# Patient Record
Sex: FEMALE | Race: WHITE | Employment: OTHER | URBAN - METROPOLITAN AREA
[De-identification: names, ages, dates, MRNs, and addresses within clinical notes are randomized per-mention and may not be internally consistent; named-entity substitution may affect disease eponyms.]

---

## 2019-05-06 VITALS
OXYGEN SATURATION: 99 % | DIASTOLIC BLOOD PRESSURE: 86 MMHG | WEIGHT: 187 LBS | SYSTOLIC BLOOD PRESSURE: 122 MMHG | HEART RATE: 70 BPM | RESPIRATION RATE: 20 BRPM | BODY MASS INDEX: 30.05 KG/M2 | HEIGHT: 66 IN

## 2019-05-06 RX ORDER — MULTIVIT WITH MINERALS/LUTEIN
1000 TABLET ORAL DAILY
COMMUNITY

## 2019-05-06 RX ORDER — CYCLOBENZAPRINE HCL 10 MG
10 TABLET ORAL SEE ADMIN INSTRUCTIONS
COMMUNITY
End: 2019-08-05 | Stop reason: ALTCHOICE

## 2019-05-06 RX ORDER — FLUTICASONE PROPIONATE 50 MCG
1 SPRAY, SUSPENSION (ML) NASAL DAILY
COMMUNITY
End: 2019-10-02 | Stop reason: SDUPTHER

## 2019-05-06 RX ORDER — NYSTATIN 100000 U/G
CREAM TOPICAL SEE ADMIN INSTRUCTIONS
COMMUNITY
End: 2019-08-05 | Stop reason: ALTCHOICE

## 2019-05-06 RX ORDER — OMEPRAZOLE 40 MG/1
40 CAPSULE, DELAYED RELEASE ORAL DAILY
COMMUNITY
End: 2019-08-05 | Stop reason: ALTCHOICE

## 2019-05-06 RX ORDER — SULFAMETHOXAZOLE AND TRIMETHOPRIM 800; 160 MG/1; MG/1
1 TABLET ORAL SEE ADMIN INSTRUCTIONS
COMMUNITY
End: 2019-05-15 | Stop reason: ALTCHOICE

## 2019-05-06 RX ORDER — ALBUTEROL SULFATE 2.5 MG/3ML
2.5 SOLUTION RESPIRATORY (INHALATION) SEE ADMIN INSTRUCTIONS
COMMUNITY
End: 2019-08-05 | Stop reason: ALTCHOICE

## 2019-05-06 RX ORDER — LOSARTAN POTASSIUM 50 MG/1
50 TABLET ORAL DAILY
COMMUNITY
End: 2019-07-09 | Stop reason: SDUPTHER

## 2019-05-06 RX ORDER — LOPERAMIDE HYDROCHLORIDE 2 MG/1
2 TABLET ORAL SEE ADMIN INSTRUCTIONS
COMMUNITY
End: 2019-08-05

## 2019-05-06 RX ORDER — PROMETHAZINE HYDROCHLORIDE 25 MG/1
25 TABLET ORAL SEE ADMIN INSTRUCTIONS
COMMUNITY
End: 2019-08-05 | Stop reason: ALTCHOICE

## 2019-05-06 RX ORDER — ERYTHROMYCIN AND BENZOYL PEROXIDE 30; 50 MG/G; MG/G
GEL TOPICAL DAILY
COMMUNITY
End: 2019-08-05 | Stop reason: ALTCHOICE

## 2019-05-06 RX ORDER — ACYCLOVIR 50 MG/G
CREAM TOPICAL
COMMUNITY
End: 2019-08-05

## 2019-05-06 RX ORDER — METOLAZONE 2.5 MG/1
2.5 TABLET ORAL SEE ADMIN INSTRUCTIONS
COMMUNITY
End: 2019-08-05 | Stop reason: ALTCHOICE

## 2019-05-06 RX ORDER — MAGNESIUM CARB/ALUMINUM HYDROX 105-160MG
296 TABLET,CHEWABLE ORAL SEE ADMIN INSTRUCTIONS
COMMUNITY
End: 2019-08-05 | Stop reason: SDUPTHER

## 2019-05-06 RX ORDER — CLOTRIMAZOLE 10 MG/1
10 LOZENGE ORAL; TOPICAL SEE ADMIN INSTRUCTIONS
COMMUNITY
End: 2019-05-15 | Stop reason: ALTCHOICE

## 2019-05-06 RX ORDER — KETOCONAZOLE 20 MG/G
CREAM TOPICAL DAILY
COMMUNITY
End: 2019-08-05

## 2019-05-06 RX ORDER — ACETAMINOPHEN 325 MG/1
650 TABLET ORAL EVERY 4 HOURS PRN
COMMUNITY
End: 2019-08-05

## 2019-05-06 RX ORDER — FUROSEMIDE 40 MG/1
40 TABLET ORAL DAILY
COMMUNITY
End: 2020-01-31 | Stop reason: SDUPTHER

## 2019-05-06 RX ORDER — POLYETHYLENE GLYCOL 3350 17 G/17G
POWDER, FOR SOLUTION ORAL SEE ADMIN INSTRUCTIONS
Status: ON HOLD | COMMUNITY
End: 2020-07-30 | Stop reason: HOSPADM

## 2019-05-06 RX ORDER — LEVOCETIRIZINE DIHYDROCHLORIDE 5 MG/1
5 TABLET, FILM COATED ORAL DAILY
COMMUNITY
End: 2019-08-05

## 2019-05-14 ENCOUNTER — HOSPITAL ENCOUNTER (EMERGENCY)
Age: 64
Discharge: HOME | End: 2019-05-15
Attending: EMERGENCY MEDICINE
Payer: MEDICAID

## 2019-05-14 ENCOUNTER — APPOINTMENT (OUTPATIENT)
Dept: CT IMAGING | Age: 64
End: 2019-05-14
Payer: MEDICAID

## 2019-05-14 DIAGNOSIS — S20.212A HEMATOMA OF LEFT CHEST WALL, INITIAL ENCOUNTER: Primary | ICD-10-CM

## 2019-05-14 LAB
ABO/RH: NORMAL
ALBUMIN SERPL-MCNC: 4 G/DL (ref 3.5–5.2)
ALP BLD-CCNC: 110 U/L (ref 35–104)
ALT SERPL-CCNC: 26 U/L (ref 0–32)
ANION GAP SERPL CALCULATED.3IONS-SCNC: 14 MMOL/L (ref 7–16)
ANTIBODY SCREEN: NORMAL
AST SERPL-CCNC: 25 U/L (ref 0–31)
BASOPHILS ABSOLUTE: 0.13 E9/L (ref 0–0.2)
BASOPHILS RELATIVE PERCENT: 0.9 % (ref 0–2)
BILIRUB SERPL-MCNC: 0.5 MG/DL (ref 0–1.2)
BUN BLDV-MCNC: 15 MG/DL (ref 8–23)
CALCIUM SERPL-MCNC: 9 MG/DL (ref 8.6–10.2)
CHLORIDE BLD-SCNC: 105 MMOL/L (ref 98–107)
CO2: 22 MMOL/L (ref 22–29)
CREAT SERPL-MCNC: 0.6 MG/DL (ref 0.5–1)
EOSINOPHILS ABSOLUTE: 0.52 E9/L (ref 0.05–0.5)
EOSINOPHILS RELATIVE PERCENT: 3.5 % (ref 0–6)
GFR AFRICAN AMERICAN: >60
GFR NON-AFRICAN AMERICAN: >60 ML/MIN/1.73
GLUCOSE BLD-MCNC: 125 MG/DL (ref 74–99)
HCT VFR BLD CALC: 39.1 % (ref 34–48)
HEMOGLOBIN: 13.1 G/DL (ref 11.5–15.5)
LYMPHOCYTES ABSOLUTE: 2.98 E9/L (ref 1.5–4)
LYMPHOCYTES RELATIVE PERCENT: 20 % (ref 20–42)
MCH RBC QN AUTO: 33.5 PG (ref 26–35)
MCHC RBC AUTO-ENTMCNC: 33.5 % (ref 32–34.5)
MCV RBC AUTO: 100 FL (ref 80–99.9)
MONOCYTES ABSOLUTE: 1.49 E9/L (ref 0.1–0.95)
MONOCYTES RELATIVE PERCENT: 10.4 % (ref 2–12)
NEUTROPHILS ABSOLUTE: 9.69 E9/L (ref 1.8–7.3)
NEUTROPHILS RELATIVE PERCENT: 65.2 % (ref 43–80)
PDW BLD-RTO: 14 FL (ref 11.5–15)
PLATELET # BLD: 385 E9/L (ref 130–450)
PMV BLD AUTO: 10.6 FL (ref 7–12)
POLYCHROMASIA: ABNORMAL
POTASSIUM SERPL-SCNC: 4.7 MMOL/L (ref 3.5–5)
RBC # BLD: 3.91 E12/L (ref 3.5–5.5)
ROULEAUX: ABNORMAL
SODIUM BLD-SCNC: 141 MMOL/L (ref 132–146)
TOTAL PROTEIN: 6.9 G/DL (ref 6.4–8.3)
WBC # BLD: 14.9 E9/L (ref 4.5–11.5)

## 2019-05-14 PROCEDURE — 6370000000 HC RX 637 (ALT 250 FOR IP): Performed by: EMERGENCY MEDICINE

## 2019-05-14 PROCEDURE — 6360000004 HC RX CONTRAST MEDICATION: Performed by: INTERNAL MEDICINE

## 2019-05-14 PROCEDURE — 86900 BLOOD TYPING SEROLOGIC ABO: CPT

## 2019-05-14 PROCEDURE — 2580000003 HC RX 258: Performed by: EMERGENCY MEDICINE

## 2019-05-14 PROCEDURE — 85025 COMPLETE CBC W/AUTO DIFF WBC: CPT

## 2019-05-14 PROCEDURE — 96375 TX/PRO/DX INJ NEW DRUG ADDON: CPT

## 2019-05-14 PROCEDURE — 96374 THER/PROPH/DIAG INJ IV PUSH: CPT

## 2019-05-14 PROCEDURE — 74177 CT ABD & PELVIS W/CONTRAST: CPT

## 2019-05-14 PROCEDURE — 6360000002 HC RX W HCPCS: Performed by: EMERGENCY MEDICINE

## 2019-05-14 PROCEDURE — 80053 COMPREHEN METABOLIC PANEL: CPT

## 2019-05-14 PROCEDURE — 36415 COLL VENOUS BLD VENIPUNCTURE: CPT

## 2019-05-14 PROCEDURE — 86850 RBC ANTIBODY SCREEN: CPT

## 2019-05-14 PROCEDURE — 2580000003 HC RX 258: Performed by: INTERNAL MEDICINE

## 2019-05-14 PROCEDURE — 86901 BLOOD TYPING SEROLOGIC RH(D): CPT

## 2019-05-14 PROCEDURE — 71260 CT THORAX DX C+: CPT

## 2019-05-14 PROCEDURE — 70450 CT HEAD/BRAIN W/O DYE: CPT

## 2019-05-14 PROCEDURE — 99284 EMERGENCY DEPT VISIT MOD MDM: CPT

## 2019-05-14 RX ORDER — NAPROXEN 500 MG/1
500 TABLET ORAL 2 TIMES DAILY WITH MEALS
Qty: 60 TABLET | Refills: 0 | Status: SHIPPED | OUTPATIENT
Start: 2019-05-14 | End: 2019-05-15 | Stop reason: ALTCHOICE

## 2019-05-14 RX ORDER — 0.9 % SODIUM CHLORIDE 0.9 %
1000 INTRAVENOUS SOLUTION INTRAVENOUS ONCE
Status: COMPLETED | OUTPATIENT
Start: 2019-05-14 | End: 2019-05-14

## 2019-05-14 RX ORDER — MORPHINE SULFATE 4 MG/ML
4 INJECTION, SOLUTION INTRAMUSCULAR; INTRAVENOUS ONCE
Status: COMPLETED | OUTPATIENT
Start: 2019-05-14 | End: 2019-05-14

## 2019-05-14 RX ORDER — ACETAMINOPHEN 500 MG
1000 TABLET ORAL ONCE
Status: DISCONTINUED | OUTPATIENT
Start: 2019-05-14 | End: 2019-05-14

## 2019-05-14 RX ORDER — HYDROCODONE BITARTRATE AND ACETAMINOPHEN 5; 325 MG/1; MG/1
2 TABLET ORAL ONCE
Status: COMPLETED | OUTPATIENT
Start: 2019-05-14 | End: 2019-05-14

## 2019-05-14 RX ORDER — SODIUM CHLORIDE 0.9 % (FLUSH) 0.9 %
10 SYRINGE (ML) INJECTION PRN
Status: DISCONTINUED | OUTPATIENT
Start: 2019-05-14 | End: 2019-05-15 | Stop reason: HOSPADM

## 2019-05-14 RX ORDER — ONDANSETRON 2 MG/ML
4 INJECTION INTRAMUSCULAR; INTRAVENOUS ONCE
Status: COMPLETED | OUTPATIENT
Start: 2019-05-14 | End: 2019-05-14

## 2019-05-14 RX ORDER — MORPHINE SULFATE 4 MG/ML
4 INJECTION, SOLUTION INTRAMUSCULAR; INTRAVENOUS ONCE
Status: DISCONTINUED | OUTPATIENT
Start: 2019-05-14 | End: 2019-05-14

## 2019-05-14 RX ADMIN — Medication 10 ML: at 18:06

## 2019-05-14 RX ADMIN — IOPAMIDOL 110 ML: 755 INJECTION, SOLUTION INTRAVENOUS at 18:06

## 2019-05-14 RX ADMIN — HYDROCODONE BITARTRATE AND ACETAMINOPHEN 2 TABLET: 5; 325 TABLET ORAL at 19:02

## 2019-05-14 RX ADMIN — Medication 4 MG: at 16:26

## 2019-05-14 RX ADMIN — ONDANSETRON HYDROCHLORIDE 4 MG: 2 SOLUTION INTRAMUSCULAR; INTRAVENOUS at 16:27

## 2019-05-14 RX ADMIN — SODIUM CHLORIDE 1000 ML: 9 INJECTION, SOLUTION INTRAVENOUS at 20:04

## 2019-05-14 ASSESSMENT — PAIN DESCRIPTION - LOCATION: LOCATION: RIB CAGE

## 2019-05-14 ASSESSMENT — ENCOUNTER SYMPTOMS
BLOOD IN STOOL: 0
VOMITING: 0
COLOR CHANGE: 1
NAUSEA: 0
BACK PAIN: 1
ABDOMINAL PAIN: 0
SHORTNESS OF BREATH: 0
COUGH: 0

## 2019-05-14 ASSESSMENT — PAIN SCALES - GENERAL
PAINLEVEL_OUTOF10: 4
PAINLEVEL_OUTOF10: 10
PAINLEVEL_OUTOF10: 10

## 2019-05-14 ASSESSMENT — PAIN DESCRIPTION - ORIENTATION: ORIENTATION: LEFT

## 2019-05-14 ASSESSMENT — PAIN DESCRIPTION - PAIN TYPE: TYPE: ACUTE PAIN

## 2019-05-14 NOTE — ED PROVIDER NOTES
Condition is a 48-year-old female presenting to ED with complaint of fall. Patient states that she normally uses a wheelchair to get around but can transition to a walker. Patient states that her knee gave out 6 days ago when she fell onto her left side striking her left side of her ribs and arm on the ground. Patient states she had a similar episode that occurred today that the fall was much softer. Patient has noticed swelling over the left side of her chest and has significant pain in that site. Patient denies any difficulty breathing. Patient is concerned she may have broken ribs. Patient denies any significant head trauma. Patient denies loss of consciousness. She is not attempted to treat her symptoms with any medication. Patient's history of UTI, MS, metatarsal fracture, hypertension, abdominal wall hematoma and 2011, patient also history of multiple falls and asthma. Review of Systems   Constitutional: Negative for chills and fever. Respiratory: Negative for cough and shortness of breath. Cardiovascular: Positive for chest pain. Gastrointestinal: Negative for abdominal pain, blood in stool, nausea and vomiting. Genitourinary: Negative for dysuria and frequency. Musculoskeletal: Positive for back pain. Skin: Positive for color change. Negative for rash. Bruising over left chest and left flank   Neurological: Negative for weakness and headaches. All other systems reviewed and are negative. Physical Exam   Constitutional: She is oriented to person, place, and time. She appears well-developed and well-nourished. No distress. HENT:   Head: Normocephalic and atraumatic. Eyes: Pupils are equal, round, and reactive to light. Neck: Normal range of motion. Neck supple. Cardiovascular: Normal rate, regular rhythm and intact distal pulses. Pulmonary/Chest: Effort normal and breath sounds normal. No respiratory distress. She has no wheezes. She has no rales.  She exhibits tenderness. Abdominal: Soft. Bowel sounds are normal. There is no tenderness. There is no rebound and no guarding. Musculoskeletal: She exhibits no edema. Neurological: She is alert and oriented to person, place, and time. No cranial nerve deficit. Coordination normal.   Skin: Skin is warm and dry. She is not diaphoretic. Bruising over the left side of the chest wall extending down the left flank. Nursing note and vitals reviewed. Procedures    MDM  Number of Diagnoses or Management Options  Diagnosis management comments: She is a 54-year-old female presenting to ED after multiple falls. Considering possibility of rib fractures, hematoma over the chest wall, also considering possible atelectasis or coronary contusion. Patient did have multiple falls considering possibility of head traumas by patient's denial of it. Patient wanted a head CT performed. Patient also has left-sided flank bruising considered possibility of internal bleeding from possible abdominal organ injury. 7:30 patient has smi 2500, ct shows hematoma on left subcutaneous space, patient will be treated for hematoma, inflammation and pain. Advised to frequently use spirometer. Patient has pain medication at home and follows with pain management.     --------------------------------------------- PAST HISTORY ---------------------------------------------  Past Medical History:  has a past medical history of Asthma, Fall, Hematoma of abdominal wall, Hypertension, Metatarsal fracture, Multiple sclerosis ([facility] [address] and UTI (urinary tract infection). Past Surgical History:  has a past surgical history that includes Hysterectomy; Cholecystectomy; and Appendectomy. Social History:  reports that she has been smoking cigarettes. She has a 11.50 pack-year smoking history. She does not have any smokeless tobacco history on file. She reports that she does not drink alcohol or use drugs.     Family History: family history includes Asthma in her ischemic changes. CT CHEST W CONTRAST   Final Result   Biliary dilatation likely status post cholecystectomy   Enhancing lesion in the right lobe of the liver   On the most inferior slice there appears to be a right renal mass   possibly arising from the right kidney of low density likely a cyst   Findings consistent with emphysema and scar in the lung fields   , Superimposed on interstitial lung disease                           CT ABDOMEN PELVIS W IV CONTRAST Additional Contrast? None   Final Result   Lesion in the liver faintly seen on the CT abdomen better seen on the   CT chest chest with evidence for enhancement, concerning for   hepatocellular carcinoma    Renal masses, statistically likely cysts. Biliary dilatation                                     ------------------------- NURSING NOTES AND VITALS REVIEWED ---------------------------  Date / Time Roomed:  5/14/2019  3:19 PM  ED Bed Assignment:  12/12    The nursing notes within the ED encounter and vital signs as below have been reviewed. /80   Pulse 87   Temp 97.6 °F (36.4 °C) (Oral)   Resp 18   Ht 5' 6\" (1.676 m)   Wt 165 lb (74.8 kg)   SpO2 98%   BMI 26.63 kg/m²   Oxygen Saturation Interpretation: Normal      ------------------------------------------ PROGRESS NOTES ------------------------------------------  ED Course as of May 14 2355   Tue May 14, 2019   2000 Notified that the patient has a blood pressure of 19O systolically. Fluids ordered. Patient asymptomatic.    [BM]      ED Course User Index  [BM] Cara Wu MD         11:55 PM  I have spoken with the patient and discussed todays results, in addition to providing specific details for the plan of care and counseling regarding the diagnosis and prognosis. Their questions are answered at this time and they are agreeable with the plan. I discussed at length with them reasons for immediate return here for re evaluation.  They will followup with their primary care

## 2019-05-15 VITALS
WEIGHT: 165 LBS | RESPIRATION RATE: 17 BRPM | HEART RATE: 82 BPM | BODY MASS INDEX: 26.52 KG/M2 | DIASTOLIC BLOOD PRESSURE: 60 MMHG | HEIGHT: 66 IN | SYSTOLIC BLOOD PRESSURE: 118 MMHG | OXYGEN SATURATION: 98 % | TEMPERATURE: 97.6 F

## 2019-05-15 RX ORDER — MAGNESIUM CARB/ALUMINUM HYDROX 105-160MG
TABLET,CHEWABLE ORAL
Refills: 11 | COMMUNITY
Start: 2019-05-10 | End: 2019-08-14 | Stop reason: SDUPTHER

## 2019-05-15 RX ORDER — AMOXICILLIN 875 MG/1
TABLET, COATED ORAL
Refills: 0 | COMMUNITY
Start: 2019-02-08 | End: 2019-02-13

## 2019-05-15 RX ORDER — AMOXICILLIN 500 MG/1
CAPSULE ORAL
Refills: 0 | COMMUNITY
Start: 2019-04-30 | End: 2019-05-07

## 2019-05-15 RX ORDER — INCONTINENCE PAD,LINER,DISP
EACH MISCELLANEOUS
Refills: 11 | COMMUNITY
Start: 2019-05-10 | End: 2020-12-10

## 2019-05-15 RX ORDER — CLINDAMYCIN HYDROCHLORIDE 300 MG/1
CAPSULE ORAL
Refills: 0 | COMMUNITY
Start: 2019-04-25 | End: 2019-05-06

## 2019-05-15 ASSESSMENT — PAIN SCALES - GENERAL: PAINLEVEL_OUTOF10: 5

## 2019-05-15 NOTE — ED NOTES
Called [employer] [facility]  had a flat and is why not here at 5 or 530.   Changing things around and assured  will still be here     Tracy Cavanaugh RN  05/15/19 [id number]

## 2019-05-15 NOTE — ED NOTES
Pt placed on bedpan for moderate amount of urine. Attempts with ambulance with prior nurse to take home to [place] unsuccessful. Pt called person at [facility] unable to get hold of her at this time to see if can pick pt up.   Continuing to call for transport     Jamia Norwood RN  05/15/19 0005

## 2019-05-15 NOTE — ED NOTES
Pt doesn't have money to pay for [employer] transport called [employer] back to see if can run pt [facility] # to see if medicaid will pay.   Medicaid will pay for transport and will  pt approx 500 to [address], RN  05/15/19 003

## 2019-05-16 ENCOUNTER — NURSE ONLY (OUTPATIENT)
Dept: PRIMARY CARE CLINIC | Age: 64
End: 2019-05-16
Payer: MEDICAID

## 2019-05-16 VITALS
SYSTOLIC BLOOD PRESSURE: 107 MMHG | RESPIRATION RATE: 20 BRPM | DIASTOLIC BLOOD PRESSURE: 73 MMHG | OXYGEN SATURATION: 98 % | HEART RATE: 90 BPM

## 2019-05-16 DIAGNOSIS — D49.0 NEOPLASM, LIVER: ICD-10-CM

## 2019-05-16 DIAGNOSIS — G35 MULTIPLE SCLEROSIS (HCC): ICD-10-CM

## 2019-05-16 DIAGNOSIS — S30.1XXD HEMATOMA OF LEFT FLANK, SUBSEQUENT ENCOUNTER: ICD-10-CM
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
## 2019-05-16 DIAGNOSIS — R60.0 LOCALIZED EDEMA: ICD-10-CM

## 2019-05-16 DIAGNOSIS — K59.00 CONSTIPATION, UNSPECIFIED CONSTIPATION TYPE: ICD-10-CM
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
## 2019-05-16 PROCEDURE — 99350 HOME/RES VST EST HIGH MDM 60: CPT | Performed by: FAMILY MEDICINE

## 2019-05-16 NOTE — PROGRESS NOTES
Ongoing evaluation of chronic medical problems. This patient has Neck pain; Multiple sclerosis ([address] Bilateral foot-drop; and Peripheral polyneuropathy on their problem list.     Home visit medically necessary in lieu of an office visit due to: wheelchair bound, difficult to get out. HPI:  Seen in [facility] ER on 5/14/19 after she fell and landed on L flank. Has a lot of bruising and some swelling. C/o muscle spasms on L side from fall. CT scan of chest showed worrisome lesion in R lobe of liver. Also c/o constipated and hard stools. Has not had a BM for at least a week. She's finished the home PT. She has officially quit smoking (2/25/19). REVIEW OF SYSTEMS:   General:  Weight stable, generally healthy, no change in strength or exercise tolerance. Heart: No palpitations, no syncope, no orthopnea. Abdomen: Chronic constipation from vicodin (sparingly) and MS managed on stool softeners and laxatives. No change in appetite, no dysphagia, no abdominal pains, no bowel habit changes, no emesis, no melena. : No urinary urgency, no dysuria, no change in nature of urine. Neurologic:   Unable to walk without holding on to something. Significant ataxia. In w/c most of the time. No weakness, no tremor, no seizures, no changes in mentation. All other systems negative. PAST MEDICAL HISTORY: (Major events, hospitalizations, surgeries):  Pneumonia (2010), 1998 Vag hyst, 1978 naina, append, freq epidural inclusion cysts tx'd with docycycline. MS dx'd Mar 8, 1991. IV corticosteroids 1991-92. Has never been on MS meds. 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 Known allergies: NKDA. Ongoing medical problems: Multiple sclerosis, Asthma, HTN, hypoglycemia, scoliosis, DDD with sciatic, arthritis, osteoporosis, chronic LBP. Preventative: Pneumovax 11/2009, Flu vac 2018, 2017, 2012, smoking cessation counselling 7/2014 Social history:  with 3 children.    Smokes 1-1 1/2 ppd 40 yrs. No alcohol. . 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 Nutritional history: Takes a lot of vitamins and supplements every day. PHYSICAL EXAM:      Vitals:    05/16/19 1238   BP: 107/73   Site: Right Upper Arm   Position: Sitting   Cuff Size: Large Adult   Pulse: 90   Resp: 20   SpO2: 98%      GEN: middle age overweight female patient sitting in WC in NAD. HEAD:  atraumatic, normocephalic. EYES:  EOMI, PERRL, L  conjunct normal, R conjunct reddened, without drainage. ENT:  EACs and TMs normal. ORAL: mouth with 12 dental extractions healing,  Tongue with white coating. Thick nasal secretions. Pharynx: PND. LUNGS:   clear to ausc, no rales or rhonchi. HEART:  RRR, no murmurs or gallops. ABD:  Obese, soft, non-tender to palp, no palp masses or HSM, normal BS. BACK:  No CVAT. Scoliosis toward right / kyphosis,  tender to palp over L lumbar area. EXTREM: 1-2+ edema  Unable to make a strong fist.   Venous stasis changes. No ulcerations, varicosities or erythema,  deformities. MUSCULOSKEL:  good ROM of most joints, non-tender to palp and with movement, except lower extremeties which are weakened from [address] and non-wt bearing. WC bound from MS. NEURO:  Normal motor for her. No tremor, normal sensory,  able to stand and transfer with much effort. Unable to walk. SKIN: Clear, no lesions. R midback with papular eczema patch. No drainage  No ulcerations or breakdown, ecchymosis, or other lesions. Medications reviewed. Labs and Imaging reviewed. ASSESSMENT:  Elderly female with has Neck pain; Multiple sclerosis ([facility] [address] Bilateral foot-drop; and Peripheral polyneuropathy on their problem list.     Diagnoses attached to this encounter:  Rachel Smith was seen today for constipation and fall.     Diagnoses and all orders for this visit:    Fall as cause of accidental injury in home as place of occurrence, subsequent encounter    Constipation, unspecified Indications: Swelling      ketoconazole (NIZORAL) 2 % cream Apply topically daily Apply to affected area BID      levocetirizine (XYZAL) 5 MG tablet Take 5 mg by mouth daily Take 1 tab daily for allergy symptoms      loperamide (LOPERAMIDE A-D) 2 MG tablet Take 2 mg by mouth See Admin Instructions 2 tabs to start, 1 tab after each diarrhea stool, up to 6 tabs per day      loratadine-pseudoephedrine (SM LORATADINE D) 5-120 MG per extended release tablet Take 1 tablet by mouth 2 times daily      losartan (COZAAR) 50 MG tablet Take 50 mg by mouth daily      Magnesium Citrate 1.745 GM/30ML solution Take 296 mLs by mouth See Admin Instructions 1 bottle daily prn constipation      metolazone (ZAROXOLYN) 2.5 MG tablet Take 2.5 mg by mouth See Admin Instructions Take 1 tab by mouth daily 30 minutes before lazix as needed for swelling      B Complex-C-Folic Acid (HM VITAMIN B COMPLEX/VITAMIN C PO) Take 1 tablet by mouth daily      mupirocin (BACTROBAN) 2 % ointment Apply topically See Admin Instructions Apply to affected area BID until better      nystatin (MYCOSTATIN) 073212 UNIT/GM cream Apply topically See Admin Instructions Apply daily to affected area until resolved      omeprazole (PRILOSEC) 40 MG delayed release capsule Take 40 mg by mouth daily      polyethylene glycol (GLYCOLAX) powder Take by mouth See Admin Instructions 1 capful in large glass water prn constipation      promethazine (PHENERGAN) 25 MG tablet Take 25 mg by mouth See Admin Instructions 1/2 -1 tab q6h prn N/V      Propylhexedrine (BENZEDREX) INHA by Nasal route Inhale each nostril for nasal congestion      Disposable Gloves (VINYL GLOVES) MISC by Does not apply route      HYDROcodone-acetaminophen (NORCO)  MG per tablet Take 1 tablet by mouth See Admin Instructions.  Take 1 tab TID prn chronic pain due to multiple sclerosis G35      albuterol sulfate  (90 BASE) MCG/ACT inhaler Inhale 2 puffs into the lungs every 6 hours

## 2019-05-31 ENCOUNTER — TELEPHONE (OUTPATIENT)
Dept: CASE MANAGEMENT | Age: 64
End: 2019-05-31

## 2019-05-31 ENCOUNTER — OFFICE VISIT (OUTPATIENT)
Dept: ONCOLOGY | Age: 64
End: 2019-05-31
Payer: MEDICAID

## 2019-05-31 ENCOUNTER — HOSPITAL ENCOUNTER (OUTPATIENT)
Dept: INFUSION THERAPY | Age: 64
Discharge: HOME | End: 2019-05-31
Payer: MEDICAID

## 2019-05-31 VITALS
BODY MASS INDEX: 26.52 KG/M2 | HEIGHT: 66 IN | WEIGHT: 165 LBS | HEART RATE: 86 BPM | SYSTOLIC BLOOD PRESSURE: 141 MMHG | DIASTOLIC BLOOD PRESSURE: 87 MMHG | TEMPERATURE: 98.2 F

## 2019-05-31 DIAGNOSIS — R16.0 LIVER MASS: Primary | ICD-10-CM

## 2019-05-31 DIAGNOSIS — R16.0 LIVER MASS: ICD-10-CM

## 2019-05-31 LAB
ALBUMIN SERPL-MCNC: 4.1 G/DL (ref 3.5–5.2)
ALP BLD-CCNC: 117 U/L (ref 35–104)
ALT SERPL-CCNC: 21 U/L (ref 0–32)
ANION GAP SERPL CALCULATED.3IONS-SCNC: 12 MMOL/L (ref 7–16)
AST SERPL-CCNC: 24 U/L (ref 0–31)
BASOPHILS ABSOLUTE: 0 E9/L (ref 0–0.2)
BASOPHILS RELATIVE PERCENT: 0.8 % (ref 0–2)
BILIRUB SERPL-MCNC: 0.7 MG/DL (ref 0–1.2)
BUN BLDV-MCNC: 17 MG/DL (ref 8–23)
CALCIUM SERPL-MCNC: 9.7 MG/DL (ref 8.6–10.2)
CEA: 1.7 NG/ML (ref 0–5.2)
CHLORIDE BLD-SCNC: 106 MMOL/L (ref 98–107)
CO2: 25 MMOL/L (ref 22–29)
CREAT SERPL-MCNC: 0.6 MG/DL (ref 0.5–1)
EOSINOPHILS ABSOLUTE: 0.46 E9/L (ref 0.05–0.5)
EOSINOPHILS RELATIVE PERCENT: 4.3 % (ref 0–6)
GFR AFRICAN AMERICAN: >60
GFR NON-AFRICAN AMERICAN: >60 ML/MIN/1.73
GLUCOSE BLD-MCNC: 106 MG/DL (ref 74–99)
HCT VFR BLD CALC: 41.6 % (ref 34–48)
HEMOGLOBIN: 13.6 G/DL (ref 11.5–15.5)
LYMPHOCYTES ABSOLUTE: 1.3 E9/L (ref 1.5–4)
LYMPHOCYTES RELATIVE PERCENT: 12.2 % (ref 20–42)
MCH RBC QN AUTO: 33.7 PG (ref 26–35)
MCHC RBC AUTO-ENTMCNC: 32.7 % (ref 32–34.5)
MCV RBC AUTO: 103.2 FL (ref 80–99.9)
METAMYELOCYTES RELATIVE PERCENT: 0.9 % (ref 0–1)
MONOCYTES ABSOLUTE: 1.19 E9/L (ref 0.1–0.95)
MONOCYTES RELATIVE PERCENT: 11.3 % (ref 2–12)
MYELOCYTE PERCENT: 0.9 % (ref 0–0)
NEUTROPHILS ABSOLUTE: 7.78 E9/L (ref 1.8–7.3)
NEUTROPHILS RELATIVE PERCENT: 70.4 % (ref 43–80)
PDW BLD-RTO: 14.8 FL (ref 11.5–15)
PLATELET # BLD: 418 E9/L (ref 130–450)
PMV BLD AUTO: 9.8 FL (ref 7–12)
POTASSIUM SERPL-SCNC: 4.6 MMOL/L (ref 3.5–5)
RBC # BLD: 4.03 E12/L (ref 3.5–5.5)
SODIUM BLD-SCNC: 143 MMOL/L (ref 132–146)
TOTAL PROTEIN: 7.5 G/DL (ref 6.4–8.3)
WBC # BLD: 10.8 E9/L (ref 4.5–11.5)

## 2019-05-31 PROCEDURE — 80053 COMPREHEN METABOLIC PANEL: CPT

## 2019-05-31 PROCEDURE — 99214 OFFICE O/P EST MOD 30 MIN: CPT

## 2019-05-31 PROCEDURE — 85610 PROTHROMBIN TIME: CPT | Performed by: INTERNAL MEDICINE

## 2019-05-31 PROCEDURE — 80074 ACUTE HEPATITIS PANEL: CPT

## 2019-05-31 PROCEDURE — 82105 ALPHA-FETOPROTEIN SERUM: CPT

## 2019-05-31 PROCEDURE — 36415 COLL VENOUS BLD VENIPUNCTURE: CPT

## 2019-05-31 PROCEDURE — 99244 OFF/OP CNSLTJ NEW/EST MOD 40: CPT | Performed by: INTERNAL MEDICINE

## 2019-05-31 PROCEDURE — 86301 IMMUNOASSAY TUMOR CA 19-9: CPT

## 2019-05-31 PROCEDURE — 86703 HIV-1/HIV-2 1 RESULT ANTBDY: CPT

## 2019-05-31 PROCEDURE — 85025 COMPLETE CBC W/AUTO DIFF WBC: CPT

## 2019-05-31 PROCEDURE — 82378 CARCINOEMBRYONIC ANTIGEN: CPT

## 2019-05-31 PROCEDURE — 86316 IMMUNOASSAY TUMOR OTHER: CPT

## 2019-05-31 RX ORDER — SOY ISOFLAVONE 40 MG
1 TABLET ORAL DAILY
Status: ON HOLD | COMMUNITY
End: 2020-07-30 | Stop reason: HOSPADM

## 2019-05-31 NOTE — TELEPHONE ENCOUNTER
Met with patient during her initial consultation appointment with Dr. Joe Rodriguez at [facility] for evaluation of liver mass per her PCP. Introduced myself and explained my role with patients receiving treatment at our cancer center. Patient was friendly and receptive. She has her history of MS, HTN, and asthma. She is in a wheelchair. On [employer]SI. Transportation with [employer] (needs 24 hr notice prior), [employer] assisting with shower entrance opening, and an aide comes 4x/wk to assist with laundry on lower floor. Reports that she baths, cooks, and cleans for herself. The aides are more companions and they play cards, etc.  Patient takes numerous vitamins and supplements. Completed nursing assessment for the center. Informed her that our office will contact once her CT Triphasic liver scan, CT guided liver biopsy, and Dr. Jany Cast consult is scheduled. Verbalizes understanding. Provided contact information, and instructed patient to call me with questions or concerns. Verbalizes understanding. Patient appreciative of visit. Will continue to follow. Transported to lab for blood work and called for her transportation. Dali Maciel, BSW, RN, OCN  Oncology Nurse Navigator

## 2019-05-31 NOTE — PROGRESS NOTES
mouth See Admin Instructions 1 bottle daily prn constipation, Disp: , Rfl:     metolazone (ZAROXOLYN) 2.5 MG tablet, Take 2.5 mg by mouth See Admin Instructions Take 1 tab by mouth daily 30 minutes before lazix as needed for swelling, Disp: , Rfl:     B Complex-C-Folic Acid (HM VITAMIN B COMPLEX/VITAMIN C PO), Take 1 tablet by mouth daily, Disp: , Rfl:     HYDROcodone-acetaminophen (NORCO)  MG per tablet, Take 1 tablet by mouth See Admin Instructions. Take 1 tab TID prn chronic pain due to multiple sclerosis G35, Disp: , Rfl:     albuterol sulfate  (90 BASE) MCG/ACT inhaler, Inhale 2 puffs into the lungs every 6 hours as needed for Wheezing, Disp: , Rfl:     vitamin D (CHOLECALCIFEROL) 5000 UNITS CAPS capsule, Take 5,000 Units by mouth daily, Disp: , Rfl:     senna (SENOKOT) 8.6 MG tablet, Take 1 tablet by mouth daily, Disp: , Rfl:     ammonium lactate (LAC-HYDRIN) 12 % lotion, Apply topically as needed for Dry Skin Apply topically as needed. , Disp: , Rfl:     Incontinence Supply Disposable (PROTECTIVE UNDERWEAR LARGE) MISC, , Disp: , Rfl: 11    Incontinence Supply Disposable (POISE MAXIMUM ABSORBENCY) PADS, , Disp: , Rfl: 11    acetaminophen (TYLENOL) 325 MG tablet, Take 650 mg by mouth every 4 hours as needed for Pain, Disp: , Rfl:     acyclovir (ZOVIRAX) 5 % CREA, Apply topically Apply every 4 hours x 5 days at first sign of cold sore, Disp: , Rfl:     betamethasone valerate (VALISONE) 0.1 % cream, Apply topically 3 times daily Apply to rash TID until resolved, Disp: , Rfl:     cyclobenzaprine (FLEXERIL) 10 MG tablet, Take 10 mg by mouth See Admin Instructions 1 tab BID-TID prn spasms, Disp: , Rfl:     ketoconazole (NIZORAL) 2 % cream, Apply topically daily Apply to affected area BID, Disp: , Rfl:     levocetirizine (XYZAL) 5 MG tablet, Take 5 mg by mouth daily Take 1 tab daily for allergy symptoms, Disp: , Rfl:     loperamide (LOPERAMIDE A-D) 2 MG tablet, Take 2 mg by mouth See Admin Inability: Not on file    Transportation needs:     Medical: Not on file     Non-medical: Not on file   Tobacco Use    Smoking status: Former Smoker     Packs/day: 0.25     Years: 46.00     Pack years: 11.50     Types: Cigarettes     Last attempt to quit: 2019     Years since quittin.2    Smokeless tobacco: Never Used   Substance and Sexual Activity    Alcohol use: No    Drug use: No    Sexual activity: Not Currently   Lifestyle    Physical activity:     Days per week: Not on file     Minutes per session: Not on file    Stress: Not on file   Relationships    Social connections:     Talks on phone: Not on file     Gets together: Not on file     Attends Bahai service: Not on file     Active member of club or organization: Not on file     Attends meetings of clubs or organizations: Not on file     Relationship status: Not on file    Intimate partner violence:     Fear of current or ex partner: Not on file     Emotionally abused: Not on file     Physically abused: Not on file     Forced sexual activity: Not on file   Other Topics Concern    Not on file   Social History Narrative    Not on file       Occupation: [employer]  Retired:  NO    Pacemaker/Defibulator/ICD:  No    [employer]: No    FALLS RISK SCREENING ASSESSMENT    Instructions:  Assess the patient and [place] the appropriate indicators that are present for fall risk identification. Total the numbers circled and assign a fall risk score from Table 2.  Reassess patient at a minimum every 12 weeks or with status change. Assessment   Date  2019     1. Mental Ability: confusion/cognitively impaired No - 0       2. Elimination Issues: incontinence, frequency No - 0       3. Ambulatory: use of assistive devices (walker, cane, off-loading devices), attached to equipment (IV pole, oxygen) Yes - 2     4. Sensory Limitations: dizziness, vertigo, impaired vision No - 0       5. Age 72 years or greater - 1       10.   Medication: diuretics, strong analgesics, hypnotics, sedatives, antihypertensive agents   Yes - 3   7. Falls:  recent history of falls within the last 3 months (not to include slipping or tripping)   No - 0   TOTAL 4    If score of 4 or greater was education given? Yes       TABLE 2   Risk Score Risk Level Plan of Care   0-3 Little or  No Risk 1. Provide assistance as indicated for ambulation activities  2. Reorient confused/cognitively impaired patient  3. Call-light/bell within patient's reach  4. Chair/bed in low position, stretcher/bed with siderails up except when performing patient care activities  5. Educate patient/family/caregiver on falls prevention  6.  Reassess in 12 weeks or with any noted change in patient condition which places them at a risk for a fall   4-6 Moderate Risk 1. Provide assistance as indicated for ambulation activities  2. Reorient confused/cognitively impaired patient  3. Call-light/bell within patient's reach  4. Chair/bed in low position, stretcher/bed with siderails up except when performing patient care activities  5. Educate patient/family/caregiver on falls prevention  6. Falls risk precaution (Yellow sticker Level II) placed on patient chart   7 or   Higher High Risk 1. Place patient in easily observable treatment room  2. Patient attended at all times by family member or staff  3. Provide assistance as indicated for ambulation activities  4. Reorient confused/cognitively impaired patient  5. Call-light/bell within patient's reach  6. Chair/bed in low position, stretcher/bed with siderails up except when performing patient care activities  7. Educate patient/family/caregiver on falls prevention  8. Falls risk precaution (Yellow sticker Level III) placed on patient chart           MALNUTRITION RISK SCREENING ASSESSMENT    Instructions:  Assess the patient and enter the appropriate indicators that are present for nutrition risk identification.  Total the numbers entered and assign a risk score. Follow the appropriate action for total score listed below. Assessment   Date  5/31/2019     1. Have you lost weight without trying? 0- No     2. Have you been eating poorly because of a decreased appetite? 0- No   3. Do you have a diagnosis of head and neck cancer? 0- No                                                                                    TOTAL 0        Score of 0-1: No action  Score 2 or greater:  · For Non-Diabetic Patient: Recommend adding Ensure Complete 2 x daily and provide patient with Ensure wellness bag with coupons  · For Diabetic Patient: Recommend adding Glucerna Shake 2 x daily and provide patient with Glucerna Wellness bag with coupons  · Route to the dietitian via [employer]    · Are you having  difficulty performing daily routine tasks  due to fatigue or weakness (ie: bathing/showering, dressing, housework, meal prep, work, child Javed Passey): No     · Do you have any arm flexibility/ROM restrictions, swelling or pain that limit activity: No     · Any changes in memory, attention/focus that impact daily activities: No     · Do you avoid participation in leisure/social activity due weakness, fatigue or pain: No     ARE ANY OF THE ABOVE ARE ANSWERED YES: No          PT ASSESSMENT FOR REFERRAL    · Have you had any recent falls in past 2 months: No     · Do you have difficulty  going up/down stairs: Yes     · Are you having difficulty walking: Yes     · Do you often hold onto furniture/environmental supports or feel off balance when you are walking: Yes     · Do you need to take rest breaks when you are walking: No     · Any pain on scale of 1-10 that limits your mobility: No 0/10    ARE ANY OF THE ABOVE ARE ANSWERED YES: Yes - but NO PT referral request sent due to patient refusal.     Has MS but does all chores.   Has an aide for company for [employer]-4x/wk      [address]    - Is patient planned to receive Cisplatin? No. This patient is not planned to start Cisplatin. - Is patient complaining of new onset hearing loss? No. Patient is not complaining of new onset hearing loss.         Jose Raul Douglass

## 2019-05-31 NOTE — PROGRESS NOTES
Department of [facility] Med Oncology  Attending Consult Note    Reason for Visit:  Consultation on a patient with Liver mass    Referring Physician:  Sharad Bhat MD    PCP:  Sharad Bhat MD    History of Present Illness:  72 y/o female with hx of HTN, Asthma, MS who presented to the ED on 05/14/2019 after a fall. Hx of MS, multiple falls, has been using a replacement wheelchair while her normal one has been getting fixed. Work-up included CT chest/abdomen/pelvis 05/14/2019 which noted enhancing 2 x 1.5 cm mass in the right lobe of the liver. Referred to our clinic for further evaluation. Review of Systems;  CONSTITUTIONAL: No fever, chills. Fair appetite. Positive for fatigue. Wheelchair bound  ENMT: Eyes: No diplopia; Nose: No epistaxis. Mouth: No sore throat. RESPIRATORY: No hemoptysis, shortness of breath, cough. CARDIOVASCULAR: No chest pain, palpitations. GASTROINTESTINAL: No nausea/vomiting, abdominal pain, diarrhea/constipation. GENITOURINARY: No dysuria, urinary frequency, hematuria. NEURO: Hx of MS not on medications. Remainder:  ROS NEGATIVE    Past Medical History:      Diagnosis Date    Asthma     Fall 5/26/2011    Hematoma of abdominal wall 5/2011    extraperitoneal    Hypertension     Metatarsal fracture 1/2011    right 3rd and 4th    Multiple sclerosis (HCC)     UTI (urinary tract infection)      Past Surgical History:      Procedure Laterality Date    APPENDECTOMY      CHOLECYSTECTOMY      HYSTERECTOMY       Family History:  Family History   Problem Relation Age of Onset    Asthma Father     High Blood Pressure Father     Cancer Maternal Aunt      Medications:  Reviewed and reconciled.     Social History:  Social History     Socioeconomic History    Marital status:      Spouse name: Not on file    Number of children: Not on file    Years of education: Not on file    Highest education level: Not on file   Occupational History    Not on file   Social Needs    presented to the ED on 05/14/2019 after a fall. Work-up included CT chest/abdomen/pelvis 05/14/2019 which showed enhancing 2 x 1.5 cm mass in the right lobe of the liver. Labs will be drawn today including AFP. CT Triphasic Liver ordered. CT guided needle biopsy ordered. Hepatobiliary team consulted (Dr. Doreen Valdivia). RTC 2-3 weeks to review test results and treatment plan. Thank you for allowing us to participate in the care of Mrs. Natasha Braden.     Marcial Whitaker MD   3/25/8998  Board Certified Medical Oncologist

## 2019-06-01 LAB
AFP-TUMOR MARKER: 5 NG/ML (ref 0–9)
CA 19-9: 13 U/ML (ref 0–37)

## 2019-06-02 ENCOUNTER — HOSPITAL ENCOUNTER (OUTPATIENT)
Dept: CT IMAGING | Age: 64
Discharge: HOME | End: 2019-06-04
Payer: MEDICAID

## 2019-06-02 DIAGNOSIS — R16.0 LIVER MASS: ICD-10-CM

## 2019-06-02 LAB — CHROMOGRANIN A: 129 NG/ML (ref 0–95)

## 2019-06-02 PROCEDURE — 6360000004 HC RX CONTRAST MEDICATION: Performed by: RADIOLOGY

## 2019-06-02 PROCEDURE — 2580000003 HC RX 258: Performed by: RADIOLOGY

## 2019-06-02 PROCEDURE — 74174 CTA ABD&PLVS W/CONTRAST: CPT

## 2019-06-02 RX ORDER — SODIUM CHLORIDE 0.9 % (FLUSH) 0.9 %
10 SYRINGE (ML) INJECTION ONCE
Status: COMPLETED | OUTPATIENT
Start: 2019-06-02 | End: 2019-06-02

## 2019-06-02 RX ADMIN — Medication 10 ML: at 10:38

## 2019-06-02 RX ADMIN — IOPAMIDOL 90 ML: 755 INJECTION, SOLUTION INTRAVENOUS at 10:38

## 2019-06-03 LAB
HAV IGM SER IA-ACNC: NORMAL
HEPATITIS B CORE IGM ANTIBODY: NORMAL
HEPATITIS B SURFACE ANTIGEN INTERPRETATION: NORMAL
HEPATITIS C ANTIBODY INTERPRETATION: NORMAL
HIV-1 AND HIV-2 ANTIBODIES: NORMAL

## 2019-06-06 ENCOUNTER — TELEPHONE (OUTPATIENT)
Dept: PRIMARY CARE CLINIC | Age: 64
End: 2019-06-06

## 2019-06-06 ENCOUNTER — TELEPHONE (OUTPATIENT)
Dept: HEMATOLOGY | Age: 64
End: 2019-06-06

## 2019-06-06 NOTE — TELEPHONE ENCOUNTER
Lyla Blackwell called. She states she is still having pain and swelling to her L side (around where her bra would sit) since she fell in March. She said all of the xrays were negative at that time, and she wants to know why it would still be swollen.

## 2019-06-06 NOTE — TELEPHONE ENCOUNTER
I called the patient and she confirmed her 6/7/19 appt at 10:45 am in [facility] department. [address]. [facility],  [address]. Directions: Park in the ER parking lot enter through door B, walk straight until you see the elevators on the right. Take the elevators down to 1R, when you come off the elevators turn left and notify the  in the glass case that you are here to see Dr. Joshua Cerrato.      Electronically signed by Nicolás Neff on 6/6/19 at 10:22 AM

## 2019-06-06 NOTE — TELEPHONE ENCOUNTER
She had significant ecchymosis and a large hematoma when I saw her. Sometimes that can develop into a seroma, a collection of fluid, that needs to be drained. It is is hurting a lot still, she probably should go back to the ER to have it checked or I can stop and see her next time I'm in the area.

## 2019-06-07 ENCOUNTER — OFFICE VISIT (OUTPATIENT)
Dept: HEMATOLOGY | Age: 64
End: 2019-06-07
Payer: MEDICAID

## 2019-06-07 VITALS
DIASTOLIC BLOOD PRESSURE: 84 MMHG | WEIGHT: 165 LBS | BODY MASS INDEX: 26.52 KG/M2 | TEMPERATURE: 97.8 F | RESPIRATION RATE: 14 BRPM | OXYGEN SATURATION: 98 % | SYSTOLIC BLOOD PRESSURE: 164 MMHG | HEART RATE: 77 BPM | HEIGHT: 66 IN

## 2019-06-07 DIAGNOSIS — R16.0 LIVER MASS: Primary | ICD-10-CM

## 2019-06-07 DIAGNOSIS — S30.1XXA ABDOMINAL WALL HEMATOMA, INITIAL ENCOUNTER: ICD-10-CM

## 2019-06-07 PROCEDURE — 99204 OFFICE O/P NEW MOD 45 MIN: CPT | Performed by: TRANSPLANT SURGERY

## 2019-06-07 PROCEDURE — 99211 OFF/OP EST MAY X REQ PHY/QHP: CPT | Performed by: TRANSPLANT SURGERY

## 2019-06-07 ASSESSMENT — ENCOUNTER SYMPTOMS
VOMITING: 0
DIARRHEA: 0
PHOTOPHOBIA: 0
ABDOMINAL PAIN: 0
SHORTNESS OF BREATH: 0
CONSTIPATION: 0
EYE PAIN: 0
BACK PAIN: 0
NAUSEA: 0
BLOOD IN STOOL: 0
EYE DISCHARGE: 0

## 2019-06-07 NOTE — PROGRESS NOTES
Hepatobiliary and Pancreatic Surgery Attending History and Physical    Patient's Name/Date of Birth: Antonio Dale /1955 (14 y.o.)    Date: June 7, 2019     CC: possible liver mass    HPI:  Patient is a very pleasant 71year old female whom has MS and recently fell. She had a CT scan performed of her liver which questioned a 2.5cm hepatocellular carcinoma in the right side of her liver. She then underwent a triphasic CT which was not convincing. She denies any weight loss. She denies a family history of cancer. She was a former smoker and a non drinker.      Past Medical History:   Diagnosis Date    Asthma     Fall 5/26/2011    Hematoma of abdominal wall 5/2011    extraperitoneal    Hypertension     Metatarsal fracture 1/2011    right 3rd and 4th    Multiple sclerosis ([facility] [address]     UTI (urinary tract infection)        Past Surgical History:   Procedure Laterality Date    APPENDECTOMY      CHOLECYSTECTOMY      HYSTERECTOMY         Current Outpatient Medications   Medication Sig Dispense Refill    BLACK ELDERBERRY,BERRY-FLOWER, PO Take 1 tablet by mouth daily      Methylsulfonylmethane (MSM) 1000 MG CAPS Take 1 tablet by mouth daily      Multiple Vitamins-Minerals (MULTIVITAMIN ADULT PO) Take 1 tablet by mouth daily      Incontinence Supply Disposable (PROTECTIVE UNDERWEAR LARGE) MISC   11    Incontinence Supply Disposable (POISE MAXIMUM ABSORBENCY) PADS   11    acetaminophen (TYLENOL) 325 MG tablet Take 650 mg by mouth every 4 hours as needed for Pain      acyclovir (ZOVIRAX) 5 % CREA Apply topically Apply every 4 hours x 5 days at first sign of cold sore      albuterol (PROVENTIL) (2.5 MG/3ML) 0.083% nebulizer solution Take 2.5 mg by nebulization See Admin Instructions 1 vial via nebulizer q4h prn cough/wheeze      Ascorbic Acid (VITAMIN C) 1000 MG tablet Take 1,000 mg by mouth daily      benzoyl peroxide-erythromycin (BENZAMYCIN) 5-3 % gel Apply topically daily Apply once a day for breakouts      betamethasone valerate (VALISONE) 0.1 % cream Apply topically 3 times daily Apply to rash TID until resolved      cyclobenzaprine (FLEXERIL) 10 MG tablet Take 10 mg by mouth See Admin Instructions 1 tab BID-TID prn spasms      dextromethorphan-guaiFENesin (MUCINEX DM)  MG per extended release tablet Take 1 tablet by mouth every 12 hours as needed for Cough      fluticasone (FLONASE) 50 MCG/ACT nasal spray 1 spray by Each Nare route daily      furosemide (LASIX) 40 MG tablet Take 40 mg by mouth daily Indications: Swelling      ketoconazole (NIZORAL) 2 % cream Apply topically daily Apply to affected area BID      levocetirizine (XYZAL) 5 MG tablet Take 5 mg by mouth daily Take 1 tab daily for allergy symptoms      loperamide (LOPERAMIDE A-D) 2 MG tablet Take 2 mg by mouth See Admin Instructions 2 tabs to start, 1 tab after each diarrhea stool, up to 6 tabs per day      loratadine-pseudoephedrine (SM LORATADINE D) 5-120 MG per extended release tablet Take 1 tablet by mouth 2 times daily      losartan (COZAAR) 50 MG tablet Take 50 mg by mouth daily      Magnesium Citrate 1.745 GM/30ML solution Take 296 mLs by mouth See Admin Instructions 1 bottle daily prn constipation      metolazone (ZAROXOLYN) 2.5 MG tablet Take 2.5 mg by mouth See Admin Instructions Take 1 tab by mouth daily 30 minutes before lazix as needed for swelling      B Complex-C-Folic Acid (HM VITAMIN B COMPLEX/VITAMIN C PO) Take 1 tablet by mouth daily      mupirocin (BACTROBAN) 2 % ointment Apply topically See Admin Instructions Apply to affected area BID until better      nystatin (MYCOSTATIN) 431360 UNIT/GM cream Apply topically See Admin Instructions Apply daily to affected area until resolved      omeprazole (PRILOSEC) 40 MG delayed release capsule Take 40 mg by mouth daily      polyethylene glycol (GLYCOLAX) powder Take by mouth See Admin Instructions 1 capful in large glass water prn constipation      promethazine (PHENERGAN) 25 MG tablet Take 25 mg by mouth See Admin Instructions 1/2 -1 tab q6h prn N/V      Propylhexedrine (BENZEDREX) INHA by Nasal route Inhale each nostril for nasal congestion      Disposable Gloves (VINYL GLOVES) MISC by Does not apply route      HYDROcodone-acetaminophen (NORCO)  MG per tablet Take 1 tablet by mouth See Admin Instructions. Take 1 tab TID prn chronic pain due to multiple sclerosis G35      albuterol sulfate  (90 BASE) MCG/ACT inhaler Inhale 2 puffs into the lungs every 6 hours as needed for Wheezing      vitamin D (CHOLECALCIFEROL) 5000 UNITS CAPS capsule Take 5,000 Units by mouth daily      senna (SENOKOT) 8.6 MG tablet Take 1 tablet by mouth daily      ammonium lactate (LAC-HYDRIN) 12 % lotion Apply topically as needed for Dry Skin Apply topically as needed. No current facility-administered medications for this visit.         No Known Allergies    Family History   Problem Relation Age of Onset    Asthma Father     High Blood Pressure Father     Cancer Maternal Aunt        Social History     Socioeconomic History    Marital status:      Spouse name: Not on file    Number of children: Not on file    Years of education: Not on file    Highest education level: Not on file   Occupational History    Not on file   Social Needs    Financial resource strain: Not on file    Food insecurity:     Worry: Not on file     Inability: Not on file    Transportation needs:     Medical: Not on file     Non-medical: Not on file   Tobacco Use    Smoking status: Former Smoker     Packs/day: 0.25     Years: 46.00     Pack years: 11.50     Types: Cigarettes     Last attempt to quit: 2019     Years since quittin.2    Smokeless tobacco: Never Used   Substance and Sexual Activity    Alcohol use: No    Drug use: No    Sexual activity: Not Currently   Lifestyle    Physical activity:     Days per week: Not on file     Minutes per session: Not on file    Stress: Not on file   Relationships    Social connections:     Talks on phone: Not on file     Gets together: Not on file     Attends Spiritism service: Not on file     Active member of club or organization: Not on file     Attends meetings of clubs or organizations: Not on file     Relationship status: Not on file    Intimate partner violence:     Fear of current or ex partner: Not on file     Emotionally abused: Not on file     Physically abused: Not on file     Forced sexual activity: Not on file   Other Topics Concern    Not on file   Social History Narrative    Not on file       ROS:   Review of Systems   Constitutional: Negative for chills, diaphoresis and fever. HENT: Negative for congestion, ear discharge, ear pain, hearing loss, nosebleeds and tinnitus. Eyes: Negative for photophobia, pain and discharge. Respiratory: Negative for shortness of breath. Cardiovascular: Negative for palpitations and leg swelling. Gastrointestinal: Negative for abdominal pain, blood in stool, constipation, diarrhea, nausea and vomiting. Endocrine: Negative for polydipsia. Genitourinary: Negative for frequency, hematuria and urgency. Musculoskeletal: Positive for myalgias. Negative for back pain and neck pain. Skin: Negative for rash. Allergic/Immunologic: Negative for environmental allergies. Neurological: Negative for tremors and seizures. Psychiatric/Behavioral: Negative for hallucinations and suicidal ideas. The patient is not nervous/anxious. Physical Exam:  Vitals:    06/07/19 1054   BP: (!) 164/84   Pulse: 77   Resp: 14   Temp: 97.8 °F (36.6 °C)   SpO2: 98%       PSYCH: mood and affect normal, alert and oriented x 3  CONSTITUTIONAL: No apparent distress, comfortable  EYES: Sclera white, pupils equal round and reactive to light  ENMT:  Hearing normal, trachea midline, ears externally intact  LYMPH: no lympadenopathy in neck.  Nolympadenopathy in groins  RESP: Breath sounds were clear and equal with no rales, wheezes, or rhonchi. Respiratory effort was normal with no retractions or use of accessory muscles. CV: Heart soundswere normal with a regular rate and rhythm. No pedal edema  GI/ Abdomen: Soft, nondistended, nontender, no guarding, no peritoneal signs  MSK: no clubbing/ no cyanosis       Assessment/Plan:  Possible 2.5cm right lobe liver mass  - we reviewed her CT images together today in the office. I am not convinced that something is there. I agree with the triphasic CT read. - I would advise against a liver biopsy at this point  - will plan for an ultrasound in 3 months  - she is in agreement with this plan    45 Minutes of which greater than 50% was spent counseling or coordinating her care. Thank you for the consultation allowing me to take part in Ms. Lara's care.      Bladimir Patel M.D.  6/7/2019  11:05 AM

## 2019-06-14 ENCOUNTER — OFFICE VISIT (OUTPATIENT)
Dept: ONCOLOGY | Age: 64
End: 2019-06-14
Payer: MEDICAID

## 2019-06-14 ENCOUNTER — HOSPITAL ENCOUNTER (OUTPATIENT)
Dept: INFUSION THERAPY | Age: 64
Discharge: HOME | End: 2019-06-14
Payer: MEDICAID

## 2019-06-14 ENCOUNTER — TELEPHONE (OUTPATIENT)
Dept: CASE MANAGEMENT | Age: 64
End: 2019-06-14

## 2019-06-14 VITALS
BODY MASS INDEX: 26.52 KG/M2 | WEIGHT: 165 LBS | TEMPERATURE: 98.8 F | HEIGHT: 66 IN | DIASTOLIC BLOOD PRESSURE: 92 MMHG | SYSTOLIC BLOOD PRESSURE: 117 MMHG | HEART RATE: 88 BPM

## 2019-06-14 DIAGNOSIS — Z09 FOLLOW UP: Primary | ICD-10-CM

## 2019-06-14 PROCEDURE — 99214 OFFICE O/P EST MOD 30 MIN: CPT | Performed by: INTERNAL MEDICINE

## 2019-06-14 PROCEDURE — 99212 OFFICE O/P EST SF 10 MIN: CPT

## 2019-06-14 NOTE — PROGRESS NOTES
[place] [id number]  Attending Clinic Note    Reason for Visit: Follow-up on a patient with ? Liver lesion    PCP:  Jackson Johnson MD    History of Present Illness:  70 y/o female with hx of HTN, Asthma, MS who presented to the ED on 05/14/2019 after a fall. Hx of MS, multiple falls, has been using a replacement wheelchair while her normal one has been getting fixed. Work-up included CT chest/abdomen/pelvis 05/14/2019 which noted enhancing 2 x 1.5 cm mass in the right lobe of the liver. Referred to our clinic for further evaluation. Review of Systems;  CONSTITUTIONAL: No fever, chills. Fair appetite. Positive for fatigue. Wheelchair bound  ENMT: Eyes: No diplopia; Nose: No epistaxis. Mouth: No sore throat. RESPIRATORY: No hemoptysis, shortness of breath, cough. CARDIOVASCULAR: No chest pain, palpitations. GASTROINTESTINAL: No nausea/vomiting, abdominal pain, diarrhea/constipation. GENITOURINARY: No dysuria, urinary frequency, hematuria. NEURO: Hx of MS not on medications. Remainder:  ROS NEGATIVE    Past Medical History:      Diagnosis Date    Asthma     Fall 5/26/2011    Hematoma of abdominal wall 5/2011    extraperitoneal    Hypertension     Metatarsal fracture 1/2011    right 3rd and 4th    Multiple sclerosis (HCC)     UTI (urinary tract infection)      Past Surgical History:      Procedure Laterality Date    APPENDECTOMY      CHOLECYSTECTOMY      HYSTERECTOMY       Family History:  Family History   Problem Relation Age of Onset    Asthma Father     High Blood Pressure Father     Cancer Maternal Aunt      Medications:  Reviewed and reconciled. Allergies:  No Known Allergies    Physical Exam:  BP (!) 117/92 (Site: Left Upper Arm, Position: Sitting, Cuff Size: Large Adult)   Pulse 88   Temp 98.8 °F (37.1 °C) (Oral)   Ht 5' 6\" (1.676 m)   Wt 165 lb (74.8 kg)   BMI 26.63 kg/m²   GENERAL: Alert, oriented x 3, Tired.  Wheelchair bound secondary to her MS  HEENT: KAYLAH; EOMI. Oropharynx clear. NECK: Supple. Without lymphadenopathy. LUNGS: Good air entry bilaterally. No wheezing, crackles or ronchi. CARDIOVASCULAR: Regular rate. No murmurs, rubs or gallops. ABDOMEN: Soft. Non-tender, non-distended. Positive bowel sounds. EXTREMITIES: Without clubbing, cyanosis, or edema. ECOG PS 2    Impression/Plan:  70 y/o female with hx of HTN, Asthma, MS who presented to the ED on 05/14/2019 after a fall. Work-up included CT chest/abdomen/pelvis 05/14/2019 which showed enhancing 2 x 1.5 cm mass in the right lobe of the liver. On 05/31/2019:   AFP 5  CEA 1.7  CA 19-9 13  CT Triphasic Liver No pathologically enhancing hepatic abnormalities noted. Hepatobiliary team consult (Dr. Kandis Ghosh) appreciated. Liver U/S in 3 months.   RTC PRN    Jaz Charles MD   3/54/0754  Board Certified Medical Oncologist

## 2019-07-09 RX ORDER — LOSARTAN POTASSIUM 50 MG/1
50 TABLET ORAL DAILY
Qty: 90 TABLET | Refills: 3 | Status: SHIPPED | OUTPATIENT
Start: 2019-07-09 | End: 2019-09-12 | Stop reason: ALTCHOICE

## 2019-07-09 RX ORDER — LOSARTAN POTASSIUM 50 MG/1
50 TABLET ORAL DAILY
Qty: 30 TABLET | Refills: 11 | OUTPATIENT
Start: 2019-07-09

## 2019-08-01 ENCOUNTER — TELEPHONE (OUTPATIENT)
Dept: PRIMARY CARE CLINIC | Age: 64
End: 2019-08-01

## 2019-08-05 ENCOUNTER — OFFICE VISIT (OUTPATIENT)
Dept: PRIMARY CARE CLINIC | Age: 64
End: 2019-08-05
Payer: MEDICAID

## 2019-08-05 VITALS
BODY MASS INDEX: 26.63 KG/M2 | OXYGEN SATURATION: 97 % | HEART RATE: 73 BPM | DIASTOLIC BLOOD PRESSURE: 94 MMHG | HEIGHT: 66 IN | SYSTOLIC BLOOD PRESSURE: 138 MMHG | RESPIRATION RATE: 20 BRPM

## 2019-08-05 DIAGNOSIS — G62.9 PERIPHERAL POLYNEUROPATHY: ICD-10-CM

## 2019-08-05 DIAGNOSIS — G35 MULTIPLE SCLEROSIS (HCC): Primary | ICD-10-CM

## 2019-08-05 DIAGNOSIS — R60.0 EDEMA OF BOTH LEGS: ICD-10-CM

## 2019-08-05 DIAGNOSIS — K59.00 CONSTIPATION, UNSPECIFIED CONSTIPATION TYPE: ICD-10-CM

## 2019-08-05 DIAGNOSIS — I10 ESSENTIAL HYPERTENSION: ICD-10-CM

## 2019-08-05 DIAGNOSIS — J45.909 ASTHMA, UNSPECIFIED ASTHMA SEVERITY, UNSPECIFIED WHETHER COMPLICATED, UNSPECIFIED WHETHER PERSISTENT: ICD-10-CM

## 2019-08-05 PROCEDURE — 99349 HOME/RES VST EST MOD MDM 40: CPT | Performed by: FAMILY MEDICINE

## 2019-08-05 RX ORDER — MAGNESIUM CARB/ALUMINUM HYDROX 105-160MG
296 TABLET,CHEWABLE ORAL SEE ADMIN INSTRUCTIONS
Qty: 1 BOTTLE | Refills: 5 | Status: ON HOLD
Start: 2019-08-05 | End: 2020-07-30 | Stop reason: HOSPADM

## 2019-08-05 ASSESSMENT — PATIENT HEALTH QUESTIONNAIRE - PHQ9
SUM OF ALL RESPONSES TO PHQ QUESTIONS 1-9: 0
SUM OF ALL RESPONSES TO PHQ QUESTIONS 1-9: 0
SUM OF ALL RESPONSES TO PHQ9 QUESTIONS 1 & 2: 0
1. LITTLE INTEREST OR PLEASURE IN DOING THINGS: 0
2. FEELING DOWN, DEPRESSED OR HOPELESS: 0

## 2019-08-13 ENCOUNTER — TELEPHONE (OUTPATIENT)
Dept: PRIMARY CARE CLINIC | Age: 64
End: 2019-08-13

## 2019-08-13 RX ORDER — AZITHROMYCIN 250 MG/1
250 TABLET, FILM COATED ORAL SEE ADMIN INSTRUCTIONS
Qty: 6 TABLET | Refills: 0 | Status: SHIPPED | OUTPATIENT
Start: 2019-08-13 | End: 2019-08-18

## 2019-08-13 NOTE — TELEPHONE ENCOUNTER
Emelyn Farley called. She states she has had a sore throat for 3 days. Her glands are swollen, and today she is having diarrhea and a temp of 99.1. She is asking that you call in an ATB.

## 2019-08-14 RX ORDER — MAGNESIUM CARB/ALUMINUM HYDROX 105-160MG
1 TABLET,CHEWABLE ORAL 2 TIMES DAILY PRN
Qty: 56 BOTTLE | Refills: 11 | Status: SHIPPED | OUTPATIENT
Start: 2019-08-14 | End: 2020-01-30

## 2019-09-11 ENCOUNTER — TELEPHONE (OUTPATIENT)
Dept: HEMATOLOGY | Age: 64
End: 2019-09-11

## 2019-09-12 ENCOUNTER — TELEPHONE (OUTPATIENT)
Dept: PRIMARY CARE CLINIC | Age: 64
End: 2019-09-12

## 2019-09-12 RX ORDER — LOSARTAN POTASSIUM AND HYDROCHLOROTHIAZIDE 12.5; 5 MG/1; MG/1
1 TABLET ORAL DAILY
Qty: 30 TABLET | Refills: 5 | Status: SHIPPED | OUTPATIENT
Start: 2019-09-12 | End: 2020-02-04 | Stop reason: ALTCHOICE

## 2019-09-25 ENCOUNTER — TELEPHONE (OUTPATIENT)
Dept: HEMATOLOGY | Age: 64
End: 2019-09-25

## 2019-09-26 ENCOUNTER — OFFICE VISIT (OUTPATIENT)
Dept: HEMATOLOGY | Age: 64
End: 2019-09-26
Payer: MEDICAID

## 2019-09-26 ENCOUNTER — HOSPITAL ENCOUNTER (OUTPATIENT)
Dept: ULTRASOUND IMAGING | Age: 64
Discharge: HOME | End: 2019-09-28
Payer: MEDICAID

## 2019-09-26 VITALS
RESPIRATION RATE: 18 BRPM | BODY MASS INDEX: 25.9 KG/M2 | HEART RATE: 71 BPM | SYSTOLIC BLOOD PRESSURE: 113 MMHG | OXYGEN SATURATION: 95 % | DIASTOLIC BLOOD PRESSURE: 74 MMHG | WEIGHT: 165 LBS | HEIGHT: 67 IN

## 2019-09-26 DIAGNOSIS — R16.0 LIVER MASS: ICD-10-CM

## 2019-09-26 DIAGNOSIS — K76.0 NAFLD (NONALCOHOLIC FATTY LIVER DISEASE): Primary | ICD-10-CM

## 2019-09-26 PROCEDURE — 99213 OFFICE O/P EST LOW 20 MIN: CPT | Performed by: TRANSPLANT SURGERY

## 2019-09-26 PROCEDURE — 76705 ECHO EXAM OF ABDOMEN: CPT

## 2019-09-26 RX ORDER — MULTIVIT WITH MINERALS/LUTEIN
1000 TABLET ORAL DAILY
COMMUNITY
End: 2020-08-21

## 2019-10-02 ENCOUNTER — OFFICE VISIT (OUTPATIENT)
Dept: PRIMARY CARE CLINIC | Age: 64
End: 2019-10-02
Payer: MEDICAID

## 2019-10-02 VITALS
OXYGEN SATURATION: 96 % | RESPIRATION RATE: 20 BRPM | SYSTOLIC BLOOD PRESSURE: 127 MMHG | DIASTOLIC BLOOD PRESSURE: 86 MMHG | HEART RATE: 75 BPM

## 2019-10-02 DIAGNOSIS — I10 ESSENTIAL HYPERTENSION: Primary | ICD-10-CM

## 2019-10-02 DIAGNOSIS — K59.00 CONSTIPATION, UNSPECIFIED CONSTIPATION TYPE: ICD-10-CM

## 2019-10-02 DIAGNOSIS — Z23 NEED FOR VACCINATION: ICD-10-CM

## 2019-10-02 DIAGNOSIS — G62.9 PERIPHERAL POLYNEUROPATHY: ICD-10-CM

## 2019-10-02 DIAGNOSIS — G35 MULTIPLE SCLEROSIS (HCC): ICD-10-CM

## 2019-10-02 DIAGNOSIS — R60.0 EDEMA OF BOTH LEGS: ICD-10-CM

## 2019-10-02 PROCEDURE — 90471 IMMUNIZATION ADMIN: CPT | Performed by: FAMILY MEDICINE

## 2019-10-02 PROCEDURE — 90670 PCV13 VACCINE IM: CPT | Performed by: FAMILY MEDICINE

## 2019-10-02 PROCEDURE — 90472 IMMUNIZATION ADMIN EACH ADD: CPT | Performed by: FAMILY MEDICINE

## 2019-10-02 PROCEDURE — 90653 IIV ADJUVANT VACCINE IM: CPT | Performed by: FAMILY MEDICINE

## 2019-10-02 PROCEDURE — 99350 HOME/RES VST EST HIGH MDM 60: CPT | Performed by: FAMILY MEDICINE

## 2019-10-02 RX ORDER — LEVOCETIRIZINE DIHYDROCHLORIDE 5 MG/1
5 TABLET, FILM COATED ORAL NIGHTLY PRN
Qty: 30 TABLET | Refills: 11 | Status: ON HOLD
Start: 2019-10-02 | End: 2020-07-30 | Stop reason: HOSPADM

## 2019-10-02 RX ORDER — DOCUSATE SODIUM 100 MG/1
100 CAPSULE, LIQUID FILLED ORAL 2 TIMES DAILY PRN
Qty: 60 CAPSULE | Refills: 11 | Status: ON HOLD
Start: 2019-10-02 | End: 2020-07-30 | Stop reason: HOSPADM

## 2019-10-02 RX ORDER — SENNA PLUS 8.6 MG/1
1 TABLET ORAL DAILY
Qty: 30 TABLET | Refills: 11 | Status: ON HOLD
Start: 2019-10-02 | End: 2020-07-30 | Stop reason: HOSPADM

## 2019-10-02 RX ORDER — SULFAMETHOXAZOLE AND TRIMETHOPRIM 800; 160 MG/1; MG/1
1 TABLET ORAL 2 TIMES DAILY
Qty: 20 TABLET | Refills: 1 | Status: SHIPPED | OUTPATIENT
Start: 2019-10-02 | End: 2019-11-07 | Stop reason: SDUPTHER

## 2019-10-02 RX ORDER — FLUTICASONE PROPIONATE 50 MCG
1 SPRAY, SUSPENSION (ML) NASAL DAILY
Qty: 1 BOTTLE | Refills: 11 | Status: ON HOLD
Start: 2019-10-02 | End: 2020-07-30 | Stop reason: HOSPADM

## 2019-10-16 ENCOUNTER — TELEPHONE (OUTPATIENT)
Dept: PRIMARY CARE CLINIC | Age: 64
End: 2019-10-16

## 2019-10-16 DIAGNOSIS — M21.372 BILATERAL FOOT-DROP: ICD-10-CM

## 2019-10-16 DIAGNOSIS — G62.9 PERIPHERAL POLYNEUROPATHY: ICD-10-CM

## 2019-10-16 DIAGNOSIS — G35 MULTIPLE SCLEROSIS (HCC): Primary | ICD-10-CM

## 2019-10-16 DIAGNOSIS — M21.371 BILATERAL FOOT-DROP: ICD-10-CM

## 2019-10-18 ENCOUNTER — TELEPHONE (OUTPATIENT)
Dept: PRIMARY CARE CLINIC | Age: 64
End: 2019-10-18

## 2019-11-04 ENCOUNTER — TELEPHONE (OUTPATIENT)
Dept: PRIMARY CARE CLINIC | Age: 64
End: 2019-11-04

## 2019-11-04 DIAGNOSIS — G35 MULTIPLE SCLEROSIS (HCC): Primary | ICD-10-CM

## 2019-11-07 RX ORDER — ALBUTEROL SULFATE 2.5 MG/3ML
2.5 SOLUTION RESPIRATORY (INHALATION) SEE ADMIN INSTRUCTIONS
Qty: 120 EACH | Refills: 11 | Status: ON HOLD
Start: 2019-11-07 | End: 2020-07-30 | Stop reason: HOSPADM

## 2019-11-07 RX ORDER — SULFAMETHOXAZOLE AND TRIMETHOPRIM 800; 160 MG/1; MG/1
1 TABLET ORAL 2 TIMES DAILY
Qty: 20 TABLET | Refills: 1 | Status: SHIPPED | OUTPATIENT
Start: 2019-11-07 | End: 2019-12-06 | Stop reason: SDUPTHER

## 2019-11-13 ENCOUNTER — TELEPHONE (OUTPATIENT)
Dept: PRIMARY CARE CLINIC | Age: 64
End: 2019-11-13

## 2019-11-21 ENCOUNTER — OFFICE VISIT (OUTPATIENT)
Dept: PRIMARY CARE CLINIC | Age: 64
End: 2019-11-21
Payer: MEDICAID

## 2019-11-21 VITALS
BODY MASS INDEX: 29.03 KG/M2 | WEIGHT: 185 LBS | OXYGEN SATURATION: 99 % | HEART RATE: 82 BPM | DIASTOLIC BLOOD PRESSURE: 70 MMHG | HEIGHT: 67 IN | RESPIRATION RATE: 20 BRPM | SYSTOLIC BLOOD PRESSURE: 96 MMHG

## 2019-11-21 DIAGNOSIS — I10 ESSENTIAL HYPERTENSION: ICD-10-CM

## 2019-11-21 DIAGNOSIS — G35 MULTIPLE SCLEROSIS (HCC): Primary | ICD-10-CM

## 2019-11-21 DIAGNOSIS — G62.9 PERIPHERAL POLYNEUROPATHY: ICD-10-CM

## 2019-11-21 DIAGNOSIS — R60.0 EDEMA OF BOTH LEGS: ICD-10-CM

## 2019-11-21 DIAGNOSIS — M48.02 CERVICAL SPINAL STENOSIS: ICD-10-CM

## 2019-11-21 DIAGNOSIS — K59.00 CONSTIPATION, UNSPECIFIED CONSTIPATION TYPE: ICD-10-CM

## 2019-11-21 DIAGNOSIS — M51.06 INTERVERTEBRAL LUMBAR DISC DISORDER WITH MYELOPATHY, LUMBAR REGION: ICD-10-CM

## 2019-11-21 PROCEDURE — 99350 HOME/RES VST EST HIGH MDM 60: CPT | Performed by: FAMILY MEDICINE

## 2019-11-21 RX ORDER — LACTULOSE 10 G/15ML
20 SOLUTION ORAL 2 TIMES DAILY PRN
Qty: 946 ML | Refills: 5 | Status: SHIPPED | OUTPATIENT
Start: 2019-11-21 | End: 2019-12-16 | Stop reason: SDUPTHER

## 2019-11-22 ENCOUNTER — TELEPHONE (OUTPATIENT)
Dept: HEMATOLOGY | Age: 64
End: 2019-11-22

## 2019-12-07 RX ORDER — SULFAMETHOXAZOLE AND TRIMETHOPRIM 800; 160 MG/1; MG/1
1 TABLET ORAL 2 TIMES DAILY
Qty: 20 TABLET | Refills: 1 | Status: SHIPPED
Start: 2019-12-07 | End: 2020-02-28

## 2019-12-16 ENCOUNTER — TELEPHONE (OUTPATIENT)
Dept: PRIMARY CARE CLINIC | Age: 64
End: 2019-12-16

## 2019-12-16 DIAGNOSIS — K59.00 CONSTIPATION, UNSPECIFIED CONSTIPATION TYPE: Primary | ICD-10-CM

## 2019-12-16 RX ORDER — LACTULOSE 10 G/15ML
20 SOLUTION ORAL 3 TIMES DAILY PRN
Qty: 1892 ML | Refills: 5 | Status: ON HOLD
Start: 2019-12-16 | End: 2020-07-30 | Stop reason: HOSPADM

## 2019-12-17 DIAGNOSIS — K59.00 CONSTIPATION, UNSPECIFIED CONSTIPATION TYPE: ICD-10-CM

## 2019-12-18 ENCOUNTER — TELEPHONE (OUTPATIENT)
Dept: PRIMARY CARE CLINIC | Age: 64
End: 2019-12-18

## 2019-12-23 ENCOUNTER — TELEPHONE (OUTPATIENT)
Dept: PRIMARY CARE CLINIC | Age: 64
End: 2019-12-23

## 2019-12-23 RX ORDER — CIPROFLOXACIN 250 MG/1
250 TABLET, FILM COATED ORAL 2 TIMES DAILY
Qty: 14 TABLET | Refills: 0 | Status: SHIPPED | OUTPATIENT
Start: 2019-12-23 | End: 2019-12-23 | Stop reason: SDUPTHER

## 2019-12-23 RX ORDER — CIPROFLOXACIN 250 MG/1
250 TABLET, FILM COATED ORAL 2 TIMES DAILY
Qty: 14 TABLET | Refills: 0 | Status: SHIPPED | OUTPATIENT
Start: 2019-12-23 | End: 2019-12-30

## 2019-12-27 ENCOUNTER — HOSPITAL ENCOUNTER (OUTPATIENT)
Dept: ULTRASOUND IMAGING | Age: 64
Discharge: HOME | End: 2019-12-29
Payer: MEDICAID

## 2019-12-27 DIAGNOSIS — K76.0 NAFLD (NONALCOHOLIC FATTY LIVER DISEASE): ICD-10-CM

## 2019-12-27 PROCEDURE — 76705 ECHO EXAM OF ABDOMEN: CPT

## 2020-01-03 ENCOUNTER — OFFICE VISIT (OUTPATIENT)
Dept: HEMATOLOGY | Age: 65
End: 2020-01-03
Payer: MEDICAID

## 2020-01-03 VITALS
WEIGHT: 185 LBS | SYSTOLIC BLOOD PRESSURE: 149 MMHG | HEART RATE: 75 BPM | HEIGHT: 65 IN | DIASTOLIC BLOOD PRESSURE: 82 MMHG | BODY MASS INDEX: 30.82 KG/M2

## 2020-01-03 PROCEDURE — 99213 OFFICE O/P EST LOW 20 MIN: CPT | Performed by: TRANSPLANT SURGERY

## 2020-01-03 NOTE — PROGRESS NOTES
Hepatobiliary and Pancreatic Surgery Progress Note    CC:  Fatty liver disease    Subjective: Patient states that she is doing well only some bowel issues likely related to her MS.    OBJECTIVE      Physical    BP (!) 149/82   Pulse 75   Ht 5' 5\" (1.651 m)   Wt 185 lb (83.9 kg)   BMI 30.79 kg/m²     General appearance: appears in no acute distress  Lungs:CTABL  Heart: RRR  Abdomen: soft, nondistended, nontympanic, no guarding, no peritoneal signs, normoactive bowel sounds  Extremities:no peripheral edema    ASSESSMENT: NAFLD - no liver mass identified    PLAN:    - we reviewed her images together today in the office  - she continues to not have a lesion in her liver  - she now falls into ultrasounds every 6 months for surveillance    Thank you for the consultation and allowing me to take part in Ms. Lara's care. Please send a copy of my note to Dr.T. Jamia Levyor Yesica 1/3/2020 1:08 PM

## 2020-01-14 ENCOUNTER — TELEPHONE (OUTPATIENT)
Dept: PRIMARY CARE CLINIC | Age: 65
End: 2020-01-14

## 2020-01-14 RX ORDER — FEXOFENADINE HCL AND PSEUDOEPHEDRINE HCI 60; 120 MG/1; MG/1
1 TABLET, EXTENDED RELEASE ORAL 2 TIMES DAILY PRN
Qty: 20 TABLET | Refills: 3 | Status: SHIPPED
Start: 2020-01-14 | End: 2020-03-24 | Stop reason: ALTCHOICE

## 2020-01-17 ENCOUNTER — TELEPHONE (OUTPATIENT)
Dept: PRIMARY CARE CLINIC | Age: 65
End: 2020-01-17

## 2020-01-17 RX ORDER — DOXYCYCLINE HYCLATE 100 MG
100 TABLET ORAL 2 TIMES DAILY
Qty: 14 TABLET | Refills: 0 | Status: SHIPPED | OUTPATIENT
Start: 2020-01-17 | End: 2020-01-24 | Stop reason: SDUPTHER

## 2020-01-17 RX ORDER — CLOTRIMAZOLE 10 MG/1
10 LOZENGE ORAL; TOPICAL
Qty: 50 TABLET | Refills: 0 | Status: SHIPPED | OUTPATIENT
Start: 2020-01-17 | End: 2020-01-27

## 2020-01-24 ENCOUNTER — TELEPHONE (OUTPATIENT)
Dept: PRIMARY CARE CLINIC | Age: 65
End: 2020-01-24

## 2020-01-24 RX ORDER — FLUCONAZOLE 150 MG/1
150 TABLET ORAL
Qty: 2 TABLET | Refills: 0 | Status: SHIPPED | OUTPATIENT
Start: 2020-01-24 | End: 2020-01-30

## 2020-01-24 RX ORDER — DOXYCYCLINE HYCLATE 100 MG
100 TABLET ORAL 2 TIMES DAILY
Qty: 14 TABLET | Refills: 0 | Status: SHIPPED | OUTPATIENT
Start: 2020-01-24 | End: 2020-01-31

## 2020-01-24 NOTE — TELEPHONE ENCOUNTER
Carmen Jimenez called. She state she finished the Doxycycline this morning. She is feeling a little better, but still doesn't feel good. She is taking Mucinex DM, Vit C, and ibuprofen, but didn't get the Allegra D because her insurance wouldn't pay for it. She states her nose is stuffy and runny. She has a low grade fever off and on. She also thinks she is getting a yeast infection because she has vaginal itching. She is asking if you can order something for that and also wants to know what else to do for her sxms. She wants to know if she needs a different ATB.

## 2020-01-30 RX ORDER — MAGNESIUM CARB/ALUMINUM HYDROX 105-160MG
TABLET,CHEWABLE ORAL
Qty: 52 BOTTLE | Refills: 11 | Status: SHIPPED
Start: 2020-01-30 | End: 2021-02-25

## 2020-01-31 ENCOUNTER — TELEPHONE (OUTPATIENT)
Dept: PRIMARY CARE CLINIC | Age: 65
End: 2020-01-31

## 2020-01-31 RX ORDER — FUROSEMIDE 40 MG/1
40 TABLET ORAL DAILY
Qty: 60 TABLET | Refills: 5 | Status: SHIPPED
Start: 2020-01-31 | End: 2020-02-10 | Stop reason: ALTCHOICE

## 2020-01-31 RX ORDER — POTASSIUM CHLORIDE 750 MG/1
10 TABLET, FILM COATED, EXTENDED RELEASE ORAL DAILY
Qty: 60 TABLET | Refills: 3 | Status: SHIPPED
Start: 2020-01-31 | End: 2020-03-20 | Stop reason: SDUPTHER

## 2020-01-31 NOTE — TELEPHONE ENCOUNTER
Patient called to report her legs are very swollen and she is SOB with exertion. CXR ordered. She does not want to go to hospital.  Instructed to take Lasix 80mg q AM and start KCl 10 mEq BID. She will call on Monday to report her status.

## 2020-02-03 ENCOUNTER — TELEPHONE (OUTPATIENT)
Dept: PRIMARY CARE CLINIC | Age: 65
End: 2020-02-03

## 2020-02-04 ENCOUNTER — OFFICE VISIT (OUTPATIENT)
Dept: PRIMARY CARE CLINIC | Age: 65
End: 2020-02-04
Payer: MEDICAID

## 2020-02-04 ENCOUNTER — TELEPHONE (OUTPATIENT)
Dept: PRIMARY CARE CLINIC | Age: 65
End: 2020-02-04

## 2020-02-04 VITALS
OXYGEN SATURATION: 97 % | SYSTOLIC BLOOD PRESSURE: 146 MMHG | DIASTOLIC BLOOD PRESSURE: 89 MMHG | RESPIRATION RATE: 20 BRPM | HEART RATE: 80 BPM

## 2020-02-04 PROCEDURE — 99349 HOME/RES VST EST MOD MDM 40: CPT | Performed by: FAMILY MEDICINE

## 2020-02-04 RX ORDER — LOSARTAN POTASSIUM 50 MG/1
50 TABLET ORAL NIGHTLY
COMMUNITY
End: 2020-07-17

## 2020-02-04 ASSESSMENT — PATIENT HEALTH QUESTIONNAIRE - PHQ9
SUM OF ALL RESPONSES TO PHQ QUESTIONS 1-9: 0
1. LITTLE INTEREST OR PLEASURE IN DOING THINGS: 0
SUM OF ALL RESPONSES TO PHQ9 QUESTIONS 1 & 2: 0
2. FEELING DOWN, DEPRESSED OR HOPELESS: 0
SUM OF ALL RESPONSES TO PHQ QUESTIONS 1-9: 0

## 2020-02-04 NOTE — PROGRESS NOTES
history: Takes a lot of vitamins and supplements every day. PHYSICAL EXAM:    Vitals:    02/04/20 1103   BP: (!) 146/89   Site: Left Upper Arm   Position: Sitting   Pulse: 80   Resp: 20   SpO2: 97%   Weight: Comment: unable      GEN: middle age overweight female patient sitting in WC in NAD. HEAD:  atraumatic, normocephalic. EYES:  EOMI, PERRL, L  conjunct normal, R conjunct reddened, without drainage. ENT:  EACs and TMs normal. ORAL: mouth with 12 dental extractions healing,  Tongue with white coating. Thick nasal secretions. Pharynx: PND. LUNGS:   clear to ausc, no rales or rhonchi. HEART:  RRR, no murmurs or gallops. ABD:  Obese, soft, non-tender to palp, no palp masses or HSM, normal BS. BACK:  No CVAT. Scoliosis toward right / kyphosis,  tender to palp over L lumbar area. EXTREM: Trace edema  Unable to make a strong fist.   Venous stasis changes. No ulcerations, varicosities or erythema,  deformities. MUSCULOSKEL:  good ROM of most joints, non-tender to palp and with movement, except lower extremeties which are weakened from [address] and non-wt bearing. WC bound from MS. NEURO:  Normal motor for her. No tremor, normal sensory,  able to stand and transfer with much effort. Unable to walk. SKIN: Clear, no lesions. R midback with papular eczema patch. No drainage  No ulcerations or breakdown, ecchymosis, or other lesions. Medications reviewed. Labs  5/31/19 [place] 14/42, GFR>60, LFTs nl, lytes nl  12/28/18 GFR 60, Lytes-nl   8/28/18 R toes/scattered arthritic changes, no fx, R foot/ scattered arthritic changes, no fx  5/16/18 Xray/ L knee Unremarkable  4/10/18 HH 14/39, GFR 96, CMP-nl, TSH-nl     ASSESSMENT:  Elderly female with has Neck pain; Multiple sclerosis ([address] Bilateral foot-drop; Peripheral polyneuropathy; Essential hypertension; Asthma; Edema of both legs; Constipation;  Intervertebral lumbar disc disorder with myelopathy, lumbar region; and Cervical spinal stenosis on prn cough/wheeze 120 each 11    fluticasone (FLONASE) 50 MCG/ACT nasal spray 1 spray by Each Nostril route daily 1 Bottle 11    senna (SENOKOT) 8.6 MG tablet Take 1 tablet by mouth daily 30 tablet 11    docusate sodium (COLACE) 100 MG capsule Take 1 capsule by mouth 2 times daily as needed for Constipation 60 capsule 11    levocetirizine (XYZAL) 5 MG tablet Take 1 tablet by mouth nightly as needed (allergies) 30 tablet 11    vitamin E 1000 units capsule Take 1,000 Units by mouth daily      Magnesium Citrate 1.745 GM/30ML solution Take 296 mLs by mouth See Admin Instructions 1 bottle daily prn constipation 1 Bottle 5    PROAIR  (90 Base) MCG/ACT inhaler INHALE 2 PUFFS EVERY 4 TO 6 HOURS AS NEEDED FOR WHEEZING.  8.5 g 11    BLACK ELDERBERRY,BERRY-FLOWER, PO Take 1 tablet by mouth daily      Methylsulfonylmethane (MSM) 1000 MG CAPS Take 1 tablet by mouth daily      Multiple Vitamins-Minerals (MULTIVITAMIN ADULT PO) Take 1 tablet by mouth daily      Incontinence Supply Disposable (POISE MAXIMUM ABSORBENCY) PADS   11    Ascorbic Acid (VITAMIN C) 1000 MG tablet Take 1,000 mg by mouth daily      betamethasone valerate (VALISONE) 0.1 % cream Apply topically 3 times daily Apply to rash TID until resolved      dextromethorphan-guaiFENesin (MUCINEX DM)  MG per extended release tablet Take 1 tablet by mouth every 12 hours as needed for Cough      B Complex-C-Folic Acid (HM VITAMIN B COMPLEX/VITAMIN C PO) Take 1 tablet by mouth daily      mupirocin (BACTROBAN) 2 % ointment Apply topically See Admin Instructions Apply to affected area BID until better      polyethylene glycol (GLYCOLAX) powder Take by mouth See Admin Instructions 1 capful in large glass water prn constipation      Propylhexedrine (BENZEDREX) INHA by Nasal route Inhale each nostril for nasal congestion      Disposable Gloves (VINYL GLOVES) MISC by Does not apply route      vitamin D (CHOLECALCIFEROL) 5000 UNITS CAPS capsule Take

## 2020-02-08 ENCOUNTER — HOSPITAL ENCOUNTER (OUTPATIENT)
Age: 65
Discharge: HOME | End: 2020-02-08
Payer: MEDICAID

## 2020-02-08 LAB
ALBUMIN SERPL-MCNC: 3.9 G/DL (ref 3.5–5.2)
ALP BLD-CCNC: 90 U/L (ref 35–104)
ALT SERPL-CCNC: 43 U/L (ref 0–32)
ANION GAP SERPL CALCULATED.3IONS-SCNC: 11 MMOL/L (ref 7–16)
AST SERPL-CCNC: 33 U/L (ref 0–31)
ATYPICAL LYMPHOCYTE RELATIVE PERCENT: 3 % (ref 0–4)
BASOPHILS ABSOLUTE: 0 E9/L (ref 0–0.2)
BASOPHILS RELATIVE PERCENT: 0 % (ref 0–2)
BILIRUB SERPL-MCNC: 0.4 MG/DL (ref 0–1.2)
BUN BLDV-MCNC: 20 MG/DL (ref 8–23)
CALCIUM SERPL-MCNC: 8.9 MG/DL (ref 8.6–10.2)
CHLORIDE BLD-SCNC: 105 MMOL/L (ref 98–107)
CHOLESTEROL, TOTAL: 169 MG/DL (ref 0–199)
CO2: 24 MMOL/L (ref 22–29)
CREAT SERPL-MCNC: 0.7 MG/DL (ref 0.5–1)
EOSINOPHILS ABSOLUTE: 0.11 E9/L (ref 0.05–0.5)
EOSINOPHILS RELATIVE PERCENT: 1 % (ref 0–6)
GFR AFRICAN AMERICAN: >60
GFR NON-AFRICAN AMERICAN: >60 ML/MIN/1.73
GLUCOSE BLD-MCNC: 114 MG/DL (ref 74–99)
HCT VFR BLD CALC: 42.1 % (ref 34–48)
HDLC SERPL-MCNC: 36 MG/DL
HEMOGLOBIN: 14.6 G/DL (ref 11.5–15.5)
LDL CHOLESTEROL CALCULATED: 56 MG/DL (ref 0–99)
LYMPHOCYTES ABSOLUTE: 2.76 E9/L (ref 1.5–4)
LYMPHOCYTES RELATIVE PERCENT: 23 % (ref 20–42)
MCH RBC QN AUTO: 34.4 PG (ref 26–35)
MCHC RBC AUTO-ENTMCNC: 34.7 % (ref 32–34.5)
MCV RBC AUTO: 99.1 FL (ref 80–99.9)
MONOCYTES ABSOLUTE: 0.32 E9/L (ref 0.1–0.95)
MONOCYTES RELATIVE PERCENT: 3 % (ref 2–12)
MYELOCYTE PERCENT: 1 % (ref 0–0)
NEUTROPHILS ABSOLUTE: 7.42 E9/L (ref 1.8–7.3)
NEUTROPHILS RELATIVE PERCENT: 69 % (ref 43–80)
PDW BLD-RTO: 13.3 FL (ref 11.5–15)
PLATELET # BLD: 287 E9/L (ref 130–450)
PMV BLD AUTO: 10.5 FL (ref 7–12)
POTASSIUM SERPL-SCNC: 4.1 MMOL/L (ref 3.5–5)
PRO-BNP: 60 PG/ML (ref 0–125)
RBC # BLD: 4.25 E12/L (ref 3.5–5.5)
SODIUM BLD-SCNC: 140 MMOL/L (ref 132–146)
TOTAL PROTEIN: 6.9 G/DL (ref 6.4–8.3)
TRIGL SERPL-MCNC: 386 MG/DL (ref 0–149)
TSH SERPL DL<=0.05 MIU/L-ACNC: 1.8 UIU/ML (ref 0.27–4.2)
VLDLC SERPL CALC-MCNC: 77 MG/DL
WBC # BLD: 10.6 E9/L (ref 4.5–11.5)

## 2020-02-08 PROCEDURE — 85025 COMPLETE CBC W/AUTO DIFF WBC: CPT

## 2020-02-08 PROCEDURE — 83880 ASSAY OF NATRIURETIC PEPTIDE: CPT

## 2020-02-08 PROCEDURE — 80061 LIPID PANEL: CPT

## 2020-02-08 PROCEDURE — 80053 COMPREHEN METABOLIC PANEL: CPT

## 2020-02-08 PROCEDURE — 36415 COLL VENOUS BLD VENIPUNCTURE: CPT

## 2020-02-08 PROCEDURE — 84443 ASSAY THYROID STIM HORMONE: CPT

## 2020-02-10 ENCOUNTER — TELEPHONE (OUTPATIENT)
Dept: PRIMARY CARE CLINIC | Age: 65
End: 2020-02-10

## 2020-02-10 RX ORDER — TORSEMIDE 10 MG/1
10 TABLET ORAL DAILY
Qty: 30 TABLET | Refills: 3 | Status: SHIPPED
Start: 2020-02-10 | End: 2020-02-27

## 2020-02-10 NOTE — TELEPHONE ENCOUNTER
5 min. Sandro Jesus called the office and left a message reporting the Lasix is giving her a bad headache, a dry spot on the back of her throat, making her 'pee like crazy' and giving her sweats. She just doesn't want to take it anymore. If Dr. Amie Gutiérrez orders her something new please send it to [facility] on [address].

## 2020-02-18 ENCOUNTER — TELEPHONE (OUTPATIENT)
Dept: PRIMARY CARE CLINIC | Age: 65
End: 2020-02-18

## 2020-02-18 NOTE — TELEPHONE ENCOUNTER
With such severe pain, I think she needs to have it evaluated at the hospital.  She should go to ED.

## 2020-02-18 NOTE — TELEPHONE ENCOUNTER
Carmen Jimenez states that her lower back has been hurting so badly, more than normal for the last 7-10 days. She states the pain will go away temporarily if she is moving a lot, but about 20 minutes after she sits down it returns. She states it is waking her up at night at times and she is only finding relief when she is seated if she beds over. She states she feels like someone is sticking her with a probe full of heat and electricity. She has been taking Ibuprophen but it is not helping relieve the pain. She states that she is also having some pain in her right arm, which her therapist massaged; Carmen Jimenez thinks this will resolve on its own. She is asking if you would consider giving her a script for Hydrocodone as it is the only pain med which seems to work and does not \"knock her out\". Orders?

## 2020-02-21 ENCOUNTER — TELEPHONE (OUTPATIENT)
Dept: PRIMARY CARE CLINIC | Age: 65
End: 2020-02-21

## 2020-02-21 NOTE — TELEPHONE ENCOUNTER
I spoke to [place] by phone. She told me leg swelling has been going on for some time. I reviewed here chart. She says she has been taking Torsemide 10 mg daily and has been elevating her legs and she uses compression stockings. Despite that she says she has BLE pitting edema. Has some mild SOB with ambulation. No wheezing at this time. No pain. I told her to take 10mg Torsemide now then starting tomorrow to take 20mg Torsemide daily x 3 days. Told her to call office on February 24 around 2 pm to give update and to receive further instructions. If symptoms worsen before then I told her to go to the emergency department. She voiced understanding.

## 2020-02-21 NOTE — TELEPHONE ENCOUNTER
Lesvia Laureano called. She states she is having more swelling again and is SOB. She is asking if she should start taking the Torsemide BID instead of QD, or if you have other orders.

## 2020-02-24 ENCOUNTER — TELEPHONE (OUTPATIENT)
Dept: PRIMARY CARE CLINIC | Age: 65
End: 2020-02-24

## 2020-02-24 NOTE — TELEPHONE ENCOUNTER
She'll need to take an extra one every day. And she will need to elevate her legs more until she gets the swelling under control.

## 2020-02-24 NOTE — TELEPHONE ENCOUNTER
Mykel Tsai called. She states she took the extra 10mg of Torsemide over the weekend and the swelling went down. She did not take it today and her legs are swelling again. Orders?

## 2020-02-27 ENCOUNTER — TELEPHONE (OUTPATIENT)
Dept: PRIMARY CARE CLINIC | Age: 65
End: 2020-02-27

## 2020-02-27 RX ORDER — HYDROCHLOROTHIAZIDE 12.5 MG/1
CAPSULE, GELATIN COATED ORAL
COMMUNITY
Start: 2020-02-03 | End: 2020-02-28

## 2020-02-27 RX ORDER — TORSEMIDE 20 MG/1
20 TABLET ORAL DAILY
Qty: 30 TABLET | Refills: 3 | Status: SHIPPED
Start: 2020-02-27 | End: 2020-03-04

## 2020-02-27 NOTE — TELEPHONE ENCOUNTER
Parish Becerra is asking if you can send in a script to her pharmacy for the higher dose of Torsemide (20mg)  She states it is working, and she is running out of the 10mg

## 2020-02-28 RX ORDER — HYDROCHLOROTHIAZIDE 12.5 MG/1
CAPSULE, GELATIN COATED ORAL
Qty: 30 CAPSULE | Refills: 2 | Status: SHIPPED
Start: 2020-02-28 | End: 2020-05-11

## 2020-02-28 RX ORDER — SULFAMETHOXAZOLE AND TRIMETHOPRIM 800; 160 MG/1; MG/1
1 TABLET ORAL 2 TIMES DAILY
Qty: 20 TABLET | Refills: 1 | Status: SHIPPED
Start: 2020-02-28 | End: 2020-04-20

## 2020-03-04 ENCOUNTER — TELEPHONE (OUTPATIENT)
Dept: PRIMARY CARE CLINIC | Age: 65
End: 2020-03-04

## 2020-03-04 RX ORDER — TORSEMIDE 10 MG/1
20 TABLET ORAL DAILY
Qty: 60 TABLET | Refills: 5 | Status: SHIPPED
Start: 2020-03-04 | End: 2020-06-18

## 2020-03-04 NOTE — TELEPHONE ENCOUNTER
Edis Armas called. She states her insurance will not pay for Torsemide 20mg, but it will pay for 10mg.  She is asking that you escript the Torsemide 10mg, take 2 tabs daily to [facility]

## 2020-03-20 ENCOUNTER — TELEPHONE (OUTPATIENT)
Dept: PRIMARY CARE CLINIC | Age: 65
End: 2020-03-20

## 2020-03-20 RX ORDER — POTASSIUM CHLORIDE 750 MG/1
20 TABLET, FILM COATED, EXTENDED RELEASE ORAL DAILY
Qty: 120 TABLET | Refills: 3 | Status: ON HOLD
Start: 2020-03-20 | End: 2020-07-30 | Stop reason: HOSPADM

## 2020-03-20 NOTE — TELEPHONE ENCOUNTER
Dario Alcaraz called today. She states stopped taking the Torsemide because it wasn't working. She decided to take HCTZ 25mg daily, but it isn't working either and she is swelling. It is in her feet, legs and mid section. She states she isn't urinating much and she is constipated. She also states she is having back spasms on the left side. Orders?

## 2020-03-20 NOTE — TELEPHONE ENCOUNTER
I called her and gave instructions to take HCTZ 1/2 hr before 1 torsemide, to limit NaCl more in her diet, to take 2 tabs of KCl. And to elevate legs more. She will call Monday to let us know how she is doing.

## 2020-03-24 ENCOUNTER — TELEPHONE (OUTPATIENT)
Dept: PRIMARY CARE CLINIC | Age: 65
End: 2020-03-24

## 2020-03-24 RX ORDER — LORATADINE AND PSEUDOEPHEDRINE SULFATE 5; 120 MG/1; MG/1
1 TABLET, EXTENDED RELEASE ORAL 2 TIMES DAILY PRN
Qty: 30 TABLET | Refills: 3 | Status: ON HOLD
Start: 2020-03-24 | End: 2020-07-30 | Stop reason: HOSPADM

## 2020-03-24 NOTE — TELEPHONE ENCOUNTER
Andreia Torrez called. She is requesting that you call in Preventiceitin D to her pharmacy [facility]). She has had nasal congestion, cough and headache for the past 3 days. She denies having a fever.   She states the swelling is much better

## 2020-03-27 ENCOUNTER — TELEPHONE (OUTPATIENT)
Dept: PRIMARY CARE CLINIC | Age: 65
End: 2020-03-27

## 2020-03-27 RX ORDER — AZITHROMYCIN 250 MG/1
250 TABLET, FILM COATED ORAL SEE ADMIN INSTRUCTIONS
Qty: 6 TABLET | Refills: 0 | Status: SHIPPED | OUTPATIENT
Start: 2020-03-27 | End: 2020-04-01

## 2020-03-27 NOTE — TELEPHONE ENCOUNTER
Claire Chawla called. She states she has been running a little bit of a fever and she thinks she is getting bronchitis.   She states she is not going to a hospital. She is requesting that you send a script for a Z-lina to [facility]

## 2020-03-27 NOTE — TELEPHONE ENCOUNTER
Okay.  Let her know that I sent it but they may not fill it because it is being reserved for those with C19. She might have to go to a Flu Clinic.

## 2020-04-20 RX ORDER — SULFAMETHOXAZOLE AND TRIMETHOPRIM 800; 160 MG/1; MG/1
1 TABLET ORAL 2 TIMES DAILY
Qty: 20 TABLET | Refills: 1 | Status: SHIPPED
Start: 2020-04-20 | End: 2020-06-15

## 2020-04-22 ENCOUNTER — VIRTUAL VISIT (OUTPATIENT)
Dept: PRIMARY CARE CLINIC | Age: 65
End: 2020-04-22
Payer: MEDICAID

## 2020-04-22 VITALS — DIASTOLIC BLOOD PRESSURE: 66 MMHG | SYSTOLIC BLOOD PRESSURE: 96 MMHG | HEART RATE: 92 BPM

## 2020-04-22 PROCEDURE — 99347 HOME/RES VST EST SF MDM 20: CPT | Performed by: FAMILY MEDICINE

## 2020-04-22 NOTE — PROGRESS NOTES
mouth daily      Magnesium Citrate 1.745 GM/30ML solution Take 296 mLs by mouth See Admin Instructions 1 bottle daily prn constipation 1 Bottle 5    PROAIR  (90 Base) MCG/ACT inhaler INHALE 2 PUFFS EVERY 4 TO 6 HOURS AS NEEDED FOR WHEEZING. 8.5 g 11    BLACK ELDERBERRY,BERRY-FLOWER, PO Take 1 tablet by mouth daily      Methylsulfonylmethane (MSM) 1000 MG CAPS Take 1 tablet by mouth daily      Multiple Vitamins-Minerals (MULTIVITAMIN ADULT PO) Take 1 tablet by mouth daily      Incontinence Supply Disposable (POISE MAXIMUM ABSORBENCY) PADS   11    Ascorbic Acid (VITAMIN C) 1000 MG tablet Take 1,000 mg by mouth daily      betamethasone valerate (VALISONE) 0.1 % cream Apply topically 3 times daily Apply to rash TID until resolved      B Complex-C-Folic Acid (HM VITAMIN B COMPLEX/VITAMIN C PO) Take 1 tablet by mouth daily      mupirocin (BACTROBAN) 2 % ointment Apply topically See Admin Instructions Apply to affected area BID until better      polyethylene glycol (GLYCOLAX) powder Take by mouth See Admin Instructions 1 capful in large glass water prn constipation      Propylhexedrine (BENZEDREX) INHA by Nasal route Inhale each nostril for nasal congestion      Disposable Gloves (VINYL GLOVES) MISC by Does not apply route      vitamin D (CHOLECALCIFEROL) 5000 UNITS CAPS capsule Take 5,000 Units by mouth daily      ammonium lactate (LAC-HYDRIN) 12 % lotion Apply topically as needed for Dry Skin Apply topically as needed. No current facility-administered medications for this visit. Return in about 1 month (around 5/22/2020). Shanika Denis is a 76 y.o. female being evaluated by a Virtual Visit (video visit) encounter to address concerns as mentioned above. A caregiver was present when appropriate.  Due to this being a TeleHealth encounter ([facility] emergency), evaluation of the following organ systems was limited: Vitals/Constitutional/EENT/Resp/CV/GI//MS/Neuro/Skin/Heme-Lymph-Imm. Pursuant to the emergency declaration under the [address] [place] [address] and the [employer] Act, this Virtual Visit was conducted with patient's (and/or legal guardian's) consent, to reduce the patient's risk of exposure to COVID-19 and provide necessary medical care. The patient (and/or legal guardian) has also been advised to contact this office for worsening conditions or problems, and seek emergency medical treatment and/or call 911 if deemed necessary. Services were provided through a video synchronous discussion virtually to substitute for in-person clinic visit. Patient and provider were located at their individual homes. --Angela Galeazzi, MD on 4/22/2020 at 3:45 PM    An electronic signature was used to authenticate this note.

## 2020-04-29 ENCOUNTER — TELEPHONE (OUTPATIENT)
Dept: PRIMARY CARE CLINIC | Age: 65
End: 2020-04-29

## 2020-05-12 ENCOUNTER — TELEPHONE (OUTPATIENT)
Dept: PRIMARY CARE CLINIC | Age: 65
End: 2020-05-12

## 2020-05-12 RX ORDER — SULFAMETHOXAZOLE AND TRIMETHOPRIM 800; 160 MG/1; MG/1
1 TABLET ORAL 2 TIMES DAILY
Qty: 20 TABLET | Refills: 0 | Status: SHIPPED | OUTPATIENT
Start: 2020-05-12 | End: 2020-05-22

## 2020-05-22 ENCOUNTER — TELEPHONE (OUTPATIENT)
Dept: PRIMARY CARE CLINIC | Age: 65
End: 2020-05-22

## 2020-05-22 RX ORDER — PHENAZOPYRIDINE HYDROCHLORIDE 100 MG/1
100 TABLET, FILM COATED ORAL 3 TIMES DAILY PRN
Qty: 10 TABLET | Refills: 0 | Status: ON HOLD
Start: 2020-05-22 | End: 2020-07-30 | Stop reason: HOSPADM

## 2020-05-22 NOTE — TELEPHONE ENCOUNTER
No. Since she is on an ATB already, she will need to get a UA C&S. When she is off the ATB for a couple days, then she will need to get urine specimen to the lab. She can take some pyridium for sxms in the meantime. Sent eRx to [facility].

## 2020-05-29 ENCOUNTER — VIRTUAL VISIT (OUTPATIENT)
Dept: PRIMARY CARE CLINIC | Age: 65
End: 2020-05-29
Payer: MEDICAID

## 2020-05-29 PROCEDURE — 99213 OFFICE O/P EST LOW 20 MIN: CPT | Performed by: PHYSICIAN ASSISTANT

## 2020-05-29 RX ORDER — DOXYCYCLINE 100 MG/1
100 CAPSULE ORAL 2 TIMES DAILY
Qty: 20 CAPSULE | Refills: 0 | Status: SHIPPED | OUTPATIENT
Start: 2020-05-29 | End: 2020-06-08

## 2020-06-15 RX ORDER — SULFAMETHOXAZOLE AND TRIMETHOPRIM 800; 160 MG/1; MG/1
1 TABLET ORAL 2 TIMES DAILY
Qty: 20 TABLET | Refills: 1 | Status: SHIPPED
Start: 2020-06-15 | End: 2020-08-26

## 2020-06-17 ENCOUNTER — TELEPHONE (OUTPATIENT)
Dept: HEMATOLOGY | Age: 65
End: 2020-06-17

## 2020-06-18 ENCOUNTER — TELEPHONE (OUTPATIENT)
Dept: PRIMARY CARE CLINIC | Age: 65
End: 2020-06-18

## 2020-06-18 RX ORDER — PRENATAL VIT 91/IRON/FOLIC/DHA 28-975-200
COMBINATION PACKAGE (EA) ORAL
Qty: 42 G | Refills: 2 | Status: ON HOLD
Start: 2020-06-18 | End: 2020-07-30 | Stop reason: HOSPADM

## 2020-06-18 RX ORDER — TORSEMIDE 10 MG/1
TABLET ORAL
Qty: 30 TABLET | Refills: 11 | Status: ON HOLD
Start: 2020-06-18 | End: 2020-07-30 | Stop reason: HOSPADM

## 2020-06-18 RX ORDER — AZITHROMYCIN 250 MG/1
250 TABLET, FILM COATED ORAL SEE ADMIN INSTRUCTIONS
Qty: 6 TABLET | Refills: 0 | Status: SHIPPED | OUTPATIENT
Start: 2020-06-18 | End: 2020-06-23

## 2020-06-24 ENCOUNTER — TELEPHONE (OUTPATIENT)
Dept: HEMATOLOGY | Age: 65
End: 2020-06-24

## 2020-07-08 NOTE — PROGRESS NOTES
7/9/2020     Home visit medically necessary in lieu of an office visit due to: wheelchair bound, difficult to get out. HPI:  Patient has been having trouble with swelling of legs again this week. She says she doesn't feel good today. She has been taking torsemide on & off, last time was 1 week ago. She has also been having more LBP and sciatic pain L lower leg. She says she takes ibuprofen occasionally when she really needs it. She also feels like she has UTI because the urine smells bad. The constipation is under control with senna and stool softeners. She has officially quit smoking (2/25/19). REVIEW OF SYSTEMS:   General:  Weight stable, generally healthy, no change in strength or exercise tolerance. Heart: No palpitations, no syncope, no orthopnea. EDEMA OF LEGS. Abdomen: Chronic constipation from vicodin (sparingly) and MS managed on stool softeners and laxatives. No change in appetite, no dysphagia, no abdominal pains, no bowel habit changes, no emesis, no melena. : No urinary urgency, no dysuria, no change in nature of urine. Neurologic:   Unable to walk without holding on to something. In w/c most of the time. No tremor, no seizures, no changes in mentation. All other systems negative. PAST MEDICAL HISTORY: (Major events, hospitalizations, surgeries): Pneumonia (2010), 1998 Vag hyst, 1978 naina, append, freq epidural inclusion cysts tx'd with docycycline. MS dx'd Mar 8, 1991. IV corticosteroids 1991-92. Has never been on MS meds. 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 Known allergies: NKDA. Ongoing medical problems: Multiple sclerosis, Asthma, HTN, hypoglycemia, scoliosis, DDD with sciatic, arthritis, osteoporosis, chronic LBP. Preventative: Pneumovax 11/2009, Flu vac 2018, 2017, 2012, smoking cessation counselling 7/2014 Social history:  with 3 children. Smokes 1-1 1/2 ppd 40 yrs. No alcohol. . Made catheter.    Worked grocery and drug stores. Nutritional history: Takes a lot of vitamins and supplements every day. PHYSICAL EXAM:    Vitals:    07/09/20 1300   BP: (!) 141/92   Site: Left Upper Arm   Position: Sitting   Pulse: 74   Resp: 20   Temp: 96.4 °F (35.8 °C)   SpO2: 97%   Weight: Comment: no weight   Height: 5' 5\" (1.651 m)      GEN: middle age overweight female patient sitting in WC in NAD. HEAD:  atraumatic, normocephalic. EYES:  EOMI, PERRL, L  conjunct normal, R conjunct reddened, without drainage. ENT:  EACs and TMs normal. ORAL: mouth with 12 dental extractions healing,  Tongue with white coating. Thick nasal secretions. Pharynx: PND. LUNGS:   clear to ausc, no rales or rhonchi. HEART:  RRR, no murmurs or gallops. ABD:  Obese, soft, non-tender to palp, no palp masses or HSM, normal BS. BACK:  No CVAT. Scoliosis toward right / kyphosis,  tender to palp over L lumbar area. EXTREM: 1-2+ edema  Unable to make a strong fist.   Venous stasis changes. No ulcerations, varicosities or erythema,  deformities. MUSCULOSKEL:  good ROM of most joints, non-tender to palp and with movement, except lower extremeties which are weakened from [address] and non-wt bearing. WC bound from MS. NEURO:  Normal motor for her. No tremor, normal sensory,  able to stand and transfer with much effort. Unable to walk. SKIN: Clear, no lesions. R midback with papular eczema patch. No drainage  No ulcerations or breakdown, ecchymosis, or other lesions. Medications reviewed. Labs 2/8/20 HH 15/42, GFR>60, lytes nl, BNP 60, TSH 1.8, , HDL 36   5/31/19 HH 14/42, GFR>60, LFTs nl, lytes nl  12/28/18 GFR 60, Lytes-nl   8/28/18 R toes/scattered arthritic changes, no fx, R foot/ scattered arthritic changes, no fx  5/16/18 Xray/ L knee Unremarkable  4/10/18 HH 14/39, GFR 96, CMP-nl, TSH-nl     ASSESSMENT:  Elderly female with has Neck pain; Multiple sclerosis ([facility] [address]  Bilateral foot-drop; Peripheral polyneuropathy; Essential hypertension; Asthma; Edema of both legs; Constipation; Intervertebral lumbar disc disorder with myelopathy, lumbar region; and Cervical spinal stenosis on their problem list.     Diagnoses attached to this encounter:  Shantelle Oconnell was seen today for multiple sclerosis, urinary tract infection and edema. Diagnoses and all orders for this visit:    Multiple sclerosis ([address]    Edema of both legs    Essential hypertension  -     Basic Metabolic Panel; Future    Intervertebral lumbar disc disorder with myelopathy, lumbar region    Constipation, unspecified constipation type    PLAN:  Check labs. Take torsemide 10 mEq q AM prn for now. Continue losartan 50mg 1 tab daily. Encouraged to elevate legs more. Continue Boost 1 daily for improving strength. Continue Flonase and Benzedrex for her allergies. Recheck 1 month. 40 minutes spent on visit, 25 minutes involved education/counseling regarding MS, BLE edema, HTN disease processes, treatment options, meds and coordination of care. Current Outpatient Medications   Medication Sig Dispense Refill    torsemide (DEMADEX) 10 MG tablet TAKE ONE TABLET BY MOUTH EVERY DAY 30 tablet 11    terbinafine (ATHLETES FOOT) 1 % cream Apply topically to feet 2 times daily for 6 weeks, then as needed. 42 g 2    phenazopyridine (PYRIDIUM) 100 MG tablet Take 1 tablet by mouth 3 times daily as needed for Pain 10 tablet 0    albuterol sulfate  (90 Base) MCG/ACT inhaler INHALE 2 PUFFS EVERY 4 TO 6 HOURS AS NEEDED FOR WHEEZING. 8.5 g 11    hydroCHLOROthiazide (MICROZIDE) 12.5 MG capsule Take 1 tablet by mouth once daily.  30 capsule 11    dextromethorphan-guaiFENesin (MUCINEX DM)  MG per extended release tablet Take 1 tablet by mouth every 12 hours as needed for Cough 60 tablet 5    loratadine-pseudoephedrine (CLARITIN-D 12 HOUR) 5-120 MG per extended release tablet Take 1 tablet by mouth 2 times daily as needed (drainage and congestion) 30 tablet 3    potassium chloride (KLOR-CON) 10 MEQ extended release tablet Take 2 tablets by mouth daily 120 tablet 3    losartan (COZAAR) 50 MG tablet Take 50 mg by mouth nightly      Incontinence Supply Disposable (PROTECTIVE UNDERWEAR LARGE) MISC Use as directed 52 Bottle 11    Zinc Gluconate (COLD-EEZE) 13.3 MG LOZG Take 1 lozenge by mouth every 3 hours for 1 day at first sign of sore throat.  18 lozenge 3    lactulose (CHRONULAC) 10 GM/15ML solution Take 30 mLs by mouth 3 times daily as needed (constipation) 1892 mL 5    albuterol (PROVENTIL) (2.5 MG/3ML) 0.083% nebulizer solution Take 3 mLs by nebulization See Admin Instructions 1 vial via nebulizer q4h prn cough/wheeze 120 each 11    fluticasone (FLONASE) 50 MCG/ACT nasal spray 1 spray by Each Nostril route daily 1 Bottle 11    senna (SENOKOT) 8.6 MG tablet Take 1 tablet by mouth daily 30 tablet 11    docusate sodium (COLACE) 100 MG capsule Take 1 capsule by mouth 2 times daily as needed for Constipation 60 capsule 11    levocetirizine (XYZAL) 5 MG tablet Take 1 tablet by mouth nightly as needed (allergies) 30 tablet 11    vitamin E 1000 units capsule Take 1,000 Units by mouth daily      Magnesium Citrate 1.745 GM/30ML solution Take 296 mLs by mouth See Admin Instructions 1 bottle daily prn constipation 1 Bottle 5    BLACK ELDERBERRY,BERRY-FLOWER, PO Take 1 tablet by mouth daily      Methylsulfonylmethane (MSM) 1000 MG CAPS Take 1 tablet by mouth daily      Multiple Vitamins-Minerals (MULTIVITAMIN ADULT PO) Take 1 tablet by mouth daily      Incontinence Supply Disposable (POISE MAXIMUM ABSORBENCY) PADS   11    Ascorbic Acid (VITAMIN C) 1000 MG tablet Take 1,000 mg by mouth daily      betamethasone valerate (VALISONE) 0.1 % cream Apply topically 3 times daily Apply to rash TID until resolved      B Complex-C-Folic Acid (HM VITAMIN B COMPLEX/VITAMIN C PO) Take 1 tablet by mouth daily      mupirocin (BACTROBAN) 2 % ointment Apply topically See Admin Instructions Apply to affected area BID until better      polyethylene glycol (GLYCOLAX) powder Take by mouth See Admin Instructions 1 capful in large glass water prn constipation      Propylhexedrine (BENZEDREX) INHA by Nasal route Inhale each nostril for nasal congestion      Disposable Gloves (VINYL GLOVES) MISC by Does not apply route      vitamin D (CHOLECALCIFEROL) 5000 UNITS CAPS capsule Take 5,000 Units by mouth daily      ammonium lactate (LAC-HYDRIN) 12 % lotion Apply topically as needed for Dry Skin Apply topically as needed. No current facility-administered medications for this visit. Return in about 1 month (around 8/9/2020). An  electronic signature was used to authenticate this note.     --Rody Longo MD on 7/9/2020 at 1:31 PM

## 2020-07-09 ENCOUNTER — TELEPHONE (OUTPATIENT)
Dept: PRIMARY CARE CLINIC | Age: 65
End: 2020-07-09

## 2020-07-09 ENCOUNTER — OFFICE VISIT (OUTPATIENT)
Dept: PRIMARY CARE CLINIC | Age: 65
End: 2020-07-09
Payer: MEDICAID

## 2020-07-09 VITALS
RESPIRATION RATE: 20 BRPM | SYSTOLIC BLOOD PRESSURE: 141 MMHG | HEART RATE: 74 BPM | TEMPERATURE: 96.4 F | HEIGHT: 65 IN | DIASTOLIC BLOOD PRESSURE: 92 MMHG | BODY MASS INDEX: 30.79 KG/M2 | OXYGEN SATURATION: 97 %

## 2020-07-09 PROCEDURE — 99349 HOME/RES VST EST MOD MDM 40: CPT | Performed by: FAMILY MEDICINE

## 2020-07-09 NOTE — TELEPHONE ENCOUNTER
----- Message from Torres League, RN sent at 7/9/2020  1:18 PM EDT -----  Ordering labs. ..  She will get a ride to [facility]  thanks

## 2020-07-10 ENCOUNTER — TELEPHONE (OUTPATIENT)
Dept: PRIMARY CARE CLINIC | Age: 65
End: 2020-07-10

## 2020-07-10 NOTE — TELEPHONE ENCOUNTER
[place] Krishna called to [facility]. During the conversation she mentioned that she often feels cold and the frequency is increasing. She also feels very tired all of the time. She is wondering if her thyroid needs to be checked. She forgot to mention this to you yesterday. If you would like to have thyroid tested as well, please add to system and let me know. I told her that I would call her back on Monday to let her know.     Thank you

## 2020-07-13 NOTE — TELEPHONE ENCOUNTER
Thank you. Markos Crisostomo know. She also stated that she left a message on your cell phone to let you know that another resident in her building had been taken out with COVID 19 on July 7th. Cheryle Lathe stated that she is feeling fine,except for her sciatica which she states is a normal complaint. She is taking her temp now proactively.

## 2020-07-13 NOTE — TELEPHONE ENCOUNTER
[place]idence Ormond her thanks. We did not come into contact or see anybody else while we were there at her building.

## 2020-07-17 RX ORDER — LOSARTAN POTASSIUM 50 MG/1
TABLET ORAL
Qty: 90 TABLET | Refills: 3 | Status: ON HOLD
Start: 2020-07-17 | End: 2020-07-30 | Stop reason: HOSPADM

## 2020-07-21 ENCOUNTER — TELEPHONE (OUTPATIENT)
Dept: PRIMARY CARE CLINIC | Age: 65
End: 2020-07-21

## 2020-07-26 ENCOUNTER — HOSPITAL ENCOUNTER (INPATIENT)
Age: 65
LOS: 4 days | Discharge: NURSING, REHAB OR LONG-TERM CARE | DRG: 720 | End: 2020-07-30
Attending: EMERGENCY MEDICINE | Admitting: INTERNAL MEDICINE
Payer: MEDICAID

## 2020-07-26 ENCOUNTER — APPOINTMENT (OUTPATIENT)
Dept: GENERAL RADIOLOGY | Age: 65
DRG: 720 | End: 2020-07-26
Payer: MEDICAID

## 2020-07-26 ENCOUNTER — ANESTHESIA (OUTPATIENT)
Dept: OPERATING ROOM | Age: 65
DRG: 720 | End: 2020-07-26
Payer: MEDICAID

## 2020-07-26 ENCOUNTER — ANESTHESIA EVENT (OUTPATIENT)
Dept: OPERATING ROOM | Age: 65
DRG: 720 | End: 2020-07-26
Payer: MEDICAID

## 2020-07-26 ENCOUNTER — APPOINTMENT (OUTPATIENT)
Dept: CT IMAGING | Age: 65
DRG: 720 | End: 2020-07-26
Payer: MEDICAID

## 2020-07-26 VITALS
DIASTOLIC BLOOD PRESSURE: 77 MMHG | RESPIRATION RATE: 19 BRPM | TEMPERATURE: 100.8 F | SYSTOLIC BLOOD PRESSURE: 91 MMHG | OXYGEN SATURATION: 95 %

## 2020-07-26 PROBLEM — A41.9 SEPSIS (HCC): Status: ACTIVE | Noted: 2020-07-26

## 2020-07-26 LAB
ALBUMIN SERPL-MCNC: 3 G/DL (ref 3.5–5.2)
ALBUMIN SERPL-MCNC: 3.7 G/DL (ref 3.5–5.2)
ALP BLD-CCNC: 133 U/L (ref 35–104)
ALP BLD-CCNC: 94 U/L (ref 35–104)
ALT SERPL-CCNC: 31 U/L (ref 0–32)
ALT SERPL-CCNC: 34 U/L (ref 0–32)
ANION GAP SERPL CALCULATED.3IONS-SCNC: 18 MMOL/L (ref 7–16)
ANION GAP SERPL CALCULATED.3IONS-SCNC: 20 MMOL/L (ref 7–16)
ANISOCYTOSIS: ABNORMAL
AST SERPL-CCNC: 32 U/L (ref 0–31)
AST SERPL-CCNC: 42 U/L (ref 0–31)
BACTERIA: ABNORMAL /HPF
BACTERIA: ABNORMAL /HPF
BASOPHILS ABSOLUTE: 0 E9/L (ref 0–0.2)
BASOPHILS RELATIVE PERCENT: 0.6 % (ref 0–2)
BILIRUB SERPL-MCNC: 0.8 MG/DL (ref 0–1.2)
BILIRUB SERPL-MCNC: 1.1 MG/DL (ref 0–1.2)
BILIRUBIN URINE: NEGATIVE
BILIRUBIN URINE: NEGATIVE
BLOOD, URINE: ABNORMAL
BLOOD, URINE: ABNORMAL
BUN BLDV-MCNC: 11 MG/DL (ref 8–23)
BUN BLDV-MCNC: 12 MG/DL (ref 8–23)
CALCIUM SERPL-MCNC: 8.2 MG/DL (ref 8.6–10.2)
CALCIUM SERPL-MCNC: 8.9 MG/DL (ref 8.6–10.2)
CHLORIDE BLD-SCNC: 102 MMOL/L (ref 98–107)
CHLORIDE BLD-SCNC: 106 MMOL/L (ref 98–107)
CLARITY: ABNORMAL
CLARITY: CLEAR
CO2: 16 MMOL/L (ref 22–29)
CO2: 17 MMOL/L (ref 22–29)
COLOR: ABNORMAL
COLOR: YELLOW
CREAT SERPL-MCNC: 0.9 MG/DL (ref 0.5–1)
CREAT SERPL-MCNC: 1 MG/DL (ref 0.5–1)
EKG ATRIAL RATE: 117 BPM
EKG P AXIS: 28 DEGREES
EKG P-R INTERVAL: 120 MS
EKG Q-T INTERVAL: 384 MS
EKG QRS DURATION: 86 MS
EKG QTC CALCULATION (BAZETT): 535 MS
EKG R AXIS: -3 DEGREES
EKG T AXIS: 72 DEGREES
EKG VENTRICULAR RATE: 117 BPM
EOSINOPHILS ABSOLUTE: 0 E9/L (ref 0.05–0.5)
EOSINOPHILS RELATIVE PERCENT: 0.2 % (ref 0–6)
EPITHELIAL CELLS, UA: ABNORMAL /HPF
EPITHELIAL CELLS, UA: ABNORMAL /HPF
GFR AFRICAN AMERICAN: >60
GFR AFRICAN AMERICAN: >60
GFR NON-AFRICAN AMERICAN: 55 ML/MIN/1.73
GFR NON-AFRICAN AMERICAN: >60 ML/MIN/1.73
GLUCOSE BLD-MCNC: 123 MG/DL (ref 74–99)
GLUCOSE BLD-MCNC: 168 MG/DL (ref 74–99)
GLUCOSE URINE: NEGATIVE MG/DL
GLUCOSE URINE: NEGATIVE MG/DL
HCT VFR BLD CALC: 37.5 % (ref 34–48)
HEMOGLOBIN: 13.7 G/DL (ref 11.5–15.5)
KETONES, URINE: NEGATIVE MG/DL
KETONES, URINE: NEGATIVE MG/DL
LACTIC ACID, SEPSIS: 5 MMOL/L (ref 0.5–1.9)
LACTIC ACID, SEPSIS: 5.7 MMOL/L (ref 0.5–1.9)
LACTIC ACID: 3.5 MMOL/L (ref 0.5–2.2)
LACTIC ACID: 5.2 MMOL/L (ref 0.5–2.2)
LEUKOCYTE ESTERASE, URINE: ABNORMAL
LEUKOCYTE ESTERASE, URINE: ABNORMAL
LYMPHOCYTES ABSOLUTE: 0 E9/L (ref 1.5–4)
LYMPHOCYTES RELATIVE PERCENT: 2 % (ref 20–42)
MAGNESIUM: 1.6 MG/DL (ref 1.6–2.6)
MCH RBC QN AUTO: 34.1 PG (ref 26–35)
MCHC RBC AUTO-ENTMCNC: 33.9 % (ref 32–34.5)
MCV RBC AUTO: 104.5 FL (ref 80–99.9)
METAMYELOCYTES RELATIVE PERCENT: 1.7 % (ref 0–1)
MONOCYTES ABSOLUTE: 0 E9/L (ref 0.1–0.95)
MONOCYTES RELATIVE PERCENT: 0.5 % (ref 2–12)
NEUTROPHILS ABSOLUTE: 13.3 E9/L (ref 1.8–7.3)
NEUTROPHILS RELATIVE PERCENT: 98.3 % (ref 43–80)
NITRITE, URINE: NEGATIVE
NITRITE, URINE: NEGATIVE
PDW BLD-RTO: 13.8 FL (ref 11.5–15)
PH UA: 5.5 (ref 5–9)
PH UA: 7 (ref 5–9)
PHOSPHORUS: 2.2 MG/DL (ref 2.5–4.5)
PLATELET # BLD: 227 E9/L (ref 130–450)
PMV BLD AUTO: 10.1 FL (ref 7–12)
POLYCHROMASIA: ABNORMAL
POTASSIUM SERPL-SCNC: 3.2 MMOL/L (ref 3.5–5)
POTASSIUM SERPL-SCNC: 3.5 MMOL/L (ref 3.5–5)
PRO-BNP: 397 PG/ML (ref 0–125)
PROTEIN UA: 30 MG/DL
PROTEIN UA: NEGATIVE MG/DL
RBC # BLD: 3.59 E12/L (ref 3.5–5.5)
RBC UA: ABNORMAL /HPF (ref 0–2)
RBC UA: ABNORMAL /HPF (ref 0–2)
SARS-COV-2, NAAT: NOT DETECTED
SODIUM BLD-SCNC: 139 MMOL/L (ref 132–146)
SODIUM BLD-SCNC: 140 MMOL/L (ref 132–146)
SPECIFIC GRAVITY UA: 1.01 (ref 1–1.03)
SPECIFIC GRAVITY UA: <=1.005 (ref 1–1.03)
T4 TOTAL: 8.4 MCG/DL (ref 4.5–11.7)
TOTAL PROTEIN: 5.5 G/DL (ref 6.4–8.3)
TOTAL PROTEIN: 6.3 G/DL (ref 6.4–8.3)
TROPONIN: 0.05 NG/ML (ref 0–0.03)
TSH SERPL DL<=0.05 MIU/L-ACNC: 1.73 UIU/ML (ref 0.27–4.2)
UROBILINOGEN, URINE: 0.2 E.U./DL
UROBILINOGEN, URINE: 0.2 E.U./DL
WBC # BLD: 13.3 E9/L (ref 4.5–11.5)
WBC UA: ABNORMAL /HPF (ref 0–5)
WBC UA: ABNORMAL /HPF (ref 0–5)

## 2020-07-26 PROCEDURE — 83735 ASSAY OF MAGNESIUM: CPT

## 2020-07-26 PROCEDURE — 2500000003 HC RX 250 WO HCPCS: Performed by: NURSE ANESTHETIST, CERTIFIED REGISTERED

## 2020-07-26 PROCEDURE — 2580000003 HC RX 258: Performed by: INTERNAL MEDICINE

## 2020-07-26 PROCEDURE — 6360000002 HC RX W HCPCS: Performed by: EMERGENCY MEDICINE

## 2020-07-26 PROCEDURE — 87081 CULTURE SCREEN ONLY: CPT

## 2020-07-26 PROCEDURE — C1758 CATHETER, URETERAL: HCPCS | Performed by: UROLOGY

## 2020-07-26 PROCEDURE — 6360000002 HC RX W HCPCS: Performed by: INTERNAL MEDICINE

## 2020-07-26 PROCEDURE — 83605 ASSAY OF LACTIC ACID: CPT

## 2020-07-26 PROCEDURE — 6360000002 HC RX W HCPCS: Performed by: UROLOGY

## 2020-07-26 PROCEDURE — 96374 THER/PROPH/DIAG INJ IV PUSH: CPT

## 2020-07-26 PROCEDURE — 87186 SC STD MICRODIL/AGAR DIL: CPT

## 2020-07-26 PROCEDURE — 2000000000 HC ICU R&B

## 2020-07-26 PROCEDURE — 74176 CT ABD & PELVIS W/O CONTRAST: CPT

## 2020-07-26 PROCEDURE — 87077 CULTURE AEROBIC IDENTIFY: CPT

## 2020-07-26 PROCEDURE — 74400 UROGRAPHY IV +-KUB TOMOG: CPT

## 2020-07-26 PROCEDURE — 6370000000 HC RX 637 (ALT 250 FOR IP): Performed by: UROLOGY

## 2020-07-26 PROCEDURE — 85025 COMPLETE CBC W/AUTO DIFF WBC: CPT

## 2020-07-26 PROCEDURE — 84100 ASSAY OF PHOSPHORUS: CPT

## 2020-07-26 PROCEDURE — 81001 URINALYSIS AUTO W/SCOPE: CPT

## 2020-07-26 PROCEDURE — 83880 ASSAY OF NATRIURETIC PEPTIDE: CPT

## 2020-07-26 PROCEDURE — 84484 ASSAY OF TROPONIN QUANT: CPT

## 2020-07-26 PROCEDURE — 3600000003 HC SURGERY LEVEL 3 BASE: Performed by: UROLOGY

## 2020-07-26 PROCEDURE — 3700000000 HC ANESTHESIA ATTENDED CARE: Performed by: UROLOGY

## 2020-07-26 PROCEDURE — 3700000001 HC ADD 15 MINUTES (ANESTHESIA): Performed by: UROLOGY

## 2020-07-26 PROCEDURE — 6370000000 HC RX 637 (ALT 250 FOR IP): Performed by: EMERGENCY MEDICINE

## 2020-07-26 PROCEDURE — P9047 ALBUMIN (HUMAN), 25%, 50ML: HCPCS | Performed by: NURSE PRACTITIONER

## 2020-07-26 PROCEDURE — 71045 X-RAY EXAM CHEST 1 VIEW: CPT

## 2020-07-26 PROCEDURE — 87150 DNA/RNA AMPLIFIED PROBE: CPT

## 2020-07-26 PROCEDURE — 6360000002 HC RX W HCPCS: Performed by: NURSE ANESTHETIST, CERTIFIED REGISTERED

## 2020-07-26 PROCEDURE — 84436 ASSAY OF TOTAL THYROXINE: CPT

## 2020-07-26 PROCEDURE — 3600000013 HC SURGERY LEVEL 3 ADDTL 15MIN: Performed by: UROLOGY

## 2020-07-26 PROCEDURE — 6360000002 HC RX W HCPCS: Performed by: NURSE PRACTITIONER

## 2020-07-26 PROCEDURE — 99285 EMERGENCY DEPT VISIT HI MDM: CPT

## 2020-07-26 PROCEDURE — 2580000003 HC RX 258: Performed by: EMERGENCY MEDICINE

## 2020-07-26 PROCEDURE — 87088 URINE BACTERIA CULTURE: CPT

## 2020-07-26 PROCEDURE — 2709999900 HC NON-CHARGEABLE SUPPLY: Performed by: UROLOGY

## 2020-07-26 PROCEDURE — 84443 ASSAY THYROID STIM HORMONE: CPT

## 2020-07-26 PROCEDURE — 80053 COMPREHEN METABOLIC PANEL: CPT

## 2020-07-26 PROCEDURE — 36415 COLL VENOUS BLD VENIPUNCTURE: CPT

## 2020-07-26 PROCEDURE — 87040 BLOOD CULTURE FOR BACTERIA: CPT

## 2020-07-26 PROCEDURE — C9113 INJ PANTOPRAZOLE SODIUM, VIA: HCPCS | Performed by: NURSE PRACTITIONER

## 2020-07-26 PROCEDURE — 2500000003 HC RX 250 WO HCPCS: Performed by: INTERNAL MEDICINE

## 2020-07-26 PROCEDURE — 2580000003 HC RX 258: Performed by: UROLOGY

## 2020-07-26 PROCEDURE — 2580000003 HC RX 258: Performed by: NURSE ANESTHETIST, CERTIFIED REGISTERED

## 2020-07-26 PROCEDURE — 93005 ELECTROCARDIOGRAM TRACING: CPT | Performed by: EMERGENCY MEDICINE

## 2020-07-26 PROCEDURE — 0T778DZ DILATION OF LEFT URETER WITH INTRALUMINAL DEVICE, VIA NATURAL OR ARTIFICIAL OPENING ENDOSCOPIC: ICD-10-PCS | Performed by: UROLOGY

## 2020-07-26 PROCEDURE — 6370000000 HC RX 637 (ALT 250 FOR IP): Performed by: INTERNAL MEDICINE

## 2020-07-26 PROCEDURE — C2617 STENT, NON-COR, TEM W/O DEL: HCPCS | Performed by: UROLOGY

## 2020-07-26 PROCEDURE — U0002 COVID-19 LAB TEST NON-CDC: HCPCS

## 2020-07-26 DEVICE — STENT URET 6FR L24CM PERCFLX HYDR+ DBL PGTL THRD 2: Type: IMPLANTABLE DEVICE | Site: URETER | Status: FUNCTIONAL

## 2020-07-26 RX ORDER — HYDROCODONE BITARTRATE AND ACETAMINOPHEN 5; 325 MG/1; MG/1
1 TABLET ORAL EVERY 4 HOURS PRN
Status: DISCONTINUED | OUTPATIENT
Start: 2020-07-26 | End: 2020-07-30 | Stop reason: HOSPADM

## 2020-07-26 RX ORDER — 0.9 % SODIUM CHLORIDE 0.9 %
1000 INTRAVENOUS SOLUTION INTRAVENOUS ONCE
Status: COMPLETED | OUTPATIENT
Start: 2020-07-26 | End: 2020-07-26

## 2020-07-26 RX ORDER — MAGNESIUM SULFATE IN WATER 40 MG/ML
2 INJECTION, SOLUTION INTRAVENOUS ONCE
Status: COMPLETED | OUTPATIENT
Start: 2020-07-26 | End: 2020-07-26

## 2020-07-26 RX ORDER — IBUPROFEN 600 MG/1
600 TABLET ORAL ONCE
Status: COMPLETED | OUTPATIENT
Start: 2020-07-26 | End: 2020-07-26

## 2020-07-26 RX ORDER — PROPOFOL 10 MG/ML
INJECTION, EMULSION INTRAVENOUS CONTINUOUS PRN
Status: DISCONTINUED | OUTPATIENT
Start: 2020-07-26 | End: 2020-07-26 | Stop reason: SDUPTHER

## 2020-07-26 RX ORDER — SODIUM CHLORIDE 9 MG/ML
25 INJECTION, SOLUTION INTRAVENOUS EVERY 8 HOURS
Status: DISCONTINUED | OUTPATIENT
Start: 2020-07-26 | End: 2020-07-29

## 2020-07-26 RX ORDER — MAGNESIUM CARB/ALUMINUM HYDROX 105-160MG
296 TABLET,CHEWABLE ORAL SEE ADMIN INSTRUCTIONS
Status: DISCONTINUED | OUTPATIENT
Start: 2020-07-26 | End: 2020-07-26 | Stop reason: SDUPTHER

## 2020-07-26 RX ORDER — MULTIVIT WITH MINERALS/LUTEIN
1000 TABLET ORAL DAILY
Status: DISCONTINUED | OUTPATIENT
Start: 2020-07-26 | End: 2020-07-30 | Stop reason: HOSPADM

## 2020-07-26 RX ORDER — SENNA PLUS 8.6 MG/1
1 TABLET ORAL DAILY
Status: DISCONTINUED | OUTPATIENT
Start: 2020-07-26 | End: 2020-07-30 | Stop reason: HOSPADM

## 2020-07-26 RX ORDER — PROPOFOL 10 MG/ML
INJECTION, EMULSION INTRAVENOUS PRN
Status: DISCONTINUED | OUTPATIENT
Start: 2020-07-26 | End: 2020-07-26 | Stop reason: SDUPTHER

## 2020-07-26 RX ORDER — ALBUTEROL SULFATE 2.5 MG/3ML
2.5 SOLUTION RESPIRATORY (INHALATION) SEE ADMIN INSTRUCTIONS
Status: DISCONTINUED | OUTPATIENT
Start: 2020-07-26 | End: 2020-07-30 | Stop reason: HOSPADM

## 2020-07-26 RX ORDER — ALBUMIN (HUMAN) 12.5 G/50ML
50 SOLUTION INTRAVENOUS ONCE
Status: COMPLETED | OUTPATIENT
Start: 2020-07-26 | End: 2020-07-26

## 2020-07-26 RX ORDER — SODIUM CHLORIDE, SODIUM LACTATE, POTASSIUM CHLORIDE, CALCIUM CHLORIDE 600; 310; 30; 20 MG/100ML; MG/100ML; MG/100ML; MG/100ML
INJECTION, SOLUTION INTRAVENOUS CONTINUOUS
Status: DISCONTINUED | OUTPATIENT
Start: 2020-07-26 | End: 2020-07-28

## 2020-07-26 RX ORDER — FLUTICASONE PROPIONATE 50 MCG
1 SPRAY, SUSPENSION (ML) NASAL DAILY
Status: DISCONTINUED | OUTPATIENT
Start: 2020-07-26 | End: 2020-07-30 | Stop reason: HOSPADM

## 2020-07-26 RX ORDER — PSEUDOEPHEDRINE HCL 60 MG/1
60 TABLET ORAL EVERY 8 HOURS PRN
Status: DISCONTINUED | OUTPATIENT
Start: 2020-07-26 | End: 2020-07-30 | Stop reason: HOSPADM

## 2020-07-26 RX ORDER — PANTOPRAZOLE SODIUM 40 MG/10ML
40 INJECTION, POWDER, LYOPHILIZED, FOR SOLUTION INTRAVENOUS DAILY
Status: DISCONTINUED | OUTPATIENT
Start: 2020-07-26 | End: 2020-07-30 | Stop reason: HOSPADM

## 2020-07-26 RX ORDER — SOY ISOFLAVONE 40 MG
1 TABLET ORAL DAILY
Status: DISCONTINUED | OUTPATIENT
Start: 2020-07-26 | End: 2020-07-26 | Stop reason: RX

## 2020-07-26 RX ORDER — EPHEDRINE SULFATE/0.9% NACL/PF 50 MG/5 ML
SYRINGE (ML) INTRAVENOUS PRN
Status: DISCONTINUED | OUTPATIENT
Start: 2020-07-26 | End: 2020-07-26 | Stop reason: SDUPTHER

## 2020-07-26 RX ORDER — DOCUSATE SODIUM 100 MG/1
100 CAPSULE, LIQUID FILLED ORAL 2 TIMES DAILY PRN
Status: DISCONTINUED | OUTPATIENT
Start: 2020-07-26 | End: 2020-07-30 | Stop reason: HOSPADM

## 2020-07-26 RX ORDER — ASCORBIC ACID 500 MG
1000 TABLET ORAL DAILY
Status: DISCONTINUED | OUTPATIENT
Start: 2020-07-26 | End: 2020-07-30 | Stop reason: HOSPADM

## 2020-07-26 RX ORDER — MEPERIDINE HYDROCHLORIDE 25 MG/ML
12.5 INJECTION INTRAMUSCULAR; INTRAVENOUS; SUBCUTANEOUS EVERY 5 MIN PRN
Status: DISCONTINUED | OUTPATIENT
Start: 2020-07-26 | End: 2020-07-26 | Stop reason: HOSPADM

## 2020-07-26 RX ORDER — MIDAZOLAM HYDROCHLORIDE 1 MG/ML
INJECTION INTRAMUSCULAR; INTRAVENOUS PRN
Status: DISCONTINUED | OUTPATIENT
Start: 2020-07-26 | End: 2020-07-26 | Stop reason: SDUPTHER

## 2020-07-26 RX ORDER — CETIRIZINE HYDROCHLORIDE 10 MG/1
10 TABLET ORAL DAILY
Status: DISCONTINUED | OUTPATIENT
Start: 2020-07-26 | End: 2020-07-30 | Stop reason: HOSPADM

## 2020-07-26 RX ORDER — ACETAMINOPHEN 325 MG/1
650 TABLET ORAL EVERY 4 HOURS PRN
Status: DISCONTINUED | OUTPATIENT
Start: 2020-07-26 | End: 2020-07-30 | Stop reason: HOSPADM

## 2020-07-26 RX ORDER — SODIUM CHLORIDE 0.9 % (FLUSH) 0.9 %
10 SYRINGE (ML) INJECTION PRN
Status: DISCONTINUED | OUTPATIENT
Start: 2020-07-26 | End: 2020-07-30 | Stop reason: HOSPADM

## 2020-07-26 RX ORDER — ACETAMINOPHEN 325 MG/1
650 TABLET ORAL ONCE
Status: COMPLETED | OUTPATIENT
Start: 2020-07-26 | End: 2020-07-26

## 2020-07-26 RX ORDER — POTASSIUM CHLORIDE 20 MEQ/1
40 TABLET, EXTENDED RELEASE ORAL ONCE
Status: COMPLETED | OUTPATIENT
Start: 2020-07-26 | End: 2020-07-26

## 2020-07-26 RX ORDER — SODIUM CHLORIDE 0.9 % (FLUSH) 0.9 %
10 SYRINGE (ML) INJECTION EVERY 12 HOURS SCHEDULED
Status: DISCONTINUED | OUTPATIENT
Start: 2020-07-26 | End: 2020-07-30 | Stop reason: HOSPADM

## 2020-07-26 RX ORDER — SODIUM CHLORIDE 9 MG/ML
INJECTION, SOLUTION INTRAVENOUS EVERY 8 HOURS
Status: DISCONTINUED | OUTPATIENT
Start: 2020-07-26 | End: 2020-07-26 | Stop reason: CLARIF

## 2020-07-26 RX ORDER — SODIUM CHLORIDE, SODIUM LACTATE, POTASSIUM CHLORIDE, CALCIUM CHLORIDE 600; 310; 30; 20 MG/100ML; MG/100ML; MG/100ML; MG/100ML
INJECTION, SOLUTION INTRAVENOUS CONTINUOUS PRN
Status: DISCONTINUED | OUTPATIENT
Start: 2020-07-26 | End: 2020-07-26 | Stop reason: SDUPTHER

## 2020-07-26 RX ORDER — FENTANYL CITRATE 50 UG/ML
INJECTION, SOLUTION INTRAMUSCULAR; INTRAVENOUS PRN
Status: DISCONTINUED | OUTPATIENT
Start: 2020-07-26 | End: 2020-07-26 | Stop reason: SDUPTHER

## 2020-07-26 RX ADMIN — PHENYLEPHRINE HYDROCHLORIDE 300 MCG: 10 INJECTION INTRAVENOUS at 07:36

## 2020-07-26 RX ADMIN — OXYCODONE HYDROCHLORIDE AND ACETAMINOPHEN 1000 MG: 500 TABLET ORAL at 09:26

## 2020-07-26 RX ADMIN — ALBUMIN HUMAN 50 G: 250 SOLUTION INTRAVENOUS at 14:25

## 2020-07-26 RX ADMIN — VANCOMYCIN HYDROCHLORIDE 1000 MG: 1 INJECTION, POWDER, LYOPHILIZED, FOR SOLUTION INTRAVENOUS at 04:41

## 2020-07-26 RX ADMIN — PHENYLEPHRINE HYDROCHLORIDE 300 MCG: 10 INJECTION INTRAVENOUS at 07:48

## 2020-07-26 RX ADMIN — SODIUM CHLORIDE, POTASSIUM CHLORIDE, SODIUM LACTATE AND CALCIUM CHLORIDE: 600; 310; 30; 20 INJECTION, SOLUTION INTRAVENOUS at 07:21

## 2020-07-26 RX ADMIN — DOCUSATE SODIUM 100 MG: 100 CAPSULE, LIQUID FILLED ORAL at 09:27

## 2020-07-26 RX ADMIN — CETIRIZINE HYDROCHLORIDE 10 MG: 10 TABLET, FILM COATED ORAL at 09:37

## 2020-07-26 RX ADMIN — SENNOSIDES 8.6 MG: 8.6 TABLET, FILM COATED ORAL at 09:26

## 2020-07-26 RX ADMIN — ENOXAPARIN SODIUM 40 MG: 40 INJECTION SUBCUTANEOUS at 09:27

## 2020-07-26 RX ADMIN — FENTANYL CITRATE 25 MCG: 50 INJECTION, SOLUTION INTRAMUSCULAR; INTRAVENOUS at 07:45

## 2020-07-26 RX ADMIN — SODIUM CHLORIDE, POTASSIUM CHLORIDE, SODIUM LACTATE AND CALCIUM CHLORIDE: 600; 310; 30; 20 INJECTION, SOLUTION INTRAVENOUS at 23:31

## 2020-07-26 RX ADMIN — SODIUM CHLORIDE 1000 ML: 9 INJECTION, SOLUTION INTRAVENOUS at 02:42

## 2020-07-26 RX ADMIN — VANCOMYCIN HYDROCHLORIDE 1000 MG: 1 INJECTION, POWDER, LYOPHILIZED, FOR SOLUTION INTRAVENOUS at 18:36

## 2020-07-26 RX ADMIN — NOREPINEPHRINE BITARTRATE 2 MCG/MIN: 1 INJECTION INTRAVENOUS at 08:16

## 2020-07-26 RX ADMIN — HYDROCODONE BITARTRATE AND ACETAMINOPHEN 1 TABLET: 5; 325 TABLET ORAL at 09:26

## 2020-07-26 RX ADMIN — PHENYLEPHRINE HYDROCHLORIDE 500 MCG: 10 INJECTION INTRAVENOUS at 08:05

## 2020-07-26 RX ADMIN — DOCUSATE SODIUM 100 MG: 100 CAPSULE, LIQUID FILLED ORAL at 14:29

## 2020-07-26 RX ADMIN — Medication 25 MG: at 07:59

## 2020-07-26 RX ADMIN — PIPERACILLIN AND TAZOBACTAM 3.38 G: 3; .375 INJECTION, POWDER, LYOPHILIZED, FOR SOLUTION INTRAVENOUS at 23:19

## 2020-07-26 RX ADMIN — Medication 25 MG: at 07:57

## 2020-07-26 RX ADMIN — ACETAMINOPHEN 650 MG: 325 TABLET, FILM COATED ORAL at 02:46

## 2020-07-26 RX ADMIN — PROPOFOL INJECTABLE EMULSION 40 MG: 10 INJECTION, EMULSION INTRAVENOUS at 07:27

## 2020-07-26 RX ADMIN — IBUPROFEN 600 MG: 600 TABLET, FILM COATED ORAL at 04:22

## 2020-07-26 RX ADMIN — PHENYLEPHRINE HYDROCHLORIDE 200 MCG: 10 INJECTION INTRAVENOUS at 07:28

## 2020-07-26 RX ADMIN — PANTOPRAZOLE SODIUM 40 MG: 40 INJECTION, POWDER, FOR SOLUTION INTRAVENOUS at 14:26

## 2020-07-26 RX ADMIN — Medication 10 ML: at 19:54

## 2020-07-26 RX ADMIN — PHENYLEPHRINE HYDROCHLORIDE 300 MCG: 10 INJECTION INTRAVENOUS at 07:45

## 2020-07-26 RX ADMIN — HYDROCODONE BITARTRATE AND ACETAMINOPHEN 1 TABLET: 5; 325 TABLET ORAL at 13:41

## 2020-07-26 RX ADMIN — PHENYLEPHRINE HYDROCHLORIDE 300 MCG: 10 INJECTION INTRAVENOUS at 07:40

## 2020-07-26 RX ADMIN — MAGNESIUM SULFATE HEPTAHYDRATE 2 G: 40 INJECTION, SOLUTION INTRAVENOUS at 09:24

## 2020-07-26 RX ADMIN — MAGNESIUM CITRATE 296 ML: 1.75 LIQUID ORAL at 09:37

## 2020-07-26 RX ADMIN — PHENYLEPHRINE HYDROCHLORIDE 300 MCG: 10 INJECTION INTRAVENOUS at 07:43

## 2020-07-26 RX ADMIN — PIPERACILLIN AND TAZOBACTAM 3.38 G: 3; .375 INJECTION, POWDER, LYOPHILIZED, FOR SOLUTION INTRAVENOUS at 14:28

## 2020-07-26 RX ADMIN — PROPOFOL INJECTABLE EMULSION 150 MCG/KG/MIN: 10 INJECTION, EMULSION INTRAVENOUS at 07:27

## 2020-07-26 RX ADMIN — POTASSIUM CHLORIDE 40 MEQ: 20 TABLET, EXTENDED RELEASE ORAL at 03:25

## 2020-07-26 RX ADMIN — SODIUM CHLORIDE 1000 ML: 9 INJECTION, SOLUTION INTRAVENOUS at 01:49

## 2020-07-26 RX ADMIN — SODIUM CHLORIDE, POTASSIUM CHLORIDE, SODIUM LACTATE AND CALCIUM CHLORIDE: 600; 310; 30; 20 INJECTION, SOLUTION INTRAVENOUS at 07:10

## 2020-07-26 RX ADMIN — PHENYLEPHRINE HYDROCHLORIDE 500 MCG: 10 INJECTION INTRAVENOUS at 08:00

## 2020-07-26 RX ADMIN — Medication 10 ML: at 14:27

## 2020-07-26 RX ADMIN — PHENYLEPHRINE HYDROCHLORIDE 200 MCG: 10 INJECTION INTRAVENOUS at 07:31

## 2020-07-26 RX ADMIN — FENTANYL CITRATE 25 MCG: 50 INJECTION, SOLUTION INTRAMUSCULAR; INTRAVENOUS at 07:27

## 2020-07-26 RX ADMIN — Medication 1000 UNITS: at 14:29

## 2020-07-26 RX ADMIN — SODIUM CHLORIDE 25 ML: 9 INJECTION, SOLUTION INTRAVENOUS at 19:53

## 2020-07-26 RX ADMIN — CEFTRIAXONE SODIUM 2 G: 2 INJECTION, POWDER, FOR SOLUTION INTRAMUSCULAR; INTRAVENOUS at 02:45

## 2020-07-26 RX ADMIN — SODIUM CHLORIDE, POTASSIUM CHLORIDE, SODIUM LACTATE AND CALCIUM CHLORIDE: 600; 310; 30; 20 INJECTION, SOLUTION INTRAVENOUS at 16:23

## 2020-07-26 RX ADMIN — MIDAZOLAM 1 MG: 1 INJECTION INTRAMUSCULAR; INTRAVENOUS at 07:27

## 2020-07-26 RX ADMIN — PIPERACILLIN AND TAZOBACTAM 3.38 G: 3; .375 INJECTION, POWDER, LYOPHILIZED, FOR SOLUTION INTRAVENOUS at 09:04

## 2020-07-26 RX ADMIN — SODIUM CHLORIDE 1000 ML: 9 INJECTION, SOLUTION INTRAVENOUS at 02:49

## 2020-07-26 RX ADMIN — PHENYLEPHRINE HYDROCHLORIDE 300 MCG: 10 INJECTION INTRAVENOUS at 07:55

## 2020-07-26 RX ADMIN — SODIUM CHLORIDE, POTASSIUM CHLORIDE, SODIUM LACTATE AND CALCIUM CHLORIDE: 600; 310; 30; 20 INJECTION, SOLUTION INTRAVENOUS at 08:00

## 2020-07-26 RX ADMIN — SODIUM CHLORIDE 1000 ML: 9 INJECTION, SOLUTION INTRAVENOUS at 04:22

## 2020-07-26 ASSESSMENT — PULMONARY FUNCTION TESTS
PIF_VALUE: 1
PIF_VALUE: 0
PIF_VALUE: 0
PIF_VALUE: 1
PIF_VALUE: 0
PIF_VALUE: 1
PIF_VALUE: 1
PIF_VALUE: 0
PIF_VALUE: 0
PIF_VALUE: 1
PIF_VALUE: 2
PIF_VALUE: 1
PIF_VALUE: 2
PIF_VALUE: 1
PIF_VALUE: 0
PIF_VALUE: 1
PIF_VALUE: 3
PIF_VALUE: 1
PIF_VALUE: 1
PIF_VALUE: 0
PIF_VALUE: 2
PIF_VALUE: 0
PIF_VALUE: 1
PIF_VALUE: 2
PIF_VALUE: 2
PIF_VALUE: 1
PIF_VALUE: 0
PIF_VALUE: 1
PIF_VALUE: 1
PIF_VALUE: 0
PIF_VALUE: 1
PIF_VALUE: 1
PIF_VALUE: 2
PIF_VALUE: 0
PIF_VALUE: 1
PIF_VALUE: 1
PIF_VALUE: 2

## 2020-07-26 ASSESSMENT — PAIN SCALES - GENERAL
PAINLEVEL_OUTOF10: 0
PAINLEVEL_OUTOF10: 0
PAINLEVEL_OUTOF10: 5
PAINLEVEL_OUTOF10: 5
PAINLEVEL_OUTOF10: 0

## 2020-07-26 ASSESSMENT — ENCOUNTER SYMPTOMS
COUGH: 0
WHEEZING: 0
BACK PAIN: 0
SHORTNESS OF BREATH: 0
ABDOMINAL DISTENTION: 0
ABDOMINAL PAIN: 0
SORE THROAT: 0
CHEST TIGHTNESS: 0
VOMITING: 0
RHINORRHEA: 0
NAUSEA: 0
SINUS PRESSURE: 0
DIARRHEA: 0

## 2020-07-26 ASSESSMENT — PAIN DESCRIPTION - LOCATION: LOCATION: LEG

## 2020-07-26 ASSESSMENT — PAIN DESCRIPTION - DESCRIPTORS: DESCRIPTORS: ACHING;DISCOMFORT

## 2020-07-26 ASSESSMENT — PAIN DESCRIPTION - PROGRESSION: CLINICAL_PROGRESSION: GRADUALLY WORSENING

## 2020-07-26 ASSESSMENT — PAIN DESCRIPTION - FREQUENCY: FREQUENCY: INTERMITTENT

## 2020-07-26 ASSESSMENT — PAIN DESCRIPTION - ONSET: ONSET: ON-GOING

## 2020-07-26 ASSESSMENT — PAIN DESCRIPTION - ORIENTATION: ORIENTATION: RIGHT;LOWER

## 2020-07-26 ASSESSMENT — PAIN DESCRIPTION - PAIN TYPE: TYPE: CHRONIC PAIN

## 2020-07-26 NOTE — ANESTHESIA PRE PROCEDURE
Department of Anesthesiology  Preprocedure Note       Name:  Lauren Gregory   Age:  76 y.o.  :  1955                                          MRN:  [id number]         Date:  2020      Surgeon: Xochilt Hines):  Travis Howard,     Procedure: Procedure(s):  CYSTOSCOPY RETROGRADE PYELOGRAM STENT INSERTION    Medications prior to admission:   Prior to Admission medications    Medication Sig Start Date End Date Taking? Authorizing Provider   losartan (COZAAR) 50 MG tablet Take 1 tablet by mouth daily 20   Carmencita Mayes MD   torsemide (DEMADEX) 10 MG tablet TAKE ONE TABLET BY MOUTH EVERY DAY 20   Carmencita Mayes MD   terbinafine (ATHLETES FOOT) 1 % cream Apply topically to feet 2 times daily for 6 weeks, then as needed. 20   Carmencita Mayes MD   phenazopyridine (PYRIDIUM) 100 MG tablet Take 1 tablet by mouth 3 times daily as needed for Pain 20  Carmencita Mayes MD   albuterol sulfate  (90 Base) MCG/ACT inhaler INHALE 2 PUFFS EVERY 4 TO 6 HOURS AS NEEDED FOR WHEEZING. 20   Carmencita Mayes MD   hydroCHLOROthiazide (MICROZIDE) 12.5 MG capsule Take 1 tablet by mouth once daily. 20   Carmencita Mayes MD   dextromethorphan-guaiFENesin [facility] DM)  MG per extended release tablet Take 1 tablet by mouth every 12 hours as needed for Cough 3/27/20   Carmencita Mayes MD   loratadine-pseudoephedrine (CLARITIN-D 12 HOUR) 5-120 MG per extended release tablet Take 1 tablet by mouth 2 times daily as needed (drainage and congestion) 3/24/20   Carmencita Mayes MD   potassium chloride (KLOR-CON) 10 MEQ extended release tablet Take 2 tablets by mouth daily 3/20/20   Carmencita Mayes MD   Incontinence Supply Disposable (PROTECTIVE UNDERWEAR LARGE) MISC Use as directed 20   Carmencita Mayes MD   Zinc Gluconate (COLD-EEZE) 13.3 MG LOZG Take 1 lozenge by mouth every 3 hours for 1 day at first sign of sore throat.  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    Cervical spinal stenosis M48.02    Sepsis secondary to ureteral stone ([facility] [address] A41.9       Past Medical History:        Diagnosis Date    Asthma     Fall 2011    Hematoma of abdominal wall 2011    extraperitoneal    Hypertension     Metatarsal fracture 2011    right 3rd and 4th    Multiple sclerosis ([facility] [address]     UTI (urinary tract infection)        Past Surgical History:        Procedure Laterality Date    APPENDECTOMY      CHOLECYSTECTOMY      HYSTERECTOMY         Social History:    Social History     Tobacco Use    Smoking status: Former Smoker     Packs/day: 0.25     Years: 46.00     Pack years: 11.50     Types: Cigarettes     Last attempt to quit: 2019     Years since quittin.4    Smokeless tobacco: Never Used   Substance Use Topics    Alcohol use: No                                Counseling given: Not Answered      Vital Signs (Current): There were no vitals filed for this visit.                                            BP Readings from Last 3 Encounters:   20 105/69   20 (!) 141/92   20 96/66       NPO Status:                                                                                 BMI:   Wt Readings from Last 3 Encounters:   20 197 lb (89.4 kg)   20 185 lb (83.9 kg)   19 185 lb (83.9 kg)     There is no height or weight on file to calculate BMI.    CBC:   Lab Results   Component Value Date    WBC 13.3 2020    RBC 3.59 2020    HGB 13.7 2020    HCT 37.5 2020    .5 2020    RDW 13.8 2020     2020       CMP:   Lab Results   Component Value Date     2020    K 3.2 2020     2020    CO2 17 2020    BUN 12 2020    CREATININE 0.9 2020    GFRAA >60 2020    LABGLOM >60 2020    GLUCOSE 123 2020    GLUCOSE 96 2011    PROT 6.3 2020    CALCIUM 8.9 2020    BILITOT 1.1 2020    ALKPHOS 133 2020    AST 32 2020    ALT 31 2020       POC Tests: No results for input(s): POCGLU, POCNA, POCK, POCCL, POCBUN, POCHEMO, POCHCT in the last 72 hours. Coags:   Lab Results   Component Value Date    PROTIME 11.9 2011    INR 1.3 2011    APTT 34.6 2011       HCG (If Applicable): No results found for: PREGTESTUR, PREGSERUM, HCG, HCGQUANT     ABGs:   Lab Results   Component Value Date    C0XVHTWI 99.6 2011        Type & Screen (If Applicable):  No results found for: LABABO, LABRH    Drug/Infectious Status (If Applicable):  No results found for: HIV, HEPCAB    COVID-19 Screening (If Applicable):   Lab Results   Component Value Date    COVID19 Not Detected 2020         Anesthesia Evaluation  Patient summary reviewed  Airway: Mallampati: I  TM distance: >3 FB   Neck ROM: full  Mouth opening: > = 3 FB Dental:    (+) edentulous, upper dentures and lower dentures      Pulmonary: breath sounds clear to auscultation  (+) asthma:                           ROS comment: Former Smoker. Cardiovascular:    (+) hypertension:,       ECG reviewed  Rhythm: regular  Rate: normal           Beta Blocker:  Not on Beta Blocker      ROS comment: EKG: Sinus Tachycardia 117, with prolonged QT. [facility]:   (+) neuromuscular disease:,              ROS comment: Multiple sclerosis  H/O fractures and Fall. GI/Hepatic/Renal:            ROS comment: UTI. .   Endo/Other:                      ROS comment: Hematoma of abdominal wall. Abdominal:   (+) obese,         Vascular: negative vascular ROS.                                     Department of Anesthesiology  Preprocedure Note       Name:  Elio Cuello   Age:  76 y.o.  :  1955                                          MRN:  [id number]         Date:  2020      Surgeon: Surgeon(s):  Rosi Shine DO    Procedure: Procedure(s):  CYSTOSCOPY RETROGRADE PYELOGRAM STENT INSERTION    Medications prior to admission:   Prior to Admission medications    Medication Sig Start Date End Date Taking? Authorizing Provider   losartan (COZAAR) 50 MG tablet Take 1 tablet by mouth daily 7/17/20   Lucita Martinez MD   torsemide (DEMADEX) 10 MG tablet TAKE ONE TABLET BY MOUTH EVERY DAY 6/18/20   Lucita Martinez MD   terbinafine (ATHLETES FOOT) 1 % cream Apply topically to feet 2 times daily for 6 weeks, then as needed. 6/18/20   Lucita Martinez MD   phenazopyridine (PYRIDIUM) 100 MG tablet Take 1 tablet by mouth 3 times daily as needed for Pain 5/22/20 5/22/21  Lucita Martinez MD   albuterol sulfate  (90 Base) MCG/ACT inhaler INHALE 2 PUFFS EVERY 4 TO 6 HOURS AS NEEDED FOR WHEEZING. 5/11/20   Lucita Martinez MD   hydroCHLOROthiazide (MICROZIDE) 12.5 MG capsule Take 1 tablet by mouth once daily. 5/11/20   Lucita Martinez MD   dextromethorphan-guaiFENesin [facility] DM)  MG per extended release tablet Take 1 tablet by mouth every 12 hours as needed for Cough 3/27/20   Lucita Martinez MD   loratadine-pseudoephedrine (CLARITIN-D 12 HOUR) 5-120 MG per extended release tablet Take 1 tablet by mouth 2 times daily as needed (drainage and congestion) 3/24/20   Lucita Martinez MD   potassium chloride (KLOR-CON) 10 MEQ extended release tablet Take 2 tablets by mouth daily 3/20/20   Lucita Martinez MD   Incontinence Supply Disposable (PROTECTIVE UNDERWEAR LARGE) MISC Use as directed 1/30/20   Lucita Martinez MD   Zinc Gluconate (COLD-EEZE) 13.3 MG LOZG Take 1 lozenge by mouth every 3 hours for 1 day at first sign of sore throat.  1/14/20 1/15/20  Lucita Martinez MD   lactulose (CHRONULAC) 10 GM/15ML solution Take 30 mLs by mouth 3 times daily as needed (constipation) 12/16/19   Lucita Martinez MD   albuterol (PROVENTIL) (2.5 MG/3ML) 0.083% nebulizer solution Take 3 mLs by nebulization See Admin Instructions 1 vial via nebulizer q4h prn cough/wheeze 11/7/19   Sara Victor Gibson Garcia MD   fluticasone (FLONASE) 50 MCG/ACT nasal spray 1 spray by Each Nostril route daily 10/2/19   Nathan Meza MD   senna (SENOKOT) 8.6 MG tablet Take 1 tablet by mouth daily 10/2/19   Nathan Meza MD   docusate sodium (COLACE) 100 MG capsule Take 1 capsule by mouth 2 times daily as needed for Constipation 10/2/19   Nathan Meza MD   levocetirizine (XYZAL) 5 MG tablet Take 1 tablet by mouth nightly as needed (allergies) 10/2/19   Nathan Meza MD   vitamin E 1000 units capsule Take 1,000 Units by mouth daily    Historical Provider, MD   Magnesium Citrate 1.745 GM/30ML solution Take 296 mLs by mouth See Admin Instructions 1 bottle daily prn constipation 8/5/19   Nathan Meza MD   BLACK ELDERBERRY,BERRY-FLOWER, PO Take 1 tablet by mouth daily    Historical Provider, MD   Methylsulfonylmethane (MSM) 1000 MG CAPS Take 1 tablet by mouth daily    Historical Provider, MD   Multiple Vitamins-Minerals (MULTIVITAMIN ADULT PO) Take 1 tablet by mouth daily    Historical Provider, MD   Incontinence Supply Disposable (POISE MAXIMUM ABSORBENCY) PADS  5/10/19   Historical Provider, MD   Ascorbic Acid (VITAMIN C) 1000 MG tablet Take 1,000 mg by mouth daily    Historical Provider, MD   betamethasone valerate (VALISONE) 0.1 % cream Apply topically 3 times daily Apply to rash TID until resolved    Historical Provider, MD   B Complex-C-Folic Acid (HM VITAMIN B COMPLEX/VITAMIN C PO) Take 1 tablet by mouth daily    Historical Provider, MD   mupirocin (BACTROBAN) 2 % ointment Apply topically See Admin Instructions Apply to affected area BID until better    Historical Provider, MD   polyethylene glycol (GLYCOLAX) powder Take by mouth See Admin Instructions 1 capful in large glass water prn constipation    Historical Provider, MD   Propylhexedrine [facility]) INHA by Nasal route Inhale each nostril for nasal congestion    Historical Provider, MD   Disposable Gloves (VINYL GLOVES) MISC by Does not apply route    Historical Provider, MD   vitamin D (CHOLECALCIFEROL) 5000 UNITS CAPS capsule Take 5,000 Units by mouth daily    Historical Provider, MD   ammonium lactate (LAC-HYDRIN) 12 % lotion Apply topically as needed for Dry Skin Apply topically as needed. Historical Provider, MD       Current medications:    No current facility-administered medications for this visit. No current outpatient medications on file.      Facility-Administered Medications Ordered in Other Visits   Medication Dose Route Frequency Provider Last Rate Last Dose    sodium chloride flush 0.9 % injection 10 mL  10 mL Intravenous 2 times per day Teodoro Fink DO        sodium chloride flush 0.9 % injection 10 mL  10 mL Intravenous PRN Teodoro Fink DO        acetaminophen (TYLENOL) tablet 650 mg  650 mg Oral Q4H PRN Teodoro Fink DO        meperidine (DEMEROL) injection 12.5 mg  12.5 mg Intravenous Q5 Min PRN Yarely Lee MD        HYDROmorphone (DILAUDID) injection 0.5 mg  0.5 mg Intravenous Q5 Min PRN Yarely Lee MD        magnesium sulfate 2 g in 50 mL IVPB premix  2 g Intravenous Once Guera Hagan MD        enoxaparin (LOVENOX) injection 40 mg  40 mg Subcutaneous Daily Guera Hagan MD        lactated ringers infusion   Intravenous Continuous Guera Hagan  mL/hr at 07/26/20 0710      piperacillin-tazobactam (ZOSYN) 3.375 g in dextrose 5 % 50 mL IVPB extended infusion (mini-bag)  3.375 g Intravenous Q8H Guera Hagan MD        And    0.9 % sodium chloride infusion  25 mL Intravenous Q8H Marlon Hoang MD           Allergies:  No Known Allergies    Problem List:    Patient Active Problem List   Diagnosis Code    Neck pain M54.2    Multiple sclerosis ([facility] [address] G35    Bilateral foot-drop M21.371, M21.372    Peripheral polyneuropathy G62.9    Essential hypertension I10    Asthma J45.909    Edema of both legs R60.0    Constipation K59.00    Intervertebral lumbar disc disorder with myelopathy, 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    Cervical spinal stenosis M48.02    Sepsis secondary to ureteral stone ([address] A41.9       Past Medical History:        Diagnosis Date    Asthma     Fall 2011    Hematoma of abdominal wall 2011    extraperitoneal    Hypertension     Metatarsal fracture 2011    right 3rd and 4th    Multiple sclerosis ([address]     UTI (urinary tract infection)        Past Surgical History:        Procedure Laterality Date    APPENDECTOMY      CHOLECYSTECTOMY      HYSTERECTOMY         Social History:    Social History     Tobacco Use    Smoking status: Former Smoker     Packs/day: 0.25     Years: 46.00     Pack years: 11.50     Types: Cigarettes     Last attempt to quit: 2019     Years since quittin.4    Smokeless tobacco: Never Used   Substance Use Topics    Alcohol use: No                                Counseling given: Not Answered      Vital Signs (Current): There were no vitals filed for this visit.                                            BP Readings from Last 3 Encounters:   20 105/69   20 (!) 141/92   20 96/66       NPO Status:                                                                                 BMI:   Wt Readings from Last 3 Encounters:   20 197 lb (89.4 kg)   20 185 lb (83.9 kg)   19 185 lb (83.9 kg)     There is no height or weight on file to calculate BMI.    CBC:   Lab Results   Component Value Date    WBC 13.3 2020    RBC 3.59 2020    HGB 13.7 2020    HCT 37.5 2020    .5 2020    RDW 13.8 2020     2020       CMP:   Lab Results   Component Value Date     2020    K 3.2 2020     2020    CO2 17 2020    BUN 12 2020    CREATININE 0.9 2020    GFRAA >60 2020    LABGLOM >60 2020    GLUCOSE 123 2020    GLUCOSE 96 2011    PROT 6.3 2020    CALCIUM 8.9 2020    BILITOT 1.1 2020    ALKPHOS 133 2020    AST 32 2020    ALT 31 2020       POC Tests: No results for input(s): POCGLU, POCNA, POCK, POCCL, POCBUN, POCHEMO, POCHCT in the last 72 hours. Coags:   Lab Results   Component Value Date    PROTIME 11.9 05/27/2011    INR 1.3 05/27/2011    APTT 34.6 05/27/2011       HCG (If Applicable): No results found for: PREGTESTUR, PREGSERUM, HCG, HCGQUANT     ABGs:   Lab Results   Component Value Date    B4LBENKG 99.6 05/27/2011        Type & Screen (If Applicable):  No results found for: LABABO, LABRH    Drug/Infectious Status (If Applicable):  No results found for: HIV, HEPCAB    COVID-19 Screening (If Applicable):   Lab Results   Component Value Date    COVID19 Not Detected 07/26/2020         Anesthesia Evaluation  Patient summary reviewed  Airway:         Dental:          Pulmonary:   (+) asthma:                           ROS comment: Former Smoker. Cardiovascular:    (+) hypertension:,       ECG reviewed               Beta Blocker:  Not on Beta Blocker      ROS comment: EKG: Sinus Tachycardia 117, with prolonged QT. [facility] Neuro/Psych:   (+) neuromuscular disease:,              ROS comment: Multiple sclerosis  H/O fractures and Fall. GI/Hepatic/Renal:            ROS comment: UTI. .   Endo/Other:                      ROS comment: Hematoma of abdominal wall. Abdominal:           Vascular: negative vascular ROS. Anesthesia Plan      general and MAC     ASA 3 - emergent       Induction: intravenous. BIS  MIPS: Postoperative opioids intended. Anesthetic plan and risks discussed with patient. Plan discussed with CRNA. Attending anesthesiologist reviewed and agrees with Dasha Rapp MD   7/26/2020               Anesthesia Plan      general and MAC     ASA 3 - emergent       Induction: intravenous. BIS  MIPS: Postoperative opioids intended. Anesthetic plan and risks discussed with patient. Plan discussed with CRNA.     Attending anesthesiologist reviewed and agrees with Pre Eval

## 2020-07-26 NOTE — ANESTHESIA PRE PROCEDURE
Department of Anesthesiology  Preprocedure Note       Name:  Carito Godoy   Age:  76 y.o.  :  1955                                          MRN:  [id number]         Date:  2020      Surgeon: Gallito Elder):  Kimberly Howard,     Procedure: Procedure(s):  CYSTOSCOPY RETROGRADE PYELOGRAM STENT INSERTION    Medications prior to admission:   Prior to Admission medications    Medication Sig Start Date End Date Taking? Authorizing Provider   losartan (COZAAR) 50 MG tablet Take 1 tablet by mouth daily 20   Nel Shepherd MD   torsemide (DEMADEX) 10 MG tablet TAKE ONE TABLET BY MOUTH EVERY DAY 20   Nel Shepherd MD   terbinafine (ATHLETES FOOT) 1 % cream Apply topically to feet 2 times daily for 6 weeks, then as needed. 20   Nel Shepherd MD   phenazopyridine (PYRIDIUM) 100 MG tablet Take 1 tablet by mouth 3 times daily as needed for Pain 20  Nel Shepherd MD   albuterol sulfate  (90 Base) MCG/ACT inhaler INHALE 2 PUFFS EVERY 4 TO 6 HOURS AS NEEDED FOR WHEEZING. 20   Nel Shepherd MD   hydroCHLOROthiazide (MICROZIDE) 12.5 MG capsule Take 1 tablet by mouth once daily. 20   Nel Shepherd MD   dextromethorphan-guaiFENesin [facility] DM)  MG per extended release tablet Take 1 tablet by mouth every 12 hours as needed for Cough 3/27/20   Nel Shepherd MD   loratadine-pseudoephedrine (CLARITIN-D 12 HOUR) 5-120 MG per extended release tablet Take 1 tablet by mouth 2 times daily as needed (drainage and congestion) 3/24/20   Nel Shepherd MD   potassium chloride (KLOR-CON) 10 MEQ extended release tablet Take 2 tablets by mouth daily 3/20/20   Nel Shepherd MD   Incontinence Supply Disposable (PROTECTIVE UNDERWEAR LARGE) MISC Use as directed 20   Nel Shepherd MD   Zinc Gluconate (COLD-EEZE) 13.3 MG LOZG Take 1 lozenge by mouth every 3 hours for 1 day at first sign of sore throat.  1/14/20 1/15/20  Nel Shepherd MD   lactulose (CHRONULAC) 10 GM/15ML solution Take 30 mLs by mouth 3 times daily as needed (constipation) 12/16/19   Alley Orlando MD   albuterol (PROVENTIL) (2.5 MG/3ML) 0.083% nebulizer solution Take 3 mLs by nebulization See Admin Instructions 1 vial via nebulizer q4h prn cough/wheeze 11/7/19   Alley Orlando MD   fluticasone (FLONASE) 50 MCG/ACT nasal spray 1 spray by Each Nostril route daily 10/2/19   Alley Orlando MD   senna (SENOKOT) 8.6 MG tablet Take 1 tablet by mouth daily 10/2/19   Alley Orlando MD   docusate sodium (COLACE) 100 MG capsule Take 1 capsule by mouth 2 times daily as needed for Constipation 10/2/19   Alley Orlando MD   levocetirizine (XYZAL) 5 MG tablet Take 1 tablet by mouth nightly as needed (allergies) 10/2/19   Alley Orlando MD   vitamin E 1000 units capsule Take 1,000 Units by mouth daily    Historical Provider, MD   Magnesium Citrate 1.745 GM/30ML solution Take 296 mLs by mouth See Admin Instructions 1 bottle daily prn constipation 8/5/19   Alley Orlando MD   BLACK ELDERBERRY,BERRY-FLOWER, PO Take 1 tablet by mouth daily    Historical Provider, MD   Methylsulfonylmethane (MSM) 1000 MG CAPS Take 1 tablet by mouth daily    Historical Provider, MD   Multiple Vitamins-Minerals (MULTIVITAMIN ADULT PO) Take 1 tablet by mouth daily    Historical Provider, MD   Incontinence Supply Disposable (POISE MAXIMUM ABSORBENCY) PADS  5/10/19   Historical Provider, MD   Ascorbic Acid (VITAMIN C) 1000 MG tablet Take 1,000 mg by mouth daily    Historical Provider, MD   betamethasone valerate (VALISONE) 0.1 % cream Apply topically 3 times daily Apply to rash TID until resolved    Historical Provider, MD   B Complex-C-Folic Acid (HM VITAMIN B COMPLEX/VITAMIN C PO) Take 1 tablet by mouth daily    Historical Provider, MD   mupirocin (BACTROBAN) 2 % ointment Apply topically See Admin Instructions Apply to affected area BID until better    Historical Provider, MD   polyethylene glycol (GLYCOLAX) powder Take by mouth See Admin Instructions 1 capful in large glass water prn constipation    Historical Provider, MD Years since quittin.4    Smokeless tobacco: Never Used   Substance Use Topics    Alcohol use: No                                Counseling given: Not Answered      Vital Signs (Current):   Vitals:    20 0442 20 0500 20 0515 20 0600   BP: (!) 139/126 109/83 105/69 105/69   Pulse: 136 121 131 135   Resp:    Temp: 102.7 °F (39.3 °C) 102.7 °F (39.3 °C) 102.8 °F (39.3 °C) 101.8 °F (38.8 °C)   TempSrc: Core Core Core Core   SpO2: 96% 97% 96% 94%   Weight:   197 lb (89.4 kg)    Height:   5' 6\" (1.676 m)                                               BP Readings from Last 3 Encounters:   20 105/69   20 (!) 141/92   20 96/66       NPO Status:                                                                                 BMI:   Wt Readings from Last 3 Encounters:   20 197 lb (89.4 kg)   20 185 lb (83.9 kg)   19 185 lb (83.9 kg)     Body mass index is 31.8 kg/m². CBC:   Lab Results   Component Value Date    WBC 13.3 2020    RBC 3.59 2020    HGB 13.7 2020    HCT 37.5 2020    .5 2020    RDW 13.8 2020     2020       CMP:   Lab Results   Component Value Date     2020    K 3.2 2020     2020    CO2 17 2020    BUN 12 2020    CREATININE 0.9 2020    GFRAA >60 2020    LABGLOM >60 2020    GLUCOSE 123 2020    GLUCOSE 96 2011    PROT 6.3 2020    CALCIUM 8.9 2020    BILITOT 1.1 2020    ALKPHOS 133 2020    AST 32 2020    ALT 31 2020       POC Tests: No results for input(s): POCGLU, POCNA, POCK, POCCL, POCBUN, POCHEMO, POCHCT in the last 72 hours.     Coags:   Lab Results   Component Value Date    PROTIME 11.9 2011    INR 1.3 2011    APTT 34.6 2011       HCG (If Applicable): No results found for: PREGTESTUR, PREGSERUM, HCG, HCGQUANT     ABGs:   Lab Results   Component Value Date [id number] 99.6 05/27/2011        Type & Screen (If Applicable):  No results found for: LABABO, LABRH    Drug/Infectious Status (If Applicable):  No results found for: HIV, HEPCAB    COVID-19 Screening (If Applicable):   Lab Results   Component Value Date    COVID19 Not Detected 07/26/2020         Anesthesia Evaluation  Patient summary reviewed  Airway:         Dental:          Pulmonary:   (+) asthma:                           ROS comment: Former Smoker. Cardiovascular:    (+) hypertension:,       ECG reviewed               Beta Blocker:  Not on Beta Blocker      ROS comment: EKG: Sinus Tachycardia 117, with prolonged QT. [facility]:   (+) neuromuscular disease:,              ROS comment: Multiple sclerosis  H/O fractures and Fall. GI/Hepatic/Renal:            ROS comment: UTI. .   Endo/Other:                      ROS comment: Hematoma of abdominal wall. Abdominal:           Vascular: negative vascular ROS. Anesthesia Plan      general and MAC     ASA 3 - emergent       Induction: intravenous. BIS  MIPS: Postoperative opioids intended. Anesthetic plan and risks discussed with patient. Plan discussed with CRNA.     Attending anesthesiologist reviewed and agrees with Cora Joseph MD   7/26/2020

## 2020-07-26 NOTE — PROGRESS NOTES
Pharmacy Note    Vasyl Moreno was ordered MSM Capsules. Per the [address], this medication is non-formulary and not stocked by pharmacy for the reason indicated below. The medication can be reordered at discharge. CONSUELO Echevarria Ph. 7/26/20209:12 AM

## 2020-07-26 NOTE — ED NOTES
1st set of [facility] obtained from [address], [facility]  07/26/20 8758

## 2020-07-26 NOTE — CONSULTS
Consult ordered by Dr. Jade Harmon, Reason for consult is Disla Proc. Shahriar Lynne :    76 F HX Multiple sclerosis, Asthma, HTN, hypoglycemia, scoliosis, DDD with sciatic, arthritis, osteoporosis, chronic LBP, presented to ED with generalized weakness over the last few days, worse with activity, better with resting, associated dysurea, increased frequency of urine, chills, constipation, left flank pain, GUTIERRES, occ cough, no CP, sputum. In the ER was appearing septic, tachycardic, data showed evidence of UTI, left hydronephrosis and hydroureter, 4mm distal ureteral stone. She was given IVF, ABX and under went CYSTOSCOPY RETROGRADE PYELOGRAM STENT INSERTION left POD#0. She was in shock as she entered the ICU requiring pressor. Past Medical History:    Reviewed; no changes. Past Medical History:   Diagnosis Date    Asthma     Fall 2011    Hematoma of abdominal wall 2011    extraperitoneal    Hypertension     Metatarsal fracture 2011    right 3rd and 4th    Multiple sclerosis ([facility] [address]     UTI (urinary tract infection)        Social History:    Reviewed; no changes.    Social History     Socioeconomic History    Marital status:      Spouse name: Not on file    Number of children: Not on file    Years of education: Not on file    Highest education level: Not on file   Occupational History    Not on file   Social Needs    Financial resource strain: Not on file    Food insecurity     Worry: Not on file     Inability: Not on file    Transportation needs     Medical: Not on file     Non-medical: Not on file   Tobacco Use    Smoking status: Former Smoker     Packs/day: 0.25     Years: 46.00     Pack years: 11.50     Types: Cigarettes     Last attempt to quit: 2019     Years since quittin.4    Smokeless tobacco: Never Used   Substance and Sexual Activity    Alcohol use: No    Drug use: No    Sexual activity: Not Currently   Lifestyle    Physical activity     Days per week: Not on file     Minutes per session: Not on file    Stress: Not on file   Relationships    Social connections     Talks on phone: Not on file     Gets together: Not on file     Attends Alevism service: Not on file     Active member of club or organization: Not on file     Attends meetings of clubs or organizations: Not on file     Relationship status: Not on file    Intimate partner violence     Fear of current or ex partner: Not on file     Emotionally abused: Not on file     Physically abused: Not on file     Forced sexual activity: Not on file   Other Topics Concern    Not on file   Social History Narrative    Not on file       Allergies:No Known Allergies    Meds: reviewed   Prior to Admission medications    Medication Sig Start Date End Date Taking? Authorizing Provider   losartan (COZAAR) 50 MG tablet Take 1 tablet by mouth daily 7/17/20   Liane Mcintosh MD   torsemide (DEMADEX) 10 MG tablet TAKE ONE TABLET BY MOUTH EVERY DAY 6/18/20   Liane Mcintosh MD   terbinafine (ATHLETES FOOT) 1 % cream Apply topically to feet 2 times daily for 6 weeks, then as needed. 6/18/20   Liane Mcintosh MD   phenazopyridine (PYRIDIUM) 100 MG tablet Take 1 tablet by mouth 3 times daily as needed for Pain 5/22/20 5/22/21  Liane Mcintosh MD   albuterol sulfate  (90 Base) MCG/ACT inhaler INHALE 2 PUFFS EVERY 4 TO 6 HOURS AS NEEDED FOR WHEEZING. 5/11/20   Liane Mcintosh MD   hydroCHLOROthiazide (MICROZIDE) 12.5 MG capsule Take 1 tablet by mouth once daily.  5/11/20   Liane Mcintosh MD   dextromethorphan-guaiFENesin [facility] DM)  MG per extended release tablet Take 1 tablet by mouth every 12 hours as needed for Cough 3/27/20   Liane Mcintosh MD   loratadine-pseudoephedrine (CLARITIN-D 12 HOUR) 5-120 MG per extended release tablet Take 1 tablet by mouth 2 times daily as needed (drainage and congestion) 3/24/20   Liane Mcintosh MD   potassium chloride (KLOR-CON) 10 MEQ extended release tablet Take 2 tablets by mouth daily 3/20/20   Liane Mcintosh MD Incontinence Supply Disposable (PROTECTIVE UNDERWEAR LARGE) MISC Use as directed 1/30/20   Emily Padron MD   Zinc Gluconate (COLD-EEZE) 13.3 MG LOZG Take 1 lozenge by mouth every 3 hours for 1 day at first sign of sore throat.  1/14/20 1/15/20  Emily Padron MD   lactulose (CHRONULAC) 10 GM/15ML solution Take 30 mLs by mouth 3 times daily as needed (constipation) 12/16/19   Emily Padron MD   albuterol (PROVENTIL) (2.5 MG/3ML) 0.083% nebulizer solution Take 3 mLs by nebulization See Admin Instructions 1 vial via nebulizer q4h prn cough/wheeze 11/7/19   Emily Padron MD   fluticasone (FLONASE) 50 MCG/ACT nasal spray 1 spray by Each Nostril route daily 10/2/19   Emily Padron MD   senna (SENOKOT) 8.6 MG tablet Take 1 tablet by mouth daily 10/2/19   Emily Padron MD   docusate sodium (COLACE) 100 MG capsule Take 1 capsule by mouth 2 times daily as needed for Constipation 10/2/19   Emily Padron MD   levocetirizine (XYZAL) 5 MG tablet Take 1 tablet by mouth nightly as needed (allergies) 10/2/19   Emily Padron MD   vitamin E 1000 units capsule Take 1,000 Units by mouth daily    Historical Provider, MD   Magnesium Citrate 1.745 GM/30ML solution Take 296 mLs by mouth See Admin Instructions 1 bottle daily prn constipation 8/5/19   Emily Padron MD   BLACK ELDERBERRY,BERRY-FLOWER, PO Take 1 tablet by mouth daily    Historical Provider, MD   Methylsulfonylmethane (MSM) 1000 MG CAPS Take 1 tablet by mouth daily    Historical Provider, MD   Multiple Vitamins-Minerals (MULTIVITAMIN ADULT PO) Take 1 tablet by mouth daily    Historical Provider, MD   Incontinence Supply Disposable (POISE MAXIMUM ABSORBENCY) PADS  5/10/19   Historical Provider, MD   Ascorbic Acid (VITAMIN C) 1000 MG tablet Take 1,000 mg by mouth daily    Historical Provider, MD   betamethasone valerate (VALISONE) 0.1 % cream Apply topically 3 times daily Apply to rash TID until resolved    Historical Provider, MD   B Complex-C-Folic Acid (HM VITAMIN B COMPLEX/VITAMIN C PO) Take 1 tablet by mouth daily    Historical Provider, MD   mupirocin (BACTROBAN) 2 % ointment Apply topically See Admin Instructions Apply to affected area BID until better    Historical Provider, MD   polyethylene glycol (GLYCOLAX) powder Take by mouth See Admin Instructions 1 capful in large glass water prn constipation    Historical Provider, MD   Propylhexedrine Amberly Scott INHA by Nasal route Inhale each nostril for nasal congestion    Historical Provider, MD   Disposable Gloves (VINYL GLOVES) MISC by Does not apply route    Historical Provider, MD   vitamin D (CHOLECALCIFEROL) 5000 UNITS CAPS capsule Take 5,000 Units by mouth daily    Historical Provider, MD   ammonium lactate (LAC-HYDRIN) 12 % lotion Apply topically as needed for Dry Skin Apply topically as needed. Historical Provider, MD       Family hx:   Family History   Problem Relation Age of Onset    Asthma Father     High Blood Pressure Father     Cancer Maternal Aunt        REVIEW OF SYSTEMS:      All other systems reviewed and are negative otherwise. PHYSICAL EXAM:        VITALS:  BP 90/64   Pulse 109   Temp 100.4 °F (38 °C) (Core)   Resp 20   Ht 5' 6\" (1.676 m)   Wt 197 lb (89.4 kg)   SpO2 94%   BMI 31.80 kg/m²  on 2 L NC    24HR INTAKE/OUTPUT:      Intake/Output Summary (Last 24 hours) at 7/26/2020 0844  Last data filed at 7/26/2020 2917  Gross per 24 hour   Intake 1200 ml   Output 2300 ml   Net -1100 ml     Level of Alertness:   Awake alert  Orientation:  Oriented to person, place, time. CONSTITUTIONAL:  awake, alert,  apparent distress, and appears stated age  Throat: clear, no ulcerations or exudate  Ears:clear, no discharge  Neck:supple no LAP, or masses  LUNGS:  + basilar  crackles no wheezing  CARDIOVASCULAR: tachy no M/R/G  ABDOMEN:  normal bowel sounds, distended and non-tender to palpation  EXT: 1+ edema, no calf tenderness. Pulses are present bilaterally.   NEUROLOGIC:  Non focal GROSSLY NORMAL SPEECH  SKIN:  normal skin color, texture  Psych: normal affect no depression    Ventilator Settings:Vent Information  SpO2: 94 %      DATA:    CBC:  Recent Labs     07/26/20  0138   WBC 13.3*   RBC 3.59   HGB 13.7   HCT 37.5      .5*   MCH 34.1   MCHC 33.9   RDW 13.8      BMP:  Recent Labs     07/26/20  0138      K 3.2*      CO2 17*   BUN 12   CREATININE 0.9   CALCIUM 8.9   GLUCOSE 123*      ABG:  Lab Results   Component Value Date    PH 7.414 05/27/2011    PCO2 37.0 05/27/2011    PO2 304.6 05/27/2011    HCO3 23.1 05/27/2011       Cultures:  No results for input(s): BC in the last 72 hours. No results for input(s): Lonia Julio in the last 72 hours. No results for input(s): CULTRESP in the last 72 hours. Radiology Review:  Pertinent images / reports were reviewed as a part of this visit. reveals the following:  XR UROGRAM W OR WO KUB W OR WO TOMOGRAM   Final Result   Intraoperative fluoroscopy with placement of a double-J left ureteral stent. CT ABDOMEN PELVIS WO CONTRAST   Preliminary Result   1. Moderate left hydronephrosis and hydroureter related to a 4 mm distal left   ureteral stone. 2. New large nonobstructing left renal calculi. 3. Otherwise unchanged incidental findings as described in the body of the   report. XR CHEST PORTABLE   Final Result   1. Low lung volumes with no definite acute abnormality identified. 2. Ground-glass opacity overlying the bilateral lower lung zones is favored   to be related to overlapping soft tissue.              Assessment:     Severe sepsis with septic shock  Urinary tract infection, likely upper, complicated by left ureteral stone/hydronephrosis  Lactic acidosis 2/2 above  Hypokalemia  Bibasilar atelectasis  Hx of MS  Constipation last bm 4 days ago    Plan:     Monitor hemodynamics, continue pressors goal map>65  ABX, FU CX, deescalate as appropriate  IVF Monitor UO, RFP, replace electrolytes as needed  Trend lactate  IS q awake hour  Bowel regimen    Discussed with RN re management    ICU Staff Physician note of personal involvement in Care  As the attending physician, I certify that I personally reviewed the patient's history and personally examined the patient to confirm the physical findings described above,  And that I reviewed the relevant imaging studies and available reports.  I also discussed the differential diagnosis and all of the proposed management plans with the patient and individuals accompanying the patient to this visit.  They had the opportunity to ask questions about the proposed management plans and to have those questions answered.     This patient has a high probability of sudden, clinically significant deterioration, which requires the highest level of physician preparedness to intervene urgently.  I managed/supervised life or organ supporting interventions that required frequent physician assessment.   I devoted my full attention to the direct care of this patient for the amount of time indicated below.  Time I spent with the family or surrogate(s) is included only if the patient was incapable of providing the necessary information or participating in medical decisions - Time devoted to teaching and to any procedures I billed separately is not included.     CRITICAL CARE TIME:  >45 minutes           Electronically signed by Zoie Valadez MD on 7/26/2020 at 8:44 AM

## 2020-07-26 NOTE — PROGRESS NOTES
Per patient request, all information and updates to be given to Bertha Bella which is a close friend ([phone]) pre- and post-surgery. Called and updated Dena on patient status and that patient was taken for surgery. All questions were answered.  Electronically signed by Cb Smallwood RN on 7/26/2020 at 7:37 AM

## 2020-07-26 NOTE — PROGRESS NOTES
Pharmacy Note    Eliolisa Cuello was ordered [address] . Per the [address], this medication is non-formulary and not stocked by pharmacy for the reason indicated below. The medication can be reordered at discharge. MAY Voss. Ph. 7/26/20209:14 AM

## 2020-07-26 NOTE — ED NOTES
Nurse April Faustin is with another patient at this time. Will call for report.      Laurence Max RN  07/26/20 2239

## 2020-07-26 NOTE — ED NOTES
Critical Lab Results:  Lactic Acid 5.0 Dr Suleman Ocampo and Dee Browning RN notified.         Diaz Stacy RN  07/26/20 6350

## 2020-07-26 NOTE — ED NOTES
Bed: 01  Expected date:   Expected time:   Means of arrival:   Comments:  LANES Angelo Huron, RN  07/26/20 5145

## 2020-07-26 NOTE — CONSULTS
each nostril for nasal congestion  Disposable Gloves (VINYL GLOVES) MISC, by Does not apply route  vitamin D (CHOLECALCIFEROL) 5000 UNITS CAPS capsule, Take 5,000 Units by mouth daily  ammonium lactate (LAC-HYDRIN) 12 % lotion, Apply topically as needed for Dry Skin Apply topically as needed. Allergies:    Patient has no known allergies. Social History:    reports that she quit smoking about 17 months ago. Her smoking use included cigarettes. She has a 11.50 pack-year smoking history. She has never used smokeless tobacco. She reports that she does not drink alcohol or use drugs. Family History:   Non-contributory to this urological problem  family history includes Asthma in her father; Cancer in her maternal aunt; High Blood Pressure in her father. Review of Systems:  Respiratory: negative for cough and hemoptysis  Cardiovascular: negative for chest pain and dyspnea  Gastrointestinal: negative for abdominal pain, diarrhea, nausea and vomiting  Derm: negative for rash and skin lesion(s)  Neurological: negative for seizures and tremors  Endocrine: negative for diabetic symptoms including polydipsia and polyuria  : As above in the HPI, otherwise negative  All other reviews are negative    Physical Exam:   Vitals: /69   Pulse 135   Temp 101.8 °F (38.8 °C) (Core)   Resp 21   Ht 5' 6\" (1.676 m)   Wt 197 lb (89.4 kg)   SpO2 94%   BMI 31.80 kg/m²   General:  Awake, alert, oriented X 3. Well developed, well nourished, well groomed. No apparent distress. HEENT:  Normocephalic, atraumatic. Pupils equal, round. No scleral icterus. No conjunctival injection. Normal lips, teeth, and gums. No nasal discharge. Neck:  Supple, no masses. Heart:  RRR  Lungs:  No audible wheezing. Respirations symmetric and non-labored.   Abdomen:  soft, nontender, no masses, no organomegaly, no peritoneal signs  Extremities:  No clubbing, cyanosis, or edema  Skin:  Warm and dry, no open lesions or rashes  Neuro: Cranial nerves 2-12 intact, no focal deficits  Rectal: deferred  Genitalia:  Obrien no    Labs:   Recent Labs     07/26/20  0138   WBC 13.3*   RBC 3.59   HGB 13.7   HCT 37.5   .5*   MCH 34.1   MCHC 33.9   RDW 13.8      MPV 10.1       Recent Labs     07/26/20  0138   CREATININE 0.9     1. Moderate left hydronephrosis and hydroureter related to a 4 mm distal left    ureteral stone. 2. New large nonobstructing left renal calculi. 3. Otherwise unchanged incidental findings as described in the body of the    report.           Images:              Assessment: Heavenly Valdes 76 y.o. female     Left distal ureteral stone  Left hydronephrosis  Left flank pain  UTI with sepsis    Plan:    CT was reviewed  All options were discussed  Progress to the OR stat for cysto, left stent insertion  RBA of the surgery was discussed  She will need another procedure in the future  Cont the IVF  Cont the antibiotics  Cultures were sent  Cont the ICU    DO PAULA Hearn  Urology

## 2020-07-26 NOTE — H&P
Department of Internal Medicine  History and Physical    PCP: Dr. Rafael Valdovinos   Admitting Physician: Dr. Nicholas Moser  Consultants: Dr. Marli Kwok, Dr. Antonio Chávez:  Generalized malaise with fever    HISTORY OF PRESENT ILLNESS:    Albania Keller is a very polite and pleasant 50-year-old female who presented to [address] [place] [address] emergency department for the evaluation of progressive generalized fatigue. She also admitted to low-grade febrile illness and left-sided flank pain. Upon presentation to the emergency department, she was found to be septic secondary to a nonobstructing left ureteral stone resulting in urinary tract infection. The patient has been IV fluid resuscitated and underwent cystoscopy with stent placement. She remains hypotensive with vasopressor therapy on board. She is feeling dramatically better than on initial presentation. She is maintained on nasal cannula oxygen and we have discussed her past medical history. She is tolerating antibiotic support without complication. No family members were present during my examination. PAST MEDICAL Hx:  Past Medical History:   Diagnosis Date    Asthma     Fall 5/26/2011    Hematoma of abdominal wall 5/2011    extraperitoneal    Hypertension     Metatarsal fracture 1/2011    right 3rd and 4th    Multiple sclerosis (HCC)     UTI (urinary tract infection)        PAST SURGICAL Hx:   Past Surgical History:   Procedure Laterality Date    APPENDECTOMY      CHOLECYSTECTOMY      HYSTERECTOMY         FAMILY Hx:  Family History   Problem Relation Age of Onset    Asthma Father     High Blood Pressure Father     Cancer Maternal Aunt        HOME MEDICATIONS:  Prior to Admission medications    Medication Sig Start Date End Date Taking?  Authorizing Provider   losartan (COZAAR) 50 MG tablet Take 1 tablet by mouth daily 7/17/20   Brandon Solis MD   torsemide (DEMADEX) 10 MG tablet TAKE ONE TABLET BY MOUTH EVERY DAY 6/18/20   Brandon Solis MD   terbinafine (ATHLETES FOOT) 1 % cream Apply topically to feet 2 times daily for 6 weeks, then as needed. 6/18/20   Washington Paiz MD   phenazopyridine (PYRIDIUM) 100 MG tablet Take 1 tablet by mouth 3 times daily as needed for Pain 5/22/20 5/22/21  Washington Pazi MD   albuterol sulfate  (90 Base) MCG/ACT inhaler INHALE 2 PUFFS EVERY 4 TO 6 HOURS AS NEEDED FOR WHEEZING. 5/11/20   Washington Paiz MD   hydroCHLOROthiazide (MICROZIDE) 12.5 MG capsule Take 1 tablet by mouth once daily. 5/11/20   Washington Paiz MD   dextromethorphan-guaiFENesin [facility] DM)  MG per extended release tablet Take 1 tablet by mouth every 12 hours as needed for Cough 3/27/20   Washington Paiz MD   loratadine-pseudoephedrine (CLARITIN-D 12 HOUR) 5-120 MG per extended release tablet Take 1 tablet by mouth 2 times daily as needed (drainage and congestion) 3/24/20   Washington Paiz MD   potassium chloride (KLOR-CON) 10 MEQ extended release tablet Take 2 tablets by mouth daily 3/20/20   Washington Paiz MD   Incontinence Supply Disposable (PROTECTIVE UNDERWEAR LARGE) MISC Use as directed 1/30/20   Washington Paiz MD   Zinc Gluconate (COLD-EEZE) 13.3 MG LOZG Take 1 lozenge by mouth every 3 hours for 1 day at first sign of sore throat.  1/14/20 1/15/20  Washington Paiz MD   lactulose (CHRONULAC) 10 GM/15ML solution Take 30 mLs by mouth 3 times daily as needed (constipation) 12/16/19   Washington Paiz MD   albuterol (PROVENTIL) (2.5 MG/3ML) 0.083% nebulizer solution Take 3 mLs by nebulization See Admin Instructions 1 vial via nebulizer q4h prn cough/wheeze 11/7/19   Washington Paiz MD   fluticasone (FLONASE) 50 MCG/ACT nasal spray 1 spray by Each Nostril route daily 10/2/19   Washington Paiz MD   senna (SENOKOT) 8.6 MG tablet Take 1 tablet by mouth daily 10/2/19   Washington Paiz MD   docusate sodium (COLACE) 100 MG capsule Take 1 capsule by mouth 2 times daily as needed for Constipation 10/2/19   Washington Paiz MD   levocetirizine (XYZAL) 5 MG tablet Take 1 tablet by mouth nightly as needed (allergies) 10/2/19   Washington Paiz MD vitamin E 1000 units capsule Take 1,000 Units by mouth daily    Historical Provider, MD   Magnesium Citrate 1.745 GM/30ML solution Take 296 mLs by mouth See Admin Instructions 1 bottle daily prn constipation 8/5/19   Iris Pak MD   BLACK ELDERBERRY,BERRY-FLOWER, PO Take 1 tablet by mouth daily    Historical Provider, MD   Methylsulfonylmethane (MSM) 1000 MG CAPS Take 1 tablet by mouth daily    Historical Provider, MD   Multiple Vitamins-Minerals (MULTIVITAMIN ADULT PO) Take 1 tablet by mouth daily    Historical Provider, MD   Incontinence Supply Disposable (POISE MAXIMUM ABSORBENCY) PADS  5/10/19   Historical Provider, MD   Ascorbic Acid (VITAMIN C) 1000 MG tablet Take 1,000 mg by mouth daily    Historical Provider, MD   betamethasone valerate (VALISONE) 0.1 % cream Apply topically 3 times daily Apply to rash TID until resolved    Historical Provider, MD   B Complex-C-Folic Acid (HM VITAMIN B COMPLEX/VITAMIN C PO) Take 1 tablet by mouth daily    Historical Provider, MD   mupirocin (BACTROBAN) 2 % ointment Apply topically See Admin Instructions Apply to affected area BID until better    Historical Provider, MD   polyethylene glycol (GLYCOLAX) powder Take by mouth See Admin Instructions 1 capful in large glass water prn constipation    Historical Provider, MD   Propylhexedrine Bishnu Sheridan) INHA by Nasal route Inhale each nostril for nasal congestion    Historical Provider, MD   Disposable Gloves (VINYL GLOVES) MISC by Does not apply route    Historical Provider, MD   vitamin D (CHOLECALCIFEROL) 5000 UNITS CAPS capsule Take 5,000 Units by mouth daily    Historical Provider, MD   ammonium lactate (LAC-HYDRIN) 12 % lotion Apply topically as needed for Dry Skin Apply topically as needed. Historical Provider, MD       ALLERGIES:  Patient has no known allergies.     SOCIAL Hx:  Social History     Socioeconomic History    Marital status:      Spouse name: Not on file    Number of children: Not on file    Years dysfunction. LABORATORY DATA:  CBC with Differential:    Lab Results   Component Value Date    WBC 13.3 07/26/2020    RBC 3.59 07/26/2020    HGB 13.7 07/26/2020    HCT 37.5 07/26/2020     07/26/2020    .5 07/26/2020    MCH 34.1 07/26/2020    MCHC 33.9 07/26/2020    RDW 13.8 07/26/2020    SEGSPCT 50 05/28/2011    METASPCT 1.7 07/26/2020    LYMPHOPCT 2.0 07/26/2020    MONOPCT 0.5 07/26/2020    MYELOPCT 1.0 02/08/2020    BASOPCT 0.6 07/26/2020    MONOSABS 0.00 07/26/2020    LYMPHSABS 0.00 07/26/2020    EOSABS 0.00 07/26/2020    BASOSABS 0.00 07/26/2020     BMP:    Lab Results   Component Value Date     07/26/2020    K 3.2 07/26/2020     07/26/2020    CO2 17 07/26/2020    BUN 12 07/26/2020    LABALBU 3.7 07/26/2020    LABALBU 4.0 05/27/2011    CREATININE 0.9 07/26/2020    CALCIUM 8.9 07/26/2020    GFRAA >60 07/26/2020    LABGLOM >60 07/26/2020    GLUCOSE 123 07/26/2020    GLUCOSE 96 05/28/2011       ASSESSMENT:  1. Sepsis secondary to a urinary tract infection in the setting of a nonobstructing 4 mm ureteral stone status post urologic stenting  2. Multiple sclerosis  3. Essential hypertension    PLAN:  I fully anticipate bacteremia in the setting of the patient's septic picture with ongoing hypotension and need for vasopressor therapy. IV fluid resuscitation is being undertaken. We will maintain empiric IV antibiotic therapy as we await urine and blood cultures. She tolerated urologic intervention without complication. Our goal is to wean off vasopressor therapy. We will attempt to wean off the nasal cannula oxygen as well. 2D echocardiogram will be assessed as she appears to be retaining a significant degree of fluid and is becoming increasingly dyspneic. Greater than 40 minutes of critical care time was spent with the patient. This time included chart review, , and discussion with those consultants involved in the patient's care.     Ivy Wasserman D.O., FACOI  11:53

## 2020-07-26 NOTE — ED NOTES
Report called and given to CRISSY LIRIANO [facility] for room ICU-8.      Oleg Redd RN  07/26/20 9915

## 2020-07-26 NOTE — BRIEF OP NOTE
Brief Postoperative Note      Patient: Carolyn Yeh  YOB: 1955  MRN: [id number]    Date of Procedure: 7/26/2020    Pre-Op Diagnosis: SEPSIS    Post-Op Diagnosis: Same       Procedure(s):  CYSTOSCOPY   STENT INSERTION LEFT    Surgeon(s):  Portia Howard DO    Assistant:  * No surgical staff found *    Anesthesia: Monitor Anesthesia Care    Estimated Blood Loss (mL): Minimal    Complications: None    Specimens:   ID Type Source Tests Collected by Time Destination   1 : URINE FOR CULTURE Urine Urine, Cystoscopic CULTURE, URINE, URINALYSIS Portia Howard DO 7/26/2020 2399        Implants:  Implant Name Type Inv.  Item Serial No.  Lot No. LRB No. Used Action   STENT URET 2 POLARIS W/O GW [id number] Stent:Urological STENT URET 2 POLARIS W/O GW [id number]  [place] [employer]: Cecilia Grass [id number] Left 1 Implanted         Drains:   Urethral Catheter Temperature probe;Straight-tip 16 fr (Active)   Catheter Indications Need for fluid management in critically ill patients in a critical care setting not able to be managed by other means such as BSC with hat, bedpan, urinal, condom catheter, or short term intermittent urethral catherization 07/26/20 0600   Site Assessment Pink 07/26/20 0600   Urine Color Yellow 07/26/20 0600   Urine Appearance Clear 07/26/20 0600   Output (mL) 200 mL 07/26/20 0141       Findings: see operative    Electronically signed by Portia Howard DO on 7/26/2020 at 7:50 AM

## 2020-07-26 NOTE — OP NOTE
[address]                                OPERATIVE REPORT    PATIENT NAME: Kavita Engel                 :        1955  MED REC NO:   [id number]                            ROOM:       08  ACCOUNT NO:   [de-identified]                           ADMIT DATE: 2020  PROVIDER:     Martha Howard DO    DATE OF PROCEDURE:  2020    PREOPERATIVE DIAGNOSES:  1. A 4-mm distal left ureteral stone. 2.  Left hydronephrosis. 3.  Left flank pain. 4.  UTI with associated sepsis. POSTOPERATIVE DIAGNOSES:  1. A 4-mm distal left ureteral stone. 2.  Left hydronephrosis. 3.  Left flank pain. 4.  UTI with associated sepsis. 5.  The patient had pyelonephrosis. PROCEDURES PERFORMED:  Cysto with left stent insertion. ANESTHESIA:  Monitored anesthesia. SURGEON:  Tony Howard DO    ESTIMATED BLOOD LOSS:  None. CONDITION:  In the ICU remains guarded. ANTIBIOTICS:  Preoperative antibiotics were administered with Rocephin. STORY:  This is a pleasant 77-year-old  female who presents to  [address] [place] [address] on the aforementioned date with the above  diagnoses. The patient was consented for this procedure this   morning. Basically, the patient was hypotensive, tachycardic with fever  of greater than 101.5. The patient did not have any family present. In  discussion with her, she states that she has never had a previous kidney  stone. The patient has been in the ICU for about two hours and still  feels quite ill. Ultimately, I talked to her about doing a staged  procedure today, first putting a stent in and then in the near future  she is going to need a left-sided laser lithotripsy. I explained the  risks, the benefits, and the alternatives of the proposed surgical  procedure to the patient.   The patient understood the risks, the  benefits, the alternatives and elected to proceed. OPERATIVE PROCEDURE:  Procedure goes as follows: This pleasant  29-year-old  female, who was brought to the operating room #1  at [facility] was placed in the supine position,  induced with monitored anesthesia. Anesthesia monitored head and neck  area, IV access and vital signs throughout the course of the case status  post induction. The patient was placed in a dorsal lithotomy position. All underlying points were pressure padded. SCDs were applied. She was  prepped and draped in a normal sterile fashion. I proceeded by taking a  21-[place] Olympus scope with a 30-degree lens inserted through the  meatus in an atraumatic fashion. Panendoscopic visualization of the  bladder was really devoid of any significant masses, tumors, lesions or  defects, but limited. She did have edema at the left ureteral orifice. It was at this point ultimately I did take an open-ended catheter and  cannulated the left ureteral orifice, placed upon a 0.035 Glidewire into  this kidney and then as soon as I did this, I took a large amount of  suppurative material to drain consistent with pyelonephrosis, probably  the etiology of her sepsis. I did place a 6 x 24 double-J stent. Her  Obrien catheter was placed. Urine culture was obtained. The patient  awoke from anesthesia without complication. PLAN:  1. She could be transferred back to the ICU. 2.  Continue IV fluids and antibiotics. 3.  She will need a laser lithotripsy when stable.         Susan Cook DO    D: 07/26/2020 7:53:37       T: 07/26/2020 10:09:45     [email or web address]  Job#: [id number]     Doc#: [id number]    CC:

## 2020-07-26 NOTE — ED PROVIDER NOTES
Chief complaint:  Weakness    HPI history provided by the patient and report  Patient was in for generalized fatigue and weakness. Apparently could not get off of the toilet today. States that she has been having increasing fatigue and weakness for the last 2 to 3 days. Denies falls or injury. No specific or focal weakness, did scribes a general body fatigue. States she believes she may have a bladder infection, does describe some urinary frequency and urgency although no flank pain or abdominal pain. She states her abdomen feels bloated but has no pain. She denies arm or leg injuries or pain. No headache. No stiff neck. No sore throat. No runny nose or nasal congestion. No coughing or shortness of breath. No syncope. Exertion makes her worse. Nursing reports that EMS reported blood sugar in the 130s. Review of Systems   Constitutional: Positive for fatigue. Negative for chills, diaphoresis and fever. HENT: Negative for congestion, rhinorrhea, sinus pressure and sore throat. Respiratory: Negative for cough, chest tightness, shortness of breath and wheezing. Cardiovascular: Negative for chest pain, palpitations and leg swelling. Gastrointestinal: Negative for abdominal distention, abdominal pain, diarrhea, nausea and vomiting. Genitourinary: Positive for dysuria and frequency. Negative for flank pain, hematuria and urgency. Musculoskeletal: Negative for arthralgias, back pain, gait problem, joint swelling, myalgias, neck pain and neck stiffness. Skin: Negative for rash and wound. Neurological: Positive for weakness. Negative for dizziness, tremors, seizures, syncope, speech difficulty, light-headedness, numbness and headaches. Hematological: Negative for adenopathy. All other systems reviewed and are negative. Physical Exam  Vitals signs and nursing note reviewed. Constitutional:       General: She is not in acute distress. Appearance: She is well-developed.  She is ill-appearing. She is not toxic-appearing or diaphoretic. HENT:      Head: Normocephalic and atraumatic. Mouth/Throat:      Mouth: Mucous membranes are dry. Comments: Dry lips, dry oral mucosa  Eyes:      General: No scleral icterus. Pupils: Pupils are equal, round, and reactive to light. Neck:      Musculoskeletal: Normal range of motion and neck supple. Normal range of motion. No neck rigidity, injury, pain with movement, spinous process tenderness or muscular tenderness. Trachea: Trachea and phonation normal. No tracheal tenderness or tracheal deviation. Cardiovascular:      Rate and Rhythm: Regular rhythm. Tachycardia present. Heart sounds: Normal heart sounds. No murmur. Pulmonary:      Effort: Pulmonary effort is normal. Tachypnea present. No respiratory distress. Breath sounds: Normal breath sounds. No stridor, decreased air movement or transmitted upper airway sounds. No decreased breath sounds, wheezing, rhonchi or rales. Chest:      Chest wall: No tenderness. Abdominal:      General: Bowel sounds are normal. There is no distension. Palpations: Abdomen is soft. Tenderness: There is no abdominal tenderness. There is no right CVA tenderness, left CVA tenderness, guarding or rebound. Musculoskeletal:         General: No swelling, tenderness, deformity or signs of injury. Right lower leg: No edema. Left lower leg: No edema. Comments: +2 general lower extremity edema with no calf pain. Lymphadenopathy:      Cervical: No cervical adenopathy. Skin:     General: Skin is warm and dry. Coloration: Skin is not jaundiced or pale. Findings: No erythema or rash. Neurological:      General: No focal deficit present. Mental Status: She is alert and oriented to person, place, and time. GCS: GCS eye subscore is 4. GCS verbal subscore is 5. GCS motor subscore is 6. Cranial Nerves: No cranial nerve deficit.       Coordination: Coordination normal.          Procedures     [facility]     ED Course as of Jul 26 0438   Sun Jul 26, 2020 0410 Patient with initial improvement however currently she is shivering, states she just does not feel well. She is somewhat tachycardic at about 140 although is shivering which is being picked up. Lungs are clear otherwise. Abdomen is currently soft and nontender. Work-up results are noted, pages have been placed for urology and medicine. [NC]   [id number] Patient with 2 peripheral IVs established and running. [NC]   [id number] Case discussed with Dr. RAZO [facility], detailed over given, he will consult on the patient, plans for stenting at 8 AM, would like the patient in the ICU and notified if significant condition change. [NC]   [id number] Case discussed with Dr. Bryant Osullivan for critical care, detailed overview given, he would also like the patient to get a dose of vancomycin. [NC]   [id number] Case discussed with Dr. Brenda Chawla, detailed overview given, he will admit the patient to the ICU. [NC]      ED Course User Index  [NC] [facility], DO        EKG Interpretation    Interpreted by emergency department physician    Rhythm: sinus tachycardia  Rate: 117  Axis: normal  Ectopy: none  Conduction: normal and long QT  ST Segments: no acute change  T Waves: no acute change  Q Waves: none    Clinical Impression: no acute changes    [facility]       ED Course as of Jul 26 0438   Sun Jul 26, 2020 0410 Patient with initial improvement however currently she is shivering, states she just does not feel well. She is somewhat tachycardic at about 140 although is shivering which is being picked up. Lungs are clear otherwise. Abdomen is currently soft and nontender. Work-up results are noted, pages have been placed for urology and medicine. [NC]   [id number] Patient with 2 peripheral IVs established and running.     [NC]   [id number] Case discussed with Dr. RAZO [facility], detailed over given, he will consult on the patient, plans for stenting at 8 AM, would like the patient in the ICU and notified if significant condition change. [NC]   [id number] Case discussed with Dr. Vicente Lowe for critical care, detailed overview given, he would also like the patient to get a dose of vancomycin. [NC]   [id number] Case discussed with Dr. Jamey Barrios, detailed overview given, he will admit the patient to the ICU. [NC]      ED Course User Index  [NC] Tyshawn Perez, DO       --------------------------------------------- PAST HISTORY ---------------------------------------------  Past Medical History:  has a past medical history of Asthma, Fall, Hematoma of abdominal wall, Hypertension, Metatarsal fracture, Multiple sclerosis ([facility] [address] and UTI (urinary tract infection). Past Surgical History:  has a past surgical history that includes Hysterectomy; Cholecystectomy; and Appendectomy. Social History:  reports that she quit smoking about 17 months ago. Her smoking use included cigarettes. She has a 11.50 pack-year smoking history. She has never used smokeless tobacco. She reports that she does not drink alcohol or use drugs. Family History: family history includes Asthma in her father; Cancer in her maternal aunt; High Blood Pressure in her father. The patients home medications have been reviewed. Allergies: Patient has no known allergies.     -------------------------------------------------- RESULTS -------------------------------------------------    Lab  Results for orders placed or performed during the hospital encounter of 07/26/20   CBC Auto Differential   Result Value Ref Range    WBC 13.3 (H) 4.5 - 11.5 E9/L    RBC 3.59 3.50 - 5.50 E12/L    Hemoglobin 13.7 11.5 - 15.5 g/dL    Hematocrit 37.5 34.0 - 48.0 %    .5 (H) 80.0 - 99.9 fL    MCH 34.1 26.0 - 35.0 pg    MCHC 33.9 32.0 - 34.5 %    RDW 13.8 11.5 - 15.0 fL    Platelets 564 834 - 432 E9/L    MPV 10.1 7.0 - 12.0 fL    Neutrophils % 98.3 (H) 43.0 - 80.0 %    Lymphocytes % 2.0 (L) 20.0 - 42.0 % Monocytes % 0.5 (L) 2.0 - 12.0 %    Eosinophils % 0.2 0.0 - 6.0 %    Basophils % 0.6 0.0 - 2.0 %    Neutrophils Absolute 13.30 (H) 1.80 - 7.30 E9/L    Lymphocytes Absolute 0.00 (L) 1.50 - 4.00 E9/L    Monocytes Absolute 0.00 (L) 0.10 - 0.95 E9/L    Eosinophils Absolute 0.00 (L) 0.05 - 0.50 E9/L    Basophils Absolute 0.00 0.00 - 0.20 E9/L    Metamyelocytes Relative 1.7 (H) 0.0 - 1.0 %    Anisocytosis 1+     Polychromasia 1+    Comprehensive Metabolic Panel   Result Value Ref Range    Sodium 139 132 - 146 mmol/L    Potassium 3.2 (L) 3.5 - 5.0 mmol/L    Chloride 102 98 - 107 mmol/L    CO2 17 (L) 22 - 29 mmol/L    Anion Gap 20 (H) 7 - 16 mmol/L    Glucose 123 (H) 74 - 99 mg/dL    BUN 12 8 - 23 mg/dL    CREATININE 0.9 0.5 - 1.0 mg/dL    GFR Non-African American >60 >=60 mL/min/1.73    GFR African American >60     Calcium 8.9 8.6 - 10.2 mg/dL    Total Protein 6.3 (L) 6.4 - 8.3 g/dL    Alb 3.7 3.5 - 5.2 g/dL    Total Bilirubin 1.1 0.0 - 1.2 mg/dL    Alkaline Phosphatase 133 (H) 35 - 104 U/L    ALT 31 0 - 32 U/L    AST 32 (H) 0 - 31 U/L   Brain Natriuretic Peptide   Result Value Ref Range    Pro- (H) 0 - 125 pg/mL   Lactate, Sepsis   Result Value Ref Range    Lactic Acid, Sepsis 5.7 (HH) 0.5 - 1.9 mmol/L   Lactate, Sepsis   Result Value Ref Range    Lactic Acid, Sepsis 5.0 (HH) 0.5 - 1.9 mmol/L   TSH without Reflex   Result Value Ref Range    TSH 1.730 0.270 - 4.200 uIU/mL   T4   Result Value Ref Range    T4, Total 8.4 4.5 - 11.7 mcg/dL   Troponin   Result Value Ref Range    Troponin 0.05 (H) 0.00 - 0.03 ng/mL   Magnesium   Result Value Ref Range    Magnesium 1.6 1.6 - 2.6 mg/dL   Urinalysis   Result Value Ref Range    Color, UA Yellow Straw/Yellow    Clarity, UA CLOUDY (A) Clear    Glucose, Ur Negative Negative mg/dL    Bilirubin Urine Negative Negative    Ketones, Urine Negative Negative mg/dL    Specific Gravity, UA 1.015 1.005 - 1.030    Blood, Urine MODERATE (A) Negative    pH, UA 7.0 5.0 - 9.0    Protein, UA 30 (A) Negative mg/dL    Urobilinogen, Urine 0.2 <2.0 E.U./dL    Nitrite, Urine Negative Negative    Leukocyte Esterase, Urine SMALL (A) Negative   COVID-19   Result Value Ref Range    SARS-CoV-2, NAAT Not Detected Not Detected   Microscopic Urinalysis   Result Value Ref Range    WBC, UA 5-10 (A) 0 - 5 /HPF    RBC, UA 5-10 (A) 0 - 2 /HPF    Epithelial Cells, UA FEW /HPF    Bacteria, UA MANY (A) None Seen /HPF   EKG 12 Lead   Result Value Ref Range    Ventricular Rate 117 BPM    Atrial Rate 117 BPM    P-R Interval 120 ms    QRS Duration 86 ms    Q-T Interval 384 ms    QTc Calculation (Bazett) 535 ms    P Axis 28 degrees    R Axis -3 degrees    T Axis 72 degrees       Radiology  CT ABDOMEN PELVIS WO CONTRAST   Preliminary Result   1. Moderate left hydronephrosis and hydroureter related to a 4 mm distal left   ureteral stone. 2. New large nonobstructing left renal calculi. 3. Otherwise unchanged incidental findings as described in the body of the   report. XR CHEST PORTABLE   Final Result   1. Low lung volumes with no definite acute abnormality identified. 2. Ground-glass opacity overlying the bilateral lower lung zones is favored   to be related to overlapping soft tissue.                   ------------------------- NURSING NOTES AND VITALS REVIEWED ---------------------------  Date / Time Roomed:  7/26/2020  1:24 AM  ED Bed Assignment:  01/01    The nursing notes within the ED encounter and vital signs as below have been reviewed.    Patient Vitals for the past 24 hrs:   BP Temp Temp src Pulse Resp SpO2 Height Weight   07/26/20 0324 (!) 116/93 100.5 °F (38.1 °C) CORE 114 24 96 % -- --   07/26/20 0251 126/69 101.5 °F (38.6 °C) Bladder 113 18 97 % -- --   07/26/20 0150 (!) 130/59 101.5 °F (38.6 °C) CORE 117 24 99 % -- --   07/26/20 0127 138/73 98.4 °F (36.9 °C) Oral 135 26 93 % 5' 6\" (1.676 m) 185 lb (83.9 kg)       Oxygen Saturation Interpretation: Normal      ------------------------------------------ PROGRESS NOTES ------------------------------------------  Re-evaluation(s):  Time: 0430. Patients symptoms show no change  Repeat physical examination is not changed        I have spoken with the patient and discussed todays results, in addition to providing specific details for the plan of care and counseling regarding the diagnosis and prognosis. Their questions are answered at this time and they are agreeable with the plan. This patient's ED course included: a personal history and physicial examination, re-evaluation prior to disposition, multiple bedside re-evaluations, IV medications, cardiac monitoring, continuous pulse oximetry and complex medical decision making and emergency management    This patient has remained hemodynamically stable and been closely monitored during their ED course. Please note that the withdrawal or failure to initiate urgent interventions for this patient would likely result in a life threatening deterioration or permanent disability. Accordingly this patient received 60 minutes of critical care time, excluding separately billable procedures. Systems at risk for deterioration include: cardiac. Clinical Impression  1. Septicemia ([address]    2. Acute cystitis without hematuria    3. Hypokalemia    4. Elevated troponin    5. Fatigue, unspecified type    6. Dehydration    7. Kidney stone          Disposition  Patient's disposition: Admit to CCU/ICU  Patient's condition is serious.        Oscar Sung DO  07/26/20 9088

## 2020-07-27 LAB
ACINETOBACTER BAUMANNII BY PCR: NOT DETECTED
ALBUMIN SERPL-MCNC: 3 G/DL (ref 3.5–5.2)
ALP BLD-CCNC: 93 U/L (ref 35–104)
ALT SERPL-CCNC: 27 U/L (ref 0–32)
ANION GAP SERPL CALCULATED.3IONS-SCNC: 11 MMOL/L (ref 7–16)
AST SERPL-CCNC: 28 U/L (ref 0–31)
BASOPHILS ABSOLUTE: 0.28 E9/L (ref 0–0.2)
BASOPHILS RELATIVE PERCENT: 0.9 % (ref 0–2)
BILIRUB SERPL-MCNC: 1.5 MG/DL (ref 0–1.2)
BOTTLE TYPE: ABNORMAL
BUN BLDV-MCNC: 12 MG/DL (ref 8–23)
CALCIUM SERPL-MCNC: 8.2 MG/DL (ref 8.6–10.2)
CANDIDA ALBICANS BY PCR: NOT DETECTED
CANDIDA GLABRATA BY PCR: NOT DETECTED
CANDIDA KRUSEI BY PCR: NOT DETECTED
CANDIDA PARAPSILOSIS BY PCR: NOT DETECTED
CANDIDA TROPICALIS BY PCR: NOT DETECTED
CARBAPENEM RESISTANCE KPC BY PCR: NOT DETECTED
CHLORIDE BLD-SCNC: 113 MMOL/L (ref 98–107)
CO2: 21 MMOL/L (ref 22–29)
CREAT SERPL-MCNC: 1 MG/DL (ref 0.5–1)
ENTEROBACTER CLOACAE COMPLEX BY PCR: NOT DETECTED
ENTEROBACTERALES BY PCR: DETECTED
ENTEROCOCCUS BY PCR: NOT DETECTED
EOSINOPHILS ABSOLUTE: 0.28 E9/L (ref 0.05–0.5)
EOSINOPHILS RELATIVE PERCENT: 0.9 % (ref 0–6)
ESCHERICHIA COLI BY PCR: DETECTED
GFR AFRICAN AMERICAN: >60
GFR NON-AFRICAN AMERICAN: 55 ML/MIN/1.73
GLUCOSE BLD-MCNC: 118 MG/DL (ref 74–99)
HAEMOPHILUS INFLUENZAE BY PCR: NOT DETECTED
HCT VFR BLD CALC: 33.4 % (ref 34–48)
HEMOGLOBIN: 11.2 G/DL (ref 11.5–15.5)
KLEBSIELLA OXYTOCA BY PCR: NOT DETECTED
KLEBSIELLA PNEUMONIAE GROUP BY PCR: NOT DETECTED
LACTIC ACID: 1.6 MMOL/L (ref 0.5–2.2)
LISTERIA MONOCYTOGENES BY PCR: NOT DETECTED
LV EF: 61 %
LVEF MODALITY: NORMAL
LYMPHOCYTES ABSOLUTE: 0.95 E9/L (ref 1.5–4)
LYMPHOCYTES RELATIVE PERCENT: 2.6 % (ref 20–42)
MCH RBC QN AUTO: 34.1 PG (ref 26–35)
MCHC RBC AUTO-ENTMCNC: 33.5 % (ref 32–34.5)
MCV RBC AUTO: 101.8 FL (ref 80–99.9)
MONOCYTES ABSOLUTE: 2.84 E9/L (ref 0.1–0.95)
MONOCYTES RELATIVE PERCENT: 8.7 % (ref 2–12)
MRSA CULTURE ONLY: NORMAL
NEISSERIA MENINGITIDIS BY PCR: NOT DETECTED
NEUTROPHILS ABSOLUTE: 27.49 E9/L (ref 1.8–7.3)
NEUTROPHILS RELATIVE PERCENT: 87 % (ref 43–80)
ORDER NUMBER: ABNORMAL
PDW BLD-RTO: 14.5 FL (ref 11.5–15)
PLATELET # BLD: 158 E9/L (ref 130–450)
PMV BLD AUTO: 10.8 FL (ref 7–12)
POLYCHROMASIA: ABNORMAL
POTASSIUM SERPL-SCNC: 3.5 MMOL/L (ref 3.5–5)
PROTEUS BY PCR: NOT DETECTED
PSEUDOMONAS AERUGINOSA BY PCR: NOT DETECTED
RBC # BLD: 3.28 E12/L (ref 3.5–5.5)
ROULEAUX: ABNORMAL
SERRATIA MARCESCENS BY PCR: NOT DETECTED
SODIUM BLD-SCNC: 145 MMOL/L (ref 132–146)
SOURCE OF BLOOD CULTURE: ABNORMAL
STAPHYLOCOCCUS AUREUS BY PCR: NOT DETECTED
STAPHYLOCOCCUS SPECIES BY PCR: NOT DETECTED
STREPTOCOCCUS AGALACTIAE BY PCR: NOT DETECTED
STREPTOCOCCUS PNEUMONIAE BY PCR: NOT DETECTED
STREPTOCOCCUS PYOGENES  BY PCR: NOT DETECTED
STREPTOCOCCUS SPECIES BY PCR: NOT DETECTED
TOTAL PROTEIN: 5.5 G/DL (ref 6.4–8.3)
TROPONIN: 0.07 NG/ML (ref 0–0.03)
WBC # BLD: 31.6 E9/L (ref 4.5–11.5)

## 2020-07-27 PROCEDURE — 83605 ASSAY OF LACTIC ACID: CPT

## 2020-07-27 PROCEDURE — 6360000002 HC RX W HCPCS: Performed by: INTERNAL MEDICINE

## 2020-07-27 PROCEDURE — 6360000002 HC RX W HCPCS: Performed by: UROLOGY

## 2020-07-27 PROCEDURE — 2580000003 HC RX 258: Performed by: INTERNAL MEDICINE

## 2020-07-27 PROCEDURE — 6360000002 HC RX W HCPCS: Performed by: NURSE PRACTITIONER

## 2020-07-27 PROCEDURE — 80053 COMPREHEN METABOLIC PANEL: CPT

## 2020-07-27 PROCEDURE — 6370000000 HC RX 637 (ALT 250 FOR IP): Performed by: UROLOGY

## 2020-07-27 PROCEDURE — 2580000003 HC RX 258: Performed by: UROLOGY

## 2020-07-27 PROCEDURE — 2000000000 HC ICU R&B

## 2020-07-27 PROCEDURE — 6370000000 HC RX 637 (ALT 250 FOR IP): Performed by: INTERNAL MEDICINE

## 2020-07-27 PROCEDURE — 93306 TTE W/DOPPLER COMPLETE: CPT

## 2020-07-27 PROCEDURE — C9113 INJ PANTOPRAZOLE SODIUM, VIA: HCPCS | Performed by: NURSE PRACTITIONER

## 2020-07-27 PROCEDURE — 2500000003 HC RX 250 WO HCPCS: Performed by: INTERNAL MEDICINE

## 2020-07-27 PROCEDURE — 85025 COMPLETE CBC W/AUTO DIFF WBC: CPT

## 2020-07-27 RX ORDER — TORSEMIDE 10 MG/1
10 TABLET ORAL DAILY
Status: DISCONTINUED | OUTPATIENT
Start: 2020-07-27 | End: 2020-07-28 | Stop reason: ALTCHOICE

## 2020-07-27 RX ORDER — POTASSIUM CHLORIDE 20 MEQ/1
20 TABLET, EXTENDED RELEASE ORAL DAILY
Status: DISCONTINUED | OUTPATIENT
Start: 2020-07-27 | End: 2020-07-28

## 2020-07-27 RX ORDER — HYDROCHLOROTHIAZIDE 12.5 MG/1
12.5 TABLET ORAL DAILY
Status: DISCONTINUED | OUTPATIENT
Start: 2020-07-27 | End: 2020-07-30 | Stop reason: HOSPADM

## 2020-07-27 RX ORDER — METOPROLOL TARTRATE 5 MG/5ML
5 INJECTION INTRAVENOUS EVERY 4 HOURS
Status: DISCONTINUED | OUTPATIENT
Start: 2020-07-27 | End: 2020-07-29 | Stop reason: DRUGHIGH

## 2020-07-27 RX ORDER — LOSARTAN POTASSIUM 50 MG/1
50 TABLET ORAL DAILY
Status: DISCONTINUED | OUTPATIENT
Start: 2020-07-27 | End: 2020-07-30 | Stop reason: HOSPADM

## 2020-07-27 RX ORDER — FUROSEMIDE 10 MG/ML
20 INJECTION INTRAMUSCULAR; INTRAVENOUS ONCE
Status: COMPLETED | OUTPATIENT
Start: 2020-07-28 | End: 2020-07-27

## 2020-07-27 RX ADMIN — POTASSIUM CHLORIDE 20 MEQ: 20 TABLET, EXTENDED RELEASE ORAL at 08:17

## 2020-07-27 RX ADMIN — OXYCODONE HYDROCHLORIDE AND ACETAMINOPHEN 1000 MG: 500 TABLET ORAL at 08:18

## 2020-07-27 RX ADMIN — Medication 1000 UNITS: at 08:19

## 2020-07-27 RX ADMIN — HYDROCHLOROTHIAZIDE 12.5 MG: 12.5 TABLET ORAL at 08:17

## 2020-07-27 RX ADMIN — PIPERACILLIN AND TAZOBACTAM 3.38 G: 3; .375 INJECTION, POWDER, LYOPHILIZED, FOR SOLUTION INTRAVENOUS at 07:00

## 2020-07-27 RX ADMIN — SENNOSIDES 8.6 MG: 8.6 TABLET, FILM COATED ORAL at 08:18

## 2020-07-27 RX ADMIN — TORSEMIDE 10 MG: 10 TABLET ORAL at 09:48

## 2020-07-27 RX ADMIN — SODIUM CHLORIDE, PRESERVATIVE FREE 10 ML: 5 INJECTION INTRAVENOUS at 23:52

## 2020-07-27 RX ADMIN — ACETAMINOPHEN 650 MG: 325 TABLET, FILM COATED ORAL at 23:45

## 2020-07-27 RX ADMIN — FUROSEMIDE 20 MG: 10 INJECTION, SOLUTION INTRAMUSCULAR; INTRAVENOUS at 23:52

## 2020-07-27 RX ADMIN — DOCUSATE SODIUM 100 MG: 100 CAPSULE, LIQUID FILLED ORAL at 08:18

## 2020-07-27 RX ADMIN — SODIUM CHLORIDE 25 ML: 9 INJECTION, SOLUTION INTRAVENOUS at 11:30

## 2020-07-27 RX ADMIN — SODIUM CHLORIDE, POTASSIUM CHLORIDE, SODIUM LACTATE AND CALCIUM CHLORIDE: 600; 310; 30; 20 INJECTION, SOLUTION INTRAVENOUS at 09:48

## 2020-07-27 RX ADMIN — SODIUM CHLORIDE, POTASSIUM CHLORIDE, SODIUM LACTATE AND CALCIUM CHLORIDE: 600; 310; 30; 20 INJECTION, SOLUTION INTRAVENOUS at 20:40

## 2020-07-27 RX ADMIN — VANCOMYCIN HYDROCHLORIDE 1000 MG: 1 INJECTION, POWDER, LYOPHILIZED, FOR SOLUTION INTRAVENOUS at 05:13

## 2020-07-27 RX ADMIN — GUAIFENESIN AND DEXTROMETHORPHAN HYDROBROMIDE 1 TABLET: 600; 30 TABLET, EXTENDED RELEASE ORAL at 08:17

## 2020-07-27 RX ADMIN — CETIRIZINE HYDROCHLORIDE 10 MG: 10 TABLET, FILM COATED ORAL at 08:18

## 2020-07-27 RX ADMIN — SODIUM CHLORIDE 25 ML: 9 INJECTION, SOLUTION INTRAVENOUS at 04:36

## 2020-07-27 RX ADMIN — LOSARTAN POTASSIUM 50 MG: 50 TABLET, FILM COATED ORAL at 18:30

## 2020-07-27 RX ADMIN — SODIUM CHLORIDE 25 ML: 9 INJECTION, SOLUTION INTRAVENOUS at 21:01

## 2020-07-27 RX ADMIN — PIPERACILLIN AND TAZOBACTAM 3.38 G: 3; .375 INJECTION, POWDER, LYOPHILIZED, FOR SOLUTION INTRAVENOUS at 16:17

## 2020-07-27 RX ADMIN — Medication 10 ML: at 08:18

## 2020-07-27 RX ADMIN — PANTOPRAZOLE SODIUM 40 MG: 40 INJECTION, POWDER, FOR SOLUTION INTRAVENOUS at 08:18

## 2020-07-27 RX ADMIN — PIPERACILLIN AND TAZOBACTAM 3.38 G: 3; .375 INJECTION, POWDER, LYOPHILIZED, FOR SOLUTION INTRAVENOUS at 23:27

## 2020-07-27 RX ADMIN — METOPROLOL TARTRATE 5 MG: 5 INJECTION, SOLUTION INTRAVENOUS at 21:00

## 2020-07-27 RX ADMIN — Medication 10 ML: at 21:00

## 2020-07-27 RX ADMIN — ACETAMINOPHEN 650 MG: 325 TABLET, FILM COATED ORAL at 05:19

## 2020-07-27 RX ADMIN — ENOXAPARIN SODIUM 40 MG: 40 INJECTION SUBCUTANEOUS at 08:16

## 2020-07-27 ASSESSMENT — PAIN SCALES - GENERAL
PAINLEVEL_OUTOF10: 0
PAINLEVEL_OUTOF10: 0
PAINLEVEL_OUTOF10: 7
PAINLEVEL_OUTOF10: 0
PAINLEVEL_OUTOF10: 0
PAINLEVEL_OUTOF10: 6
PAINLEVEL_OUTOF10: 0

## 2020-07-27 ASSESSMENT — PAIN DESCRIPTION - PROGRESSION
CLINICAL_PROGRESSION: GRADUALLY WORSENING
CLINICAL_PROGRESSION: GRADUALLY WORSENING

## 2020-07-27 ASSESSMENT — PAIN DESCRIPTION - DESCRIPTORS: DESCRIPTORS: HEADACHE

## 2020-07-27 ASSESSMENT — PAIN DESCRIPTION - PAIN TYPE: TYPE: ACUTE PAIN

## 2020-07-27 ASSESSMENT — PAIN DESCRIPTION - ONSET: ONSET: PROGRESSIVE

## 2020-07-27 ASSESSMENT — PAIN DESCRIPTION - FREQUENCY: FREQUENCY: INTERMITTENT

## 2020-07-27 ASSESSMENT — PAIN - FUNCTIONAL ASSESSMENT: PAIN_FUNCTIONAL_ASSESSMENT: ACTIVITIES ARE NOT PREVENTED

## 2020-07-27 ASSESSMENT — PAIN DESCRIPTION - LOCATION: LOCATION: HEAD

## 2020-07-27 ASSESSMENT — PAIN DESCRIPTION - ORIENTATION: ORIENTATION: RIGHT;LEFT

## 2020-07-27 NOTE — PROGRESS NOTES
7/27/2020 8:14 AM  Service: Urology  Group: [facility] ([facility])    Melinda Cosby  [id number]    Subjective:    She remains in the ICU  She is alert and oriented  Tired  Off of pressors  She denies any flank pain, tolerating the stent   Denies fever or chills   Tolerating diet     Review of Systems  Constitutional: No fever or chills   Respiratory: negative for cough and hemoptysis  Cardiovascular: negative for chest pain and dyspnea  Gastrointestinal: negative for abdominal pain, diarrhea, nausea and vomiting   : See above  Derm: negative for rash and skin lesion(s)  Neurological: negative for seizures and tremors  Musculoskeletal: Negative    Psychiatric: Negative   All other reviews are negative      Scheduled Meds:   potassium chloride  20 mEq Oral Daily    hydrochlorothiazide  12.5 mg Oral Daily    torsemide  10 mg Oral Daily    sodium chloride flush  10 mL Intravenous 2 times per day    enoxaparin  40 mg Subcutaneous Daily    piperacillin-tazobactam  3.375 g Intravenous Q8H    vitamin C  1,000 mg Oral Daily    vitamin E  1,000 Units Oral Daily    fluticasone  1 spray Each Nostril Daily    senna  1 tablet Oral Daily    albuterol  2.5 mg Nebulization See Admin Instructions    vancomycin  1,000 mg Intravenous Q12H    cetirizine  10 mg Oral Daily    pantoprazole  40 mg Intravenous Daily       Objective:  Vitals:    07/27/20 0700   BP: 121/60   Pulse: 104   Resp: 16   Temp:    SpO2: 92%         Allergies: Patient has no known allergies.     General Appearance: alert and oriented to person, place and time and in no acute distress, weak  Skin: no rash or erythema  Head: normocephalic and atraumatic  Pulmonary/Chest: normal air movement, no respiratory distress  Abdomen: soft, non-tender, non-distended  Genitourinary: dillon draining yellow urine   Extremities: no cyanosis, clubbing or edema         Labs:     Recent Labs     07/27/20  0506      K 3.5   *   CO2 21*   BUN 12 CREATININE 1.0   GLUCOSE 118*   CALCIUM 8.2*       Lab Results   Component Value Date    HGB 11.2 07/27/2020    HCT 33.4 07/27/2020         Assessment/Plan:  POD#1 cystoscopy, left ureteral stent insertion   4mm left distal ureteral stone   UTI with Sepsis   Pyelonephrosis     Cont to watch the WBCs  Creatinine stable   Cont the ICU Care  Urine culture pending   Continue the antibiotics per ICU Team, currently on Vanc and Zosyn   Continue the left ureteral stent  She will require a second procedure for her stone management in the future via left sided laser lithotripsy.  This will be done during a different hospital stay once she is free of infection   No further acute  interventions are planned at this time  Please call with further questions or concerns     Pee Lyon, APRN - CNP   [facility]rology

## 2020-07-27 NOTE — PROGRESS NOTES
Internal Medicine Progress Note     [facility]     Donnalesley Anaya. Zahraa Chao, KARL.A.CTerrenceOTerrenceI. Joyce Doshi D.O., TAYLEROBEN Calderon D.O. Davi Senior, MSN, APRN, NP-C  Gwendolyn Salazaror. rSeedhar Headley, MSN, APRN-CNP     Primary Care Physician: Todd Salmon MD   Admitting Physician:  Kelsey Bautista DO  Admission date and time: 7/26/2020  1:24 AM    Room:  [place] [address]    Patient Name: Kalpesh Beltran  MRN: [id number]    Date of Service: 7/27/2020     Subjective:  Cushing is feeling dramatically better today with no further need for vasopressor therapy. Her abdominal discomfort is improving and she has an increasing appetite. We discussed her positive blood cultures as to be expected. She is tolerating antibiotic support and IV fluid resuscitation. She would like to become more active today. No family members were present during my examination. Review of System: HEENT:  Manuel ear pain, sore throat, sinus or eye problems. Admits to irritation associated with the nasal cannula oxygen. Cardiovascular:   Denies any chest pain, irregular heartbeats, or palpitations. Respiratory:   Less shortness of breath today. No coughing or sputum production. Gastrointestinal:   Improving abdominal discomfort. No diarrhea or constipation. Admits to an improving appetite. Extremities:   Denies any lower extremity swelling or edema. Neurology:    Admits to chronic neurologic deficits and weakness related to underlying multiple sclerosis  Derm:    Denies any rashes, ulcers, or excoriations. Denies bruising. Genitourinary:    Denies any urgency, frequency, hematuria. Voiding with the use of a catheter. .  Musculoskeletal:  Denies myalgias, joint complaints or back pain      Physical Exam:  I/O this shift: In: 48 [IV Piggyback:50]  Out: -   Blood pressure 136/71, pulse 87, temperature 99.2 °F (37.3 °C), temperature source Oral, resp.  rate 18, height 5' 6\" (1.676 m), weight 195 lb (88.5 kg), SpO2 93 %. HEENT:    PERRLA. EOMI. Sclera clear. Buccal mucosa moist.  Nasal cannula oxygen is in place. Neck:    Supple. Trachea midline. No thyromegaly. No JVD. No bruits. Heart:    Rhythm regular, rate controlled. No murmurs. Lungs:    Symmetrical. Clear to auscultation bilaterally. No wheezes. No rhonchi. No rales. Abdomen:   Soft. Non-tender. Non-distended. Bowel sounds positive. No organomegaly or masses. No pain on palpation  Extremities:    Peripheral pulses present. No peripheral edema. No ulcers. Neurologic:    Alert x 3. No focal deficit. Cranial nerves grossly intact. Chronic deficits related to multiple sclerosis  Skin:    No petechia. No hemorrhage. No wounds. Psych:   Behavior is normal. Mood appears normal. Speech is not rapid or pressured. Musculokeletal:  Spine ROM normal. Muscular strength intact. Gait not assessed. Genitalia/Breast:  Voiding with the use of a Obrien catheter.     Scheduled Meds:   potassium chloride  20 mEq Oral Daily    hydrochlorothiazide  12.5 mg Oral Daily    torsemide  10 mg Oral Daily    sodium chloride flush  10 mL Intravenous 2 times per day    enoxaparin  40 mg Subcutaneous Daily    piperacillin-tazobactam  3.375 g Intravenous Q8H    vitamin C  1,000 mg Oral Daily    vitamin E  1,000 Units Oral Daily    fluticasone  1 spray Each Nostril Daily    senna  1 tablet Oral Daily    albuterol  2.5 mg Nebulization See Admin Instructions    cetirizine  10 mg Oral Daily    pantoprazole  40 mg Intravenous Daily       Continuous Infusions:   lactated ringers 100 mL/hr at 07/27/20 0948    sodium chloride 25 mL (07/27/20 0436)    norepinephrine Stopped (07/26/20 2045)       Objective Data:  CBC with Differential:    Lab Results   Component Value Date    WBC 31.6 07/27/2020    RBC 3.28 07/27/2020    HGB 11.2 07/27/2020    HCT 33.4 07/27/2020     07/27/2020    .8 07/27/2020    MCH 34.1 07/27/2020    MCHC 33.5 07/27/2020 RDW 14.5 07/27/2020    SEGSPCT 50 05/28/2011    METASPCT 1.7 07/26/2020    LYMPHOPCT 2.6 07/27/2020    MONOPCT 8.7 07/27/2020    MYELOPCT 1.0 02/08/2020    BASOPCT 0.9 07/27/2020    MONOSABS 2.84 07/27/2020    LYMPHSABS 0.95 07/27/2020    EOSABS 0.28 07/27/2020    BASOSABS 0.28 07/27/2020     BMP:    Lab Results   Component Value Date     07/27/2020    K 3.5 07/27/2020     07/27/2020    CO2 21 07/27/2020    BUN 12 07/27/2020    LABALBU 3.0 07/27/2020    LABALBU 4.0 05/27/2011    CREATININE 1.0 07/27/2020    CALCIUM 8.2 07/27/2020    GFRAA >60 07/27/2020    LABGLOM 55 07/27/2020    GLUCOSE 118 07/27/2020    GLUCOSE 96 05/28/2011       Assessment:   1. Sepsis secondary to a urinary tract infection in the setting of a nonobstructing 4 mm ureteral stone status post urologic stenting with concomitant gram-negative bacteremia  2. Multiple sclerosis  3. Essential hypertension    Plan:   Augie Hitchcock has improved dramatically when compared to my examination yesterday. She is maintained on IV fluid resuscitation and is no longer requiring vasopressor support. Antibiotic therapy is being employed for the underlying infectious processes. 2D echocardiogram will also be obtained today. Diet is being advanced. I anticipate a downward trend in white blood cell count over the next 24 hours. She will work with the therapy teams. I anticipate transferring the patient from the intensive care unit in the near future. Continue current therapy. See orders for further plan of care. Greater than 40 minutes of critical care time spent with the patient. This time included chart review, , and discussion with those consultants involved in the patient's care. More than 50% of my  time was spent at the bedside counseling/coordinating care with the patient and/or family with face to face contact. This time was spent reviewing notes and laboratory data as well as instructing and counseling the patient.  Time I spent with the family or surrogate(s) is included only if the patient was incapable of providing the necessary information or participating in medical decisions. I also discussed the differential diagnosis and all of the proposed management plans with the patient and individuals accompanying the patient. Garima Rajput requires this high level of physician care and nursing in the ICU due to the complexity of decision management and chance of rapid decline or death. Continued cardiac monitoring and higher level of nursing are required. I am readily available for any further decision-making and intervention.        Dinora Ewing DO, F.A.C.O.I.  7/27/2020  1:00 PM

## 2020-07-27 NOTE — PROGRESS NOTES
Pharmacy Consultation Note  (Antibiotic Dosing and Monitoring)    Vancomycin has been discontinued; pharmacy will sign-off. Please reconsult if needed.     Thank you,  Niko Garcia, PharmD, BCPS 7/27/2020 11:25 AM

## 2020-07-27 NOTE — PLAN OF CARE
Problem: Falls - Risk of:  Goal: Will remain free from falls  Description: Will remain free from falls  Outcome: Met This Shift     Problem: Pain:  Goal: Control of acute pain  Description: Control of acute pain  Outcome: Met This Shift  Goal: Control of chronic pain  Description: Control of chronic pain  Outcome: Met This Shift

## 2020-07-27 NOTE — PROGRESS NOTES
Pulmonary/Critical Care Progress Note    We are following patient for septic shock secondary to obstructing stone left ureter requiring cystoscopy and stent insertion to remove the stone, multiple sclerosis, asthma, hypertension, suspect underlying obstructive sleep apnea, chronic low back pain, osteoarthritis    SUBJECTIVE:  Patient has done well since the stent insertion. She is off Levophed now which she required earlier. She is currently on lactated Ringer's and doing well on antibiotics which include Zosyn. Her blood cultures are positive for E. coli and Enterobacter species. She has become much more awake and alert and says she feels much better than she did yesterday. She is taking a diet without difficulty. MEDICATIONS:   potassium chloride  20 mEq Oral Daily    hydrochlorothiazide  12.5 mg Oral Daily    torsemide  10 mg Oral Daily    sodium chloride flush  10 mL Intravenous 2 times per day    enoxaparin  40 mg Subcutaneous Daily    piperacillin-tazobactam  3.375 g Intravenous Q8H    vitamin C  1,000 mg Oral Daily    vitamin E  1,000 Units Oral Daily    fluticasone  1 spray Each Nostril Daily    senna  1 tablet Oral Daily    albuterol  2.5 mg Nebulization See Admin Instructions    cetirizine  10 mg Oral Daily    pantoprazole  40 mg Intravenous Daily      lactated ringers 100 mL/hr at 07/27/20 0948    sodium chloride 25 mL (07/27/20 1130)    norepinephrine Stopped (07/26/20 2045)     sodium chloride flush, acetaminophen, docusate sodium, dextromethorphan-guaiFENesin, HYDROcodone 5 mg - acetaminophen, pseudoephedrine, magnesium citrate, mupirocin, perflutren lipid microspheres      REVIEW OF SYSTEMS:  Constitutional: Denies fever, weight loss, night sweats, and fatigue  Skin: Denies pigmentation, dark lesions, and rashes   HEENT: Denies hearing loss, tinnitus, ear drainage, epistaxis, sore throat, and hoarseness.   Cardiovascular: Denies palpitations, chest pain, and chest pressure. Respiratory: Denies cough, dyspnea at rest, hemoptysis, apnea, and choking. Gastrointestinal: Denies nausea, vomiting, poor appetite, diarrhea, heartburn or reflux  Genitourinary: Denies dysuria, frequency, urgency or hematuria  Musculoskeletal: Denies myalgias, muscle weakness, and bone pain  Neurological: Denies dizziness, vertigo, headache, and focal weakness  Psychological: Denies anxiety and depression  Endocrine: Denies heat intolerance and cold intolerance  Hematopoietic/Lymphatic: Denies bleeding problems and blood transfusions    OBJECTIVE:  Vitals:    07/27/20 1400   BP: (!) 143/91   Pulse: 88   Resp: 18   Temp:    SpO2: 94%        O2 Flow Rate (L/min): 2 L/min  O2 Device: Nasal cannula    PHYSICAL EXAM:  Constitutional: No fever, chills, diaphoresis  Skin: No skin rash, no skin breakdown  HEENT: Unremarkable  Neck: Thick neck, no jugular venous distention or lymphadenopathy  Cardiovascular: S1, S2 normal.  No S3 murmurs rubs present  Respiratory: Clear to auscultation bilaterally  Gastrointestinal: Soft, obese, nontender  Genitourinary: No CVA tenderness  Extremities: No clubbing, cyanosis, or edema  Neurological: Awake, alert, however, has some spastic type movements. Moves all extremities  Psychological: Appropriate affect    LABS:  WBC   Date Value Ref Range Status   07/27/2020 31.6 (H) 4.5 - 11.5 E9/L Final   07/26/2020 13.3 (H) 4.5 - 11.5 E9/L Final   02/08/2020 10.6 4.5 - 11.5 E9/L Final     Hemoglobin   Date Value Ref Range Status   07/27/2020 11.2 (L) 11.5 - 15.5 g/dL Final   07/26/2020 13.7 11.5 - 15.5 g/dL Final     Comment:     Results corrected for icteric sample.    02/08/2020 14.6 11.5 - 15.5 g/dL Final     Hematocrit   Date Value Ref Range Status   07/27/2020 33.4 (L) 34.0 - 48.0 % Final   07/26/2020 37.5 34.0 - 48.0 % Final   02/08/2020 42.1 34.0 - 48.0 % Final     MCV   Date Value Ref Range Status   07/27/2020 101.8 (H) 80.0 - 99.9 fL Final   07/26/2020 104.5 (H) 80.0 - 99.9 07/26/2020 3.0 (L) 3.5 - 5.2 g/dL Final   07/26/2020 3.7 3.5 - 5.2 g/dL Final     Total Bilirubin   Date Value Ref Range Status   07/27/2020 1.5 (H) 0.0 - 1.2 mg/dL Final   07/26/2020 0.8 0.0 - 1.2 mg/dL Final   07/26/2020 1.1 0.0 - 1.2 mg/dL Final     Alkaline Phosphatase   Date Value Ref Range Status   07/27/2020 93 35 - 104 U/L Final   07/26/2020 94 35 - 104 U/L Final   07/26/2020 133 (H) 35 - 104 U/L Final     AST   Date Value Ref Range Status   07/27/2020 28 0 - 31 U/L Final   07/26/2020 42 (H) 0 - 31 U/L Final   07/26/2020 32 (H) 0 - 31 U/L Final     ALT   Date Value Ref Range Status   07/27/2020 27 0 - 32 U/L Final   07/26/2020 34 (H) 0 - 32 U/L Final   07/26/2020 31 0 - 32 U/L Final     GFR Non-   Date Value Ref Range Status   07/27/2020 55 >=60 mL/min/1.73 Final     Comment:     Chronic Kidney Disease: less than 60 ml/min/1.73 sq.m. Kidney Failure: less than 15 ml/min/1.73 sq.m. Results valid for patients 18 years and older. 07/26/2020 55 >=60 mL/min/1.73 Final     Comment:     Chronic Kidney Disease: less than 60 ml/min/1.73 sq.m. Kidney Failure: less than 15 ml/min/1.73 sq.m. Results valid for patients 18 years and older. 07/26/2020 >60 >=60 mL/min/1.73 Final     Comment:     Chronic Kidney Disease: less than 60 ml/min/1.73 sq.m. Kidney Failure: less than 15 ml/min/1.73 sq.m. Results valid for patients 18 years and older. GFR    Date Value Ref Range Status   07/27/2020 >60  Final   07/26/2020 >60  Final   07/26/2020 >60  Final     Magnesium   Date Value Ref Range Status   07/26/2020 1.6 1.6 - 2.6 mg/dL Final     Phosphorus   Date Value Ref Range Status   07/26/2020 2.2 (L) 2.5 - 4.5 mg/dL Final     No results for input(s): PH, PO2, PCO2, HCO3, BE, O2SAT in the last 72 hours. RADIOLOGY:  XR UROGRAM W OR WO KUB W OR WO TOMOGRAM   Final Result   Intraoperative fluoroscopy with placement of a double-J left ureteral stent. CT ABDOMEN PELVIS WO CONTRAST   Preliminary Result   1. Moderate left hydronephrosis and hydroureter related to a 4 mm distal left   ureteral stone. 2. New large nonobstructing left renal calculi. 3. Otherwise unchanged incidental findings as described in the body of the   report. XR CHEST PORTABLE   Final Result   1. Low lung volumes with no definite acute abnormality identified. 2. Ground-glass opacity overlying the bilateral lower lung zones is favored   to be related to overlapping soft tissue. XR CHEST PORTABLE    (Results Pending)           PROBLEM LIST:  Principal Problem:    Sepsis secondary to ureteral stone ([address]  Resolved Problems:    * No resolved hospital problems. *      IMPRESSION:  1. Septic shock, improving  2. Urinary tract infection  3. Bacteremia with Enterobacter and E. coli in cultures  4. Arthritis  5. Multiple sclerosis  6. JAKY  7. Obstructing left ureteral stone removed by stenting/cystoscopy    PLAN:  1. Reduce IV fluids  2. Continue IV antibiotics including Zosyn  3. Await sensitivities  4. Continue DVT prophylaxis with enoxaparin  5. Continue GI bleed prophylaxis with Protonix  6. Resume Demadex (torsemide 10 mg/day  7. Labs, chest x-ray in a.m. ATTESTATION:  ICU Staff Physician note of personal involvement in Care  As the attending physician, I certify that I personally reviewed the patients history and personally examined the patient to confirm the physical findings described above,  And that I reviewed the relevant imaging studies and available reports. I also discussed the differential diagnosis and all of the proposed management plans with the patient and individuals accompanying the patient to this visit. They had the opportunity to ask questions about the proposed management plans and to have those questions answered.      This patient has a high probability of sudden, clinically significant deterioration, which requires the highest level of physician preparedness to intervene urgently. I managed/supervised life or organ supporting interventions that required frequent physician assessment. I devoted my full attention to the direct care of this patient for the amount of time indicated below. Time I spent with the family or surrogate(s) is included only if the patient was incapable of providing the necessary information or participating in medical decisions - Time devoted to teaching and to any procedures I billed separately is not included.     CRITICAL CARE TIME:  37 minutes    Electronically signed by Anne Queen MD on 7/27/2020 at 3:14 PM

## 2020-07-27 NOTE — CARE COORDINATION
7/27/2020 return call from Anastasia Santana with [employer]. Pt is getting meals 7 days a week 2 meals a day- total 14 meals a week. She has aide services 3 hrs 3 x/s a week- with [employer] [facility]. She has home pt with [facility] now. CM/Ss to follow.  Electronically signed by BRIGETTE Paige on 7/27/2020 at 3:33 PM

## 2020-07-28 ENCOUNTER — APPOINTMENT (OUTPATIENT)
Dept: GENERAL RADIOLOGY | Age: 65
DRG: 720 | End: 2020-07-28
Payer: MEDICAID

## 2020-07-28 LAB
ALBUMIN SERPL-MCNC: 2.8 G/DL (ref 3.5–5.2)
ALP BLD-CCNC: 114 U/L (ref 35–104)
ALT SERPL-CCNC: 28 U/L (ref 0–32)
ANION GAP SERPL CALCULATED.3IONS-SCNC: 10 MMOL/L (ref 7–16)
AST SERPL-CCNC: 28 U/L (ref 0–31)
BASOPHILS ABSOLUTE: 0.19 E9/L (ref 0–0.2)
BASOPHILS RELATIVE PERCENT: 0.9 % (ref 0–2)
BILIRUB SERPL-MCNC: 0.8 MG/DL (ref 0–1.2)
BUN BLDV-MCNC: 12 MG/DL (ref 8–23)
CALCIUM SERPL-MCNC: 8.4 MG/DL (ref 8.6–10.2)
CHLORIDE BLD-SCNC: 108 MMOL/L (ref 98–107)
CO2: 25 MMOL/L (ref 22–29)
CREAT SERPL-MCNC: 1 MG/DL (ref 0.5–1)
EOSINOPHILS ABSOLUTE: 0.73 E9/L (ref 0.05–0.5)
EOSINOPHILS RELATIVE PERCENT: 3.5 % (ref 0–6)
GFR AFRICAN AMERICAN: >60
GFR NON-AFRICAN AMERICAN: 55 ML/MIN/1.73
GLUCOSE BLD-MCNC: 108 MG/DL (ref 74–99)
HCT VFR BLD CALC: 31.5 % (ref 34–48)
HEMOGLOBIN: 11.3 G/DL (ref 11.5–15.5)
LYMPHOCYTES ABSOLUTE: 1.67 E9/L (ref 1.5–4)
LYMPHOCYTES RELATIVE PERCENT: 7.8 % (ref 20–42)
MCH RBC QN AUTO: 37.5 PG (ref 26–35)
MCHC RBC AUTO-ENTMCNC: 35.9 % (ref 32–34.5)
MCV RBC AUTO: 104.7 FL (ref 80–99.9)
MONOCYTES ABSOLUTE: 0.84 E9/L (ref 0.1–0.95)
MONOCYTES RELATIVE PERCENT: 4.3 % (ref 2–12)
NEUTROPHILS ABSOLUTE: 17.56 E9/L (ref 1.8–7.3)
NEUTROPHILS RELATIVE PERCENT: 83.5 % (ref 43–80)
ORGANISM: ABNORMAL
PDW BLD-RTO: 14.2 FL (ref 11.5–15)
PHOSPHORUS: 2.7 MG/DL (ref 2.5–4.5)
PLATELET # BLD: 159 E9/L (ref 130–450)
PMV BLD AUTO: 11 FL (ref 7–12)
POTASSIUM SERPL-SCNC: 3.4 MMOL/L (ref 3.5–5)
RBC # BLD: 3.01 E12/L (ref 3.5–5.5)
SODIUM BLD-SCNC: 143 MMOL/L (ref 132–146)
TOTAL PROTEIN: 5.6 G/DL (ref 6.4–8.3)
URINE CULTURE, ROUTINE: ABNORMAL
WBC # BLD: 20.9 E9/L (ref 4.5–11.5)

## 2020-07-28 PROCEDURE — 71045 X-RAY EXAM CHEST 1 VIEW: CPT

## 2020-07-28 PROCEDURE — 6360000002 HC RX W HCPCS: Performed by: NURSE PRACTITIONER

## 2020-07-28 PROCEDURE — 6360000002 HC RX W HCPCS: Performed by: UROLOGY

## 2020-07-28 PROCEDURE — 1200000000 HC SEMI PRIVATE

## 2020-07-28 PROCEDURE — 97161 PT EVAL LOW COMPLEX 20 MIN: CPT | Performed by: PHYSICAL THERAPIST

## 2020-07-28 PROCEDURE — 36415 COLL VENOUS BLD VENIPUNCTURE: CPT

## 2020-07-28 PROCEDURE — 6370000000 HC RX 637 (ALT 250 FOR IP): Performed by: INTERNAL MEDICINE

## 2020-07-28 PROCEDURE — 84100 ASSAY OF PHOSPHORUS: CPT

## 2020-07-28 PROCEDURE — 97530 THERAPEUTIC ACTIVITIES: CPT

## 2020-07-28 PROCEDURE — 2580000003 HC RX 258: Performed by: UROLOGY

## 2020-07-28 PROCEDURE — 80053 COMPREHEN METABOLIC PANEL: CPT

## 2020-07-28 PROCEDURE — 2500000003 HC RX 250 WO HCPCS: Performed by: INTERNAL MEDICINE

## 2020-07-28 PROCEDURE — 2580000003 HC RX 258: Performed by: INTERNAL MEDICINE

## 2020-07-28 PROCEDURE — C9113 INJ PANTOPRAZOLE SODIUM, VIA: HCPCS | Performed by: NURSE PRACTITIONER

## 2020-07-28 PROCEDURE — 97530 THERAPEUTIC ACTIVITIES: CPT | Performed by: PHYSICAL THERAPIST

## 2020-07-28 PROCEDURE — 6370000000 HC RX 637 (ALT 250 FOR IP): Performed by: UROLOGY

## 2020-07-28 PROCEDURE — 85025 COMPLETE CBC W/AUTO DIFF WBC: CPT

## 2020-07-28 PROCEDURE — 97165 OT EVAL LOW COMPLEX 30 MIN: CPT

## 2020-07-28 RX ORDER — FUROSEMIDE 40 MG/1
40 TABLET ORAL 2 TIMES DAILY
Status: DISCONTINUED | OUTPATIENT
Start: 2020-07-28 | End: 2020-07-29

## 2020-07-28 RX ORDER — POTASSIUM CHLORIDE 20 MEQ/1
40 TABLET, EXTENDED RELEASE ORAL ONCE
Status: COMPLETED | OUTPATIENT
Start: 2020-07-28 | End: 2020-07-28

## 2020-07-28 RX ORDER — POTASSIUM CHLORIDE 20 MEQ/1
20 TABLET, EXTENDED RELEASE ORAL 2 TIMES DAILY
Status: DISCONTINUED | OUTPATIENT
Start: 2020-07-29 | End: 2020-07-30 | Stop reason: HOSPADM

## 2020-07-28 RX ORDER — FUROSEMIDE 10 MG/ML
40 INJECTION INTRAMUSCULAR; INTRAVENOUS 2 TIMES DAILY
Status: DISCONTINUED | OUTPATIENT
Start: 2020-07-28 | End: 2020-07-28 | Stop reason: DRUGHIGH

## 2020-07-28 RX ADMIN — SODIUM CHLORIDE 25 ML: 9 INJECTION, SOLUTION INTRAVENOUS at 03:30

## 2020-07-28 RX ADMIN — CETIRIZINE HYDROCHLORIDE 10 MG: 10 TABLET, FILM COATED ORAL at 08:24

## 2020-07-28 RX ADMIN — METOPROLOL TARTRATE 5 MG: 5 INJECTION, SOLUTION INTRAVENOUS at 17:49

## 2020-07-28 RX ADMIN — ACETAMINOPHEN 650 MG: 325 TABLET, FILM COATED ORAL at 19:18

## 2020-07-28 RX ADMIN — DOCUSATE SODIUM 100 MG: 100 CAPSULE, LIQUID FILLED ORAL at 08:33

## 2020-07-28 RX ADMIN — SODIUM CHLORIDE, PRESERVATIVE FREE 10 ML: 5 INJECTION INTRAVENOUS at 21:25

## 2020-07-28 RX ADMIN — Medication 10 ML: at 08:27

## 2020-07-28 RX ADMIN — OXYCODONE HYDROCHLORIDE AND ACETAMINOPHEN 1000 MG: 500 TABLET ORAL at 08:24

## 2020-07-28 RX ADMIN — Medication 10 ML: at 20:22

## 2020-07-28 RX ADMIN — METOPROLOL TARTRATE 5 MG: 5 INJECTION, SOLUTION INTRAVENOUS at 09:51

## 2020-07-28 RX ADMIN — METOPROLOL TARTRATE 5 MG: 5 INJECTION, SOLUTION INTRAVENOUS at 00:52

## 2020-07-28 RX ADMIN — PIPERACILLIN AND TAZOBACTAM 3.38 G: 3; .375 INJECTION, POWDER, LYOPHILIZED, FOR SOLUTION INTRAVENOUS at 06:12

## 2020-07-28 RX ADMIN — PIPERACILLIN AND TAZOBACTAM 3.38 G: 3; .375 INJECTION, POWDER, LYOPHILIZED, FOR SOLUTION INTRAVENOUS at 23:00

## 2020-07-28 RX ADMIN — METOPROLOL TARTRATE 5 MG: 5 INJECTION, SOLUTION INTRAVENOUS at 21:25

## 2020-07-28 RX ADMIN — ENOXAPARIN SODIUM 40 MG: 40 INJECTION SUBCUTANEOUS at 08:25

## 2020-07-28 RX ADMIN — Medication 1000 UNITS: at 08:24

## 2020-07-28 RX ADMIN — PANTOPRAZOLE SODIUM 40 MG: 40 INJECTION, POWDER, FOR SOLUTION INTRAVENOUS at 08:26

## 2020-07-28 RX ADMIN — SODIUM CHLORIDE 25 ML: 9 INJECTION, SOLUTION INTRAVENOUS at 16:38

## 2020-07-28 RX ADMIN — LOSARTAN POTASSIUM 50 MG: 50 TABLET, FILM COATED ORAL at 09:23

## 2020-07-28 RX ADMIN — HYDROCHLOROTHIAZIDE 12.5 MG: 12.5 TABLET ORAL at 08:24

## 2020-07-28 RX ADMIN — FUROSEMIDE 40 MG: 40 TABLET ORAL at 16:42

## 2020-07-28 RX ADMIN — POTASSIUM CHLORIDE 40 MEQ: 20 TABLET, EXTENDED RELEASE ORAL at 09:23

## 2020-07-28 RX ADMIN — SODIUM CHLORIDE, PRESERVATIVE FREE 10 ML: 5 INJECTION INTRAVENOUS at 18:36

## 2020-07-28 RX ADMIN — SODIUM CHLORIDE, POTASSIUM CHLORIDE, SODIUM LACTATE AND CALCIUM CHLORIDE: 600; 310; 30; 20 INJECTION, SOLUTION INTRAVENOUS at 08:12

## 2020-07-28 RX ADMIN — FUROSEMIDE 40 MG: 40 TABLET ORAL at 08:24

## 2020-07-28 RX ADMIN — PIPERACILLIN AND TAZOBACTAM 3.38 G: 3; .375 INJECTION, POWDER, LYOPHILIZED, FOR SOLUTION INTRAVENOUS at 14:57

## 2020-07-28 RX ADMIN — METOPROLOL TARTRATE 5 MG: 5 INJECTION, SOLUTION INTRAVENOUS at 14:10

## 2020-07-28 RX ADMIN — SENNOSIDES 8.6 MG: 8.6 TABLET, FILM COATED ORAL at 08:24

## 2020-07-28 RX ADMIN — POTASSIUM CHLORIDE 20 MEQ: 20 TABLET, EXTENDED RELEASE ORAL at 08:24

## 2020-07-28 RX ADMIN — SODIUM CHLORIDE 25 ML: 9 INJECTION, SOLUTION INTRAVENOUS at 10:30

## 2020-07-28 ASSESSMENT — PAIN SCALES - GENERAL
PAINLEVEL_OUTOF10: 0
PAINLEVEL_OUTOF10: 6
PAINLEVEL_OUTOF10: 0

## 2020-07-28 ASSESSMENT — PAIN DESCRIPTION - LOCATION: LOCATION: HEAD

## 2020-07-28 ASSESSMENT — PAIN DESCRIPTION - PAIN TYPE: TYPE: ACUTE PAIN

## 2020-07-28 ASSESSMENT — PAIN DESCRIPTION - PROGRESSION
CLINICAL_PROGRESSION: GRADUALLY IMPROVING
CLINICAL_PROGRESSION: GRADUALLY IMPROVING

## 2020-07-28 ASSESSMENT — PAIN DESCRIPTION - ONSET: ONSET: SUDDEN

## 2020-07-28 ASSESSMENT — PAIN - FUNCTIONAL ASSESSMENT: PAIN_FUNCTIONAL_ASSESSMENT: ACTIVITIES ARE NOT PREVENTED

## 2020-07-28 NOTE — PLAN OF CARE
Problem: Falls - Risk of:  Goal: Will remain free from falls  Description: Will remain free from falls  7/28/2020 1940 by Giovanny Lunsford RN  Outcome: Met This Shift  7/28/2020 1252 by Mónica Jones RN  Outcome: Met This Shift     Problem: Falls - Risk of:  Goal: Absence of physical injury  Description: Absence of physical injury  Outcome: Met This Shift     Problem: Pain:  Goal: Pain level will decrease  Description: Pain level will decrease  Outcome: Met This Shift     Problem: Pain:  Goal: Control of acute pain  Description: Control of acute pain  7/28/2020 1940 by Giovanny Lunsford RN  Outcome: Met This Shift  7/28/2020 1252 by Mónica Jones RN  Outcome: Met This Shift     Problem: Pain:  Goal: Control of chronic pain  Description: Control of chronic pain  Outcome: Met This Shift     Problem: Skin Integrity:  Goal: Will show no infection signs and symptoms  Description: Will show no infection signs and symptoms  7/28/2020 1940 by Giovanny Lunsford RN  Outcome: Met This Shift  7/28/2020 0627 by Vianey Weaver RN  Outcome: Met This Shift     Problem: Skin Integrity:  Goal: Absence of new skin breakdown  Description: Absence of new skin breakdown  7/28/2020 1940 by Giovanny Lunsford RN  Outcome: Met This Shift  7/28/2020 1252 by Mónica Jones RN  Outcome: Met This Shift

## 2020-07-28 NOTE — PLAN OF CARE
Problem: Skin Integrity:  Goal: Will show no infection signs and symptoms  Description: Will show no infection signs and symptoms  Outcome: Met This Shift     Problem: Fluid Volume:  Goal: Maintenance of adequate hydration will improve  Description: Maintenance of adequate hydration will improve  Outcome: Met This Shift     Problem: Urinary Elimination:  Goal: Skin integrity will improve  Description: Skin integrity will improve  Outcome: Met This Shift     Problem: Urinary Elimination:  Goal: Will remain free from infection  Description: Will remain free from infection  Outcome: Met This Shift

## 2020-07-28 NOTE — PROGRESS NOTES
7/28/2020 9:07 AM  Service: Urology  Group: [facility] ([facility])    Arjun Art  [id number]    Subjective:    Alert and oriented  Denies pain or discomfort  Feels tired, but feeling better  She denies any flank pain, tolerating the stent   Denies fever or chills   Tolerating diet     Review of Systems  Constitutional: No fever or chills   Respiratory: negative for cough and hemoptysis  Cardiovascular: negative for chest pain and dyspnea  Gastrointestinal: negative for abdominal pain, diarrhea, nausea and vomiting   : See above  Derm: negative for rash and skin lesion(s)  Neurological: negative for seizures and tremors  Musculoskeletal: Negative    Psychiatric: Negative   All other reviews are negative      Scheduled Meds:   furosemide  40 mg Oral BID    potassium chloride  20 mEq Oral Daily    hydrochlorothiazide  12.5 mg Oral Daily    losartan  50 mg Oral Daily    metoprolol  5 mg Intravenous Q4H    sodium chloride flush  10 mL Intravenous 2 times per day    enoxaparin  40 mg Subcutaneous Daily    piperacillin-tazobactam  3.375 g Intravenous Q8H    vitamin C  1,000 mg Oral Daily    vitamin E  1,000 Units Oral Daily    fluticasone  1 spray Each Nostril Daily    senna  1 tablet Oral Daily    albuterol  2.5 mg Nebulization See Admin Instructions    cetirizine  10 mg Oral Daily    pantoprazole  40 mg Intravenous Daily       Objective:  Vitals:    07/28/20 0600   BP: (!) 151/82   Pulse: 82   Resp: 18   Temp:    SpO2: 92%         Allergies: Patient has no known allergies.     General Appearance: alert and oriented to person, place and time and in no acute distress, weak  Skin: no rash or erythema  Head: normocephalic and atraumatic  Pulmonary/Chest: normal air movement, no respiratory distress  Abdomen: soft, non-tender, non-distended  Genitourinary: dillon draining yellow urine   Extremities: no cyanosis, clubbing or edema         Labs:     Recent Labs     07/28/20  0430      K 3.4* *   CO2 25   BUN 12   CREATININE 1.0   GLUCOSE 108*   CALCIUM 8.4*       Lab Results   Component Value Date    HGB 11.3 07/28/2020    HCT 31.5 07/28/2020         Assessment/Plan:  POD#2 cystoscopy, left ureteral stent insertion   4mm left distal ureteral stone   UTI with Sepsis   Pyelonephrosis     Cont to watch the WBCs  Creatinine stable   Cont the ICU Care  Urine culture +E. Coli   Continue the antibiotics per ICU Team, currently on Zosyn   Continue the left ureteral stent  Continue the dillon catheter  Voiding trial when medically stable and ambulatory   She will require a second procedure for her stone management in the future via left sided laser lithotripsy.  This will be done as an outpatient, during a different hospital stay once she is free of infection   No further acute  interventions are planned at this time  Please call with further questions or concerns     GRETEL Villeda - CNP   [facility]  Urology

## 2020-07-28 NOTE — PROGRESS NOTES
Dr Sridhar Cerrato notified about patient's increasing edema and BP. Patient states she feels overly bloated and is hard to move her extremities. Orders given.

## 2020-07-28 NOTE — PROGRESS NOTES
Physical Therapy Initial Evaluation    Room #:  IC08/IC08-01  Patient Name: Anisa Casey  YOB: 1955  MRN: [id number]    Referring Provider: Romi Posada MD    Date of Service: 7/28/2020    Evaluating Physical Therapist:  Fonda Galeazzi, PT #[id number]      Diagnosis:   Sepsis [facility]) [A41.9]        Patient Active Problem List   Diagnosis    Neck pain    Multiple sclerosis ([address]    Bilateral foot-drop    Peripheral polyneuropathy    Essential hypertension    Asthma    Edema of both legs    Constipation    Intervertebral lumbar disc disorder with myelopathy, lumbar region    Cervical spinal stenosis    Sepsis secondary to ureteral stone ([address]        Tentative placement recommendation: Subacute rehab    Equipment recommendation: To be determined      Prior Level of Function: Patient transferred to wheelchair, ambulated short distances as able with wheeled walker   Rehab Potential: good  for baseline    Past medical history:   Past Medical History:   Diagnosis Date    Asthma     Fall 5/26/2011    Hematoma of abdominal wall 5/2011    extraperitoneal    Hypertension     Metatarsal fracture 1/2011    right 3rd and 4th    Multiple sclerosis ([address]     UTI (urinary tract infection)      Past Surgical History:   Procedure Laterality Date    APPENDECTOMY      CHOLECYSTECTOMY      CYSTOSCOPY INSERTION / REMOVAL STENT / STONE Right 7/26/2020    CYSTOSCOPY RETROGRADE PYELOGRAM STENT INSERTION LEFT performed by Ean Tejeda Lee, DO at [facility]         Precautions: falls, alarm and O2 ,      SUBJECTIVE:    Social history: Patient lives alone  in a apartment  with No steps  to enterTub shower grab bars    Equipment owned: Wheelchair, Natalie Manger, Bedside commode and Shower chair,       [address] cleared patient for PT evaluation.  The admitting diagnosis and active problem list as listed above have been reviewed prior to the initiation of this evaluation. OBJECTIVE:   Initial Evaluation  Date: 7/28/2020 Treatment Date:     Short Term/ Long Term   Goals   Was pt agreeable to Eval/treatment? Yes    To be met in 5 days   Pain level   7/10  Left sciatic      Bed Mobility    Rolling: Maximal assist of  2    Supine to sit: Maximal assist of  2    Sit to supine: Maximal assist of  2    Scooting: Maximal assist of  2  Dependent x 2 to scoot to Head of bed   Rolling: Moderate assist of 1    Supine to sit: Moderate assist of 1    Sit to supine: Moderate assist of 1    Scooting: Moderate assist of 1     Transfers Sit to stand: Maximal assist of  2 with chux  Sit to stand:  Moderate assist of 1 wheeled walker    Ambulation    not assessed unable to advance lower extremities    5 feet using  wheeled walker with Moderate assist of 1    Stair negotiation: ascended and descended   Not assessed            ROM Within functional limits except lower extremities     Increase range of motion 10% of affected joints    Strength BUE: 3+/5  RLE:  2+/5 external rotation 0/5  LLE:  2+/5 external rotation 0/5   Increase strength in affected mm groups by 1/3 grade   Balance Sitting EOB:  fair  Dynamic Standing:  poor    Sitting EOB:  good    Dynamic Standing: fair wheeled walker      Patient is Alert & Oriented x person, place, time and situation and follows directions    Sensation:  Patient  reports numbness and tingling to extremities    Edema:  yes bilateral lower extremities    Endurance: fair       Vitals:   2 liters  Blood Pressure at rest 149/84 Blood Pressure during session 178/94   Heart Rate at rest 92 Heart Rate during session 95   SPO2 at rest 91%  SPO2 during session 88% 92% in standing     Patient education  Patient educated on role of Physical Therapy, risks of immobility, safety and plan of care      Patient response to education:   Pt verbalized understanding Pt demonstrated skill Pt requires further education in this area   Yes Partial Yes      ASSESSMENT: Patient exhibits decreased strength, balance, coordination impairing functional mobility. Therapist educated and facilitated patient on techniques to increase safety and independence with bed mobility, balance, functional transfers, and functional mobility. Treatment:  Patient practiced and was instructed in the following treatment: Sat edge of bed 15 minutes with Minimal assist of 1 to increase dynamic sitting balance and activity tolerance. stood x 2, unable to advance lower extremities      Therapeutic Exercises:  ankle pumps  x 10 reps. At end of session, patient in bed with alarm call light and phone within reach,   all lines and tubes intact, nursing notified. Patient would benefit from continued skilled Physical Therapy to improve functional independence and quality of life. Patient's/ family goals   get stronger        Patient and or family understand(s) diagnosis, prognosis, and plan of care. PLAN:    Physical Therapy care will be provided in accordance with the objectives noted above. Exercises and functional mobility practice will be used as well as appropriate assistive devices or modalities to obtain goals. Patient and family education will also be administered as needed. Frequency of treatments: Patient will be seen    daily  for therapeutic exercise, functional retraining, endurance activities, balance exercises, family and patient education. Time in  0837  Time out  0900    Total Treatment Time  10 minutes    Evaluation time includes thorough review of current medical information, gathering information on past medical history/social history and prior level of function, completion of standardized testing/informal observation of tasks, assessment of data, and development of Plan of care and goals.      CPT codes:  Low Complexity PT evaluation (36306)  Therapeutic activities (19753)   10 minutes  1 unit(s)    Thomas Lara, PT

## 2020-07-28 NOTE — PROGRESS NOTES
Internal Medicine Progress Note     [facility]     Sweta Oviedo. Maciej Sanches., KARL.MARCI.ANALIOTerrenceI. Brooks Valencia D.O., TAYLEROBEN Alvarado D.O. Viona Heimlich, MSN, APRN, NP-C  Karie Mackenzie. Marcello Melo, MSN, APRN-CNP     Primary Care Physician: Naheed Allen MD   Admitting Physician:  Humberto Min DO  Admission date and time: 7/26/2020  1:24 AM    Room:  [place] [address]    Patient Name: Melinda Cosby  MRN: [id number]    Date of Service: 7/28/2020     Subjective:  Sabra Wallace continues to improve on a daily basis. She is feeling dramatically better today. We continue to await final cultures from both the urine as well as the blood. We are attempting to wean off the nasal cannula oxygen and have begun gentle diuresis with the addition of home medications. She is volume overloaded at this point. She will work with the therapy teams and we discussed discharge planning. She is acceptable for transfer from the intensive care unit. Review of System: HEENT:  Manuel ear pain, sore throat, sinus or eye problems. Admits to irritation associated with the nasal cannula oxygen. Cardiovascular:   Denies any chest pain, irregular heartbeats, or palpitations. Respiratory:   She is short of breath today that appears to be compatible with volume overload. Gastrointestinal:   Improving abdominal discomfort. No diarrhea or constipation. Admits to an improving appetite. Extremities:   Denies any lower extremity swelling or edema. Neurology:    Admits to chronic neurologic deficits and weakness related to underlying multiple sclerosis  Derm:    Denies any rashes, ulcers, or excoriations. Denies bruising. Genitourinary:    Denies any urgency, frequency, hematuria. Voiding with the use of a catheter. .  Musculoskeletal:  Denies myalgias, joint complaints or back pain      Physical Exam:  I/O this shift:  In: 370 [P.O.:360; I.V.:10]  Out: 400 [Urine:400]  Blood pressure (!) 150/98, pulse 76, temperature 98.8 °F (37.1 °C), temperature source Temporal, resp. rate 18, height 5' 6\" (1.676 m), weight 194 lb 3.2 oz (88.1 kg), SpO2 94 %. HEENT:    PERRLA. EOMI. Sclera clear. Buccal mucosa moist.  Nasal cannula oxygen is in place. Neck:    Supple. Trachea midline. No thyromegaly. No JVD. No bruits. Heart:    Rhythm regular, rate controlled. No murmurs. Lungs:    Diminished bilaterally with rales at the bases. Abdomen:   Soft. Non-tender. Non-distended. Bowel sounds positive. No organomegaly or masses. No pain on palpation  Extremities:    Peripheral pulses present. Bilateral peripheral edema. Neurologic:    Alert x 3. No focal deficit. Cranial nerves grossly intact. Chronic deficits related to multiple sclerosis  Skin:    No petechia. No hemorrhage. No wounds. Psych:   Behavior is normal. Mood appears normal. Speech is not rapid or pressured. Musculokeletal:  Spine ROM normal. Muscular strength intact. Gait not assessed. Genitalia/Breast:  Voiding with the use of a Obrien catheter.     Scheduled Meds:   furosemide  40 mg Oral BID    [START ON 7/29/2020] potassium chloride  20 mEq Oral BID    hydrochlorothiazide  12.5 mg Oral Daily    losartan  50 mg Oral Daily    metoprolol  5 mg Intravenous Q4H    sodium chloride flush  10 mL Intravenous 2 times per day    enoxaparin  40 mg Subcutaneous Daily    piperacillin-tazobactam  3.375 g Intravenous Q8H    vitamin C  1,000 mg Oral Daily    vitamin E  1,000 Units Oral Daily    fluticasone  1 spray Each Nostril Daily    senna  1 tablet Oral Daily    albuterol  2.5 mg Nebulization See Admin Instructions    cetirizine  10 mg Oral Daily    pantoprazole  40 mg Intravenous Daily       Continuous Infusions:   sodium chloride 25 mL (07/28/20 1030)    norepinephrine Stopped (07/26/20 2045)       Objective Data:  CBC with Differential:    Lab Results   Component Value Date    WBC 20.9 07/28/2020    RBC 3.01 07/28/2020    HGB 11.3 07/28/2020    HCT 31.5 07/28/2020     07/28/2020    .7 07/28/2020    MCH 37.5 07/28/2020    MCHC 35.9 07/28/2020    RDW 14.2 07/28/2020    SEGSPCT 50 05/28/2011    METASPCT 1.7 07/26/2020    LYMPHOPCT 7.8 07/28/2020    MONOPCT 4.3 07/28/2020    MYELOPCT 1.0 02/08/2020    BASOPCT 0.9 07/28/2020    MONOSABS 0.84 07/28/2020    LYMPHSABS 1.67 07/28/2020    EOSABS 0.73 07/28/2020    BASOSABS 0.19 07/28/2020     BMP:    Lab Results   Component Value Date     07/28/2020    K 3.4 07/28/2020     07/28/2020    CO2 25 07/28/2020    BUN 12 07/28/2020    LABALBU 2.8 07/28/2020    LABALBU 4.0 05/27/2011    CREATININE 1.0 07/28/2020    CALCIUM 8.4 07/28/2020    GFRAA >60 07/28/2020    LABGLOM 55 07/28/2020    GLUCOSE 108 07/28/2020    GLUCOSE 96 05/28/2011       Assessment:   1. Sepsis secondary to a urinary tract infection in the setting of a nonobstructing 4 mm ureteral stone status post urologic stenting with concomitant gram-negative bacteremia  2. Acute respiratory failure with hypoxia in the setting of volume resuscitation and volume overload  3. Multiple sclerosis  4. Essential hypertension    Plan:   Josy Maharaj continues to improve on a daily basis. Vasopressor therapy has been weaned off and she is now requiring the addition of her home blood pressure medications. Following aggressive IV fluid resuscitation in the setting of sepsis and hypotension, she has developed some volume retention. Echocardiogram is indicating preserved ejection fraction on preliminary report. We will begin diuresis with the addition of home oral diuretics. We will maintain antibiotics for coverage of the urinary tract infection and bacteremia as we await final cultures and sensitivities. She is acceptable for transfer from the intensive care unit. Her goal is to return home. She remains appreciative of the care she has received. Urologic recommendations have been reviewed as well.     Greater than 40 minutes of critical care time spent with the patient. This time included chart review, , and discussion with those consultants involved in the patient's care. More than 50% of my  time was spent at the bedside counseling/coordinating care with the patient and/or family with face to face contact. This time was spent reviewing notes and laboratory data as well as instructing and counseling the patient. Time I spent with the family or surrogate(s) is included only if the patient was incapable of providing the necessary information or participating in medical decisions. I also discussed the differential diagnosis and all of the proposed management plans with the patient and individuals accompanying the patient. Ciera Fernandez requires this high level of physician care and nursing in the ICU due to the complexity of decision management and chance of rapid decline or death. Continued cardiac monitoring and higher level of nursing are required. I am readily available for any further decision-making and intervention.        Jean Mcfadden DO, F.A.C.O.I.  7/28/2020  11:52 AM

## 2020-07-28 NOTE — PROGRESS NOTES
Pulmonary/Critical Care Progress Note    We are following patient for E. coli, Enterobacter urinary tract infection and bacteremia resulting in septic shock, diastolic dysfunction, probable obstructive sleep apnea, multiple sclerosis, obstructing left ureteral stone requiring cystoscopy and stent, chronic low back pain, hypertension    SUBJECTIVE:  Patient continues to do quite well. She will need a restart of her oral loop diuretics  As well as hydrochlorothiazide and losartan for her high blood pressure. She is responding well to Zosyn for her urinary tract infection and is also tolerating her doses of metoprolol every 4 hours as well as potassium chloride. Because she is doing well, she is a candidate for transfer to monitored floor having been weaned off norepinephrine yesterday.         MEDICATIONS:   furosemide  40 mg Oral BID    [START ON 7/29/2020] potassium chloride  20 mEq Oral BID    hydrochlorothiazide  12.5 mg Oral Daily    losartan  50 mg Oral Daily    metoprolol  5 mg Intravenous Q4H    sodium chloride flush  10 mL Intravenous 2 times per day    enoxaparin  40 mg Subcutaneous Daily    piperacillin-tazobactam  3.375 g Intravenous Q8H    vitamin C  1,000 mg Oral Daily    vitamin E  1,000 Units Oral Daily    fluticasone  1 spray Each Nostril Daily    senna  1 tablet Oral Daily    albuterol  2.5 mg Nebulization See Admin Instructions    cetirizine  10 mg Oral Daily    pantoprazole  40 mg Intravenous Daily      sodium chloride 25 mL (07/28/20 1030)    norepinephrine Stopped (07/26/20 2045)     sodium chloride flush, acetaminophen, docusate sodium, dextromethorphan-guaiFENesin, HYDROcodone 5 mg - acetaminophen, pseudoephedrine, magnesium citrate, mupirocin, perflutren lipid microspheres      REVIEW OF SYSTEMS:  Constitutional: Denies fever, weight loss, night sweats, and fatigue  Skin: Denies pigmentation, dark lesions, and rashes   HEENT: Denies hearing loss, tinnitus, ear drainage, epistaxis, sore throat, and hoarseness. Cardiovascular: Denies palpitations, chest pain, and chest pressure. Respiratory: Denies cough, dyspnea at rest, hemoptysis, apnea, and choking. Gastrointestinal: Denies nausea, vomiting, poor appetite, diarrhea, heartburn or reflux  Genitourinary: Denies dysuria, frequency, urgency or hematuria  Musculoskeletal: Denies myalgias, muscle weakness, and bone pain  Neurological: Denies dizziness, vertigo, headache, and focal weakness  Psychological: Denies anxiety and depression  Endocrine: Denies heat intolerance and cold intolerance  Hematopoietic/Lymphatic: Denies bleeding problems and blood transfusions    OBJECTIVE:  Vitals:    07/28/20 1400   BP: (!) 150/89   Pulse: 100   Resp: 19   Temp:    SpO2: 95%        O2 Flow Rate (L/min): 2 L/min  O2 Device: Nasal cannula    PHYSICAL EXAM:  Constitutional: No fever, chills, diaphoresis  Skin: Skin rash, no skin breakdown  HEENT: Unremarkable  Neck: Thick neck, no JVD or lymphadenopathy  Cardiovascular: S1, S2 regular. No S3 murmurs or rubs present  Respiratory: Clear to auscultation bilaterally  Gastrointestinal: Soft, obese, nontender  Genitourinary: CVA tenderness  Extremities: 2+ edema of feet legs and ankles  Neurological: Some spastic movements consistent with multiple sclerosis, however, she does move all of her extremities. Psychological: Appropriate affect    LABS:  WBC   Date Value Ref Range Status   07/28/2020 20.9 (H) 4.5 - 11.5 E9/L Final   07/27/2020 31.6 (H) 4.5 - 11.5 E9/L Final   07/26/2020 13.3 (H) 4.5 - 11.5 E9/L Final     Hemoglobin   Date Value Ref Range Status   07/28/2020 11.3 (L) 11.5 - 15.5 g/dL Final   07/27/2020 11.2 (L) 11.5 - 15.5 g/dL Final   07/26/2020 13.7 11.5 - 15.5 g/dL Final     Comment:     Results corrected for icteric sample.      Hematocrit   Date Value Ref Range Status   07/28/2020 31.5 (L) 34.0 - 48.0 % Final   07/27/2020 33.4 (L) 34.0 - 48.0 % Final   07/26/2020 37.5 34.0 - 48.0 % Final MCV   Date Value Ref Range Status   07/28/2020 104.7 (H) 80.0 - 99.9 fL Final   07/27/2020 101.8 (H) 80.0 - 99.9 fL Final   07/26/2020 104.5 (H) 80.0 - 99.9 fL Final     Platelets   Date Value Ref Range Status   07/28/2020 159 130 - 450 E9/L Final   07/27/2020 158 130 - 450 E9/L Final   07/26/2020 227 130 - 450 E9/L Final     Sodium   Date Value Ref Range Status   07/28/2020 143 132 - 146 mmol/L Final   07/27/2020 145 132 - 146 mmol/L Final   07/26/2020 140 132 - 146 mmol/L Final     Potassium   Date Value Ref Range Status   07/28/2020 3.4 (L) 3.5 - 5.0 mmol/L Final   07/27/2020 3.5 3.5 - 5.0 mmol/L Final   07/26/2020 3.5 3.5 - 5.0 mmol/L Final     Chloride   Date Value Ref Range Status   07/28/2020 108 (H) 98 - 107 mmol/L Final   07/27/2020 113 (H) 98 - 107 mmol/L Final   07/26/2020 106 98 - 107 mmol/L Final     CO2   Date Value Ref Range Status   07/28/2020 25 22 - 29 mmol/L Final   07/27/2020 21 (L) 22 - 29 mmol/L Final   07/26/2020 16 (L) 22 - 29 mmol/L Final     BUN   Date Value Ref Range Status   07/28/2020 12 8 - 23 mg/dL Final   07/27/2020 12 8 - 23 mg/dL Final   07/26/2020 11 8 - 23 mg/dL Final     CREATININE   Date Value Ref Range Status   07/28/2020 1.0 0.5 - 1.0 mg/dL Final   07/27/2020 1.0 0.5 - 1.0 mg/dL Final   07/26/2020 1.0 0.5 - 1.0 mg/dL Final     Glucose   Date Value Ref Range Status   07/28/2020 108 (H) 74 - 99 mg/dL Final   07/27/2020 118 (H) 74 - 99 mg/dL Final   07/26/2020 168 (H) 74 - 99 mg/dL Final   05/28/2011 96 70 - 110 mg/dL Final   05/27/2011 98 70 - 110 mg/dL Final   05/26/2011 135 (H) 70 - 110 mg/dL Final     Calcium   Date Value Ref Range Status   07/28/2020 8.4 (L) 8.6 - 10.2 mg/dL Final   07/27/2020 8.2 (L) 8.6 - 10.2 mg/dL Final   07/26/2020 8.2 (L) 8.6 - 10.2 mg/dL Final     Total Protein   Date Value Ref Range Status   07/28/2020 5.6 (L) 6.4 - 8.3 g/dL Final   07/27/2020 5.5 (L) 6.4 - 8.3 g/dL Final   07/26/2020 5.5 (L) 6.4 - 8.3 g/dL Final     Albumin   Date Value Ref Range Status   05/27/2011 4.0 3.2 - 4.8 g/dL Final     Alb   Date Value Ref Range Status   07/28/2020 2.8 (L) 3.5 - 5.2 g/dL Final   07/27/2020 3.0 (L) 3.5 - 5.2 g/dL Final   07/26/2020 3.0 (L) 3.5 - 5.2 g/dL Final     Total Bilirubin   Date Value Ref Range Status   07/28/2020 0.8 0.0 - 1.2 mg/dL Final   07/27/2020 1.5 (H) 0.0 - 1.2 mg/dL Final   07/26/2020 0.8 0.0 - 1.2 mg/dL Final     Alkaline Phosphatase   Date Value Ref Range Status   07/28/2020 114 (H) 35 - 104 U/L Final   07/27/2020 93 35 - 104 U/L Final   07/26/2020 94 35 - 104 U/L Final     AST   Date Value Ref Range Status   07/28/2020 28 0 - 31 U/L Final   07/27/2020 28 0 - 31 U/L Final   07/26/2020 42 (H) 0 - 31 U/L Final     ALT   Date Value Ref Range Status   07/28/2020 28 0 - 32 U/L Final   07/27/2020 27 0 - 32 U/L Final   07/26/2020 34 (H) 0 - 32 U/L Final     GFR Non-   Date Value Ref Range Status   07/28/2020 55 >=60 mL/min/1.73 Final     Comment:     Chronic Kidney Disease: less than 60 ml/min/1.73 sq.m. Kidney Failure: less than 15 ml/min/1.73 sq.m. Results valid for patients 18 years and older. 07/27/2020 55 >=60 mL/min/1.73 Final     Comment:     Chronic Kidney Disease: less than 60 ml/min/1.73 sq.m. Kidney Failure: less than 15 ml/min/1.73 sq.m. Results valid for patients 18 years and older. 07/26/2020 55 >=60 mL/min/1.73 Final     Comment:     Chronic Kidney Disease: less than 60 ml/min/1.73 sq.m. Kidney Failure: less than 15 ml/min/1.73 sq.m. Results valid for patients 18 years and older.        GFR    Date Value Ref Range Status   07/28/2020 >60  Final   07/27/2020 >60  Final   07/26/2020 >60  Final     Magnesium   Date Value Ref Range Status   07/26/2020 1.6 1.6 - 2.6 mg/dL Final     Phosphorus   Date Value Ref Range Status   07/28/2020 2.7 2.5 - 4.5 mg/dL Final   07/26/2020 2.2 (L) 2.5 - 4.5 mg/dL Final     No results for input(s): PH, PO2, PCO2, HCO3, BE, O2SAT in the last 72 hours. RADIOLOGY:  XR CHEST PORTABLE   Final Result   Mild central vascular congestion, with bibasilar airspace disease and small   pleural effusions, which could represent pulmonary edema or bibasilar   pneumonia. XR UROGRAM W OR WO KUB W OR WO TOMOGRAM   Final Result   Intraoperative fluoroscopy with placement of a double-J left ureteral stent. CT ABDOMEN PELVIS WO CONTRAST   Final Result   1. Moderate left hydronephrosis and hydroureter related to a 4 mm distal   ureteral stone. 2. New large nonobstructing left renal calculi. 3. Otherwise unchanged incidental findings as described in the body of the   report. XR CHEST PORTABLE   Final Result   1. Low lung volumes with no definite acute abnormality identified. 2. Ground-glass opacity overlying the bilateral lower lung zones is favored   to be related to overlapping soft tissue. PROBLEM LIST:  Principal Problem:    Sepsis secondary to ureteral stone ([address]  Resolved Problems:    * No resolved hospital problems. *      IMPRESSION:  1. Septic shock, improving  2. E. coli and Enterobacter urinary tract infections and bacteremia  3. Arthritis  4. Hypertension  5. Likely obstructive sleep apnea  6. Acute kidney injury, improving  7. Status post removal of obstructing left ureteral stone with cystoscopy and stenting    PLAN:  1. DC IV fluids  2. Restart oral furosemide 40 mg p.o. twice daily  3. Restart hydrochlorothiazide  4. Potassium chloride  5. Cozaar as she was taking before  6. Continue IV antibiotics/Zosyn  7. Transferred to monitored floor  8.  Continue aggressive physical and Occupational Therapy    ATTESTATION:  ICU Staff Physician note of personal involvement in Care  As the attending physician, I certify that I personally reviewed the patients history and personally examined the patient to confirm the physical findings described above,  And that I reviewed the relevant imaging studies and available reports. I also discussed the differential diagnosis and all of the proposed management plans with the patient and individuals accompanying the patient to this visit. They had the opportunity to ask questions about the proposed management plans and to have those questions answered. This patient has a high probability of sudden, clinically significant deterioration, which requires the highest level of physician preparedness to intervene urgently. I managed/supervised life or organ supporting interventions that required frequent physician assessment. I devoted my full attention to the direct care of this patient for the amount of time indicated below. Time I spent with the family or surrogate(s) is included only if the patient was incapable of providing the necessary information or participating in medical decisions - Time devoted to teaching and to any procedures I billed separately is not included.     CRITICAL CARE TIME:  38 minutes    Electronically signed by Soy Whiteny MD on 7/28/2020 at 3:32 PM

## 2020-07-28 NOTE — PROGRESS NOTES
Notified Dr Rogelio Madsen concerning patient's BP of 164/114 post administration of her BP medications.      Orders given

## 2020-07-28 NOTE — PROGRESS NOTES
Occupational Therapy  OCCUPATIONAL THERAPY INITIAL EVALUATION      Date:2020  Patient Name: Niecy Saravia  MRN: [id number]  : 1955  Room: [place] [address]    Referring Provider: Lee Gonzalez MD     Evaluating OT: Jenifer Pagan OTR/DHIRAJ #[id number]    AM-PAC Daily Activity Raw Score:   AM-PAC Daily Activity Inpatient   How much help for putting on and taking off regular lower body clothing?: A Lot  How much help for Bathing?: A Lot  How much help for Toileting?: A Lot  How much help for putting on and taking off regular upper body clothing?: A Little  How much help for taking care of personal grooming?: A Little  How much help for eating meals?: None  AM-PAC Inpatient Daily Activity Raw Score: 16  AM-PAC Inpatient ADL T-Scale Score : 35.96  ADL Inpatient CMS 0-100% Score: 53.32  ADL Inpatient CMS G-Code Modifier : CK      Recommended Placement: Subacute Rehab  Recommended Adaptive Equipment: TBD     Diagnosis:  1. Septicemia ([address]    2. Acute cystitis without hematuria    3. Hypokalemia    4. Elevated troponin    5. Fatigue, unspecified type    6. Dehydration    7. Kidney stone         Pertinent Medical History:    Past Medical History:   Diagnosis Date    Asthma     Fall 2011    Hematoma of abdominal wall 2011    extraperitoneal    Hypertension     Metatarsal fracture 2011    right 3rd and 4th    Multiple sclerosis (HCC)     UTI (urinary tract infection)         Precautions:  Falls, alarm, O2     Home Living: Pt lives alone in an apartment; 0 ARABELLA/0 rail(s).    Bathroom setup: tub/shower   Equipment owned: w/c, TTB, [employer], BSC    Prior Level of Function: Mod I with ADLs , Mod I with IADLs; ambulated with w/c and [employer] for short distances  Driving: No  Occupation: Retired    Pain Level: 7/10 L sciatic  Cognition: A&O: 4/4; Follows 2 step directions   Memory:  Good   Sequencing:  Fair   Problem solving:  Fair+   Judgement/safety:  Fair+     Functional Assessment:   Initial Eval Status  Date: 20 Treatment Status  Date: STGs = LTGs  Time frame: 5-7 days   Feeding Independent        Grooming Minimal Assist   Brushing hair sitting EOB  Independent    UB Dressing Minimal Assist   Adjust and tie back of gown  Independent    LB Dressing Maximal Assist     Moderate Assist    Bathing Maximal Assist    Moderate Assist    Toileting Maximal Assist     Moderate Assist    Bed Mobility  Supine to sit: Maximal Assist of 2   Sit to supine: Maximal Assist of 2     Supine to sit: Moderate Assist   Sit to supine: Moderate Assist    Functional Transfers Maximal Assist of 2  Sit<>stand  Moderate Assist    Functional Mobility Attempted  Pt unable to advance feet. Moderate Assist    Balance Sitting:     Static:  Fair    Dynamic:Fair  Standing: Fair  Sitting:     Static:  Good    Dynamic:Good  Standing: Good   Activity Tolerance Fair    Good   Visual/  Perceptual   WFL          Hand Dominance not reported     Strength ROM Additional Info:    RUE  4-/5  WFL good  and wfl FMC/dexterity noted during ADL tasks       LUE 4-/5  WFL good  and wfl FMC/dexterity noted during ADL tasks       Hearing: WFL   Sensation:  No c/o numbness or tingling   Tone: WFL   Edema: B LE                            Comments:   Nursing approved therapy session. Upon arrival, patient supine in bed and agreeable to OT session with PT collaboration. Therapist educated pt on role of OT. At end of session, patient supine in bed with call light and phone within reach, all lines and tubes intact; nursing aware. Pt demonstrated fair understanding of education/techniques and decreased independence and safety during completion of ADL/functional transfer/mobility tasks. Pt would benefit from continued skilled OT to increase safety and independence with completion of ADL/IADL tasks for functional independence and quality of life.     Treatment:  Skilled occupational therapy services provided include instruction/training on safety and adapted techniques for completion of therapeutic activities, ADLs/IADLs. Therapist facilitated bed mobility, functional transfers, graded functional activities (static/dynamic sitting, static/dynamic standing, functional reaching), and functional ambulation - providing mod cuing (verbal, visual, tactile) on hand placement, body mechanics, posture, breathing techniques, energy conservation, compensatory strategies, and safety. Therapist facilitated self-care retraining: UB dressing, LB dressing, grooming tasks - providing min cuing (verbal, visual, tactile) on body mechanics, posture, breathing techniques, energy conservation, compensatory strategies, and safety. Therapist educated pt on compensatory strategies/energy conservation techniques to safely complete ADLs/IADLs. Skilled monitoring of O2 sats, HR, and pt response throughout treatment.       Eval Complexity: Low    Evaluation time includes thorough review of current medical information, gathering information on past medical history/social history and prior level of function, completion of standardized testing/informal observation of tasks, assessment of data, and development of POC/Goals    Assessment of current deficits   Functional mobility [x]  ADLs [x] Strength [x]  Cognition []  Functional transfers  [x] IADLs [x] Safety Awareness [x]  Endurance [x]  Fine Motor Coordination [] Balance [x] Vision/perception [] Sensation []   Gross Motor Coordination [] ROM [] Delirium []                  Motor Control []    Plan of Care: OT 1-3x/week PRN during hospitalization  ADL retraining [x]   Equipment needs [x]   Neuromuscular re-education [x] Energy Conservation Techniques [x]  Functional Transfer training [x] Patient and/or Family Education [x]  Functional Mobility training [x]  Environmental Modifications [x]  Cognitive re-training []   Compensatory techniques for ADLs [x]  Splinting Needs []   Positioning to improve overall function [x]   Therapeutic Activity [x]  Therapeutic Exercise  [x]  Visual/Perceptual: []    Delirium prevention/treatment  []   Other:  []    Rehab Potential: Good for established goals     Patient / Family Goal:  Not stated. Patient and/or family were instructed on functional diagnosis, prognosis/goals and OT plan of care. Demonstrated fair understanding. low Evaluation + 17 Timed treatment minutes    Time IW:2487            Time Out: 0901    Treatment Charges: Mins Units   Ther Ex  22871     Manual Therapy 95082     Thera Activities 42139 10 1   ADL/Home Mgt 20642 7    Neuro Re-ed 43971     Orthotic manage/training  04210     Non-Billable Time     Total Timed Treatment 17 1     Evaluation time includes thorough review of current medical information, gathering information on past medical history/social history and prior level of function, completion of standardized testing/informal observation of tasks, assessment of data, and development of POC/Goals.     Suzanna Beyer, OTR/L #[id number]

## 2020-07-28 NOTE — CARE COORDINATION
7/28/2020 Negative Covid. Cm transition of care:  Pt/ot suggesting [facility]- Spoke to patient and she is in agreement for [facility] at discharge in addition to her other passport services she previously has. Referral to [facility] to review. Will need orders if pcp agrees. SS to follow.  Electronically signed by Clive Babb RN-BC on 7/28/2020 at 2:39 PM

## 2020-07-29 LAB
ALBUMIN SERPL-MCNC: 2.9 G/DL (ref 3.5–5.2)
ALP BLD-CCNC: 158 U/L (ref 35–104)
ALT SERPL-CCNC: 36 U/L (ref 0–32)
ANION GAP SERPL CALCULATED.3IONS-SCNC: 12 MMOL/L (ref 7–16)
AST SERPL-CCNC: 30 U/L (ref 0–31)
BASOPHILS ABSOLUTE: 0.07 E9/L (ref 0–0.2)
BASOPHILS RELATIVE PERCENT: 0.5 % (ref 0–2)
BILIRUB SERPL-MCNC: 0.8 MG/DL (ref 0–1.2)
BUN BLDV-MCNC: 11 MG/DL (ref 8–23)
CALCIUM SERPL-MCNC: 9 MG/DL (ref 8.6–10.2)
CHLORIDE BLD-SCNC: 100 MMOL/L (ref 98–107)
CO2: 29 MMOL/L (ref 22–29)
CREAT SERPL-MCNC: 0.9 MG/DL (ref 0.5–1)
CULTURE, BLOOD 2: ABNORMAL
CULTURE, BLOOD 2: ABNORMAL
EOSINOPHILS ABSOLUTE: 0.59 E9/L (ref 0.05–0.5)
EOSINOPHILS RELATIVE PERCENT: 3.8 % (ref 0–6)
GFR AFRICAN AMERICAN: >60
GFR NON-AFRICAN AMERICAN: >60 ML/MIN/1.73
GLUCOSE BLD-MCNC: 111 MG/DL (ref 74–99)
HCT VFR BLD CALC: 37.2 % (ref 34–48)
HEMOGLOBIN: 12.2 G/DL (ref 11.5–15.5)
IMMATURE GRANULOCYTES #: 0.2 E9/L
IMMATURE GRANULOCYTES %: 1.3 % (ref 0–5)
LYMPHOCYTES ABSOLUTE: 1.9 E9/L (ref 1.5–4)
LYMPHOCYTES RELATIVE PERCENT: 12.2 % (ref 20–42)
MAGNESIUM: 1.8 MG/DL (ref 1.6–2.6)
MCH RBC QN AUTO: 33.2 PG (ref 26–35)
MCHC RBC AUTO-ENTMCNC: 32.8 % (ref 32–34.5)
MCV RBC AUTO: 101.4 FL (ref 80–99.9)
MONOCYTES ABSOLUTE: 1.17 E9/L (ref 0.1–0.95)
MONOCYTES RELATIVE PERCENT: 7.5 % (ref 2–12)
NEUTROPHILS ABSOLUTE: 11.59 E9/L (ref 1.8–7.3)
NEUTROPHILS RELATIVE PERCENT: 74.7 % (ref 43–80)
ORGANISM: ABNORMAL
PDW BLD-RTO: 13.4 FL (ref 11.5–15)
PHOSPHORUS: 3.8 MG/DL (ref 2.5–4.5)
PLATELET # BLD: 214 E9/L (ref 130–450)
PMV BLD AUTO: 11.1 FL (ref 7–12)
POTASSIUM SERPL-SCNC: 3.3 MMOL/L (ref 3.5–5)
RBC # BLD: 3.67 E12/L (ref 3.5–5.5)
SODIUM BLD-SCNC: 141 MMOL/L (ref 132–146)
TOTAL PROTEIN: 6.3 G/DL (ref 6.4–8.3)
URINE CULTURE, ROUTINE: ABNORMAL
URINE CULTURE, ROUTINE: ABNORMAL
WBC # BLD: 15.5 E9/L (ref 4.5–11.5)

## 2020-07-29 PROCEDURE — C9113 INJ PANTOPRAZOLE SODIUM, VIA: HCPCS | Performed by: NURSE PRACTITIONER

## 2020-07-29 PROCEDURE — 97530 THERAPEUTIC ACTIVITIES: CPT

## 2020-07-29 PROCEDURE — 6360000002 HC RX W HCPCS: Performed by: NURSE PRACTITIONER

## 2020-07-29 PROCEDURE — 1200000000 HC SEMI PRIVATE

## 2020-07-29 PROCEDURE — 2580000003 HC RX 258: Performed by: UROLOGY

## 2020-07-29 PROCEDURE — 83735 ASSAY OF MAGNESIUM: CPT

## 2020-07-29 PROCEDURE — 2500000003 HC RX 250 WO HCPCS: Performed by: INTERNAL MEDICINE

## 2020-07-29 PROCEDURE — 6360000002 HC RX W HCPCS: Performed by: UROLOGY

## 2020-07-29 PROCEDURE — 84100 ASSAY OF PHOSPHORUS: CPT

## 2020-07-29 PROCEDURE — 2580000003 HC RX 258: Performed by: CLINICAL NURSE SPECIALIST

## 2020-07-29 PROCEDURE — 6370000000 HC RX 637 (ALT 250 FOR IP): Performed by: INTERNAL MEDICINE

## 2020-07-29 PROCEDURE — 36415 COLL VENOUS BLD VENIPUNCTURE: CPT

## 2020-07-29 PROCEDURE — 85025 COMPLETE CBC W/AUTO DIFF WBC: CPT

## 2020-07-29 PROCEDURE — 6360000002 HC RX W HCPCS: Performed by: CLINICAL NURSE SPECIALIST

## 2020-07-29 PROCEDURE — 2700000000 HC OXYGEN THERAPY PER DAY

## 2020-07-29 PROCEDURE — 80053 COMPREHEN METABOLIC PANEL: CPT

## 2020-07-29 PROCEDURE — 6370000000 HC RX 637 (ALT 250 FOR IP): Performed by: UROLOGY

## 2020-07-29 RX ORDER — HEPARIN SODIUM (PORCINE) LOCK FLUSH IV SOLN 100 UNIT/ML 100 UNIT/ML
1 SOLUTION INTRAVENOUS EVERY 12 HOURS SCHEDULED
Status: DISCONTINUED | OUTPATIENT
Start: 2020-07-29 | End: 2020-07-30 | Stop reason: HOSPADM

## 2020-07-29 RX ORDER — POLYETHYLENE GLYCOL 3350 17 G/17G
17 POWDER, FOR SOLUTION ORAL DAILY PRN
Status: DISCONTINUED | OUTPATIENT
Start: 2020-07-29 | End: 2020-07-30 | Stop reason: HOSPADM

## 2020-07-29 RX ORDER — POTASSIUM CHLORIDE 20 MEQ/1
20 TABLET, EXTENDED RELEASE ORAL ONCE
Status: COMPLETED | OUTPATIENT
Start: 2020-07-29 | End: 2020-07-29

## 2020-07-29 RX ORDER — SODIUM CHLORIDE 0.9 % (FLUSH) 0.9 %
10 SYRINGE (ML) INJECTION PRN
Status: DISCONTINUED | OUTPATIENT
Start: 2020-07-29 | End: 2020-07-30 | Stop reason: HOSPADM

## 2020-07-29 RX ORDER — HEPARIN SODIUM (PORCINE) LOCK FLUSH IV SOLN 100 UNIT/ML 100 UNIT/ML
1 SOLUTION INTRAVENOUS PRN
Status: DISCONTINUED | OUTPATIENT
Start: 2020-07-29 | End: 2020-07-30 | Stop reason: HOSPADM

## 2020-07-29 RX ORDER — METOPROLOL TARTRATE 50 MG/1
50 TABLET, FILM COATED ORAL 2 TIMES DAILY
Status: DISCONTINUED | OUTPATIENT
Start: 2020-07-29 | End: 2020-07-30 | Stop reason: HOSPADM

## 2020-07-29 RX ORDER — FUROSEMIDE 40 MG/1
40 TABLET ORAL DAILY
Status: DISCONTINUED | OUTPATIENT
Start: 2020-07-30 | End: 2020-07-30 | Stop reason: HOSPADM

## 2020-07-29 RX ADMIN — SODIUM CHLORIDE 25 ML: 9 INJECTION, SOLUTION INTRAVENOUS at 02:57

## 2020-07-29 RX ADMIN — Medication 10 ML: at 20:20

## 2020-07-29 RX ADMIN — CETIRIZINE HYDROCHLORIDE 10 MG: 10 TABLET, FILM COATED ORAL at 08:32

## 2020-07-29 RX ADMIN — OXYCODONE HYDROCHLORIDE AND ACETAMINOPHEN 1000 MG: 500 TABLET ORAL at 08:32

## 2020-07-29 RX ADMIN — METOPROLOL TARTRATE 50 MG: 50 TABLET, FILM COATED ORAL at 08:32

## 2020-07-29 RX ADMIN — WATER 2 G: 1 INJECTION INTRAMUSCULAR; INTRAVENOUS; SUBCUTANEOUS at 17:50

## 2020-07-29 RX ADMIN — SODIUM CHLORIDE, PRESERVATIVE FREE 10 ML: 5 INJECTION INTRAVENOUS at 20:22

## 2020-07-29 RX ADMIN — FLUTICASONE PROPIONATE 1 SPRAY: 50 SPRAY, METERED NASAL at 08:33

## 2020-07-29 RX ADMIN — POTASSIUM CHLORIDE 20 MEQ: 20 TABLET, EXTENDED RELEASE ORAL at 20:20

## 2020-07-29 RX ADMIN — HYDROCODONE BITARTRATE AND ACETAMINOPHEN 1 TABLET: 5; 325 TABLET ORAL at 08:32

## 2020-07-29 RX ADMIN — SODIUM CHLORIDE 25 ML: 9 INJECTION, SOLUTION INTRAVENOUS at 10:40

## 2020-07-29 RX ADMIN — Medication 10 ML: at 08:34

## 2020-07-29 RX ADMIN — ENOXAPARIN SODIUM 40 MG: 40 INJECTION SUBCUTANEOUS at 08:33

## 2020-07-29 RX ADMIN — PSEUDOEPHEDRINE HYDROCHLORIDE 60 MG: 60 TABLET ORAL at 00:54

## 2020-07-29 RX ADMIN — PANTOPRAZOLE SODIUM 40 MG: 40 INJECTION, POWDER, FOR SOLUTION INTRAVENOUS at 08:33

## 2020-07-29 RX ADMIN — HYDROCHLOROTHIAZIDE 12.5 MG: 12.5 TABLET ORAL at 08:32

## 2020-07-29 RX ADMIN — DOCUSATE SODIUM 100 MG: 100 CAPSULE, LIQUID FILLED ORAL at 08:32

## 2020-07-29 RX ADMIN — METOPROLOL TARTRATE 5 MG: 5 INJECTION, SOLUTION INTRAVENOUS at 05:00

## 2020-07-29 RX ADMIN — POTASSIUM CHLORIDE 20 MEQ: 20 TABLET, EXTENDED RELEASE ORAL at 14:24

## 2020-07-29 RX ADMIN — SENNOSIDES 8.6 MG: 8.6 TABLET, FILM COATED ORAL at 08:32

## 2020-07-29 RX ADMIN — POTASSIUM CHLORIDE 20 MEQ: 20 TABLET, EXTENDED RELEASE ORAL at 08:32

## 2020-07-29 RX ADMIN — PIPERACILLIN AND TAZOBACTAM 3.38 G: 3; .375 INJECTION, POWDER, LYOPHILIZED, FOR SOLUTION INTRAVENOUS at 06:43

## 2020-07-29 RX ADMIN — LOSARTAN POTASSIUM 50 MG: 50 TABLET, FILM COATED ORAL at 08:32

## 2020-07-29 RX ADMIN — ACETAMINOPHEN 650 MG: 325 TABLET, FILM COATED ORAL at 23:41

## 2020-07-29 RX ADMIN — Medication 1000 UNITS: at 08:32

## 2020-07-29 RX ADMIN — METOPROLOL TARTRATE 5 MG: 5 INJECTION, SOLUTION INTRAVENOUS at 00:21

## 2020-07-29 RX ADMIN — METOPROLOL TARTRATE 50 MG: 50 TABLET, FILM COATED ORAL at 20:20

## 2020-07-29 RX ADMIN — FUROSEMIDE 40 MG: 40 TABLET ORAL at 08:32

## 2020-07-29 ASSESSMENT — PAIN SCALES - GENERAL
PAINLEVEL_OUTOF10: 0
PAINLEVEL_OUTOF10: 6
PAINLEVEL_OUTOF10: 4
PAINLEVEL_OUTOF10: 0
PAINLEVEL_OUTOF10: 4

## 2020-07-29 ASSESSMENT — PAIN DESCRIPTION - PROGRESSION
CLINICAL_PROGRESSION: GRADUALLY WORSENING

## 2020-07-29 ASSESSMENT — PAIN DESCRIPTION - FREQUENCY
FREQUENCY: INTERMITTENT
FREQUENCY: CONTINUOUS
FREQUENCY: CONTINUOUS

## 2020-07-29 ASSESSMENT — PAIN DESCRIPTION - PAIN TYPE
TYPE: ACUTE PAIN

## 2020-07-29 ASSESSMENT — PAIN DESCRIPTION - DESCRIPTORS
DESCRIPTORS: DULL;ACHING;THROBBING
DESCRIPTORS: HEADACHE
DESCRIPTORS: HEADACHE

## 2020-07-29 ASSESSMENT — PAIN DESCRIPTION - LOCATION
LOCATION: HEAD

## 2020-07-29 ASSESSMENT — PAIN DESCRIPTION - ONSET
ONSET: ON-GOING
ONSET: GRADUAL
ONSET: GRADUAL

## 2020-07-29 ASSESSMENT — PAIN - FUNCTIONAL ASSESSMENT: PAIN_FUNCTIONAL_ASSESSMENT: ACTIVITIES ARE NOT PREVENTED

## 2020-07-29 NOTE — CARE COORDINATION
7/29/2020 Negative Covid. Cm transition of care:  SS consult for [facility]- acute rehab planning discharge tomorrow 7/30/2020. Pt is a telemetry transfer. Pt interested in [facility]- she has been there before. She does have MS and is in the wheelchair much of the time. Pt/ot notes for BILLY Bates with [facility] will review. NO PRECERT pt has straight Medicaid coverage. CM/SS to follow.  Electronically signed by BRIGETTE Pino on 7/29/2020 at 12:12 PM

## 2020-07-29 NOTE — PROGRESS NOTES
Occupational Therapy  OT BEDSIDE TREATMENT NOTE      Date:2020  Patient Name: Carolyn Yeh  MRN: [id number]  : 1955  Room: [place] [address]     *Referring Provider: Nicki Watkins MD      Evaluating OT: Asher Galvez OTR/L #[id number]     AM-PAC Daily Activity Raw Score:   AM-PAC Daily Activity Inpatient   How much help for putting on and taking off regular lower body clothing?: A Lot  How much help for Bathing?: A Lot  How much help for Toileting?: A Lot  How much help for putting on and taking off regular upper body clothing?: A Little  How much help for taking care of personal grooming?: A Little  How much help for eating meals?: None  AM-PAC Inpatient Daily Activity Raw Score: 16  AM-PAC Inpatient ADL T-Scale Score : 35.96  ADL Inpatient CMS 0-100% Score: 53.32  ADL Inpatient CMS G-Code Modifier : CK        Recommended Placement: Subacute Rehab  Recommended Adaptive Equipment: TBD      Diagnosis:  1. Septicemia ([facility] [address]    2. Acute cystitis without hematuria    3. Hypokalemia    4. Elevated troponin    5. Fatigue, unspecified type    6. Dehydration    7. Kidney stone          Pertinent Medical History:    Past Medical History        Past Medical History:   Diagnosis Date    Asthma      Fall 2011    Hematoma of abdominal wall 2011     extraperitoneal    Hypertension      Metatarsal fracture 2011     right 3rd and 4th    Multiple sclerosis (HCC)      UTI (urinary tract infection)              Precautions:  Falls, alarm, O2, hx of MS     Home Living: Pt lives alone in an apartment; 0 ARABELLA/0 rail(s).    Bathroom setup: tub/shower   Equipment owned: w/c, TTB, [employer], BSC     Prior Level of Function: Mod I with ADLs , Mod I with IADLs; ambulated with w/c and [employer] for short distances  Driving: No  Occupation: Retired     Pain Level: No c/o pain  Cognition: A&O: 4/4; Follows 2 step directions              Memory:  Good              Sequencing:  Fair              Problem solving:  Fair+ understanding of education/techniques and decreased independence and safety during completion of ADL/functional transfer/mobility tasks. Pt would benefit from continued skilled OT to increase safety and independence with completion of ADL/IADL tasks for functional independence and quality of life. Pt has made fair progress towards set goals. Continue with current plan of care. Treatment:  Skilled occupational therapy services provided include instruction/training on safety and adapted techniques for completion of therapeutic activities, ADLs/IADLs. Therapist facilitated bed mobility, functional transfers, graded functional activities (static/dynamic sitting, static/dynamic standing, functional reaching) - providing min cuing (verbal, visual, tactile) on hand placement, body mechanics, posture, breathing techniques, energy conservation, compensatory strategies, and safety. Therapist facilitated self-care retraining: toileting tasks - providing min cuing (verbal, visual, tactile) on body mechanics, posture, breathing techniques, energy conservation, compensatory strategies, and safety. Therapist educated pt on compensatory strategies/energy conservation techniques to safely complete ADLs/IADLs. Skilled monitoring of O2 sats, HR, and pt response throughout treatment.       Treatment Time In:1334         Treatment Time Out: 1740               Treatment Charges: Mins Units   Ther Ex  63253     Manual Therapy 33383     Thera Activities 24322 10 1   ADL/Home Mgt 42978 8    Neuro Re-ed 29442     Group Therapy      Orthotic manage/training  23338     Non-Billable Time     Total Timed Treatment 18 [address] [place], [place] #[id number]

## 2020-07-29 NOTE — PROGRESS NOTES
Physical Therapy Treatment Note    Room #:  IC08/IC08-01  Patient Name: Dangelo Osorio  YOB: 1955  MRN: [id number]    Referring Provider: Soy Whitney MD    Date of Service: 7/29/2020    Evaluating Physical Therapist:  Lester Billings PT #[id number]      Diagnosis:   Sepsis [facility]) [A41.9]        Patient Active Problem List   Diagnosis    Neck pain    Multiple sclerosis ([address]    Bilateral foot-drop    Peripheral polyneuropathy    Essential hypertension    Asthma    Edema of both legs    Constipation    Intervertebral lumbar disc disorder with myelopathy, lumbar region    Cervical spinal stenosis    Sepsis secondary to ureteral stone ([address]        Tentative placement recommendation: Subacute rehab    Equipment recommendation:  To be determined      Prior Level of Function: Patient transferred to wheelchair, ambulated short distances as able with wheeled walker   Rehab Potential: good  for baseline    Past medical history:   Past Medical History:   Diagnosis Date    Asthma     Fall 5/26/2011    Hematoma of abdominal wall 5/2011    extraperitoneal    Hypertension     Metatarsal fracture 1/2011    right 3rd and 4th    Multiple sclerosis ([address]     UTI (urinary tract infection)      Past Surgical History:   Procedure Laterality Date    APPENDECTOMY      CHOLECYSTECTOMY      CYSTOSCOPY INSERTION / REMOVAL STENT / STONE Right 7/26/2020    CYSTOSCOPY RETROGRADE PYELOGRAM STENT INSERTION LEFT performed by Nava Zhang Lee, DO at [address]         Precautions: falls, alarm and O2 ,      SUBJECTIVE:    Social history: Patient lives alone  in a apartment  with No steps  to enterTub shower grab bars    Equipment owned: Wheelchair, [address], Bedside commode and Shower chair,       [address] [facility] Mobility Inpatient   How much difficulty turning over in bed?: A Lot  How much difficulty sitting down on / standing up from a chair with arms?: A Lot  How much Dynamic Standing:  poor   Sitting EOB:  good    Dynamic Standing: fair wheeled walker      Patient is Alert & Oriented x person, place, time and situation  and follows directions    Sensation:  Pt denies numbness and tingling to extremities  Edema:  yes bilateral lower extremities    Vitals:  Blood Pressure at rest  Blood Pressure post session    Heart Rate at rest  Heart Rate post activity    SPO2 at rest %  SPO2 post activity % recovery      Patient education  Patient educated on role of Physical Therapy, risks of immobility, safety and plan of care and safety      Patient response to education:   Pt verbalized understanding Pt demonstrated skill Pt requires further education in this area   Yes Partial Yes       ASSESSMENT:    Comments:  Pt able to attain 1/2 upright position with standing. I performed a stand-pivot transfer chair to the bed; pt not able to assist.    Treatment:  Patient practiced and was instructed in the following treatment:    Pt in chair at start of tx session. not assessed Assisted OT with standing pt to get off of the bedpan and personal hygiene. Pt fatigued from sitting up in the chair. I performed a stand-pivot transfer from the chair and back to bed. Therapeutic Exercises: not performed     At end of session, patient in bed with  call light and phone within reach,  all lines and tubes intact, nursing notified. Patient would benefit from continued skilled Physical Therapy to improve functional independence and quality of life. PLAN:    Patient is making limited progress towards established goals. Will continue with current Plan of care. Time in  1:34  Time out  1:52    Total Treatment Time  18 minutes    CPT codes:  Therapeutic activities (05527)   18 minutes  1 unit(s)    Warren Camacho  [facility]  LIC # [id number]

## 2020-07-29 NOTE — PROGRESS NOTES
Internal Medicine Progress Note     [facility]     Jackolyn Severe. Hailey Mays., KARL.MARCI.ANALIOTerrenceI. Skye Oliveira D.O., TAYLEROTerrenceITerrence Harp D.O. Evan Cannon, MSN, APRN, NP-C  Doreen Waldron. Joanne Servin, MSN, APRN-CNP     Primary Care Physician: Victorino Ward MD   Admitting Physician:  Erin Rosenthal DO  Admission date and time: 7/26/2020  1:24 AM    Room:  [place] [address]    Patient Name: Shirley Rincon  MRN: [id number]    Date of Service: 7/29/2020     Subjective:  Kwadwo Craven is seated at the bedside chair resting very comfortably today. She states she feels dramatically better today. She continues to require nasal cannula oxygen but diuresed accordingly yesterday. Unfortunately, she has become increasingly weak and deconditioned following her critical illness. She is requesting evaluation for acute rehabilitation and I believe this to be very reasonable. We also discussed the multi drug resistant nature of her E. coli urinary tract infection and bacteremia. She is for transfer from the intensive care unit. Review of System: HEENT:  Manuel ear pain, sore throat, sinus or eye problems. Admits to irritation associated with the nasal cannula oxygen. Cardiovascular:   Denies any chest pain, irregular heartbeats, or palpitations. Respiratory:   She is short of breath today that appears to be compatible with volume overload. Gastrointestinal:   Improving abdominal discomfort. No diarrhea or constipation. Admits to an improving appetite. Extremities:   Denies any lower extremity swelling or edema. Neurology:    Admits to chronic neurologic deficits and weakness related to underlying multiple sclerosis  Derm:    Denies any rashes, ulcers, or excoriations. Denies bruising. Genitourinary:    Denies any urgency, frequency, hematuria. Voiding with the use of a catheter. .  Musculoskeletal:  Denies myalgias, joint complaints or back pain      Physical Exam:  I/O this shift:  In: 380 [P.O.:360; I.V.:20]  Out: -   Blood pressure (!) 153/77, pulse 83, temperature 98.1 °F (36.7 °C), temperature source Oral, resp. rate 20, height 5' 6\" (1.676 m), weight 194 lb 3.2 oz (88.1 kg), SpO2 91 %. HEENT:    PERRLA. EOMI. Sclera clear. Buccal mucosa moist.  Nasal cannula oxygen is in place. Neck:    Supple. Trachea midline. No thyromegaly. No JVD. No bruits. Heart:    Rhythm regular, rate controlled. No murmurs. Lungs:    Diminished bilaterally with rales at the bases. Abdomen:   Soft. Non-tender. Non-distended. Bowel sounds positive. No organomegaly or masses. No pain on palpation  Extremities:    Peripheral pulses present. Bilateral peripheral edema. Neurologic:    Alert x 3. No focal deficit. Cranial nerves grossly intact. Chronic deficits related to multiple sclerosis  Skin:    No petechia. No hemorrhage. No wounds. Psych:   Behavior is normal. Mood appears normal. Speech is not rapid or pressured. Musculokeletal:  Spine ROM normal. Muscular strength intact. Gait not assessed. Genitalia/Breast:  Voiding with the use of a Obrien catheter.     Scheduled Meds:   metoprolol tartrate  50 mg Oral BID    [START ON 7/30/2020] furosemide  40 mg Oral Daily    potassium chloride  20 mEq Oral Once    potassium chloride  20 mEq Oral BID    hydrochlorothiazide  12.5 mg Oral Daily    losartan  50 mg Oral Daily    sodium chloride flush  10 mL Intravenous 2 times per day    enoxaparin  40 mg Subcutaneous Daily    piperacillin-tazobactam  3.375 g Intravenous Q8H    vitamin C  1,000 mg Oral Daily    vitamin E  1,000 Units Oral Daily    fluticasone  1 spray Each Nostril Daily    senna  1 tablet Oral Daily    albuterol  2.5 mg Nebulization See Admin Instructions    cetirizine  10 mg Oral Daily    pantoprazole  40 mg Intravenous Daily       Continuous Infusions:   sodium chloride 25 mL (07/29/20 1040)       Objective Data:  CBC with Differential:    Lab Results   Component Value Date    WBC 15.5 07/29/2020    RBC 3.67 07/29/2020    HGB 12.2 07/29/2020    HCT 37.2 07/29/2020     07/29/2020    .4 07/29/2020    MCH 33.2 07/29/2020    MCHC 32.8 07/29/2020    RDW 13.4 07/29/2020    SEGSPCT 50 05/28/2011    METASPCT 1.7 07/26/2020    LYMPHOPCT 12.2 07/29/2020    MONOPCT 7.5 07/29/2020    MYELOPCT 1.0 02/08/2020    BASOPCT 0.5 07/29/2020    MONOSABS 1.17 07/29/2020    LYMPHSABS 1.90 07/29/2020    EOSABS 0.59 07/29/2020    BASOSABS 0.07 07/29/2020     BMP:    Lab Results   Component Value Date     07/29/2020    K 3.3 07/29/2020     07/29/2020    CO2 29 07/29/2020    BUN 11 07/29/2020    LABALBU 2.9 07/29/2020    LABALBU 4.0 05/27/2011    CREATININE 0.9 07/29/2020    CALCIUM 9.0 07/29/2020    GFRAA >60 07/29/2020    LABGLOM >60 07/29/2020    GLUCOSE 111 07/29/2020    GLUCOSE 96 05/28/2011       Assessment:   1. Sepsis secondary to a urinary tract infection in the setting of a nonobstructing 4 mm ureteral stone status post urologic stenting with concomitant gram-negative bacteremia  2. Acute respiratory failure with hypoxia in the setting of volume resuscitation and volume overload  3. Multiple sclerosis  4. Essential hypertension    Plan:   EMCOR continues to improve on a daily basis. She diuresed accordingly yesterday and her volume status has improved dramatically. This has resulted in improvement in her respiratory status as well. Unfortunately, E. coli urinary tract infection and bacteremia is somewhat drug-resistant and we will ask the infectious disease team to provide recommendations for antibiotic therapy upon discharge. She may ultimately require IV antibiotic therapy temporarily. This would require PICC line placement. The patient will continue to work with the therapy teams. She is acceptable for transfer from the intensive care unit. We discussed removal of the Obrien catheter tomorrow morning.   I am recommending acute rehabilitation and the patient is in agreement. Probable transfer to [facility] tomorrow pending acceptance. Greater than 40 minutes of critical care time spent with the patient. This time included chart review, , and discussion with those consultants involved in the patient's care. More than 50% of my  time was spent at the bedside counseling/coordinating care with the patient and/or family with face to face contact. This time was spent reviewing notes and laboratory data as well as instructing and counseling the patient. Time I spent with the family or surrogate(s) is included only if the patient was incapable of providing the necessary information or participating in medical decisions. I also discussed the differential diagnosis and all of the proposed management plans with the patient and individuals accompanying the patient. Geo Sanabria requires this high level of physician care and nursing in the ICU due to the complexity of decision management and chance of rapid decline or death. Continued cardiac monitoring and higher level of nursing are required. I am readily available for any further decision-making and intervention.        Flavio Henley DO, F.A.C.O.I.  7/29/2020  12:02 PM

## 2020-07-29 NOTE — PLAN OF CARE
Problem: Falls - Risk of:  Goal: Will remain free from falls  Description: Will remain free from falls  Outcome: Met This Shift     Problem: Pain:  Goal: Control of acute pain  Description: Control of acute pain  Outcome: Met This Shift     Problem: Skin Integrity:  Goal: Absence of new skin breakdown  Description: Absence of new skin breakdown  Outcome: Met This Shift

## 2020-07-29 NOTE — DISCHARGE INSTR - COC
Impairments/Disabilities:010395360}    Nutrition Therapy:  Current Nutrition Therapy:   50Ej Barnes BREANA Diet List:753060186}    Routes of Feeding: {CHP DME Other Feedings:473058373}  Liquids: {Slp liquid thickness:71430}  Daily Fluid Restriction: {CHP DME Yes amt example:274055779}  Last Modified Barium Swallow with Video (Video Swallowing Test): {Done Not Done UZLL:827309352}    Treatments at the Time of Hospital Discharge:   Respiratory Treatments: ***  Oxygen Therapy:  {Therapy; copd oxygen:21686}  Ventilator:    {MH CC Vent RMHJ:949071178}    Rehab Therapies: {THERAPEUTIC INTERVENTION:7869588402}  Weight Bearing Status/Restrictions: 50Ej HAYES Weight Bearin}  Other Medical Equipment (for information only, NOT a DME order):  {EQUIPMENT:619001591}  Other Treatments: ***    Patient's personal belongings (please select all that are sent with patient):  {[facility] DME Belongings:187227501}    RN SIGNATURE:  {Esignature:057149942}    CASE MANAGEMENT/SOCIAL WORK SECTION    Inpatient Status Date: 2020     Readmission Risk Assessment Score:  Readmission Risk              Risk of Unplanned Readmission:        19           Discharging to Facility/ Agency   Name: Cosme Mishra , [facility]     Phone: [phone]    / signature: Electronically signed by Sherri Gramajo RN-BC on 2020 at 1:03 PM      PHYSICIAN SECTION    Prognosis: {Prognosis:3117934228}    Condition at Discharge: 50Ej Barnes Patient Condition:794321265}    Rehab Potential (if transferring to Rehab): {Prognosis:3666900956}    Recommended Labs or Other Treatments After Discharge: ***    Physician Certification: I certify the above information and transfer of Ching Tubbs  is necessary for the continuing treatment of the diagnosis listed and that she requires {Admit to Appropriate Level of Care:09079} for {GREATER/LESS:706310973} 30 days.      Update Admission H&P: {CHP DME Changes in ZCQGE:623790539}    PHYSICIAN SIGNATURE: {[facility]:393674578}

## 2020-07-29 NOTE — CONSULTS
[facility]  Consult Note    [address] [address]  [place], [address]  Phone [phone]   [phone]      Admit Date: 2020  1:24 AM  Pt Name: Alondra Meléndez  MRN: [id number]  : 1955  Reason for Consult:    Chief Complaint   Patient presents with    Extremity Weakness     x 2 days; couldnt get off toilet tonight     Requesting Physician:  Chino Weiner DO  PCP: Bri Snow MD  History Obtained From:  patient  ID consulted for Sepsis [facility]) [A41.9]  on hospital day [address] [place]       Chief Complaint   Patient presents with    Extremity Weakness     x 2 days; couldnt get off toilet tonight     HISTORYOF PRESENT ILLNESS      Alondra Meléndez is a 76 y.o. female who presents with significant past medical history of  has a past medical history of Asthma, Fall, Hematoma of abdominal wall, Hypertension, Metatarsal fracture, Multiple sclerosis ([address] and UTI (urinary tract infection). who presents with   Chief Complaint   Patient presents with    Extremity Weakness     x 2 days; couldnt get off toilet tonight     ED TRIAGEVITALS  BP: (!) 153/77, Temp: 98.1 °F (36.7 °C), Pulse: 83, Resp: 20, SpO2: 91 %  HPI  Pt from home with h/o MS  Came in with fatigue and weakness. She had abd pain   Wbc13.3 cr0.9   1. Moderate left hydronephrosis and hydroureter related to a 4 mm distal   ureteral stone. 2. New large nonobstructing left renal calculi. 3. Otherwise unchanged incidental findings as described in the body of the   report. S/p CYSTOSCOPY   STENT INSERTION LEFT  Blood cx E.  Coli  ID was asked to see for atbx  Currently on piptazo  No issues with meds    REVIEW OF SYSTEMS    (2-9 systems for level 4, 10 or more for level 5)     REVIEW OFSYSTEMS:    CONSTITUTIONAL:   No fever, chills, weight loss  ALLERGIES:    No urticaria, hay fever,    EYES:     No blurry vision, loss of vision,eye pain  ENT:      No hearing loss, sore throat  CARDIOVASCULAR:  No chest pain or palpitations  RESPIRATORY:   No cough, sob  ENDOCRINE:    No increase thirst, urination   HEME-LYMPH:   No easy bruising or bleeding  GI:     No nausea, vomiting or diarrhea  :       urinary complaints  NEURO:    No seizures, stroke, HA  MUSCULOSKELETAL:  No muscle aches or pain, no jointpain  SKIN:     No rash or itch  PSYCH:    No depression or anxiety    Medications Prior to Admission: losartan (COZAAR) 50 MG tablet, Take 1 tablet by mouth daily  torsemide (DEMADEX) 10 MG tablet, TAKE ONE TABLET BY MOUTH EVERY DAY  terbinafine (ATHLETES FOOT) 1 % cream, Apply topically to feet 2 times daily for 6 weeks, then as needed. phenazopyridine (PYRIDIUM) 100 MG tablet, Take 1 tablet by mouth 3 times daily as needed for Pain  albuterol sulfate  (90 Base) MCG/ACT inhaler, INHALE 2 PUFFS EVERY 4 TO 6 HOURS AS NEEDED FOR WHEEZING. hydroCHLOROthiazide (MICROZIDE) 12.5 MG capsule, Take 1 tablet by mouth once daily. dextromethorphan-guaiFENesin (MUCINEX DM)  MG per extended release tablet, Take 1 tablet by mouth every 12 hours as needed for Cough  loratadine-pseudoephedrine (CLARITIN-D 12 HOUR) 5-120 MG per extended release tablet, Take 1 tablet by mouth 2 times daily as needed (drainage and congestion)  potassium chloride (KLOR-CON) 10 MEQ extended release tablet, Take 2 tablets by mouth daily  Incontinence Supply Disposable (PROTECTIVE UNDERWEAR LARGE) MISC, Use as directed  Zinc Gluconate (COLD-EEZE) 13.3 MG LOZG, Take 1 lozenge by mouth every 3 hours for 1 day at first sign of sore throat.   lactulose (CHRONULAC) 10 GM/15ML solution, Take 30 mLs by mouth 3 times daily as needed (constipation)  albuterol (PROVENTIL) (2.5 MG/3ML) 0.083% nebulizer solution, Take 3 mLs by nebulization See Admin Instructions 1 vial via nebulizer q4h prn cough/wheeze  fluticasone (FLONASE) 50 MCG/ACT nasal spray, 1 spray by Each Nostril route daily  senna (SENOKOT) 8.6 MG tablet, Take 1 tablet by mouth daily  docusate sodium (COLACE) 100 MG capsule, Take 1 capsule by mouth 2 times daily as needed for Constipation  levocetirizine (XYZAL) 5 MG tablet, Take 1 tablet by mouth nightly as needed (allergies)  vitamin E 1000 units capsule, Take 1,000 Units by mouth daily  Magnesium Citrate 1.745 GM/30ML solution, Take 296 mLs by mouth See Admin Instructions 1 bottle daily prn constipation  BLACK ELDERBERRY,BERRY-FLOWER, PO, Take 1 tablet by mouth daily  Methylsulfonylmethane (MSM) 1000 MG CAPS, Take 1 tablet by mouth daily  Multiple Vitamins-Minerals (MULTIVITAMIN ADULT PO), Take 1 tablet by mouth daily  Incontinence Supply Disposable (POISE MAXIMUM ABSORBENCY) PADS,   Ascorbic Acid (VITAMIN C) 1000 MG tablet, Take 1,000 mg by mouth daily  betamethasone valerate (VALISONE) 0.1 % cream, Apply topically 3 times daily Apply to rash TID until resolved  B Complex-C-Folic Acid (HM VITAMIN B COMPLEX/VITAMIN C PO), Take 1 tablet by mouth daily  mupirocin (BACTROBAN) 2 % ointment, Apply topically See Admin Instructions Apply to affected area BID until better  polyethylene glycol (GLYCOLAX) powder, Take by mouth See Admin Instructions 1 capful in large glass water prn constipation  Propylhexedrine (BENZEDREX) INHA, by Nasal route Inhale each nostril for nasal congestion  Disposable Gloves (VINYL GLOVES) MISC, by Does not apply route  vitamin D (CHOLECALCIFEROL) 5000 UNITS CAPS capsule, Take 5,000 Units by mouth daily  ammonium lactate (LAC-HYDRIN) 12 % lotion, Apply topically as needed for Dry Skin Apply topically as needed.'  CURRENT MEDICATIONS     Current Facility-Administered Medications:     metoprolol tartrate (LOPRESSOR) tablet 50 mg, 50 mg, Oral, BID, Desean Warren MD, 50 mg at 07/29/20 0094    polyethylene glycol (GLYCOLAX) packet 17 g, 17 g, Oral, Daily PRN, MD Arun Smith ON 7/30/2020] furosemide (LASIX) tablet 40 mg, 40 mg, Oral, Daily, Desean Warren MD    potassium chloride (KLOR-CON M) extended release tablet 20 mEq, 20 mEq, Oral, Once, Luiza Glodsmith MD    potassium chloride Scott MANCIA) extended release tablet 20 mEq, 20 mEq, Oral, BID, Luiza Goldsmith MD, 20 mEq at 07/29/20 5651    hydrochlorothiazide (HYDRODIURIL) tablet 12.5 mg, 12.5 mg, Oral, Daily, Luiza oGldsmith MD, 12.5 mg at 07/29/20 2003    losartan (COZAAR) tablet 50 mg, 50 mg, Oral, Daily, Luiza Goldsmith MD, 50 mg at 07/29/20 5447    sodium chloride flush 0.9 % injection 10 mL, 10 mL, Intravenous, 2 times per day, Tony A Lee, DO, 10 mL at 07/29/20 0834    sodium chloride flush 0.9 % injection 10 mL, 10 mL, Intravenous, PRN, Tony A Lee, DO, 10 mL at 07/28/20 2125    acetaminophen (TYLENOL) tablet 650 mg, 650 mg, Oral, Q4H PRN, Tony A Lee, DO, 650 mg at 07/28/20 1918    enoxaparin (LOVENOX) injection 40 mg, 40 mg, Subcutaneous, Daily, Tony A Lee, DO, 40 mg at 07/29/20 0833    piperacillin-tazobactam (ZOSYN) 3.375 g in dextrose 5 % 50 mL IVPB extended infusion (mini-bag), 3.375 g, Intravenous, Q8H, Stopped at 07/29/20 1040 **AND** 0.9 % sodium chloride infusion, 25 mL, Intravenous, Q8H, Tony A Lee, DO, Last Rate: 12.5 mL/hr at 07/29/20 1040, 25 mL at 07/29/20 1040    vitamin C (ASCORBIC ACID) tablet 1,000 mg, 1,000 mg, Oral, Daily, Tony A Lee, DO, 1,000 mg at 07/29/20 7441    vitamin E capsule 1,000 Units, 1,000 Units, Oral, Daily, Tony A Lee, DO, 1,000 Units at 07/29/20 0832    fluticasone (FLONASE) 50 MCG/ACT nasal spray 1 spray, 1 spray, Each Nostril, Daily, Tony A Lee, DO, 1 spray at 07/29/20 5939    senna (SENOKOT) tablet 8.6 mg, 1 tablet, Oral, Daily, Tony A Lee, DO, 8.6 mg at 07/29/20 9885    docusate sodium (COLACE) capsule 100 mg, 100 mg, Oral, BID PRN, Tony A Ele, DO, 100 mg at 07/29/20 8587    albuterol (PROVENTIL) nebulizer solution 2.5 mg, 2.5 mg, Nebulization, See Admin Instructions, Tony A Lee, DO    dextromethorphan-guaiFENesin (MUCINEX DM)  MG per extended release tablet 1 tablet, 1 tablet, Oral, Q12H PRN, Tony Howard DO, 1 tablet at 07/27/20 0817    HYDROcodone-acetaminophen (NORCO) 5-325 MG per tablet 1 tablet, 1 tablet, Oral, Q4H PRN, Sandra Kaye MD, 1 tablet at 07/29/20 0832    cetirizine (ZYRTEC) tablet 10 mg, 10 mg, Oral, Daily, Guera Hagan MD, 10 mg at 07/29/20 0832    pseudoephedrine (SUDAFED) tablet 60 mg, 60 mg, Oral, Q8H PRN, Guera Hagan MD, 60 mg at 07/29/20 0054    Magnesium Citrate solution 296 mL, 296 mL, Oral, Daily PRN, Guera Hagan MD, 296 mL at 07/26/20 7131    mupirocin (BACTROBAN) 2 % ointment, , Topical, Daily PRN, Tony Howard DO    perflutren lipid microspheres (DEFINITY) injection 1.65 mg, 1.5 mL, Intravenous, ONCE PRN, Israel Escalante APRN - CNP    pantoprazole (PROTONIX) injection 40 mg, 40 mg, Intravenous, Daily, Woodstacy Escalante, APRN - CNP, 40 mg at 07/29/20 6570  ALLERGIES     Patient has no known allergies.   Immunization History   Administered Date(s) Administered    Influenza Vaccine, unspecified formulation 10/01/2012, 10/01/2017, 10/01/2018    Influenza Virus Vaccine 10/01/2012, 10/01/2017, 10/01/2018    Influenza, Triv, inactivated, subunit, adjuvanted, IM (Fluad 65 yrs and older) 10/02/2019    Pneumococcal Conjugate 13-valent ([id number]) 10/02/2019    Pneumococcal Polysaccharide ([id number]) 11/01/2009     PAST MEDICAL HISTORY     Past Medical History:   Diagnosis Date    Asthma     Fall 5/26/2011    Hematoma of abdominal wall 5/2011    extraperitoneal    Hypertension     Metatarsal fracture 1/2011    right 3rd and 4th    Multiple sclerosis ([facility] [address]     UTI (urinary tract infection)      SURGICAL HISTORY       Past Surgical History:   Procedure Laterality Date    APPENDECTOMY      CHOLECYSTECTOMY      CYSTOSCOPY INSERTION / REMOVAL STENT / STONE Right 7/26/2020    CYSTOSCOPY RETROGRADE PYELOGRAM STENT INSERTION LEFT performed by Sarah [facility] DO at [facility]       Family History   Problem Relation Age of Onset    Asthma Father     High Blood Pressure Father     Cancer Maternal Aunt      SOCIAL HISTORY       Social History     Socioeconomic History    Marital status:      Spouse name: None    Number of children: None    Years of education: None    Highest education level: None   Occupational History    None   Social Needs    Financial resource strain: None    Food insecurity     Worry: None     Inability: None    Transportation needs     Medical: None     Non-medical: None   Tobacco Use    Smoking status: Former Smoker     Packs/day: 0.25     Years: 46.00     Pack years: 11.50     Types: Cigarettes     Last attempt to quit: 2019     Years since quittin.4    Smokeless tobacco: Never Used   Substance and Sexual Activity    Alcohol use: No    Drug use: No    Sexual activity: Not Currently   Lifestyle    Physical activity     Days per week: None     Minutes per session: None    Stress: None   Relationships    Social connections     Talks on phone: None     Gets together: None     Attends Voodoo service: None     Active member of club or organization: None     Attends meetings of clubs or organizations: None     Relationship status: None    Intimate partner violence     Fear of current or ex partner: None     Emotionally abused: None     Physically abused: None     Forced sexual activity: None   Other Topics Concern    None   Social History Narrative    None     ·      PHYSICAL EXAM    (up to 7 for level 4, 8 or more forlevel 5)     ED Triage Vitals [20 0127]   BP Temp Temp Source Pulse Resp SpO2 Height Weight   138/73 98.4 °F (36.9 °C) Oral 135 26 93 % 5' 6\" (1.676 m) 185 lb (83.9 kg)     Vitals:    Vitals:    20 0600 20 0800 20 0900 20 1000   BP: (!) 165/89 134/77 (!) 145/78 (!) 153/77   Pulse: 83 73 80 83   Resp:  20   Temp:  98.1 °F (36.7 °C)     TempSrc:  Oral     SpO2: 92%  92% 91%   Weight:       Height:         Physical Exam Constitutional/General: Alert and oriented, NAD in chair inc bmi  Head: NC/AT  Eyes: PERRL, EOMI  Mouth: Normal mucosa, no thrush   Neck: Supple, full ROM,    Pulmonary: Lungs clear to auscultation bilaterally post . Not in respiratory distress  Cardiovascular:  Regular rate and rhythm, no murmurs, gallops, or rubs. Abdomen: Soft, + BS.    distension. Nontender. Extremities: Moves all extremities x 4. Warm and well perfused edema  Pulses:  Distal pulses intact  Skin: Warm and dry without rash  Neurologic:    No focal deficits  Psych: Normal Affect     DIAGNOSTICRESULTS   RADIOLOGY:   Ct Abdomen Pelvis Wo Contrast    Result Date: 7/28/2020  EXAMINATION: CT OF THE ABDOMEN AND PELVIS WITHOUT CONTRAST 7/26/2020 3:07 am TECHNIQUE: CT of the abdomen and pelvis was performed without the administration of intravenous contrast. Multiplanar reformatted images are provided for review. Dose modulation, iterative reconstruction, and/or weight based adjustment of the mA/kV was utilized to reduce the radiation dose to as low as reasonably achievable. COMPARISON: 06/02/2019 HISTORY: ORDERING SYSTEM PROVIDED HISTORY: Pain TECHNOLOGIST PROVIDED HISTORY: Reason for exam:->Pain FINDINGS: Lower Chest:  Visualized portion of the lower chest demonstrates no acute abnormality. Stable appearance of thin-walled cystic changes. Organs: Suboptimal evaluation of the solid organs without intravenous contrast.  There are bilateral there are large nonobstructing left renal calculi which appear new since the comparison study. Moderate left hydronephrosis and hydroureter to the level of the distal ureter. There is an obstructing 4 mm left distal ureteral stone. A right upper pole hyperdense cyst is noted. 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 The liver, spleen, pancreas, and adrenal glands are without acute findings. Unchanged ill-defined low-attenuation region in hepatic segment 5.   The gallbladder is present without radiopaque cholelithiasis. GI/Bowel: No mechanical bowel obstruction. Small hiatal hernia. Sigmoid predominant diverticulosis without evidence of acute diverticulitis. No evidence appendicitis. Pelvis: The urinary bladder contains gas and a Obrien catheter. Status post hysterectomy. Peritoneum/Retroperitoneum: Moderate to severe calcified atherosclerotic plaque. No lymphadenopathy. Bones/Soft Tissues: No acute or aggressive osseous lesions. 1. Moderate left hydronephrosis and hydroureter related to a 4 mm distal ureteral stone. 2. New large nonobstructing left renal calculi. 3. Otherwise unchanged incidental findings as described in the body of the report. Xr Chest Portable    Result Date: 7/28/2020  EXAMINATION: ONE XRAY VIEW OF THE CHEST 7/28/2020 7:40 am COMPARISON: 07/26/2020 HISTORY: ORDERING SYSTEM PROVIDED HISTORY: DOMI TECHNOLOGIST PROVIDED HISTORY: Reason for exam:->SOB FINDINGS: Cardiac size and mediastinal structures appear stable. Mild vascular congestion. Bibasilar airspace disease and small pleural effusions. No pneumothorax is identified. Mild central vascular congestion, with bibasilar airspace disease and small pleural effusions, which could represent pulmonary edema or bibasilar pneumonia. Xr Chest Portable    Result Date: 7/26/2020  EXAMINATION: ONE XRAY VIEW OF THE CHEST 7/26/2020 2:47 am COMPARISON: 05/27/2001 HISTORY: ORDERING SYSTEM PROVIDED HISTORY: bala TECHNOLOGIST PROVIDED HISTORY: Reason for exam:->bala FINDINGS: The lungs are underinflated, resulting in vascular crowding and subsegmental atelectasis. Bilateral lower lung zone ground-glass attenuation is favored to be related to overlapping soft tissue. No convincing evidence for focal consolidation. No effusion or pneumothorax. The cardiac silhouette and mediastinal contours are stable given differences in technique and degree of inspiration. No acute bony abnormality. 1. Low lung volumes with no definite acute abnormality identified. 2. Ground-glass opacity overlying the bilateral lower lung zones is favored to be related to overlapping soft tissue. Xr Urogram W Or Wo Kub W Or Wo Tomogram    Result Date: 7/26/2020  EXAMINATION: ANTE PYELOGRAM NEPHROST 7/26/2020 8:13 am COMPARISON: None. Correlated with CT abdomen pelvis from earlier the same day. HISTORY: ORDERING SYSTEM PROVIDED HISTORY: Kidney stone TECHNOLOGIST PROVIDED HISTORY: Reason for exam:->kidney stone FINDINGS: Fluoroscopy time 4.1 seconds, Air Kerma 1.6 mGy. 4 fluoroscopic images were obtained. Fluoroscopic images show retrograde cannulation of the left ureter with placement of a double-J left ureteral stent. For additional details, please refer to the procedure report. Intraoperative fluoroscopy with placement of a double-J left ureteral stent.      LABS  Recent Labs     07/27/20  0506 07/28/20  0430 07/29/20  0511   WBC 31.6* 20.9* 15.5*   HGB 11.2* 11.3* 12.2   HCT 33.4* 31.5* 37.2   .8* 104.7* 101.4*    159 214     Recent Labs     07/27/20  0506 07/28/20  0430 07/29/20  0511    143 141   K 3.5 3.4* 3.3*   * 108* 100   CO2 21* 25 29   BUN 12 12 11   CREATININE 1.0 1.0 0.9   GFRAA >60 >60 >60   LABGLOM 55 55 >60   GLUCOSE 118* 108* 111*   PROT 5.5* 5.6* 6.3*   LABALBU 3.0* 2.8* 2.9*   CALCIUM 8.2* 8.4* 9.0   BILITOT 1.5* 0.8 0.8   ALKPHOS 93 114* 158*   AST 28 28 30   ALT 27 28 36*        Lab Results   Component Value Date    COVID19 Not Detected 07/26/2020     COVID-19/TERRELL-COV2 LABS  Recent Labs     07/26/20  1730 07/26/20  2320 07/27/20  0506 07/28/20  0430 07/29/20  0511   TROPONINI 0.05* 0.07*  --   --   --    AST  --   --  28 28 30   ALT  --   --  27 28 36*     Lab Results   Component Value Date    CHOL 169 02/08/2020    TRIG 386 02/08/2020    HDL 36 02/08/2020    LDLCALC 56 02/08/2020    LABVLDL 77 02/08/2020            Lab Results   Component Value Date    HEPAIGM Non-Reactive 05/31/2019    HEPBIGM Non-Reactive 05/31/2019    HCVABI Non-Reactive 05/31/2019     Hep C Ab Interp   Date Value Ref Range Status   05/31/2019 Non-Reactive NON REACT Final          MICROBIOLOGY:     Cultures :   Lab Results   Component Value Date    ORG Escherichia coli 07/26/2020    ORG Escherichia coli 07/26/2020    ORG Escherichia coli 07/26/2020    ORG Escherichia coli 07/26/2020     Lab Results   Component Value Date    BLOODCULT2 Previously positive blood culture called 07/26/2020    BLOODCULT2  07/26/2020     Refer to previous culture of CXBLD from 7-26-20 @ 0138 for  susceptibility results      ORG Escherichia coli 07/26/2020    ORG Escherichia coli 07/26/2020    ORG Escherichia coli 07/26/2020    ORG Escherichia coli 07/26/2020          Urine Culture, Routine   Date Value Ref Range Status   07/26/2020 >100,000 CFU/ml  Final   07/26/2020 <10,000 CFU/mL  Gram positive cocci   (A)  Final   07/26/2020 >100,000 CFU/ml  Final     MRSA Culture Only   Date Value Ref Range Status   07/26/2020 Methicillin resistant Staph aureus not isolated  Final       Patient is a 76 y.o. female who presented with   Chief Complaint   Patient presents with    Extremity Weakness     x 2 days; couldnt get off toilet tonight        FINAL IMPRESSION    SEPSIS  FEVERS/TACHYCARDIA  LEUKOCYTOSIS  E. COLI SEPSIS COMPLICATED UTI NEPHROLITHIASIS/HYDRONEPHROSIS  MRSA NEG         piperacillin-tazobactam (ZOSYN) 3.375 g in dextrose 5 % 50 mL IVPB extended infusion (mini-bag), Q8H     Change to ceftriaxone plan 2 weeks  Order picc      Imaging and labs were reviewed per medical records and any ID pertinent labs were addressed with the patient. The patient/FAMILY  was educated about the diagnosis, prognosis, indications, risks and benefits of treatment. An opportunity to ask questions was given to the patient/FAMILY and questions were answered. Thank you for involving me in the care of Ching Tubbs.  Please do not hesitate to call for any questions or concerns.          Electronically signed by Mandy Grimaldo MD on 7/29/2020 at 12:32 PM

## 2020-07-29 NOTE — PROGRESS NOTES
Pulmonary/Critical Care Progress Note    We are following patient for E. coli, Enterobacter urinary tract infection, bacteremia resulting in septic shock, now improved, diastolic dysfunction, obstructive sleep apnea, obstructive urinary stone requiring cystoscopy and stent, hypertension, low back pain, possible cor pulmonale, multiple sclerosis    SUBJECTIVE:  Patient is recovering very nicely. She continues to receive oral furosemide and is diuresing very well. She is being treated with ceftriaxone 2 g every 24 hours and will need 1 week of IV ceftriaxone at the time of discharge which should hopefully be soon.   Her blood pressure is under excellent control and her diet has returned to normal.  She will be transferred to a monitored floor once there is a bed available    MEDICATIONS:   metoprolol tartrate  50 mg Oral BID    [START ON 7/30/2020] furosemide  40 mg Oral Daily    lidocaine  5 mL Intradermal Once    heparin flush  1 mL Intravenous 2 times per day    cefTRIAXone (ROCEPHIN) IV  2 g Intravenous Q24H    potassium chloride  20 mEq Oral BID    hydrochlorothiazide  12.5 mg Oral Daily    losartan  50 mg Oral Daily    sodium chloride flush  10 mL Intravenous 2 times per day    enoxaparin  40 mg Subcutaneous Daily    vitamin C  1,000 mg Oral Daily    vitamin E  1,000 Units Oral Daily    fluticasone  1 spray Each Nostril Daily    senna  1 tablet Oral Daily    albuterol  2.5 mg Nebulization See Admin Instructions    cetirizine  10 mg Oral Daily    pantoprazole  40 mg Intravenous Daily       polyethylene glycol, sodium chloride flush, heparin flush, sodium chloride flush, acetaminophen, docusate sodium, dextromethorphan-guaiFENesin, HYDROcodone 5 mg - acetaminophen, pseudoephedrine, magnesium citrate, mupirocin, perflutren lipid microspheres      REVIEW OF SYSTEMS:  Constitutional: Denies fever, weight loss, night sweats, and fatigue  Skin: Denies pigmentation, dark lesions, and rashes   HEENT: Denies hearing loss, tinnitus, ear drainage, epistaxis, sore throat, and hoarseness. Cardiovascular: Denies palpitations, chest pain, and chest pressure. Respiratory: Denies cough, dyspnea at rest, hemoptysis, apnea, and choking. Gastrointestinal: Denies nausea, vomiting, poor appetite, diarrhea, heartburn or reflux  Genitourinary: Denies dysuria, frequency, urgency or hematuria  Musculoskeletal: Denies myalgias, muscle weakness, and bone pain  Neurological: Denies dizziness, vertigo, headache, and focal weakness  Psychological: Denies anxiety and depression  Endocrine: Denies heat intolerance and cold intolerance  Hematopoietic/Lymphatic: Denies bleeding problems and blood transfusions    OBJECTIVE:  Vitals:    07/29/20 1400   BP: 125/72   Pulse: 72   Resp: 20   Temp:    SpO2: 94%        O2 Flow Rate (L/min): 2 L/min  O2 Device: Nasal cannula    PHYSICAL EXAM:  Constitutional: No fever, chills, diaphoresis  Skin: Skin rash, no skin breakdown  HEENT: Unremarkable  Neck: JVD, lymphadenopathy, thyromegaly  Cardiovascular: S1, S2 normal.  No S3 murmurs rubs present  Respiratory: Auscultation clear bilaterally  Gastrointestinal: Obese soft, nontender  Genitourinary: No CVA tenderness  Extremities: No clubbing, cyanosis, or edema  Neurological: At baseline for multiple sclerosis. Moves all extremities  Psychological: In good spirits. Appropriate affect    LABS:  WBC   Date Value Ref Range Status   07/29/2020 15.5 (H) 4.5 - 11.5 E9/L Final   07/28/2020 20.9 (H) 4.5 - 11.5 E9/L Final   07/27/2020 31.6 (H) 4.5 - 11.5 E9/L Final     Hemoglobin   Date Value Ref Range Status   07/29/2020 12.2 11.5 - 15.5 g/dL Final   07/28/2020 11.3 (L) 11.5 - 15.5 g/dL Final   07/27/2020 11.2 (L) 11.5 - 15.5 g/dL Final     Hematocrit   Date Value Ref Range Status   07/29/2020 37.2 34.0 - 48.0 % Final     Comment:     Cold agglutinin suspected. The specimen was pre-treated prior to assay.    07/28/2020 31.5 (L) 34.0 - 48.0 % Final 07/27/2020 33.4 (L) 34.0 - 48.0 % Final     MCV   Date Value Ref Range Status   07/29/2020 101.4 (H) 80.0 - 99.9 fL Final   07/28/2020 104.7 (H) 80.0 - 99.9 fL Final   07/27/2020 101.8 (H) 80.0 - 99.9 fL Final     Platelets   Date Value Ref Range Status   07/29/2020 214 130 - 450 E9/L Final   07/28/2020 159 130 - 450 E9/L Final   07/27/2020 158 130 - 450 E9/L Final     Sodium   Date Value Ref Range Status   07/29/2020 141 132 - 146 mmol/L Final   07/28/2020 143 132 - 146 mmol/L Final   07/27/2020 145 132 - 146 mmol/L Final     Potassium   Date Value Ref Range Status   07/29/2020 3.3 (L) 3.5 - 5.0 mmol/L Final   07/28/2020 3.4 (L) 3.5 - 5.0 mmol/L Final   07/27/2020 3.5 3.5 - 5.0 mmol/L Final     Chloride   Date Value Ref Range Status   07/29/2020 100 98 - 107 mmol/L Final   07/28/2020 108 (H) 98 - 107 mmol/L Final   07/27/2020 113 (H) 98 - 107 mmol/L Final     CO2   Date Value Ref Range Status   07/29/2020 29 22 - 29 mmol/L Final   07/28/2020 25 22 - 29 mmol/L Final   07/27/2020 21 (L) 22 - 29 mmol/L Final     BUN   Date Value Ref Range Status   07/29/2020 11 8 - 23 mg/dL Final   07/28/2020 12 8 - 23 mg/dL Final   07/27/2020 12 8 - 23 mg/dL Final     CREATININE   Date Value Ref Range Status   07/29/2020 0.9 0.5 - 1.0 mg/dL Final   07/28/2020 1.0 0.5 - 1.0 mg/dL Final   07/27/2020 1.0 0.5 - 1.0 mg/dL Final     Glucose   Date Value Ref Range Status   07/29/2020 111 (H) 74 - 99 mg/dL Final   07/28/2020 108 (H) 74 - 99 mg/dL Final   07/27/2020 118 (H) 74 - 99 mg/dL Final   05/28/2011 96 70 - 110 mg/dL Final   05/27/2011 98 70 - 110 mg/dL Final   05/26/2011 135 (H) 70 - 110 mg/dL Final     Calcium   Date Value Ref Range Status   07/29/2020 9.0 8.6 - 10.2 mg/dL Final   07/28/2020 8.4 (L) 8.6 - 10.2 mg/dL Final   07/27/2020 8.2 (L) 8.6 - 10.2 mg/dL Final     Total Protein   Date Value Ref Range Status   07/29/2020 6.3 (L) 6.4 - 8.3 g/dL Final   07/28/2020 5.6 (L) 6.4 - 8.3 g/dL Final   07/27/2020 5.5 (L) 6.4 - 8.3 g/dL Final     Albumin   Date Value Ref Range Status   05/27/2011 4.0 3.2 - 4.8 g/dL Final     Alb   Date Value Ref Range Status   07/29/2020 2.9 (L) 3.5 - 5.2 g/dL Final   07/28/2020 2.8 (L) 3.5 - 5.2 g/dL Final   07/27/2020 3.0 (L) 3.5 - 5.2 g/dL Final     Total Bilirubin   Date Value Ref Range Status   07/29/2020 0.8 0.0 - 1.2 mg/dL Final   07/28/2020 0.8 0.0 - 1.2 mg/dL Final   07/27/2020 1.5 (H) 0.0 - 1.2 mg/dL Final     Alkaline Phosphatase   Date Value Ref Range Status   07/29/2020 158 (H) 35 - 104 U/L Final   07/28/2020 114 (H) 35 - 104 U/L Final   07/27/2020 93 35 - 104 U/L Final     AST   Date Value Ref Range Status   07/29/2020 30 0 - 31 U/L Final   07/28/2020 28 0 - 31 U/L Final   07/27/2020 28 0 - 31 U/L Final     ALT   Date Value Ref Range Status   07/29/2020 36 (H) 0 - 32 U/L Final   07/28/2020 28 0 - 32 U/L Final   07/27/2020 27 0 - 32 U/L Final     GFR Non-   Date Value Ref Range Status   07/29/2020 >60 >=60 mL/min/1.73 Final     Comment:     Chronic Kidney Disease: less than 60 ml/min/1.73 sq.m. Kidney Failure: less than 15 ml/min/1.73 sq.m. Results valid for patients 18 years and older. 07/28/2020 55 >=60 mL/min/1.73 Final     Comment:     Chronic Kidney Disease: less than 60 ml/min/1.73 sq.m. Kidney Failure: less than 15 ml/min/1.73 sq.m. Results valid for patients 18 years and older. 07/27/2020 55 >=60 mL/min/1.73 Final     Comment:     Chronic Kidney Disease: less than 60 ml/min/1.73 sq.m. Kidney Failure: less than 15 ml/min/1.73 sq.m. Results valid for patients 18 years and older.        GFR    Date Value Ref Range Status   07/29/2020 >60  Final   07/28/2020 >60  Final   07/27/2020 >60  Final     Magnesium   Date Value Ref Range Status   07/29/2020 1.8 1.6 - 2.6 mg/dL Final   07/26/2020 1.6 1.6 - 2.6 mg/dL Final     Phosphorus   Date Value Ref Range Status   07/29/2020 3.8 2.5 - 4.5 mg/dL Final   07/28/2020 2.7 2.5 - 4.5 mg/dL Final   07/26/2020 2.2 (L) 2.5 - 4.5 mg/dL Final     No results for input(s): PH, PO2, PCO2, HCO3, BE, O2SAT in the last 72 hours. RADIOLOGY:  XR CHEST PORTABLE   Final Result   Mild central vascular congestion, with bibasilar airspace disease and small   pleural effusions, which could represent pulmonary edema or bibasilar   pneumonia. XR UROGRAM W OR WO KUB W OR WO TOMOGRAM   Final Result   Intraoperative fluoroscopy with placement of a double-J left ureteral stent. CT ABDOMEN PELVIS WO CONTRAST   Final Result   1. Moderate left hydronephrosis and hydroureter related to a 4 mm distal   ureteral stone. 2. New large nonobstructing left renal calculi. 3. Otherwise unchanged incidental findings as described in the body of the   report. XR CHEST PORTABLE   Final Result   1. Low lung volumes with no definite acute abnormality identified. 2. Ground-glass opacity overlying the bilateral lower lung zones is favored   to be related to overlapping soft tissue. PROBLEM LIST:  Principal Problem:    Sepsis secondary to ureteral stone ([address]  Resolved Problems:    * No resolved hospital problems. *      IMPRESSION:  1. Septic shock, improved  2. E. coli, Enterobacter urinary tract infections with bacteremia  3. Multiple sclerosis  4. Acute kidney injury  5. Likely obstructive sleep apnea  6. Hypertension  7. Osteoarthritis  8. Status post removal of obstructing left ureteral stone via cystoscopy and stenting  9. Hypokalemia    PLAN:  1. Oral Lasix  2. Continue IV antibiotics  3. Transfer to monitored floor  4. Resume antihypertensive medications  5.  Potassium replacement    ATTESTATION:  ICU Staff Physician note of personal involvement in Care  As the attending physician, I certify that I personally reviewed the patients history and personally examined the patient to confirm the physical findings described above,  And that I reviewed the relevant imaging studies and available reports. I also discussed the differential diagnosis and all of the proposed management plans with the patient and individuals accompanying the patient to this visit. They had the opportunity to ask questions about the proposed management plans and to have those questions answered. This patient has a high probability of sudden, clinically significant deterioration, which requires the highest level of physician preparedness to intervene urgently. I managed/supervised life or organ supporting interventions that required frequent physician assessment. I devoted my full attention to the direct care of this patient for the amount of time indicated below. Time I spent with the family or surrogate(s) is included only if the patient was incapable of providing the necessary information or participating in medical decisions - Time devoted to teaching and to any procedures I billed separately is not included.     CRITICAL CARE TIME:  32 minutes    Electronically signed by Lee Gonzalez MD on 7/29/2020 at 2:32 PM

## 2020-07-30 VITALS
HEIGHT: 66 IN | DIASTOLIC BLOOD PRESSURE: 69 MMHG | TEMPERATURE: 98.7 F | BODY MASS INDEX: 30.15 KG/M2 | RESPIRATION RATE: 20 BRPM | OXYGEN SATURATION: 90 % | SYSTOLIC BLOOD PRESSURE: 139 MMHG | HEART RATE: 77 BPM | WEIGHT: 187.6 LBS

## 2020-07-30 LAB
ANION GAP SERPL CALCULATED.3IONS-SCNC: 13 MMOL/L (ref 7–16)
BUN BLDV-MCNC: 12 MG/DL (ref 8–23)
CALCIUM SERPL-MCNC: 9.2 MG/DL (ref 8.6–10.2)
CHLORIDE BLD-SCNC: 94 MMOL/L (ref 98–107)
CO2: 32 MMOL/L (ref 22–29)
CREAT SERPL-MCNC: 0.9 MG/DL (ref 0.5–1)
GFR AFRICAN AMERICAN: >60
GFR NON-AFRICAN AMERICAN: >60 ML/MIN/1.73
GLUCOSE BLD-MCNC: 153 MG/DL (ref 74–99)
MAGNESIUM: 1.7 MG/DL (ref 1.6–2.6)
POTASSIUM SERPL-SCNC: 3.3 MMOL/L (ref 3.5–5)
SARS-COV-2, NAAT: NOT DETECTED
SODIUM BLD-SCNC: 139 MMOL/L (ref 132–146)

## 2020-07-30 PROCEDURE — 6370000000 HC RX 637 (ALT 250 FOR IP): Performed by: INTERNAL MEDICINE

## 2020-07-30 PROCEDURE — 6370000000 HC RX 637 (ALT 250 FOR IP): Performed by: UROLOGY

## 2020-07-30 PROCEDURE — 36410 VNPNXR 3YR/> PHY/QHP DX/THER: CPT

## 2020-07-30 PROCEDURE — 2700000000 HC OXYGEN THERAPY PER DAY

## 2020-07-30 PROCEDURE — 05HC33Z INSERTION OF INFUSION DEVICE INTO LEFT BASILIC VEIN, PERCUTANEOUS APPROACH: ICD-10-PCS | Performed by: INTERNAL MEDICINE

## 2020-07-30 PROCEDURE — 2580000003 HC RX 258: Performed by: UROLOGY

## 2020-07-30 PROCEDURE — 6360000002 HC RX W HCPCS: Performed by: CLINICAL NURSE SPECIALIST

## 2020-07-30 PROCEDURE — 80048 BASIC METABOLIC PNL TOTAL CA: CPT

## 2020-07-30 PROCEDURE — 83735 ASSAY OF MAGNESIUM: CPT

## 2020-07-30 PROCEDURE — C9113 INJ PANTOPRAZOLE SODIUM, VIA: HCPCS | Performed by: NURSE PRACTITIONER

## 2020-07-30 PROCEDURE — U0002 COVID-19 LAB TEST NON-CDC: HCPCS

## 2020-07-30 PROCEDURE — 6360000002 HC RX W HCPCS: Performed by: UROLOGY

## 2020-07-30 PROCEDURE — C1751 CATH, INF, PER/CENT/MIDLINE: HCPCS

## 2020-07-30 PROCEDURE — 87040 BLOOD CULTURE FOR BACTERIA: CPT

## 2020-07-30 PROCEDURE — 6360000002 HC RX W HCPCS: Performed by: NURSE PRACTITIONER

## 2020-07-30 PROCEDURE — 76937 US GUIDE VASCULAR ACCESS: CPT

## 2020-07-30 RX ORDER — PSEUDOEPHEDRINE HCL 30 MG
100 TABLET ORAL 2 TIMES DAILY PRN
DISCHARGE
Start: 2020-07-30 | End: 2020-09-30

## 2020-07-30 RX ORDER — ACETAMINOPHEN 325 MG/1
650 TABLET ORAL EVERY 4 HOURS PRN
Qty: 120 TABLET | Refills: 3 | DISCHARGE
Start: 2020-07-30

## 2020-07-30 RX ORDER — FUROSEMIDE 40 MG/1
40 TABLET ORAL DAILY
Qty: 60 TABLET | Refills: 3 | DISCHARGE
Start: 2020-07-31 | End: 2020-08-21 | Stop reason: SDUPTHER

## 2020-07-30 RX ORDER — POTASSIUM CHLORIDE 20 MEQ/1
40 TABLET, EXTENDED RELEASE ORAL ONCE
Status: COMPLETED | OUTPATIENT
Start: 2020-07-30 | End: 2020-07-30

## 2020-07-30 RX ORDER — METOPROLOL TARTRATE 50 MG/1
50 TABLET, FILM COATED ORAL 2 TIMES DAILY
Qty: 60 TABLET | Refills: 3 | DISCHARGE
Start: 2020-07-30 | End: 2020-08-21 | Stop reason: SDUPTHER

## 2020-07-30 RX ORDER — HYDROCHLOROTHIAZIDE 12.5 MG/1
12.5 TABLET ORAL DAILY
Qty: 30 TABLET | Refills: 3 | DISCHARGE
Start: 2020-07-31 | End: 2020-08-21 | Stop reason: ALTCHOICE

## 2020-07-30 RX ORDER — HYDROCODONE BITARTRATE AND ACETAMINOPHEN 5; 325 MG/1; MG/1
1 TABLET ORAL EVERY 4 HOURS PRN
Refills: 0 | COMMUNITY
Start: 2020-07-30 | End: 2020-08-21

## 2020-07-30 RX ORDER — PSEUDOEPHEDRINE HCL 60 MG/1
60 TABLET ORAL EVERY 8 HOURS PRN
DISCHARGE
Start: 2020-07-30 | End: 2020-08-21

## 2020-07-30 RX ORDER — ALBUTEROL SULFATE 2.5 MG/3ML
2.5 SOLUTION RESPIRATORY (INHALATION) SEE ADMIN INSTRUCTIONS
Qty: 120 EACH | Refills: 3 | DISCHARGE
Start: 2020-07-30 | End: 2022-02-07 | Stop reason: SDUPTHER

## 2020-07-30 RX ORDER — CETIRIZINE HYDROCHLORIDE 10 MG/1
10 TABLET ORAL DAILY
Status: ON HOLD | DISCHARGE
Start: 2020-07-31 | End: 2020-08-07

## 2020-07-30 RX ORDER — POTASSIUM CHLORIDE 20 MEQ/1
20 TABLET, EXTENDED RELEASE ORAL 2 TIMES DAILY
Qty: 60 TABLET | Refills: 3 | DISCHARGE
Start: 2020-07-30 | End: 2020-08-21 | Stop reason: DRUGHIGH

## 2020-07-30 RX ORDER — PANTOPRAZOLE SODIUM 40 MG/10ML
40 INJECTION, POWDER, LYOPHILIZED, FOR SOLUTION INTRAVENOUS DAILY
DISCHARGE
Start: 2020-07-31 | End: 2020-08-21

## 2020-07-30 RX ORDER — LOSARTAN POTASSIUM 50 MG/1
50 TABLET ORAL DAILY
Qty: 30 TABLET | Refills: 3 | DISCHARGE
Start: 2020-07-31 | End: 2021-10-13 | Stop reason: SDUPTHER

## 2020-07-30 RX ORDER — POLYETHYLENE GLYCOL 3350 17 G/17G
17 POWDER, FOR SOLUTION ORAL DAILY PRN
Qty: 527 G | Refills: 1 | DISCHARGE
Start: 2020-07-30 | End: 2020-08-21 | Stop reason: SDUPTHER

## 2020-07-30 RX ADMIN — POTASSIUM CHLORIDE 40 MEQ: 20 TABLET, EXTENDED RELEASE ORAL at 12:23

## 2020-07-30 RX ADMIN — Medication 1000 UNITS: at 09:44

## 2020-07-30 RX ADMIN — HYDROCHLOROTHIAZIDE 12.5 MG: 12.5 TABLET ORAL at 09:45

## 2020-07-30 RX ADMIN — POTASSIUM CHLORIDE 20 MEQ: 20 TABLET, EXTENDED RELEASE ORAL at 09:44

## 2020-07-30 RX ADMIN — ENOXAPARIN SODIUM 40 MG: 40 INJECTION SUBCUTANEOUS at 09:44

## 2020-07-30 RX ADMIN — FUROSEMIDE 40 MG: 40 TABLET ORAL at 09:45

## 2020-07-30 RX ADMIN — SENNOSIDES 8.6 MG: 8.6 TABLET, FILM COATED ORAL at 09:44

## 2020-07-30 RX ADMIN — METOPROLOL TARTRATE 50 MG: 50 TABLET, FILM COATED ORAL at 09:44

## 2020-07-30 RX ADMIN — LOSARTAN POTASSIUM 50 MG: 50 TABLET, FILM COATED ORAL at 09:45

## 2020-07-30 RX ADMIN — Medication 10 ML: at 09:45

## 2020-07-30 RX ADMIN — OXYCODONE HYDROCHLORIDE AND ACETAMINOPHEN 1000 MG: 500 TABLET ORAL at 09:44

## 2020-07-30 RX ADMIN — FLUTICASONE PROPIONATE 1 SPRAY: 50 SPRAY, METERED NASAL at 09:44

## 2020-07-30 RX ADMIN — CETIRIZINE HYDROCHLORIDE 10 MG: 10 TABLET, FILM COATED ORAL at 09:45

## 2020-07-30 RX ADMIN — HYDROCODONE BITARTRATE AND ACETAMINOPHEN 1 TABLET: 5; 325 TABLET ORAL at 07:37

## 2020-07-30 RX ADMIN — PSEUDOEPHEDRINE HYDROCHLORIDE 60 MG: 60 TABLET ORAL at 09:45

## 2020-07-30 RX ADMIN — DOCUSATE SODIUM 100 MG: 100 CAPSULE, LIQUID FILLED ORAL at 09:44

## 2020-07-30 RX ADMIN — SODIUM CHLORIDE, PRESERVATIVE FREE 100 UNITS: 5 INJECTION INTRAVENOUS at 09:46

## 2020-07-30 RX ADMIN — PANTOPRAZOLE SODIUM 40 MG: 40 INJECTION, POWDER, FOR SOLUTION INTRAVENOUS at 09:44

## 2020-07-30 ASSESSMENT — PAIN SCALES - GENERAL
PAINLEVEL_OUTOF10: 5
PAINLEVEL_OUTOF10: 0
PAINLEVEL_OUTOF10: 0
PAINLEVEL_OUTOF10: 2
PAINLEVEL_OUTOF10: 0
PAINLEVEL_OUTOF10: 0

## 2020-07-30 ASSESSMENT — PAIN DESCRIPTION - ONSET: ONSET: GRADUAL

## 2020-07-30 ASSESSMENT — PAIN DESCRIPTION - LOCATION
LOCATION: HEAD
LOCATION: HEAD

## 2020-07-30 ASSESSMENT — PAIN DESCRIPTION - DESCRIPTORS: DESCRIPTORS: DISCOMFORT;HEADACHE

## 2020-07-30 ASSESSMENT — PAIN DESCRIPTION - PAIN TYPE
TYPE: ACUTE PAIN
TYPE: ACUTE PAIN

## 2020-07-30 NOTE — PROGRESS NOTES
[facility] called for nurse to nurse report to Darcy Briones. [phone]. Will call back and give Covid results of todays test.  [facility] called and wii be picking up at 1430 to transport to [place].    Pt made aware of arrangements

## 2020-07-30 NOTE — PROCEDURES
Power midline Placement 7/30/2020    Product number: [id number]   Lot Number: [id number]      Ultrasound: YES   Anatomy; iv placement site: left basilic vein                Upper Arm Circumference: 33cm    Size: 4.5fr    Exposed Length: 3cm    Internal Length: 12cm   Cut: 0cm   Vein Measurement: 0.58cm    Inserted power midline without difficulty,brisk blood return noted.      Kemp Ahumada  7/30/2020  9:06 AM

## 2020-07-30 NOTE — PROGRESS NOTES
Subjective:     CO generalized weakness for the last few days, worse with activity, better with resting, associated GUTIERRES, leg edema, no fever, chills or CP    PMH,Social Hx and Family Hx : Reviewed; no changes from initial note      Review of Systems    All other systems reviewed and are otherwise negative. Objective:   Data Review:  Vital Signs: BP (!) 151/82   Pulse 77   Temp 99 °F (37.2 °C) (Oral)   Resp 18   Ht 5' 6\" (1.676 m)   Wt 187 lb 9.6 oz (85.1 kg)   SpO2 94%   BMI 30.28 kg/m²     24 Hour I&O Review:     Intake/Output Summary (Last 24 hours) at 7/30/2020 0841  Last data filed at 7/30/2020 0544  Gross per 24 hour   Intake 740 ml   Output 4300 ml   Net -3560 ml       Physical Exam   VS: reviewed, trend noted  Constitutional: Alert and oriented. HENT: No oropharyngeal exudate. No ulceration . NC AT   Eyes: Pupils are equal, round, and reactive to light. Neck: Supple, No LAP. No tracheal deviation present. Cardiovascular: Normal rate, regular rhythm, S1&S2    Pulmonary/Chest:  No wheezes, + bibasilar crackles   Abdominal: Soft. Bowel sounds are normal, obese, no tenderness to palpation. Musculoskeletal: Normal range of motion. no tenderness.    Neurological: Non focal. normal speech  Extremities:1+ edema BL LE no clubbing or cyanosis  Skin: warm and dry  PSY: no depression, normal affect      Continuous Infusions:      Current Medications: Current Facility-Administered Medications: metoprolol tartrate (LOPRESSOR) tablet 50 mg, 50 mg, Oral, BID  polyethylene glycol (GLYCOLAX) packet 17 g, 17 g, Oral, Daily PRN  furosemide (LASIX) tablet 40 mg, 40 mg, Oral, Daily  lidocaine 1 % injection 5 mL, 5 mL, Intradermal, Once  sodium chloride flush 0.9 % injection 10 mL, 10 mL, Intravenous, PRN  heparin flush 100 UNIT/ML injection 100 Units, 1 mL, Intravenous, 2 times per day  heparin flush 100 UNIT/ML injection 100 Units, 1 mL, Intracatheter, PRN  cefTRIAXone (ROCEPHIN) 2 g in sterile water 20 mL IV syringe, 2 g, Intravenous, Q24H  potassium chloride (KLOR-CON M) extended release tablet 20 mEq, 20 mEq, Oral, BID  hydrochlorothiazide (HYDRODIURIL) tablet 12.5 mg, 12.5 mg, Oral, Daily  losartan (COZAAR) tablet 50 mg, 50 mg, Oral, Daily  sodium chloride flush 0.9 % injection 10 mL, 10 mL, Intravenous, 2 times per day  sodium chloride flush 0.9 % injection 10 mL, 10 mL, Intravenous, PRN  acetaminophen (TYLENOL) tablet 650 mg, 650 mg, Oral, Q4H PRN  enoxaparin (LOVENOX) injection 40 mg, 40 mg, Subcutaneous, Daily  vitamin C (ASCORBIC ACID) tablet 1,000 mg, 1,000 mg, Oral, Daily  vitamin E capsule 1,000 Units, 1,000 Units, Oral, Daily  fluticasone (FLONASE) 50 MCG/ACT nasal spray 1 spray, 1 spray, Each Nostril, Daily  senna (SENOKOT) tablet 8.6 mg, 1 tablet, Oral, Daily  docusate sodium (COLACE) capsule 100 mg, 100 mg, Oral, BID PRN  albuterol (PROVENTIL) nebulizer solution 2.5 mg, 2.5 mg, Nebulization, See Admin Instructions  dextromethorphan-guaiFENesin (MUCINEX DM)  MG per extended release tablet 1 tablet, 1 tablet, Oral, Q12H PRN  HYDROcodone-acetaminophen (NORCO) 5-325 MG per tablet 1 tablet, 1 tablet, Oral, Q4H PRN  cetirizine (ZYRTEC) tablet 10 mg, 10 mg, Oral, Daily  pseudoephedrine (SUDAFED) tablet 60 mg, 60 mg, Oral, Q8H PRN  Magnesium Citrate solution 296 mL, 296 mL, Oral, Daily PRN  mupirocin (BACTROBAN) 2 % ointment, , Topical, Daily PRN  perflutren lipid microspheres (DEFINITY) injection 1.65 mg, 1.5 mL, Intravenous, ONCE PRN  pantoprazole (PROTONIX) injection 40 mg, 40 mg, Intravenous, Daily    Ventilator Settings:Vent Information  SpO2: 94 %   CBC:  Recent Labs     07/28/20  0430 07/29/20  0511   WBC 20.9* 15.5*   RBC 3.01* 3.67   HGB 11.3* 12.2   HCT 31.5* 37.2    214   .7* 101.4*   MCH 37.5* 33.2   MCHC 35.9* 32.8   RDW 14.2 13.4      BMP:  Recent Labs     07/28/20  0430 07/29/20  0511    141   K 3.4* 3.3*   * 100   CO2 25 29   BUN 12 11   CREATININE 1.0 0.9   CALCIUM 8. 4* 9.0   GLUCOSE 108* 111*      ABG:  Lab Results   Component Value Date    PH 7.414 05/27/2011    PCO2 37.0 05/27/2011    PO2 304.6 05/27/2011    HCO3 23.1 05/27/2011       Cultures:  No results for input(s): BC in the last 72 hours. No results for input(s): Norlene Loges in the last 72 hours. No results for input(s): CULTRESP in the last 72 hours. Radiology Review:  Pertinent images / reports were reviewed as a part of this visit. XR CHEST PORTABLE   Final Result   Mild central vascular congestion, with bibasilar airspace disease and small   pleural effusions, which could represent pulmonary edema or bibasilar   pneumonia. XR UROGRAM W OR WO KUB W OR WO TOMOGRAM   Final Result   Intraoperative fluoroscopy with placement of a double-J left ureteral stent. CT ABDOMEN PELVIS WO CONTRAST   Final Result   1. Moderate left hydronephrosis and hydroureter related to a 4 mm distal   ureteral stone. 2. New large nonobstructing left renal calculi. 3. Otherwise unchanged incidental findings as described in the body of the   report. XR CHEST PORTABLE   Final Result   1. Low lung volumes with no definite acute abnormality identified. 2. Ground-glass opacity overlying the bilateral lower lung zones is favored   to be related to overlapping soft tissue.              Other Diagnostic Testing:      Assessment:   Severe sepsis without shock, improved off pressors now  Ecoli bacteremia  UTI complicated by ureteral stone sp stenting, without hematuria  Hypoxemia on NC o2  Basilar atelectasis  Hypokalemia  Hypomagnesemia    Plan:     Continue diuresis , monitor UO, RFP, replace electrolytes as needed  O2 to keep sat> 90%  IS q awake hour  abx per ID plan for 2w of rocephin  PT/OT    Discussed with RN re management        Electronically signed by Lyn Brooks MD on 7/30/2020 at 8:58 AM

## 2020-07-30 NOTE — CARE COORDINATION
7/30/2020 Negative Covid (7/26)  [facility] requested new covid testing. Dry Covid sent 7/30- noted. [facility] nurse to nurse:  [facility] [phone]. Ambulance form completed and sent to icu via tube system. Prince Santoro called nurse to nurse. Waiting on return call from [facility] with [facility] for discharge acceptance time. [facility].   Electronically signed by BRIGETTE Paige on 7/30/2020 at 10:31 AM

## 2020-07-30 NOTE — PLAN OF CARE
Problem: Falls - Risk of:  Goal: Will remain free from falls  Description: Will remain free from falls  7/30/2020 0543 by Silvino Nicole RN  Outcome: Met This Shift     Problem: Falls - Risk of:  Goal: Absence of physical injury  Description: Absence of physical injury  7/30/2020 0543 by Silvino Nicole RN  Outcome: Met This Shift     Problem: Pain:  Goal: Control of acute pain  Description: Control of acute pain  7/30/2020 0543 by Silvino Nicole RN  Outcome: Met This Shift

## 2020-07-30 NOTE — PLAN OF CARE
Problem: Falls - Risk of:  Goal: Will remain free from falls  Description: Will remain free from falls  7/29/2020 2130 by Chanda Brar RN  Outcome: Met This Shift  7/29/2020 1905 by Jeanne Greco RN  Outcome: Met This Shift     Problem: Falls - Risk of:  Goal: Absence of physical injury  Description: Absence of physical injury  Outcome: Met This Shift     Problem: Pain:  Goal: Pain level will decrease  Description: Pain level will decrease  Outcome: Met This Shift     Problem: Pain:  Goal: Control of acute pain  Description: Control of acute pain  7/29/2020 2130 by Chanda Brar RN  Outcome: Met This Shift  7/29/2020 1905 by Jeanne Greco RN  Outcome: Met This Shift     Problem: Pain:  Goal: Control of chronic pain  Description: Control of chronic pain  Outcome: Met This Shift     Problem: Skin Integrity:  Goal: Will show no infection signs and symptoms  Description: Will show no infection signs and symptoms  Outcome: Met This Shift     Problem: Skin Integrity:  Goal: Absence of new skin breakdown  Description: Absence of new skin breakdown  7/29/2020 2130 by Chanda Brar RN  Outcome: Met This Shift  7/29/2020 1905 by Jeanne Greco RN  Outcome: Met This Shift

## 2020-07-30 NOTE — PROGRESS NOTES
[facility]  [facility]  Progress Note    NAME:Nettie Forman  1955  DATE OF SERVICE:20    Pt was seen FACE TO FACE FOR E COLI SEPSIS    SUBJECTIVE:  Chief Complaint   Patient presents with    Extremity Weakness     x 2 days; couldnt get off toilet tonight     nad   No f/c  Patient is tolerating medications. No reported adverse drug reactions. Review of systems:  As stated above in the chief complaint, otherwise negative. Medications:  Scheduled Meds:   potassium chloride  40 mEq Oral Once    metoprolol tartrate  50 mg Oral BID    furosemide  40 mg Oral Daily    lidocaine  5 mL Intradermal Once    heparin flush  1 mL Intravenous 2 times per day    cefTRIAXone (ROCEPHIN) IV  2 g Intravenous Q24H    potassium chloride  20 mEq Oral BID    hydrochlorothiazide  12.5 mg Oral Daily    losartan  50 mg Oral Daily    sodium chloride flush  10 mL Intravenous 2 times per day    enoxaparin  40 mg Subcutaneous Daily    vitamin C  1,000 mg Oral Daily    vitamin E  1,000 Units Oral Daily    fluticasone  1 spray Each Nostril Daily    senna  1 tablet Oral Daily    albuterol  2.5 mg Nebulization See Admin Instructions    cetirizine  10 mg Oral Daily    pantoprazole  40 mg Intravenous Daily     Continuous Infusions:  PRN Meds:polyethylene glycol, sodium chloride flush, heparin flush, sodium chloride flush, acetaminophen, docusate sodium, dextromethorphan-guaiFENesin, HYDROcodone 5 mg - acetaminophen, pseudoephedrine, magnesium citrate, mupirocin, perflutren lipid microspheres    OBJECTIVE:  /69   Pulse 77   Temp 98.7 °F (37.1 °C) (Oral)   Resp 20   Ht 5' 6\" (1.676 m)   Wt 187 lb 9.6 oz (85.1 kg)   SpO2 90%   BMI 30.28 kg/m²   Temp  Av.9 °F (37.2 °C)  Min: 98.7 °F (37.1 °C)  Max: 99 °F (37.2 °C)  Constitutional:  The patient is awake, alert, and oriented. INC BMI  Skin:    Warm and dry. No rashes were noted. HEENT:   Round and reactive pupils. AT/NC  Neck:    Supple to movements. Chest:   No use of accessory muscles to breathe. Symmetrical expansion. Cardiovascular:  S1 and S2 are rhythmic and regular. No murmurs appreciated. Abdomen:   Positive bowel sounds to auscultation. Benign to palpation. Extremities:   No clubbing, no cyanosis, no edema. CNS    AAxO   Lines: MIDLINE 7/30    Radiology:  Laboratory and Tests Review:  Lab Results   Component Value Date    WBC 15.5 (H) 07/29/2020    WBC 20.9 (H) 07/28/2020    WBC 31.6 (H) 07/27/2020    HGB 12.2 07/29/2020    HCT 37.2 07/29/2020    .4 (H) 07/29/2020     07/29/2020     No results found for: CRPHS  Lab Results   Component Value Date    ALT 36 (H) 07/29/2020    AST 30 07/29/2020    ALKPHOS 158 (H) 07/29/2020    BILITOT 0.8 07/29/2020     Lab Results   Component Value Date     07/30/2020    K 3.3 07/30/2020    CL 94 07/30/2020    CO2 32 07/30/2020    BUN 12 07/30/2020    CREATININE 0.9 07/30/2020    CREATININE 0.9 07/29/2020    CREATININE 1.0 07/28/2020    GFRAA >60 07/30/2020    LABGLOM >60 07/30/2020    GLUCOSE 153 07/30/2020    GLUCOSE 96 05/28/2011    PROT 6.3 07/29/2020    LABALBU 2.9 07/29/2020    LABALBU 4.0 05/27/2011    CALCIUM 9.2 07/30/2020    BILITOT 0.8 07/29/2020    ALKPHOS 158 07/29/2020    AST 30 07/29/2020    ALT 36 07/29/2020     No results found for: CRP  No results found for: [address]    Microbiology:   No results for input(s): COVID19 in the last 72 hours.   Lab Results   Component Value Date    BLOODCULT2 Previously positive blood culture called 07/26/2020    BLOODCULT2  07/26/2020     Refer to previous culture of CXBLD from 7-26-20 @ 0138 for  susceptibility results      ORG Escherichia coli 07/26/2020    ORG Escherichia coli 07/26/2020    ORG Escherichia coli 07/26/2020    ORG Escherichia coli 07/26/2020          MRSA Culture Only   Date Value Ref Range Status   07/26/2020 Methicillin resistant Staph aureus not isolated  Final ASSESSMENT/PLAN:  SEPSIS  FEVERS/TACHYCARDIA  LEUKOCYTOSIS  E. COLI SEPSIS COMPLICATED UTI NEPHROLITHIASIS/HYDRONEPHROSIS  MRSA NEG             Change to ceftriaxone plan 2 weeks  Order picc      · Monitor labs    Imaging and labs were reviewed per medical records. The patient was educated about the diagnosis, prognosis, indications, risks and benefits of treatment. An opportunity to ask questions was given to the patient/FAMILY. Thank you for involving me in the care of Cordell Alexis will continue to follow. Please do not hesitate to call for any questions or concerns.     Electronically signed by Alec Chairez MD on 7/30/2020 at 10:25 AM

## 2020-07-31 NOTE — DISCHARGE SUMMARY
the  hospital to the intensive coronary care unit. PAST MEDICAL HISTORY:  Positive for history of asthma, history of fall,  essential hypertension, multiple sclerosis, urinary tract infection. PHYSICAL EXAMINATION:  VITAL SIGNS:  Showed blood pressure to be 123/89, pulse 118,  respirations 15. Afebrile. GENERAL APPEARANCE:  This is a 58-year-old white female. HEENT:  Normal.  NECK:  With adequate carotid upstrokes without bruits. Trachea midline. Thyroid not palpable. LUNGS:  Coarse. HEART:  Fairly regular. ABDOMEN:  Soft. Obrien catheter was in place. EXTREMITIES:  Revealed trace to mild edema. While the patient was in the hospital, she had the following laboratory  studies performed:  COVID test was negative. Electrolytes were  satisfactory. Potassium was 3.9. Remaining lab work included  microbiology testing, including positive blood culture for E. coli. BUN  and creatinine were 13 and 1.0 respectively. Magnesium was normal.   Glucose was satisfactory. CT of the abdomen and pelvis showed moderate  left hydronephrosis with hydroureter, related to 4-mm ureteral stone. CXR showed low volume with ground-glass opacity in the lower lung  zone, without any soft tissue. For further lab, please refer to chart. HOSPITAL COURSE OF STAY:  The patient was admitted directly to the  hospital to the intensive coronary care unit. After the surgical  procedure, because of hypotension, the patient was treated appropriately,  Vasopressor was weaned off. The patient clinically improved. The patient's lab work data at this time improved and did require  additional rehab. Arrangements were made, and the patient was  transferred to [facility] for rehab. At the time of discharge on  07/30, her blood pressure was 139/69, pulse 77, respirations 20. She  was afebrile. The patient was transferred by ambulance. Chest x-ray  was improved at this time.     More than 40 minutes were spent on the day

## 2020-08-03 ENCOUNTER — PREP FOR PROCEDURE (OUTPATIENT)
Dept: UROLOGY | Age: 65
End: 2020-08-03

## 2020-08-03 RX ORDER — CIPROFLOXACIN 2 MG/ML
400 INJECTION, SOLUTION INTRAVENOUS
Status: CANCELLED | OUTPATIENT
Start: 2020-08-03 | End: 2020-08-03

## 2020-08-03 RX ORDER — SODIUM CHLORIDE 0.9 % (FLUSH) 0.9 %
10 SYRINGE (ML) INJECTION EVERY 12 HOURS SCHEDULED
Status: CANCELLED | OUTPATIENT
Start: 2020-08-03

## 2020-08-03 RX ORDER — SODIUM CHLORIDE 9 MG/ML
INJECTION, SOLUTION INTRAVENOUS CONTINUOUS
Status: CANCELLED | OUTPATIENT
Start: 2020-08-03

## 2020-08-03 RX ORDER — SODIUM CHLORIDE 0.9 % (FLUSH) 0.9 %
10 SYRINGE (ML) INJECTION PRN
Status: CANCELLED | OUTPATIENT
Start: 2020-08-03

## 2020-08-04 LAB
BLOOD CULTURE, ROUTINE: NORMAL
CULTURE, BLOOD 2: NORMAL

## 2020-08-05 RX ORDER — VITAMIN B COMPLEX
1 CAPSULE ORAL DAILY
COMMUNITY

## 2020-08-05 RX ORDER — LANOLIN ALCOHOL/MO/W.PET/CERES
5 CREAM (GRAM) TOPICAL NIGHTLY PRN
COMMUNITY
End: 2020-08-21 | Stop reason: SDUPTHER

## 2020-08-05 RX ORDER — SENNA AND DOCUSATE SODIUM 50; 8.6 MG/1; MG/1
1 TABLET, FILM COATED ORAL DAILY
COMMUNITY
End: 2021-09-28 | Stop reason: SDUPTHER

## 2020-08-05 RX ORDER — LORATADINE 10 MG/1
10 CAPSULE, LIQUID FILLED ORAL 2 TIMES DAILY
COMMUNITY
End: 2020-10-16 | Stop reason: SDUPTHER

## 2020-08-05 NOTE — PROGRESS NOTES
Pre Procedure Arrival Instructions    Pre Admission Testing Department   Phone [phone] Fax [phone] Date: 8/7/20   Arrival Time:  0800 to [facility] [employer] Instructions:    [x] Nothing by mouth after midnight, including gum, candy, mints, or water  [x] You may brush your teeth, but do not swallow any water  [x] Take medications as instructed with 1-2 oz of water   Metoprolol,pantoprazole. Use albuterol am of sorgery    [x] Stop herbal supplements and vitamins 5 days prior to procedure last dose 8/5/20  [x] Please verify preop dosing of blood thinners with your physician. Check last dose of lovenox with Dr. Howard[] Do not take Insulin or oral diabetic medications  [] If diabetic and have low blood sugar or feel symptomatic take 1-2 oz apple juice or glucose tablets  [x] Antibacterial soap shower or bath Am of surgery, no lotion, poweders, or creams to surgical site  [] Shower with antibacterial soap and use CHG wipes provided the evening before surgery as instructed  [] Follow bowel prep as instructed per surgeon  [x] No smoking, alcohol use, or illegal drug use within 24 hours of surgery  [] Jewelry, body piercings, eyeglasses, contact lenses, and dentures are not permitted into surgery (bring cases)  [x] Please do not wear any nail polish or makeup on the day of surgery  [x] If surgeon requests a time change you will be notified the day prior to surgery  [] An authorized [address] [place] [address] must remain with the patient during their stay if they are mentally handicapped, have dementia, are disoriented, unable to use a call light, or would be a safety concern if left unattended.   [x] Other instructions:mask required

## 2020-08-06 NOTE — PROGRESS NOTES
[facility] faxed the covid result which was done 7/30. It is negative. Placed on chart. Facility unable to have her here for 0800 appt. They can come at 0830.

## 2020-08-07 ENCOUNTER — APPOINTMENT (OUTPATIENT)
Dept: GENERAL RADIOLOGY | Age: 65
End: 2020-08-07
Attending: UROLOGY
Payer: COMMERCIAL

## 2020-08-07 ENCOUNTER — ANESTHESIA EVENT (OUTPATIENT)
Dept: OPERATING ROOM | Age: 65
End: 2020-08-07
Payer: COMMERCIAL

## 2020-08-07 ENCOUNTER — HOSPITAL ENCOUNTER (OUTPATIENT)
Age: 65
Setting detail: OUTPATIENT SURGERY
Discharge: OTHER INSTITUTION | End: 2020-08-07
Attending: UROLOGY | Admitting: UROLOGY
Payer: COMMERCIAL

## 2020-08-07 ENCOUNTER — ANESTHESIA (OUTPATIENT)
Dept: OPERATING ROOM | Age: 65
End: 2020-08-07
Payer: COMMERCIAL

## 2020-08-07 VITALS
OXYGEN SATURATION: 93 % | SYSTOLIC BLOOD PRESSURE: 118 MMHG | HEART RATE: 70 BPM | TEMPERATURE: 98 F | HEIGHT: 66 IN | DIASTOLIC BLOOD PRESSURE: 64 MMHG | BODY MASS INDEX: 30.05 KG/M2 | WEIGHT: 187 LBS | RESPIRATION RATE: 18 BRPM

## 2020-08-07 VITALS — OXYGEN SATURATION: 100 % | TEMPERATURE: 98.6 F | DIASTOLIC BLOOD PRESSURE: 62 MMHG | SYSTOLIC BLOOD PRESSURE: 108 MMHG

## 2020-08-07 PROCEDURE — C2617 STENT, NON-COR, TEM W/O DEL: HCPCS | Performed by: UROLOGY

## 2020-08-07 PROCEDURE — 7100000011 HC PHASE II RECOVERY - ADDTL 15 MIN: Performed by: UROLOGY

## 2020-08-07 PROCEDURE — 2580000003 HC RX 258: Performed by: NURSE PRACTITIONER

## 2020-08-07 PROCEDURE — 3600000004 HC SURGERY LEVEL 4 BASE: Performed by: UROLOGY

## 2020-08-07 PROCEDURE — 74420 UROGRAPHY RTRGR +-KUB: CPT

## 2020-08-07 PROCEDURE — 6360000002 HC RX W HCPCS: Performed by: NURSE PRACTITIONER

## 2020-08-07 PROCEDURE — 2709999900 HC NON-CHARGEABLE SUPPLY: Performed by: UROLOGY

## 2020-08-07 PROCEDURE — 3700000000 HC ANESTHESIA ATTENDED CARE: Performed by: UROLOGY

## 2020-08-07 PROCEDURE — 2580000003 HC RX 258: Performed by: NURSE ANESTHETIST, CERTIFIED REGISTERED

## 2020-08-07 PROCEDURE — 6360000002 HC RX W HCPCS: Performed by: NURSE ANESTHETIST, CERTIFIED REGISTERED

## 2020-08-07 PROCEDURE — 3600000014 HC SURGERY LEVEL 4 ADDTL 15MIN: Performed by: UROLOGY

## 2020-08-07 PROCEDURE — 3700000001 HC ADD 15 MINUTES (ANESTHESIA): Performed by: UROLOGY

## 2020-08-07 PROCEDURE — 7100000010 HC PHASE II RECOVERY - FIRST 15 MIN: Performed by: UROLOGY

## 2020-08-07 DEVICE — URETERAL STENT
Type: IMPLANTABLE DEVICE | Site: URETER | Status: FUNCTIONAL
Brand: PERCUFLEX™

## 2020-08-07 RX ORDER — SODIUM CHLORIDE 9 MG/ML
INJECTION, SOLUTION INTRAVENOUS CONTINUOUS PRN
Status: DISCONTINUED | OUTPATIENT
Start: 2020-08-07 | End: 2020-08-07 | Stop reason: SDUPTHER

## 2020-08-07 RX ORDER — CIPROFLOXACIN 500 MG/1
500 TABLET, FILM COATED ORAL 2 TIMES DAILY
Qty: 10 TABLET | Refills: 0 | Status: SHIPPED | OUTPATIENT
Start: 2020-08-07 | End: 2020-08-12

## 2020-08-07 RX ORDER — PROPOFOL 10 MG/ML
INJECTION, EMULSION INTRAVENOUS CONTINUOUS PRN
Status: DISCONTINUED | OUTPATIENT
Start: 2020-08-07 | End: 2020-08-07 | Stop reason: SDUPTHER

## 2020-08-07 RX ORDER — MIDAZOLAM HYDROCHLORIDE 1 MG/ML
INJECTION INTRAMUSCULAR; INTRAVENOUS PRN
Status: DISCONTINUED | OUTPATIENT
Start: 2020-08-07 | End: 2020-08-07 | Stop reason: SDUPTHER

## 2020-08-07 RX ORDER — FENTANYL CITRATE 50 UG/ML
INJECTION, SOLUTION INTRAMUSCULAR; INTRAVENOUS PRN
Status: DISCONTINUED | OUTPATIENT
Start: 2020-08-07 | End: 2020-08-07 | Stop reason: SDUPTHER

## 2020-08-07 RX ORDER — SODIUM CHLORIDE 0.9 % (FLUSH) 0.9 %
10 SYRINGE (ML) INJECTION PRN
Status: DISCONTINUED | OUTPATIENT
Start: 2020-08-07 | End: 2020-08-07 | Stop reason: HOSPADM

## 2020-08-07 RX ORDER — ONDANSETRON 2 MG/ML
INJECTION INTRAMUSCULAR; INTRAVENOUS PRN
Status: DISCONTINUED | OUTPATIENT
Start: 2020-08-07 | End: 2020-08-07 | Stop reason: SDUPTHER

## 2020-08-07 RX ORDER — OXYCODONE HYDROCHLORIDE AND ACETAMINOPHEN 5; 325 MG/1; MG/1
1 TABLET ORAL EVERY 4 HOURS PRN
Qty: 10 TABLET | Refills: 0 | Status: SHIPPED | OUTPATIENT
Start: 2020-08-07 | End: 2020-08-14

## 2020-08-07 RX ORDER — CIPROFLOXACIN 2 MG/ML
400 INJECTION, SOLUTION INTRAVENOUS
Status: COMPLETED | OUTPATIENT
Start: 2020-08-07 | End: 2020-08-07

## 2020-08-07 RX ORDER — SODIUM CHLORIDE 0.9 % (FLUSH) 0.9 %
10 SYRINGE (ML) INJECTION EVERY 12 HOURS SCHEDULED
Status: DISCONTINUED | OUTPATIENT
Start: 2020-08-07 | End: 2020-08-07 | Stop reason: HOSPADM

## 2020-08-07 RX ORDER — DEXAMETHASONE SODIUM PHOSPHATE 10 MG/ML
INJECTION, EMULSION INTRAMUSCULAR; INTRAVENOUS PRN
Status: DISCONTINUED | OUTPATIENT
Start: 2020-08-07 | End: 2020-08-07 | Stop reason: SDUPTHER

## 2020-08-07 RX ORDER — PROPOFOL 10 MG/ML
INJECTION, EMULSION INTRAVENOUS PRN
Status: DISCONTINUED | OUTPATIENT
Start: 2020-08-07 | End: 2020-08-07 | Stop reason: SDUPTHER

## 2020-08-07 RX ORDER — SODIUM CHLORIDE 9 MG/ML
INJECTION, SOLUTION INTRAVENOUS CONTINUOUS
Status: DISCONTINUED | OUTPATIENT
Start: 2020-08-07 | End: 2020-08-07 | Stop reason: HOSPADM

## 2020-08-07 RX ADMIN — PHENYLEPHRINE HYDROCHLORIDE 100 MCG: 10 INJECTION INTRAVENOUS at 10:12

## 2020-08-07 RX ADMIN — CIPROFLOXACIN 400 MG: 2 INJECTION, SOLUTION INTRAVENOUS at 10:10

## 2020-08-07 RX ADMIN — MIDAZOLAM 2 MG: 1 INJECTION INTRAMUSCULAR; INTRAVENOUS at 10:05

## 2020-08-07 RX ADMIN — SODIUM CHLORIDE: 9 INJECTION, SOLUTION INTRAVENOUS at 09:10

## 2020-08-07 RX ADMIN — ONDANSETRON HYDROCHLORIDE 4 MG: 2 INJECTION, SOLUTION INTRAMUSCULAR; INTRAVENOUS at 10:10

## 2020-08-07 RX ADMIN — SODIUM CHLORIDE: 9 INJECTION, SOLUTION INTRAVENOUS at 09:00

## 2020-08-07 RX ADMIN — FENTANYL CITRATE 100 MCG: 50 INJECTION, SOLUTION INTRAMUSCULAR; INTRAVENOUS at 10:05

## 2020-08-07 RX ADMIN — DEXAMETHASONE SODIUM PHOSPHATE 10 MG: 10 INJECTION, EMULSION INTRAMUSCULAR; INTRAVENOUS at 10:10

## 2020-08-07 RX ADMIN — PHENYLEPHRINE HYDROCHLORIDE 200 MCG: 10 INJECTION INTRAVENOUS at 10:18

## 2020-08-07 RX ADMIN — PROPOFOL 50 MG: 10 INJECTION, EMULSION INTRAVENOUS at 10:07

## 2020-08-07 RX ADMIN — PROPOFOL 100 MCG/KG/MIN: 10 INJECTION, EMULSION INTRAVENOUS at 10:12

## 2020-08-07 ASSESSMENT — PULMONARY FUNCTION TESTS
PIF_VALUE: 0

## 2020-08-07 ASSESSMENT — PAIN SCALES - GENERAL
PAINLEVEL_OUTOF10: 0

## 2020-08-07 ASSESSMENT — PAIN - FUNCTIONAL ASSESSMENT: PAIN_FUNCTIONAL_ASSESSMENT: 0-10

## 2020-08-07 NOTE — OP NOTE
Operative Note      Patient: Vasyl Moreno  YOB: 1955  MRN: [id number]    Date of Procedure: 8/7/2020    Pre-Op Diagnosis: URETERAL STONE    Post-Op Diagnosis: Same       Procedure(s):  CYSTOSCOPY RETROGRADE URETEROSCOPY PYELOGRAM LASER LITHOTRIPSY LEFT J-STENT-----FACILITY------    Surgeon(s):  Micheal Howard DO    Assistant:   * No surgical staff found *    Anesthesia: Monitor Anesthesia Care    Estimated Blood Loss (mL): Minimal    Complications: None    Specimens:   * No specimens in log *    Implants:  * No implants in log *      Drains:   Urethral Catheter Non-latex 16 fr (Active)   Catheter Indications Urology/Urologist seeing this patient or inserted indwelling catheter;Need for fluid management in critically ill patients in a critical care setting not able to be managed by other means such as BSC with hat, bedpan, urinal, condom catheter, or short term intermittent urethral catherization 07/30/20 0805   Site Assessment No urethral drainage 07/28/20 1200   Urine Color Yellow 07/30/20 0805   Urine Appearance Clear 07/30/20 0805   Urine Odor Malodorous 07/30/20 0805   Output (mL) 650 mL 07/30/20 0544       [REMOVED] Urethral Catheter Temperature probe;Straight-tip 16 fr (Removed)   Catheter Indications Need for fluid management in critically ill patients in a critical care setting not able to be managed by other means such as BSC with hat, bedpan, urinal, condom catheter, or short term intermittent urethral catherization 07/26/20 1600   Site Assessment [place] 07/26/20 1600   Urine Color Yellow 07/26/20 1600   Urine Appearance Clear 07/26/20 1600   Output (mL) 200 mL 07/26/20 0141       Findings: see operative report    Detailed Description of Procedure:   See operative report    Electronically signed by Micheal Howard DO on 8/7/2020 at 9:41 AM

## 2020-08-07 NOTE — OP NOTE
[facility]                  [address] [place],  [facility]                                OPERATIVE REPORT    PATIENT NAME: Brady Salas                 :        1955  MED REC NO:   [id number]                            ROOM:  ACCOUNT NO:   [de-identified]                           ADMIT DATE: 2020  PROVIDER:     [facility] DO [facility]    DATE OF PROCEDURE:  2020    PREOPERATIVE DIAGNOSES:  1.  4-mm distal left ureteral stone. 2.  Left hydronephrosis. 3.  Left flank pain. 4.  Associated UTI with sepsis. POSTOPERATIVE DIAGNOSIS:  Passed stone. OPERATION PERFORMED:  1. Cystoscopy. 2.  Ureteroscopy. 3.  Stent exchange. 4.  Stent was left on a string. ANESTHESIA:  Monitored anesthesia. SURGEON:  Tony Howard DO    ESTIMATED BLOOD LOSS:  None. CONDITION:  PACU, stable. PREOPERATIVE ANTIBIOTICS:  Administered. PATHOLOGIC SPECIMEN:  None. STORY:  This is a 27-year-old female who in end of July had presented  with sepsis, had a 4-mm stone in the distal left ureter. At that time,  I did place the stent. She subsequently has been sent to rehab. She  represents today for definitive urologic intervention. CAT scan was  reviewed with her preoperatively. I did explain the risks, the  benefits, and the alternatives of the proposed surgical procedure to the  patient. The patient understood the risks, the benefits, and the  alternatives, and elected to proceed. Please note, the patient had been  transferred to [facility], where she has been undergoing rehab. The  patient remained with the stent-in. She does not have any family  present. I did explain the risks, benefits, the alternatives of the  proposed surgical procedure and she elected to proceed.     DESCRIPTION OF PROCEDURE:  This pleasant 27-year-old  female  was brought to operating room #11 at [address] [place] [address], placed in the supine

## 2020-08-07 NOTE — ANESTHESIA POSTPROCEDURE EVALUATION
Department of Anesthesiology  Postprocedure Note    Patient: Winston Joseph  MRN: [id number]  YOB: 1955  Date of evaluation: 8/7/2020  Time:  11:59 AM     Procedure Summary     Date:  08/07/20 Room / Location:  [facility] OR  / [facility]    Anesthesia Start:  8759 Anesthesia Stop:  4683    Procedure:  CYSTOSCOPY RETROGRADE PYELOGRAM URETEROSCOPY  LEFT J-STENT EXCHANGE, INSERTION JUNIOR CATHETER---FACILITY------ (Left Bladder) Diagnosis:  (URETERAL STONE)    Surgeon:  Red Howard DO Responsible Provider:  Brooke Thapa MD    Anesthesia Type:  MAC ASA Status:  3          Anesthesia Type: MAC    Tenzin Phase I: Tenzni Score: 9    Tenzin Phase II: Tenzin Score: 9    Last vitals: Reviewed and per EMR flowsheets.        Anesthesia Post Evaluation    Patient location during evaluation: PACU  Patient participation: complete - patient participated  Level of consciousness: awake and alert  Airway patency: patent  Nausea & Vomiting: no nausea and no vomiting  Complications: no  Cardiovascular status: hemodynamically stable  Respiratory status: acceptable  Hydration status: euvolemic

## 2020-08-07 NOTE — H&P
8/7/2020 9:38 AM  Service: Urology  Group: [facility] (Lee/Sowmya/Reg)    Scooter Diaz  [id number]     Chief Complaint: left distal ureteral stone    History of Present Illness: The patient is a 76 y.o. female patient who presents with the above  She is doing better post the stent  She remains in rehab  She does have a dillon  She does not have any family present  She states this infection has drained her  The surgery was discussed with here today  RBA of the surgery was discussed  All options were discussed     Past Medical History:   Diagnosis Date    Arthritis     Asthma     [facility] [address]     Eczema     Fall 5/26/2011    Gout     Hematoma of abdominal wall 5/2011    extraperitoneal    Hypertension     Metatarsal fracture 1/2011    right 3rd and 4th    Multiple sclerosis ([facility] [address] [facility]     Scoliosis     Spinal stenosis     UTI (urinary tract infection)        Past Surgical History:   Procedure Laterality Date    APPENDECTOMY      CHOLECYSTECTOMY      CYSTOSCOPY INSERTION / REMOVAL STENT / STONE Right 7/26/2020    CYSTOSCOPY RETROGRADE PYELOGRAM STENT INSERTION LEFT performed by Judy Howard DO at [address]         Medications Prior to Admission:    Medications Prior to Admission: melatonin 3 MG TABS tablet, Take 5 mg by mouth nightly as needed  sennosides-docusate sodium (SENOKOT-S) 8.6-50 MG tablet, Take 1 tablet by mouth daily  b complex vitamins capsule, Take 1 capsule by mouth daily  loratadine (CLARITIN) 10 MG capsule, Take 10 mg by mouth daily  HYDROcodone-acetaminophen (NORCO) 5-325 MG per tablet, Take 1 tablet by mouth every 4 hours as needed.   albuterol (PROVENTIL) (2.5 MG/3ML) 0.083% nebulizer solution, Take 3 mLs by nebulization See Admin Instructions  acetaminophen (TYLENOL) 325 MG tablet, Take 2 tablets by mouth every 4 hours as needed for Pain or Fever  enoxaparin (LOVENOX) 40 MG/0.4ML injection, Inject 0.4 mLs into the skin pack-year smoking history. She has never used smokeless tobacco. She reports that she does not drink alcohol or use drugs. Family History:   Non-contributory to this urological problem  family history includes Asthma in her father; Cancer in her maternal aunt; High Blood Pressure in her father. Review of Systems:  Respiratory: negative for cough and hemoptysis  Cardiovascular: negative for chest pain and dyspnea  Gastrointestinal: negative for abdominal pain, diarrhea, nausea and vomiting  Derm: negative for rash and skin lesion(s)  Neurological: negative for seizures and tremors  Endocrine: negative for diabetic symptoms including polydipsia and polyuria  : As above in the HPI, otherwise negative  All other reviews are negative    Physical Exam:   Vitals: /61   Pulse 77   Temp 98.6 °F (37 °C) (Temporal)   Resp 20   Ht 5' 6\" (1.676 m)   Wt 187 lb (84.8 kg)   SpO2 92%   BMI 30.18 kg/m²   General:  Awake, alert, oriented X 3. Well developed, well nourished, well groomed. No apparent distress. HEENT:  Normocephalic, atraumatic. Pupils equal, round. No scleral icterus. No conjunctival injection. Normal lips, teeth, and gums. No nasal discharge. Neck:  Supple, no masses. Heart:  RRR  Lungs:  No audible wheezing. Respirations symmetric and non-labored. Abdomen:  soft, nontender, no masses, no organomegaly, no peritoneal signs  Extremities:  No clubbing, cyanosis, or edema  Skin:  Warm and dry, no open lesions or rashes  Neuro:  Cranial nerves 2-12 intact, no focal deficits  Rectal: deferred  Genitalia:  Obrien no    Labs:   No results for input(s): WBC, RBC, HGB, HCT, MCV, MCH, MCHC, RDW, PLT, MPV in the last 72 hours. No results for input(s): CREATININE in the last 72 hours.     Images:      Assessment: Roberto Carlos Brownlee 76 y.o. female     Left distal ureteral stone  Left hydronephrosis  Left flank pain    Plan:    CT was reviewed  All options were discussed  Progress to the OR for C&P,

## 2020-08-07 NOTE — ANESTHESIA PRE PROCEDURE
Department of Anesthesiology  Preprocedure Note       Name:  Alondra Meléndez   Age:  76 y.o.  :  1955                                          MRN:  [id number]         Date:  2020      Surgeon: Jenifer Nguyen):  Gian Howard DO    Procedure: Procedure(s):  CYSTOSCOPY RETROGRADE PYELOGRAM STENT INSERTION    Medications prior to admission:   Prior to Admission medications    Medication Sig Start Date End Date Taking? Authorizing Provider   melatonin 3 MG TABS tablet Take 5 mg by mouth nightly as needed    Historical Provider, MD   sennosides-docusate sodium (SENOKOT-S) 8.6-50 MG tablet Take 1 tablet by mouth daily    Historical Provider, MD   b complex vitamins capsule Take 1 capsule by mouth daily    Historical Provider, MD   loratadine (CLARITIN) 10 MG capsule Take 10 mg by mouth daily    Historical Provider, MD   HYDROcodone-acetaminophen (NORCO) 5-325 MG per tablet Take 1 tablet by mouth every 4 hours as needed.  20   Cori Awad DO   albuterol (PROVENTIL) (2.5 MG/3ML) 0.083% nebulizer solution Take 3 mLs by nebulization See Admin Instructions 20   Cori Awad DO   acetaminophen (TYLENOL) 325 MG tablet Take 2 tablets by mouth every 4 hours as needed for Pain or Fever 20   Cori Awad DO   enoxaparin (LOVENOX) 40 MG/0.4ML injection Inject 0.4 mLs into the skin daily 20   Cori Awad DO   losartan (COZAAR) 50 MG tablet Take 1 tablet by mouth daily 20   Cori Awad DO   metoprolol tartrate (LOPRESSOR) 50 MG tablet Take 1 tablet by mouth 2 times daily 20   Cori Awad DO   dextromethorphan-guaiFENesin ([address] DM)  MG per extended release tablet Take 1 tablet by mouth every 12 hours as needed for Cough 20  Ruy Awad DO   pseudoephedrine (SUDAFED) 60 MG tablet Take 1 tablet by mouth every 8 hours as needed for Congestion 20   Ruy Awad DO   furosemide (LASIX) 40 MG tablet Take 1 tablet by mouth daily 20   Cori Burnette Intravenous Continuous Janneth Maya, APRN -  mL/hr at 20 0910      ciprofloxacin (CIPRO) IVPB 400 mg  400 mg Intravenous On Call to [address] [place] [place]ba APRN - CNP        sodium chloride flush 0.9 % injection 10 mL  10 mL Intravenous 2 times per day Janneth Maya, APRN - CNP        sodium chloride flush 0.9 % injection 10 mL  10 mL Intravenous PRN Janneth Maya, APRN - CNP           Allergies:     Allergies   Allergen Reactions    Adhesive Tape Itching       Problem List:    Patient Active Problem List   Diagnosis Code    Neck pain M54.2    Multiple sclerosis ([address] G35    Bilateral foot-drop M21.371, M21.372    Peripheral polyneuropathy G62.9    Essential hypertension I10    Asthma J45.909    Edema of both legs R60.0    Constipation K59.00    Intervertebral lumbar disc disorder with myelopathy, 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    Cervical spinal stenosis M48.02    Sepsis secondary to ureteral stone ([address] A41.9       Past Medical History:        Diagnosis Date    Arthritis     Asthma     COPD (chronic obstructive pulmonary disease) ([address]     Eczema     Fall 2011    Gout     Hematoma of abdominal wall 2011    extraperitoneal    Hypertension     Metatarsal fracture 2011    right 3rd and 4th    Multiple sclerosis ([address]     Scoliosis     Spinal stenosis     UTI (urinary tract infection)        Past Surgical History:        Procedure Laterality Date    APPENDECTOMY      CHOLECYSTECTOMY      CYSTOSCOPY INSERTION / REMOVAL STENT / STONE Right 2020    CYSTOSCOPY RETROGRADE PYELOGRAM STENT INSERTION LEFT performed by Brennen Howard DO at [facility]         Social History:    Social History     Tobacco Use    Smoking status: Former Smoker     Packs/day: 0.25     Years: 46.00     Pack years: 11.50     Types: Cigarettes     Last attempt to quit: 2019     Years since quittin.4    Smokeless tobacco: Never Used   Substance Use Topics    Alcohol use: No                                Counseling given: Not Answered      Vital Signs (Current): There were no vitals filed for this visit. BP Readings from Last 3 Encounters:   08/07/20 124/61   07/30/20 139/69   07/26/20 91/77       NPO Status:                                                                                 BMI:   Wt Readings from Last 3 Encounters:   08/07/20 187 lb (84.8 kg)   07/30/20 187 lb 9.6 oz (85.1 kg)   01/03/20 185 lb (83.9 kg)     There is no height or weight on file to calculate BMI.    CBC:   Lab Results   Component Value Date    WBC 15.5 07/29/2020    RBC 3.67 07/29/2020    HGB 12.2 07/29/2020    HCT 37.2 07/29/2020    .4 07/29/2020    RDW 13.4 07/29/2020     07/29/2020       CMP:   Lab Results   Component Value Date     07/30/2020    K 3.3 07/30/2020    CL 94 07/30/2020    CO2 32 07/30/2020    BUN 12 07/30/2020    CREATININE 0.9 07/30/2020    GFRAA >60 07/30/2020    LABGLOM >60 07/30/2020    GLUCOSE 153 07/30/2020    GLUCOSE 96 05/28/2011    PROT 6.3 07/29/2020    CALCIUM 9.2 07/30/2020    BILITOT 0.8 07/29/2020    ALKPHOS 158 07/29/2020    AST 30 07/29/2020    ALT 36 07/29/2020       POC Tests: No results for input(s): POCGLU, POCNA, POCK, POCCL, POCBUN, POCHEMO, POCHCT in the last 72 hours.     Coags:   Lab Results   Component Value Date    PROTIME 11.9 05/27/2011    INR 1.3 05/27/2011    APTT 34.6 05/27/2011       HCG (If Applicable): No results found for: PREGTESTUR, PREGSERUM, HCG, HCGQUANT     ABGs:   Lab Results   Component Value Date    J5EDMQNB 99.6 05/27/2011        Type & Screen (If Applicable):  No results found for: LABABO, LABRH    Drug/Infectious Status (If Applicable):  No results found for: HIV, HEPCAB    COVID-19 Screening (If Applicable):   Lab Results   Component Value Date    COVID19 Not Detected 07/30/2020         Anesthesia Evaluation  Patient summary reviewed no history of anesthetic complications: Airway: Mallampati: I  TM distance: >3 FB   Neck ROM: full  Mouth opening: > = 3 FB Dental:    (+) edentulous, upper dentures and lower dentures      Pulmonary: breath sounds clear to auscultation  (+) COPD:  asthma:                           ROS comment: Former Smoker. Cardiovascular:    (+) hypertension:,       ECG reviewed  Rhythm: regular  Rate: normal           Beta Blocker:  Not on Beta Blocker      ROS comment: EKG: Sinus Tachycardia 117, with prolonged QT. Cesilia Perry Neuro/Psych:   (+) neuromuscular disease:,              ROS comment: Multiple sclerosis  H/O fractures and Fall. GI/Hepatic/Renal:   (+) renal disease: kidney stones,          ROS comment: UTI. .   Endo/Other:                      ROS comment: obese Abdominal:           Vascular: negative vascular ROS. Department of Anesthesiology  Preprocedure Note       Name:  Katerina Reyes   Age:  76 y.o.  :  1955                                          MRN:  [id number]         Date:  2020      Surgeon: Torri Zhu):  Owen COVARRUBIAS Memo, DO    Procedure: Procedure(s):  CYSTOSCOPY RETROGRADE PYELOGRAM STENT INSERTION    Medications prior to admission:   Prior to Admission medications    Medication Sig Start Date End Date Taking? Authorizing Provider   melatonin 3 MG TABS tablet Take 5 mg by mouth nightly as needed    Historical Provider, MD   sennosides-docusate sodium (SENOKOT-S) 8.6-50 MG tablet Take 1 tablet by mouth daily    Historical Provider, MD   b complex vitamins capsule Take 1 capsule by mouth daily    Historical Provider, MD   loratadine (CLARITIN) 10 MG capsule Take 10 mg by mouth daily    Historical Provider, MD   HYDROcodone-acetaminophen (NORCO) 5-325 MG per tablet Take 1 tablet by mouth every 4 hours as needed.  20   Pama Lipleny Awad DO   albuterol (PROVENTIL) (2.5 MG/3ML) 0.083% nebulizer solution Take 3 mLs by nebulization See Admin Instructions 20   Pama Emily Awad DO   acetaminophen (TYLENOL) 325 MG tablet Take 2 tablets by mouth every 4 hours as needed for Pain or Fever 7/30/20   Sheryl Awad DO   enoxaparin (LOVENOX) 40 MG/0.4ML injection Inject 0.4 mLs into the skin daily 7/31/20   Sheryl Awad DO   losartan (COZAAR) 50 MG tablet Take 1 tablet by mouth daily 7/31/20   Sheryl Awad DO   metoprolol tartrate (LOPRESSOR) 50 MG tablet Take 1 tablet by mouth 2 times daily 7/30/20   Sheryl Awad DO   dextromethorphan-guaiFENesin ([address] DM)  MG per extended release tablet Take 1 tablet by mouth every 12 hours as needed for Cough 7/30/20 8/9/20  Ruy Awad DO   pseudoephedrine (SUDAFED) 60 MG tablet Take 1 tablet by mouth every 8 hours as needed for Congestion 7/30/20   Ruy Awad DO   furosemide (LASIX) 40 MG tablet Take 1 tablet by mouth daily 7/31/20   Sheryl Awad DO   hydrochlorothiazide (HYDRODIURIL) 12.5 MG tablet Take 1 tablet by mouth daily 7/31/20   Sheryl Awad DO   docusate sodium (COLACE, DULCOLAX) 100 MG CAPS Take 100 mg by mouth 2 times daily as needed for Constipation 7/30/20   Sheryl Awad DO   magnesium citrate solution Take 296 mLs by mouth daily as needed for Constipation 7/30/20   Sheryl Awad DO   polyethylene glycol (GLYCOLAX) 17 g packet Take 17 g by mouth daily as needed for Constipation 7/30/20 8/29/20  Sheryl Awad DO   potassium chloride (KLOR-CON M) 20 MEQ extended release tablet Take 1 tablet by mouth 2 times daily 7/30/20   Sheryl Awad DO   pantoprazole (PROTONIX) 40 MG injection Infuse 40 mg intravenously daily 7/31/20   Sheryl Awad DO   cefTRIAXone (ROCEPHIN) infusion Infuse 2,000 mg intravenously every 24 hours for 14 days Compound per protocol 7/29/20 8/12/20  GRETEL Peng - CNS   Incontinence Supply Disposable (PROTECTIVE UNDERWEAR LARGE) MISC Use as directed 1/30/20   María Center, MD   vitamin E 1000 units capsule Take 1,000 Units by mouth daily    Historical Provider, MD   Incontinence Supply Disposable (POISE MAXIMUM ABSORBENCY) PADS  5/10/19   Historical Provider, MD   Ascorbic Acid (VITAMIN C) 1000 MG tablet Take 1,000 mg by mouth daily    Historical Provider, MD   Disposable Gloves (VINYL GLOVES) MISC by Does not apply route    Historical Provider, MD   vitamin D (CHOLECALCIFEROL) 5000 UNITS CAPS capsule Take 5,000 Units by mouth daily    Historical Provider, MD   ammonium lactate (LAC-HYDRIN) 12 % lotion Apply topically as needed for Dry Skin Apply topically as needed. Historical Provider, MD       Current medications:    No current facility-administered medications for this visit. No current outpatient medications on file. Facility-Administered Medications Ordered in Other Visits   Medication Dose Route Frequency Provider Last Rate Last Dose    0.9 % sodium chloride infusion   Intravenous Continuous GRETEL Freeman  mL/hr at 08/07/20 0910      ciprofloxacin (CIPRO) IVPB 400 mg  400 mg Intravenous On Call to [facility], APRN - CNP        sodium chloride flush 0.9 % injection 10 mL  10 mL Intravenous 2 times per day GRETEL Freeman CNP        sodium chloride flush 0.9 % injection 10 mL  10 mL Intravenous PRN GRETEL Freeman CNP           Allergies:     Allergies   Allergen Reactions    Adhesive Tape Itching       Problem List:    Patient Active Problem List   Diagnosis Code    Neck pain M54.2    Multiple sclerosis ([facility] G35    Bilateral foot-drop M21.371, M21.372    Peripheral polyneuropathy G62.9    Essential hypertension I10    Asthma J45.909    Edema of both legs R60.0    Constipation K59.00    Intervertebral lumbar disc disorder with myelopathy, 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    Cervical spinal stenosis M48.02    Sepsis secondary to ureteral stone ([address] A41.9       Past Medical History:        Diagnosis Date    Arthritis     Asthma     COPD (chronic obstructive pulmonary disease) ([facility]     Eczema     Fall 5/26/2011    Gout     Hematoma of abdominal wall 2011    extraperitoneal    Hypertension     Metatarsal fracture 2011    right 3rd and 4th    Multiple sclerosis ([facility]     Scoliosis     Spinal stenosis     UTI (urinary tract infection)        Past Surgical History:        Procedure Laterality Date    APPENDECTOMY      CHOLECYSTECTOMY      CYSTOSCOPY INSERTION / REMOVAL STENT / STONE Right 2020    CYSTOSCOPY RETROGRADE PYELOGRAM STENT INSERTION LEFT performed by Hernando Howard DO at [facility] 5         Social History:    Social History     Tobacco Use    Smoking status: Former Smoker     Packs/day: 0.25     Years: 46.00     Pack years: 11.50     Types: Cigarettes     Last attempt to quit: 2019     Years since quittin.4    Smokeless tobacco: Never Used   Substance Use Topics    Alcohol use: No                                Counseling given: Not Answered      Vital Signs (Current): There were no vitals filed for this visit.                                            BP Readings from Last 3 Encounters:   20 124/61   20 139/69   20 91/77       NPO Status:                                                                                 BMI:   Wt Readings from Last 3 Encounters:   20 187 lb (84.8 kg)   20 187 lb 9.6 oz (85.1 kg)   20 185 lb (83.9 kg)     There is no height or weight on file to calculate BMI.    CBC:   Lab Results   Component Value Date    WBC 15.5 2020    RBC 3.67 2020    HGB 12.2 2020    HCT 37.2 2020    .4 2020    RDW 13.4 2020     2020       CMP:   Lab Results   Component Value Date     2020    K 3.3 2020    CL 94 2020    CO2 32 2020    BUN 12 2020    CREATININE 0.9 2020    GFRAA >60 2020    LABGLOM >60 2020    GLUCOSE 153 2020    GLUCOSE 96 2011    PROT 6.3 2020    CALCIUM 9.2 2020    BILITOT 0.8 2020 ALKPHOS 158 07/29/2020    AST 30 07/29/2020    ALT 36 07/29/2020       POC Tests: No results for input(s): POCGLU, POCNA, POCK, POCCL, POCBUN, POCHEMO, POCHCT in the last 72 hours. Coags:   Lab Results   Component Value Date    PROTIME 11.9 05/27/2011    INR 1.3 05/27/2011    APTT 34.6 05/27/2011       HCG (If Applicable): No results found for: PREGTESTUR, PREGSERUM, HCG, HCGQUANT     ABGs:   Lab Results   Component Value Date    P1HJVVFT 99.6 05/27/2011        Type & Screen (If Applicable):  No results found for: LABABO, LABRH    Drug/Infectious Status (If Applicable):  No results found for: HIV, HEPCAB    COVID-19 Screening (If Applicable):   Lab Results   Component Value Date    COVID19 Not Detected 07/30/2020         Anesthesia Evaluation  Patient summary reviewed  Airway:         Dental:          Pulmonary:   (+) asthma:                           ROS comment: Former Smoker. Cardiovascular:    (+) hypertension:,       ECG reviewed               Beta Blocker:  Not on Beta Blocker      ROS comment: EKG: Sinus Tachycardia 117, with prolonged QT. Tommas Baptise Neuro/Psych:   (+) neuromuscular disease:,              ROS comment: Multiple sclerosis  H/O fractures and Fall. GI/Hepatic/Renal:            ROS comment: UTI. .   Endo/Other:                      ROS comment: Hematoma of abdominal wall. Abdominal:           Vascular: negative vascular ROS. Anesthesia Plan      general and MAC     ASA 3 - emergent       Induction: intravenous. BIS  MIPS: Postoperative opioids intended. Anesthetic plan and risks discussed with patient. Plan discussed with CRNA. Attending anesthesiologist reviewed and agrees with Pre Eval content              Fortino Augustin MD   8/7/2020               Anesthesia Plan      MAC     ASA 3       Induction: intravenous. Anesthetic plan and risks discussed with patient. Plan discussed with CRNA.                   Fortino Augustin MD 8/7/2020

## 2020-08-10 ENCOUNTER — TELEPHONE (OUTPATIENT)
Dept: PRIMARY CARE CLINIC | Age: 65
End: 2020-08-10

## 2020-08-11 ENCOUNTER — TELEPHONE (OUTPATIENT)
Dept: HEMATOLOGY | Age: 65
End: 2020-08-11

## 2020-08-11 NOTE — TELEPHONE ENCOUNTER
Called patient no answer reguarding her cancelled us for dr Jamel Graf  Electronically signed by Elvis Gamez MA on 8/11/2020 at 2:37 PM

## 2020-08-19 ENCOUNTER — TELEPHONE (OUTPATIENT)
Dept: PRIMARY CARE CLINIC | Age: 65
End: 2020-08-19

## 2020-08-19 NOTE — TELEPHONE ENCOUNTER
Per Theone  at [facility], pt is scheduled to be discharged today (8/19/20) from their facility. *Pt has been confirmed for her scheduled  FRI, 8/21/20 TCM with us.

## 2020-08-21 ENCOUNTER — TELEPHONE (OUTPATIENT)
Dept: PRIMARY CARE CLINIC | Age: 65
End: 2020-08-21

## 2020-08-21 ENCOUNTER — OFFICE VISIT (OUTPATIENT)
Dept: PRIMARY CARE CLINIC | Age: 65
End: 2020-08-21
Payer: MEDICAID

## 2020-08-21 VITALS
SYSTOLIC BLOOD PRESSURE: 136 MMHG | HEART RATE: 104 BPM | TEMPERATURE: 96.5 F | WEIGHT: 187 LBS | BODY MASS INDEX: 30.05 KG/M2 | RESPIRATION RATE: 20 BRPM | OXYGEN SATURATION: 93 % | DIASTOLIC BLOOD PRESSURE: 78 MMHG | HEIGHT: 66 IN

## 2020-08-21 PROBLEM — A41.9 SEPSIS (HCC): Status: RESOLVED | Noted: 2020-07-26 | Resolved: 2020-08-21

## 2020-08-21 PROBLEM — N20.1 URETEROLITHIASIS: Status: ACTIVE | Noted: 2020-07-26

## 2020-08-21 PROCEDURE — 99350 HOME/RES VST EST HIGH MDM 60: CPT | Performed by: FAMILY MEDICINE

## 2020-08-21 PROCEDURE — 1111F DSCHRG MED/CURRENT MED MERGE: CPT | Performed by: FAMILY MEDICINE

## 2020-08-21 RX ORDER — LANOLIN ALCOHOL/MO/W.PET/CERES
3 CREAM (GRAM) TOPICAL NIGHTLY PRN
Qty: 100 TABLET | Refills: 3 | Status: SHIPPED | OUTPATIENT
Start: 2020-08-21

## 2020-08-21 RX ORDER — POLYETHYLENE GLYCOL 3350 17 G/17G
25 POWDER, FOR SOLUTION ORAL DAILY PRN
Qty: 527 G | Refills: 11 | Status: SHIPPED | OUTPATIENT
Start: 2020-08-21 | End: 2021-11-08 | Stop reason: SDUPTHER

## 2020-08-21 RX ORDER — POTASSIUM CHLORIDE 20 MEQ/1
20 TABLET, EXTENDED RELEASE ORAL DAILY
Qty: 60 TABLET | Refills: 3 | Status: SHIPPED
Start: 2020-08-21 | End: 2020-12-04 | Stop reason: ALTCHOICE

## 2020-08-21 RX ORDER — METOPROLOL TARTRATE 50 MG/1
50 TABLET, FILM COATED ORAL 2 TIMES DAILY
Qty: 60 TABLET | Refills: 3 | Status: SHIPPED
Start: 2020-08-21 | End: 2020-11-19

## 2020-08-21 RX ORDER — FUROSEMIDE 40 MG/1
40 TABLET ORAL DAILY
Qty: 60 TABLET | Refills: 3 | Status: SHIPPED
Start: 2020-08-21 | End: 2021-03-08

## 2020-08-21 RX ORDER — MEDICAL SUPPLY, MISCELLANEOUS
EACH MISCELLANEOUS
Qty: 1000 ML | Refills: 5 | Status: SHIPPED | OUTPATIENT
Start: 2020-08-21

## 2020-08-21 NOTE — PROGRESS NOTES
136/78   Site: Right Upper Arm   Position: Sitting   Pulse: 104   Resp: 20   Temp: 96.5 °F (35.8 °C)   TempSrc: Infrared   SpO2: 93%   Weight: 187 lb (84.8 kg)   Height: 5' 6\" (1.676 m)      GEN: middle age overweight female patient sitting in WC in NAD. HEAD:  atraumatic, normocephalic. EYES:  EOMI, PERRL, L  conjunct normal. ENT:  EACs and TMs normal. ORAL: mouth with 12 dental extractions healing,  Tongue with white coating. Thick nasal secretions. Pharynx: PND. LUNGS:   clear to ausc, no rales or rhonchi. HEART:  RRR, no murmurs or gallops. ABD:  Obese, soft, non-tender to palp, no palp masses or HSM, normal BS. : Obrien cath in place draining light yellow urine. BACK:  No CVAT. Scoliosis toward right / kyphosis,  tender to palp over L lumbar area. EXTREM: No edema  Unable to make a strong fist.   Venous stasis changes. No ulcerations, varicosities or erythema,  deformities. MUSCULOSKEL:  good ROM of most joints, non-tender to palp and with movement, except lower extremeties which are weakened from [address] and non-wt bearing. WC bound from MS. NEURO:  Normal motor for her. No tremor, normal sensory,  able to stand and transfer with much effort. Unable to walk. SKIN: No drainage  No ulcerations or breakdown, ecchymosis, or other lesions. Medications reviewed. Labs 7/29/20 HH 12/37, GFR>60, K+ 3.3  2/8/20 HH 15/42, GFR>60, lytes nl, BNP 60, TSH 1.8, , HDL 36   5/31/19 HH 14/42, GFR>60, LFTs nl, lytes nl      ASSESSMENT:  Elderly female with has Neck pain; Multiple sclerosis ([address] Bilateral foot-drop; Peripheral polyneuropathy; Essential hypertension; Asthma; Edema of both legs; Constipation; Intervertebral lumbar disc disorder with myelopathy, lumbar region; Cervical spinal stenosis; and Ureterolithiasis on their problem list.     Diagnoses attached to this encounter:  Dina Lara was seen today for urinary tract infection, hypertension, leg swelling and constipation.     Diagnoses and all orders for this visit:    Sepsis, due to unspecified organism, unspecified whether acute organ dysfunction present ([facility]    Ureterolithiasis    Multiple sclerosis ([facility]    Essential hypertension  -     CBC Auto Differential; Future  -     Basic Metabolic Panel; Future    Edema of both legs    Constipation, unspecified constipation type    Other orders  -     potassium chloride (KLOR-CON M) 20 MEQ extended release tablet; Take 1 tablet by mouth daily  -     melatonin 3 MG TABS tablet; Take 1 tablet by mouth nightly as needed (insomnia)  -     polyethylene glycol (GLYCOLAX) 17 g packet; Take 25 g by mouth daily as needed for Constipation  -     furosemide (LASIX) 40 MG tablet; Take 1 tablet by mouth daily  -     metoprolol tartrate (LOPRESSOR) 50 MG tablet; Take 1 tablet by mouth 2 times daily  -     Oral Electrolytes (PEDIALYTE) SOLN; Drink daily for good fluid intake. PLAN:  Check labs. Instructed to f/u with specialists as recommended. Take Lasix 40 mEq q AM with KCl. Continue losartan 50mg 1 tab daily and metoprolol 50mg BID. Encouraged to elevate legs. Continue Boost 1 daily for improving strength. Continue Flonase and Benzedrex for her allergies. Recheck 1 month. 60 minutes spent on visit, 35 minutes involved education/counseling regarding UTI, stones, MS, BLE edema, HTN disease processes, treatment options, meds and coordination of care.     Current Outpatient Medications   Medication Sig Dispense Refill    potassium chloride (KLOR-CON M) 20 MEQ extended release tablet Take 1 tablet by mouth daily 60 tablet 3    melatonin 3 MG TABS tablet Take 1 tablet by mouth nightly as needed (insomnia) 100 tablet 3    polyethylene glycol (GLYCOLAX) 17 g packet Take 25 g by mouth daily as needed for Constipation 527 g 11    furosemide (LASIX) 40 MG tablet Take 1 tablet by mouth daily 60 tablet 3    metoprolol tartrate (LOPRESSOR) 50 MG tablet Take 1 tablet by mouth 2 times daily 60 tablet 3    Oral Electrolytes (PEDIALYTE) SOLN Drink daily for good fluid intake. 1000 mL 5    b complex vitamins capsule Take 1 capsule by mouth daily      loratadine (CLARITIN) 10 MG capsule Take 10 mg by mouth daily      albuterol (PROVENTIL) (2.5 MG/3ML) 0.083% nebulizer solution Take 3 mLs by nebulization See Admin Instructions 120 each 3    acetaminophen (TYLENOL) 325 MG tablet Take 2 tablets by mouth every 4 hours as needed for Pain or Fever 120 tablet 3    losartan (COZAAR) 50 MG tablet Take 1 tablet by mouth daily 30 tablet 3    docusate sodium (COLACE, DULCOLAX) 100 MG CAPS Take 100 mg by mouth 2 times daily as needed for Constipation      magnesium citrate solution Take 296 mLs by mouth daily as needed for Constipation      Ascorbic Acid (VITAMIN C) 1000 MG tablet Take 1,000 mg by mouth daily      sennosides-docusate sodium (SENOKOT-S) 8.6-50 MG tablet Take 1 tablet by mouth daily      Incontinence Supply Disposable (PROTECTIVE UNDERWEAR LARGE) MISC Use as directed 52 Bottle 11    Incontinence Supply Disposable (POISE MAXIMUM ABSORBENCY) PADS   11    Disposable Gloves (VINYL GLOVES) MISC by Does not apply route      ammonium lactate (LAC-HYDRIN) 12 % lotion Apply topically as needed for Dry Skin Apply topically as needed. No current facility-administered medications for this visit. Return in about 1 month (around 9/21/2020). An  electronic signature was used to authenticate this note.     --Woo Martinez MD on 8/21/2020 at 2:07 PM

## 2020-08-21 NOTE — TELEPHONE ENCOUNTER
----- Message from Frederick Quezada RN sent at 8/21/2020  1:53 PM EDT -----  Ordering labs through her [employer] [facility], they are supposed to come later today, but for sure next week.    thanks

## 2020-08-25 ENCOUNTER — TELEPHONE (OUTPATIENT)
Dept: PRIMARY CARE CLINIC | Age: 65
End: 2020-08-25

## 2020-08-25 NOTE — TELEPHONE ENCOUNTER
Ramana Billings called. She is concerned because she is now on Metoprolol 50mg BID and Losartan QD. She states that when she was in the hospital, her BP was going too low. I looked at her VSs from her visit on Friday and assured her that her BP was well within limits and definitely not too low. I asked her if her BP has been running low since then. She states she doesn't know because she hasn't checked it, she is just worried that it will go too low. I encouraged her to continue taking her meds and ordered and check her BP daily and notify the office if her BP is low.  Verbalized understanding

## 2020-08-26 ENCOUNTER — HOSPITAL ENCOUNTER (OUTPATIENT)
Dept: GENERAL RADIOLOGY | Age: 65
Discharge: HOME | End: 2020-08-28
Payer: MEDICAID

## 2020-08-26 ENCOUNTER — HOSPITAL ENCOUNTER (OUTPATIENT)
Age: 65
Discharge: HOME | End: 2020-08-28
Payer: MEDICAID

## 2020-08-26 PROCEDURE — 74018 RADEX ABDOMEN 1 VIEW: CPT

## 2020-08-26 RX ORDER — SULFAMETHOXAZOLE AND TRIMETHOPRIM 800; 160 MG/1; MG/1
1 TABLET ORAL 2 TIMES DAILY
Qty: 20 TABLET | Refills: 1 | Status: SHIPPED
Start: 2020-08-26 | End: 2020-10-22 | Stop reason: SDUPTHER

## 2020-08-27 ENCOUNTER — TELEPHONE (OUTPATIENT)
Dept: PRIMARY CARE CLINIC | Age: 65
End: 2020-08-27

## 2020-08-27 NOTE — TELEPHONE ENCOUNTER
Patient called in stating that she went to Dr. Von Nixon / S[facility]'s office to have stent removed . When removing stent the string broke off and stent is still in and they sent her for a KUB yesterday. She states that she had left a message with their office and waiting for f/up return call. She was calling in regards of what to do next. I did advise her to call Dr. Deng's/ [address] [place] [address] office back again to obtain those KUB results and further recommendations from them.

## 2020-09-01 RX ORDER — CIPROFLOXACIN 2 MG/ML
400 INJECTION, SOLUTION INTRAVENOUS
Status: CANCELLED | OUTPATIENT
Start: 2020-09-01 | End: 2020-09-01

## 2020-09-01 RX ORDER — SODIUM CHLORIDE 0.9 % (FLUSH) 0.9 %
10 SYRINGE (ML) INJECTION PRN
Status: CANCELLED | OUTPATIENT
Start: 2020-09-01

## 2020-09-01 RX ORDER — SODIUM CHLORIDE 0.9 % (FLUSH) 0.9 %
10 SYRINGE (ML) INJECTION EVERY 12 HOURS SCHEDULED
Status: CANCELLED | OUTPATIENT
Start: 2020-09-01

## 2020-09-04 ENCOUNTER — TELEPHONE (OUTPATIENT)
Dept: PRIMARY CARE CLINIC | Age: 65
End: 2020-09-04

## 2020-09-04 ENCOUNTER — HOSPITAL ENCOUNTER (EMERGENCY)
Age: 65
Discharge: HOME | End: 2020-09-04
Attending: EMERGENCY MEDICINE
Payer: MEDICAID

## 2020-09-04 ENCOUNTER — APPOINTMENT (OUTPATIENT)
Dept: CT IMAGING | Age: 65
End: 2020-09-04
Payer: MEDICAID

## 2020-09-04 VITALS
SYSTOLIC BLOOD PRESSURE: 165 MMHG | TEMPERATURE: 98.4 F | WEIGHT: 185 LBS | HEIGHT: 60 IN | OXYGEN SATURATION: 98 % | RESPIRATION RATE: 85 BRPM | DIASTOLIC BLOOD PRESSURE: 88 MMHG | HEART RATE: 20 BPM | BODY MASS INDEX: 36.32 KG/M2

## 2020-09-04 LAB
ALBUMIN SERPL-MCNC: 3.4 G/DL (ref 3.5–5.2)
ALP BLD-CCNC: 96 U/L (ref 35–104)
ALT SERPL-CCNC: 27 U/L (ref 0–32)
ANION GAP SERPL CALCULATED.3IONS-SCNC: 14 MMOL/L (ref 7–16)
AST SERPL-CCNC: 44 U/L (ref 0–31)
BACTERIA: ABNORMAL /HPF
BASOPHILS ABSOLUTE: 0 E9/L (ref 0–0.2)
BASOPHILS RELATIVE PERCENT: 0.4 % (ref 0–2)
BILIRUB SERPL-MCNC: 0.7 MG/DL (ref 0–1.2)
BILIRUBIN URINE: NEGATIVE
BLOOD, URINE: ABNORMAL
BUN BLDV-MCNC: 11 MG/DL (ref 8–23)
CALCIUM SERPL-MCNC: 8.9 MG/DL (ref 8.6–10.2)
CHLORIDE BLD-SCNC: 105 MMOL/L (ref 98–107)
CLARITY: ABNORMAL
CO2: 21 MMOL/L (ref 22–29)
COLOR: YELLOW
CREAT SERPL-MCNC: 0.8 MG/DL (ref 0.5–1)
EOSINOPHILS ABSOLUTE: 0.28 E9/L (ref 0.05–0.5)
EOSINOPHILS RELATIVE PERCENT: 1.7 % (ref 0–6)
EPITHELIAL CELLS, UA: ABNORMAL /HPF
GFR AFRICAN AMERICAN: >60
GFR NON-AFRICAN AMERICAN: >60 ML/MIN/1.73
GLUCOSE BLD-MCNC: 94 MG/DL (ref 74–99)
GLUCOSE URINE: NEGATIVE MG/DL
HCT VFR BLD CALC: 38.6 % (ref 34–48)
HEMOGLOBIN: 13 G/DL (ref 11.5–15.5)
KETONES, URINE: NEGATIVE MG/DL
LACTIC ACID, SEPSIS: 2.7 MMOL/L (ref 0.5–1.9)
LEUKOCYTE ESTERASE, URINE: ABNORMAL
LYMPHOCYTES ABSOLUTE: 1.62 E9/L (ref 1.5–4)
LYMPHOCYTES RELATIVE PERCENT: 10.3 % (ref 20–42)
MCH RBC QN AUTO: 34.9 PG (ref 26–35)
MCHC RBC AUTO-ENTMCNC: 33.7 % (ref 32–34.5)
MCV RBC AUTO: 103.5 FL (ref 80–99.9)
MONOCYTES ABSOLUTE: 1.13 E9/L (ref 0.1–0.95)
MONOCYTES RELATIVE PERCENT: 6.9 % (ref 2–12)
NEUTROPHILS ABSOLUTE: 13.12 E9/L (ref 1.8–7.3)
NEUTROPHILS RELATIVE PERCENT: 81 % (ref 43–80)
NITRITE, URINE: POSITIVE
PDW BLD-RTO: 15 FL (ref 11.5–15)
PH UA: 6 (ref 5–9)
PLATELET # BLD: 285 E9/L (ref 130–450)
PMV BLD AUTO: 9.8 FL (ref 7–12)
POLYCHROMASIA: ABNORMAL
POTASSIUM REFLEX MAGNESIUM: 5.1 MMOL/L (ref 3.5–5)
PROTEIN UA: 100 MG/DL
RBC # BLD: 3.73 E12/L (ref 3.5–5.5)
RBC UA: ABNORMAL /HPF (ref 0–2)
SODIUM BLD-SCNC: 140 MMOL/L (ref 132–146)
SPECIFIC GRAVITY UA: 1.02 (ref 1–1.03)
TOTAL PROTEIN: 6.7 G/DL (ref 6.4–8.3)
UROBILINOGEN, URINE: 0.2 E.U./DL
WBC # BLD: 16.2 E9/L (ref 4.5–11.5)
WBC UA: >20 /HPF (ref 0–5)

## 2020-09-04 PROCEDURE — 87150 DNA/RNA AMPLIFIED PROBE: CPT

## 2020-09-04 PROCEDURE — 81001 URINALYSIS AUTO W/SCOPE: CPT

## 2020-09-04 PROCEDURE — 85025 COMPLETE CBC W/AUTO DIFF WBC: CPT

## 2020-09-04 PROCEDURE — 99284 EMERGENCY DEPT VISIT MOD MDM: CPT

## 2020-09-04 PROCEDURE — 74176 CT ABD & PELVIS W/O CONTRAST: CPT

## 2020-09-04 PROCEDURE — 83605 ASSAY OF LACTIC ACID: CPT

## 2020-09-04 PROCEDURE — 87186 SC STD MICRODIL/AGAR DIL: CPT

## 2020-09-04 PROCEDURE — 2580000003 HC RX 258: Performed by: EMERGENCY MEDICINE

## 2020-09-04 PROCEDURE — 80053 COMPREHEN METABOLIC PANEL: CPT

## 2020-09-04 PROCEDURE — 96365 THER/PROPH/DIAG IV INF INIT: CPT

## 2020-09-04 PROCEDURE — 6370000000 HC RX 637 (ALT 250 FOR IP): Performed by: EMERGENCY MEDICINE

## 2020-09-04 PROCEDURE — 87088 URINE BACTERIA CULTURE: CPT

## 2020-09-04 PROCEDURE — 6360000002 HC RX W HCPCS: Performed by: EMERGENCY MEDICINE

## 2020-09-04 PROCEDURE — 87040 BLOOD CULTURE FOR BACTERIA: CPT

## 2020-09-04 PROCEDURE — 87077 CULTURE AEROBIC IDENTIFY: CPT

## 2020-09-04 RX ORDER — SODIUM CHLORIDE 0.9 % (FLUSH) 0.9 %
10 SYRINGE (ML) INJECTION PRN
Status: DISCONTINUED | OUTPATIENT
Start: 2020-09-04 | End: 2020-09-04 | Stop reason: HOSPADM

## 2020-09-04 RX ORDER — ACETAMINOPHEN 325 MG/1
650 TABLET ORAL ONCE
Status: COMPLETED | OUTPATIENT
Start: 2020-09-04 | End: 2020-09-04

## 2020-09-04 RX ORDER — SODIUM CHLORIDE 0.9 % (FLUSH) 0.9 %
10 SYRINGE (ML) INJECTION EVERY 12 HOURS SCHEDULED
Status: DISCONTINUED | OUTPATIENT
Start: 2020-09-04 | End: 2020-09-04 | Stop reason: HOSPADM

## 2020-09-04 RX ORDER — CEFDINIR 300 MG/1
300 CAPSULE ORAL 2 TIMES DAILY
Qty: 20 CAPSULE | Refills: 0 | Status: SHIPPED | OUTPATIENT
Start: 2020-09-04 | End: 2020-09-14

## 2020-09-04 RX ORDER — 0.9 % SODIUM CHLORIDE 0.9 %
1000 INTRAVENOUS SOLUTION INTRAVENOUS ONCE
Status: COMPLETED | OUTPATIENT
Start: 2020-09-04 | End: 2020-09-04

## 2020-09-04 RX ADMIN — ACETAMINOPHEN 650 MG: 325 TABLET ORAL at 16:51

## 2020-09-04 RX ADMIN — SODIUM CHLORIDE 1000 ML: 9 INJECTION, SOLUTION INTRAVENOUS at 16:50

## 2020-09-04 RX ADMIN — CEFEPIME HYDROCHLORIDE 2 G: 2 INJECTION, POWDER, FOR SOLUTION INTRAVENOUS at 17:14

## 2020-09-04 ASSESSMENT — PAIN SCALES - GENERAL: PAINLEVEL_OUTOF10: 0

## 2020-09-04 NOTE — TELEPHONE ENCOUNTER
Cheryle Lathe called. She states she is going to the hospital. She has a fever and chills and thinks she has a UTI.   She states HH got a urine culture yesterday, but she wants to go to the hospital.  She is going to [facility]

## 2020-09-05 LAB
ACINETOBACTER BAUMANNII BY PCR: NOT DETECTED
BOTTLE TYPE: ABNORMAL
CANDIDA ALBICANS BY PCR: NOT DETECTED
CANDIDA GLABRATA BY PCR: NOT DETECTED
CANDIDA KRUSEI BY PCR: NOT DETECTED
CANDIDA PARAPSILOSIS BY PCR: NOT DETECTED
CANDIDA TROPICALIS BY PCR: NOT DETECTED
CARBAPENEM RESISTANCE KPC BY PCR: NOT DETECTED
ENTEROBACTER CLOACAE COMPLEX BY PCR: NOT DETECTED
ENTEROBACTERALES BY PCR: DETECTED
ENTEROCOCCUS BY PCR: NOT DETECTED
ESCHERICHIA COLI BY PCR: DETECTED
HAEMOPHILUS INFLUENZAE BY PCR: NOT DETECTED
KLEBSIELLA OXYTOCA BY PCR: NOT DETECTED
KLEBSIELLA PNEUMONIAE GROUP BY PCR: NOT DETECTED
LISTERIA MONOCYTOGENES BY PCR: NOT DETECTED
NEISSERIA MENINGITIDIS BY PCR: NOT DETECTED
ORDER NUMBER: ABNORMAL
PROTEUS BY PCR: NOT DETECTED
PSEUDOMONAS AERUGINOSA BY PCR: NOT DETECTED
SERRATIA MARCESCENS BY PCR: NOT DETECTED
SOURCE OF BLOOD CULTURE: ABNORMAL
STAPHYLOCOCCUS AUREUS BY PCR: NOT DETECTED
STAPHYLOCOCCUS SPECIES BY PCR: NOT DETECTED
STREPTOCOCCUS AGALACTIAE BY PCR: NOT DETECTED
STREPTOCOCCUS PNEUMONIAE BY PCR: NOT DETECTED
STREPTOCOCCUS PYOGENES  BY PCR: NOT DETECTED
STREPTOCOCCUS SPECIES BY PCR: NOT DETECTED

## 2020-09-05 ASSESSMENT — ENCOUNTER SYMPTOMS
ABDOMINAL DISTENTION: 0
NAUSEA: 0
WHEEZING: 0
CONSTIPATION: 0
EYE REDNESS: 0
ABDOMINAL PAIN: 0
EYE PAIN: 0
RHINORRHEA: 0
VOMITING: 0
SINUS PRESSURE: 0
SHORTNESS OF BREATH: 0
COUGH: 0
SORE THROAT: 0
BLOOD IN STOOL: 0
EYE DISCHARGE: 0
BACK PAIN: 0
DIARRHEA: 0

## 2020-09-05 NOTE — ED PROVIDER NOTES
XIN Hanna is a 76 y.o. female with a PMHx significant for HTN, COPD, MS, Gout and scoliosis who presents with 1 day of increased urinary frequency, fever and chills. The patient states that she was recently admitted to the hospital for a septic kidney stone. She states that she had a stent placed at that time. She says that she was actually due to get the stent removed today. However, she states that when she was not feeling well this morning she called her urologist office and they canceled the procedure. At that point she decided to come to the ER for further evaluation. The patient states that her temperature was as high as 99 °F.  She states that she knows this is not a normal \"fever,\" but she states that with her MS, her normal temperature is approximately 97.2 °F and that this does present a significant elevation for her. She states that she took ibuprofen earlier in the day and that her temperature normalized briefly. She says, \"I tried to come in early this time. .. last time I waited way too long and I got that sepsis and had to be admitted. \"  The patient denies recent trauma, fatigue, HA, dizziness, lightheadedness, vision changes, congestion, rhinorrhea, neck pain, chest pain, palpitations, hx of MI, SOB, cough, wheezing, abdominal pain, N/V/D/C, hematochezia, melena, dysuria, hematuria, generalized weakness and paresthesias. The patient is currently taking no blood thinners. Tobacco Hx:   reports that she quit smoking about 18 months ago. Her smoking use included cigarettes. She has a 11.50 pack-year smoking history. She has never used smokeless tobacco.    Alcohol Hx:   reports no history of alcohol use. Illicit Drug Hx:  Reports no history of illicit drug use. The history is provided by the patient. Last Tetanus (if applicable): N/A    Review of Systems   Constitutional: Positive for chills and fever. Negative for diaphoresis and fatigue.    HENT: Negative for congestion, ear pain, postnasal drip, rhinorrhea, sinus pressure and sore throat. Eyes: Negative for pain, discharge and redness. Respiratory: Negative for cough, shortness of breath and wheezing. Cardiovascular: Negative for chest pain, palpitations and leg swelling. Gastrointestinal: Negative for abdominal distention, abdominal pain, blood in stool, constipation, diarrhea, nausea and vomiting. \"I feel some increased pressure in my lower abdomen. \"   Genitourinary: Positive for frequency. Negative for difficulty urinating, dysuria, flank pain and hematuria. Musculoskeletal: Negative for arthralgias, back pain, myalgias and neck pain. Skin: Negative for rash and wound. Neurological: Negative for dizziness, facial asymmetry, weakness, light-headedness, numbness and headaches. Hematological: Negative for adenopathy. All other systems reviewed and are negative. Physical Exam  Vitals signs and nursing note reviewed. Constitutional:       General: She is awake. She is not in acute distress. Appearance: She is not diaphoretic. HENT:      Head: Normocephalic and atraumatic. Right Ear: External ear normal.      Left Ear: External ear normal.      Nose: Nose normal. No congestion or rhinorrhea. Mouth/Throat:      Mouth: Mucous membranes are moist.      Pharynx: Oropharynx is clear. Eyes:      General: No scleral icterus. Right eye: No discharge. Left eye: No discharge. Extraocular Movements: Extraocular movements intact. Conjunctiva/sclera: Conjunctivae normal.   Neck:      Musculoskeletal: Normal range of motion and neck supple. No neck rigidity or muscular tenderness. Cardiovascular:      Rate and Rhythm: Normal rate and regular rhythm. Heart sounds: Normal heart sounds. No murmur. No friction rub. No gallop.        Comments: Upper extremity and lower extremity distal pulses intact bilaterally +2/4  Pulmonary:      Effort: Pulmonary effort is normal. No respiratory distress. Breath sounds: Normal breath sounds. No wheezing, rhonchi or rales. Comments: Good air movement noted throughout. Symmetric lung expansion. Chest:      Chest wall: No tenderness. Abdominal:      General: Bowel sounds are normal. There is no distension. Palpations: Abdomen is soft. Tenderness: There is no abdominal tenderness. There is no right CVA tenderness, left CVA tenderness, guarding or rebound. Musculoskeletal: Normal range of motion. General: No tenderness or deformity. Right lower leg: No edema. Left lower leg: No edema. Lymphadenopathy:      Cervical: No cervical adenopathy. Skin:     General: Skin is warm and dry. Capillary Refill: Capillary refill takes less than 2 seconds. Findings: No erythema or rash. Neurological:      General: No focal deficit present. Mental Status: She is alert and oriented to person, place, and time. Sensory: No sensory deficit. Motor: No weakness. Coordination: Coordination normal.        ---------------------------------- PAST HISTORY ---------------------------------------------  Past Medical History:  has a past medical history of Arthritis, Asthma, COPD (chronic obstructive pulmonary disease) ([address] Eczema, Fall, Gout, Hematoma of abdominal wall, Hypertension, Metatarsal fracture, Multiple sclerosis ([address] Prolonged emergence from general anesthesia, Scoliosis, Spinal stenosis, and UTI (urinary tract infection). Past Surgical History:  has a past surgical history that includes Hysterectomy; Cholecystectomy; Appendectomy; CYSTOSCOPY INSERTION / REMOVAL STENT / STONE (Right, 7/26/2020); and Lithotripsy (Left, 8/7/2020). Social History:  reports that she quit smoking about 18 months ago. Her smoking use included cigarettes. She has a 11.50 pack-year smoking history.  She has never used smokeless tobacco. She reports that she does not drink alcohol or use drugs. Family History: family history includes Asthma in her father; Cancer in her maternal aunt; High Blood Pressure in her father. Home Meds: Not in a hospital admission. The patients home medications have been reviewed. Allergies: Adhesive tape    ------------------------- NURSING NOTES AND VITALS REVIEWED ---------------------------  Date / Time Roomed:  9/4/2020  3:20 PM  ED Bed Assignment:  14/14    The nursing notes within the ED encounter and vital signs as below have been reviewed. BP (!) 165/88   Pulse (!) 20   Temp 98.4 °F (36.9 °C) (Oral)   Resp (!) 85   Ht 5' (1.524 m)   Wt 185 lb (83.9 kg)   SpO2 98%   BMI 36.13 kg/m²   -------------------------------------------------- RESULTS / INTERVENTIONS -------------------------------------------------  All laboratory and radiology tests have been reviewed by this physician.     LABS:  Results for orders placed or performed during the hospital encounter of 09/04/20   CBC Auto Differential   Result Value Ref Range    WBC 16.2 (H) 4.5 - 11.5 E9/L    RBC 3.73 3.50 - 5.50 E12/L    Hemoglobin 13.0 11.5 - 15.5 g/dL    Hematocrit 38.6 34.0 - 48.0 %    .5 (H) 80.0 - 99.9 fL    MCH 34.9 26.0 - 35.0 pg    MCHC 33.7 32.0 - 34.5 %    RDW 15.0 11.5 - 15.0 fL    Platelets 348 667 - 057 E9/L    MPV 9.8 7.0 - 12.0 fL    Neutrophils % 81.0 (H) 43.0 - 80.0 %    Lymphocytes % 10.3 (L) 20.0 - 42.0 %    Monocytes % 6.9 2.0 - 12.0 %    Eosinophils % 1.7 0.0 - 6.0 %    Basophils % 0.4 0.0 - 2.0 %    Neutrophils Absolute 13.12 (H) 1.80 - 7.30 E9/L    Lymphocytes Absolute 1.62 1.50 - 4.00 E9/L    Monocytes Absolute 1.13 (H) 0.10 - 0.95 E9/L    Eosinophils Absolute 0.28 0.05 - 0.50 E9/L    Basophils Absolute 0.00 0.00 - 0.20 E9/L    Polychromasia 1+    Comprehensive Metabolic Panel w/ Reflex to MG   Result Value Ref Range    Sodium 140 132 - 146 mmol/L    Potassium reflex Magnesium 5.1 (H) 3.5 - 5.0 mmol/L    Chloride 105 98 - 107 mmol/L    CO2 21 (L) 22 - 29 mmol/L    Anion Gap 14 7 - 16 mmol/L    Glucose 94 74 - 99 mg/dL    BUN 11 8 - 23 mg/dL    CREATININE 0.8 0.5 - 1.0 mg/dL    GFR Non-African American >60 >=60 mL/min/1.73    GFR African American >60     Calcium 8.9 8.6 - 10.2 mg/dL    Total Protein 6.7 6.4 - 8.3 g/dL    Alb 3.4 (L) 3.5 - 5.2 g/dL    Total Bilirubin 0.7 0.0 - 1.2 mg/dL    Alkaline Phosphatase 96 35 - 104 U/L    ALT 27 0 - 32 U/L    AST 44 (H) 0 - 31 U/L   Urinalysis, reflex to microscopic   Result Value Ref Range    Color, UA Yellow Straw/Yellow    Clarity, UA CLOUDY (A) Clear    Glucose, Ur Negative Negative mg/dL    Bilirubin Urine Negative Negative    Ketones, Urine Negative Negative mg/dL    Specific Gravity, UA 1.020 1.005 - 1.030    Blood, Urine MODERATE (A) Negative    pH, UA 6.0 5.0 - 9.0    Protein,  (A) Negative mg/dL    Urobilinogen, Urine 0.2 <2.0 E.U./dL    Nitrite, Urine POSITIVE (A) Negative    Leukocyte Esterase, Urine LARGE (A) Negative   Lactate, Sepsis   Result Value Ref Range    Lactic Acid, Sepsis 2.7 (H) 0.5 - 1.9 mmol/L   Microscopic Urinalysis   Result Value Ref Range    WBC, UA >20 (A) 0 - 5 /HPF    RBC, UA NONE 0 - 2 /HPF    Epithelial Cells, UA RARE /HPF    Bacteria, UA MANY (A) None Seen /HPF       RADIOLOGY: Interpreted by Radiologist unless otherwise noted. CT ABDOMEN PELVIS WO CONTRAST Additional Contrast? None   Final Result   Left nephroureteral stent is in good position. Perinephric fat stranding around the left kidney may be postprocedural versus   possible pyelonephritis. Limited evaluation without IV contrast.      No abnormal perinephric or periureteral collections.              Oxygen Saturation Interpretation: Normal    Meds Given:  Medications   0.9 % sodium chloride bolus (0 mLs Intravenous Stopped 9/4/20 1843)   acetaminophen (TYLENOL) tablet 650 mg (650 mg Oral Given 9/4/20 1651)   cefepime (MAXIPIME) 2 g IVPB extended (mini-bag) (0 g Intravenous Stopped 9/4/20 3489)       Procedures:  No procedures performed. --------------------------------- PROGRESS NOTES / ADDITIONAL PROVIDER NOTES ---------------------------------  Consultations:  As outlined below. ED Course:     4109: All results were discussed with the patient and I have provided specific details regarding the plan of care, diagnosis and associated prognosis. The patient tolerated the visit well and, at the time of discharge, she was without objective evidence of hemodynamic instability or an acute process (biological or psychological) requiring hospitalization and inpatient management. She was seen by myself and the assigned attending physician, Dr. Trinidad Cranker, who agreed with my assessment and plan as laid out herein. The importance of follow-up was discussed at the end of the visit and I recommended that the patient be seen by her primary care physician in 2-3 days. The patient verbalized her understanding and agreement with the plan as presented and stated her intention to follow up with her PCP and urologist by calling to schedule an appointment as soon as possible. Reasons to return to the ER or seek immediate evaluation by a medical provider were discussed at length and all questions were answered. The patient was discharged home in stable condition. MDM:  Patient presented from home complaining of increased urinary frequency, fevers and chills. She states that she was scheduled to have a stent removed by her urologist today, but when she was not feeling well they canceled the appointment and she came to the ER for further evaluation. On arrival, the patient was hypertensive with remaining vital signs within normal limits. Physical exam was as documented above. A work-up was initiated to further evaluate the patient's presenting complaints. Patient had a leukocytosis with a white count of approximately 16,000. Lactate mildly elevated at 2.7.  UA results consistent with a urinary tract infection.   Blood and urine cultures

## 2020-09-06 LAB
ORGANISM: ABNORMAL
URINE CULTURE, ROUTINE: ABNORMAL

## 2020-09-09 LAB — CULTURE, BLOOD 2: NORMAL

## 2020-09-10 LAB — ORGANISM: ABNORMAL

## 2020-09-17 ENCOUNTER — TELEPHONE (OUTPATIENT)
Dept: PRIMARY CARE CLINIC | Age: 65
End: 2020-09-17

## 2020-09-24 NOTE — PROGRESS NOTES
9/25/2020     Home visit medically necessary in lieu of an office visit due to: wheelchair bound, difficult to get out. HPI:  Patient had stent removed by Dr Ysabel Allen and it went well. She was seen [facility] ED on 9/4/20 for UTI and was treated with cefdinir. She has felt very tired all month and today she feels a bit SOB and has some swelling of legs. She takes Lasix when her feet are real swollen. She has not been having LBP and sciatic pain L lower leg. She has been moving her bowels better this month. REVIEW OF SYSTEMS:   General:  Weight stable, generally healthy, no change in strength or exercise tolerance. Heart: No palpitations, no syncope, no orthopnea. EDEMA OF LEGS AT TIMES. Abdomen: Chronic constipation. No change in appetite, no dysphagia, no abdominal pains, no bowel habit changes, no emesis, no melena. : No urinary urgency, no dysuria, no change in nature of urine. JUNIOR CATH. Neurologic:   Unable to walk without holding on to something. In w/c most of the time. No tremor, no seizures, no changes in mentation. All other systems negative. PAST MEDICAL HISTORY: (Major events, hospitalizations, surgeries): Pneumonia (2010), 1998 Vag hyst, 1978 naina, append, freq epidural inclusion cysts tx'd with docycycline. MS dx'd Mar 8, 1991. IV corticosteroids 1991-92. Has never been on MS meds. 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 Known allergies: NKDA. Ongoing medical problems: Multiple sclerosis, Asthma, HTN, hypoglycemia, scoliosis, DDD with sciatic, arthritis, osteoporosis, chronic LBP. Preventative: Pneumovax 11/2009, Flu vac 2018, 2017, 2012, smoking cessation counselling 7/2014 Social history:  with 3 children. Smokes 1-1 1/2 ppd 40 yrs. No alcohol. . 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 Nutritional history: Takes a lot of vitamins and supplements every day.      PHYSICAL EXAM:    Vitals:    09/25/20 1019   BP: 135/84 Site: Left Upper Arm   Position: Sitting   Pulse: 63   Resp: 20   Temp: 96.8 °F (36 °C)   TempSrc: Infrared   SpO2: 98%   Weight: 184 lb 15.5 oz (83.9 kg)   Height: 5' (1.524 m)      GEN: middle age overweight female patient sitting in WC in NAD. HEAD:  atraumatic, normocephalic. EYES:  EOMI, PERRL, L  conjunct normal. ENT:  EACs and TMs normal. ORAL: mouth with 12 dental extractions healing,  Tongue with white coating. Thick nasal secretions. Pharynx: PND. LUNGS:   clear to ausc, no rales or rhonchi. HEART:  RRR, no murmurs or gallops. ABD:  Obese, soft, non-tender to palp, no palp masses or HSM, normal BS. : Obrien cath in place draining light yellow urine. BACK:  No CVAT. Scoliosis toward right / kyphosis,  tender to palp over L lumbar area. EXTREM: 1-2+ pedal edema  Unable to make a strong fist. Venous stasis changes. No ulcerations, varicosities or erythema, deformities. MUSCULOSKEL: good ROM of most joints, non-tender to palp and with movement, except lower extremeties which are weakened from [address] and non-wt bearing. WC bound from MS. NEURO:  Normal motor for her. No tremor, normal sensory,  able to stand and transfer with much effort. Unable to walk. SKIN: No drainage  No ulcerations or breakdown, ecchymosis, or other lesions. Medications reviewed. Labs 9/4/20 HH 13/39, GFR>60, lytes nl, AST 44  7/29/20 HH 12/37, GFR>60, K+ 3.3  2/8/20 HH 15/42, GFR>60, lytes nl, BNP 60, TSH 1.8, , HDL 36   5/31/19 HH 14/42, GFR>60, LFTs nl, lytes nl      ASSESSMENT:  Elderly female with has Neck pain; Multiple sclerosis ([address] Bilateral foot-drop; Peripheral polyneuropathy; Essential hypertension; Asthma; Edema of both legs; Constipation;  Intervertebral lumbar disc disorder with myelopathy, lumbar region; Cervical spinal stenosis; Ureterolithiasis; and Pulmonary venous congestion on their problem list.     Diagnoses attached to this encounter:  [facility] was seen today for multiple sclerosis, daily 60 tablet 3    metoprolol tartrate (LOPRESSOR) 50 MG tablet Take 1 tablet by mouth 2 times daily 60 tablet 3    Oral Electrolytes (PEDIALYTE) SOLN Drink daily for good fluid intake. 1000 mL 5    sennosides-docusate sodium (SENOKOT-S) 8.6-50 MG tablet Take 1 tablet by mouth daily      b complex vitamins capsule Take 1 capsule by mouth daily      loratadine (CLARITIN) 10 MG capsule Take 10 mg by mouth 2 times daily       albuterol (PROVENTIL) (2.5 MG/3ML) 0.083% nebulizer solution Take 3 mLs by nebulization See Admin Instructions (Patient taking differently: Take 2.5 mg by nebulization every 4 hours as needed ) 120 each 3    acetaminophen (TYLENOL) 325 MG tablet Take 2 tablets by mouth every 4 hours as needed for Pain or Fever 120 tablet 3    losartan (COZAAR) 50 MG tablet Take 1 tablet by mouth daily 30 tablet 3    magnesium citrate solution Take 296 mLs by mouth daily as needed for Constipation (Patient not taking: Reported on 9/2/2020)      Incontinence Supply Disposable (PROTECTIVE UNDERWEAR LARGE) MISC Use as directed 52 Bottle 11    Incontinence Supply Disposable (POISE MAXIMUM ABSORBENCY) PADS   11    Ascorbic Acid (VITAMIN C) 1000 MG tablet Take 1,000 mg by mouth daily      Disposable Gloves (VINYL GLOVES) MISC by Does not apply route      ammonium lactate (LAC-HYDRIN) 12 % lotion Apply topically as needed for Dry Skin Apply topically as needed. No current facility-administered medications for this visit. Return in about 1 month (around 10/25/2020). An  electronic signature was used to authenticate this note.     --Ez Randhawa MD on 10/1/2020 at 11:43 AM

## 2020-09-25 ENCOUNTER — OFFICE VISIT (OUTPATIENT)
Dept: PRIMARY CARE CLINIC | Age: 65
End: 2020-09-25
Payer: MEDICAID

## 2020-09-25 ENCOUNTER — TELEPHONE (OUTPATIENT)
Dept: PRIMARY CARE CLINIC | Age: 65
End: 2020-09-25

## 2020-09-25 VITALS
DIASTOLIC BLOOD PRESSURE: 84 MMHG | WEIGHT: 184.97 LBS | OXYGEN SATURATION: 98 % | BODY MASS INDEX: 36.31 KG/M2 | SYSTOLIC BLOOD PRESSURE: 135 MMHG | HEART RATE: 63 BPM | TEMPERATURE: 96.8 F | HEIGHT: 60 IN | RESPIRATION RATE: 20 BRPM

## 2020-09-25 PROCEDURE — 99349 HOME/RES VST EST MOD MDM 40: CPT | Performed by: FAMILY MEDICINE

## 2020-09-25 NOTE — TELEPHONE ENCOUNTER
----- Message from Lobito Stewart RN sent at 9/25/2020 10:29 AM EDT -----  Ordering Cologuard for her. Told her they would call with instructions on use.    Thanks

## 2020-09-30 RX ORDER — DOCUSATE SODIUM 100 MG
CAPSULE ORAL
Qty: 60 CAPSULE | Refills: 11 | Status: SHIPPED | OUTPATIENT
Start: 2020-09-30 | End: 2022-05-16

## 2020-09-30 RX ORDER — SENNOSIDES 8.6 MG
TABLET ORAL
Qty: 30 TABLET | Refills: 11 | Status: ON HOLD
Start: 2020-09-30 | End: 2020-12-28 | Stop reason: HOSPADM

## 2020-09-30 RX ORDER — FLUTICASONE PROPIONATE 50 MCG
1 SPRAY, SUSPENSION (ML) NASAL DAILY
Qty: 16 G | Refills: 11 | Status: SHIPPED | OUTPATIENT
Start: 2020-09-30 | End: 2021-09-28 | Stop reason: SDUPTHER

## 2020-10-01 ENCOUNTER — TELEPHONE (OUTPATIENT)
Dept: PRIMARY CARE CLINIC | Age: 65
End: 2020-10-01

## 2020-10-01 PROBLEM — R09.89 PULMONARY VENOUS CONGESTION: Status: ACTIVE | Noted: 2020-09-25

## 2020-10-01 NOTE — TELEPHONE ENCOUNTER
Order sent to [facility] this afternoon. VM left for pt updating her on status and asking her to call office if she does not hear from [facility] in next couple days.

## 2020-10-01 NOTE — TELEPHONE ENCOUNTER
YESSI Sunshine with [address] states pt is sleeping in recliner because she cannot lay flat in bed d/t SOB. She states pt has bilateral edema which has no decreased since she has been treating her. She believes she would benefit from a hospital bed with rails for help repositioning and elevation of head and feet. I have attached order to this encounter for your signature if in agreement. Please also addend recent note to reflect the medical necessity of this item and disease contributing to need if in agreement. We would also need a Dx for SOB if appropriate.  Thanks

## 2020-10-16 ENCOUNTER — TELEPHONE (OUTPATIENT)
Dept: PRIMARY CARE CLINIC | Age: 65
End: 2020-10-16

## 2020-10-16 RX ORDER — LORATADINE 10 MG/1
10 CAPSULE, LIQUID FILLED ORAL 2 TIMES DAILY
Qty: 60 CAPSULE | Refills: 11 | Status: SHIPPED
Start: 2020-10-16 | End: 2021-03-09 | Stop reason: ALTCHOICE

## 2020-10-16 NOTE — TELEPHONE ENCOUNTER
[facility] called. She states she feels like she is getting a UTI. She is going to get the fax number of a lab near her. Could you please put in the order, and I will fax it to her lab once she gives us the name and number?  Thanks

## 2020-10-16 NOTE — TELEPHONE ENCOUNTER
Order inputted for JURGEN SRINIVASAN.    -- O[facility] Stage called. She states she feels like she is getting a UTI. She is going to get the fax number of a lab near her. Could you please put in the order, and I will fax it to her lab once she gives us the name and number?  Thanks

## 2020-10-22 RX ORDER — SULFAMETHOXAZOLE AND TRIMETHOPRIM 800; 160 MG/1; MG/1
1 TABLET ORAL 2 TIMES DAILY
Qty: 20 TABLET | Refills: 1 | Status: SHIPPED
Start: 2020-10-22 | End: 2020-12-17 | Stop reason: SDUPTHER

## 2020-10-26 ENCOUNTER — TELEPHONE (OUTPATIENT)
Dept: PRIMARY CARE CLINIC | Age: 65
End: 2020-10-26

## 2020-10-26 NOTE — TELEPHONE ENCOUNTER
Alley Braden states she took her urine to [facility] this morning and requested we fax script to them to process, which Monica Borrego took care of. Alley Braden is requesting if a script is sent in, please send it to [facility] in [place] this time so that she can have someone pick it up.  Thanks

## 2020-10-27 RX ORDER — CIPROFLOXACIN 500 MG/1
500 TABLET, FILM COATED ORAL 2 TIMES DAILY
Qty: 14 TABLET | Refills: 0 | Status: SHIPPED | OUTPATIENT
Start: 2020-10-27 | End: 2020-11-03

## 2020-10-27 NOTE — TELEPHONE ENCOUNTER
Bonnie Forde called back today asking if we had any results. I called Alaina Shepard in [place] to follow up and was directed to call office in [place]. When I called I spoke to [facility] who could not find the order in the computer. He stated that he was going to look into it and call us back. I have not heard anything this afternoon. When I called Bonnie Forde, she stated that she is running a temp of 98.6 which is a bit high for her. She states normal temp is aaprox 97.7. She states her stomach hurts, she has diarrhea and a headache. She states that she feels like she did before she ended up in the hospital before and she does not want that to happen again. If you are going to send something in for her, she would like it to go to [facility] in [place].

## 2020-11-03 NOTE — PROGRESS NOTES
11/4/2020     Home visit medically necessary in lieu of an office visit due to: wheelchair bound, difficult to get out. HPI:  Patient had recent symptoms of UTI and took course of cipro. She says she is much better. She has been having increased LBP and sciatic pain L lower leg. She did not get relief taking gabapentin in past. She continues to have swelling of feet and tries to elevate legs more. She takes Lasix about 3 times/week. She has felt very tired all month and today she feels a bit SOB and has some swelling of legs. She has been moving her bowels better this month. REVIEW OF SYSTEMS:   General:  Weight stable, generally healthy, no change in strength or exercise tolerance. Heart: No palpitations, no syncope, no orthopnea. EDEMA OF LEGS AT TIMES. Abdomen: Chronic constipation. No change in appetite, no dysphagia, no abdominal pains, no bowel habit changes, no emesis, no melena. : No urinary urgency, no dysuria, no change in nature of urine. JUNIOR CATH. Neurologic:   Unable to walk without holding on to something. In w/c most of the time. No tremor, no seizures, no changes in mentation. All other systems negative. PAST MEDICAL HISTORY: (Major events, hospitalizations, surgeries): Pneumonia (2010), 1998 Vag hyst, 1978 naina, append, freq epidural inclusion cysts tx'd with docycycline. MS dx'd Mar 8, 1991. IV corticosteroids 1991-92. Has never been on MS meds. 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 Known allergies: NKDA. Ongoing medical problems: Multiple sclerosis, Asthma, HTN, hypoglycemia, scoliosis, DDD with sciatic, arthritis, osteoporosis, chronic LBP. Preventative: Pneumovax 11/2009, Flu vac 2018, 2017, 2012, smoking cessation counselling 7/2014 Social history:  with 3 children. Smokes 1-1 1/2 ppd 40 yrs. No alcohol. . 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    Nutritional history: Takes a lot of vitamins and supplements every day. PHYSICAL EXAM:    Vitals:    11/04/20 1059   BP: (!) 142/91   Site: Left Wrist   Position: Sitting   Pulse: 72   Resp: 20   Temp: 97.1 °F (36.2 °C)   TempSrc: Infrared   SpO2: 98%   Weight: Comment: no weight   Height: 5' (1.524 m)      GEN: middle age overweight female patient sitting in WC in NAD. HEAD:  atraumatic, normocephalic. EYES:  EOMI, PERRL, L  conjunct normal. ENT:  EACs and TMs normal. ORAL: mouth with 12 dental extractions healing,  Tongue with white coating. Thick nasal secretions. Pharynx: PND. LUNGS:   clear to ausc, no rales or rhonchi. HEART:  RRR, no murmurs or gallops. ABD:  Obese, soft, non-tender to palp, no palp masses or HSM, normal BS. : Obrien cath in place draining light yellow urine. BACK:  No CVAT. Scoliosis toward right / kyphosis,  tender to palp over L lumbar area. EXTREM: 1-2+ pedal edema  Unable to make a strong fist. Venous stasis changes. No ulcerations, varicosities or erythema, deformities. MUSCULOSKEL: good ROM of most joints, non-tender to palp and with movement, except lower extremeties which are weakened from [address] and non-wt bearing. WC bound from MS. NEURO:  Normal motor for her. No tremor, normal sensory,  able to stand and transfer with much effort. Unable to walk. SKIN: No drainage  No ulcerations or breakdown, ecchymosis, or other lesions. Medications reviewed. Labs 9/4/20 HH 13/39, GFR>60, lytes nl, AST 44  7/29/20 HH 12/37, GFR>60, K+ 3.3  2/8/20 HH 15/42, GFR>60, lytes nl, BNP 60, TSH 1.8, , HDL 36   5/31/19 HH 14/42, GFR>60, LFTs nl, lytes nl      ASSESSMENT:  Elderly female with has Neck pain; Multiple sclerosis ([address] Bilateral foot-drop; Peripheral polyneuropathy; Essential hypertension; Asthma; Edema of both legs; Constipation;  Intervertebral lumbar disc disorder with myelopathy, lumbar region; Cervical spinal stenosis; Ureterolithiasis; and Pulmonary venous congestion on their problem list.     Diagnoses attached to this encounter:  Pranay Amos was seen today for multiple sclerosis and immunizations. Diagnoses and all orders for this visit:    Multiple sclerosis ([address]    Essential hypertension    Peripheral polyneuropathy  -     pregabalin (LYRICA) 25 MG capsule; Take 1 capsule by mouth 3 times daily for 180 days. Edema of both legs    Intervertebral lumbar disc disorder with myelopathy, lumbar region  -     pregabalin (LYRICA) 25 MG capsule; Take 1 capsule by mouth 3 times daily for 180 days. Need for immunization against influenza    Other orders  -     INFLUENZA, QUADV, ADJUVANTED, 65 YRS =, IM, PF, PREFILL SYR, 0.5ML (FLUAD)    PLAN:    Flu vac given R deltoid. Trial of Lyrica 25mg TID. Encouraged to elevate legs. Instructed to increase Lasix when she has swelling. F/U with Dr John Stuart as scheduled. Continue Boost 1 daily for improving strength. Continue Flonase and Benzedrex for her allergies. Recheck 1 month. 40 minutes spent on visit, 25 minutes involved education/counseling regarding UTI, stones, MS, BLE edema, HTN disease processes, treatment options, meds and coordination of care. Current Outpatient Medications   Medication Sig Dispense Refill    influenza A&B vaccine (FLUAD QUADRIVALENT) 0.5 ML PRSY injection Inject 0.5 mLs into the muscle once Given R deltoid. [address] [id number];  [id number]; exp 06/30/2021      pregabalin (LYRICA) 25 MG capsule Take 1 capsule by mouth 3 times daily for 180 days.  90 capsule 5    loratadine (CLARITIN) 10 MG capsule Take 1 capsule by mouth 2 times daily 60 capsule 11    STOOL SOFTENER 100 MG capsule Take 1 capsule by mouth 2 times daily as needed for Constipation 60 capsule 11    senna (SENOKOT) 8.6 MG TABS tablet Take 1 tablet by mouth daily 30 tablet 11    fluticasone (FLONASE) 50 MCG/ACT nasal spray 1 spray by Each Nostril route daily 16 g 11    albuterol sulfate (PROAIR RESPICLICK) 968 (90 Base) MCG/ACT aerosol powder inhalation Inhale into the lungs every 4 11/4/2020 at 11:29 AM

## 2020-11-04 ENCOUNTER — OFFICE VISIT (OUTPATIENT)
Dept: PRIMARY CARE CLINIC | Age: 65
End: 2020-11-04
Payer: MEDICAID

## 2020-11-04 ENCOUNTER — TELEPHONE (OUTPATIENT)
Dept: PRIMARY CARE CLINIC | Age: 65
End: 2020-11-04

## 2020-11-04 VITALS
HEIGHT: 60 IN | TEMPERATURE: 97.1 F | SYSTOLIC BLOOD PRESSURE: 142 MMHG | HEART RATE: 72 BPM | OXYGEN SATURATION: 98 % | RESPIRATION RATE: 20 BRPM | DIASTOLIC BLOOD PRESSURE: 91 MMHG | BODY MASS INDEX: 36.12 KG/M2

## 2020-11-04 PROCEDURE — 90694 VACC AIIV4 NO PRSRV 0.5ML IM: CPT | Performed by: FAMILY MEDICINE

## 2020-11-04 PROCEDURE — 99349 HOME/RES VST EST MOD MDM 40: CPT | Performed by: FAMILY MEDICINE

## 2020-11-04 PROCEDURE — 90471 IMMUNIZATION ADMIN: CPT | Performed by: FAMILY MEDICINE

## 2020-11-04 RX ORDER — PREGABALIN 25 MG/1
25 CAPSULE ORAL 3 TIMES DAILY
Qty: 90 CAPSULE | Refills: 5 | Status: SHIPPED
Start: 2020-11-04 | End: 2020-12-04 | Stop reason: SINTOL

## 2020-11-04 RX ORDER — A/SINGAPORE/GP1908/2015 IVR-180 (AN A/MICHIGAN/45/2015 (H1N1)PDM09-LIKE VIRUS, A/HONG KONG/4801/2014, NYMC X-263B (H3N2) (AN A/HONG KONG/4801/2014-LIKE VIRUS), AND B/BRISBANE/60/2008, WILD TYPE (A B/BRISBANE/60/2008-LIKE VIRUS) 15; 15; 15 UG/.5ML; UG/.5ML; UG/.5ML
0.5 INJECTION, SUSPENSION INTRAMUSCULAR ONCE
COMMUNITY
Start: 2020-11-04 | End: 2020-11-04

## 2020-11-04 NOTE — TELEPHONE ENCOUNTER
----- Message from Keary Claude, RN sent at 11/4/2020 10:56 AM EST -----  We never got her UA results. She took it to [facility] on [address] in [place]. Can you call and have them faxed over?    thanks

## 2020-11-04 NOTE — TELEPHONE ENCOUNTER
I called and spoke to rep at lab. It seems that there was a mix up somewhere because they do not have record of a urine being dropped off in the time frame of October 26-29 when Radha Vegas contacted our office to fax the script.

## 2020-11-12 ENCOUNTER — TELEPHONE (OUTPATIENT)
Dept: PRIMARY CARE CLINIC | Age: 65
End: 2020-11-12

## 2020-11-12 NOTE — TELEPHONE ENCOUNTER
RAPHAEL called. She states she has a stye on her L upper eyelid. Started last night, and now she thinks there is one starting on the R side also. I recommended warm compresses 10/15 min QID. Other orders?

## 2020-12-04 ENCOUNTER — OFFICE VISIT (OUTPATIENT)
Dept: PRIMARY CARE CLINIC | Age: 65
End: 2020-12-04
Payer: MEDICAID

## 2020-12-04 VITALS
HEART RATE: 74 BPM | OXYGEN SATURATION: 98 % | WEIGHT: 185 LBS | SYSTOLIC BLOOD PRESSURE: 163 MMHG | DIASTOLIC BLOOD PRESSURE: 83 MMHG | RESPIRATION RATE: 18 BRPM | BODY MASS INDEX: 36.32 KG/M2 | TEMPERATURE: 98 F | HEIGHT: 60 IN

## 2020-12-04 PROBLEM — R53.83 FATIGUE: Status: ACTIVE | Noted: 2020-12-04

## 2020-12-04 PROCEDURE — 99349 HOME/RES VST EST MOD MDM 40: CPT | Performed by: PHYSICIAN ASSISTANT

## 2020-12-04 RX ORDER — DM/P-EPHED/ACETAMINOPH/DOXYLAM
3000 LIQUID (ML) ORAL DAILY
Status: ON HOLD | COMMUNITY
Start: 2020-12-04 | End: 2020-12-24

## 2020-12-04 NOTE — PROGRESS NOTES
12/4/2020     Home visit medically necessary in lieu of an office visit due to: wheelchair bound, difficult to get out. HPI:  Feeling upset today due to family stress and loss of a relative due to cardiac arrest. Says BP may be up. Denies CP, SOB, URI symptoms/cough. or abdominal pain. No current symptoms of UTI. Still having some ^ LBP and sciatic pain L lower leg. Only took one Lyrica before stopping it - it caused fatigue and confusion. She did not get relief taking gabapentin in past. She continues to have swelling of feet and tries to elevate legs more. She takes Lasix about 3 times/week. She says she moves her bowels every several days when he takes Senna and Colace. Wants to have B12 shots to increase energy. Stopped Boost since it did not help with energy. Says she had a sty to left eyelid which went away after using warm, moist compresses. States she will tries Oil of Oregano and black garlic 4x times/per week to cleanse the body (antifungal) and hemp (pain control). Has HH aide 3x/week for 3 hours. REVIEW OF SYSTEMS:   General:  Weight stable, generally healthy, no change in strength or exercise tolerance. Heart: No palpitations, no syncope, no orthopnea. EDEMA OF LEGS AT TIMES. Abdomen: Chronic constipation. No change in appetite, no dysphagia, no abdominal pains, no bowel habit changes, no emesis, no melena. : No urinary urgency, no dysuria, no change in nature of urine. JUNIOR CATH. Neurologic:   Unable to walk without holding on to something. In w/c most of the time. No tremor, no seizures, no changes in mentation. All other systems negative. PAST MEDICAL HISTORY: (Major events, hospitalizations, surgeries): Pneumonia (2010), 1998 Vag hyst, 1978 naina, append, freq epidural inclusion cysts tx'd with docycycline. MS dx'd Mar 8, 1991. IV corticosteroids 1991-92. Has never been on MS meds. 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 Known allergies: NKDA.    Ongoing medical problems: Multiple sclerosis, Asthma, HTN, hypoglycemia, scoliosis, DDD with sciatic, arthritis, osteoporosis, chronic LBP. Preventative: Pneumovax 23 - 11/2009. Prevnar - 10/2019. Flu vac - 10/2020. Smoking cessation counselling 7/2014 Social history:  with 3 children. Smokes 1-1 1/2 ppd 40 yrs. No alcohol. . 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 Nutritional history: Takes a lot of vitamins and supplements every day. PHYSICAL EXAM:    Vitals:    12/04/20 1308   BP: (!) 163/83   Site: Left Upper Arm   Position: Sitting   Pulse: 74   Resp: 18   Temp: 98 °F (36.7 °C)   TempSrc: Temporal   SpO2: 98%   Weight: 185 lb (83.9 kg)  Comment: estimate   Height: 5' (1.524 m)        GEN: middle age overweight female patient sitting in WC in NAD. HEAD:  atraumatic, normocephalic. EYES:  EOMI, PERRL, L  conjunct normal. ENT:  EACs and TMs normal. ORAL: mouth with 12 dental extractions healing,  Tongue with white coating. Thick nasal secretions. Pharynx: PND. LUNGS:   clear to ausc, no rales or rhonchi. HEART:  RRR, no murmurs or gallops. ABD:  Obese, soft, non-tender to palp, no palp masses or HSM, normal BS. : Obrien cath in place draining light yellow urine. BACK:  No CVAT. Scoliosis toward right / kyphosis,  tender to palp over L lumbar area. EXTREM: 1-2+ pedal edema  Unable to make a strong fist. Venous stasis changes. No ulcerations, varicosities or erythema, deformities. MUSCULOSKEL: good ROM of most joints, non-tender to palp and with movement, except lower extremeties which are weakened from [address] and non-wt bearing. WC bound from MS. NEURO:  Normal motor for her. No tremor, normal sensory,  able to stand and transfer with much effort. Unable to walk. SKIN: No drainage  No ulcerations or breakdown, ecchymosis, or other lesions. Medications reviewed.    Labs 9/4/20 HH 13/39, GFR>60, lytes nl, AST 44  7/29/20 HH 12/37, GFR>60, K+ 3.3  2/8/20 [facility] 15/42, GFR>60, lytes nl, BNP 60, TSH 1.8, , HDL 36   5/31/19 HH 14/42, GFR>60, LFTs nl, lytes nl      ASSESSMENT:  Elderly female with has Neck pain; Multiple sclerosis ([facility] [address] Bilateral foot-drop; Peripheral polyneuropathy; Essential hypertension; Asthma; Edema of both legs; Constipation; Intervertebral lumbar disc disorder with myelopathy, lumbar region; Cervical spinal stenosis; Ureterolithiasis; Pulmonary venous congestion; and Fatigue on their problem list.     [facility] for hypertension, leg swelling and constipation. Diagnoses and all orders for this visit:    Multiple sclerosis ([facility] [address]    Essential hypertension  -     BASIC METABOLIC PANEL; Future    Edema of both legs  -     BASIC METABOLIC PANEL; Future    Intervertebral lumbar disc disorder with myelopathy, lumbar region    Fatigue, unspecified type  -     VITAMIN B12 & FOLATE; Future      PLAN:    Keep monitoring BP. Order B12 and Folate tests due to complaint of fatigue. Check BMP due to Lasix use and leg swelling. She will consider lidocaine patch and topical medicatons for LBP. Encouraged to elevate legs. Instructed to increase Lasix when she has swelling. F/U with Dr Ruddy Ojeda as scheduled. Continue Flonase and Benzedrex for her allergies. Recheck 1 month. 40 minutes spent on visit, 25 minutes involved education/counseling regarding UTI, stones, MS, BLE edema, HTN disease processes, treatment options, meds and coordination of care.     Current Outpatient Medications   Medication Sig Dispense Refill    Oil of Oregano 1500 MG CAPS Take 3,000 mg by mouth daily      metoprolol tartrate (LOPRESSOR) 50 MG tablet Take 1 tablet by mouth 2 times daily 60 tablet 11    potassium chloride (KLOR-CON) 10 MEQ extended release tablet Take 2 tablets by mouth daily (Patient taking differently: Take 20 mEq by mouth 2 times daily With her Lasix) 60 tablet 11    loratadine (CLARITIN) 10 MG capsule Take 1 capsule by mouth 2 times daily 60 capsule 11    STOOL SOFTENER 100 MG capsule Take 1 capsule by mouth 2 times daily as needed for Constipation 60 capsule 11    senna (SENOKOT) 8.6 MG TABS tablet Take 1 tablet by mouth daily 30 tablet 11    fluticasone (FLONASE) 50 MCG/ACT nasal spray 1 spray by Each Nostril route daily 16 g 11    albuterol sulfate (PROAIR RESPICLICK) 459 (90 Base) MCG/ACT aerosol powder inhalation Inhale into the lungs every 4 hours as needed for Wheezing or Shortness of Breath      melatonin 3 MG TABS tablet Take 1 tablet by mouth nightly as needed (insomnia) 100 tablet 3    polyethylene glycol (GLYCOLAX) 17 g packet Take 25 g by mouth daily as needed for Constipation 527 g 11    furosemide (LASIX) 40 MG tablet Take 1 tablet by mouth daily 60 tablet 3    Oral Electrolytes (PEDIALYTE) SOLN Drink daily for good fluid intake. 1000 mL 5    sennosides-docusate sodium (SENOKOT-S) 8.6-50 MG tablet Take 1 tablet by mouth daily      b complex vitamins capsule Take 1 capsule by mouth daily      albuterol (PROVENTIL) (2.5 MG/3ML) 0.083% nebulizer solution Take 3 mLs by nebulization See Admin Instructions (Patient taking differently: Take 2.5 mg by nebulization every 4 hours as needed ) 120 each 3    acetaminophen (TYLENOL) 325 MG tablet Take 2 tablets by mouth every 4 hours as needed for Pain or Fever 120 tablet 3    losartan (COZAAR) 50 MG tablet Take 1 tablet by mouth daily 30 tablet 3    magnesium citrate solution Take 296 mLs by mouth daily as needed for Constipation      Incontinence Supply Disposable (PROTECTIVE UNDERWEAR LARGE) MISC Use as directed 52 Bottle 11    Incontinence Supply Disposable (POISE MAXIMUM ABSORBENCY) PADS   11    Ascorbic Acid (VITAMIN C) 1000 MG tablet Take 1,000 mg by mouth daily      Disposable Gloves (VINYL GLOVES) MISC by Does not apply route      ammonium lactate (LAC-HYDRIN) 12 % lotion Apply topically as needed for Dry Skin Apply topically as needed.        No current facility-administered medications for this visit. Return in about 1 month (around 1/4/2021). An electronic signature was used to authenticate this note.     --Janet Wade PA-C on 12/4/2020 at 1:55 PM

## 2020-12-10 RX ORDER — INCONTINENCE PAD,LINER,DISP
EACH MISCELLANEOUS
Qty: 54 EACH | Refills: 11 | Status: SHIPPED | OUTPATIENT
Start: 2020-12-10

## 2020-12-17 RX ORDER — SULFAMETHOXAZOLE AND TRIMETHOPRIM 800; 160 MG/1; MG/1
1 TABLET ORAL 2 TIMES DAILY
Qty: 20 TABLET | Refills: 1 | Status: ON HOLD
Start: 2020-12-17 | End: 2020-12-28 | Stop reason: HOSPADM

## 2020-12-23 ENCOUNTER — APPOINTMENT (OUTPATIENT)
Dept: GENERAL RADIOLOGY | Age: 65
DRG: 720 | End: 2020-12-23
Payer: MEDICAID

## 2020-12-23 ENCOUNTER — HOSPITAL ENCOUNTER (INPATIENT)
Age: 65
LOS: 5 days | Discharge: NURSING, REHAB OR LONG-TERM CARE | DRG: 720 | End: 2020-12-28
Attending: EMERGENCY MEDICINE | Admitting: INTERNAL MEDICINE
Payer: MEDICAID

## 2020-12-23 PROBLEM — N39.0 SEPSIS DUE TO URINARY TRACT INFECTION (HCC): Status: ACTIVE | Noted: 2020-12-23

## 2020-12-23 PROBLEM — A41.9 SEPSIS DUE TO URINARY TRACT INFECTION (HCC): Status: ACTIVE | Noted: 2020-12-23

## 2020-12-23 LAB
ALBUMIN SERPL-MCNC: 3.6 G/DL (ref 3.5–5.2)
ALP BLD-CCNC: 143 U/L (ref 35–104)
ALT SERPL-CCNC: 32 U/L (ref 0–32)
ANION GAP SERPL CALCULATED.3IONS-SCNC: 15 MMOL/L (ref 7–16)
APTT: 25.7 SEC (ref 24.5–35.1)
AST SERPL-CCNC: 40 U/L (ref 0–31)
BACTERIA: ABNORMAL /HPF
BASOPHILS ABSOLUTE: 0 E9/L (ref 0–0.2)
BASOPHILS RELATIVE PERCENT: 0.4 % (ref 0–2)
BILIRUB SERPL-MCNC: 1.1 MG/DL (ref 0–1.2)
BILIRUBIN URINE: NEGATIVE
BLOOD, URINE: ABNORMAL
BUN BLDV-MCNC: 17 MG/DL (ref 8–23)
CALCIUM SERPL-MCNC: 9 MG/DL (ref 8.6–10.2)
CHLORIDE BLD-SCNC: 103 MMOL/L (ref 98–107)
CLARITY: ABNORMAL
CO2: 17 MMOL/L (ref 22–29)
COLOR: YELLOW
CREAT SERPL-MCNC: 1.3 MG/DL (ref 0.5–1)
EOSINOPHILS ABSOLUTE: 0 E9/L (ref 0.05–0.5)
EOSINOPHILS RELATIVE PERCENT: 0.2 % (ref 0–6)
EPITHELIAL CELLS, UA: ABNORMAL /HPF
GFR AFRICAN AMERICAN: 49
GFR NON-AFRICAN AMERICAN: 41 ML/MIN/1.73
GLUCOSE BLD-MCNC: 121 MG/DL (ref 74–99)
GLUCOSE URINE: NEGATIVE MG/DL
HCT VFR BLD CALC: 45 % (ref 34–48)
HEMOGLOBIN: 15.3 G/DL (ref 11.5–15.5)
INR BLD: 1.1
KETONES, URINE: NEGATIVE MG/DL
LACTIC ACID, SEPSIS: 5.7 MMOL/L (ref 0.5–1.9)
LACTIC ACID: 4.6 MMOL/L (ref 0.5–2.2)
LEUKOCYTE ESTERASE, URINE: ABNORMAL
LIPASE: 16 U/L (ref 13–60)
LYMPHOCYTES ABSOLUTE: 0.63 E9/L (ref 1.5–4)
LYMPHOCYTES RELATIVE PERCENT: 2.7 % (ref 20–42)
MCH RBC QN AUTO: 34.5 PG (ref 26–35)
MCHC RBC AUTO-ENTMCNC: 34 % (ref 32–34.5)
MCV RBC AUTO: 101.6 FL (ref 80–99.9)
MONOCYTES ABSOLUTE: 0.21 E9/L (ref 0.1–0.95)
MONOCYTES RELATIVE PERCENT: 0.9 % (ref 2–12)
NEUTROPHILS ABSOLUTE: 20.37 E9/L (ref 1.8–7.3)
NEUTROPHILS RELATIVE PERCENT: 96.5 % (ref 43–80)
NITRITE, URINE: NEGATIVE
PDW BLD-RTO: 14.5 FL (ref 11.5–15)
PH UA: 6 (ref 5–9)
PLATELET # BLD: 226 E9/L (ref 130–450)
PMV BLD AUTO: 10.4 FL (ref 7–12)
POTASSIUM REFLEX MAGNESIUM: 4.3 MMOL/L (ref 3.5–5)
PROTEIN UA: NEGATIVE MG/DL
PROTHROMBIN TIME: 12.1 SEC (ref 9.3–12.4)
RBC # BLD: 4.43 E12/L (ref 3.5–5.5)
RBC UA: ABNORMAL /HPF (ref 0–2)
SARS-COV-2, NAAT: NOT DETECTED
SODIUM BLD-SCNC: 135 MMOL/L (ref 132–146)
SPECIFIC GRAVITY UA: 1.02 (ref 1–1.03)
TOTAL PROTEIN: 6.7 G/DL (ref 6.4–8.3)
UROBILINOGEN, URINE: 0.2 E.U./DL
WBC # BLD: 21 E9/L (ref 4.5–11.5)
WBC UA: ABNORMAL /HPF (ref 0–5)

## 2020-12-23 PROCEDURE — 81001 URINALYSIS AUTO W/SCOPE: CPT

## 2020-12-23 PROCEDURE — 71045 X-RAY EXAM CHEST 1 VIEW: CPT

## 2020-12-23 PROCEDURE — U0002 COVID-19 LAB TEST NON-CDC: HCPCS

## 2020-12-23 PROCEDURE — 87040 BLOOD CULTURE FOR BACTERIA: CPT

## 2020-12-23 PROCEDURE — 85025 COMPLETE CBC W/AUTO DIFF WBC: CPT

## 2020-12-23 PROCEDURE — 87088 URINE BACTERIA CULTURE: CPT

## 2020-12-23 PROCEDURE — 1200000000 HC SEMI PRIVATE

## 2020-12-23 PROCEDURE — 6370000000 HC RX 637 (ALT 250 FOR IP): Performed by: EMERGENCY MEDICINE

## 2020-12-23 PROCEDURE — 0202U NFCT DS 22 TRGT SARS-COV-2: CPT

## 2020-12-23 PROCEDURE — 85610 PROTHROMBIN TIME: CPT

## 2020-12-23 PROCEDURE — 87150 DNA/RNA AMPLIFIED PROBE: CPT

## 2020-12-23 PROCEDURE — 2580000003 HC RX 258: Performed by: EMERGENCY MEDICINE

## 2020-12-23 PROCEDURE — 93005 ELECTROCARDIOGRAM TRACING: CPT | Performed by: EMERGENCY MEDICINE

## 2020-12-23 PROCEDURE — 96374 THER/PROPH/DIAG INJ IV PUSH: CPT

## 2020-12-23 PROCEDURE — 87077 CULTURE AEROBIC IDENTIFY: CPT

## 2020-12-23 PROCEDURE — 83690 ASSAY OF LIPASE: CPT

## 2020-12-23 PROCEDURE — 83605 ASSAY OF LACTIC ACID: CPT

## 2020-12-23 PROCEDURE — 80053 COMPREHEN METABOLIC PANEL: CPT

## 2020-12-23 PROCEDURE — 87186 SC STD MICRODIL/AGAR DIL: CPT

## 2020-12-23 PROCEDURE — 85730 THROMBOPLASTIN TIME PARTIAL: CPT

## 2020-12-23 PROCEDURE — 36415 COLL VENOUS BLD VENIPUNCTURE: CPT

## 2020-12-23 PROCEDURE — 2580000003 HC RX 258: Performed by: INTERNAL MEDICINE

## 2020-12-23 PROCEDURE — 6360000002 HC RX W HCPCS: Performed by: EMERGENCY MEDICINE

## 2020-12-23 PROCEDURE — 96361 HYDRATE IV INFUSION ADD-ON: CPT

## 2020-12-23 PROCEDURE — 99285 EMERGENCY DEPT VISIT HI MDM: CPT

## 2020-12-23 RX ORDER — 0.9 % SODIUM CHLORIDE 0.9 %
1000 INTRAVENOUS SOLUTION INTRAVENOUS ONCE
Status: COMPLETED | OUTPATIENT
Start: 2020-12-23 | End: 2020-12-24

## 2020-12-23 RX ORDER — ACETAMINOPHEN 325 MG/1
650 TABLET ORAL ONCE
Status: COMPLETED | OUTPATIENT
Start: 2020-12-23 | End: 2020-12-23

## 2020-12-23 RX ORDER — 0.9 % SODIUM CHLORIDE 0.9 %
1000 INTRAVENOUS SOLUTION INTRAVENOUS ONCE
Status: COMPLETED | OUTPATIENT
Start: 2020-12-23 | End: 2020-12-23

## 2020-12-23 RX ORDER — AZITHROMYCIN 250 MG/1
500 TABLET, FILM COATED ORAL ONCE
Status: COMPLETED | OUTPATIENT
Start: 2020-12-23 | End: 2020-12-23

## 2020-12-23 RX ADMIN — SODIUM CHLORIDE 1000 ML: 9 INJECTION, SOLUTION INTRAVENOUS at 19:44

## 2020-12-23 RX ADMIN — SODIUM CHLORIDE 1000 ML: 9 INJECTION, SOLUTION INTRAVENOUS at 22:26

## 2020-12-23 RX ADMIN — SODIUM CHLORIDE 1000 ML: 9 INJECTION, SOLUTION INTRAVENOUS at 21:37

## 2020-12-23 RX ADMIN — CEFTRIAXONE SODIUM 1 G: 1 INJECTION, POWDER, FOR SOLUTION INTRAMUSCULAR; INTRAVENOUS at 21:37

## 2020-12-23 RX ADMIN — AZITHROMYCIN MONOHYDRATE 500 MG: 250 TABLET ORAL at 21:37

## 2020-12-23 RX ADMIN — ACETAMINOPHEN 650 MG: 325 TABLET, FILM COATED ORAL at 19:45

## 2020-12-23 ASSESSMENT — ENCOUNTER SYMPTOMS
NAUSEA: 1
DIARRHEA: 1
ABDOMINAL PAIN: 0
VOMITING: 0
CHEST TIGHTNESS: 1
COUGH: 1
SHORTNESS OF BREATH: 1
BLOOD IN STOOL: 0

## 2020-12-23 ASSESSMENT — PAIN SCALES - GENERAL: PAINLEVEL_OUTOF10: 0

## 2020-12-24 ENCOUNTER — APPOINTMENT (OUTPATIENT)
Dept: GENERAL RADIOLOGY | Age: 65
DRG: 720 | End: 2020-12-24
Payer: MEDICAID

## 2020-12-24 ENCOUNTER — APPOINTMENT (OUTPATIENT)
Dept: ULTRASOUND IMAGING | Age: 65
DRG: 720 | End: 2020-12-24
Payer: MEDICAID

## 2020-12-24 LAB
ADENOVIRUS BY PCR: NOT DETECTED
ALBUMIN SERPL-MCNC: 3.7 G/DL (ref 3.5–5.2)
ALP BLD-CCNC: 83 U/L (ref 35–104)
ALT SERPL-CCNC: 24 U/L (ref 0–32)
AMMONIA: 20 UMOL/L (ref 11–51)
ANION GAP SERPL CALCULATED.3IONS-SCNC: 14 MMOL/L (ref 7–16)
AST SERPL-CCNC: 28 U/L (ref 0–31)
BASOPHILS ABSOLUTE: 0 E9/L (ref 0–0.2)
BASOPHILS RELATIVE PERCENT: 0.3 % (ref 0–2)
BILIRUB SERPL-MCNC: 1.1 MG/DL (ref 0–1.2)
BORDETELLA PARAPERTUSSIS BY PCR: NOT DETECTED
BORDETELLA PERTUSSIS BY PCR: NOT DETECTED
BUN BLDV-MCNC: 18 MG/DL (ref 8–23)
CALCIUM SERPL-MCNC: 8 MG/DL (ref 8.6–10.2)
CHLAMYDOPHILIA PNEUMONIAE BY PCR: NOT DETECTED
CHLORIDE BLD-SCNC: 107 MMOL/L (ref 98–107)
CO2: 17 MMOL/L (ref 22–29)
CORONAVIRUS 229E BY PCR: NOT DETECTED
CORONAVIRUS HKU1 BY PCR: NOT DETECTED
CORONAVIRUS NL63 BY PCR: NOT DETECTED
CORONAVIRUS OC43 BY PCR: NOT DETECTED
CREAT SERPL-MCNC: 1.4 MG/DL (ref 0.5–1)
EKG ATRIAL RATE: 105 BPM
EKG P AXIS: 22 DEGREES
EKG P-R INTERVAL: 116 MS
EKG Q-T INTERVAL: 384 MS
EKG QRS DURATION: 86 MS
EKG QTC CALCULATION (BAZETT): 507 MS
EKG R AXIS: -4 DEGREES
EKG T AXIS: 47 DEGREES
EKG VENTRICULAR RATE: 105 BPM
EOSINOPHILS ABSOLUTE: 0 E9/L (ref 0.05–0.5)
EOSINOPHILS RELATIVE PERCENT: 0.1 % (ref 0–6)
GFR AFRICAN AMERICAN: 45
GFR NON-AFRICAN AMERICAN: 38 ML/MIN/1.73
GLUCOSE BLD-MCNC: 120 MG/DL (ref 74–99)
HCT VFR BLD CALC: 37.7 % (ref 34–48)
HEMOGLOBIN: 12 G/DL (ref 11.5–15.5)
HUMAN METAPNEUMOVIRUS BY PCR: NOT DETECTED
HUMAN RHINOVIRUS/ENTEROVIRUS BY PCR: NOT DETECTED
INFLUENZA A BY PCR: NOT DETECTED
INFLUENZA B BY PCR: NOT DETECTED
LACTIC ACID: 1.4 MMOL/L (ref 0.5–2.2)
LACTIC ACID: 2.7 MMOL/L (ref 0.5–2.2)
LACTIC ACID: 2.8 MMOL/L (ref 0.5–2.2)
LACTIC ACID: 4.7 MMOL/L (ref 0.5–2.2)
LACTIC ACID: 6.4 MMOL/L (ref 0.5–2.2)
LYMPHOCYTES ABSOLUTE: 1.36 E9/L (ref 1.5–4)
LYMPHOCYTES RELATIVE PERCENT: 3.5 % (ref 20–42)
MAGNESIUM: 1.6 MG/DL (ref 1.6–2.6)
MCH RBC QN AUTO: 33 PG (ref 26–35)
MCHC RBC AUTO-ENTMCNC: 31.8 % (ref 32–34.5)
MCV RBC AUTO: 103.6 FL (ref 80–99.9)
METAMYELOCYTES RELATIVE PERCENT: 0.9 % (ref 0–1)
METER GLUCOSE: 141 MG/DL (ref 74–99)
MONOCYTES ABSOLUTE: 0.68 E9/L (ref 0.1–0.95)
MONOCYTES RELATIVE PERCENT: 1.8 % (ref 2–12)
MYCOPLASMA PNEUMONIAE BY PCR: NOT DETECTED
MYELOCYTE PERCENT: 0.9 % (ref 0–0)
NEUTROPHILS ABSOLUTE: 32.3 E9/L (ref 1.8–7.3)
NEUTROPHILS RELATIVE PERCENT: 93 % (ref 43–80)
OVALOCYTES: ABNORMAL
PARAINFLUENZA VIRUS 1 BY PCR: NOT DETECTED
PARAINFLUENZA VIRUS 2 BY PCR: NOT DETECTED
PARAINFLUENZA VIRUS 3 BY PCR: NOT DETECTED
PARAINFLUENZA VIRUS 4 BY PCR: NOT DETECTED
PDW BLD-RTO: 14.8 FL (ref 11.5–15)
PHOSPHORUS: 2.4 MG/DL (ref 2.5–4.5)
PLATELET # BLD: 200 E9/L (ref 130–450)
PMV BLD AUTO: 10.8 FL (ref 7–12)
POIKILOCYTES: ABNORMAL
POLYCHROMASIA: ABNORMAL
POTASSIUM SERPL-SCNC: 4.3 MMOL/L (ref 3.5–5)
RBC # BLD: 3.64 E12/L (ref 3.5–5.5)
RESPIRATORY SYNCYTIAL VIRUS BY PCR: NOT DETECTED
SARS-COV-2, PCR: NOT DETECTED
SODIUM BLD-SCNC: 138 MMOL/L (ref 132–146)
TEAR DROP CELLS: ABNORMAL
TOTAL PROTEIN: 5.8 G/DL (ref 6.4–8.3)
WBC # BLD: 34 E9/L (ref 4.5–11.5)

## 2020-12-24 PROCEDURE — 85025 COMPLETE CBC W/AUTO DIFF WBC: CPT

## 2020-12-24 PROCEDURE — 6360000002 HC RX W HCPCS: Performed by: INTERNAL MEDICINE

## 2020-12-24 PROCEDURE — 80053 COMPREHEN METABOLIC PANEL: CPT

## 2020-12-24 PROCEDURE — 6370000000 HC RX 637 (ALT 250 FOR IP): Performed by: INTERNAL MEDICINE

## 2020-12-24 PROCEDURE — 83605 ASSAY OF LACTIC ACID: CPT

## 2020-12-24 PROCEDURE — 36415 COLL VENOUS BLD VENIPUNCTURE: CPT

## 2020-12-24 PROCEDURE — 74018 RADEX ABDOMEN 1 VIEW: CPT

## 2020-12-24 PROCEDURE — 2580000003 HC RX 258: Performed by: INTERNAL MEDICINE

## 2020-12-24 PROCEDURE — 76775 US EXAM ABDO BACK WALL LIM: CPT

## 2020-12-24 PROCEDURE — 97162 PT EVAL MOD COMPLEX 30 MIN: CPT | Performed by: PHYSICAL THERAPIST

## 2020-12-24 PROCEDURE — 1200000000 HC SEMI PRIVATE

## 2020-12-24 PROCEDURE — 82140 ASSAY OF AMMONIA: CPT

## 2020-12-24 PROCEDURE — 87449 NOS EACH ORGANISM AG IA: CPT

## 2020-12-24 PROCEDURE — P9047 ALBUMIN (HUMAN), 25%, 50ML: HCPCS | Performed by: INTERNAL MEDICINE

## 2020-12-24 PROCEDURE — 76705 ECHO EXAM OF ABDOMEN: CPT

## 2020-12-24 PROCEDURE — 83735 ASSAY OF MAGNESIUM: CPT

## 2020-12-24 PROCEDURE — 82962 GLUCOSE BLOOD TEST: CPT

## 2020-12-24 PROCEDURE — 84100 ASSAY OF PHOSPHORUS: CPT

## 2020-12-24 RX ORDER — ALBUMIN (HUMAN) 12.5 G/50ML
25 SOLUTION INTRAVENOUS ONCE
Status: COMPLETED | OUTPATIENT
Start: 2020-12-24 | End: 2020-12-24

## 2020-12-24 RX ORDER — ACETAMINOPHEN 650 MG/1
650 SUPPOSITORY RECTAL EVERY 6 HOURS PRN
Status: DISCONTINUED | OUTPATIENT
Start: 2020-12-24 | End: 2020-12-29 | Stop reason: HOSPADM

## 2020-12-24 RX ORDER — FLUTICASONE PROPIONATE 50 MCG
1 SPRAY, SUSPENSION (ML) NASAL DAILY
Status: DISCONTINUED | OUTPATIENT
Start: 2020-12-24 | End: 2020-12-29 | Stop reason: HOSPADM

## 2020-12-24 RX ORDER — SENNA PLUS 8.6 MG/1
1 TABLET ORAL 2 TIMES DAILY
Status: DISCONTINUED | OUTPATIENT
Start: 2020-12-24 | End: 2020-12-25 | Stop reason: SDUPTHER

## 2020-12-24 RX ORDER — LANOLIN ALCOHOL/MO/W.PET/CERES
3 CREAM (GRAM) TOPICAL NIGHTLY PRN
Status: DISCONTINUED | OUTPATIENT
Start: 2020-12-24 | End: 2020-12-29 | Stop reason: HOSPADM

## 2020-12-24 RX ORDER — SODIUM CHLORIDE 0.9 % (FLUSH) 0.9 %
10 SYRINGE (ML) INJECTION EVERY 12 HOURS SCHEDULED
Status: DISCONTINUED | OUTPATIENT
Start: 2020-12-24 | End: 2020-12-29 | Stop reason: HOSPADM

## 2020-12-24 RX ORDER — NICOTINE POLACRILEX 4 MG
15 LOZENGE BUCCAL PRN
Status: DISCONTINUED | OUTPATIENT
Start: 2020-12-24 | End: 2020-12-29 | Stop reason: HOSPADM

## 2020-12-24 RX ORDER — POLYETHYLENE GLYCOL 3350 17 G/17G
17 POWDER, FOR SOLUTION ORAL DAILY
Status: DISCONTINUED | OUTPATIENT
Start: 2020-12-24 | End: 2020-12-29 | Stop reason: HOSPADM

## 2020-12-24 RX ORDER — BISACODYL 10 MG
10 SUPPOSITORY, RECTAL RECTAL ONCE
Status: COMPLETED | OUTPATIENT
Start: 2020-12-24 | End: 2020-12-24

## 2020-12-24 RX ORDER — SODIUM CHLORIDE 0.9 % (FLUSH) 0.9 %
10 SYRINGE (ML) INJECTION PRN
Status: DISCONTINUED | OUTPATIENT
Start: 2020-12-24 | End: 2020-12-26 | Stop reason: SDUPTHER

## 2020-12-24 RX ORDER — PREGABALIN 25 MG/1
25 CAPSULE ORAL 3 TIMES DAILY
Status: ON HOLD | COMMUNITY
End: 2020-12-24

## 2020-12-24 RX ORDER — SODIUM CHLORIDE 9 MG/ML
INJECTION, SOLUTION INTRAVENOUS CONTINUOUS
Status: DISCONTINUED | OUTPATIENT
Start: 2020-12-24 | End: 2020-12-25

## 2020-12-24 RX ORDER — 0.9 % SODIUM CHLORIDE 0.9 %
500 INTRAVENOUS SOLUTION INTRAVENOUS ONCE
Status: COMPLETED | OUTPATIENT
Start: 2020-12-24 | End: 2020-12-24

## 2020-12-24 RX ORDER — AZITHROMYCIN 250 MG/1
500 TABLET, FILM COATED ORAL DAILY
Status: DISCONTINUED | OUTPATIENT
Start: 2020-12-24 | End: 2020-12-25

## 2020-12-24 RX ORDER — DEXTROSE MONOHYDRATE 25 G/50ML
12.5 INJECTION, SOLUTION INTRAVENOUS PRN
Status: DISCONTINUED | OUTPATIENT
Start: 2020-12-24 | End: 2020-12-29 | Stop reason: HOSPADM

## 2020-12-24 RX ORDER — ACETAMINOPHEN 325 MG/1
650 TABLET ORAL EVERY 6 HOURS PRN
Status: DISCONTINUED | OUTPATIENT
Start: 2020-12-24 | End: 2020-12-29 | Stop reason: HOSPADM

## 2020-12-24 RX ORDER — POLYETHYLENE GLYCOL 3350 17 G/17G
17 POWDER, FOR SOLUTION ORAL DAILY PRN
Status: DISCONTINUED | OUTPATIENT
Start: 2020-12-24 | End: 2020-12-29 | Stop reason: HOSPADM

## 2020-12-24 RX ORDER — DEXTROSE MONOHYDRATE 50 MG/ML
100 INJECTION, SOLUTION INTRAVENOUS PRN
Status: DISCONTINUED | OUTPATIENT
Start: 2020-12-24 | End: 2020-12-29 | Stop reason: HOSPADM

## 2020-12-24 RX ADMIN — SENNOSIDES 8.6 MG: 8.6 TABLET, FILM COATED ORAL at 09:42

## 2020-12-24 RX ADMIN — SODIUM CHLORIDE 500 ML: 9 INJECTION, SOLUTION INTRAVENOUS at 01:53

## 2020-12-24 RX ADMIN — SODIUM CHLORIDE, PRESERVATIVE FREE 10 ML: 5 INJECTION INTRAVENOUS at 20:55

## 2020-12-24 RX ADMIN — SODIUM CHLORIDE: 9 INJECTION, SOLUTION INTRAVENOUS at 03:15

## 2020-12-24 RX ADMIN — ENOXAPARIN SODIUM 40 MG: 40 INJECTION SUBCUTANEOUS at 09:42

## 2020-12-24 RX ADMIN — ACETAMINOPHEN 650 MG: 325 TABLET, FILM COATED ORAL at 12:19

## 2020-12-24 RX ADMIN — ACETAMINOPHEN 650 MG: 325 TABLET, FILM COATED ORAL at 20:55

## 2020-12-24 RX ADMIN — SENNOSIDES 8.6 MG: 8.6 TABLET, FILM COATED ORAL at 20:54

## 2020-12-24 RX ADMIN — AZITHROMYCIN MONOHYDRATE 500 MG: 250 TABLET ORAL at 20:55

## 2020-12-24 RX ADMIN — CEFTRIAXONE SODIUM 1 G: 1 INJECTION, POWDER, FOR SOLUTION INTRAMUSCULAR; INTRAVENOUS at 21:06

## 2020-12-24 RX ADMIN — FLUTICASONE PROPIONATE 1 SPRAY: 50 SPRAY, METERED NASAL at 12:12

## 2020-12-24 RX ADMIN — POLYETHYLENE GLYCOL 3350 17 G: 17 POWDER, FOR SOLUTION ORAL at 09:42

## 2020-12-24 RX ADMIN — ALBUMIN (HUMAN) 25 G: 0.25 INJECTION, SOLUTION INTRAVENOUS at 01:56

## 2020-12-24 RX ADMIN — MAGNESIUM CITRATE 296 ML: 1.75 LIQUID ORAL at 17:40

## 2020-12-24 RX ADMIN — BISACODYL 10 MG: 10 SUPPOSITORY RECTAL at 12:10

## 2020-12-24 RX ADMIN — ALBUMIN (HUMAN) 25 G: 0.25 INJECTION, SOLUTION INTRAVENOUS at 03:15

## 2020-12-24 SDOH — ECONOMIC STABILITY: INCOME INSECURITY: HOW HARD IS IT FOR YOU TO PAY FOR THE VERY BASICS LIKE FOOD, HOUSING, MEDICAL CARE, AND HEATING?: NOT ASKED

## 2020-12-24 SDOH — ECONOMIC STABILITY: FOOD INSECURITY: WITHIN THE PAST 12 MONTHS, YOU WORRIED THAT YOUR FOOD WOULD RUN OUT BEFORE YOU GOT MONEY TO BUY MORE.: NOT ASKED

## 2020-12-24 SDOH — ECONOMIC STABILITY: TRANSPORTATION INSECURITY
IN THE PAST 12 MONTHS, HAS THE LACK OF TRANSPORTATION KEPT YOU FROM MEDICAL APPOINTMENTS OR FROM GETTING MEDICATIONS?: NOT ASKED

## 2020-12-24 SDOH — ECONOMIC STABILITY: FOOD INSECURITY: WITHIN THE PAST 12 MONTHS, THE FOOD YOU BOUGHT JUST DIDN'T LAST AND YOU DIDN'T HAVE MONEY TO GET MORE.: NOT ASKED

## 2020-12-24 SDOH — ECONOMIC STABILITY: TRANSPORTATION INSECURITY
IN THE PAST 12 MONTHS, HAS LACK OF TRANSPORTATION KEPT YOU FROM MEETINGS, WORK, OR FROM GETTING THINGS NEEDED FOR DAILY LIVING?: NOT ASKED

## 2020-12-24 ASSESSMENT — PAIN SCALES - GENERAL
PAINLEVEL_OUTOF10: 8
PAINLEVEL_OUTOF10: 0
PAINLEVEL_OUTOF10: 10
PAINLEVEL_OUTOF10: 0
PAINLEVEL_OUTOF10: 0

## 2020-12-24 NOTE — PROGRESS NOTES
Patient interested in meds to beds program.    Electronically signed by James Aceves RN on 12/24/2020 at 2:45 AM

## 2020-12-24 NOTE — CONSULTS
INPATIENT CONSULTATION RECORD FOR  12/24/2020      [facility]  [address] [facility], [address] [facility]  [phone]        REASON FOR CONSULTATION: Retained left ureteral stent/Bilateral renal cysts/Left renal calculi/UTI/Microhematuria      HISTORY OF PRESENT ILLNESS:      The patient is a 76 y.o. female patient who was admitted with weakness, fatigue, and shortness of breath. She is being treated for pneumonia at this time. She feels very worn out. She is not having any pain. She has had frequent UTIs. She is known to Dr. Eliv Howard in our practice. She was found in July of this year to have a left renal stone and left distal ureteral calculus with hydronephrosis. She had placement of a stent at that time. She was taken back to the operating room on 8/7/2020 and underwent left ureteroscopy with ureteral stone removal and stent placement on a string. She has not had follow-up since then and was to remove the stent on her own several days after her surgery. She reports that the string to the stent was pulled but no stent came out. A CT scan on 9/4/2020 was reviewed and shows persistence of a left ureteral stent with stone fragments in the left mid and lower pole calyx, along with bilateral simple renal cysts. She is not having any pain at this time. She has had limited mobility and transportation issues recently. A renal ultrasound was performed today showing bilateral renal cysts and a persistent left ureteral stent. A KUB is pending. She denies gross hematuria. She has a Obrien at this time. Urinalysis showed microscopic hematuria. She has been started on broad-spectrum antibiotics.   Cultures are pending      Past Medical History:   Diagnosis Date    Arthritis     Asthma     COPD (chronic obstructive pulmonary disease) ([facility] [address]     Eczema     Fall 5/26/2011    Gout     Hematoma of abdominal wall 5/2011    extraperitoneal    Hypertension     Metatarsal fracture 1/2011 right 3rd and 4th    Multiple sclerosis (HCC)     Prolonged emergence from general anesthesia     Scoliosis     Spinal stenosis     UTI (urinary tract infection)    Nephrolithiasis      Past Surgical History:   Procedure Laterality Date    APPENDECTOMY      CHOLECYSTECTOMY      CYSTOSCOPY INSERTION / REMOVAL STENT / STONE Right 7/26/2020    CYSTOSCOPY RETROGRADE PYELOGRAM STENT INSERTION LEFT performed by Carlyle Howard DO at [place] [id number] LITHOTRIPSY Left 8/7/2020    CYSTOSCOPY RETROGRADE PYELOGRAM URETEROSCOPY  LEFT J-STENT EXCHANGE, INSERTION JUNIOR CATHETER---FACILITY------ performed by Carlyle Howard DO at [facility] OR       Medications Prior to Admission:    Medications Prior to Admission: Incontinence Supply Disposable (POISE MAXIMUM ABSORBENCY) PADS, USE AS DIRECTED  metoprolol tartrate (LOPRESSOR) 50 MG tablet, Take 1 tablet by mouth 2 times daily  potassium chloride (KLOR-CON) 10 MEQ extended release tablet, Take 2 tablets by mouth daily (Patient taking differently: Take 20 mEq by mouth 2 times daily With her Lasix)  loratadine (CLARITIN) 10 MG capsule, Take 1 capsule by mouth 2 times daily  senna (SENOKOT) 8.6 MG TABS tablet, Take 1 tablet by mouth daily  fluticasone (FLONASE) 50 MCG/ACT nasal spray, 1 spray by Each Nostril route daily  albuterol sulfate (PROAIR RESPICLICK) 401 (90 Base) MCG/ACT aerosol powder inhalation, Inhale into the lungs every 4 hours as needed for Wheezing or Shortness of Breath  melatonin 3 MG TABS tablet, Take 1 tablet by mouth nightly as needed (insomnia)  polyethylene glycol (GLYCOLAX) 17 g packet, Take 25 g by mouth daily as needed for Constipation  furosemide (LASIX) 40 MG tablet, Take 1 tablet by mouth daily  Oral Electrolytes (PEDIALYTE) SOLN, Drink daily for good fluid intake.   sennosides-docusate sodium (SENOKOT-S) 8.6-50 MG tablet, Take 1 tablet by mouth daily  b complex vitamins capsule, Take 1 capsule by mouth daily albuterol (PROVENTIL) (2.5 MG/3ML) 0.083% nebulizer solution, Take 3 mLs by nebulization See Admin Instructions (Patient taking differently: Take 2.5 mg by nebulization every 4 hours as needed )  acetaminophen (TYLENOL) 325 MG tablet, Take 2 tablets by mouth every 4 hours as needed for Pain or Fever  losartan (COZAAR) 50 MG tablet, Take 1 tablet by mouth daily  magnesium citrate solution, Take 296 mLs by mouth daily as needed for Constipation  Incontinence Supply Disposable (PROTECTIVE UNDERWEAR LARGE) MISC, Use as directed  Ascorbic Acid (VITAMIN C) 1000 MG tablet, Take 1,000 mg by mouth daily  Disposable Gloves (VINYL GLOVES) MISC, by Does not apply route  ammonium lactate (LAC-HYDRIN) 12 % lotion, Apply topically as needed for Dry Skin Apply topically as needed. [DISCONTINUED] pregabalin (LYRICA) 25 MG capsule, Take 25 mg by mouth 3 times daily. Indications: for nerve pain  sulfamethoxazole-trimethoprim (BACTRIM DS;SEPTRA DS) 800-160 MG per tablet, Take 1 tablet by mouth 2 times daily for 10 days  [DISCONTINUED] Oil of Oregano 1500 MG CAPS, Take 3,000 mg by mouth daily  STOOL SOFTENER 100 MG capsule, Take 1 capsule by mouth 2 times daily as needed for Constipation    Allergies:    Lyrica [pregabalin] and Adhesive tape    Social History:   She  reports that she quit smoking about 21 months ago. Her smoking use included cigarettes. She has a 11.50 pack-year smoking history. She has never used smokeless tobacco. She reports that she does not drink alcohol or use drugs. She is  and has 2 children. She lives alone in an apartment. She is a retired drug     Family History:   Non-contributory to this Urological problem  family history includes Asthma in her father; Breast Cancer in her sister; Cancer in her maternal aunt; Heart Failure in her mother; High Blood Pressure in her father.     REVIEW OF SYSTEMS:  Respiratory: denies any symptoms of a productive cough, shortness of breath, or hemoptysis Cardiovascular: denies chest pain, dyspnea, or cardiac murmur  Gastrointestinal: negative for abdominal pain, diarrhea, or constipation  Dermatologic: denies any rashes, skin lesion(s), or pruritis  Neurological: negative for headaches, seizures, and tremors  Endocrine: negative for diabetic symptoms including polydipsia and polyuria or thyroid dysfunction  Ocular: denies retinopathy or glaucoma  ENT: denies sinusitis or epistaxis  Constitutional: denies nausea, vomiting, fever, or chills  Psychiatric: denies anxiety or depression  : denies gross hematuria. She gets frequent urinary tract infections    PHYSICAL EXAM:    Vitals:  /60   Pulse 98   Temp 99.6 °F (37.6 °C) (Oral)   Resp 20   Ht 5' 6\" (1.676 m)   Wt 195 lb (88.5 kg)   SpO2 99%   BMI 31.47 kg/m²     General:  Awake, alert, oriented X 3. Well developed, well nourished, well groomed. No apparent distress. HEENT:  Normocephalic, atraumatic. Pupils equal, round. No scleral icterus. No conjunctival injection. Normal lips, teeth, and gums. No nasal discharge. Neck:  Supple, no masses. Heart:  RRR. Lungs:  No audible wheezing. Respirations symmetric and non-labored. Clear bilaterally. Abdomen:  Soft, nontender, no masses, no organomegaly, no peritoneal signs. Active bowel sounds. No rebound or guarding. No flank or CVA tenderness. Extremities:  No clubbing, cyanosis, or edema. Brisk peripheral pulses. Skin:  Warm and dry, no open lesions or rashes. Neuro:  Cranial nerves 2-12 intact, no focal motor or sensory deficits. Alert and oriented. Rectal: Deferred  Genitalia: Normal female external genitalia. No obvious prolapse. She has a 16 [employer] Obrien catheter draining clear, yellow urine into a gravity bag. There is NO evidence of a ureteral stent string protruding from her urethra.     LABS:    Lab Results   Component Value Date    WBC 34.0 (H) 12/24/2020    HGB 12.0 12/24/2020    HCT 37.7 12/24/2020 .6 (H) 12/24/2020     12/24/2020       Lab Results   Component Value Date    BUN 18 12/24/2020    CREATININE 1.4 (H) 12/24/2020                    ASSESSMENT / PLAN:    Retained left ureteral stent/Bilateral renal cysts/Left renal calculi/UTI/Microhematuria  Her genitalia were examined. There is NO evidence of a stent string. Somehow this must have been removed without removing the stent in the process. A CT scan 3 months ago and a renal ultrasound today suggests the stent is still present. A KUB will be performed to check the stent location and to assess any residual stone burden. The left ureteral stone has likely been removed 4 months ago, however, she likely has persistent stones in the left kidney which were seen on CT imaging in September. She is limited in her mobility at this time. Her catheter will be kept indwelling for now but can be removed when she is ready for discharge. She will continue with broad-spectrum antibiotics until her cultures are finalized. She may require further stone treatment if they are still present on her KUB imaging. If that is the case, the stent will be kept indwelling and she will be scheduled as an outpatient for ESWL and stent removal.  If the stones are no longer present or minimal, plans will be made to have her stent removed during this admission after resolution of her acute illness since she has transportation issues and trouble getting to our office. We will follow her during her hospital stay. Thank you for allow me to assist in her care once again.   Sincerely,      Abi Klein  12:22 PM  12/24/2020

## 2020-12-24 NOTE — CARE COORDINATION
12/24/2020 1329 CM note: COVID (-) 12/23/2020. Met with patient at the bedside to discuss transition of care at discharge. Patient lives alone in a 3rd floor apartment with elevator. Patient is independent with ADLs, and patient uses [employer] for transportation or her friends provide a ride. Patient home DME includes wc (manual and electric), walker, and nebulizer through [email or web address]. Patient has hx [address] and [address] through [employer], and [employer]. Patient has aides come in from [address] [place] [address] on Th-F-Sat for 3 hours/day  to assist with laundry, light housekeeping, and grocery shopping. Discharge plan is home with [address] if ordered. [facility]- referral given to [facility]- she can accept patient with start of services on Wednesday or Thursday (next week). Pts friend Holly Paiz or [employer] will provide transportation home.  Electronically signed by Aster Strong RN on 12/24/2020 at 1:42 PM

## 2020-12-24 NOTE — PROGRESS NOTES
Physical Therapy Initial Evaluation    Room #:  6553/5970-32  Patient Name: Chrissy Zacarias  YOB: 1955  MRN: [id number]    Referring Provider: Fartun Headley DO    Date of Service: 12/24/2020    Evaluating Physical Therapist:  Carrol Abad PT     Diagnosis:   Sepsis due to urinary tract infection ([facility] [address] [A41.9, N39.0]        Patient Active Problem List   Diagnosis    Neck pain    Multiple sclerosis ([address]    Bilateral foot-drop    Peripheral polyneuropathy    Essential hypertension    Asthma    Edema of both legs    Constipation    Intervertebral lumbar disc disorder with myelopathy, lumbar region    Cervical spinal stenosis    Ureterolithiasis    Pulmonary venous congestion    Fatigue    Sepsis due to urinary tract infection ([address]        Tentative placement recommendation: Subacute rehab    Equipment recommendation: None      Prior Level of Function: Patient ambulated last at [facility] 8/20 in parallel bars. States she could stand for a few minutes to do her dishes.  Reports she was able to transfer bed to chair independently  Rehab Potential: good  for baseline    Past medical history:   Past Medical History:   Diagnosis Date    Arthritis     Asthma     COPD (chronic obstructive pulmonary disease) ([address]     Eczema     Fall 5/26/2011    Gout     Hematoma of abdominal wall 5/2011    extraperitoneal    Hypertension     Metatarsal fracture 1/2011    right 3rd and 4th    Multiple sclerosis ([address]     Prolonged emergence from general anesthesia     Scoliosis     Spinal stenosis     UTI (urinary tract infection)      Past Surgical History:   Procedure Laterality Date    APPENDECTOMY      CHOLECYSTECTOMY      CYSTOSCOPY INSERTION / REMOVAL STENT / STONE Right 7/26/2020    CYSTOSCOPY RETROGRADE PYELOGRAM STENT INSERTION LEFT [facility] LITHOTRIPSY Left 8/7/2020 Stair negotiation: ascended and descended   Not assessed       Not assessed      ROM Within functional limits        Strength BUE: 4/5  RLE:  2 - 3+/5  LLE:  2 - 3+/5  Increase strength in affected mm groups by 1/3 grade   Balance Sitting EOB:  fair +   Dynamic Standing:  not assessed    Sitting EOB:  good    Dynamic Standing: not assessed       Patient is Alert & Oriented x person, place, time and situation and follows directions    Sensation:  Patient  reports numbness and tingling to extremities    Edema:  yes bilateral lower extremities    Endurance: poor        Patient education  Patient educated on role of Physical Therapy, risks of immobility, safety and plan of care, energy conservation, importance of positional changes for oxygen exchange and  importance of mobility while in hospital       Patient response to education:   Pt verbalized understanding Pt demonstrated skill Pt requires further education in this area   Yes Partial Yes      ASSESSMENT: Patient exhibits decreased strength, balance, coordination impairing functional mobility. Therapist educated and facilitated patient on techniques to increase safety and independence with bed mobility, balance, functional transfers, and functional mobility. At end of session, patient in bed with alarm call light and phone within reach,   all lines and tubes intact, nursing notified. Patient would benefit from continued skilled Physical Therapy to improve functional independence and quality of life. Patient's/ family goals   home        Patient and or family understand(s) diagnosis, prognosis, and plan of care. PLAN:    Physical Therapy care will be provided in accordance with the objectives noted above. Exercises and functional mobility practice will be used as well as appropriate assistive devices or modalities to obtain goals. Patient and family education will also be administered as needed. Frequency of treatments: Patient will be seen    daily  for therapeutic exercise, functional retraining, endurance activities, balance exercises, family and patient education. Time in  1040  Time out  1105    Total Treatment Time  0 minutes    Evaluation time includes thorough review of current medical information, gathering information on past medical history/social history and prior level of function, completion of standardized testing/informal observation of tasks, assessment of data, and development of Plan of care and goals. CPT codes:   Moderate Complexity PT evaluation (17761)  No treatment    Mary Santoro, PT

## 2020-12-24 NOTE — PROGRESS NOTES
Internal Medicine Progress Note    [facility]    Alfreda Naranjo. Colette Caballero., F.MARCI.ANALIOTerrenceI. Phuc Duran D.O., SUREKHA Segura D.O. Kelley Kirby, MSN, APRN, NP-C  Alvarado Blanco. Dariela Gonsalez, MSN, APRN-CNP     Primary Care Physician: Ailyn Mojica MD   Admitting Physician:  Nimo Shepherd DO  Admission date and time: 12/23/2020  6:57 PM    Room:  [address] [place] [address]  Admitting diagnosis: Sepsis due [facility] tract infection ([facility] [A41.9, N39.0]    Patient Name: Pamella Cano  MRN: [id number]    Date of Service: 12/24/2020     Subjective:  Deepika Boone is a 76 y.o. female who was seen and examined today,12/24/2020, at the bedside. Patient seen uncomfortable. Complaining of constipation and abdominal distention. Also fever and chills    No family present during my examination. Review of System:   Constitutional:   Denies  weight loss or gain, fatigue or malaise. Complains of fever and chills  HEENT:   Denies ear pain, sore throat, sinus or eye problems. Cardiovascular:   Denies any chest pain, irregular heartbeats, or palpitations. Respiratory:   Denies shortness of breath, coughing, sputum production, hemoptysis, or wheezing. Gastrointestinal:   Denies nausea, vomiting, diarrhea. Denies any abdominal pain. Complains of chronic constipation abdominal distention  Genitourinary:    Urinary frequency  Extremities:   Denies lower extremity swelling, edema or cyanosis. Neurology:    Denies any headache or focal neurological deficits, Denies generalized weakness or memory difficulty. Psch:   Denies being anxious or depressed. Musculoskeletal:    Denies  myalgias, joint complaints or back pain. Integumentary:   Denies any rashes, ulcers, or excoriations. Denies bruising. Hematologic/Lymphatic:  Denies bruising or bleeding. Physical Exam:  No intake/output data recorded.     Intake/Output Summary (Last 24 hours) at 12/24/2020 2137 Last data filed at 12/24/2020 7040  Gross per 24 hour   Intake 1072.6 ml   Output 600 ml   Net 472.6 ml   I/O last 3 completed shifts: In: 1072.6 [P.O.:120; I.V.:252.6; IV Piggyback:700]  Out: 600 [Urine:600]  Patient Vitals for the past 96 hrs (Last 3 readings):   Weight   12/24/20 0114 195 lb (88.5 kg)     Vital Signs:   Blood pressure 102/60, pulse 98, temperature 99.6 °F (37.6 °C), temperature source Oral, resp. rate 20, height 5' 6\" (1.676 m), weight 195 lb (88.5 kg), SpO2 99 %, not currently breastfeeding. General appearance:  Alert, responsive, oriented to person, place, and time. Well preserved, alert, no distress. Head:  Normocephalic. No masses, lesions or tenderness. Eyes:  PERRLA. EOMI. Sclera clear. Buccal mucosa moist.  ENT:  Ears normal. Mucosa normal.  Neck:    Supple. Trachea midline. No thyromegaly. No JVD. No bruits. Heart:    Rhythm regular. Rate controlled. No murmurs. Lungs:    Symmetrical. Clear to auscultation bilaterally. No wheezes. No rhonchi. No rales. Abdomen:   Soft. Non-tender. Non-distended. Bowel sounds positive. No organomegaly or masses. No pain on palpation. Extremities:    Peripheral pulses present. No peripheral edema. No ulcers. No cyanosis. No clubbing. Neurologic:    Alert x 3. No focal deficit. Cranial nerves grossly intact. No focal weakness. Psych:   Behavior is normal. Mood appears normal. Speech is not rapid and/or pressured. Musculoskeletal:   Spine ROM normal. Muscular strength intact. Gait not assessed. Integumentary:  No rashes  Skin normal color and texture.   Genitalia/Breast:  Deferred    Medication:  Scheduled Meds:   sodium chloride flush  10 mL Intravenous 2 times per day    enoxaparin  40 mg Subcutaneous Daily    azithromycin  500 mg Oral Daily    cefTRIAXone (ROCEPHIN) IV  1 g Intravenous Q24H    fluticasone  1 spray Each Nostril Daily    polyethylene glycol  17 g Oral Daily    senna  1 tablet Oral BID     Continuous Infusions:  dextrose      sodium chloride 100 mL/hr at 12/24/20 1145       Objective Data:  CBC with Differential:    Lab Results   Component Value Date    WBC 34.0 12/24/2020    RBC 3.64 12/24/2020    HGB 12.0 12/24/2020    HCT 37.7 12/24/2020     12/24/2020    .6 12/24/2020    MCH 33.0 12/24/2020    MCHC 31.8 12/24/2020    RDW 14.8 12/24/2020    SEGSPCT 50 05/28/2011    METASPCT 0.9 12/24/2020    LYMPHOPCT 3.5 12/24/2020    MONOPCT 1.8 12/24/2020    MYELOPCT 0.9 12/24/2020    BASOPCT 0.3 12/24/2020    MONOSABS 0.68 12/24/2020    LYMPHSABS 1.36 12/24/2020    EOSABS 0.00 12/24/2020    BASOSABS 0.00 12/24/2020     CMP:    Lab Results   Component Value Date     12/24/2020    K 4.3 12/24/2020    K 4.3 12/23/2020     12/24/2020    CO2 17 12/24/2020    BUN 18 12/24/2020    CREATININE 1.4 12/24/2020    GFRAA 45 12/24/2020    LABGLOM 38 12/24/2020    GLUCOSE 120 12/24/2020    GLUCOSE 96 05/28/2011    PROT 5.8 12/24/2020    LABALBU 3.7 12/24/2020    LABALBU 4.0 05/27/2011    CALCIUM 8.0 12/24/2020    BILITOT 1.1 12/24/2020    ALKPHOS 83 12/24/2020    AST 28 12/24/2020    ALT 24 12/24/2020              Assessment:    · Sepsis secondary to UTI and pneumonia  · Constipation . · Chronic bilateral lower extremity edema  · Lactic acidosis   · Hepatic steatosis. ·  Multiple sclerosis  · Hypertension  · Asthma/COPD  · Scoliosis  · History of tobacco abuse      Plan:      · We will consult urology because of possible stent  · Continue antibiotic therapy. Awaiting further culture  · DVT prophylaxis  · Continue treatment chronic comorbidity  · Treat underlying constipation.   · Monitor lactic acid level More than 50% of my time was spent at the bedside counseling/coordinating care with the patient and/or family with face to face contact. This time was spent reviewing notes and laboratory data as well as instructing and counseling the patient. Time I spent with the family or surrogate(s) is included only if the patient was incapable of providing the necessary information or participating in medical decisions. I also discussed the differential diagnosis and all of the proposed management plans with the patient and individuals accompanying the patient. I am readily available for any further decision-making and intervention.        Sadia Awad DO, F.A.C.O.I.  12/24/2020  1:53 PM

## 2020-12-24 NOTE — H&P
Department of Internal Medicine  History and Physical    PCP: Dr. Binh Pinedo  Admitting Physician: Dr. Adele Sandhoff  Consultants:       CHIEF COMPLAINT:  Weakness/fatigue    HISTORY OF PRESENT ILLNESS:    Patient is 41-year-old female who presented to the ED due to feeling very weak and fatigued. States that she has been feeling like this for about a week. She does complain of some shortness of breath and nonproductive cough. However she denies any abdominal pain or fever. She admits to constipation. She does have some abdominal distention. She admits to increased lower extremity edema. PAST MEDICAL Hx:  Past Medical History:   Diagnosis Date    Arthritis     Asthma     COPD (chronic obstructive pulmonary disease) ([facility] [address]     Eczema     Fall 5/26/2011    Gout     Hematoma of abdominal wall 5/2011    extraperitoneal    Hypertension     Metatarsal fracture 1/2011    right 3rd and 4th    Multiple sclerosis (HCC)     Prolonged emergence from general anesthesia     Scoliosis     Spinal stenosis     UTI (urinary tract infection)        PAST SURGICAL Hx:   Past Surgical History:   Procedure Laterality Date    APPENDECTOMY      CHOLECYSTECTOMY      CYSTOSCOPY INSERTION / REMOVAL STENT / STONE Right 7/26/2020    CYSTOSCOPY RETROGRADE PYELOGRAM STENT INSERTION LEFT performed by Joseline Howard DO at [facility] [address] LITHOTRIPSY Left 8/7/2020    CYSTOSCOPY RETROGRADE PYELOGRAM URETEROSCOPY  LEFT J-STENT EXCHANGE, INSERTION JUNIOR CATHETER---FACILITY------ [place] [facility]       FAMILY Hx:  Family History   Problem Relation Age of Onset    Asthma Father     High Blood Pressure Father     Cancer Maternal Aunt        HOME MEDICATIONS:  Prior to Admission medications    Medication Sig Start Date End Date Taking?  Authorizing Provider sulfamethoxazole-trimethoprim (BACTRIM DS;SEPTRA DS) 800-160 MG per tablet Take 1 tablet by mouth 2 times daily for 10 days 12/17/20 12/27/20  Janine Goetz MD   Incontinence Supply Disposable (POISE MAXIMUM ABSORBENCY) PADS USE AS DIRECTED 12/10/20   Janine Goetz MD   Oil of Oregano 1500 MG CAPS Take 3,000 mg by mouth daily 12/4/20   Historical Provider, MD   metoprolol tartrate (LOPRESSOR) 50 MG tablet Take 1 tablet by mouth 2 times daily 11/19/20   Janine Goetz MD   potassium chloride (KLOR-CON) 10 MEQ extended release tablet Take 2 tablets by mouth daily  Patient taking differently: Take 20 mEq by mouth 2 times daily With her Lasix 11/19/20   Janine Goetz MD   loratadine (CLARITIN) 10 MG capsule Take 1 capsule by mouth 2 times daily 10/16/20   Janine Goetz MD   STOOL SOFTENER 100 MG capsule Take 1 capsule by mouth 2 times daily as needed for Constipation 9/30/20   Janine Goetz MD   senna (SENOKOT) 8.6 MG TABS tablet Take 1 tablet by mouth daily 9/30/20   Janine Goetz MD   fluticasone (FLONASE) 50 MCG/ACT nasal spray 1 spray by Each Nostril route daily 9/30/20   Janine Goetz MD   albuterol sulfate (PROAIR RESPICLICK) 580 (90 Base) MCG/ACT aerosol powder inhalation Inhale into the lungs every 4 hours as needed for Wheezing or Shortness of Breath    Historical Provider, MD   melatonin 3 MG TABS tablet Take 1 tablet by mouth nightly as needed (insomnia) 8/21/20   Janine Goetz MD   polyethylene glycol (GLYCOLAX) 17 g packet Take 25 g by mouth daily as needed for Constipation 8/21/20   Janine Goetz MD   furosemide (LASIX) 40 MG tablet Take 1 tablet by mouth daily 8/21/20   Janine Goetz MD   Oral Electrolytes (PEDIALYTE) SOLN Drink daily for good fluid intake.  8/21/20   Janine Goetz MD   sennosides-docusate sodium (SENOKOT-S) 8.6-50 MG tablet Take 1 tablet by mouth daily    Historical MD Dirk   b complex vitamins capsule Take 1 capsule by mouth daily    Historical Provider, MD albuterol (PROVENTIL) (2.5 MG/3ML) 0.083% nebulizer solution Take 3 mLs by nebulization See Admin Instructions  Patient taking differently: Take 2.5 mg by nebulization every 4 hours as needed  20   Carolina Beans Bisel, DO   acetaminophen (TYLENOL) 325 MG tablet Take 2 tablets by mouth every 4 hours as needed for Pain or Fever 20   Carolina Beans Bisel, DO   losartan (COZAAR) 50 MG tablet Take 1 tablet by mouth daily 20   Carolina Beans Bisel, DO   magnesium citrate solution Take 296 mLs by mouth daily as needed for Constipation 20   Carolina Beans Bisel, DO   Incontinence Supply Disposable (PROTECTIVE UNDERWEAR LARGE) MISC Use as directed 20   Teena Pena MD   Ascorbic Acid (VITAMIN C) 1000 MG tablet Take 1,000 mg by mouth daily    Historical Provider, MD   Disposable Gloves (VINYL GLOVES) MISC by Does not apply route    Historical Provider, MD   ammonium lactate (LAC-HYDRIN) 12 % lotion Apply topically as needed for Dry Skin Apply topically as needed.     Historical Provider, MD       ALLERGIES:  Adhesive tape    SOCIAL Hx:  Social History     Socioeconomic History    Marital status:      Spouse name: Not on file    Number of children: Not on file    Years of education: Not on file    Highest education level: Not on file   Occupational History    Not on file   Social Needs    Financial resource strain: Not on file    Food insecurity     Worry: Not on file     Inability: Not on file    Transportation needs     Medical: Not on file     Non-medical: Not on file   Tobacco Use    Smoking status: Former Smoker     Packs/day: 0.25     Years: 46.00     Pack years: 11.50     Types: Cigarettes     Quit date: 2019     Years since quittin.8    Smokeless tobacco: Never Used   Substance and Sexual Activity    Alcohol use: No    Drug use: No    Sexual activity: Not Currently   Lifestyle    Physical activity     Days per week: Not on file     Minutes per session: Not on file    Stress: Not on file Relationships    Social connections     Talks on phone: Not on file     Gets together: Not on file     Attends [employer] service: Not on file     Active member of club or organization: Not on file     Attends meetings of clubs or organizations: Not on file     Relationship status: Not on file    Intimate partner violence     Fear of current or ex partner: Not on file     Emotionally abused: Not on file     Physically abused: Not on file     Forced sexual activity: Not on file   Other Topics Concern    Not on file   Social History Narrative    Not on file       ROS: Positive in bold  General:   Denies chills, fatigue, fever, malaise, night sweats or weight loss    Psychological:   Denies anxiety, disorientation or hallucinations    ENT:    Denies epistaxis, headaches, vertigo or visual changes    Cardiovascular:   Denies any chest pain, irregular heartbeats, or palpitations. No paroxysmal nocturnal dyspnea. Respiratory:   Denies shortness of breath, coughing, sputum production, hemoptysis, or wheezing. No orthopnea. Gastrointestinal:   Denies nausea, vomiting, diarrhea, or constipation. Denies any abdominal pain. Denies change in bowel habits or stools. Genito-Urinary:    Denies any urgency, frequency, hematuria. Voiding without difficulty. Musculoskeletal:   Denies joint pain, joint stiffness, joint swelling or muscle pain    Neurology:    Denies any headache or focal neurological deficits. No weakness or paresthesia. Derm:    Denies any rashes, ulcers, or excoriations. Denies bruising. Extremities:   Denies any lower extremity swelling or edema.       PHYSICAL EXAM:  VITALS:  Vitals:    12/23/20 2128   BP: (!) 84/59   Pulse: 102   Resp: 20   Temp: 99.2 °F (37.3 °C)   SpO2: 95%         CONSTITUTIONAL:    Awake, alert, cooperative, no apparent distress, and appears stated age    EYES:    PERRL, EOMI, sclera clear, conjunctiva normal    ENT: MYELOPCT 1.0 02/08/2020    BASOPCT 0.4 12/23/2020    MONOSABS 0.21 12/23/2020    LYMPHSABS 0.63 12/23/2020    EOSABS 0.00 12/23/2020    BASOSABS 0.00 12/23/2020     CMP:    Lab Results   Component Value Date     12/23/2020    K 4.3 12/23/2020     12/23/2020    CO2 17 12/23/2020    BUN 17 12/23/2020    CREATININE 1.3 12/23/2020    GFRAA 49 12/23/2020    LABGLOM 41 12/23/2020    GLUCOSE 121 12/23/2020    GLUCOSE 96 05/28/2011    PROT 6.7 12/23/2020    LABALBU 3.6 12/23/2020    LABALBU 4.0 05/27/2011    CALCIUM 9.0 12/23/2020    BILITOT 1.1 12/23/2020    ALKPHOS 143 12/23/2020    AST 40 12/23/2020    ALT 32 12/23/2020       ASSESSMENT/PLAN:  1. Sepsis secondary to UTI and pneumonia  1. Continue Rocephin and azithromycin. Urine cultures ordered. Respiratory culture will be ordered as well. Continue IV fluids. Give a dose of albumin. Obtain renal ultrasound. 2. Constipation   1. Placed on bowel regimen. 3. Chronic bilateral lower extremity edema  1. Hold off on diuretic therapy in the setting of hypotension. 4. Lactic acidosis   1. Trend. Continue IV fluids  5. Hepatic steatosis  1. Patient does have distended abdomen. Obtain abdominal ultrasound. Assess for ascites. 6.  multiple sclerosis  7. Hypertension  8. Asthma/COPD  9. Scoliosis  10. History of tobacco abuse          Jimbo Jaramillo D.O.  9:49 PM  12/23/2020    Electronically signed by Jimbo Jaramillo DO on 12/23/20 at 9:49 PM EST  . Telehealth visit completed via physician liaison, Dr. Irene Meza,  who personally saw and examined the patient. I personally participated in the case including review of pertinent history as augmented in the above medical record and medical decision making on the date of service and I agree with all pertinent clinical formation unless otherwise noted. Tawnya Awad D.O., F.A.C.O.I.  10:00 PM  12/23/2020

## 2020-12-24 NOTE — ED PROVIDER NOTES
Fatigue  Severity:  Moderate  Onset quality:  Gradual  Duration:  1 day  Timing:  Constant  Progression:  Worsening  Chronicity:  New  Context: recent infection (URI for past week)    Relieved by:  Nothing  Worsened by: Activity  Ineffective treatments:  Rest  Associated symptoms: cough, diarrhea, difficulty walking (unable to get out of bed today), fever, frequency, nausea and shortness of breath    Associated symptoms: no abdominal pain, no anorexia, no chest pain, no dysuria, no numbness in extremities, no falls, no headaches, no loss of consciousness, no myalgias, no sensory-motor deficit, no syncope and no vomiting    Risk factors: no diabetes    Risk factors comment:  Htn      Review of Systems   Constitutional: Positive for activity change, appetite change, chills, fatigue and fever. HENT: Negative. Respiratory: Positive for cough, chest tightness and shortness of breath. Cardiovascular: Negative for chest pain, palpitations, leg swelling and syncope. Gastrointestinal: Positive for diarrhea and nausea. Negative for abdominal pain, anorexia, blood in stool and vomiting. Genitourinary: Positive for frequency. Negative for dysuria and flank pain. Musculoskeletal: Negative. Negative for falls, myalgias and neck pain. Skin: Negative. Neurological: Positive for weakness. Negative for loss of consciousness and headaches. Physical Exam  Vitals signs and nursing note reviewed. Constitutional:       Appearance: She is well-developed. She is obese. She is ill-appearing. HENT:      Head: Normocephalic and atraumatic. Nose: Nose normal.      Mouth/Throat:      Mouth: Mucous membranes are dry. Eyes:      Extraocular Movements: Extraocular movements intact. Conjunctiva/sclera: Conjunctivae normal.      Pupils: Pupils are equal, round, and reactive to light. Neck:      Musculoskeletal: Normal range of motion and neck supple.    Cardiovascular: Rate and Rhythm: Normal rate and regular rhythm. Heart sounds: Normal heart sounds. No murmur. Pulmonary:      Effort: Pulmonary effort is normal. No respiratory distress. Breath sounds: Normal breath sounds. No wheezing or rales. Abdominal:      General: Bowel sounds are normal.      Palpations: Abdomen is soft. Tenderness: There is no abdominal tenderness. There is no guarding or rebound. Skin:     General: Skin is warm and dry. Neurological:      Mental Status: She is alert and oriented to person, place, and time. Cranial Nerves: No cranial nerve deficit. Coordination: Coordination normal.         Procedures    MDM  EKG Interpretation    Interpreted by emergency department physician    Rhythm: sinus tachycardia  Rate: 100-110  Axis: left  Ectopy: none  Conduction: normal  ST Segments: depression in V5 and V6  T Waves: aVL  Q Waves: III    Clinical Impression: non-specific EKG    María Luciano       EKG Interpretation    Interpreted by emergency department physician    Rhythm: sinus tachycardia  Rate: 100-110  Axis: normal  Ectopy: none  Conduction: normal  ST Segments: depression in  v5 and v6  T Waves: inversion in  III  Q Waves: III    Clinical Impression: sinus tachycardia no change when compared to previous 7/26/20    María Luciano             --------------------------------------------- PAST HISTORY ---------------------------------------------  Past Medical History:  has a past medical history of Arthritis, Asthma, COPD (chronic obstructive pulmonary disease) ([address] Eczema, Fall, Gout, Hematoma of abdominal wall, Hypertension, Metatarsal fracture, Multiple sclerosis ([address] Prolonged emergence from general anesthesia, Scoliosis, Spinal stenosis, and UTI (urinary tract infection). Past Surgical History:  has a past surgical history that includes Hysterectomy; Cholecystectomy; Appendectomy; CYSTOSCOPY INSERTION / REMOVAL STENT / STONE (Right, 7/26/2020); and Lithotripsy (Left, 8/7/2020). Social History:  reports that she quit smoking about 21 months ago. Her smoking use included cigarettes. She has a 11.50 pack-year smoking history. She has never used smokeless tobacco. She reports that she does not drink alcohol or use drugs. Family History: family history includes Asthma in her father; Cancer in her maternal aunt; High Blood Pressure in her father. The patients home medications have been reviewed.     Allergies: Adhesive tape    -------------------------------------------------- RESULTS -------------------------------------------------    Lab  Results for orders placed or performed during the hospital encounter of 12/23/20   Lactate, Sepsis   Result Value Ref Range    Lactic Acid, Sepsis 5.7 (HH) 0.5 - 1.9 mmol/L   CBC auto differential   Result Value Ref Range    WBC 21.0 (H) 4.5 - 11.5 E9/L    RBC 4.43 3.50 - 5.50 E12/L    Hemoglobin 15.3 11.5 - 15.5 g/dL    Hematocrit 45.0 34.0 - 48.0 %    .6 (H) 80.0 - 99.9 fL    MCH 34.5 26.0 - 35.0 pg    MCHC 34.0 32.0 - 34.5 %    RDW 14.5 11.5 - 15.0 fL    Platelets 156 415 - 430 E9/L    MPV 10.4 7.0 - 12.0 fL    Neutrophils % 96.5 (H) 43.0 - 80.0 %    Lymphocytes % 2.7 (L) 20.0 - 42.0 %    Monocytes % 0.9 (L) 2.0 - 12.0 %    Eosinophils % 0.2 0.0 - 6.0 %    Basophils % 0.4 0.0 - 2.0 %    Neutrophils Absolute 20.37 (H) 1.80 - 7.30 E9/L    Lymphocytes Absolute 0.63 (L) 1.50 - 4.00 E9/L    Monocytes Absolute 0.21 0.10 - 0.95 E9/L    Eosinophils Absolute 0.00 (L) 0.05 - 0.50 E9/L    Basophils Absolute 0.00 0.00 - 0.20 E9/L   Comprehensive Metabolic Panel w/ Reflex to MG   Result Value Ref Range    Sodium 135 132 - 146 mmol/L    Potassium reflex Magnesium 4.3 3.5 - 5.0 mmol/L    Chloride 103 98 - 107 mmol/L    CO2 17 (L) 22 - 29 mmol/L Anion Gap 15 7 - 16 mmol/L    Glucose 121 (H) 74 - 99 mg/dL    BUN 17 8 - 23 mg/dL    CREATININE 1.3 (H) 0.5 - 1.0 mg/dL    GFR Non-African American 41 >=60 mL/min/1.73    GFR African American 49     Calcium 9.0 8.6 - 10.2 mg/dL    Total Protein 6.7 6.4 - 8.3 g/dL    Alb 3.6 3.5 - 5.2 g/dL    Total Bilirubin 1.1 0.0 - 1.2 mg/dL    Alkaline Phosphatase 143 (H) 35 - 104 U/L    ALT 32 0 - 32 U/L    AST 40 (H) 0 - 31 U/L   APTT   Result Value Ref Range    aPTT 25.7 24.5 - 35.1 sec   Protime-INR   Result Value Ref Range    Protime 12.1 9.3 - 12.4 sec    INR 1.1    Lipase   Result Value Ref Range    Lipase 16 13 - 60 U/L   Urinalysis with Microscopic   Result Value Ref Range    Color, UA Yellow Straw/Yellow    Clarity, UA SLCLOUDY Clear    Glucose, Ur Negative Negative mg/dL    Bilirubin Urine Negative Negative    Ketones, Urine Negative Negative mg/dL    Specific Gravity, UA 1.025 1.005 - 1.030    Blood, Urine MODERATE (A) Negative    pH, UA 6.0 5.0 - 9.0    Protein, UA Negative Negative mg/dL    Urobilinogen, Urine 0.2 <2.0 E.U./dL    Nitrite, Urine Negative Negative    Leukocyte Esterase, Urine MODERATE (A) Negative    WBC, UA 10-20 (A) 0 - 5 /HPF    RBC, UA 1-3 0 - 2 /HPF    Epithelial Cells, UA FEW /HPF    Bacteria, UA MANY (A) None Seen /HPF   COVID-19   Result Value Ref Range    SARS-CoV-2, NAAT Not Detected Not Detected   EKG 12 lead   Result Value Ref Range    Ventricular Rate 105 BPM    Atrial Rate 105 BPM    P-R Interval 116 ms    QRS Duration 86 ms    Q-T Interval 384 ms    QTc Calculation (Bazett) 507 ms    P Axis 22 degrees    R Axis -4 degrees    T Axis 47 degrees       Radiology  XR CHEST PORTABLE   Final Result   Questionable airspace disease in the right lung base medially, which may   reflect pneumonia.   Follow-up PA and lateral radiographs would be helpful in   further evaluation.        ------------------------- NURSING NOTES AND VITALS REVIEWED --------------------------- María Wolf, DO  12/23/20 0774

## 2020-12-24 NOTE — PROGRESS NOTES
Pt c/o \"I feel short of breath\"  Noted pt laying flat in bed, repositioned, Pox on RA 92%, applied 02 2LNC for comfort, 97% on 02

## 2020-12-25 ENCOUNTER — APPOINTMENT (OUTPATIENT)
Dept: GENERAL RADIOLOGY | Age: 65
DRG: 720 | End: 2020-12-25
Payer: MEDICAID

## 2020-12-25 LAB
ACINETOBACTER BAUMANNII BY PCR: NOT DETECTED
ALBUMIN SERPL-MCNC: 2.9 G/DL (ref 3.5–5.2)
ALP BLD-CCNC: 111 U/L (ref 35–104)
ALT SERPL-CCNC: 19 U/L (ref 0–32)
ANION GAP SERPL CALCULATED.3IONS-SCNC: 10 MMOL/L (ref 7–16)
AST SERPL-CCNC: 21 U/L (ref 0–31)
BASOPHILS ABSOLUTE: 0.04 E9/L (ref 0–0.2)
BASOPHILS RELATIVE PERCENT: 0.2 % (ref 0–2)
BILIRUB SERPL-MCNC: 1 MG/DL (ref 0–1.2)
BOTTLE TYPE: ABNORMAL
BUN BLDV-MCNC: 17 MG/DL (ref 8–23)
CALCIUM SERPL-MCNC: 8.3 MG/DL (ref 8.6–10.2)
CANDIDA ALBICANS BY PCR: NOT DETECTED
CANDIDA GLABRATA BY PCR: NOT DETECTED
CANDIDA KRUSEI BY PCR: NOT DETECTED
CANDIDA PARAPSILOSIS BY PCR: NOT DETECTED
CANDIDA TROPICALIS BY PCR: NOT DETECTED
CARBAPENEM RESISTANCE KPC BY PCR: NOT DETECTED
CHLORIDE BLD-SCNC: 108 MMOL/L (ref 98–107)
CO2: 20 MMOL/L (ref 22–29)
CREAT SERPL-MCNC: 1.3 MG/DL (ref 0.5–1)
ENTEROBACTER CLOACAE COMPLEX BY PCR: NOT DETECTED
ENTEROBACTERALES BY PCR: DETECTED
ENTEROCOCCUS BY PCR: NOT DETECTED
EOSINOPHILS ABSOLUTE: 0.13 E9/L (ref 0.05–0.5)
EOSINOPHILS RELATIVE PERCENT: 0.6 % (ref 0–6)
ESCHERICHIA COLI BY PCR: DETECTED
GFR AFRICAN AMERICAN: 49
GFR NON-AFRICAN AMERICAN: 41 ML/MIN/1.73
GLUCOSE BLD-MCNC: 119 MG/DL (ref 74–99)
HAEMOPHILUS INFLUENZAE BY PCR: NOT DETECTED
HCT VFR BLD CALC: 35.5 % (ref 34–48)
HEMOGLOBIN: 11.6 G/DL (ref 11.5–15.5)
IMMATURE GRANULOCYTES #: 0.23 E9/L
IMMATURE GRANULOCYTES %: 1.1 % (ref 0–5)
KLEBSIELLA OXYTOCA BY PCR: NOT DETECTED
KLEBSIELLA PNEUMONIAE GROUP BY PCR: NOT DETECTED
L. PNEUMOPHILA SEROGP 1 UR AG: NORMAL
LACTIC ACID: 1.2 MMOL/L (ref 0.5–2.2)
LISTERIA MONOCYTOGENES BY PCR: NOT DETECTED
LYMPHOCYTES ABSOLUTE: 0.93 E9/L (ref 1.5–4)
LYMPHOCYTES RELATIVE PERCENT: 4.5 % (ref 20–42)
MCH RBC QN AUTO: 32.8 PG (ref 26–35)
MCHC RBC AUTO-ENTMCNC: 32.7 % (ref 32–34.5)
MCV RBC AUTO: 100.3 FL (ref 80–99.9)
METER GLUCOSE: 115 MG/DL (ref 74–99)
METER GLUCOSE: 155 MG/DL (ref 74–99)
MONOCYTES ABSOLUTE: 1.45 E9/L (ref 0.1–0.95)
MONOCYTES RELATIVE PERCENT: 7 % (ref 2–12)
NEISSERIA MENINGITIDIS BY PCR: NOT DETECTED
NEUTROPHILS ABSOLUTE: 17.97 E9/L (ref 1.8–7.3)
NEUTROPHILS RELATIVE PERCENT: 86.6 % (ref 43–80)
ORDER NUMBER: ABNORMAL
PDW BLD-RTO: 15 FL (ref 11.5–15)
PLATELET # BLD: 174 E9/L (ref 130–450)
PMV BLD AUTO: 10.6 FL (ref 7–12)
POTASSIUM SERPL-SCNC: 3.9 MMOL/L (ref 3.5–5)
PROTEUS SPECIES BY PCR: NOT DETECTED
PSEUDOMONAS AERUGINOSA BY PCR: NOT DETECTED
RBC # BLD: 3.54 E12/L (ref 3.5–5.5)
SERRATIA MARCESCENS BY PCR: NOT DETECTED
SODIUM BLD-SCNC: 138 MMOL/L (ref 132–146)
SOURCE OF BLOOD CULTURE: ABNORMAL
STAPHYLOCOCCUS AUREUS BY PCR: NOT DETECTED
STAPHYLOCOCCUS SPECIES BY PCR: NOT DETECTED
STREP PNEUMONIAE ANTIGEN, URINE: NORMAL
STREPTOCOCCUS AGALACTIAE BY PCR: NOT DETECTED
STREPTOCOCCUS PNEUMONIAE BY PCR: NOT DETECTED
STREPTOCOCCUS PYOGENES  BY PCR: NOT DETECTED
STREPTOCOCCUS SPECIES BY PCR: NOT DETECTED
TOTAL PROTEIN: 5.7 G/DL (ref 6.4–8.3)
WBC # BLD: 20.8 E9/L (ref 4.5–11.5)

## 2020-12-25 PROCEDURE — 83605 ASSAY OF LACTIC ACID: CPT

## 2020-12-25 PROCEDURE — 87040 BLOOD CULTURE FOR BACTERIA: CPT

## 2020-12-25 PROCEDURE — 6370000000 HC RX 637 (ALT 250 FOR IP): Performed by: INTERNAL MEDICINE

## 2020-12-25 PROCEDURE — 1200000000 HC SEMI PRIVATE

## 2020-12-25 PROCEDURE — 71046 X-RAY EXAM CHEST 2 VIEWS: CPT

## 2020-12-25 PROCEDURE — 6370000000 HC RX 637 (ALT 250 FOR IP): Performed by: NURSE PRACTITIONER

## 2020-12-25 PROCEDURE — 80053 COMPREHEN METABOLIC PANEL: CPT

## 2020-12-25 PROCEDURE — 36415 COLL VENOUS BLD VENIPUNCTURE: CPT

## 2020-12-25 PROCEDURE — 85025 COMPLETE CBC W/AUTO DIFF WBC: CPT

## 2020-12-25 PROCEDURE — 6360000002 HC RX W HCPCS: Performed by: INTERNAL MEDICINE

## 2020-12-25 PROCEDURE — 82962 GLUCOSE BLOOD TEST: CPT

## 2020-12-25 RX ORDER — ALBUTEROL SULFATE 90 UG/1
1 AEROSOL, METERED RESPIRATORY (INHALATION) EVERY 4 HOURS PRN
Status: DISCONTINUED | OUTPATIENT
Start: 2020-12-25 | End: 2020-12-29 | Stop reason: HOSPADM

## 2020-12-25 RX ORDER — FUROSEMIDE 20 MG/1
20 TABLET ORAL ONCE
Status: COMPLETED | OUTPATIENT
Start: 2020-12-25 | End: 2020-12-25

## 2020-12-25 RX ORDER — METOPROLOL TARTRATE 50 MG/1
50 TABLET, FILM COATED ORAL 2 TIMES DAILY
Status: DISCONTINUED | OUTPATIENT
Start: 2020-12-25 | End: 2020-12-29 | Stop reason: HOSPADM

## 2020-12-25 RX ORDER — FUROSEMIDE 40 MG/1
40 TABLET ORAL DAILY
Status: DISCONTINUED | OUTPATIENT
Start: 2020-12-25 | End: 2020-12-29 | Stop reason: HOSPADM

## 2020-12-25 RX ORDER — FUROSEMIDE 10 MG/ML
20 INJECTION INTRAMUSCULAR; INTRAVENOUS ONCE
Status: DISCONTINUED | OUTPATIENT
Start: 2020-12-25 | End: 2020-12-25

## 2020-12-25 RX ORDER — SENNA PLUS 8.6 MG/1
1 TABLET ORAL DAILY
Status: DISCONTINUED | OUTPATIENT
Start: 2020-12-26 | End: 2020-12-29 | Stop reason: HOSPADM

## 2020-12-25 RX ORDER — LUBIPROSTONE 8 UG/1
8 CAPSULE, GELATIN COATED ORAL 2 TIMES DAILY WITH MEALS
Status: DISCONTINUED | OUTPATIENT
Start: 2020-12-25 | End: 2020-12-29 | Stop reason: HOSPADM

## 2020-12-25 RX ORDER — POTASSIUM CHLORIDE 20 MEQ/1
20 TABLET, EXTENDED RELEASE ORAL 2 TIMES DAILY
Status: DISCONTINUED | OUTPATIENT
Start: 2020-12-25 | End: 2020-12-29 | Stop reason: HOSPADM

## 2020-12-25 RX ORDER — LOSARTAN POTASSIUM 50 MG/1
50 TABLET ORAL DAILY
Status: DISCONTINUED | OUTPATIENT
Start: 2020-12-25 | End: 2020-12-29 | Stop reason: HOSPADM

## 2020-12-25 RX ADMIN — ACETAMINOPHEN 650 MG: 325 TABLET, FILM COATED ORAL at 18:01

## 2020-12-25 RX ADMIN — METOPROLOL TARTRATE 50 MG: 50 TABLET, FILM COATED ORAL at 21:31

## 2020-12-25 RX ADMIN — LOSARTAN POTASSIUM 50 MG: 50 TABLET, FILM COATED ORAL at 18:02

## 2020-12-25 RX ADMIN — ENOXAPARIN SODIUM 40 MG: 40 INJECTION SUBCUTANEOUS at 09:11

## 2020-12-25 RX ADMIN — FUROSEMIDE 40 MG: 40 TABLET ORAL at 18:02

## 2020-12-25 RX ADMIN — SENNOSIDES 8.6 MG: 8.6 TABLET, FILM COATED ORAL at 09:10

## 2020-12-25 RX ADMIN — ACETAMINOPHEN 650 MG: 325 TABLET, FILM COATED ORAL at 09:10

## 2020-12-25 RX ADMIN — POTASSIUM CHLORIDE 20 MEQ: 20 TABLET, EXTENDED RELEASE ORAL at 21:31

## 2020-12-25 RX ADMIN — AZITHROMYCIN MONOHYDRATE 500 MG: 250 TABLET ORAL at 09:10

## 2020-12-25 RX ADMIN — FUROSEMIDE 20 MG: 20 TABLET ORAL at 13:00

## 2020-12-25 ASSESSMENT — PAIN SCALES - GENERAL
PAINLEVEL_OUTOF10: 0

## 2020-12-25 NOTE — PROGRESS NOTES
Progress Note  Date:2020       Room:0415/0415-01  Patient Name:Nettie Lara     YOB: 1955     Age:65 y.o. Subjective    Subjective pt in xray at present  Chart reviewed  Pt had two procedures in aug for LEFT ureteral stones  Stent was left in place with string which apparently was cut and removed  The stent was still present on a ct in 2020  Pt now in with uti which is being rx by ID   A kub done yesterday did not show a LEFT stent however I believe I can see a RIGHT stentwhich is faintly seen? ? Could pt have had a procedure at another hosp???  Will order a renal scan    Pt will most likely need to have cysto retro to resolve issue                Review of Systems  Objective         Vitals Last 24 Hours:  TEMPERATURE:  Temp  Av.1 °F (37.8 °C)  Min: 99.4 °F (37.4 °C)  Max: 100.5 °F (38.1 °C)  RESPIRATIONS RANGE: Resp  Av  Min: 20  Max: 22  PULSE OXIMETRY RANGE: SpO2  Av %  Min: 91 %  Max: 97 %  PULSE RANGE: Pulse  Av  Min: 89  Max: 105  BLOOD PRESSURE RANGE: Systolic (67KLE), OHK:352 , Min:122 , OWS:908   ; Diastolic (09FPP), WCE:47, Min:63, Max:82    I/O (24Hr):     Intake/Output Summary (Last 24 hours) at 2020 1254  Last data filed at 2020 0934  Gross per 24 hour   Intake 2400 ml   Output 2550 ml   Net -150 ml     Objective  Labs/Imaging/Diagnostics    Labs:  CBC:  Recent Labs     209   WBC 21.0* 34.0* 20.8*   RBC 4.43 3.64 3.54   HGB 15.3 12.0 11.6   HCT 45.0 37.7 35.5   .6* 103.6* 100.3*   RDW 14.5 14.8 15.0    200 174     CHEMISTRIES:  Recent Labs     208 20    138 138   K 4.3 4.3 3.9    107 108*   CO2 17* 17* 20*   BUN 17 18 17   CREATININE 1.3* 1.4* 1.3*   GLUCOSE 121* 120* 119*   PHOS  --  2.4*  --    MG  --  1.6  --      PT/INR:  Recent Labs     20   PROTIME 12.1   INR 1.1     APTT:  Recent Labs     20 APTT 25.7     LIVER PROFILE:  Recent Labs     12/23/20  1935 12/24/20  0428 12/25/20  0459   AST 40* 28 21   ALT 32 24 19   BILITOT 1.1 1.1 1.0   ALKPHOS 143* 83 111*       Imaging Last 24 Hours:  Xr Abdomen (kub) (single Ap View)    Result Date: 12/24/2020  EXAMINATION: ONE SUPINE XRAY VIEW(S) OF THE ABDOMEN 12/24/2020 12:34 pm COMPARISON: 08/26/2020 HISTORY: ORDERING SYSTEM PROVIDED HISTORY: kidney stones TECHNOLOGIST PROVIDED HISTORY: KIDNEY STONES Reason for exam:->kidney stones FINDINGS: Lung bases are normal there are degenerative changes in the lower lumbar spine and mild rotoscoliosis. There are questionable vague calcifications in the left mid abdomen largest measuring 5 mm. Gas in bowel loops does somewhat limit evaluation. Mild adynamic ileus. Questionable calcifications/calculi in left mid abdomen largest measuring 5 mm    Xr Chest Portable    Result Date: 12/23/2020  EXAMINATION: ONE XRAY VIEW OF THE CHEST 12/23/2020 7:27 pm COMPARISON: July 28 HISTORY: ORDERING SYSTEM PROVIDED HISTORY: SOB/fever TECHNOLOGIST PROVIDED HISTORY: Reason for exam:->SOB/fever FINDINGS: Cardiomediastinal silhouette within normal limits. Patient rotated to the left. Question mild airspace disease in the right lung base. No pleural effusions. Pulmonary vasculature within normal limits. Questionable airspace disease in the right lung base medially, which may reflect pneumonia. Follow-up PA and lateral radiographs would be helpful in further evaluation. Us Abdomen Limited    Result Date: 12/24/2020  EXAMINATION: RIGHT UPPER QUADRANT ULTRASOUND 12/24/2020 8:27 am COMPARISON: None. HISTORY: ORDERING SYSTEM PROVIDED HISTORY: assess for ascites and evaluate kidneys TECHNOLOGIST PROVIDED HISTORY: Reason for exam:->assess for ascites and evaluate kidneys What reading provider will be dictating this exam?->CRC FINDINGS: Scanning of the right and left upper and lower quadrants demonstrates no ascites.     No ascites identified Us Retroperitoneal Limited    Result Date: 12/24/2020  EXAMINATION: ULTRASOUND OF THE KIDNEYS AND BLADDER 12/24/2020 7:27 am COMPARISON: Abdominopelvic CT dated 4 September 2020 HISTORY: ORDERING SYSTEM PROVIDED HISTORY: Flank pain TECHNOLOGIST PROVIDED HISTORY: Reason for exam:->Flank pain What reading provider will be dictating this exam?->CRC FINDINGS: The right kidney measures 12.1 in length and the left kidney measures 13.2 in length. There are benign cysts at the right kidney. There is no hydronephrosis. There is a benign cyst at the left left ureteral stent. The bladder is collapsed around a Obrien catheter. Bilateral renal stone cysts. Indwelling left ureteral stent.     Assessment//Plan           Hospital Problems           Last Modified POA    Sepsis due to urinary tract infection ([facility] [address] 12/23/2020 Yes        Assessment & Plan    Electronically signed by Moe Diaz MD on 12/25/20 at 12:54 PM EST

## 2020-12-25 NOTE — CONSULTS
GI:     No nausea, vomiting or diarrhea  :      urinary complaints  NEURO:    No seizures, stroke, HA  MUSCULOSKELETAL:  No muscle aches or pain, no jointpain  SKIN:     No rash or itch  PSYCH:    No depression or anxiety    Medications Prior to Admission: Incontinence Supply Disposable (POISE MAXIMUM ABSORBENCY) PADS, USE AS DIRECTED  metoprolol tartrate (LOPRESSOR) 50 MG tablet, Take 1 tablet by mouth 2 times daily  potassium chloride (KLOR-CON) 10 MEQ extended release tablet, Take 2 tablets by mouth daily (Patient taking differently: Take 20 mEq by mouth 2 times daily With her Lasix)  loratadine (CLARITIN) 10 MG capsule, Take 1 capsule by mouth 2 times daily  senna (SENOKOT) 8.6 MG TABS tablet, Take 1 tablet by mouth daily  fluticasone (FLONASE) 50 MCG/ACT nasal spray, 1 spray by Each Nostril route daily  albuterol sulfate (PROAIR RESPICLICK) 503 (90 Base) MCG/ACT aerosol powder inhalation, Inhale into the lungs every 4 hours as needed for Wheezing or Shortness of Breath  melatonin 3 MG TABS tablet, Take 1 tablet by mouth nightly as needed (insomnia)  polyethylene glycol (GLYCOLAX) 17 g packet, Take 25 g by mouth daily as needed for Constipation  furosemide (LASIX) 40 MG tablet, Take 1 tablet by mouth daily  Oral Electrolytes (PEDIALYTE) SOLN, Drink daily for good fluid intake.   sennosides-docusate sodium (SENOKOT-S) 8.6-50 MG tablet, Take 1 tablet by mouth daily  b complex vitamins capsule, Take 1 capsule by mouth daily  albuterol (PROVENTIL) (2.5 MG/3ML) 0.083% nebulizer solution, Take 3 mLs by nebulization See Admin Instructions (Patient taking differently: Take 2.5 mg by nebulization every 4 hours as needed )  acetaminophen (TYLENOL) 325 MG tablet, Take 2 tablets by mouth every 4 hours as needed for Pain or Fever  losartan (COZAAR) 50 MG tablet, Take 1 tablet by mouth daily  magnesium citrate solution, Take 296 mLs by mouth daily as needed for Constipation Incontinence Supply Disposable (PROTECTIVE UNDERWEAR LARGE) MISC, Use as directed  Ascorbic Acid (VITAMIN C) 1000 MG tablet, Take 1,000 mg by mouth daily  Disposable Gloves (VINYL GLOVES) MISC, by Does not apply route  ammonium lactate (LAC-HYDRIN) 12 % lotion, Apply topically as needed for Dry Skin Apply topically as needed. [DISCONTINUED] pregabalin (LYRICA) 25 MG capsule, Take 25 mg by mouth 3 times daily.  Indications: for nerve pain  sulfamethoxazole-trimethoprim (BACTRIM DS;SEPTRA DS) 800-160 MG per tablet, Take 1 tablet by mouth 2 times daily for 10 days  [DISCONTINUED] Oil of Oregano 1500 MG CAPS, Take 3,000 mg by mouth daily  STOOL SOFTENER 100 MG capsule, Take 1 capsule by mouth 2 times daily as needed for Constipation'  CURRENT MEDICATIONS     Current Facility-Administered Medications:     furosemide (LASIX) injection 20 mg, 20 mg, Intravenous, Once, Ruy Awad,     glucose (GLUTOSE) 40 % oral gel 15 g, 15 g, Oral, PRN, Ismail U Philipp, DO    dextrose 50 % IV solution, 12.5 g, Intravenous, PRN, Sabas Esters, DO    glucagon (rDNA) injection 1 mg, 1 mg, Intramuscular, PRN, Ismail U Philipp, DO    dextrose 5 % solution, 100 mL/hr, Intravenous, PRN, Sabas Esters, DO    sodium chloride flush 0.9 % injection 10 mL, 10 mL, Intravenous, 2 times per day, Sabas Esters, DO, 10 mL at 12/24/20 2055    sodium chloride flush 0.9 % injection 10 mL, 10 mL, Intravenous, PRN, Sabas Esters, DO    enoxaparin (LOVENOX) injection 40 mg, 40 mg, Subcutaneous, Daily, Ismail U Philipp, DO, 40 mg at 12/25/20 0911    polyethylene glycol (GLYCOLAX) packet 17 g, 17 g, Oral, Daily PRN, Sabas Esters, DO    acetaminophen (TYLENOL) tablet 650 mg, 650 mg, Oral, Q6H PRN, 650 mg at 12/25/20 0910 **OR** acetaminophen (TYLENOL) suppository 650 mg, 650 mg, Rectal, Q6H PRN, Sabas Esters, DO CYSTOSCOPY RETROGRADE PYELOGRAM STENT INSERTION LEFT [facility]    CYSTOSCOPY RETROGRADE PYELOGRAM URETEROSCOPY  LEFT J-STENT EXCHANGE, INSERTION JUNIOR CATHETER---FACILITY------ performed by Lucero Howard DO at [facility] OR     FAMILY HISTORY       Family History   Problem Relation Age of Onset    Asthma Father     High Blood Pressure Father     Cancer Maternal Aunt     Heart Failure Mother     Breast Cancer Sister      SOCIAL HISTORY       Social History     Socioeconomic History    Marital status:      Spouse name: None    Number of children: 2    Years of education: None    Highest education level: None   Occupational History    None   Social Needs    Financial resource strain: None    Food insecurity     Worry: None     Inability: None    Transportation needs     Medical: None     Non-medical: None   Tobacco Use    Smoking status: Former Smoker     Packs/day: 0.25     Years: 46.00     Pack years: 11.50     Types: Cigarettes     Quit date: 2019     Years since quittin.8    Smokeless tobacco: Never Used   Substance and Sexual Activity    Alcohol use: No    Drug use: No    Sexual activity: Not Currently   Lifestyle    Physical activity     Days per week: None     Minutes per session: None    Stress: None   Relationships    Social connections     Talks on phone: None     Gets together: None     Attends Buddhism service: None     Active member of club or organization: None     Attends meetings of clubs or organizations: None     Relationship status: None    Intimate partner violence     Fear of current or ex partner: None     Emotionally abused: None     Physically abused: None     Forced sexual activity: None   Other Topics Concern    None   Social History Narrative    Son in [place]-    Daughter in [place]     Pt at home  Has home health  PHYSICAL EXAM (up to 7 for level 4, 8 or more forlevel 5)     ED Triage Vitals   BP Temp Temp Source Pulse Resp SpO2 Height Weight   12/23/20 1858 12/23/20 1858 12/23/20 1858 12/23/20 1858 12/23/20 1858 12/23/20 1858 12/24/20 0114 12/24/20 0114   (!) 122/49 102.6 °F (39.2 °C) Oral 112 18 94 % 5' 6\" (1.676 m) 195 lb (88.5 kg)     Vitals:    Vitals:    12/24/20 0720 12/24/20 0745 12/24/20 2054 12/24/20 2150   BP:  102/60 122/63    Pulse:  98 105    Resp:  20 20    Temp:  99.6 °F (37.6 °C) 100.4 °F (38 °C) 99.4 °F (37.4 °C)   TempSrc:  Oral Oral Oral   SpO2: 92% 99% 97%    Weight:       Height:         Physical Exam   Constitutional/General: Alert and oriented, NAD  Head: NC/AT  Eyes: PERRL, EOMI  Mouth: Normal mucosa, no thrush   Neck: Supple, full ROM,    Pulmonary: Lungs clear to auscultation bilaterally. Not in respiratory distress  Cardiovascular:  Regular rate and rhythm, no murmurs, gallops, or rubs. Abdomen: Soft, + BS.   distension. Nontender. Extremities: ble edema   Warm  Has edema  Pulses:  Distal pulses intact  Skin: Warm and dry without rash  Neurologic:    No focal deficits  Psych: Normal Affect  Obrien slight pink     DIAGNOSTICRESULTS   RADIOLOGY:   Xr Abdomen (kub) (single Ap View)    Result Date: 12/24/2020  EXAMINATION: ONE SUPINE XRAY VIEW(S) OF THE ABDOMEN 12/24/2020 12:34 pm COMPARISON: 08/26/2020 HISTORY: ORDERING SYSTEM PROVIDED HISTORY: kidney stones TECHNOLOGIST PROVIDED HISTORY: KIDNEY STONES Reason for exam:->kidney stones FINDINGS: Lung bases are normal there are degenerative changes in the lower lumbar spine and mild rotoscoliosis. There are questionable vague calcifications in the left mid abdomen largest measuring 5 mm. Gas in bowel loops does somewhat limit evaluation. Mild adynamic ileus.  Questionable calcifications/calculi in left mid abdomen largest measuring 5 mm    Xr Chest Portable    Result Date: 12/23/2020 EXAMINATION: ONE XRAY VIEW OF THE CHEST 12/23/2020 7:27 pm COMPARISON: July 28 HISTORY: ORDERING SYSTEM PROVIDED HISTORY: SOB/fever TECHNOLOGIST PROVIDED HISTORY: Reason for exam:->SOB/fever FINDINGS: Cardiomediastinal silhouette within normal limits. Patient rotated to the left. Question mild airspace disease in the right lung base. No pleural effusions. Pulmonary vasculature within normal limits. Questionable airspace disease in the right lung base medially, which may reflect pneumonia. Follow-up PA and lateral radiographs would be helpful in further evaluation. Us Abdomen Limited    Result Date: 12/24/2020  EXAMINATION: RIGHT UPPER QUADRANT ULTRASOUND 12/24/2020 8:27 am COMPARISON: None. HISTORY: ORDERING SYSTEM PROVIDED HISTORY: assess for ascites and evaluate kidneys TECHNOLOGIST PROVIDED HISTORY: Reason for exam:->assess for ascites and evaluate kidneys What reading provider will be dictating this exam?->CRC FINDINGS: Scanning of the right and left upper and lower quadrants demonstrates no ascites. No ascites identified    Us Retroperitoneal Limited    Result Date: 12/24/2020  EXAMINATION: ULTRASOUND OF THE KIDNEYS AND BLADDER 12/24/2020 7:27 am COMPARISON: Abdominopelvic CT dated 4 September 2020 HISTORY: ORDERING SYSTEM PROVIDED HISTORY: Flank pain TECHNOLOGIST PROVIDED HISTORY: Reason for exam:->Flank pain What reading provider will be dictating this exam?->CRC FINDINGS: The right kidney measures 12.1 in length and the left kidney measures 13.2 in length. There are benign cysts at the right kidney. There is no hydronephrosis. There is a benign cyst at the left left ureteral stent. The bladder is collapsed around a Obrien catheter. Bilateral renal stone cysts. Indwelling left ureteral stent.     LABS  Recent Labs     12/23/20  1935 12/24/20  0428 12/25/20  0459   WBC 21.0* 34.0* 20.8*   HGB 15.3 12.0 11.6   HCT 45.0 37.7 35.5   .6* 103.6* 100.3*    200 174     Recent Labs Gram positive cocci   (A)  Final   07/26/2020 >100,000 CFU/ml  Final     MRSA Culture Only   Date Value Ref Range Status   07/26/2020 Methicillin resistant Staph aureus not isolated  Final     No results for input(s): CDIFFTOXANT in the last 72 hours. Recent Labs     12/24/20  0654   [id number] Presumptive Negative -suggesting no recent or current infections  with Legionella pneumophila serogroup 1. Infection to Legionella cannot be ruled out since other serogroups  and species may cause infection, antigen may not be present in  early infection, or level of antigen may be below the  detection limit. Normal Range: Presumptive Negative       Recent Labs     12/24/20  0654   STREPNEUMAGU Presumptive negative- suggests no current or recent  pneumococcal infection. Infection due to Strep pneumoniae cannot be  ruled out since the antigen present in the sample  may be below the detection limit of the test.  Normal Range:Presumptive Negative         Patient is a 76 y.o. female who presented with   Chief Complaint   Patient presents with    Fatigue     x1week        FINAL IMPRESSION    Fevers/leukocytosis  Gn sepsis complicated uti indwelling left ureteral stent. H/O LEFT HYDRONEPHROSIS/STONES  H/o recurrent E. Coli in the recent past 9/4/2020 she had a course of cipro   Right lung pneumonia covid screen neg   H/o MS   -currently on ceftriaxone will change to meropenem for poss ESBL      cefTRIAXone (ROCEPHIN) 1 g in sodium chloride (PF) 10 mL IV syringe, Q24H    REPEAT BLOOD CX  ESR/CRP  ECHO        Imaging and labs were reviewed per medical records and any ID pertinent labs were addressed with the patient. The patient/FAMILY  was educated about the diagnosis, prognosis, indications, risks and benefits of treatment. An opportunity to ask questions was given to the patient/FAMILY and questions were answered. Thank you for involving me in the care of Shannon Hampton. Please do not hesitate to call for any questions or concerns.          Electronically signed by Kendell Del Castillo MD on 12/25/2020 at 10:56 AM

## 2020-12-25 NOTE — PROGRESS NOTES
In: 160 [P.O.:160]  Out: -     Intake/Output Summary (Last 24 hours) at 12/25/2020 1312  Last data filed at 12/25/2020 0934  Gross per 24 hour   Intake 2400 ml   Output 2550 ml   Net -150 ml   I/O last 3 completed shifts: In: 0563 [P.O.:1520; I.V.:1500]  Out: 2550 [Urine:2550]  Patient Vitals for the past 96 hrs (Last 3 readings):   Weight   12/24/20 0114 195 lb (88.5 kg)     Vital Signs:   Blood pressure (!) 172/82, pulse 89, temperature 100.5 °F (38.1 °C), temperature source Oral, resp. rate 22, height 5' 6\" (1.676 m), weight 195 lb (88.5 kg), SpO2 91 %, not currently breastfeeding. General appearance:  Alert, responsive, oriented to person, place, and time. Well preserved, alert, no distress. Head:  Normocephalic. No masses, lesions or tenderness. Eyes:  PERRLA. EOMI. Sclera clear. Buccal mucosa moist.  ENT:  Ears normal. Mucosa normal.  Neck:    Supple. Trachea midline. No thyromegaly. No JVD. No bruits. Heart:    Rhythm regular. Rate controlled. No murmurs. Lungs:    Symmetrical. Clear to auscultation bilaterally. No wheezes. No rhonchi. No rales. Abdomen:   Soft. Non-tender. Non-distended. Bowel sounds positive. No organomegaly or masses. No pain on palpation. Extremities:    Peripheral pulses present. No peripheral edema. No ulcers. No cyanosis. No clubbing. Neurologic:    Alert x 3. No focal deficit. Cranial nerves grossly intact. No focal weakness. Psych:   Behavior is normal. Mood appears normal. Speech is not rapid and/or pressured. Musculoskeletal:   Spine ROM normal. Muscular strength intact. Gait not assessed. Integumentary:  No rashes  Skin normal color and texture.   Genitalia/Breast:  Deferred    Medication:  Scheduled Meds:   meropenem  1 g Intravenous Q12H    sodium chloride flush  10 mL Intravenous 2 times per day    enoxaparin  40 mg Subcutaneous Daily    fluticasone  1 spray Each Nostril Daily    polyethylene glycol  17 g Oral Daily    senna  1 tablet Oral BID Continuous Infusions:   dextrose      sodium chloride Stopped (12/24/20 3516)       Objective Data:  CBC with Differential:    Lab Results   Component Value Date    WBC 20.8 12/25/2020    RBC 3.54 12/25/2020    HGB 11.6 12/25/2020    HCT 35.5 12/25/2020     12/25/2020    .3 12/25/2020    MCH 32.8 12/25/2020    MCHC 32.7 12/25/2020    RDW 15.0 12/25/2020    SEGSPCT 50 05/28/2011    METASPCT 0.9 12/24/2020    LYMPHOPCT 4.5 12/25/2020    MONOPCT 7.0 12/25/2020    MYELOPCT 0.9 12/24/2020    BASOPCT 0.2 12/25/2020    MONOSABS 1.45 12/25/2020    LYMPHSABS 0.93 12/25/2020    EOSABS 0.13 12/25/2020    BASOSABS 0.04 12/25/2020     CMP:    Lab Results   Component Value Date     12/25/2020    K 3.9 12/25/2020    K 4.3 12/23/2020     12/25/2020    CO2 20 12/25/2020    BUN 17 12/25/2020    CREATININE 1.3 12/25/2020    GFRAA 49 12/25/2020    LABGLOM 41 12/25/2020    GLUCOSE 119 12/25/2020    GLUCOSE 96 05/28/2011    PROT 5.7 12/25/2020    LABALBU 2.9 12/25/2020    LABALBU 4.0 05/27/2011    CALCIUM 8.3 12/25/2020    BILITOT 1.0 12/25/2020    ALKPHOS 111 12/25/2020    AST 21 12/25/2020    ALT 19 12/25/2020              Assessment:    · Sepsis secondary to UTI and pneumonia  · Constipation . · Chronic bilateral lower extremity edema  · Lactic acidosis   · Hepatic steatosis. ·  Multiple sclerosis  · Hypertension  · Asthma/COPD  · Scoliosis  · History of tobacco abuse      Plan:      · Urology is following. · Blood cultures show gram-negative rods of E. coli might require longer duration of antibiotic therapy. ID has been consulted  · Repeat chest x-ray today show possible early developing pneumonia. Check procalcitonin level. Aggressive pulmonary toiletry  · Encourage ambulation  · Adjust blood pressure medication  · Discussed discharge planning.   Possibly might require short-term skilled nursing facility More than 50% of my time was spent at the bedside counseling/coordinating care with the patient and/or family with face to face contact. This time was spent reviewing notes and laboratory data as well as instructing and counseling the patient. Time I spent with the family or surrogate(s) is included only if the patient was incapable of providing the necessary information or participating in medical decisions. I also discussed the differential diagnosis and all of the proposed management plans with the patient and individuals accompanying the patient. I am readily available for any further decision-making and intervention.        Alta Awad DO, F.A.C.O.I.  12/25/2020  1:12 PM

## 2020-12-26 LAB
ALBUMIN SERPL-MCNC: 2.9 G/DL (ref 3.5–5.2)
ALP BLD-CCNC: 155 U/L (ref 35–104)
ALT SERPL-CCNC: 18 U/L (ref 0–32)
ANION GAP SERPL CALCULATED.3IONS-SCNC: 9 MMOL/L (ref 7–16)
AST SERPL-CCNC: 19 U/L (ref 0–31)
BASOPHILS ABSOLUTE: 0 E9/L (ref 0–0.2)
BASOPHILS RELATIVE PERCENT: 0 % (ref 0–2)
BILIRUB SERPL-MCNC: 0.9 MG/DL (ref 0–1.2)
BUN BLDV-MCNC: 15 MG/DL (ref 8–23)
C-REACTIVE PROTEIN: 29.3 MG/DL (ref 0–0.4)
CALCIUM SERPL-MCNC: 8.8 MG/DL (ref 8.6–10.2)
CHLORIDE BLD-SCNC: 100 MMOL/L (ref 98–107)
CO2: 21 MMOL/L (ref 22–29)
CREAT SERPL-MCNC: 1.2 MG/DL (ref 0.5–1)
EOSINOPHILS ABSOLUTE: 0.18 E9/L (ref 0.05–0.5)
EOSINOPHILS RELATIVE PERCENT: 1 % (ref 0–6)
GFR AFRICAN AMERICAN: 54
GFR NON-AFRICAN AMERICAN: 45 ML/MIN/1.73
GLUCOSE BLD-MCNC: 100 MG/DL (ref 74–99)
HCT VFR BLD CALC: 36.8 % (ref 34–48)
HEMOGLOBIN: 12.3 G/DL (ref 11.5–15.5)
LV EF: 55 %
LVEF MODALITY: NORMAL
LYMPHOCYTES ABSOLUTE: 1.81 E9/L (ref 1.5–4)
LYMPHOCYTES RELATIVE PERCENT: 10 % (ref 20–42)
MCH RBC QN AUTO: 33.2 PG (ref 26–35)
MCHC RBC AUTO-ENTMCNC: 33.4 % (ref 32–34.5)
MCV RBC AUTO: 99.2 FL (ref 80–99.9)
METER GLUCOSE: 111 MG/DL (ref 74–99)
METER GLUCOSE: 112 MG/DL (ref 74–99)
METER GLUCOSE: 128 MG/DL (ref 74–99)
MONOCYTES ABSOLUTE: 2.53 E9/L (ref 0.1–0.95)
MONOCYTES RELATIVE PERCENT: 14 % (ref 2–12)
NEUTROPHILS ABSOLUTE: 13.58 E9/L (ref 1.8–7.3)
NEUTROPHILS RELATIVE PERCENT: 75 % (ref 43–80)
ORGANISM: ABNORMAL
PDW BLD-RTO: 14.9 FL (ref 11.5–15)
PLATELET # BLD: 216 E9/L (ref 130–450)
PMV BLD AUTO: 10.9 FL (ref 7–12)
POTASSIUM SERPL-SCNC: 4.5 MMOL/L (ref 3.5–5)
RBC # BLD: 3.71 E12/L (ref 3.5–5.5)
SODIUM BLD-SCNC: 130 MMOL/L (ref 132–146)
TOTAL PROTEIN: 6.3 G/DL (ref 6.4–8.3)
URINE CULTURE, ROUTINE: ABNORMAL
WBC # BLD: 18.1 E9/L (ref 4.5–11.5)

## 2020-12-26 PROCEDURE — 80053 COMPREHEN METABOLIC PANEL: CPT

## 2020-12-26 PROCEDURE — 6360000002 HC RX W HCPCS: Performed by: INTERNAL MEDICINE

## 2020-12-26 PROCEDURE — 6370000000 HC RX 637 (ALT 250 FOR IP): Performed by: INTERNAL MEDICINE

## 2020-12-26 PROCEDURE — 86140 C-REACTIVE PROTEIN: CPT

## 2020-12-26 PROCEDURE — 85025 COMPLETE CBC W/AUTO DIFF WBC: CPT

## 2020-12-26 PROCEDURE — 82962 GLUCOSE BLOOD TEST: CPT

## 2020-12-26 PROCEDURE — 36415 COLL VENOUS BLD VENIPUNCTURE: CPT

## 2020-12-26 PROCEDURE — 93306 TTE W/DOPPLER COMPLETE: CPT

## 2020-12-26 PROCEDURE — 1200000000 HC SEMI PRIVATE

## 2020-12-26 PROCEDURE — 2700000000 HC OXYGEN THERAPY PER DAY

## 2020-12-26 RX ORDER — HEPARIN SODIUM (PORCINE) LOCK FLUSH IV SOLN 100 UNIT/ML 100 UNIT/ML
3 SOLUTION INTRAVENOUS EVERY 12 HOURS SCHEDULED
Status: DISCONTINUED | OUTPATIENT
Start: 2020-12-26 | End: 2020-12-29 | Stop reason: HOSPADM

## 2020-12-26 RX ORDER — HEPARIN SODIUM (PORCINE) LOCK FLUSH IV SOLN 100 UNIT/ML 100 UNIT/ML
3 SOLUTION INTRAVENOUS PRN
Status: DISCONTINUED | OUTPATIENT
Start: 2020-12-26 | End: 2020-12-29 | Stop reason: HOSPADM

## 2020-12-26 RX ORDER — LIDOCAINE HYDROCHLORIDE 10 MG/ML
5 INJECTION, SOLUTION EPIDURAL; INFILTRATION; INTRACAUDAL; PERINEURAL ONCE
Status: DISCONTINUED | OUTPATIENT
Start: 2020-12-26 | End: 2020-12-29 | Stop reason: HOSPADM

## 2020-12-26 RX ORDER — DIPHENHYDRAMINE HYDROCHLORIDE, ZINC ACETATE 2; .1 G/100G; G/100G
CREAM TOPICAL 3 TIMES DAILY PRN
Status: DISCONTINUED | OUTPATIENT
Start: 2020-12-26 | End: 2020-12-29 | Stop reason: HOSPADM

## 2020-12-26 RX ORDER — SODIUM CHLORIDE 0.9 % (FLUSH) 0.9 %
10 SYRINGE (ML) INJECTION PRN
Status: DISCONTINUED | OUTPATIENT
Start: 2020-12-26 | End: 2020-12-29 | Stop reason: HOSPADM

## 2020-12-26 RX ADMIN — POTASSIUM CHLORIDE 20 MEQ: 20 TABLET, EXTENDED RELEASE ORAL at 20:21

## 2020-12-26 RX ADMIN — LOSARTAN POTASSIUM 50 MG: 50 TABLET, FILM COATED ORAL at 08:05

## 2020-12-26 RX ADMIN — METOPROLOL TARTRATE 50 MG: 50 TABLET, FILM COATED ORAL at 20:21

## 2020-12-26 RX ADMIN — LUBIPROSTONE 8 MCG: 8 CAPSULE, GELATIN COATED ORAL at 18:07

## 2020-12-26 RX ADMIN — ENOXAPARIN SODIUM 40 MG: 40 INJECTION SUBCUTANEOUS at 08:05

## 2020-12-26 RX ADMIN — METOPROLOL TARTRATE 50 MG: 50 TABLET, FILM COATED ORAL at 08:05

## 2020-12-26 RX ADMIN — POLYETHYLENE GLYCOL 3350 17 G: 17 POWDER, FOR SOLUTION ORAL at 08:05

## 2020-12-26 RX ADMIN — LUBIPROSTONE 8 MCG: 8 CAPSULE, GELATIN COATED ORAL at 08:05

## 2020-12-26 RX ADMIN — FUROSEMIDE 40 MG: 40 TABLET ORAL at 08:05

## 2020-12-26 RX ADMIN — POTASSIUM CHLORIDE 20 MEQ: 20 TABLET, EXTENDED RELEASE ORAL at 08:05

## 2020-12-26 RX ADMIN — SENNOSIDES 8.6 MG: 8.6 TABLET, FILM COATED ORAL at 08:05

## 2020-12-26 ASSESSMENT — PAIN SCALES - GENERAL
PAINLEVEL_OUTOF10: 0
PAINLEVEL_OUTOF10: 0

## 2020-12-26 NOTE — PROGRESS NOTES
Comprehensive Nutrition Assessment    Type and Reason for Visit:  Initial, Positive Nutrition Screen    Nutrition Recommendations/Plan: Will start Ensure HP BID to supplement intake. Nutrition Assessment:  Pt admit w/ sepsis d/t UTI, PMH of COPD and MS. Pt reported poor intake x 1 at admit d/t weakness and fatigue. Variable but improved intake since admission. Will start ONS and monitor. Malnutrition Assessment:  Malnutrition Status: At risk for malnutrition (Comment)    Context:  Acute Illness     Findings of the 6 clinical characteristics of malnutrition:  Energy Intake:  1 - 75% or less of estimated energy requirements for 7 or more days  Weight Loss:  No significant weight loss     Body Fat Loss:  Unable to assess     Muscle Mass Loss:  Unable to assess    Fluid Accumulation:  No significant fluid accumulation     Strength:  Not Performed    Estimated Daily Nutrient Needs:  Energy (kcal):  6895-1775(MS REE= 1456 x 1.2); Weight Used for Energy Requirements:  Current     Protein (g):  80-90(1.3-1.5 gm/kg IBW); Weight Used for Protein Requirements:  Ideal        Fluid (ml/day):  4260-8293; Method Used for Fluid Requirements:  1 ml/kcal      Nutrition Related Findings:  Pt alert, abd distended, constipation, laxative ordered, +BS, I/O WNL, +3 BLE edema, renal labs improved since admit      Wounds:  None       Current Nutrition Therapies:    DIET GENERAL; Anthropometric Measures:  · Height: 5' 6\" (167.6 cm)  · Current Body Weight: 198 lb 4.8 oz (89.9 kg)(12/26 actual)   · Admission Body Weight: 198 lb 4.8 oz (89.9 kg)(12/26 actual)    · Usual Body Weight: 184 lb 15.5 oz (83.9 kg)(9/25/20 actual per EMR)     · Ideal Body Weight: 130 lbs; % Ideal Body Weight 152.5 %   · BMI: 32  · Adjusted Body Weight:  ; No Adjustment   · BMI Categories: Obese Class 1 (BMI 30.0-34. 9)       Nutrition Diagnosis: · Inadequate oral intake related to early satiety as evidenced by poor intake prior to admission, intake 51-75%    Nutrition Interventions:   Food and/or Nutrient Delivery:  Continue Current Diet, Start Oral Nutrition Supplement(Will start Ensure HP BID to supplement intake)  Nutrition Education/Counseling:  Education not indicated   Coordination of Nutrition Care:  Continue to monitor while inpatient    Goals:  Pt is to consume >50% of most meals/ONS       Nutrition Monitoring and Evaluation:   Behavioral-Environmental Outcomes:  None Identified   Food/Nutrient Intake Outcomes:  Food and Nutrient Intake, Supplement Intake  Physical Signs/Symptoms Outcomes:  Biochemical Data, GI Status, Fluid Status or Edema, Nutrition Focused Physical Findings, Skin, Weight     Discharge Planning:    No discharge needs at this time     Electronically signed by Tim Cameron RD, LD on 12/26/20 at 10:02 AM EST    Contact: [phone]

## 2020-12-26 NOTE — PROGRESS NOTES
Denies any rashes, ulcers, or excoriations. Denies bruising. Hematologic/Lymphatic:  Denies bruising or bleeding. Physical Exam:  I/O this shift:  In: 240 [P.O.:240]  Out: -     Intake/Output Summary (Last 24 hours) at 12/26/2020 1338  Last data filed at 12/26/2020 1155  Gross per 24 hour   Intake 240 ml   Output 1400 ml   Net -1160 ml   I/O last 3 completed shifts: In: 160 [P.O.:160]  Out: 1400 [Urine:1400]  Patient Vitals for the past 96 hrs (Last 3 readings):   Weight   12/26/20 0601 198 lb 4.8 oz (89.9 kg)   12/24/20 0114 195 lb (88.5 kg)     Vital Signs:   Blood pressure (!) 156/73, pulse 94, temperature 97.8 °F (36.6 °C), temperature source Oral, resp. rate 20, height 5' 6\" (1.676 m), weight 198 lb 4.8 oz (89.9 kg), SpO2 91 %, not currently breastfeeding. General appearance:  Alert, responsive, oriented to person, place, and time. Well preserved, alert, no distress. Head:  Normocephalic. No masses, lesions or tenderness. Eyes:  PERRLA. EOMI. Sclera clear. Buccal mucosa moist.  ENT:  Ears normal. Mucosa normal.  Neck:    Supple. Trachea midline. No thyromegaly. No JVD. No bruits. Heart:    Rhythm regular. Rate controlled. No murmurs. Lungs:    Symmetrical. Clear to auscultation bilaterally. No wheezes. No rhonchi. No rales. Abdomen:   Soft. Non-tender. Non-distended. Bowel sounds positive. No organomegaly or masses. No pain on palpation. Extremities:    Peripheral pulses present. No peripheral edema. No ulcers. No cyanosis. No clubbing. Neurologic:    Alert x 3. No focal deficit. Cranial nerves grossly intact. No focal weakness. Psych:   Behavior is normal. Mood appears normal. Speech is not rapid and/or pressured. Musculoskeletal:   Spine ROM normal. Muscular strength intact. Gait not assessed. Integumentary:  No rashes  Skin normal color and texture.   Genitalia/Breast:  Deferred    Medication:  Scheduled Meds:   magnesium citrate  296 mL Oral Once  lidocaine PF  5 mL Intradermal Once    heparin flush  3 mL Intravenous 2 times per day    meropenem  1 g Intravenous Q12H    metoprolol tartrate  50 mg Oral BID    potassium chloride  20 mEq Oral BID    senna  1 tablet Oral Daily    furosemide  40 mg Oral Daily    losartan  50 mg Oral Daily    lubiprostone  8 mcg Oral BID WC    sodium chloride flush  10 mL Intravenous 2 times per day    enoxaparin  40 mg Subcutaneous Daily    fluticasone  1 spray Each Nostril Daily    polyethylene glycol  17 g Oral Daily     Continuous Infusions:   dextrose         Objective Data:  CBC with Differential:    Lab Results   Component Value Date    WBC 18.1 12/26/2020    RBC 3.71 12/26/2020    HGB 12.3 12/26/2020    HCT 36.8 12/26/2020     12/26/2020    MCV 99.2 12/26/2020    MCH 33.2 12/26/2020    MCHC 33.4 12/26/2020    RDW 14.9 12/26/2020    SEGSPCT 50 05/28/2011    METASPCT 0.9 12/24/2020    LYMPHOPCT 10.0 12/26/2020    MONOPCT 14.0 12/26/2020    MYELOPCT 0.9 12/24/2020    BASOPCT 0.0 12/26/2020    MONOSABS 2.53 12/26/2020    LYMPHSABS 1.81 12/26/2020    EOSABS 0.18 12/26/2020    BASOSABS 0.00 12/26/2020     CMP:    Lab Results   Component Value Date     12/26/2020    K 4.5 12/26/2020    K 4.3 12/23/2020     12/26/2020    CO2 21 12/26/2020    BUN 15 12/26/2020    CREATININE 1.2 12/26/2020    GFRAA 54 12/26/2020    LABGLOM 45 12/26/2020    GLUCOSE 100 12/26/2020    GLUCOSE 96 05/28/2011    PROT 6.3 12/26/2020    LABALBU 2.9 12/26/2020    LABALBU 4.0 05/27/2011    CALCIUM 8.8 12/26/2020    BILITOT 0.9 12/26/2020    ALKPHOS 155 12/26/2020    AST 19 12/26/2020    ALT 18 12/26/2020              Assessment:    · Sepsis secondary to UTI and pneumonia  · Constipation . · Chronic bilateral lower extremity edema  · Lactic acidosis   · Hepatic steatosis.   ·  Multiple sclerosis  · Hypertension  · Asthma/COPD  · Scoliosis  · History of tobacco abuse      Plan: · Temperature are improving. Will continue antibiotic therapy  · PICC line placement and probably require outpatient antibiotics  · Social service for discharge planning  · Continue pulmonary treatment and wean off oxygen  · Encourage deep breathing exercise and DVT prophylaxis    More than 50% of my time was spent at the bedside counseling/coordinating care with the patient and/or family with face to face contact. This time was spent reviewing notes and laboratory data as well as instructing and counseling the patient. Time I spent with the family or surrogate(s) is included only if the patient was incapable of providing the necessary information or participating in medical decisions. I also discussed the differential diagnosis and all of the proposed management plans with the patient and individuals accompanying the patient. I am readily available for any further decision-making and intervention.        Judi Awad DO, F.A.C.O.I.  12/26/2020  1:38 PM

## 2020-12-26 NOTE — PROGRESS NOTES
[facility]  (Lee/ Sowmya[facility])      PROGRESS NOTE         PATIENT NAME: Rl Swanson OF BIRTH: 1955  ADMISSION DATE: 12/23/2020  6:57 PM   TODAY'S DATE: 12/26/2020        Subjective       Pt in echo this morning   Still with indwelling stent on CHEMA.    KUB positive left renal calculi    Objective     VS:   BP (!) 156/73   Pulse 94   Temp 97.8 °F (36.6 °C) (Oral)   Resp 20   Ht 5' 6\" (1.676 m)   Wt 198 lb 4.8 oz (89.9 kg)   SpO2 91%   BMI 32.01 kg/m²     I & O - 24hr:    Intake/Output Summary (Last 24 hours) at 12/26/2020 1008  Last data filed at 12/26/2020 0816  Gross per 24 hour   Intake 120 ml   Output 1400 ml   Net -1280 ml         Physical Exam:  General:  Neck:  Resp:  Abdomen:   No acute distress  Supple  Normal effort  Soft, non-tender, nondistended   :  deferred   Skin: Skin color, texture, turgor normal, no rashes or lesions       Labs and Imaging Studies   Labs:    CBC:   Recent Labs     12/24/20  0428 12/25/20  0459 12/26/20  0517   WBC 34.0* 20.8* 18.1*   HGB 12.0 11.6 12.3   HCT 37.7 35.5 36.8   .6* 100.3* 99.2    174 216     BMP:  Recent Labs     12/24/20  0428 12/25/20  0459 12/26/20  0517    138 130*   K 4.3 3.9 4.5    108* 100   CO2 17* 20* 21*   PHOS 2.4*  --   --    BUN 18 17 15   CREATININE 1.4* 1.3* 1.2*       Magnesium:   Lab Results   Component Value Date    MG 1.6 12/24/2020      Phosphate:   Lab Results   Component Value Date    PHOS 2.4 12/24/2020     PT/INR:   Recent Labs     12/23/20  1935   PROTIME 12.1   INR 1.1       U/A:   Lab Results   Component Value Date    LEUKOCYTESUR MODERATE 12/23/2020    PHUR 6.0 12/23/2020    WBCUA 10-20 12/23/2020    WBCUA 2-5 05/27/2011    RBCUA 1-3 12/23/2020    RBCUA 0-1 05/27/2011    BACTERIA MANY 12/23/2020    SPECGRAV 1.025 12/23/2020    BLOODU MODERATE 12/23/2020    GLUCOSEU Negative 12/23/2020    GLUCOSEU NEGATIVE 05/27/2011       Urine Culture:       Component Value Date/Time LABURIN >100,000 CFU/ml 12/23/2020 2036        Blood Culture: reviewed. Imaging Studies:          Assessment and Plan       ASSESMENT:  77 y/o F with retained left ureteral stent. Positive blood culture. PLAN:  Will need outpatient ESWL Stones KUB positive. Clear infection at this time. Will need cystoscopy and stent change vs removal at the time of Left eswl. Will set up outpatient follow up for surgery. Continue antibiotics.        Surendra Rosa MD  [facility]  12/26/2020  10:08 AM

## 2020-12-26 NOTE — PROGRESS NOTES
[address] [facility]  [facility]  Progress Note    NAME:Nettie Rodney  1955  DATE OF SERVICE:20    FACE TO FACE ENCOUNTER 20  ID FOLLOWING FOR     SUBJECTIVE:  Chief Complaint   Patient presents with    Fatigue     x1week    presented on admission  Today:    tired  Patient is tolerating medications. No reported adverse drug reactions. Review of systems:  As stated above in the chief complaint, otherwise negative. Medications:  Scheduled Meds:   magnesium citrate  296 mL Oral Once    lidocaine PF  5 mL Intradermal Once    heparin flush  3 mL Intravenous 2 times per day    meropenem  1 g Intravenous Q12H    metoprolol tartrate  50 mg Oral BID    potassium chloride  20 mEq Oral BID    senna  1 tablet Oral Daily    furosemide  40 mg Oral Daily    losartan  50 mg Oral Daily    lubiprostone  8 mcg Oral BID WC    sodium chloride flush  10 mL Intravenous 2 times per day    enoxaparin  40 mg Subcutaneous Daily    fluticasone  1 spray Each Nostril Daily    polyethylene glycol  17 g Oral Daily     Continuous Infusions:   dextrose       PRN Meds:sodium chloride flush, heparin flush, perflutren lipid microspheres, albuterol sulfate HFA, glucose, dextrose, glucagon (rDNA), dextrose, polyethylene glycol, acetaminophen **OR** acetaminophen, melatonin    OBJECTIVE:  BP (!) 156/73   Pulse 94   Temp 97.8 °F (36.6 °C) (Oral)   Resp 20   Ht 5' 6\" (1.676 m)   Wt 198 lb 4.8 oz (89.9 kg)   SpO2 91%   BMI 32.01 kg/m²   Temp  Av.9 °F (37.2 °C)  Min: 97.8 °F (36.6 °C)  Max: 99.7 °F (37.6 °C)  Constitutional:  The patient is awake, alert, and oriented. Skin:    Warm and dry. No rashes were noted. HEENT:   Round and reactive pupils. AT/NC  Neck:    Supple to movements. Chest:   No use of accessory muscles to breathe. Symmetrical expansion. On o2  Cardiovascular:  S1 and S2 are rhythmic and regular. No murmurs appreciated. Abdomen:   Positive bowel sounds to auscultation. Benign to palpation. distended  Extremities:   No clubbing, no cyanosis,  edema.   CNS    AAxO   Lines: no iv site    Radiology:  Laboratory and Tests Review:  Lab Results   Component Value Date    WBC 18.1 (H) 12/26/2020    WBC 20.8 (H) 12/25/2020    WBC 34.0 (H) 12/24/2020    HGB 12.3 12/26/2020    HCT 36.8 12/26/2020    MCV 99.2 12/26/2020     12/26/2020     No results found for: CRPHS  Lab Results   Component Value Date    ALT 18 12/26/2020    AST 19 12/26/2020    ALKPHOS 155 (H) 12/26/2020    BILITOT 0.9 12/26/2020     Lab Results   Component Value Date     12/26/2020    K 4.5 12/26/2020    K 4.3 12/23/2020     12/26/2020    CO2 21 12/26/2020    BUN 15 12/26/2020    CREATININE 1.2 12/26/2020    CREATININE 1.3 12/25/2020    CREATININE 1.4 12/24/2020    GFRAA 54 12/26/2020    LABGLOM 45 12/26/2020    GLUCOSE 100 12/26/2020    GLUCOSE 96 05/28/2011    PROT 6.3 12/26/2020    LABALBU 2.9 12/26/2020    LABALBU 4.0 05/27/2011    CALCIUM 8.8 12/26/2020    BILITOT 0.9 12/26/2020    ALKPHOS 155 12/26/2020    AST 19 12/26/2020    ALT 18 12/26/2020     Lab Results   Component Value Date    CRP 29.3 (H) 12/26/2020       Microbiology:   Recent Labs     12/23/20 2132   COVID19 Not Detected     Lab Results   Component Value Date    BLOODCULT2  12/23/2020     Gram stain performed from blood culture bottle media  Gram negative rods      BLOODCULT2 Identification and sensitivity to follow 12/23/2020    BLOODCULT2 5 Days no growth 09/04/2020    ORG Escherichia coli 12/23/2020    ORG Gram negative prince 12/23/2020    ORG Gram negative prince 12/23/2020         MRSA Culture Only   Date Value Ref Range Status   07/26/2020 Methicillin resistant Staph aureus not isolated  Final     No results found for: CULTRESP  Recent Labs     12/23/20 2036   ORG Escherichia coli*     Recent Labs     12/23/20 1935 12/23/20 2036 ORG Gram negative prince*  Gram negative prince* Escherichia coli*     Recent Labs     12/23/20 1935 12/23/20 2036   LABURIN  --  >100,000 CFU/ml   ORG Gram negative prince*  Gram negative prince* Escherichia coli*        ASSESSMENT/PLAN:  Gn sepsis  E. Coli uti has left stent  leukocytosis  Poss pneumonia   -urology following Will need cystoscopy and stent change vs removal at the time of Left eswl. Urology will set up outpatient follow up for surgery           meropenem (MERREM) 1 g in sodium chloride 0.9 % 100 mL IVPB (mini-bag), Q12H  Await final cx  picc       · Monitor labs    Imaging and labs were reviewed per medical records. The patient was educated about the diagnosis, prognosis, indications, risks and benefits of treatment. An opportunity to ask questions was given to the patient/FAMILY. Thank you for involving me in the care of Ramón Gamboa will continue to follow. Please do not hesitate to call for any questions or concerns.     Electronically signed by Marcus Younger MD on 12/26/2020 at 12:12 PM

## 2020-12-27 LAB
ALBUMIN SERPL-MCNC: 2.8 G/DL (ref 3.5–5.2)
ALP BLD-CCNC: 152 U/L (ref 35–104)
ALT SERPL-CCNC: 31 U/L (ref 0–32)
ANION GAP SERPL CALCULATED.3IONS-SCNC: 7 MMOL/L (ref 7–16)
AST SERPL-CCNC: 36 U/L (ref 0–31)
BASOPHILS ABSOLUTE: 0 E9/L (ref 0–0.2)
BASOPHILS RELATIVE PERCENT: 0 % (ref 0–2)
BILIRUB SERPL-MCNC: 0.8 MG/DL (ref 0–1.2)
BUN BLDV-MCNC: 19 MG/DL (ref 8–23)
CALCIUM SERPL-MCNC: 8.7 MG/DL (ref 8.6–10.2)
CHLORIDE BLD-SCNC: 97 MMOL/L (ref 98–107)
CO2: 24 MMOL/L (ref 22–29)
CREAT SERPL-MCNC: 1.1 MG/DL (ref 0.5–1)
EOSINOPHILS ABSOLUTE: 0.15 E9/L (ref 0.05–0.5)
EOSINOPHILS RELATIVE PERCENT: 1 % (ref 0–6)
GFR AFRICAN AMERICAN: 60
GFR NON-AFRICAN AMERICAN: 50 ML/MIN/1.73
GLUCOSE BLD-MCNC: 95 MG/DL (ref 74–99)
HCT VFR BLD CALC: 36.2 % (ref 34–48)
HEMOGLOBIN: 11.7 G/DL (ref 11.5–15.5)
LYMPHOCYTES ABSOLUTE: 1.51 E9/L (ref 1.5–4)
LYMPHOCYTES RELATIVE PERCENT: 10 % (ref 20–42)
MCH RBC QN AUTO: 32.3 PG (ref 26–35)
MCHC RBC AUTO-ENTMCNC: 32.3 % (ref 32–34.5)
MCV RBC AUTO: 100 FL (ref 80–99.9)
METER GLUCOSE: 109 MG/DL (ref 74–99)
METER GLUCOSE: 112 MG/DL (ref 74–99)
METER GLUCOSE: 118 MG/DL (ref 74–99)
MONOCYTES ABSOLUTE: 2.26 E9/L (ref 0.1–0.95)
MONOCYTES RELATIVE PERCENT: 15 % (ref 2–12)
MYELOCYTE PERCENT: 1 % (ref 0–0)
NEUTROPHILS ABSOLUTE: 11.17 E9/L (ref 1.8–7.3)
NEUTROPHILS RELATIVE PERCENT: 73 % (ref 43–80)
PDW BLD-RTO: 14.5 FL (ref 11.5–15)
PLATELET # BLD: 219 E9/L (ref 130–450)
PMV BLD AUTO: 10.5 FL (ref 7–12)
POTASSIUM SERPL-SCNC: 4.4 MMOL/L (ref 3.5–5)
RBC # BLD: 3.62 E12/L (ref 3.5–5.5)
SODIUM BLD-SCNC: 128 MMOL/L (ref 132–146)
TOTAL PROTEIN: 6.1 G/DL (ref 6.4–8.3)
WBC # BLD: 15.1 E9/L (ref 4.5–11.5)

## 2020-12-27 PROCEDURE — 82962 GLUCOSE BLOOD TEST: CPT

## 2020-12-27 PROCEDURE — 6360000002 HC RX W HCPCS: Performed by: INTERNAL MEDICINE

## 2020-12-27 PROCEDURE — 6360000002 HC RX W HCPCS: Performed by: SPECIALIST

## 2020-12-27 PROCEDURE — 6370000000 HC RX 637 (ALT 250 FOR IP): Performed by: INTERNAL MEDICINE

## 2020-12-27 PROCEDURE — 85025 COMPLETE CBC W/AUTO DIFF WBC: CPT

## 2020-12-27 PROCEDURE — 1200000000 HC SEMI PRIVATE

## 2020-12-27 PROCEDURE — 2700000000 HC OXYGEN THERAPY PER DAY

## 2020-12-27 PROCEDURE — 2580000003 HC RX 258: Performed by: SPECIALIST

## 2020-12-27 PROCEDURE — 97110 THERAPEUTIC EXERCISES: CPT

## 2020-12-27 PROCEDURE — 80053 COMPREHEN METABOLIC PANEL: CPT

## 2020-12-27 PROCEDURE — 36415 COLL VENOUS BLD VENIPUNCTURE: CPT

## 2020-12-27 RX ORDER — SODIUM CHLORIDE 0.9 % (FLUSH) 0.9 %
10 SYRINGE (ML) INJECTION EVERY 12 HOURS SCHEDULED
Status: DISCONTINUED | OUTPATIENT
Start: 2020-12-27 | End: 2020-12-29 | Stop reason: HOSPADM

## 2020-12-27 RX ORDER — LIDOCAINE HYDROCHLORIDE 10 MG/ML
5 INJECTION, SOLUTION INFILTRATION; PERINEURAL ONCE
Status: DISCONTINUED | OUTPATIENT
Start: 2020-12-27 | End: 2020-12-29 | Stop reason: HOSPADM

## 2020-12-27 RX ORDER — SODIUM CHLORIDE 0.9 % (FLUSH) 0.9 %
10 SYRINGE (ML) INJECTION PRN
Status: DISCONTINUED | OUTPATIENT
Start: 2020-12-27 | End: 2020-12-29 | Stop reason: HOSPADM

## 2020-12-27 RX ADMIN — CEFTRIAXONE 2 G: 2 INJECTION, POWDER, FOR SOLUTION INTRAMUSCULAR; INTRAVENOUS at 17:58

## 2020-12-27 RX ADMIN — ACETAMINOPHEN 650 MG: 325 TABLET, FILM COATED ORAL at 08:31

## 2020-12-27 RX ADMIN — LOSARTAN POTASSIUM 50 MG: 50 TABLET, FILM COATED ORAL at 08:32

## 2020-12-27 RX ADMIN — LUBIPROSTONE 8 MCG: 8 CAPSULE, GELATIN COATED ORAL at 08:31

## 2020-12-27 RX ADMIN — LUBIPROSTONE 8 MCG: 8 CAPSULE, GELATIN COATED ORAL at 17:58

## 2020-12-27 RX ADMIN — ENOXAPARIN SODIUM 40 MG: 40 INJECTION SUBCUTANEOUS at 08:31

## 2020-12-27 RX ADMIN — FUROSEMIDE 40 MG: 40 TABLET ORAL at 08:32

## 2020-12-27 RX ADMIN — POTASSIUM CHLORIDE 20 MEQ: 20 TABLET, EXTENDED RELEASE ORAL at 08:32

## 2020-12-27 RX ADMIN — METOPROLOL TARTRATE 50 MG: 50 TABLET, FILM COATED ORAL at 20:53

## 2020-12-27 RX ADMIN — METOPROLOL TARTRATE 50 MG: 50 TABLET, FILM COATED ORAL at 08:32

## 2020-12-27 RX ADMIN — FLUTICASONE PROPIONATE 1 SPRAY: 50 SPRAY, METERED NASAL at 08:31

## 2020-12-27 RX ADMIN — SENNOSIDES 8.6 MG: 8.6 TABLET, FILM COATED ORAL at 08:32

## 2020-12-27 RX ADMIN — POTASSIUM CHLORIDE 20 MEQ: 20 TABLET, EXTENDED RELEASE ORAL at 20:53

## 2020-12-27 ASSESSMENT — PAIN SCALES - GENERAL
PAINLEVEL_OUTOF10: 0
PAINLEVEL_OUTOF10: 0
PAINLEVEL_OUTOF10: 9
PAINLEVEL_OUTOF10: 0

## 2020-12-27 ASSESSMENT — PAIN DESCRIPTION - ORIENTATION: ORIENTATION: RIGHT;LEFT

## 2020-12-27 ASSESSMENT — PAIN DESCRIPTION - LOCATION: LOCATION: ABDOMEN

## 2020-12-27 ASSESSMENT — PAIN DESCRIPTION - FREQUENCY: FREQUENCY: CONTINUOUS

## 2020-12-27 ASSESSMENT — PAIN DESCRIPTION - DESCRIPTORS: DESCRIPTORS: CRAMPING;DISCOMFORT;CONSTANT

## 2020-12-27 ASSESSMENT — PAIN DESCRIPTION - PAIN TYPE: TYPE: ACUTE PAIN

## 2020-12-27 NOTE — PROGRESS NOTES
Physical Therapy Treatment Note    Room #:  0415/0415-01  Patient Name: Shannan Foreman  YOB: 1955  MRN: [id number]    Referring Provider: Jane Garcia DO  Date of Service: 12/27/2020  Evaluating Physical Therapist: Jeri Jeffrey PT  Diagnosis: Sepsis due to urinary tract infection ([facility] [A41.9, N39.0]        Patient Active Problem List   Diagnosis    Neck pain    Multiple sclerosis ([address]    Bilateral foot-drop    Peripheral polyneuropathy    Essential hypertension    Asthma    Edema of both legs    Constipation    Intervertebral lumbar disc disorder with myelopathy, lumbar region    Cervical spinal stenosis    Ureterolithiasis    Pulmonary venous congestion    Fatigue    Sepsis due to urinary tract infection ([address]      Tentative placement recommendation: Subacute rehab    Equipment recommendation: None      Prior Level of Function: Patient ambulated last at [facility] 8/20 in parallel bars. States she could stand for a few minutes to do her dishes.  Reports she was able to transfer bed to chair independently  Rehab Potential: good for baseline    Past medical history:   Past Medical History:   Diagnosis Date    Arthritis     Asthma     COPD (chronic obstructive pulmonary disease) ([address]     Eczema     Fall 5/26/2011    Gout     Hematoma of abdominal wall 5/2011    extraperitoneal    Hypertension     Metatarsal fracture 1/2011    right 3rd and 4th    Multiple sclerosis ([address]     Prolonged emergence from general anesthesia     Scoliosis     Spinal stenosis     UTI (urinary tract infection)      Past Surgical History:   Procedure Laterality Date    APPENDECTOMY      CHOLECYSTECTOMY      CYSTOSCOPY INSERTION / REMOVAL STENT / STONE Right 7/26/2020    CYSTOSCOPY RETROGRADE PYELOGRAM STENT INSERTION LEFT performed by Gisselle Howard DO at [facility] [address] LITHOTRIPSY Left 8/7/2020 CYSTOSCOPY RETROGRADE PYELOGRAM URETEROSCOPY  LEFT J-STENT EXCHANGE, INSERTION JUNIOR CATHETER---FACILITY------ performed by Rolf Howard DO at [facility] OR     Precautions: falls , FWB (full weight bearing) Bilateral, MS    SUBJECTIVE:  Social history: Patient lives alone in a apartment with No steps, Walk in shower, grab bars  Equipment owned: Wheelchair,  Bonine and Hand held shower head,       [address]   How much difficulty turning over in bed?: A Lot  How much difficulty sitting down on / standing up from a chair with arms?: A Lot  How much difficulty moving from lying on back to sitting on side of bed?: A Lot  How much help from another person moving to and from a bed to a chair?: A Lot  How much help from another person needed to walk in hospital room?: Total  How much help from another person for climbing 3-5 steps with a railing?: Total  AM-PAC Inpatient Mobility Raw Score : 10  AM-PAC Inpatient T-Scale Score : 32.29  Mobility Inpatient CMS 0-100% Score: 76.75  Mobility Inpatient CMS G-Code Modifier : CL    Nursing cleared patient for PT treatment. Patient complained of fatigue and \"exhaustion\"; agreed to bed exercises only. OBJECTIVE:   Initial Evaluation  Date: 12/24/20 Treatment Date:     Short Term/ Long Term   Goals   Was pt agreeable to Eval/treatment? Yes   12/27/2020 To be met in 3 days   Pain level   0/10    None stated    Bed Mobility  Rolling: Maximal assist of 1    Supine to sit: Maximal assist of 1    Sit to supine: Maximal assist of 1    Scooting: Maximal assist of 1   Rolling: Not assessed    Supine to sit: Not assessed    Sit to supine: Not assessed    Scooting: Not assessed   Rolling: Moderate assist of 1    Supine to sit: Moderate assist of 1    Sit to supine: Moderate assist of 1    Scooting:  Moderate assist of 1     Transfers Sit to stand:unable to stand attempted 2x Sit to stand: Not assessed   Sit to stand: Maximal assist of  2 Ambulation    not assessed   Not assessed  not assessed    Stair negotiation: ascended and descended   Not assessed    Not assessed   Not assessed      ROM Within functional limits        Strength BUE: 4/5  RLE:  2 - 3+/5  LLE:  2 - 3+/5  Increase strength in affected mm groups by 1/3 grade   Balance Sitting EOB:  fair +   Dynamic Standing:  not assessed   Not assessed Sitting EOB:  good    Dynamic Standing: not assessed       Patient education  Patient educated on role of Physical Therapy, risks of immobility, safety and plan of care, energy conservation, importance of positional changes for oxygen exchange and  importance of mobility while in hospital       Patient response to education:   Pt verbalized understanding Pt demonstrated skill Pt requires further education in this area   Yes Partial Yes      Treatment: Patient performed the following supine bilateral lower extremity exercises with AAROM: ankle pumps, heel slides, hip abduction, and SLR x 15 reps. Verbal cues were needed for correct technique and to increase participation. At end of session, patient in bed with alarm call light and phone within reach, all lines and tubes intact, nursing notified. ASSESSMENT: Patient continues to exhibit decreased strength, balance, and coordination that is impairing functional mobility. Patient agreed to bed exercises only and had difficulty participating due to bilateral lower extremity weakness and fatigue. Patient would benefit from continued skilled Physical Therapy to improve functional independence and quality of life. Patient's/ family goals   home        Patient and or family understand(s) diagnosis, prognosis, and plan of care.     PLAN: Physical Therapy care will be provided in accordance with the objectives noted above. Exercises and functional mobility practice will be used as well as appropriate assistive devices or modalities to obtain goals. Patient and family education will also be administered as needed. Frequency of treatments: Patient will be seen daily  for therapeutic exercise, functional retraining, endurance activities, balance exercises, family and patient education.        Time in: 11:10  Time out: 11:21    Total Treatment Time  0 minutes    CPT codes:  Therapeutic exercises (60867)   11 minutes  1 unit(s)     Perla Javier PTA   LIC# [id number]

## 2020-12-27 NOTE — PROGRESS NOTES
Internal Medicine Progress Note    [facility]    Gary Coffey. James Celaya., F.A.C.O.I. Mike Perry D.O., TAYLEROBEN Ortega D.O. Cornelio Montgomery, MSN, APRN, NP-C  Marina Beverly, MSN, APRN-CNP     Primary Care Physician: Yovanny Watson MD   Admitting Physician:  Nam Miller DO  Admission date and time: 12/23/2020  6:57 PM    Room:  [address] [place] [address]  Admitting diagnosis: Sepsis due to urinary tract infection ([facility] [A41.9, N39.0]    Patient Name: Rohan Harmon  MRN: [id number]    Date of Service: 12/27/2020     Subjective:  Christelle Sanon is a 76 y.o. female who was seen and examined today,12/27/2020, at the bedside. Patient still wearing oxygen adjustment has been made accordingly. Still complains some constipation. Patient probably will require IV antibiotic therapy. Discussed discharge planning. No family present during my examination. Review of System:   Constitutional:   Denies  weight loss or gain, fatigue or malaise. Complains of fever and chills  HEENT:   Denies ear pain, sore throat, sinus or eye problems. Cardiovascular:   Denies any chest pain, irregular heartbeats, or palpitations. Respiratory:   Mild shortness of breath. Denies coughing, sputum production, hemoptysis, or wheezing. Gastrointestinal:   Denies nausea, vomiting, diarrhea. Denies any abdominal pain. Complains of chronic constipation abdominal distention  Genitourinary:    Urinary frequency  Extremities:   Denies lower extremity swelling, edema or cyanosis. Neurology:    Denies any headache or focal neurological deficits, Denies generalized weakness or memory difficulty. Psch:   Denies being anxious or depressed. Musculoskeletal:    Denies  myalgias, joint complaints or back pain. Integumentary:   Denies any rashes, ulcers, or excoriations. Denies bruising. Hematologic/Lymphatic:  Denies bruising or bleeding.     Physical Exam:  I/O this shift:  In: 61 [P.O.:60] Out: 525 [Urine:525]    Intake/Output Summary (Last 24 hours) at 12/27/2020 1036  Last data filed at 12/27/2020 0752  Gross per 24 hour   Intake 300 ml   Output 2225 ml   Net -1925 ml   I/O last 3 completed shifts: In: 360 [P.O.:360]  Out: 1700 [Urine:1700]  Patient Vitals for the past 96 hrs (Last 3 readings):   Weight   12/26/20 0601 198 lb 4.8 oz (89.9 kg)   12/24/20 0114 195 lb (88.5 kg)     Vital Signs:   Blood pressure (!) 146/80, pulse 98, temperature 98.8 °F (37.1 °C), temperature source Oral, resp. rate 20, height 5' 6\" (1.676 m), weight 198 lb 4.8 oz (89.9 kg), SpO2 91 %, not currently breastfeeding. General appearance:  Alert, responsive, oriented to person, place, and time. Well preserved, alert, no distress. Head:  Normocephalic. No masses, lesions or tenderness. Eyes:  PERRLA. EOMI. Sclera clear. Buccal mucosa moist.  ENT:  Ears normal. Mucosa normal.  Neck:    Supple. Trachea midline. No thyromegaly. No JVD. No bruits. Heart:    Rhythm regular. Rate controlled. No murmurs. Lungs:    Symmetrical. Clear to auscultation bilaterally. No wheezes. No rhonchi. No rales. Abdomen:   Soft. Non-tender. Non-distended. Bowel sounds positive. No organomegaly or masses. No pain on palpation. Extremities:    Peripheral pulses present. No peripheral edema. No ulcers. No cyanosis. No clubbing. Neurologic:    Alert x 3. No focal deficit. Cranial nerves grossly intact. No focal weakness. Psych:   Behavior is normal. Mood appears normal. Speech is not rapid and/or pressured. Musculoskeletal:   Spine ROM normal. Muscular strength intact. Gait not assessed. Integumentary:  No rashes  Skin normal color and texture.   Genitalia/Breast:  Deferred    Medication:  Scheduled Meds:   magnesium citrate  296 mL Oral Once    lidocaine PF  5 mL Intradermal Once    heparin flush  3 mL Intravenous 2 times per day    meropenem  1 g Intravenous Q12H    metoprolol tartrate  50 mg Oral BID  potassium chloride  20 mEq Oral BID    senna  1 tablet Oral Daily    furosemide  40 mg Oral Daily    losartan  50 mg Oral Daily    lubiprostone  8 mcg Oral BID WC    sodium chloride flush  10 mL Intravenous 2 times per day    enoxaparin  40 mg Subcutaneous Daily    fluticasone  1 spray Each Nostril Daily    polyethylene glycol  17 g Oral Daily     Continuous Infusions:   dextrose         Objective Data:  CBC with Differential:    Lab Results   Component Value Date    WBC 15.1 12/27/2020    RBC 3.62 12/27/2020    HGB 11.7 12/27/2020    HCT 36.2 12/27/2020     12/27/2020    .0 12/27/2020    MCH 32.3 12/27/2020    MCHC 32.3 12/27/2020    RDW 14.5 12/27/2020    SEGSPCT 50 05/28/2011    METASPCT 0.9 12/24/2020    LYMPHOPCT 10.0 12/27/2020    MONOPCT 15.0 12/27/2020    MYELOPCT 1.0 12/27/2020    BASOPCT 0.0 12/27/2020    MONOSABS 2.26 12/27/2020    LYMPHSABS 1.51 12/27/2020    EOSABS 0.15 12/27/2020    BASOSABS 0.00 12/27/2020     CMP:    Lab Results   Component Value Date     12/27/2020    K 4.4 12/27/2020    K 4.3 12/23/2020    CL 97 12/27/2020    CO2 24 12/27/2020    BUN 19 12/27/2020    CREATININE 1.1 12/27/2020    GFRAA 60 12/27/2020    LABGLOM 50 12/27/2020    GLUCOSE 95 12/27/2020    GLUCOSE 96 05/28/2011    PROT 6.1 12/27/2020    LABALBU 2.8 12/27/2020    LABALBU 4.0 05/27/2011    CALCIUM 8.7 12/27/2020    BILITOT 0.8 12/27/2020    ALKPHOS 152 12/27/2020    AST 36 12/27/2020    ALT 31 12/27/2020              Assessment:    · Sepsis secondary to UTI and pneumonia  · Constipation . · Chronic bilateral lower extremity edema  · Lactic acidosis   · Hepatic steatosis.   ·  Multiple sclerosis  · Hypertension  · Asthma/COPD  · Scoliosis  · History of tobacco abuse  · Chronic constipation  · Obesity secondary to excess caloric intake with BMI of 32        Plan:      · Antibiotics being prescribed by infectious disease for sepsis  · PICC line entertained for outpatient antibiotic therapy

## 2020-12-27 NOTE — PROGRESS NOTES
Abdomen:   Positive bowel sounds to auscultation. Benign to palpation. Distended nt  Extremities:    edema. CNS    AAxO   Lines: no iv site    Radiology:  Laboratory and Tests Review:  Lab Results   Component Value Date    WBC 15.1 (H) 12/27/2020    WBC 18.1 (H) 12/26/2020    WBC 20.8 (H) 12/25/2020    HGB 11.7 12/27/2020    HCT 36.2 12/27/2020    .0 (H) 12/27/2020     12/27/2020     No results found for: CRPHS  Lab Results   Component Value Date    ALT 31 12/27/2020    AST 36 (H) 12/27/2020    ALKPHOS 152 (H) 12/27/2020    BILITOT 0.8 12/27/2020     Lab Results   Component Value Date     12/27/2020    K 4.4 12/27/2020    K 4.3 12/23/2020    CL 97 12/27/2020    CO2 24 12/27/2020    BUN 19 12/27/2020    CREATININE 1.1 12/27/2020    CREATININE 1.2 12/26/2020    CREATININE 1.3 12/25/2020    GFRAA 60 12/27/2020    LABGLOM 50 12/27/2020    GLUCOSE 95 12/27/2020    GLUCOSE 96 05/28/2011    PROT 6.1 12/27/2020    LABALBU 2.8 12/27/2020    LABALBU 4.0 05/27/2011    CALCIUM 8.7 12/27/2020    BILITOT 0.8 12/27/2020    ALKPHOS 152 12/27/2020    AST 36 12/27/2020    ALT 31 12/27/2020     Lab Results   Component Value Date    CRP 29.3 (H) 12/26/2020       Microbiology:   No results for input(s): COVID19 in the last 72 hours. Lab Results   Component Value Date    BLOODCULT2 24 Hours no growth 12/25/2020    BLOODCULT2  12/23/2020     Gram stain performed from blood culture bottle media  Gram negative rods      BLOODCULT2 Identification and sensitivity to follow 12/23/2020    ORG Escherichia coli 12/23/2020    ORG Gram negative prince 12/23/2020    ORG Gram negative prince 12/23/2020         MRSA Culture Only   Date Value Ref Range Status   07/26/2020 Methicillin resistant Staph aureus not isolated  Final        ASSESSMENT/PLAN:  Gn sepsis  E.  Coli uti has left stent  leukocytosis  Poss pneumonia -urology following Will need cystoscopy and stent change vs removal at the time of Left eswl. Urology will set up outpatient follow up for surgery           meropenem (MERREM) 1 g in sodium chloride 0.9 % 100 mL IVPB (mini-bag), Q12H  Ceftriaxone IV/IM  picc   Pt  Med rec       · Monitor labs    Imaging and labs were reviewed per medical records. The patient was educated about the diagnosis, prognosis, indications, risks and benefits of treatment. An opportunity to ask questions was given to the patient/FAMILY. Thank you for involving me in the care of Matthew Rivera will continue to follow. Please do not hesitate to call for any questions or concerns.     Electronically signed by Ulysses Sang, MD on 12/27/2020 at 10:14 AM

## 2020-12-28 ENCOUNTER — APPOINTMENT (OUTPATIENT)
Dept: ULTRASOUND IMAGING | Age: 65
DRG: 720 | End: 2020-12-28
Payer: MEDICAID

## 2020-12-28 ENCOUNTER — APPOINTMENT (OUTPATIENT)
Dept: GENERAL RADIOLOGY | Age: 65
DRG: 720 | End: 2020-12-28
Payer: MEDICAID

## 2020-12-28 VITALS
BODY MASS INDEX: 31.87 KG/M2 | OXYGEN SATURATION: 93 % | WEIGHT: 198.3 LBS | HEIGHT: 66 IN | DIASTOLIC BLOOD PRESSURE: 78 MMHG | RESPIRATION RATE: 18 BRPM | SYSTOLIC BLOOD PRESSURE: 137 MMHG | TEMPERATURE: 99.9 F | HEART RATE: 87 BPM

## 2020-12-28 LAB
ALBUMIN SERPL-MCNC: 2.7 G/DL (ref 3.5–5.2)
ALP BLD-CCNC: 148 U/L (ref 35–104)
ALT SERPL-CCNC: 57 U/L (ref 0–32)
ANION GAP SERPL CALCULATED.3IONS-SCNC: 10 MMOL/L (ref 7–16)
AST SERPL-CCNC: 52 U/L (ref 0–31)
BASOPHILS ABSOLUTE: 0 E9/L (ref 0–0.2)
BASOPHILS RELATIVE PERCENT: 0.7 % (ref 0–2)
BILIRUB SERPL-MCNC: 0.5 MG/DL (ref 0–1.2)
BUN BLDV-MCNC: 25 MG/DL (ref 8–23)
CALCIUM SERPL-MCNC: 8.9 MG/DL (ref 8.6–10.2)
CHLORIDE BLD-SCNC: 94 MMOL/L (ref 98–107)
CO2: 26 MMOL/L (ref 22–29)
CREAT SERPL-MCNC: 1.1 MG/DL (ref 0.5–1)
EOSINOPHILS ABSOLUTE: 0.37 E9/L (ref 0.05–0.5)
EOSINOPHILS RELATIVE PERCENT: 2.6 % (ref 0–6)
GFR AFRICAN AMERICAN: 60
GFR NON-AFRICAN AMERICAN: 50 ML/MIN/1.73
GLUCOSE BLD-MCNC: 106 MG/DL (ref 74–99)
HCT VFR BLD CALC: 32.7 % (ref 34–48)
HEMOGLOBIN: 11.9 G/DL (ref 11.5–15.5)
LYMPHOCYTES ABSOLUTE: 2.45 E9/L (ref 1.5–4)
LYMPHOCYTES RELATIVE PERCENT: 16.7 % (ref 20–42)
MCH RBC QN AUTO: 36.8 PG (ref 26–35)
MCHC RBC AUTO-ENTMCNC: 36.4 % (ref 32–34.5)
MCV RBC AUTO: 101.2 FL (ref 80–99.9)
METER GLUCOSE: 112 MG/DL (ref 74–99)
MONOCYTES ABSOLUTE: 3.02 E9/L (ref 0.1–0.95)
MONOCYTES RELATIVE PERCENT: 21.1 % (ref 2–12)
MYELOCYTE PERCENT: 4.4 % (ref 0–0)
NEUTROPHILS ABSOLUTE: 8.64 E9/L (ref 1.8–7.3)
NEUTROPHILS RELATIVE PERCENT: 55.3 % (ref 43–80)
PDW BLD-RTO: 14.5 FL (ref 11.5–15)
PLATELET # BLD: 284 E9/L (ref 130–450)
PMV BLD AUTO: 10.7 FL (ref 7–12)
POIKILOCYTES: ABNORMAL
POLYCHROMASIA: ABNORMAL
POTASSIUM SERPL-SCNC: 4.5 MMOL/L (ref 3.5–5)
RBC # BLD: 3.23 E12/L (ref 3.5–5.5)
SODIUM BLD-SCNC: 130 MMOL/L (ref 132–146)
TEAR DROP CELLS: ABNORMAL
TOTAL PROTEIN: 6.5 G/DL (ref 6.4–8.3)
WBC # BLD: 14.4 E9/L (ref 4.5–11.5)

## 2020-12-28 PROCEDURE — 6370000000 HC RX 637 (ALT 250 FOR IP): Performed by: CLINICAL NURSE SPECIALIST

## 2020-12-28 PROCEDURE — 80053 COMPREHEN METABOLIC PANEL: CPT

## 2020-12-28 PROCEDURE — 2720000010 HC SURG SUPPLY STERILE

## 2020-12-28 PROCEDURE — 82962 GLUCOSE BLOOD TEST: CPT

## 2020-12-28 PROCEDURE — 2700000000 HC OXYGEN THERAPY PER DAY

## 2020-12-28 PROCEDURE — 74018 RADEX ABDOMEN 1 VIEW: CPT

## 2020-12-28 PROCEDURE — 76937 US GUIDE VASCULAR ACCESS: CPT

## 2020-12-28 PROCEDURE — 36569 INSJ PICC 5 YR+ W/O IMAGING: CPT

## 2020-12-28 PROCEDURE — C1751 CATH, INF, PER/CENT/MIDLINE: HCPCS

## 2020-12-28 PROCEDURE — 6360000002 HC RX W HCPCS: Performed by: INTERNAL MEDICINE

## 2020-12-28 PROCEDURE — 36415 COLL VENOUS BLD VENIPUNCTURE: CPT

## 2020-12-28 PROCEDURE — 02HV33Z INSERTION OF INFUSION DEVICE INTO SUPERIOR VENA CAVA, PERCUTANEOUS APPROACH: ICD-10-PCS | Performed by: INTERNAL MEDICINE

## 2020-12-28 PROCEDURE — 6360000002 HC RX W HCPCS: Performed by: SPECIALIST

## 2020-12-28 PROCEDURE — 6370000000 HC RX 637 (ALT 250 FOR IP): Performed by: INTERNAL MEDICINE

## 2020-12-28 PROCEDURE — 85025 COMPLETE CBC W/AUTO DIFF WBC: CPT

## 2020-12-28 PROCEDURE — 2580000003 HC RX 258: Performed by: SPECIALIST

## 2020-12-28 PROCEDURE — 93970 EXTREMITY STUDY: CPT

## 2020-12-28 RX ORDER — LUBIPROSTONE 8 UG/1
8 CAPSULE, GELATIN COATED ORAL 2 TIMES DAILY WITH MEALS
Qty: 60 CAPSULE | Refills: 0 | DISCHARGE
Start: 2020-12-28 | End: 2021-03-08 | Stop reason: ALTCHOICE

## 2020-12-28 RX ORDER — ACYCLOVIR 50 MG/G
OINTMENT TOPICAL
Status: DISCONTINUED | OUTPATIENT
Start: 2020-12-28 | End: 2020-12-29 | Stop reason: HOSPADM

## 2020-12-28 RX ORDER — ACYCLOVIR 50 MG/G
OINTMENT TOPICAL
Qty: 1 TUBE | Refills: 2 | Status: SHIPPED | OUTPATIENT
Start: 2020-12-28 | End: 2021-01-04

## 2020-12-28 RX ADMIN — FUROSEMIDE 40 MG: 40 TABLET ORAL at 08:28

## 2020-12-28 RX ADMIN — LUBIPROSTONE 8 MCG: 8 CAPSULE, GELATIN COATED ORAL at 17:16

## 2020-12-28 RX ADMIN — MUPIROCIN: 20 OINTMENT TOPICAL at 12:35

## 2020-12-28 RX ADMIN — POTASSIUM CHLORIDE 20 MEQ: 20 TABLET, EXTENDED RELEASE ORAL at 08:28

## 2020-12-28 RX ADMIN — ACETAMINOPHEN 650 MG: 325 TABLET, FILM COATED ORAL at 20:00

## 2020-12-28 RX ADMIN — CEFTRIAXONE 2 G: 2 INJECTION, POWDER, FOR SOLUTION INTRAMUSCULAR; INTRAVENOUS at 12:34

## 2020-12-28 RX ADMIN — ENOXAPARIN SODIUM 40 MG: 40 INJECTION SUBCUTANEOUS at 08:34

## 2020-12-28 RX ADMIN — LOSARTAN POTASSIUM 50 MG: 50 TABLET, FILM COATED ORAL at 08:28

## 2020-12-28 RX ADMIN — LUBIPROSTONE 8 MCG: 8 CAPSULE, GELATIN COATED ORAL at 08:28

## 2020-12-28 RX ADMIN — ACETAMINOPHEN 650 MG: 325 TABLET, FILM COATED ORAL at 00:40

## 2020-12-28 RX ADMIN — SENNOSIDES 8.6 MG: 8.6 TABLET, FILM COATED ORAL at 08:28

## 2020-12-28 RX ADMIN — METOPROLOL TARTRATE 50 MG: 50 TABLET, FILM COATED ORAL at 08:28

## 2020-12-28 RX ADMIN — ACYCLOVIR: 50 OINTMENT TOPICAL at 17:16

## 2020-12-28 RX ADMIN — POLYETHYLENE GLYCOL 3350 17 G: 17 POWDER, FOR SOLUTION ORAL at 08:34

## 2020-12-28 RX ADMIN — ACYCLOVIR: 50 OINTMENT TOPICAL at 12:35

## 2020-12-28 ASSESSMENT — PAIN DESCRIPTION - DESCRIPTORS: DESCRIPTORS: CRAMPING;TENDER

## 2020-12-28 ASSESSMENT — PAIN SCALES - GENERAL
PAINLEVEL_OUTOF10: 7
PAINLEVEL_OUTOF10: 1
PAINLEVEL_OUTOF10: 0

## 2020-12-28 ASSESSMENT — PAIN DESCRIPTION - LOCATION: LOCATION: ABDOMEN

## 2020-12-28 NOTE — DISCHARGE INSTR - COC
Bilateral foot-drop M21.371, M21.372    Peripheral polyneuropathy G62.9    Essential hypertension I10    Asthma J45.909    Edema of both legs R60.0    Constipation K59.00    Intervertebral lumbar disc disorder with myelopathy, 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    Cervical spinal stenosis M48.02    Ureterolithiasis N20.1    Pulmonary venous congestion R09.89    Fatigue R53.83    Sepsis due to urinary tract infection (HCC) A41.9, N39.0       Isolation/Infection:   Isolation            No Isolation          Patient Infection Status       Infection Onset Added Last Indicated Last Indicated By Review Planned Expiration Resolved Resolved By    None active    Resolved    COVID-19 Rule Out 12/23/20 12/23/20 12/23/20 Respiratory Panel, Molecular, with COVID-19 (Restricted: peds pts or suitable admitted adults) (Ordered)   12/24/20 Rule-Out Test Resulted    COVID-19 Rule Out 12/23/20 12/23/20 12/23/20 COVID-19 (Ordered)   12/23/20 Rule-Out Test Resulted    COVID-19 Rule Out 07/30/20 07/30/20 07/30/20 COVID-19 (Ordered)   07/30/20 Rule-Out Test Resulted    COVID-19 Rule Out 07/26/20 07/26/20 07/26/20 COVID-19 (Ordered)   07/26/20 Rule-Out Test Resulted            Nurse Assessment:  Last Vital Signs: /67   Pulse 93   Temp 99.9 °F (37.7 °C) (Oral)   Resp 18   Ht 5' 6\" (1.676 m)   Wt 198 lb 4.8 oz (89.9 kg)   SpO2 95%   BMI 32.01 kg/m²     Last documented pain score (0-10 scale): Pain Level: 1  Last Weight:   Wt Readings from Last 1 Encounters:   12/26/20 198 lb 4.8 oz (89.9 kg)     Mental Status:  {IP PT MENTAL STATUS:20030:::0}    IV Access:  { BREANA IV ACCESS:208436033:::0}    Nursing Mobility/ADLs:  Walking   {CHP DME ADLs:041095224:::0}  Transfer  {CHP DME ADLs:327254556:::0}  Bathing  {CHP DME ADLs:563338951:::0}  Dressing  {CHP DME ADLs:888974634:::0}  Toileting  {CHP DME ADLs:410362666:::0}  Feeding  {CHP DME ADLs:305872969:::0}  Med Admin  {CHP DME ADLs:332751409:::0}  Med Delivery   { BREANA MED Delivery:348513887:::0} / signature: {Esignature:618072058:::0}    PHYSICIAN SECTION    Prognosis: {Prognosis:0214577909:::0}    Condition at Discharge: Lopez Barnes Patient Condition:492622092:::0}    Rehab Potential (if transferring to Rehab): {Prognosis:4598177217:::0}    Recommended Labs or Other Treatments After Discharge: ***    Physician Certification: I certify the above information and transfer of Chrissy Zacarias  is necessary for the continuing treatment of the diagnosis listed and that she requires {Admit to Appropriate Level of Care:15682:::0} for {GREATER/LESS:247078275} 30 days.      Update Admission H&P: {CHP DME Changes in HandP:746341424:::0}    PHYSICIAN SIGNATURE:  Electronically signed by Monique Day DO on 12/28/20 at 10:26 AM EST

## 2020-12-28 NOTE — CARE COORDINATION
Ss note: 12/28/2020.2:10 PM Covid neg 12-. Pt accepted at [facility] LOC has been completed for [facility] and bed is available today. Per nursing awaiting test results prior to discharge today. SW contacted [facility], placed pt on WILL CALL [phone] nurse aware. Nursing will need to call ambulance and liaison Carol at [phone] when ready for discharge. Facility # for nurse to nurse is [phone]. CRISTIAN Doherty    Update: Transport set up for 9pm with [facility]- it was the next available time. Notified Kendall Joseph liaison, and patient.  Called and LVM with pts friend, Filomena De Paz, per patients request.

## 2020-12-28 NOTE — DISCHARGE SUMMARY
INR 1.3 05/27/2011    INR 1.2 05/26/2011    PROTIME 12.1 12/23/2020    PROTIME 11.9 05/27/2011    PROTIME 11.8 05/26/2011      Lab Results   Component Value Date    TSH 1.730 07/26/2020     Lab Results   Component Value Date    TRIG 386 (H) 02/08/2020    TRIG 159 (H) 09/04/2014     Lab Results   Component Value Date    HDL 36 02/08/2020    HDL 38 09/04/2014     Lab Results   Component Value Date    LDLCALC 56 02/08/2020    [address] 89 09/04/2014     No results found for: LABA1C    IMAGING:  Echo Complete    Result Date: 12/26/2020 Transthoracic Echocardiography Report (TTE)  Demographics   Patient Name       Brooke Hall  Gender               Female                     K   Medical Record     [id number]          Room Number          5566  Number   Account #          [de-identified]         Procedure Date       12/26/2020   Corporate ID                         Ordering Physician   Geraldine Manzo DO   Accession Number   [id number]        Referring Physician   Date of Birth      1955        [address] [facility]   Age                76 year(s)        Interpreting         Geraldine Manzo DO                                       Physician                                        Any Other  Procedure Type of Study   TTE procedure:Echo Complete W/Doppler & Color Flow. Procedure Date Date: 12/26/2020 Start: 08:32 AM Study Location: Echo Lab Technical Quality: Adequate visualization Indications:R/O Endocarditis. Patient Status: Routine Height: 66 inches Weight: 198 pounds BSA: 1.99 m^2 BMI: 31.96 kg/m^2 Rhythm: Sinus tachycardia HR: 92 bpm BP: 183/96 mmHg  Findings   Left Ventricle  Left ventricular size is grossly normal.  Mild left ventricular concentric hypertrophy noted. Ejection fraction is visually estimated at 55%. No evidence of left ventricular mass or thrombus noted. No regional wall motion abnormalities seen. Right Ventricle  Normal right ventricular size. No evidence of a thrombus in the right ventricle. Left Atrium  Normal sized left atrium. Interatrial septum appears intact. No evidence of thrombus within left atrium. Right Atrium  Normal right atrium size. Mitral Valve  Physiologic and/or trace mitral regurgitation is present. No evidence of mitral valve stenosis. Tricuspid Valve  Physiologic and/or trace tricuspid regurgitation. Aortic Valve  Aortic valve opens well. The aortic valve is trileaflet. No evidence of aortic valve regurgitation. No hemodynamically significant aortic stenosis is present. Pulmonic Valve  Pulmonic valve is structurally normal.   Pericardial Effusion  No evidence of pericardial effusion. Aorta  The aorta is within normal limits. Miscellaneous  Regular rhythm. No obvious vegetations   Conclusions   Summary  Left ventricular size is grossly normal.  Mild left ventricular concentric hypertrophy noted. Ejection fraction is visually estimated at 55%. No evidence of left ventricular mass or thrombus noted. No regional wall motion abnormalities seen. Normal sized left atrium. Interatrial septum appears intact. No evidence of thrombus within left atrium. Physiologic and/or trace mitral regurgitation is present. No evidence of mitral valve stenosis. Physiologic and/or trace tricuspid regurgitation. Regular rhythm. No obvious vegetations   Signature   ----------------------------------------------------------------  Electronically signed by Ligia Muhammad DO(Interpreting  physician) on 12/26/2020 06:05 PM  ----------------------------------------------------------------  M-Mode/2D Measurements & Calculations   LV Diastolic     LV Systolic Dimension: 3.1 cm   AV Cusp Separation: 1.8  Dimension: 4.4   LV Volume Diastolic: 89.1 ml    cmAO Root Dimension: 2.3  cm               LV Volume Systolic: 43.9 ml     cm  LV FS:29.6 %     LV EDV/LV EDV Index: 87.7 XX/00  LV PW Diastolic: RS/H^0CR ESV/LV ESV Index: 39.3  1.3 cm           ml/20ml/ m^2  Septum           EF Calculated: 55.2 %           RV Diastolic Dimension:  Diastolic: 1.5   LV Mass Index: 121 l/min*m^2    2. 2 cm  cm               LV Length: 6.7 cm  CO: 4.24 l/min                                   Ascending Aorta: 3.4 cm  CI: 2.13 l/m*m^2 LVOT: 1.8 cm                    LA volume/Index: 45.4 ml  LV Mass: 240.27                                  /22.8ml/m^2  g  Doppler Measurements & Calculations   MV Peak E-Wave:   AV Peak Velocity: 1.46 m/s    LVOT Peak Velocity: 1.03  0.48 m/s          AV Peak Gradient: 8.57 mmHg   m/s  MV Peak A-Wave:   AV Mean Velocity: 1.09 m/s    LVOT Mean Velocity: 0.71  0.66 m/s          AV Mean Gradient: 5.2 mmHg    m/s  MV E/A Ratio:     AV VTI: 22.2 cm               LVOT Peak Gradient: 4.2  0.72              AV Area (Continuity):2.07     mmHgLVOT Mean Gradient:  MV Peak Gradient: cm^2                          2.2 mmHg  2.6 mmHg  MV Mean Gradient: LVOT VTI: 18.1 cm  1.1 mmHg  MV Mean Velocity:                               TR Velocity:1.95 m/s  0.5 m/s           Pulm. Vein A Reversal         TR Gradient:15.13 mmHg  MV Deceleration   Duration:115.3 msec  Time: 238.5 msec  Pulm. Vein D Velocity:0.32  MV P1/2t: 75.8    m/sPulm. Vein A Reversal  msec              Velocity:0.34 m/s  MVA by PHT:2.9    Pulm. Vein S Velocity: 0.64  cm^2              m/s  MV Area  (continuity): 3  cm^2  MV E' Septal  Velocity: 0.04  m/s  MV E' Lateral  Velocity: 5 m/s  [email or web address] [id number]    Xr Chest (2 Vw)    Result Date: 12/25/2020 EXAMINATION: TWO XRAY VIEWS OF THE CHEST 12/25/2020 9:25 am COMPARISON: One-view chest x-ray 12/23/2020. CT chest 05/14/20196 HISTORY: ORDERING SYSTEM PROVIDED HISTORY: Dyspnea TECHNOLOGIST PROVIDED HISTORY: Reason for exam:->Dyspnea FINDINGS: The cardiac silhouette is within normal limits. Rounded density seen in the retrocardiac region on the lateral view is compatible with a small hiatal hernia seen on the previous CT exam.  The mediastinal contours are otherwise within normal limits. There is some interval improvement in aeration of the medial right lung base. Mildly patchy peribronchial thickening in the right upper-mid lung appears new compared to the previous exam.  There is mild interval increase in hazy consolidation in the left lung base on the frontal view with lower lobe consolidations noted on the lateral view. No significant pleural effusions or pneumothorax. A skin fold overlies the left lung base on the frontal image. There is rightward curvature of the thoracolumbar spine. Osseous degenerative changes are present. EKG leads overlie the chest.    Findings are concerning for developing pneumonia. Xr Abdomen (kub) (single Ap View)    Result Date: 12/24/2020  EXAMINATION: ONE SUPINE XRAY VIEW(S) OF THE ABDOMEN 12/24/2020 12:34 pm COMPARISON: 08/26/2020 HISTORY: ORDERING SYSTEM PROVIDED HISTORY: kidney stones TECHNOLOGIST PROVIDED HISTORY: KIDNEY STONES Reason for exam:->kidney stones FINDINGS: Lung bases are normal there are degenerative changes in the lower lumbar spine and mild rotoscoliosis. There are questionable vague calcifications in the left mid abdomen largest measuring 5 mm. Gas in bowel loops does somewhat limit evaluation. Mild adynamic ileus.  Questionable calcifications/calculi in left mid abdomen largest measuring 5 mm    Xr Chest Portable    Result Date: 12/23/2020 EXAMINATION: ONE XRAY VIEW OF THE CHEST 12/23/2020 7:27 pm COMPARISON: July 28 HISTORY: ORDERING SYSTEM PROVIDED HISTORY: SOB/fever TECHNOLOGIST PROVIDED HISTORY: Reason for exam:->SOB/fever FINDINGS: Cardiomediastinal silhouette within normal limits. Patient rotated to the left. Question mild airspace disease in the right lung base. No pleural effusions. Pulmonary vasculature within normal limits. Questionable airspace disease in the right lung base medially, which may reflect pneumonia. Follow-up PA and lateral radiographs would be helpful in further evaluation. Us Abdomen Limited    Result Date: 12/24/2020  EXAMINATION: RIGHT UPPER QUADRANT ULTRASOUND 12/24/2020 8:27 am COMPARISON: None. HISTORY: ORDERING SYSTEM PROVIDED HISTORY: assess for ascites and evaluate kidneys TECHNOLOGIST PROVIDED HISTORY: Reason for exam:->assess for ascites and evaluate kidneys What reading provider will be dictating this exam?->CRC FINDINGS: Scanning of the right and left upper and lower quadrants demonstrates no ascites. No ascites identified    Us Retroperitoneal Limited    Result Date: 12/24/2020  EXAMINATION: ULTRASOUND OF THE KIDNEYS AND BLADDER 12/24/2020 7:27 am COMPARISON: Abdominopelvic CT dated 4 September 2020 HISTORY: ORDERING SYSTEM PROVIDED HISTORY: Flank pain TECHNOLOGIST PROVIDED HISTORY: Reason for exam:->Flank pain What reading provider will be dictating this exam?->CRC FINDINGS: The right kidney measures 12.1 in length and the left kidney measures 13.2 in length. There are benign cysts at the right kidney. There is no hydronephrosis. There is a benign cyst at the left left ureteral stent. The bladder is collapsed around a Obrien catheter. Bilateral renal stone cysts. Indwelling left ureteral stent.         HOSPITAL COURSE: Pranay Amos did well throughout hospitalization. Initially admitted December 12 with sepsis and sources of UTI versus pneumonia being investigated. Patient was found to have urinary tract infection further complicated by stent in place. Infectious disease was consulted. Antibiotic therapy was adjusted to meropenem to cover E. coli/gram-negative sepsis appropriately. Questionable pneumonia was also present and felt that this would provide adequate coverage for this as well. Outpatient urology follow-up will be required for cystoscopy and stent change versus removal at that time. PICC line was arranged in the hospital prior to disposition for ongoing IV antibiotic therapy and necessity. Given the patient's degree of deconditioning in the setting of neuromuscular disorder skilled nursing facility placement was arranged. Given her degree of immobility, ongoing constipation issues were further exacerbated. Amitiza was added to the patient's extensive regimen. Good bowel regimen will be maintained moving forward. She was transition to skilled nursing facility in good condition for ongoing therapy as discussed. BRIEF PHYSICAL EXAMINATION AND LABORATORIES ON DAY OF DISCHARGE:  VITALS:  /67   Pulse 93   Temp 99.9 °F (37.7 °C) (Oral)   Resp 18   Ht 5' 6\" (1.676 m)   Wt 198 lb 4.8 oz (89.9 kg)   SpO2 95%   BMI 32.01 kg/m²     HEENT:  PERRLA. EOMI. Sclera clear. Buccal mucosa moist.    Neck:  Supple. Trachea midline. No thyromegaly. No JVD. No bruits. Heart:  Rhythm regular, rate controlled. Systolic murmur. .    Lungs:  Symmetrical.  Diminished bibasilar air exchange. Clear to auscultation bilaterally. No wheezes. No rhonchi. No rales. Abdomen: Soft. Obese contour. Non-tender. Non-distended. Bowel sounds positive. No organomegaly or masses. No pain on palpation    Extremities:  Peripheral pulses present. Trace to 1+ bilateral lower extremity peripheral edema. No ulcers. by Does not apply route             fluticasone (FLONASE) 50 MCG/ACT nasal spray  1 spray by Each Nostril route daily             furosemide (LASIX) 40 MG tablet  Take 1 tablet by mouth daily             Incontinence Supply Disposable (POISE MAXIMUM ABSORBENCY) PADS  USE AS DIRECTED             Incontinence Supply Disposable (PROTECTIVE UNDERWEAR LARGE) MISC  Use as directed             loratadine (CLARITIN) 10 MG capsule  Take 1 capsule by mouth 2 times daily             losartan (COZAAR) 50 MG tablet  Take 1 tablet by mouth daily             lubiprostone (AMITIZA) 8 MCG CAPS capsule  Take 1 capsule by mouth 2 times daily (with meals)             magnesium citrate solution  Take 296 mLs by mouth once as needed for Constipation             melatonin 3 MG TABS tablet  Take 1 tablet by mouth nightly as needed (insomnia)             metoprolol tartrate (LOPRESSOR) 50 MG tablet  Take 1 tablet by mouth 2 times daily             Oral Electrolytes (PEDIALYTE) SOLN  Drink daily for good fluid intake. polyethylene glycol (GLYCOLAX) 17 g packet  Take 25 g by mouth daily as needed for Constipation             potassium chloride (KLOR-CON) 10 MEQ extended release tablet  Take 2 tablets by mouth daily             sennosides-docusate sodium (SENOKOT-S) 8.6-50 MG tablet  Take 1 tablet by mouth daily             STOOL SOFTENER 100 MG capsule  Take 1 capsule by mouth 2 times daily as needed for Constipation                 FOLLOW UP/INSTRUCTIONS:  · This patient is instructed to follow-up with her primary care physician. · Patient is instructed to follow-up with the consults listed above as directed by them. · she is instructed to resume home medications and take new medications as indicated in the list above. · If the patient has a recurrence of symptoms, she is instructed to go to the ED. Preparing for this patient's discharge, including paperwork, orders, instructions, and meeting with patient did require > 40 minutes.     GRETEL Madrigal - CNP     12/28/2020  9:19 AM

## 2020-12-28 NOTE — PROGRESS NOTES
12/28/2020 9:13 AM  Service: Urology  Group: [facility] ([facility]/Sow[facility])    Simón Allen  [id number]    Subjective:    Doing well this AM  No pain or discomfort  No Fever or chills  States left stent was removed at [facility] in September  Review of office records does confirm that the left ureteral stent was removed on 9/18/20  By Dr. Marybeth Najera Lee  She is     Review of Systems  Constitutional: No fever or chills   Respiratory: negative for cough and hemoptysis  Cardiovascular: negative for chest pain and dyspnea  Gastrointestinal: negative for abdominal pain, diarrhea, nausea and vomiting   : See above  Derm: negative for rash and skin lesion(s)  Neurological: negative for seizures and tremors  Musculoskeletal: Negative    Psychiatric: Negative   All other reviews are negative      Scheduled Meds:   cefTRIAXone (ROCEPHIN) IV  2 g Intravenous Q24H    lidocaine 1 % injection  5 mL Intradermal Once    sodium chloride flush  10 mL Intravenous 2 times per day    magnesium citrate  296 mL Oral Once    lidocaine PF  5 mL Intradermal Once    heparin flush  3 mL Intravenous 2 times per day    metoprolol tartrate  50 mg Oral BID    potassium chloride  20 mEq Oral BID    senna  1 tablet Oral Daily    furosemide  40 mg Oral Daily    losartan  50 mg Oral Daily    lubiprostone  8 mcg Oral BID WC    sodium chloride flush  10 mL Intravenous 2 times per day    enoxaparin  40 mg Subcutaneous Daily    fluticasone  1 spray Each Nostril Daily    polyethylene glycol  17 g Oral Daily       Objective:  Vitals:    12/28/20 0827   BP: 131/67   Pulse: 93   Resp: 18   Temp: 99.9 °F (37.7 °C)   SpO2: 95%         Allergies: Lyrica [pregabalin] and Adhesive tape    General Appearance: alert and oriented to person, place and time and in no acute distress  Skin: no rash or erythema  Head: normocephalic and atraumatic  Pulmonary/Chest: normal air movement, no respiratory distress  Abdomen: soft, non-tender, non-distended Genitourinary: dillon draining yellow urine   Extremities: no cyanosis, clubbing or edema         Labs:     Recent Labs     12/28/20  0430   *   K 4.5   CL 94*   CO2 26   BUN 25*   CREATININE 1.1*   GLUCOSE 106*   CALCIUM 8.9       Lab Results   Component Value Date    HGB 11.9 12/28/2020    HCT 32.7 12/28/2020     Narrative   EXAMINATION:   ONE SUPINE XRAY VIEW(S) OF THE ABDOMEN   12/24/2020 12:34 pm   COMPARISON:   08/26/2020   HISTORY:   ORDERING SYSTEM PROVIDED HISTORY: kidney stones   TECHNOLOGIST PROVIDED HISTORY:   KIDNEY STONES   Reason for exam:->kidney stones   FINDINGS:   Lung bases are normal there are degenerative changes in the lower lumbar   spine and mild rotoscoliosis. There are questionable vague calcifications in the left mid abdomen largest   measuring 5 mm.  Gas in bowel loops does somewhat limit evaluation.       Impression   Mild adynamic ileus.    Questionable calcifications/calculi in left mid abdomen largest measuring 5 mm         Assessment/Plan:  Left Renal Calculi   E. Coli Bacteremia   Multiple Sclerosis     Creatinine stable  Leukocytosis improving   Cont antibiotics per ID   No stent visualized on KUB  Stent was removed on 9/18/20 [facility] by  [facility]  She will need outpatient follow up to discuss possible left ESWL in the future versus monitoring of her stone disease   Cont the dillon catheter  Voiding trial can be performed in facility as an outpatient   No further  interventions are planned at this time  Please call with any further questions or concerns        Lupis Trimble, APRN - CNP   [facility]  [facility] The patient was seen and examined. I have reviewed the medical record in detail. I agree with the plan as outlined by EMAMNUEL Tubbs. She is feeling much better and is clinically improved. She is to go to [facility] for rehab soon. Her KUB shows no evidence of a retained ureteral stent in either kidney. There are, however, nonobstructive left renal stones. I recommended she undergo left ESWL as an outpatient in the near future after resolution of her acute illness which we discussed in detail. She agrees with the plan. She will keep the Obrien until she is ambulatory at the rehab facility. She is otherwise stable for discharge from urology standpoint.     Geovanni Paniagua MD  11:17 AM  12/28/2020

## 2020-12-28 NOTE — PROGRESS NOTES
ORG Escherichia coli 12/23/2020    ORG Escherichia coli 12/23/2020   . SEEN  MRSA Culture Only   Date Value Ref Range Status   07/26/2020 Methicillin resistant Staph aureus not isolated  Final        ASSESSMENT:  Gn sepsis  E. Coli uti has left stent  leukocytosis  Poss pneumonia   -urology following Will need cystoscopy and stent change vs removal at the time of Left eswl. Urology will set up outpatient follow up for surgery     Plan:  · Continue Rocephin IC  · picc placed this morning  · Will order KUB  · Ultrasound lower extremities   · Acyclovir for lip ulcers  · mupirocin to irritation/ rash on chest.  · Pt  Med rec- discharge planning  · Monitor labs    Imaging and labs were reviewed per medical records. The patient was educated about the diagnosis, prognosis, indications, risks and benefits of treatment. An opportunity to ask questions was given to the patient/FAMILY. Thank you for involving me in the care of [place] will continue to follow. Please do not hesitate to call for any questions or concerns. Electronically signed by GRETEL Acharya on 12/28/2020 at 10:48 AM   As above    This is a face to face encounter with Rosealee Bence on 12/28/20. I have performed and participated in the history, exam, medical decision making, and  POC  with the NURSE PRACTITIONER and provided the instruction and education regarding this patient's care. Imaging and labs were reviewed per medical records and any ID pertinent labs were addressed with the patient. The patient was educated about the diagnosis, prognosis, indications, risks and benefits of treatment.     IN BED HA SNO C/O F/C/N/V/D  RL POST CALF PAIN  TMAX99.9 2L  WBC15.4 CR1.1  E. COLI BACTEREMIA,IA COMPLICATED UTI H/O KIDNEY STONES  ORAL HSV  RASH CHEST       acyclovir (ZOVIRAX) 5 % ointment, Q3H (SCHEDULED)      mupirocin (BACTROBAN) 2 % ointment, Daily   cefTRIAXone (ROCEPHIN) 2 g in sodium chloride (PF) 20 mL IV syringe, Q24H    HAS LUE PICC  US RLE   CAN D/C WHEN STABLE   F/U 2 -3 WEEKS    Pt had the opportunity to ask questions. All questions were answered. Thank you for involving me in the care of Rohan Harmon. Please do not hesitate to call for any questions or concerns.     Electronically signed by Karyna Garg MD on 12/28/2020 at 1:49 PM    Phone [phone]  Fax [phone]

## 2020-12-28 NOTE — CONSULTS
Stanislav Lozano M.D. [facility]  Dr. Erika Diaz M.D.,  Dr. Antoinette Page M.D.,  Dr. Francis Sanches D.O.,  Dr. Marlena Hannah D.O. ,  Dr. Simran Pillai M.D.,  Dr. Pool Thayer D.O. Ira Jasmine  76 y.o.  female      Re: Chronic constipation  Requesting physician:Dr Awad  Date:11:58 AM 12/28/2020          HPI: Mrs. Anyi Pryor is a 79-year-old  female seen in the hospital for above-described issue. She reports longstanding for many years issue of constipation with generally 1-2 bowel movements per week. Patient reports feeling some abdominal discomfort secondary to constipation. She reports that she has been using over-the-counter laxatives and stool softeners with little effect. Patient also reports trying Linzess also with little effect. Despite the chronic nature of her complaints and her age she has never had colonoscopy. Laboratory work reveals some mild anemia on this presentation without evidence of overt bleed.         Information sources:   -Patient  -medical record  -health care team    PMHx:  Past Medical History:   Diagnosis Date    Arthritis     Asthma     [facility] obstructive [facility]) [facility] [facility]     Eczema     Fall 5/26/2011    Gout     Hematoma of abdominal wall 5/2011    extraperitoneal    Hypertension     Metatarsal fracture 1/2011    right 3rd and 4th    Multiple sclerosis ([facility] [address] [facility]     Prolonged emergence from general anesthesia     Scoliosis     Spinal stenosis     UTI (urinary tract infection)        PSHx:  Past Surgical History:   Procedure Laterality Date    APPENDECTOMY      CHOLECYSTECTOMY      CYSTOSCOPY INSERTION / REMOVAL STENT / STONE Right 7/26/2020    CYSTOSCOPY RETROGRADE PYELOGRAM STENT INSERTION LEFT performed by Dawood Mcclelland Memo,  at [facility] [address] LITHOTRIPSY Left 8/7/2020 CYSTOSCOPY RETROGRADE PYELOGRAM URETEROSCOPY  LEFT J-STENT EXCHANGE, INSERTION JUNIOR CATHETER---FACILITY------ performed by Brady Howard DO [place] [address] [facility] [place]:  Current Facility-Administered Medications   Medication Dose Route Frequency Provider Last Rate Last Admin    acyclovir (ZOVIRAX) 5 % ointment   Topical Q3H (SCHEDULED) Cholojulien Abreu APRN - CNS        mupirocin (BACTROBAN) 2 % ointment   Topical Daily GRETEL Lopes - CNS        cefTRIAXone (ROCEPHIN) 2 g in sodium chloride (PF) 20 mL IV syringe  2 g Intravenous Q24H Shannon Rose MD   2 g at 12/27/20 1758    lidocaine 1 % injection 5 mL  5 mL Intradermal Once Shannon Rose MD        sodium chloride flush 0.9 % injection 10 mL  10 mL Intravenous 2 times per day Shannon Rose MD        sodium chloride flush 0.9 % injection 10 mL  10 mL Intravenous PRN Shannon Rose MD        magnesium citrate solution 296 mL  296 mL Oral Once Ruy Awad DO        lidocaine PF 1 % injection 5 mL  5 mL Intradermal Once Ruy Awad DO        sodium chloride flush 0.9 % injection 10 mL  10 mL Intravenous PRN Ruy Awad DO        heparin flush 100 UNIT/ML injection 300 Units  3 mL Intravenous 2 times per day Ruy Awad DO        heparin flush 100 UNIT/ML injection 300 Units  3 mL Intracatheter PRN Johnathan Rinne, DO        diphenhydrAMINE-zinc acetate cream   Topical TID PRN Sabas Esters, DO        perflutren lipid microspheres (DEFINITY) injection 1.65 mg  1.5 mL Intravenous ONCE PRN Shannon Rose MD        metoprolol tartrate (LOPRESSOR) tablet 50 mg  50 mg Oral BID Ruy Awad DO   50 mg at 12/28/20 0828    potassium chloride (KLOR-CON M) extended release tablet 20 mEq  20 mEq Oral BID Karma Awad DO   20 mEq at 12/28/20 0828    senna (SENOKOT) tablet 8.6 mg  1 tablet Oral Daily Ruy Awad DO   8.6 mg at 12/28/20 9237  furosemide (LASIX) tablet 40 mg  40 mg Oral Daily Ruy BRAGG Gradylisa, DO   40 mg at 12/28/20 1287    albuterol sulfate  (90 Base) MCG/ACT inhaler 1 puff  1 puff Inhalation Q4H PRN Ruy BRAGG Gradylisa, DO        losartan (COZAAR) tablet 50 mg  50 mg Oral Daily Ruy BRAGG Delmi, DO   50 mg at 12/28/20 0587    lubiprostone (AMITIZA) capsule 8 mcg  8 mcg Oral BID WC Ruy BRAGG Delmi, DO   8 mcg at 12/28/20 5804    glucose (GLUTOSE) 40 % oral gel 15 g  15 g Oral PRN Cory Lard, DO        dextrose 50 % IV solution  12.5 g Intravenous PRN Cory Lard, DO        glucagon (rDNA) injection 1 mg  1 mg Intramuscular PRN Cory Lard, DO        dextrose 5 % solution  100 mL/hr Intravenous PRN Cory Lard, DO        sodium chloride flush 0.9 % injection 10 mL  10 mL Intravenous 2 times per day Cory Lard, DO   10 mL at 12/24/20 2055    enoxaparin (LOVENOX) injection 40 mg  40 mg Subcutaneous Daily Cory Lard, DO   40 mg at 12/28/20 7503    polyethylene glycol (GLYCOLAX) packet 17 g  17 g Oral Daily PRN Cory Lard, DO        acetaminophen (TYLENOL) tablet 650 mg  650 mg Oral Q6H PRN Cory Lard, DO   650 mg at 12/28/20 0040    Or    acetaminophen (TYLENOL) suppository 650 mg  650 mg Rectal Q6H PRN Cory Lard, DO        fluticasone (FLONASE) 50 MCG/ACT nasal spray 1 spray  1 spray Each Nostril Daily Cory Lard, DO   1 spray at 12/27/20 0831    melatonin tablet 3 mg  3 mg Oral Nightly PRN Cory Lard, DO        polyethylene glycol (GLYCOLAX) packet 17 g  17 g Oral Daily Cory Lard, DO   17 g at 12/28/20 0310       SocHx:  Social History     Socioeconomic History    Marital status:      Spouse name: Not on file    Number of children: 2    Years of education: Not on file    Highest education level: Not on file   Occupational History    Not on file   Social Needs    Financial resource strain: Not on file    Food insecurity     Worry: Not on file Inability: Not on file    Transportation needs     Medical: Not on file     Non-medical: Not on file   Tobacco Use    Smoking status: Former Smoker     Packs/day: 0.25     Years: 46.00     Pack years: 11.50     Types: Cigarettes     Quit date: 2019     Years since quittin.8    Smokeless tobacco: Never Used   Substance and Sexual Activity    Alcohol use: No    Drug use: No    Sexual activity: Not Currently   Lifestyle    Physical activity     Days per week: Not on file     Minutes per session: Not on file    Stress: Not on file   Relationships    Social connections     Talks on phone: Not on file     Gets together: Not on file     Attends Baptism service: Not on file     Active member of club or organization: Not on file     Attends meetings of clubs or organizations: Not on file     Relationship status: Not on file    Intimate partner violence     Fear of current or ex partner: Not on file     Emotionally abused: Not on file     Physically abused: Not on file     Forced sexual activity: Not on file   Other Topics Concern    Not on file   Social History Narrative    Son in [place]-    Daughter in [place]       FamHx:  Family History   Problem Relation Age of Onset    Asthma Father     High Blood Pressure Father     Cancer Maternal Aunt     Heart Failure Mother     Breast Cancer Sister        Allergy:  Allergies   Allergen Reactions    Lyrica [Pregabalin] Shortness Of Breath    Adhesive Tape Itching         ROS: As described in the HPI and in addition is negative upon detailed review of systems or unobtainable unless otherwise stated in this dictation.     PE:  /67   Pulse 93   Temp 99.9 °F (37.7 °C) (Oral)   Resp 18   Ht 5' 6\" (1.676 m)   Wt 198 lb 4.8 oz (89.9 kg)   SpO2 95%   BMI 32.01 kg/m²     Gen.: NAD/obese  female  Head: Atraumatic/normocephalic  Eyes: EOMI/anicteric sclera  ENT: Moist oral mucosa/no discharge nose or ears Neck: Supple with trachea midline  Chest: CTA B  Cor: Regular  Abd.: Soft and obese. Mildly diffusely tender. No guarding. BS +  Extr.:  BLE 1-2+ edema  Muscles: Decreased tone and bulk throughout  Skin: Warm and dry      DATA:     Lab Results   Component Value Date    WBC 14.4 12/28/2020    RBC 3.23 12/28/2020    HGB 11.9 12/28/2020    HCT 32.7 12/28/2020    .2 12/28/2020    MCH 36.8 12/28/2020    MCHC 36.4 12/28/2020    RDW 14.5 12/28/2020     12/28/2020    MPV 10.7 12/28/2020     Lab Results   Component Value Date     12/28/2020    K 4.5 12/28/2020    K 4.3 12/23/2020    CL 94 12/28/2020    CO2 26 12/28/2020    BUN 25 12/28/2020    CREATININE 1.1 12/28/2020    CALCIUM 8.9 12/28/2020    PROT 6.5 12/28/2020    LABALBU 2.7 12/28/2020    LABALBU 4.0 05/27/2011    BILITOT 0.5 12/28/2020    ALKPHOS 148 12/28/2020    AST 52 12/28/2020    ALT 57 12/28/2020     Lab Results   Component Value Date    LIPASE 16 12/23/2020     No results found for: AMYLASE      ASSESSMENT/PLAN:  1. Constipation  -Chronic  -Continue laxative/stool softeners   -Recommend increased ambulation  -Further evaluation is necessary with colonoscopy -plan for outpatient procedure    2. Anemia  -Macrocytic  -No evidence of overt bleed at this time  -Colonoscopy as above    Thank you for the opportunity to see this patient in consultation    Jeana Umanzor MD  12/28/2020  11:58 AM    NOTE:  This report was transcribed using voice recognition software. Every effort was made to ensure accuracy; however, inadvertent computerized transcription errors may be present.

## 2020-12-28 NOTE — PROCEDURES
Power picc line  Placement 12/28/2020    Product number: [id number]   Lot Number: [id number]      Ultrasound: YES   RIGHT BRACHIAL VEIN              Upper Arm Circumference: 32CM    Size: 4FR    Exposed Length: 2CM    Internal Length: 38CM   Cut: 10CM   Vein Measurement: 0.55CM    INSERTED POWER PICC LINE WITH VPS TIP CONFIRMATION SYSTEM TIP IN LOWER 1/3SVC/CAJ JÚNIOR OBTAINED.      Mal Hodge  12/28/2020  10:00 AM

## 2020-12-29 LAB
CULTURE, BLOOD 2: ABNORMAL
ORGANISM: ABNORMAL
ORGANISM: ABNORMAL

## 2020-12-30 LAB
BLOOD CULTURE, ROUTINE: NORMAL
CULTURE, BLOOD 2: NORMAL

## 2021-02-10 PROBLEM — Z90.49 HX OF APPENDECTOMY: Status: ACTIVE | Noted: 2021-02-10

## 2021-02-10 PROBLEM — Z90.710 HX OF HYSTERECTOMY: Status: ACTIVE | Noted: 2021-02-10

## 2021-02-10 PROBLEM — Z90.49 HX OF CHOLECYSTECTOMY: Status: ACTIVE | Noted: 2021-02-10

## 2021-02-25 ENCOUNTER — TELEPHONE (OUTPATIENT)
Dept: PRIMARY CARE CLINIC | Age: 66
End: 2021-02-25

## 2021-02-25 RX ORDER — MAGNESIUM CARB/ALUMINUM HYDROX 105-160MG
TABLET,CHEWABLE ORAL
Qty: 56 BOTTLE | Refills: 11 | Status: SHIPPED | OUTPATIENT
Start: 2021-02-25

## 2021-02-25 NOTE — TELEPHONE ENCOUNTER
Pako Ayeshasjeanine called today stating that she is scheduled to discharge home from [facility] in [facility] tomorrow. She states it has been 2 months since she has been home and part of the delay in getting home is that she had an exacerbation of her MS sxms. When I asked how she is feeling now, she stated that she can stand again and is getting back to where she was previously. She mentioned that she thinks that she has an eye infection; I advised that she should discuss this with the doctor and nursing staff at [facility]. She verbalized understanding and will let us know once she is home.

## 2021-03-08 ENCOUNTER — OFFICE VISIT (OUTPATIENT)
Dept: PRIMARY CARE CLINIC | Age: 66
End: 2021-03-08
Payer: MEDICAID

## 2021-03-08 VITALS
DIASTOLIC BLOOD PRESSURE: 68 MMHG | OXYGEN SATURATION: 99 % | RESPIRATION RATE: 20 BRPM | TEMPERATURE: 97.9 F | SYSTOLIC BLOOD PRESSURE: 119 MMHG | BODY MASS INDEX: 31.85 KG/M2 | HEART RATE: 69 BPM | HEIGHT: 66 IN | WEIGHT: 198.19 LBS

## 2021-03-08 DIAGNOSIS — I10 ESSENTIAL HYPERTENSION: ICD-10-CM

## 2021-03-08 DIAGNOSIS — D64.9 ANEMIA, UNSPECIFIED TYPE: ICD-10-CM

## 2021-03-08 DIAGNOSIS — G62.9 PERIPHERAL POLYNEUROPATHY: ICD-10-CM

## 2021-03-08 DIAGNOSIS — G35 MULTIPLE SCLEROSIS (HCC): Primary | ICD-10-CM

## 2021-03-08 DIAGNOSIS — R09.89 PULMONARY VENOUS CONGESTION: ICD-10-CM

## 2021-03-08 PROBLEM — N20.1 URETEROLITHIASIS: Status: RESOLVED | Noted: 2020-07-26 | Resolved: 2021-03-08

## 2021-03-08 PROCEDURE — 99350 HOME/RES VST EST HIGH MDM 60: CPT | Performed by: FAMILY MEDICINE

## 2021-03-08 RX ORDER — FUROSEMIDE 40 MG/1
40 TABLET ORAL DAILY PRN
Qty: 60 TABLET | Refills: 3
Start: 2021-03-08 | End: 2021-03-18

## 2021-03-08 RX ORDER — POTASSIUM CHLORIDE 750 MG/1
20 TABLET, FILM COATED, EXTENDED RELEASE ORAL DAILY PRN
Qty: 60 TABLET | Refills: 11
Start: 2021-03-08

## 2021-03-08 ASSESSMENT — PATIENT HEALTH QUESTIONNAIRE - PHQ9
SUM OF ALL RESPONSES TO PHQ QUESTIONS 1-9: 0
2. FEELING DOWN, DEPRESSED OR HOPELESS: 0
SUM OF ALL RESPONSES TO PHQ QUESTIONS 1-9: 0

## 2021-03-08 NOTE — PROGRESS NOTES
Smoking cessation counselling 7/2014 Social history:  with 3 children. Smokes 1-1 1/2 ppd 40 yrs. No alcohol. . 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 Nutritional history: Takes a lot of vitamins and supplements every day. PHYSICAL EXAM:    Vitals:    03/08/21 1506   BP: 119/68   Site: Right Wrist   Position: Sitting   Pulse: 69   Resp: 20   Temp: 97.9 °F (36.6 °C)   TempSrc: Infrared   SpO2: 99%   Weight: 198 lb 3.1 oz (89.9 kg)   Height: 5' 6\" (1.676 m)        GEN: middle age overweight female patient sitting in WC in NAD. HEAD:  atraumatic, normocephalic. EYES:  EOMI, PERRL, L  conjunct normal. ENT:  EACs and TMs normal. ORAL: mouth with 12 dental extractions healing,  Tongue with white coating. Thick nasal secretions. Pharynx: PND. LUNGS:   clear to ausc, no rales or rhonchi. HEART:  RRR, no murmurs or gallops. ABD:  Obese, soft, non-tender to palp, no palp masses or HSM, normal BS. : Obrien cath in place draining light yellow urine. BACK:  No CVAT. Scoliosis toward right / kyphosis,  tender to palp over L lumbar area. EXTREM: Trace-1+ pedal edema  Unable to make a strong fist. Venous stasis changes. No ulcerations, varicosities or erythema, deformities. MUSCULOSKEL: good ROM of most joints, non-tender to palp and with movement, except lower extremeties which are weakened from [address] and non-wt bearing. WC bound from MS. NEURO:  Normal motor for her. No tremor, normal sensory,  able to stand and transfer with much effort. Unable to walk. SKIN: No drainage  No ulcerations or breakdown, ecchymosis, or other lesions. Medications reviewed. Labs 12/28/20 GFR 50, HH 10/35 9/4/20 HH 13/39, GFR>60, lytes nl, AST 44  7/29/20 HH 12/37, GFR>60, K+ 3.3     ASSESSMENT:  Elderly female with has Neck pain; Multiple sclerosis ([address] Bilateral foot-drop; Peripheral polyneuropathy; Essential hypertension; Asthma; Edema of both legs; Constipation;  Intervertebral lumbar disc disorder with myelopathy, lumbar region; Cervical spinal stenosis; Pulmonary venous congestion; Fatigue; Sepsis due to urinary tract infection ([facility] [address] Hx of appendectomy; Hx of cholecystectomy; and Hx of hysterectomy on their problem list.     America Gonzales was seen today for follow-up from hospital, extremity weakness and leg swelling. Diagnoses and all orders for this visit:    Multiple sclerosis ([facility] [address]    Peripheral polyneuropathy    Essential hypertension  -     CBC Auto Differential; Future  -     Basic Metabolic Panel; Future    Pulmonary venous congestion    Anemia, unspecified type  -     Vitamin B12 & Folate; Future  -     Ferritin; Future    Other orders  -     furosemide (LASIX) 40 MG tablet; Take 1 tablet by mouth daily as needed (swelling)  -     potassium chloride (KLOR-CON) 10 MEQ extended release tablet; Take 2 tablets by mouth daily as needed (with Lasix)      PLAN:    Patient will check with [facility] to see if she can be directly admitted. Check labs. Encouraged to elevate legs more. Instructed to take Lasix when she has swelling. Continue Flonase and Benzedrex for her allergies. Recheck 1 month. 60 minutes spent on visit, 35 minutes involved education/counseling regarding debility, MS, BLE edema, HTN disease processes, treatment options, meds and coordination of care.     Current Outpatient Medications   Medication Sig Dispense Refill    furosemide (LASIX) 40 MG tablet Take 1 tablet by mouth daily as needed (swelling) 60 tablet 3    potassium chloride (KLOR-CON) 10 MEQ extended release tablet Take 2 tablets by mouth daily as needed (with Lasix) 60 tablet 11    Incontinence Supply Disposable (PROTECTIVE UNDERWEAR LARGE) MISC Use as directed 56 Bottle 11    magnesium citrate solution Take 296 mLs by mouth once as needed for Constipation      Incontinence Supply Disposable (POISE MAXIMUM ABSORBENCY) PADS USE AS DIRECTED 54 each 11    metoprolol tartrate (LOPRESSOR) 50 MG tablet Take 1 tablet by mouth 2 times daily 60 tablet 11    loratadine (CLARITIN) 10 MG capsule Take 1 capsule by mouth 2 times daily 60 capsule 11    STOOL SOFTENER 100 MG capsule Take 1 capsule by mouth 2 times daily as needed for Constipation 60 capsule 11    fluticasone (FLONASE) 50 MCG/ACT nasal spray 1 spray by Each Nostril route daily 16 g 11    albuterol sulfate (PROAIR RESPICLICK) 465 (90 Base) MCG/ACT aerosol powder inhalation Inhale into the lungs every 4 hours as needed for Wheezing or Shortness of Breath      melatonin 3 MG TABS tablet Take 1 tablet by mouth nightly as needed (insomnia) 100 tablet 3    polyethylene glycol (GLYCOLAX) 17 g packet Take 25 g by mouth daily as needed for Constipation 527 g 11    Oral Electrolytes (PEDIALYTE) SOLN Drink daily for good fluid intake. 1000 mL 5    sennosides-docusate sodium (SENOKOT-S) 8.6-50 MG tablet Take 1 tablet by mouth daily      b complex vitamins capsule Take 1 capsule by mouth daily      albuterol (PROVENTIL) (2.5 MG/3ML) 0.083% nebulizer solution Take 3 mLs by nebulization See Admin Instructions (Patient taking differently: Take 2.5 mg by nebulization every 4 hours as needed ) 120 each 3    acetaminophen (TYLENOL) 325 MG tablet Take 2 tablets by mouth every 4 hours as needed for Pain or Fever 120 tablet 3    losartan (COZAAR) 50 MG tablet Take 1 tablet by mouth daily 30 tablet 3    Ascorbic Acid (VITAMIN C) 1000 MG tablet Take 1,000 mg by mouth daily      Disposable Gloves (VINYL GLOVES) MISC by Does not apply route      ammonium lactate (LAC-HYDRIN) 12 % lotion Apply topically as needed for Dry Skin Apply topically as needed. No current facility-administered medications for this visit. Return in about 1 month (around 4/8/2021). An electronic signature was used to authenticate this note.     --Gabo Horowitz MD on 3/8/2021 at 3:42 PM

## 2021-03-09 ENCOUNTER — TELEPHONE (OUTPATIENT)
Dept: PRIMARY CARE CLINIC | Age: 66
End: 2021-03-09

## 2021-03-09 RX ORDER — ETOH/EUC OIL/MENTH/PEP/WINTERG
60 SPRAY, NON-AEROSOL (ML) MUCOUS MEMBRANE PRN
COMMUNITY
End: 2021-03-09 | Stop reason: SDUPTHER

## 2021-03-09 RX ORDER — ETOH/EUC OIL/MENTH/PEP/WINTERG
60 SPRAY, NON-AEROSOL (ML) MUCOUS MEMBRANE PRN
Qty: 60 BOTTLE | Refills: 11 | Status: SHIPPED | OUTPATIENT
Start: 2021-03-09

## 2021-03-09 RX ORDER — CETIRIZINE HYDROCHLORIDE 10 MG/1
10 TABLET ORAL DAILY
Qty: 90 TABLET | Refills: 3 | Status: SHIPPED | OUTPATIENT
Start: 2021-03-09 | End: 2021-12-13 | Stop reason: SDUPTHER

## 2021-03-10 ENCOUNTER — TELEPHONE (OUTPATIENT)
Dept: PRIMARY CARE CLINIC | Age: 66
End: 2021-03-10

## 2021-03-10 DIAGNOSIS — M21.371 BILATERAL FOOT-DROP: ICD-10-CM

## 2021-03-10 DIAGNOSIS — M21.372 BILATERAL FOOT-DROP: ICD-10-CM

## 2021-03-10 DIAGNOSIS — R53.1 GENERAL WEAKNESS: ICD-10-CM

## 2021-03-10 DIAGNOSIS — G35 MULTIPLE SCLEROSIS (HCC): Primary | ICD-10-CM

## 2021-03-10 DIAGNOSIS — R26.2 UNABLE TO WALK: ICD-10-CM

## 2021-03-10 NOTE — TELEPHONE ENCOUNTER
Ness Grace called. She states the rehab was supposed to set her up with therapy and did not. She is asking if you will order physical therapy for her.

## 2021-03-12 ENCOUNTER — TELEPHONE (OUTPATIENT)
Dept: PRIMARY CARE CLINIC | Age: 66
End: 2021-03-12

## 2021-03-12 DIAGNOSIS — G35 MULTIPLE SCLEROSIS (HCC): Primary | ICD-10-CM

## 2021-03-12 DIAGNOSIS — R53.81 DEBILITY: ICD-10-CM

## 2021-03-12 DIAGNOSIS — R26.2 UNABLE TO WALK: ICD-10-CM

## 2021-03-12 NOTE — TELEPHONE ENCOUNTER
Zachary Marroquin called and states she would like to be referred to [facility] for PT. She gave me the phone number to call. I called and spoke to Marc Leigh at [facility] ([phone]). She states they just need a script for PT eval and treat faxed to them at [phone]. Could you please put a script in the system for PT eval and treat along with Dx?

## 2021-03-15 ENCOUNTER — TELEPHONE (OUTPATIENT)
Dept: PRIMARY CARE CLINIC | Age: 66
End: 2021-03-15

## 2021-03-15 DIAGNOSIS — R53.81 DEBILITY: ICD-10-CM

## 2021-03-15 DIAGNOSIS — G35 MULTIPLE SCLEROSIS (HCC): Primary | ICD-10-CM

## 2021-03-15 DIAGNOSIS — R26.2 UNABLE TO WALK: ICD-10-CM

## 2021-03-15 NOTE — TELEPHONE ENCOUNTER
I got a call from [facility] at rehab today stating that when they contacted pt to set up outpatient therapy, she asked if she would be able to stay there; to be admitted. They have your orders for outpatient therapy, but would need a referral for an assessment for admission to be sent to them if you would like their [address] [place] [address] doctor to see If she qualifies. We would also need to send them your last H&P. Are you able to do this type of referral? If not, they cannot admit her unless she is coming from a hospital stay. When I spoke to Shannon Pierson today, she stated that [facility] helped her so much and she was able to stand while admitted there previously. Please advise.

## 2021-03-17 ENCOUNTER — TELEPHONE (OUTPATIENT)
Dept: PRIMARY CARE CLINIC | Age: 66
End: 2021-03-17

## 2021-03-17 NOTE — TELEPHONE ENCOUNTER
Natalia Batistawayne states her evaluation at [facility] is on Thursday, March 25th. If she Is eligible for inpatient rehab and insurance approves she would be admitted for no longer than about 20 days. She will not be admitted on the same day as eval d/t insurance approval process. She is interested in receiving the COVID vaccine and would be home to receive her first dose on Monday when [place] is in that area. However, she will not yet know if she would be home for second dose. What do you recommend?

## 2021-03-22 ENCOUNTER — IMMUNIZATION (OUTPATIENT)
Dept: PRIMARY CARE CLINIC | Age: 66
End: 2021-03-22
Payer: MEDICAID

## 2021-03-22 PROCEDURE — 91301 COVID-19, MODERNA VACCINE 100MCG/0.5ML DOSE: CPT | Performed by: PHYSICIAN ASSISTANT

## 2021-03-22 PROCEDURE — 0011A COVID-19, MODERNA VACCINE 100MCG/0.5ML DOSE: CPT | Performed by: PHYSICIAN ASSISTANT

## 2021-03-26 ENCOUNTER — TELEPHONE (OUTPATIENT)
Dept: PRIMARY CARE CLINIC | Age: 66
End: 2021-03-26

## 2021-03-26 NOTE — TELEPHONE ENCOUNTER
Lencho Nagel called. She states that for months now, she has been unable to control her urine. States it just runs out. Denies any spasms or other symptoms. She is asking if there is any medication that would help with that. If so, she would like it sent to [email or web address]. Also, she had her evaluatation at [facility]. She will be having outpatient therapy there. She doesn't know what date it will start. She is waiting for them to call her to set it up.

## 2021-03-26 NOTE — TELEPHONE ENCOUNTER
Called Nettie and explained this to her. She is aware of Kegel exercises and will also speak to the therapist about it once she starts therapy.

## 2021-03-26 NOTE — TELEPHONE ENCOUNTER
There is no medication for this but she could start doing Kegel exercises to strengthen the pelvic floor muscles. The therapist can explain how to do them. She needs to do at least 45/day.

## 2021-04-06 ENCOUNTER — OFFICE VISIT (OUTPATIENT)
Dept: PRIMARY CARE CLINIC | Age: 66
End: 2021-04-06
Payer: MEDICAID

## 2021-04-06 VITALS
BODY MASS INDEX: 31.99 KG/M2 | HEART RATE: 75 BPM | OXYGEN SATURATION: 95 % | DIASTOLIC BLOOD PRESSURE: 63 MMHG | SYSTOLIC BLOOD PRESSURE: 95 MMHG | HEIGHT: 66 IN | TEMPERATURE: 97.2 F | RESPIRATION RATE: 20 BRPM

## 2021-04-06 DIAGNOSIS — I10 ESSENTIAL HYPERTENSION: ICD-10-CM

## 2021-04-06 DIAGNOSIS — G35 MULTIPLE SCLEROSIS (HCC): Primary | ICD-10-CM

## 2021-04-06 DIAGNOSIS — J31.0 CHRONIC RHINITIS: ICD-10-CM

## 2021-04-06 DIAGNOSIS — R60.0 EDEMA OF BOTH LEGS: ICD-10-CM

## 2021-04-06 DIAGNOSIS — K59.00 CONSTIPATION, UNSPECIFIED CONSTIPATION TYPE: ICD-10-CM

## 2021-04-06 DIAGNOSIS — M48.02 CERVICAL SPINAL STENOSIS: ICD-10-CM

## 2021-04-06 PROBLEM — N39.0 SEPSIS DUE TO URINARY TRACT INFECTION (HCC): Status: RESOLVED | Noted: 2020-12-23 | Resolved: 2021-04-06

## 2021-04-06 PROBLEM — A41.9 SEPSIS DUE TO URINARY TRACT INFECTION (HCC): Status: RESOLVED | Noted: 2020-12-23 | Resolved: 2021-04-06

## 2021-04-06 PROCEDURE — 99350 HOME/RES VST EST HIGH MDM 60: CPT | Performed by: FAMILY MEDICINE

## 2021-04-06 RX ORDER — IPRATROPIUM BROMIDE 42 UG/1
2 SPRAY, METERED NASAL 4 TIMES DAILY
Qty: 1 BOTTLE | Refills: 3 | Status: SHIPPED
Start: 2021-04-06 | End: 2022-04-22 | Stop reason: SDUPTHER

## 2021-04-06 NOTE — PROGRESS NOTES
4/6/2021     Home visit medically necessary in lieu of an office visit due to: wheelchair bound, difficult to get out. HPI:  Patient had her 1st COVID vaccine on 3/22/21 and has night chills since then. She also c/o her nose dripping constantly even with Zyrtec and Flonase. She started outpatient PT at [facility] this week. She is unable to walk, pivot or care for herself but feels she is improving. She still has swelling of feet but not taking Lasix because she can't get on potty. She is moving her bowels daily with Senna and Colace. REVIEW OF SYSTEMS:   General:  Weight stable, generally healthy, no change in strength or exercise tolerance. Heart: No palpitations, no syncope, no orthopnea. EDEMA OF LEGS AT TIMES. Abdomen: Chronic constipation. No change in appetite, no dysphagia, no abdominal pains, no bowel habit changes, no emesis, no melena. : No urinary urgency, no dysuria, no change in nature of urine. JUNIOR CATH. Neurologic:   Unable to walk without holding on to something. In w/c most of the time. No tremor, no seizures, no changes in mentation. All other systems negative. PAST MEDICAL HISTORY: (Major events, hospitalizations, surgeries): Pneumonia (2010), 1998 Vag hyst, 1978 naina, append, freq epidural inclusion cysts tx'd with docycycline. MS dx'd Mar 8, 1991. IV corticosteroids 1991-92. Has never been on MS meds. 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 Known allergies: NKDA. Ongoing medical problems: Multiple sclerosis, Asthma, HTN, hypoglycemia, scoliosis, DDD with sciatic, arthritis, osteoporosis, chronic LBP. Preventative: Pneumovax 23 - 11/2009. Prevnar - 10/2019. Flu vac - 10/2020. Smoking cessation counselling 7/2014 Social history:  with 3 children. Smokes 1-1 1/2 ppd 40 yrs. No alcohol. . 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 Nutritional history: Takes a lot of vitamins and supplements every day. and incontinence. Diagnoses and all orders for this visit:    Multiple sclerosis ([facility] [address]    Essential hypertension    Cervical spinal stenosis    Edema of both legs    Constipation, unspecified constipation type    Chronic rhinitis    Other orders  -     ipratropium (ATROVENT) 0.06 % nasal spray; 2 sprays by Each Nostril route 4 times daily      PLAN:    Continue outpatient PT at [address] [place] [address]  Encouraged to continue exercises at home. Check labs. Start Atrovent nasal spray for RN. Encouraged elevate legs more. Instructed to take Lasix when she has swelling. Continue Flonase and Benzedrex for her allergies. Recheck 1 month. 60 minutes spent on visit, 35 minutes involved education/counseling regarding debility, MS, BLE edema, HTN disease processes, treatment options, meds and coordination of care.     Current Outpatient Medications   Medication Sig Dispense Refill    ipratropium (ATROVENT) 0.06 % nasal spray 2 sprays by Each Nostril route 4 times daily 1 Bottle 3    furosemide (LASIX) 40 MG tablet Take 1 tablet by mouth daily 90 tablet 3    Incontinence Supply Disposable (UNDERPADS REGULAR) MISC 60 each by Does not apply route as needed (Incontinence) 60 Bottle 11    cetirizine (ZYRTEC) 10 MG tablet Take 1 tablet by mouth daily 90 tablet 3    potassium chloride (KLOR-CON) 10 MEQ extended release tablet Take 2 tablets by mouth daily as needed (with Lasix) 60 tablet 11    Incontinence Supply Disposable (PROTECTIVE UNDERWEAR LARGE) MISC Use as directed 56 Bottle 11    magnesium citrate solution Take 296 mLs by mouth once as needed for Constipation      Incontinence Supply Disposable (POISE MAXIMUM ABSORBENCY) PADS USE AS DIRECTED 54 each 11    metoprolol tartrate (LOPRESSOR) 50 MG tablet Take 1 tablet by mouth 2 times daily 60 tablet 11    STOOL SOFTENER 100 MG capsule Take 1 capsule by mouth 2 times daily as needed for Constipation 60 capsule 11    fluticasone (FLONASE) 50 MCG/ACT nasal spray 1 spray by Each Nostril route daily 16 g 11    albuterol sulfate (PROAIR RESPICLICK) 837 (90 Base) MCG/ACT aerosol powder inhalation Inhale into the lungs every 4 hours as needed for Wheezing or Shortness of Breath      melatonin 3 MG TABS tablet Take 1 tablet by mouth nightly as needed (insomnia) 100 tablet 3    polyethylene glycol (GLYCOLAX) 17 g packet Take 25 g by mouth daily as needed for Constipation 527 g 11    Oral Electrolytes (PEDIALYTE) SOLN Drink daily for good fluid intake. 1000 mL 5    sennosides-docusate sodium (SENOKOT-S) 8.6-50 MG tablet Take 1 tablet by mouth daily      b complex vitamins capsule Take 1 capsule by mouth daily      albuterol (PROVENTIL) (2.5 MG/3ML) 0.083% nebulizer solution Take 3 mLs by nebulization See Admin Instructions (Patient taking differently: Take 2.5 mg by nebulization every 4 hours as needed ) 120 each 3    acetaminophen (TYLENOL) 325 MG tablet Take 2 tablets by mouth every 4 hours as needed for Pain or Fever 120 tablet 3    losartan (COZAAR) 50 MG tablet Take 1 tablet by mouth daily 30 tablet 3    Ascorbic Acid (VITAMIN C) 1000 MG tablet Take 1,000 mg by mouth daily      Disposable Gloves (VINYL GLOVES) MISC by Does not apply route      ammonium lactate (LAC-HYDRIN) 12 % lotion Apply topically as needed for Dry Skin Apply topically as needed. No current facility-administered medications for this visit. Return in about 1 month (around 5/6/2021). An electronic signature was used to authenticate this note.     --Diamante Wilkinson MD on 4/6/2021 at 1:53 PM

## 2021-04-19 ENCOUNTER — TELEPHONE (OUTPATIENT)
Dept: PRIMARY CARE CLINIC | Age: 66
End: 2021-04-19

## 2021-04-19 ENCOUNTER — IMMUNIZATION (OUTPATIENT)
Dept: PRIMARY CARE CLINIC | Age: 66
End: 2021-04-19
Payer: MEDICAID

## 2021-04-19 PROCEDURE — 91301 COVID-19, MODERNA VACCINE 100MCG/0.5ML DOSE: CPT | Performed by: PHYSICIAN ASSISTANT

## 2021-04-19 PROCEDURE — 0012A COVID-19, MODERNA VACCINE 100MCG/0.5ML DOSE: CPT | Performed by: PHYSICIAN ASSISTANT

## 2021-04-19 RX ORDER — LUBIPROSTONE 8 UG/1
8 CAPSULE, GELATIN COATED ORAL 2 TIMES DAILY WITH MEALS
Qty: 60 CAPSULE | Refills: 5 | Status: SHIPPED
Start: 2021-04-19 | End: 2022-01-13

## 2021-04-19 NOTE — PROGRESS NOTES
Patient states having some chills after the first vaccine for several days but states no major problems. Stable during appointment today.

## 2021-04-21 ENCOUNTER — TELEPHONE (OUTPATIENT)
Dept: PRIMARY CARE CLINIC | Age: 66
End: 2021-04-21

## 2021-04-21 NOTE — TELEPHONE ENCOUNTER
Bryant Sanchez called today. She states she thinks she might be having a reaction to the COVID shot on Monday. She has been very tired. When she woke up this morning, she states her vision was very blurry, like she was in smoke. It has cleared now, but she wanted you to be aware.

## 2021-04-21 NOTE — TELEPHONE ENCOUNTER
Let her know that reactions to the vaccine are very common. These symptoms sound mild so she should drink plenty of fluids and rest. If she has worse reactions, she should let us know.

## 2021-05-03 ENCOUNTER — TELEPHONE (OUTPATIENT)
Dept: PRIMARY CARE CLINIC | Age: 66
End: 2021-05-03

## 2021-05-03 RX ORDER — PROPYLHEXEDRINE 250 MG/1
INHALANT NASAL
Qty: 1 EACH | Refills: 5 | Status: SHIPPED | OUTPATIENT
Start: 2021-05-03

## 2021-05-03 NOTE — TELEPHONE ENCOUNTER
Kush Hylton called. She states she just called EMS because she choked on a cracker. While they were there, they looked at her throat and suggested she call the office. States her throat is red with white patches all over. She thinks she has thrush again and is asking if you will call something in. (She is asking if you will call in, \" a double dose, because one never works\") Also c/o nasal congestion. States she is taking Zyrtec, flonase and all of her other meds, but they are not working. No fever or other symptoms.

## 2021-05-03 NOTE — TELEPHONE ENCOUNTER
Kaitlynn Cottrell from [employer] called. He states they do not have Benzedrex and it is not covered. He is asking if a Kimani's vapo-inhaler would be ok?

## 2021-05-07 ENCOUNTER — OFFICE VISIT (OUTPATIENT)
Dept: PRIMARY CARE CLINIC | Age: 66
End: 2021-05-07
Payer: MEDICAID

## 2021-05-07 VITALS
BODY MASS INDEX: 30.53 KG/M2 | HEART RATE: 75 BPM | HEIGHT: 66 IN | RESPIRATION RATE: 20 BRPM | DIASTOLIC BLOOD PRESSURE: 80 MMHG | SYSTOLIC BLOOD PRESSURE: 138 MMHG | WEIGHT: 190 LBS | OXYGEN SATURATION: 95 % | TEMPERATURE: 97.1 F

## 2021-05-07 DIAGNOSIS — K59.00 CONSTIPATION, UNSPECIFIED CONSTIPATION TYPE: ICD-10-CM

## 2021-05-07 DIAGNOSIS — G35 MULTIPLE SCLEROSIS (HCC): Primary | ICD-10-CM

## 2021-05-07 DIAGNOSIS — H61.91 SKIN LESION OF RIGHT EXTERNAL EAR: ICD-10-CM

## 2021-05-07 DIAGNOSIS — J31.0 CHRONIC RHINITIS: ICD-10-CM

## 2021-05-07 DIAGNOSIS — I10 ESSENTIAL HYPERTENSION: ICD-10-CM

## 2021-05-07 PROCEDURE — 99349 HOME/RES VST EST MOD MDM 40: CPT | Performed by: PHYSICIAN ASSISTANT

## 2021-05-07 PROCEDURE — 17000 DESTRUCT PREMALG LESION: CPT | Performed by: PHYSICIAN ASSISTANT

## 2021-05-07 NOTE — PROGRESS NOTES
5/7/2021     Home visit medically necessary in lieu of an office visit due to: wheelchair bound, difficult to get out. HPI:  Patient thinks 2nd COVID shot made her sick for one week. Felt fatigued. Still has nose dripping and congestion. Has th Zyrtec and Flonase and has started taking Vicks Vapo-inhaler She started outpatient PT at [facility] this week. She is unable to walk, pivot or care for herself but feels she is improving. She still has swelling of feet but not taking Lasix because she can't get on potty. She is moving her bowels daily with re-addition of Amitiza and continuing  Senna and Colace. Called office recently due to white spots in mouth along with mild discomfort. She had to call the ambulance this past week because she choked on a cracker which had [place] on it. Denies difficulty breathing. Says the EMS told her her mouth had while spots and was a little red. She was placed on Nystatin which has helped. Says she would like to try 6 mg Melatonin instead of 3 mg. Has started taking Qunol CoQ10 for health maintenance. Wants a raised, rounded area to the back of the R ear looked at - says its been there awhile and wants to know what it is. REVIEW OF SYSTEMS:   General:  Weight stable, generally healthy, no change in strength or exercise tolerance. Heart: No palpitations, no syncope, no orthopnea. EDEMA OF LEGS AT TIMES. Abdomen: Chronic constipation. No change in appetite, no dysphagia, no abdominal pains, no bowel habit changes, no emesis, no melena. : No urinary urgency, no dysuria, no change in nature of urine. JUNIOR CATH. Neurologic:   Unable to walk without holding on to something. In w/c most of the time. No tremor, no seizures, no changes in mentation. All other systems negative. PAST MEDICAL HISTORY: (Major events, hospitalizations, surgeries): Pneumonia (2010), 1998 Vag hyst, 1978 naina, append, freq epidural inclusion cysts tx'd with docycycline.    MS dx'd Mar 8, 1991. IV corticosteroids 1991-92. Has never been on MS meds. 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 Known allergies: NKDA. Ongoing medical problems: Multiple sclerosis, Asthma, HTN, hypoglycemia, scoliosis, DDD with sciatic, arthritis, osteoporosis, chronic LBP. Preventative: COVID vac - 3/22/21, 4/19/21. Pneumovax 23 - 11/2009. Prevnar - 10/2019. Flu vac - 10/2020. Smoking cessation counselling 7/2014 Social history:  with 3 children. Smokes 1-1 1/2 ppd 40 yrs. No alcohol. . 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 Nutritional history: Takes a lot of vitamins and supplements every day. PHYSICAL EXAM:    Vitals:    05/07/21 1244   BP: 138/80   Pulse: 75   Resp: 20   Temp: 97.1 °F (36.2 °C)   TempSrc: Temporal   SpO2: 95%   Weight: 190 lb (86.2 kg)  Comment: estimate   Height: 5' 6\" (1.676 m)        GEN: middle age overweight female patient sitting in WC in NAD. HEAD:  atraumatic, normocephalic. EYES:  EOMI, PERRL, L  conjunct normal. ENT:  EACs and TMs normal. R posterior pinna 5 mm diameter rounded raised flesh colored lesion. ORAL: mouth with 12 dental extractions healing,  Tongue with faint white coating. Pharynx: PND. LUNGS:   clear to ausc, no rales or rhonchi. HEART:  RRR, no murmurs or gallops. ABD:  Obese, soft, non-tender to palp, no palp masses or HSM, normal BS. : Obrien cath in place draining light yellow urine. BACK:  No CVAT. Scoliosis toward right / kyphosis,  tender to palp over L lumbar area. EXTREM: 1+ pedal edema  Unable to make a strong fist. Venous stasis changes. No ulcerations, varicosities or erythema, deformities. MUSCULOSKEL: good ROM of most joints, non-tender to palp and with movement, except lower extremeties which are weakened from [address] and non-wt bearing. WC bound from MS. NEURO:  Normal motor for her. No tremor, normal sensory,  able to stand and transfer with much effort. Unable to walk.  SKIN: No drainage No ulcerations or breakdown, ecchymosis, or other lesions. Medications reviewed. Labs 12/28/20 GFR 50, HH 10/35 9/4/20 HH 13/39, GFR>60, lytes nl, AST 44  7/29/20 HH 12/37, GFR>60, K+ 3.3     ASSESSMENT:  Elderly female with has Neck pain; Multiple sclerosis ([address] Bilateral foot-drop; Peripheral polyneuropathy; Essential hypertension; Asthma; Edema of both legs; Constipation; Intervertebral lumbar disc disorder with myelopathy, lumbar region; Cervical spinal stenosis; Pulmonary venous congestion; Fatigue; Hx of appendectomy; Hx of cholecystectomy; Hx of hysterectomy; and Chronic rhinitis on their problem list.     Kierra Overall was seen today for multiple sclerosis. Diagnoses and all orders for this visit:    Multiple sclerosis ([facility] [address]    Essential hypertension    Chronic rhinitis    Constipation, unspecified constipation type    Skin lesion of right external ear      PLAN:    Cryotherapy of actinic keratosis located on R posterior pinna; tolerated procedure well without complications. May try 6 mg of Melatonin for insomnia. Encouraged to continue exercises at home. Check labs. Start Atrovent nasal spray for RN. Encouraged elevate legs more. Instructed to take Lasix when she has swelling. Continue Flonase and Benzedrex for her allergies. Recheck 1 month. 60 minutes spent on visit, 35 minutes involved education/counseling regarding debility, MS, BLE edema, HTN disease processes, treatment options, meds and coordination of care. Current Outpatient Medications   Medication Sig Dispense Refill    nystatin (MYCOSTATIN) 213774 UNIT/ML suspension Take 5 mLs by mouth 4 times daily until resolved. 200 mL 2    Propylhexedrine (BENZEDREX) INHA Use every 3-4 hours as needed for nasal congestion.  1 each 5    Disposable Gloves (VINYL GLOVES) MISC 1 box by Does not apply route as needed (patient care) 2 each 11    lubiprostone (AMITIZA) 8 MCG CAPS capsule Take 1 capsule by mouth 2 times daily (with meals) 60 capsule 5  ipratropium (ATROVENT) 0.06 % nasal spray 2 sprays by Each Nostril route 4 times daily 1 Bottle 3    furosemide (LASIX) 40 MG tablet Take 1 tablet by mouth daily 90 tablet 3    Incontinence Supply Disposable (UNDERPADS REGULAR) MISC 60 each by Does not apply route as needed (Incontinence) 60 Bottle 11    cetirizine (ZYRTEC) 10 MG tablet Take 1 tablet by mouth daily 90 tablet 3    potassium chloride (KLOR-CON) 10 MEQ extended release tablet Take 2 tablets by mouth daily as needed (with Lasix) 60 tablet 11    Incontinence Supply Disposable (PROTECTIVE UNDERWEAR LARGE) MISC Use as directed 56 Bottle 11    magnesium citrate solution Take 296 mLs by mouth once as needed for Constipation      Incontinence Supply Disposable (POISE MAXIMUM ABSORBENCY) PADS USE AS DIRECTED 54 each 11    metoprolol tartrate (LOPRESSOR) 50 MG tablet Take 1 tablet by mouth 2 times daily 60 tablet 11    STOOL SOFTENER 100 MG capsule Take 1 capsule by mouth 2 times daily as needed for Constipation 60 capsule 11    fluticasone (FLONASE) 50 MCG/ACT nasal spray 1 spray by Each Nostril route daily 16 g 11    albuterol sulfate (PROAIR RESPICLICK) 418 (90 Base) MCG/ACT aerosol powder inhalation Inhale into the lungs every 4 hours as needed for Wheezing or Shortness of Breath      melatonin 3 MG TABS tablet Take 1 tablet by mouth nightly as needed (insomnia) 100 tablet 3    polyethylene glycol (GLYCOLAX) 17 g packet Take 25 g by mouth daily as needed for Constipation 527 g 11    Oral Electrolytes (PEDIALYTE) SOLN Drink daily for good fluid intake.  1000 mL 5    sennosides-docusate sodium (SENOKOT-S) 8.6-50 MG tablet Take 1 tablet by mouth daily      b complex vitamins capsule Take 1 capsule by mouth daily      albuterol (PROVENTIL) (2.5 MG/3ML) 0.083% nebulizer solution Take 3 mLs by nebulization See Admin Instructions (Patient taking differently: Take 2.5 mg by nebulization every 4 hours as needed ) 120 each 3    acetaminophen

## 2021-05-11 ENCOUNTER — TELEPHONE (OUTPATIENT)
Dept: PRIMARY CARE CLINIC | Age: 66
End: 2021-05-11

## 2021-05-11 RX ORDER — CYCLOBENZAPRINE HCL 5 MG
5 TABLET ORAL 3 TIMES DAILY PRN
Qty: 30 TABLET | Refills: 1 | Status: SHIPPED | OUTPATIENT
Start: 2021-05-11 | End: 2021-05-31

## 2021-05-11 NOTE — TELEPHONE ENCOUNTER
Lesa Aiken just called c/o upper R sided back spasms that are affecting her sciatica. She wants a muscle relaxer called in. She has used Flexaril before in the lowest dose and said it worked good. If you could send it to [facility] they will deliver it tomorrow.

## 2021-05-12 ENCOUNTER — TELEPHONE (OUTPATIENT)
Dept: PRIMARY CARE CLINIC | Age: 66
End: 2021-05-12

## 2021-05-12 NOTE — TELEPHONE ENCOUNTER
Carly Sullivan called. She c/o having chills starting last night. She states she is running a slight fever off and on. T-99.1. She also c/o constipation for several days. She states her stomach is bloated. She is taking Tylenol for the fever. C/o slight nausea, but denies any vomiting or any other symptoms. She has been taking senna and Amitiza. Instructed her to take Miralax for the constipation. If that doesn't work, try Magnesium Citrate. Instructed her to go to the ER if she develops abd pain, vomiting, or if her fever goes up and she can't keep it down. Verbalized understanding. Any other orders?

## 2021-05-14 ENCOUNTER — TELEPHONE (OUTPATIENT)
Dept: PRIMARY CARE CLINIC | Age: 66
End: 2021-05-14

## 2021-05-14 RX ORDER — DOXYCYCLINE HYCLATE 100 MG
100 TABLET ORAL 2 TIMES DAILY
Qty: 14 TABLET | Refills: 0 | Status: SHIPPED | OUTPATIENT
Start: 2021-05-14 | End: 2021-05-21

## 2021-05-14 NOTE — TELEPHONE ENCOUNTER
Sunday Vasquez called. She is still not feeling well. She states this morning she has a fever 101.8. She thinks she has the flu and would like an ATB. She says she hurts all over and has chills.

## 2021-05-17 ENCOUNTER — TELEPHONE (OUTPATIENT)
Dept: PRIMARY CARE CLINIC | Age: 66
End: 2021-05-17

## 2021-05-17 DIAGNOSIS — R30.0 DYSURIA: Primary | ICD-10-CM

## 2021-05-17 NOTE — TELEPHONE ENCOUNTER
She has 2 RFs of Nystatin, so she should have another RF. I sent order for UA CS. She needs to drink a lot more fluids to keep her bladder flushed out.

## 2021-05-17 NOTE — TELEPHONE ENCOUNTER
Gege Ureña called. She states she has finished the second bottle of medication, but she thinks she still has thrush. She is taking the ATB you ordered, but now she thinks she has a UTI. She states her aide can take a sample to the lab tomorrow, and she is asking if you will put in an order for a UA CS. She said she is afraid of getting septic again.

## 2021-05-18 ENCOUNTER — HOSPITAL ENCOUNTER (OUTPATIENT)
Age: 66
Discharge: HOME | End: 2021-05-18
Payer: MEDICAID

## 2021-05-18 DIAGNOSIS — R30.0 DYSURIA: ICD-10-CM

## 2021-05-18 LAB
BACTERIA: ABNORMAL /HPF
BILIRUBIN URINE: NEGATIVE
BLOOD, URINE: ABNORMAL
CLARITY: ABNORMAL
COLOR: YELLOW
EPITHELIAL CELLS, UA: ABNORMAL /HPF
GLUCOSE URINE: NEGATIVE MG/DL
KETONES, URINE: NEGATIVE MG/DL
LEUKOCYTE ESTERASE, URINE: ABNORMAL
NITRITE, URINE: NEGATIVE
PH UA: 6 (ref 5–9)
PROTEIN UA: 100 MG/DL
RBC UA: ABNORMAL /HPF (ref 0–2)
SPECIFIC GRAVITY UA: 1.01 (ref 1–1.03)
UROBILINOGEN, URINE: 0.2 E.U./DL
WBC UA: ABNORMAL /HPF (ref 0–5)

## 2021-05-18 PROCEDURE — 87077 CULTURE AEROBIC IDENTIFY: CPT

## 2021-05-18 PROCEDURE — 87186 SC STD MICRODIL/AGAR DIL: CPT

## 2021-05-18 PROCEDURE — 87088 URINE BACTERIA CULTURE: CPT

## 2021-05-18 PROCEDURE — 81001 URINALYSIS AUTO W/SCOPE: CPT

## 2021-05-20 ENCOUNTER — TELEPHONE (OUTPATIENT)
Dept: PRIMARY CARE CLINIC | Age: 66
End: 2021-05-20

## 2021-05-20 LAB
ORGANISM: ABNORMAL
URINE CULTURE, ROUTINE: ABNORMAL

## 2021-05-20 RX ORDER — CEFDINIR 300 MG/1
300 CAPSULE ORAL 2 TIMES DAILY
Qty: 14 CAPSULE | Refills: 0 | Status: SHIPPED | OUTPATIENT
Start: 2021-05-20 | End: 2021-09-28 | Stop reason: SDUPTHER

## 2021-05-20 NOTE — TELEPHONE ENCOUNTER
I am sending eRx to [facility] for different antibiotic for UTI - cefdinir. She can stop the doxycycline. How is Ayse Hardy doing?

## 2021-05-20 NOTE — TELEPHONE ENCOUNTER
Spoke to Nicholas Herrera and gave instructions. She states she has diarrhea today. I suggested she try yogurt and maybe some imodium if it continues.  Verbalized understanding

## 2021-05-21 RX ORDER — ALBUTEROL SULFATE 90 UG/1
AEROSOL, METERED RESPIRATORY (INHALATION)
Qty: 6.7 G | Refills: 11 | Status: SHIPPED | OUTPATIENT
Start: 2021-05-21 | End: 2022-08-08

## 2021-06-01 ENCOUNTER — TELEPHONE (OUTPATIENT)
Dept: PRIMARY CARE CLINIC | Age: 66
End: 2021-06-01

## 2021-06-01 NOTE — TELEPHONE ENCOUNTER
Jojo Oswald called. She states she has used 3 bottles of Nystatin and she still has thrush. She is asking if there is something else that you can order for her.

## 2021-06-04 ENCOUNTER — OFFICE VISIT (OUTPATIENT)
Dept: PRIMARY CARE CLINIC | Age: 66
End: 2021-06-04
Payer: MEDICAID

## 2021-06-04 DIAGNOSIS — J31.0 CHRONIC RHINITIS: ICD-10-CM

## 2021-06-04 DIAGNOSIS — J98.8 CONGESTION OF UPPER AIRWAY: ICD-10-CM

## 2021-06-04 DIAGNOSIS — K59.00 CONSTIPATION, UNSPECIFIED CONSTIPATION TYPE: ICD-10-CM

## 2021-06-04 DIAGNOSIS — G35 MULTIPLE SCLEROSIS (HCC): Primary | ICD-10-CM

## 2021-06-04 DIAGNOSIS — I10 ESSENTIAL HYPERTENSION: ICD-10-CM

## 2021-06-04 PROCEDURE — 99343 PR HOME VST NEW PATIENT MOD-HI SEVERITY 45 MINUTES: CPT | Performed by: PHYSICIAN ASSISTANT

## 2021-06-04 RX ORDER — LORATADINE 10 MG/1
10 TABLET ORAL DAILY
Qty: 30 TABLET | Refills: 11 | Status: SHIPPED | OUTPATIENT
Start: 2021-06-04 | End: 2022-07-01 | Stop reason: SDUPTHER

## 2021-06-04 RX ORDER — GUAIFENESIN 100 MG/5ML
200 SYRUP ORAL 3 TIMES DAILY PRN
Qty: 240 ML | Refills: 5 | Status: SHIPPED | OUTPATIENT
Start: 2021-06-04 | End: 2021-09-08 | Stop reason: SDUPTHER

## 2021-06-04 RX ORDER — CYCLOBENZAPRINE HCL 5 MG
5 TABLET ORAL 3 TIMES DAILY PRN
COMMUNITY
Start: 2021-05-11 | End: 2022-12-18 | Stop reason: ALTCHOICE

## 2021-06-04 NOTE — PROGRESS NOTES
6/4/2021     Home visit medically necessary in lieu of an office visit due to: wheelchair bound, difficult to get out. HPI:  This past month was placed on antibiotic for UTI. Denies symptoms currently. Says she has been dealing with a sore throat but also admits to ^ PND. Thought she had oral thrush and took Nystatin for this which did not help the sore throat. She thinks that Ipratropium Bromide nasal spray is not helping so she stopped it. She wants to go back on Claritin because it did a better job decreasing nasal drainage than Zyrtec. There has been no cough. Also this past month was having back spasms and was placed on Flexeril. The back pain stopped after one dose of the medication. She has been feeling stronger. She is not receiving PT at this time. She does not walk. She has trouble with transfers but she is happy to admit she was able to get in the shower recently. She has swelling of the feet but does not like to take Lasix because it makes her have to go to the bathroom. She is moving her bowels daily with re-addition of Amitiza and continuing  Senna and Colace. Denies difficulty breathing. No increased insomnia. Denies problems after cryotherapy performed on the posterior aspect of the right pinna last month. REVIEW OF SYSTEMS:   General:  Weight stable, generally healthy, no change in strength or exercise tolerance. Heart: No palpitations, no syncope, no orthopnea. EDEMA OF LEGS AT TIMES. Abdomen: Chronic constipation. No change in appetite, no dysphagia, no abdominal pains, no bowel habit changes, no emesis, no melena. : No urinary urgency, no dysuria, no change in nature of urine. JUNIOR CATH. Neurologic:   Unable to walk without holding on to something. In w/c most of the time. No tremor, no seizures, no changes in mentation. All other systems negative.        PAST MEDICAL HISTORY: (Major events, hospitalizations, surgeries): Pneumonia (2010), 1998 Vag hyst, 1978 naina, append, freq epidural inclusion cysts tx'd with docycycline. MS dx'd Mar 8, 1991. IV corticosteroids 1991-92. Has never been on MS meds. 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 Known allergies: NKDA. Ongoing medical problems: Multiple sclerosis, Asthma, HTN, hypoglycemia, scoliosis, DDD with sciatic, arthritis, osteoporosis, chronic LBP. Preventative: COVID vac - 3/22/21, 4/19/21. Pneumovax 23 - 11/2009. Prevnar - 10/2019. Flu vac - 10/2020. Smoking cessation counselling 7/2014 Social history:  with 3 children. Smokes 1-1 1/2 ppd 40 yrs. No alcohol. . 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 Nutritional history: Takes a lot of vitamins and supplements every day. PHYSICAL EXAM:    Vitals:    06/04/21 1136   BP: (!) 145/82   Pulse: 70   Resp: 20   Temp: 96.9 °F (36.1 °C)   TempSrc: Temporal   SpO2: 98%   Weight: 190 lb (86.2 kg)  Comment: estimate   Height: 5' 6\" (1.676 m)        GEN: middle age overweight female patient sitting in WC in NAD. HEAD:  atraumatic, normocephalic. EYES:  EOMI, PERRL, conjunct normal. ENT:  EACs and TMs normal. R posterior pinna with a healing 5 mm diameter rounded slightly dark pink area without redness/swelling. ORAL: mouth with 12 dental extractions healing,  No thrush. Pharynx: PND. LUNGS:   clear to ausc, no rales or rhonchi. HEART:  RRR, no murmurs or gallops. ABD:  Obese, soft, non-tender to palp, no palp masses or HSM, normal BS. : Obrien cath draining light yellow urine. BACK:  No CVAT. Scoliosis toward right / kyphosis,  tender to palp over L lumbar area. EXTREM: 1+ pedal edema  Unable to make a strong fist. Venous stasis changes. No ulcerations, varicosities or erythema, deformities. MUSCULOSKEL: good ROM of most joints, non-tender to palp and with movement, except lower extremeties which are weakened from [address] and non-wt bearing. WC bound from MS. NEURO:  Normal motor for her.  No tremor, normal sensory,  able to stand and transfer with much effort. Unable to walk. SKIN: No drainage  No ulcerations or breakdown, ecchymosis, or other lesions. Medications reviewed. Labs 12/28/20 GFR 50, HH 10/35 9/4/20 HH 13/39, GFR>60, lytes nl, AST 44  7/29/20 HH 12/37, GFR>60, K+ 3.3     ASSESSMENT:  Elderly female with has Neck pain; Multiple sclerosis ([facility] [address] Bilateral foot-drop; Peripheral polyneuropathy; Essential hypertension; Asthma; Edema of both legs; Constipation; Intervertebral lumbar disc disorder with myelopathy, lumbar region; Cervical spinal stenosis; Pulmonary venous congestion; Fatigue; Hx of appendectomy; Hx of cholecystectomy; Hx of hysterectomy; Chronic rhinitis; and Congestion of upper airway on their problem list.     Leann Laguerre was seen today for multiple sclerosis. Diagnoses and all orders for this visit:    Multiple sclerosis ([facility]    Essential hypertension    Chronic rhinitis  -     loratadine (CLARITIN) 10 MG tablet; Take 1 tablet by mouth daily    Congestion of upper airway  -     guaiFENesin (ROBITUSSIN) 100 MG/5ML syrup; Take 10 mLs by mouth 3 times daily as needed for Congestion    Constipation, unspecified constipation type         PLAN:  E-rx Guaifenesin liquid for congestion and to soothe the throat. Start on Claritin instead Zyrtec for rhinitis. May also use Flonase. Reminded to get lab work done. Encouraged to continue exercises at home. . Encouraged elevate legs more. Instructed to take Lasix when she has swelling. Continue Flonase and Benzedrex for her allergies. Recheck 1 month. 40 minutes spent on visit, 25 minutes involved education/counseling regarding debility, MS, BLE edema, HTN disease processes, treatment options, meds and coordination of care.     Current Outpatient Medications   Medication Sig Dispense Refill    loratadine (CLARITIN) 10 MG tablet Take 1 tablet by mouth daily 30 tablet 11    guaiFENesin (ROBITUSSIN) 100 MG/5ML syrup Take 10 mLs by mouth 3 times daily as needed for Congestion 240 mL 5    cyclobenzaprine (FLEXERIL) 5 MG tablet Take 5 mg by mouth 3 times daily as needed for Muscle spasms      albuterol sulfate  (90 Base) MCG/ACT inhaler INHALE 2 PUFFS EVERY 4 TO 6 HOURS AS NEEDED FOR WHEEZING. 6.7 g 11    nystatin (MYCOSTATIN) 843996 UNIT/ML suspension Take 5 mLs by mouth 4 times daily until resolved. 200 mL 2    Propylhexedrine (BENZEDREX) INHA Use every 3-4 hours as needed for nasal congestion.  1 each 5    Disposable Gloves (VINYL GLOVES) MISC 1 box by Does not apply route as needed (patient care) 2 each 11    lubiprostone (AMITIZA) 8 MCG CAPS capsule Take 1 capsule by mouth 2 times daily (with meals) 60 capsule 5    ipratropium (ATROVENT) 0.06 % nasal spray 2 sprays by Each Nostril route 4 times daily 1 Bottle 3    furosemide (LASIX) 40 MG tablet Take 1 tablet by mouth daily 90 tablet 3    Incontinence Supply Disposable (UNDERPADS REGULAR) MISC 60 each by Does not apply route as needed (Incontinence) 60 Bottle 11    cetirizine (ZYRTEC) 10 MG tablet Take 1 tablet by mouth daily 90 tablet 3    potassium chloride (KLOR-CON) 10 MEQ extended release tablet Take 2 tablets by mouth daily as needed (with Lasix) 60 tablet 11    Incontinence Supply Disposable (PROTECTIVE UNDERWEAR LARGE) MISC Use as directed 56 Bottle 11    magnesium citrate solution Take 296 mLs by mouth once as needed for Constipation      Incontinence Supply Disposable (POISE MAXIMUM ABSORBENCY) PADS USE AS DIRECTED 54 each 11    metoprolol tartrate (LOPRESSOR) 50 MG tablet Take 1 tablet by mouth 2 times daily 60 tablet 11    STOOL SOFTENER 100 MG capsule Take 1 capsule by mouth 2 times daily as needed for Constipation 60 capsule 11    fluticasone (FLONASE) 50 MCG/ACT nasal spray 1 spray by Each Nostril route daily 16 g 11    albuterol sulfate (PROAIR RESPICLICK) 024 (90 Base) MCG/ACT aerosol powder inhalation Inhale into the lungs every 4 hours as needed for Wheezing or Shortness of Breath

## 2021-06-05 VITALS
RESPIRATION RATE: 20 BRPM | TEMPERATURE: 96.9 F | HEART RATE: 70 BPM | DIASTOLIC BLOOD PRESSURE: 82 MMHG | HEIGHT: 66 IN | OXYGEN SATURATION: 98 % | SYSTOLIC BLOOD PRESSURE: 145 MMHG | BODY MASS INDEX: 30.53 KG/M2 | WEIGHT: 190 LBS

## 2021-06-09 ENCOUNTER — TELEPHONE (OUTPATIENT)
Dept: PRIMARY CARE CLINIC | Age: 66
End: 2021-06-09

## 2021-06-09 DIAGNOSIS — R26.2 DIFFICULTY WALKING: ICD-10-CM

## 2021-06-09 DIAGNOSIS — R53.1 GENERALIZED WEAKNESS: ICD-10-CM

## 2021-06-09 DIAGNOSIS — G35 MULTIPLE SCLEROSIS (HCC): Primary | ICD-10-CM

## 2021-06-09 NOTE — TELEPHONE ENCOUNTER
She does not qualify for [facility] services. She does not need any skilled services. I put in order PT eval/tx.

## 2021-06-09 NOTE — TELEPHONE ENCOUNTER
She does not currently have [facility].  She would need you to order PT

## 2021-06-09 NOTE — TELEPHONE ENCOUNTER
If she currently is receiving [facility] services, that would be fine to add PT eval/tx. She needs PT for weakness.

## 2021-06-09 NOTE — TELEPHONE ENCOUNTER
Rhonda Jarquin called. She is asking you would order Therapy for her through [facility].  She states she feels like she needs strengthening so that she can transfer

## 2021-06-16 ENCOUNTER — TELEPHONE (OUTPATIENT)
Dept: PRIMARY CARE CLINIC | Age: 66
End: 2021-06-16

## 2021-06-16 DIAGNOSIS — R30.0 DYSURIA: Primary | ICD-10-CM

## 2021-06-16 NOTE — TELEPHONE ENCOUNTER
Joelle Krabbe called. She thinks she has another UIT. States her urine has a foul odor. She is asking if you will put an order in for UA CS.  Her friend can take a sample to [facility]

## 2021-06-17 ENCOUNTER — TELEPHONE (OUTPATIENT)
Dept: PRIMARY CARE CLINIC | Age: 66
End: 2021-06-17

## 2021-06-17 RX ORDER — CEFUROXIME AXETIL 250 MG/1
250 TABLET ORAL 2 TIMES DAILY
Qty: 14 TABLET | Refills: 0 | Status: SHIPPED
Start: 2021-06-17 | End: 2021-06-17 | Stop reason: SDUPTHER

## 2021-06-17 RX ORDER — CEFUROXIME AXETIL 250 MG/1
250 TABLET ORAL 2 TIMES DAILY
Qty: 14 TABLET | Refills: 0 | Status: SHIPPED | OUTPATIENT
Start: 2021-06-17 | End: 2021-06-24

## 2021-06-17 NOTE — TELEPHONE ENCOUNTER
That's outrageous. Don't family members take specimens to lab for homebound patients all the time? I sent eRx for ceftin to her pharmacy. Please call the lab and ask them what's going on.

## 2021-06-17 NOTE — TELEPHONE ENCOUNTER
Jojo Oswald called. She states her friend took her urine sample to [facility].  Joes called her and states they can't take her urine sample because she wasn't there to register it. She told them she is homebound and can't get out to do that, but they wouldn't accept her sample. Orders?

## 2021-06-17 NOTE — TELEPHONE ENCOUNTER
I spoke to Leann Laguerre to let her know about the ATB. She is asking if you will please send it to [facility]. We had changed her pharmacy in the system this morning, but it did not stay. I am sorry for the confusion. It should be in there now.  Thanks

## 2021-06-23 DIAGNOSIS — R30.0 DYSURIA: ICD-10-CM

## 2021-06-23 LAB
BACTERIA: ABNORMAL /HPF
BILIRUBIN URINE: NEGATIVE
BLOOD, URINE: ABNORMAL
CLARITY: ABNORMAL
COLOR: YELLOW
CRYSTALS, UA: ABNORMAL /HPF
EPITHELIAL CELLS, UA: ABNORMAL /HPF
GLUCOSE URINE: NEGATIVE MG/DL
KETONES, URINE: NEGATIVE MG/DL
LEUKOCYTE ESTERASE, URINE: ABNORMAL
NITRITE, URINE: NEGATIVE
PH UA: 8 (ref 5–9)
PROTEIN UA: 30 MG/DL
RBC UA: ABNORMAL /HPF (ref 0–2)
SPECIFIC GRAVITY UA: 1.01 (ref 1–1.03)
UROBILINOGEN, URINE: 0.2 E.U./DL
WBC UA: >20 /HPF (ref 0–5)

## 2021-07-01 ENCOUNTER — OFFICE VISIT (OUTPATIENT)
Dept: PRIMARY CARE CLINIC | Age: 66
End: 2021-07-01
Payer: MEDICAID

## 2021-07-01 VITALS
HEIGHT: 66 IN | BODY MASS INDEX: 30.67 KG/M2 | SYSTOLIC BLOOD PRESSURE: 97 MMHG | TEMPERATURE: 97.2 F | RESPIRATION RATE: 20 BRPM | OXYGEN SATURATION: 98 % | HEART RATE: 67 BPM | DIASTOLIC BLOOD PRESSURE: 65 MMHG

## 2021-07-01 DIAGNOSIS — D64.9 ANEMIA, UNSPECIFIED TYPE: ICD-10-CM

## 2021-07-01 DIAGNOSIS — N39.0 RECURRENT UTI: ICD-10-CM

## 2021-07-01 DIAGNOSIS — I10 ESSENTIAL HYPERTENSION: ICD-10-CM

## 2021-07-01 DIAGNOSIS — G35 MULTIPLE SCLEROSIS (HCC): Primary | ICD-10-CM

## 2021-07-01 DIAGNOSIS — M48.02 CERVICAL SPINAL STENOSIS: ICD-10-CM

## 2021-07-01 DIAGNOSIS — R60.0 EDEMA OF BOTH LEGS: ICD-10-CM

## 2021-07-01 PROCEDURE — 99350 HOME/RES VST EST HIGH MDM 60: CPT | Performed by: FAMILY MEDICINE

## 2021-07-01 RX ORDER — CEFUROXIME AXETIL 250 MG/1
250 TABLET ORAL 2 TIMES DAILY
Qty: 6 TABLET | Refills: 5 | Status: SHIPPED
Start: 2021-07-01 | End: 2021-12-17

## 2021-07-01 RX ORDER — FLUCONAZOLE 150 MG/1
150 TABLET ORAL DAILY
Qty: 7 TABLET | Refills: 0 | Status: SHIPPED | OUTPATIENT
Start: 2021-07-01 | End: 2021-07-08

## 2021-07-01 NOTE — PROGRESS NOTES
7/1/2021     Home visit medically necessary in lieu of an office visit due to: wheelchair bound, difficult to get out. HPI:  Patient has had several UTIs in last month and every time she takes an antibiotic she gets thrush. She is having no UTI symptoms currently. For thrush she took Nystatin which did help somewhat for sore throat. She has not had any cough. She has had L sciatic nerve pain especially in AM before she is moving much. She has been receiving PT. She has been feeling stronger. She is still not able to walk. She also has trouble with transfers. She has swelling of the feet but does not like to take Lasix because it makes her have to go to the bathroom. She is moving her bowels daily with re-addition of Amitiza with Senna and Colace. REVIEW OF SYSTEMS:   General:  Weight stable, generally healthy, no change in strength or exercise tolerance. Heart: No palpitations, no syncope, no orthopnea. EDEMA OF LEGS AT TIMES. Abdomen: Chronic constipation. No change in appetite, no dysphagia, no abdominal pains, no bowel habit changes, no emesis, no melena. : No urinary urgency, no dysuria, no change in nature of urine. JUNIOR CATH. Neurologic:   Unable to walk without holding on to something. In w/c most of the time. No tremor, no seizures, no changes in mentation. All other systems negative. PAST MEDICAL HISTORY: (Major events, hospitalizations, surgeries): Pneumonia (2010), 1998 Vag hyst, 1978 naina, append, freq epidural inclusion cysts tx'd with docycycline. MS dx'd Mar 8, 1991. IV corticosteroids 1991-92. Has never been on MS meds. 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 Known allergies: NKDA. Ongoing medical problems: Multiple sclerosis, Asthma, HTN, hypoglycemia, scoliosis, DDD with sciatic, arthritis, osteoporosis, chronic LBP. Preventative: COVID vac - 3/22/21, 4/19/21. Pneumovax 23 - 11/2009. Prevnar - 10/2019. Flu vac - 10/2020.  Smoking cessation counselling 7/2014 Social history:  with 3 children. Smokes 1-1 1/2 ppd 40 yrs. No alcohol. . 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 Nutritional history: Takes a lot of vitamins and supplements every day. PHYSICAL EXAM:    Vitals:    07/01/21 1357   BP: 97/65   Site: Left Wrist   Position: Sitting   Pulse: 67   Resp: 20   Temp: 97.2 °F (36.2 °C)   TempSrc: Infrared   SpO2: 98%   Weight: Comment: unable   Height: 5' 6\" (1.676 m)      GEN: middle age overweight female patient sitting in WC in NAD. HEAD:  atraumatic, normocephalic. EYES:  EOMI, PERRL, conjunct normal. ENT:  EACs and TMs normal.  ORAL: mouth with 12 dental extractions healing,  Mild thrush. Pharynx: PND. LUNGS:   clear to ausc, no rales or rhonchi. HEART:  RRR, no murmurs or gallops. ABD:  Obese, soft, non-tender to palp, no palp masses or HSM, normal BS. : Obrien cath draining light yellow urine. BACK:  No CVAT. Scoliosis toward right / kyphosis,  tender to palp over L lumbar area. EXTREM: 1+ pedal edema  Unable to make a strong fist. Venous stasis changes. No ulcerations, varicosities or erythema, deformities. MUSCULOSKEL: good ROM of most joints, non-tender to palp and with movement, except lower extremeties which are weakened from [address] and non-wt bearing. WC bound from MS. NEURO:  Normal motor for her. No tremor, normal sensory,  able to stand and transfer with much effort. Unable to walk. SKIN: No drainage  No ulcerations or breakdown, ecchymosis, or other lesions. Medications reviewed. Labs 12/28/20 GFR 50, HH 10/35 9/4/20 HH 13/39, GFR>60, lytes nl, AST 44  7/29/20 HH 12/37, GFR>60, K+ 3.3     ASSESSMENT:  Elderly female with has Neck pain; Multiple sclerosis ([address] Bilateral foot-drop; Peripheral polyneuropathy; Essential hypertension; Asthma; Edema of both legs; Constipation;  Intervertebral lumbar disc disorder with myelopathy, lumbar region; Cervical spinal stenosis; Pulmonary venous congestion; Fatigue; Hx of appendectomy; Hx of cholecystectomy; Hx of hysterectomy; Chronic rhinitis; Congestion of upper airway; and Recurrent UTI on their problem list.     Cushing was seen today for urinary tract infection, leg swelling, pain and thrush. Diagnoses and all orders for this visit:    Multiple sclerosis ([address]    Cervical spinal stenosis    Edema of both legs    Essential hypertension  -     CBC Auto Differential; Future  -     Basic Metabolic Panel; Future    Anemia, unspecified type  -     CBC Auto Differential; Future  -     Vitamin B12 & Folate; Future  -     Ferritin; Future    Recurrent UTI    Other orders  -     fluconazole (DIFLUCAN) 150 MG tablet; Take 1 tablet by mouth daily for 7 days  -     cefUROXime (CEFTIN) 250 MG tablet; Take 1 tablet by mouth 2 times daily for 3 days at first sign of UTI         PLAN:    Diflucan for chronic thrush. Ceftin x 3 days at first sign of UTI. Continue Claritin and Flonase for allergies. Needs to get lab work done. Encouraged to continue exercises at home. Encouraged elevate legs more. Instructed to take Lasix when she has swelling. Recheck 1 month. 60 minutes spent on visit, 35 minutes involved education/counseling regarding UTIs, MS, BLE edema, HTN disease processes, treatment options, meds and coordination of care.     Current Outpatient Medications   Medication Sig Dispense Refill    fluconazole (DIFLUCAN) 150 MG tablet Take 1 tablet by mouth daily for 7 days 7 tablet 0    cefUROXime (CEFTIN) 250 MG tablet Take 1 tablet by mouth 2 times daily for 3 days at first sign of UTI 6 tablet 5    loratadine (CLARITIN) 10 MG tablet Take 1 tablet by mouth daily 30 tablet 11    guaiFENesin (ROBITUSSIN) 100 MG/5ML syrup Take 10 mLs by mouth 3 times daily as needed for Congestion 240 mL 5    cyclobenzaprine (FLEXERIL) 5 MG tablet Take 5 mg by mouth 3 times daily as needed for Muscle spasms      albuterol sulfate  (90 Base) MCG/ACT inhaler INHALE 2 PUFFS EVERY 4 TO 6 HOURS AS NEEDED FOR WHEEZING. 6.7 g 11    nystatin (MYCOSTATIN) 944077 UNIT/ML suspension Take 5 mLs by mouth 4 times daily until resolved. 200 mL 2    Propylhexedrine (BENZEDREX) INHA Use every 3-4 hours as needed for nasal congestion.  1 each 5    Disposable Gloves (VINYL GLOVES) MISC 1 box by Does not apply route as needed (patient care) 2 each 11    lubiprostone (AMITIZA) 8 MCG CAPS capsule Take 1 capsule by mouth 2 times daily (with meals) 60 capsule 5    ipratropium (ATROVENT) 0.06 % nasal spray 2 sprays by Each Nostril route 4 times daily 1 Bottle 3    furosemide (LASIX) 40 MG tablet Take 1 tablet by mouth daily 90 tablet 3    Incontinence Supply Disposable (UNDERPADS REGULAR) MISC 60 each by Does not apply route as needed (Incontinence) 60 Bottle 11    cetirizine (ZYRTEC) 10 MG tablet Take 1 tablet by mouth daily 90 tablet 3    potassium chloride (KLOR-CON) 10 MEQ extended release tablet Take 2 tablets by mouth daily as needed (with Lasix) 60 tablet 11    Incontinence Supply Disposable (PROTECTIVE UNDERWEAR LARGE) MISC Use as directed 56 Bottle 11    magnesium citrate solution Take 296 mLs by mouth once as needed for Constipation      Incontinence Supply Disposable (POISE MAXIMUM ABSORBENCY) PADS USE AS DIRECTED 54 each 11    metoprolol tartrate (LOPRESSOR) 50 MG tablet Take 1 tablet by mouth 2 times daily 60 tablet 11    STOOL SOFTENER 100 MG capsule Take 1 capsule by mouth 2 times daily as needed for Constipation 60 capsule 11    fluticasone (FLONASE) 50 MCG/ACT nasal spray 1 spray by Each Nostril route daily 16 g 11    albuterol sulfate (PROAIR RESPICLICK) 756 (90 Base) MCG/ACT aerosol powder inhalation Inhale into the lungs every 4 hours as needed for Wheezing or Shortness of Breath      melatonin 3 MG TABS tablet Take 1 tablet by mouth nightly as needed (insomnia) 100 tablet 3    polyethylene glycol (GLYCOLAX) 17 g packet Take 25 g by mouth daily as needed for Constipation 527 g 11    Oral Electrolytes (PEDIALYTE) SOLN Drink daily for good fluid intake. 1000 mL 5    sennosides-docusate sodium (SENOKOT-S) 8.6-50 MG tablet Take 1 tablet by mouth daily      b complex vitamins capsule Take 1 capsule by mouth daily      albuterol (PROVENTIL) (2.5 MG/3ML) 0.083% nebulizer solution Take 3 mLs by nebulization See Admin Instructions (Patient taking differently: Take 2.5 mg by nebulization every 4 hours as needed ) 120 each 3    acetaminophen (TYLENOL) 325 MG tablet Take 2 tablets by mouth every 4 hours as needed for Pain or Fever 120 tablet 3    losartan (COZAAR) 50 MG tablet Take 1 tablet by mouth daily 30 tablet 3    Ascorbic Acid (VITAMIN C) 1000 MG tablet Take 1,000 mg by mouth daily      ammonium lactate (LAC-HYDRIN) 12 % lotion Apply topically as needed for Dry Skin Apply topically as needed. No current facility-administered medications for this visit. Return in about 1 month (around 8/1/2021). An electronic signature was used to authenticate this note.     --Dereck Govea MD on 7/1/2021 at 2:31 PM

## 2021-07-23 ENCOUNTER — HOSPITAL ENCOUNTER (OUTPATIENT)
Age: 66
Discharge: HOME | End: 2021-07-23
Payer: MEDICAID

## 2021-07-23 DIAGNOSIS — I10 ESSENTIAL HYPERTENSION: ICD-10-CM

## 2021-07-23 DIAGNOSIS — D64.9 ANEMIA, UNSPECIFIED TYPE: ICD-10-CM

## 2021-07-23 LAB
ANION GAP SERPL CALCULATED.3IONS-SCNC: 9 MMOL/L (ref 7–16)
BASOPHILS ABSOLUTE: 0.08 E9/L (ref 0–0.2)
BASOPHILS RELATIVE PERCENT: 0.8 % (ref 0–2)
BUN BLDV-MCNC: 18 MG/DL (ref 6–23)
CALCIUM SERPL-MCNC: 10 MG/DL (ref 8.6–10.2)
CHLORIDE BLD-SCNC: 108 MMOL/L (ref 98–107)
CO2: 28 MMOL/L (ref 22–29)
CREAT SERPL-MCNC: 1 MG/DL (ref 0.5–1)
EOSINOPHILS ABSOLUTE: 0.45 E9/L (ref 0.05–0.5)
EOSINOPHILS RELATIVE PERCENT: 4.7 % (ref 0–6)
FERRITIN: 88 NG/ML
FOLATE: 15 NG/ML (ref 4.8–24.2)
GFR AFRICAN AMERICAN: >60
GFR NON-AFRICAN AMERICAN: 55 ML/MIN/1.73
GLUCOSE BLD-MCNC: 115 MG/DL (ref 74–99)
HCT VFR BLD CALC: 45 % (ref 34–48)
HEMOGLOBIN: 15.3 G/DL (ref 11.5–15.5)
IMMATURE GRANULOCYTES #: 0.07 E9/L
IMMATURE GRANULOCYTES %: 0.7 % (ref 0–5)
LYMPHOCYTES ABSOLUTE: 2.48 E9/L (ref 1.5–4)
LYMPHOCYTES RELATIVE PERCENT: 25.9 % (ref 20–42)
MCH RBC QN AUTO: 34.1 PG (ref 26–35)
MCHC RBC AUTO-ENTMCNC: 34 % (ref 32–34.5)
MCV RBC AUTO: 100.2 FL (ref 80–99.9)
MONOCYTES ABSOLUTE: 0.83 E9/L (ref 0.1–0.95)
MONOCYTES RELATIVE PERCENT: 8.7 % (ref 2–12)
NEUTROPHILS ABSOLUTE: 5.66 E9/L (ref 1.8–7.3)
NEUTROPHILS RELATIVE PERCENT: 59.2 % (ref 43–80)
PDW BLD-RTO: 14.1 FL (ref 11.5–15)
PLATELET # BLD: 345 E9/L (ref 130–450)
PMV BLD AUTO: 10.9 FL (ref 7–12)
POTASSIUM SERPL-SCNC: 4 MMOL/L (ref 3.5–5)
RBC # BLD: 4.49 E12/L (ref 3.5–5.5)
SODIUM BLD-SCNC: 145 MMOL/L (ref 132–146)
VITAMIN B-12: 450 PG/ML (ref 211–946)
WBC # BLD: 9.6 E9/L (ref 4.5–11.5)

## 2021-07-23 PROCEDURE — 82728 ASSAY OF FERRITIN: CPT

## 2021-07-23 PROCEDURE — 82746 ASSAY OF FOLIC ACID SERUM: CPT

## 2021-07-23 PROCEDURE — 36415 COLL VENOUS BLD VENIPUNCTURE: CPT

## 2021-07-23 PROCEDURE — 82607 VITAMIN B-12: CPT

## 2021-07-23 PROCEDURE — 85025 COMPLETE CBC W/AUTO DIFF WBC: CPT

## 2021-07-23 PROCEDURE — 80048 BASIC METABOLIC PNL TOTAL CA: CPT

## 2021-07-27 ENCOUNTER — TELEPHONE (OUTPATIENT)
Dept: PRIMARY CARE CLINIC | Age: 66
End: 2021-07-27

## 2021-07-27 DIAGNOSIS — M54.40 ACUTE LOW BACK PAIN WITH SCIATICA, SCIATICA LATERALITY UNSPECIFIED, UNSPECIFIED BACK PAIN LATERALITY: Primary | ICD-10-CM

## 2021-07-27 RX ORDER — FLUCONAZOLE 150 MG/1
150 TABLET ORAL DAILY
Qty: 3 TABLET | Refills: 2 | Status: SHIPPED | OUTPATIENT
Start: 2021-07-27 | End: 2021-07-30

## 2021-07-27 NOTE — TELEPHONE ENCOUNTER
Alley Braden left a voicemail on the nurse line this afternoon stating that she thinks that she has thrush again and is starting to lose her voice. She also mentioned that she is having a lot of pain in her left hip and side due to what she believes is her sciatica. She is stating that she cannot sleep, the pain is so bad. She states she is having PT but it is really hard. She is asking if you would be able to order an xray of her left lower back and hip through [address] Please advise.

## 2021-08-09 ENCOUNTER — TELEPHONE (OUTPATIENT)
Dept: PRIMARY CARE CLINIC | Age: 66
End: 2021-08-09

## 2021-08-09 ENCOUNTER — OFFICE VISIT (OUTPATIENT)
Dept: PRIMARY CARE CLINIC | Age: 66
End: 2021-08-09
Payer: MEDICAID

## 2021-08-09 VITALS
SYSTOLIC BLOOD PRESSURE: 117 MMHG | TEMPERATURE: 97.2 F | RESPIRATION RATE: 20 BRPM | OXYGEN SATURATION: 98 % | DIASTOLIC BLOOD PRESSURE: 86 MMHG | HEIGHT: 66 IN | BODY MASS INDEX: 30.67 KG/M2 | HEART RATE: 64 BPM

## 2021-08-09 DIAGNOSIS — K14.1 GEOGRAPHIC TONGUE: ICD-10-CM

## 2021-08-09 DIAGNOSIS — K59.00 CONSTIPATION, UNSPECIFIED CONSTIPATION TYPE: ICD-10-CM

## 2021-08-09 DIAGNOSIS — N39.0 RECURRENT UTI: ICD-10-CM

## 2021-08-09 DIAGNOSIS — I10 ESSENTIAL HYPERTENSION: ICD-10-CM

## 2021-08-09 DIAGNOSIS — G35 MULTIPLE SCLEROSIS (HCC): Primary | ICD-10-CM

## 2021-08-09 DIAGNOSIS — R60.0 EDEMA OF BOTH LEGS: ICD-10-CM

## 2021-08-09 DIAGNOSIS — R09.89 PULMONARY VENOUS CONGESTION: ICD-10-CM

## 2021-08-09 PROCEDURE — 99349 HOME/RES VST EST MOD MDM 40: CPT | Performed by: FAMILY MEDICINE

## 2021-08-09 RX ORDER — FLUCONAZOLE 150 MG/1
1 TABLET ORAL DAILY
COMMUNITY
Start: 2021-08-06 | End: 2021-08-09

## 2021-08-09 NOTE — TELEPHONE ENCOUNTER
Spoke to Fátima at [facility]. They have the order, but don't know why it hasn't been done yet. She is emailing the techs in the area and asking them to contact [facility] with a date and time that they will be there to do the xrays.

## 2021-08-09 NOTE — TELEPHONE ENCOUNTER
----- Message from Daylin Kilpatrick RN sent at 8/9/2021 11:04 AM EDT -----  [facility] has never been out to do her X-Ray ordered 7/27/21. Can someone call them please? She reports she called the office to report this and no one called her back.   Thanks

## 2021-08-09 NOTE — PROGRESS NOTES
8/9/2021     Home visit medically necessary in lieu of an office visit due to: wheelchair bound, difficult to get out. HPI:  Patient has increased L sciatic nerve pain down to have ankle at times especially in AM before she is moving much. She has been taking Tylenol and ibuprofen 600 mg TID. She wants to avoid narcs. She has been receiving PT. She has been feeling stronger. She is still unable to walk. She also has trouble with transfers. She continues to feel she has thrush but no change with Diflucan. She is having no UTI symptoms currently. She has not had any cough. She She has swelling of the feet but does not like to take Lasix because it makes her have to go to the bathroom. She is moving her bowels daily better Amitiza with Senna and Colace. REVIEW OF SYSTEMS:   General:  Weight stable, generally healthy, no change in strength or exercise tolerance. Heart: No palpitations, no syncope, no orthopnea. EDEMA OF LEGS AT TIMES. Abdomen: Chronic constipation. No change in appetite, no dysphagia, no abdominal pains, no bowel habit changes, no emesis, no melena. : No urinary urgency, no dysuria, no change in nature of urine. JUNIOR CATH. Neurologic:   Unable to walk without holding on to something. In w/c most of the time. No tremor, no seizures, no changes in mentation. All other systems negative. PAST MEDICAL HISTORY: (Major events, hospitalizations, surgeries): Pneumonia (2010), 1998 Vag hyst, 1978 naina, append, freq epidural inclusion cysts tx'd with docycycline. MS dx'd Mar 8, 1991. IV corticosteroids 1991-92. Has never been on MS meds. 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 Known allergies: NKDA. Ongoing medical problems: Multiple sclerosis, Asthma, HTN, hypoglycemia, scoliosis, DDD with sciatic, arthritis, osteoporosis, chronic LBP. Preventative: COVID vac - 3/22/21, 4/19/21. Pneumovax 23 - 11/2009. Prevnar - 10/2019. Flu vac - 10/2020.  Smoking cessation counselling 7/2014 Social history:  with 3 children. Smokes 1-1 1/2 ppd 40 yrs. No alcohol. . 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 Nutritional history: Takes a lot of vitamins and supplements every day. PHYSICAL EXAM:    Vitals:    08/09/21 1107   BP: 117/86   Site: Left Wrist   Position: Sitting   Pulse: 64   Resp: 20   Temp: 97.2 °F (36.2 °C)   TempSrc: Infrared   SpO2: 98%   Weight: Comment: unable   Height: 5' 6\" (1.676 m)      GEN: middle age overweight female patient sitting in WC in NAD. HEAD:  atraumatic, normocephalic. EYES:  EOMI, PERRL, conjunct normal. ENT:  EACs and TMs normal.  ORAL: mouth with 12 dental extractions healing,  Mild thrush. Pharynx: PND. LUNGS:   clear to ausc, no rales or rhonchi. HEART:  RRR, no murmurs or gallops. ABD:  Obese, soft, non-tender to palp, no palp masses or HSM, normal BS. : Obrien cath draining light yellow urine. BACK:  No CVAT. Scoliosis toward right / kyphosis,  tender to palp over L lumbar area. EXTREM: 1+ pedal edema  Unable to make a strong fist. Venous stasis changes. No ulcerations, varicosities or erythema, deformities. MUSCULOSKEL: good ROM of most joints, non-tender to palp and with movement, except lower extremeties which are weakened from [place] and non-wt bearing. WC bound from MS. NEURO:  Normal motor for her. No tremor, normal sensory,  able to stand and transfer with much effort. Unable to walk. SKIN: No drainage  No ulcerations or breakdown, ecchymosis, or other lesions. Medications reviewed. Labs 7/23/21 HH 15/45, GFR 55, lytes nl  12/28/20 GFR 50, HH 10/35  9/4/20 HH 13/39, GFR>60, lytes nl, AST 44     ASSESSMENT:  Elderly female with has Neck pain; Multiple sclerosis ([address] Bilateral foot-drop; Peripheral polyneuropathy; Essential hypertension; Asthma; Edema of both legs; Constipation;  Intervertebral lumbar disc disorder with myelopathy, lumbar region; Cervical spinal stenosis; Pulmonary venous congestion; Fatigue; Hx of appendectomy; Hx of cholecystectomy; Hx of hysterectomy; Chronic rhinitis; Congestion of upper airway; Recurrent UTI; and Geographic tongue on their problem list.     Pati Shaw was seen today for leg swelling and back pain. Diagnoses and all orders for this visit:    Multiple sclerosis ([facility] [address]    Essential hypertension    Pulmonary venous congestion    Recurrent UTI    Edema of both legs    Constipation, unspecified constipation type    Geographic tongue       PLAN:    Recommend against ibuprofen. Encouraged to drink more fluids. Use Ceftin x 3 days at first sign of UTI. Continue Claritin and Flonase for allergies. Encouraged to continue exercises at home. Encouraged elevate legs more. Instructed to take Lasix when she has swelling. Recheck 1 month. 40 minutes spent on visit, 25 minutes involved education/counseling regarding UTIs, MS, BLE edema, HTN disease processes, treatment options, meds and coordination of care. Current Outpatient Medications   Medication Sig Dispense Refill    cefUROXime (CEFTIN) 250 MG tablet Take 1 tablet by mouth 2 times daily for 3 days at first sign of UTI 6 tablet 5    loratadine (CLARITIN) 10 MG tablet Take 1 tablet by mouth daily 30 tablet 11    guaiFENesin (ROBITUSSIN) 100 MG/5ML syrup Take 10 mLs by mouth 3 times daily as needed for Congestion 240 mL 5    cyclobenzaprine (FLEXERIL) 5 MG tablet Take 5 mg by mouth 3 times daily as needed for Muscle spasms      albuterol sulfate  (90 Base) MCG/ACT inhaler INHALE 2 PUFFS EVERY 4 TO 6 HOURS AS NEEDED FOR WHEEZING. 6.7 g 11    Propylhexedrine (BENZEDREX) INHA Use every 3-4 hours as needed for nasal congestion.  1 each 5    Disposable Gloves (VINYL GLOVES) MISC 1 box by Does not apply route as needed (patient care) 2 each 11    lubiprostone (AMITIZA) 8 MCG CAPS capsule Take 1 capsule by mouth 2 times daily (with meals) 60 capsule 5    ipratropium (ATROVENT) 0.06 % nasal spray 2 sprays by Each Nostril route 4 times daily 1 Bottle 3    furosemide (LASIX) 40 MG tablet Take 1 tablet by mouth daily 90 tablet 3    Incontinence Supply Disposable (UNDERPADS REGULAR) MISC 60 each by Does not apply route as needed (Incontinence) 60 Bottle 11    cetirizine (ZYRTEC) 10 MG tablet Take 1 tablet by mouth daily 90 tablet 3    potassium chloride (KLOR-CON) 10 MEQ extended release tablet Take 2 tablets by mouth daily as needed (with Lasix) 60 tablet 11    Incontinence Supply Disposable (PROTECTIVE UNDERWEAR LARGE) MISC Use as directed 56 Bottle 11    magnesium citrate solution Take 296 mLs by mouth once as needed for Constipation      Incontinence Supply Disposable (POISE MAXIMUM ABSORBENCY) PADS USE AS DIRECTED 54 each 11    metoprolol tartrate (LOPRESSOR) 50 MG tablet Take 1 tablet by mouth 2 times daily 60 tablet 11    STOOL SOFTENER 100 MG capsule Take 1 capsule by mouth 2 times daily as needed for Constipation 60 capsule 11    fluticasone (FLONASE) 50 MCG/ACT nasal spray 1 spray by Each Nostril route daily 16 g 11    albuterol sulfate (PROAIR RESPICLICK) 439 (90 Base) MCG/ACT aerosol powder inhalation Inhale into the lungs every 4 hours as needed for Wheezing or Shortness of Breath      melatonin 3 MG TABS tablet Take 1 tablet by mouth nightly as needed (insomnia) 100 tablet 3    polyethylene glycol (GLYCOLAX) 17 g packet Take 25 g by mouth daily as needed for Constipation 527 g 11    Oral Electrolytes (PEDIALYTE) SOLN Drink daily for good fluid intake.  1000 mL 5    sennosides-docusate sodium (SENOKOT-S) 8.6-50 MG tablet Take 1 tablet by mouth daily      b complex vitamins capsule Take 1 capsule by mouth daily      albuterol (PROVENTIL) (2.5 MG/3ML) 0.083% nebulizer solution Take 3 mLs by nebulization See Admin Instructions (Patient taking differently: Take 2.5 mg by nebulization every 4 hours as needed ) 120 each 3    acetaminophen (TYLENOL) 325 MG tablet Take 2 tablets by mouth every 4 hours as needed for Pain or Fever 120 tablet 3    losartan (COZAAR) 50 MG tablet Take 1 tablet by mouth daily 30 tablet 3    Ascorbic Acid (VITAMIN C) 1000 MG tablet Take 1,000 mg by mouth daily      ammonium lactate (LAC-HYDRIN) 12 % lotion Apply topically as needed for Dry Skin Apply topically as needed. No current facility-administered medications for this visit. Return in about 1 month (around 9/9/2021). An electronic signature was used to authenticate this note.     --Clarissa Rosas MD on 8/9/2021 at 11:34 AM

## 2021-08-10 ENCOUNTER — TELEPHONE (OUTPATIENT)
Dept: PRIMARY CARE CLINIC | Age: 66
End: 2021-08-10

## 2021-08-10 DIAGNOSIS — M54.42 ACUTE LEFT-SIDED LOW BACK PAIN WITH LEFT-SIDED SCIATICA: Primary | ICD-10-CM

## 2021-08-10 DIAGNOSIS — M54.40 ACUTE LOW BACK PAIN WITH SCIATICA, SCIATICA LATERALITY UNSPECIFIED, UNSPECIFIED BACK PAIN LATERALITY: ICD-10-CM

## 2021-08-10 NOTE — TELEPHONE ENCOUNTER
Okay. I put order in the system for CT scan. I don't know how they make the appts at different sites.

## 2021-08-10 NOTE — TELEPHONE ENCOUNTER
----- Message from Verenice Buckner MD sent at 8/10/2021  1:34 PM EDT -----  Xray of back shows degenerated discs. Xray of hip is normal. She needs a CT of lumbar spine. Is she willing and able to go to hospital to have this done?

## 2021-08-10 NOTE — TELEPHONE ENCOUNTER
Spoke to Chino Loco and gave results. She is willing to go for the CT scan. She is asking that you send the order to [facility].

## 2021-08-17 ENCOUNTER — TELEPHONE (OUTPATIENT)
Dept: PRIMARY CARE CLINIC | Age: 66
End: 2021-08-17

## 2021-08-17 RX ORDER — ONDANSETRON 4 MG/1
4 TABLET, ORALLY DISINTEGRATING ORAL 3 TIMES DAILY PRN
Qty: 21 TABLET | Refills: 2 | Status: SHIPPED
Start: 2021-08-17 | End: 2022-03-22

## 2021-08-17 NOTE — TELEPHONE ENCOUNTER
Melvin Duncan called. She began vomiting last night. She has had a fever for several days. She feels like she is getting dehydrated. She called EMS this am because she thought she needed to go to the ER to get IV fluids. They recommended that she call the office and get a script for Zofran. Orders?

## 2021-09-07 ENCOUNTER — TELEPHONE (OUTPATIENT)
Dept: PRIMARY CARE CLINIC | Age: 66
End: 2021-09-07

## 2021-09-07 NOTE — TELEPHONE ENCOUNTER
KAROI: When I spoke to Rhonda Alvarengaelvis today she stated that she had not been around anyone positive for Covid in the last 14 days. She stated that about a month ago she was not feeling well and something was going around with residents in the building. She states she was not tested but that turned out to be more of a \"stomach thing\". She states she is feeling pretty good right now,  except seasonal allergies, which she states is not new, but is normal for her.

## 2021-09-08 ENCOUNTER — OFFICE VISIT (OUTPATIENT)
Dept: PRIMARY CARE CLINIC | Age: 66
End: 2021-09-08
Payer: MEDICAID

## 2021-09-08 VITALS
HEART RATE: 58 BPM | HEIGHT: 66 IN | DIASTOLIC BLOOD PRESSURE: 81 MMHG | BODY MASS INDEX: 30.53 KG/M2 | OXYGEN SATURATION: 96 % | SYSTOLIC BLOOD PRESSURE: 142 MMHG | RESPIRATION RATE: 20 BRPM | TEMPERATURE: 96.8 F | WEIGHT: 190 LBS

## 2021-09-08 DIAGNOSIS — G35 MULTIPLE SCLEROSIS (HCC): Primary | ICD-10-CM

## 2021-09-08 DIAGNOSIS — J98.8 CONGESTION OF UPPER AIRWAY: ICD-10-CM

## 2021-09-08 DIAGNOSIS — R60.0 EDEMA OF BOTH LEGS: ICD-10-CM

## 2021-09-08 DIAGNOSIS — M51.06 INTERVERTEBRAL LUMBAR DISC DISORDER WITH MYELOPATHY, LUMBAR REGION: ICD-10-CM

## 2021-09-08 DIAGNOSIS — I10 ESSENTIAL HYPERTENSION: ICD-10-CM

## 2021-09-08 DIAGNOSIS — K59.00 CONSTIPATION, UNSPECIFIED CONSTIPATION TYPE: ICD-10-CM

## 2021-09-08 PROCEDURE — 99349 HOME/RES VST EST MOD MDM 40: CPT | Performed by: PHYSICIAN ASSISTANT

## 2021-09-08 RX ORDER — GUAIFENESIN 100 MG/5ML
200 SYRUP ORAL 3 TIMES DAILY PRN
Qty: 240 ML | Refills: 5 | Status: SHIPPED
Start: 2021-09-08 | End: 2021-11-19 | Stop reason: SDUPTHER

## 2021-09-08 NOTE — PROGRESS NOTES
9/8/2021     Home visit medically necessary in lieu of an office visit due to: wheelchair bound, difficult to get out. HPI:  Still having L sciatic nerve pain which is controlled with Tylenol. She is avoiding ibuprofen. Recent lumbar xray shows multilevel degenerative disc disease. Has an order for CT Lumbar spine. PT was dc'd and may resume after she gets the CT. Lately, she has felt stronger and is able to stand longer. She is still unable to walk. She also has trouble with transfers. She denies sore throat. She is having no UTI symptoms currently. She has not had any cough. She has swelling of the feet but does not like to take Lasix because it makes her have to go to the bathroom. Last month was having nausea and decreased intake and thought about going to ED but she started taking Zofran which helped. She is moving her bowels daily better with Amitiza with Senna and Colace. REVIEW OF SYSTEMS:   General:  Weight stable, generally healthy, no change in strength or exercise tolerance. Heart: No palpitations, no syncope, no orthopnea. EDEMA OF LEGS AT TIMES. Abdomen: Chronic constipation. No change in appetite, no dysphagia, no abdominal pains, no bowel habit changes, no emesis, no melena. : No urinary urgency, no dysuria, no change in nature of urine. JUNIOR CATH. Neurologic:   Unable to walk without holding on to something. In w/c most of the time. No tremor, no seizures, no changes in mentation. All other systems negative. PAST MEDICAL HISTORY: (Major events, hospitalizations, surgeries): Pneumonia (2010), 1998 Vag hyst, 1978 naina, append, freq epidural inclusion cysts tx'd with docycycline. MS dx'd Mar 8, 1991. IV corticosteroids 1991-92. Has never been on MS meds. 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 Known allergies: NKDA.    Ongoing medical problems: Multiple sclerosis, Asthma, HTN, hypoglycemia, scoliosis, DDD with sciatic, arthritis, osteoporosis, chronic Intervertebral lumbar disc disorder with myelopathy, lumbar region; Cervical spinal stenosis; Pulmonary venous congestion; Fatigue; Hx of appendectomy; Hx of cholecystectomy; Hx of hysterectomy; Chronic rhinitis; Congestion of upper airway; Recurrent UTI; and Geographic tongue on their problem list.     Ronda Gruber was seen today for multiple sclerosis. Diagnoses and all orders for this visit:    Multiple sclerosis ([facility] [address]    Essential hypertension    Intervertebral lumbar disc disorder with myelopathy, lumbar region    Congestion of upper airway  -     guaiFENesin (ROBITUSSIN) 100 MG/5ML syrup; Take 10 mLs by mouth 3 times daily as needed for Congestion    Edema of both legs    Constipation, unspecified constipation type         PLAN:  She will make appointment to get CT Lumbar spine. Take Tylenol for pain. Recommend against ibuprofen. Encouraged to drink more fluids. Use Ceftin x 3 days at first sign of UTI. Continue Claritin and Flonase for allergies. Encouraged to continue exercises at home. Encouraged elevate legs more. Instructed to take Lasix when she has swelling. Recheck 1 month. 40 minutes spent on visit, 25 minutes involved education/counseling regarding UTIs, MS, BLE edema, HTN disease processes, treatment options, meds and coordination of care.     Current Outpatient Medications   Medication Sig Dispense Refill    guaiFENesin (ROBITUSSIN) 100 MG/5ML syrup Take 10 mLs by mouth 3 times daily as needed for Congestion 240 mL 5    ondansetron (ZOFRAN-ODT) 4 MG disintegrating tablet Take 1 tablet by mouth 3 times daily as needed for Nausea or Vomiting 21 tablet 2    cefUROXime (CEFTIN) 250 MG tablet Take 1 tablet by mouth 2 times daily for 3 days at first sign of UTI 6 tablet 5    loratadine (CLARITIN) 10 MG tablet Take 1 tablet by mouth daily 30 tablet 11    cyclobenzaprine (FLEXERIL) 5 MG tablet Take 5 mg by mouth 3 times daily as needed for Muscle spasms      albuterol sulfate  (90 Base) MCG/ACT inhaler INHALE 2 PUFFS EVERY 4 TO 6 HOURS AS NEEDED FOR WHEEZING. 6.7 g 11    Propylhexedrine (BENZEDREX) INHA Use every 3-4 hours as needed for nasal congestion.  1 each 5    Disposable Gloves (VINYL GLOVES) MISC 1 box by Does not apply route as needed (patient care) 2 each 11    lubiprostone (AMITIZA) 8 MCG CAPS capsule Take 1 capsule by mouth 2 times daily (with meals) 60 capsule 5    ipratropium (ATROVENT) 0.06 % nasal spray 2 sprays by Each Nostril route 4 times daily 1 Bottle 3    furosemide (LASIX) 40 MG tablet Take 1 tablet by mouth daily 90 tablet 3    Incontinence Supply Disposable (UNDERPADS REGULAR) MISC 60 each by Does not apply route as needed (Incontinence) 60 Bottle 11    cetirizine (ZYRTEC) 10 MG tablet Take 1 tablet by mouth daily 90 tablet 3    potassium chloride (KLOR-CON) 10 MEQ extended release tablet Take 2 tablets by mouth daily as needed (with Lasix) 60 tablet 11    Incontinence Supply Disposable (PROTECTIVE UNDERWEAR LARGE) MISC Use as directed 56 Bottle 11    magnesium citrate solution Take 296 mLs by mouth once as needed for Constipation      Incontinence Supply Disposable (POISE MAXIMUM ABSORBENCY) PADS USE AS DIRECTED 54 each 11    metoprolol tartrate (LOPRESSOR) 50 MG tablet Take 1 tablet by mouth 2 times daily 60 tablet 11    STOOL SOFTENER 100 MG capsule Take 1 capsule by mouth 2 times daily as needed for Constipation 60 capsule 11    fluticasone (FLONASE) 50 MCG/ACT nasal spray 1 spray by Each Nostril route daily 16 g 11    albuterol sulfate (PROAIR RESPICLICK) 789 (90 Base) MCG/ACT aerosol powder inhalation Inhale into the lungs every 4 hours as needed for Wheezing or Shortness of Breath      melatonin 3 MG TABS tablet Take 1 tablet by mouth nightly as needed (insomnia) 100 tablet 3    polyethylene glycol (GLYCOLAX) 17 g packet Take 25 g by mouth daily as needed for Constipation 527 g 11    Oral Electrolytes (PEDIALYTE) SOLN Drink daily for good fluid intake. 1000 mL 5    sennosides-docusate sodium (SENOKOT-S) 8.6-50 MG tablet Take 1 tablet by mouth daily      b complex vitamins capsule Take 1 capsule by mouth daily      albuterol (PROVENTIL) (2.5 MG/3ML) 0.083% nebulizer solution Take 3 mLs by nebulization See Admin Instructions (Patient taking differently: Take 2.5 mg by nebulization every 4 hours as needed ) 120 each 3    acetaminophen (TYLENOL) 325 MG tablet Take 2 tablets by mouth every 4 hours as needed for Pain or Fever 120 tablet 3    losartan (COZAAR) 50 MG tablet Take 1 tablet by mouth daily 30 tablet 3    Ascorbic Acid (VITAMIN C) 1000 MG tablet Take 1,000 mg by mouth daily      ammonium lactate (LAC-HYDRIN) 12 % lotion Apply topically as needed for Dry Skin Apply topically as needed. No current facility-administered medications for this visit. Return in about 1 month (around 10/8/2021) for regular visit. An electronic signature was used to authenticate this note.     --Natalya Galindo PA-C on 9/8/2021 at 5:07 PM

## 2021-09-28 RX ORDER — SENNA AND DOCUSATE SODIUM 50; 8.6 MG/1; MG/1
1 TABLET, FILM COATED ORAL DAILY
Qty: 30 TABLET | Refills: 11 | Status: SHIPPED | OUTPATIENT
Start: 2021-09-28

## 2021-09-28 RX ORDER — FLUTICASONE PROPIONATE 50 MCG
1 SPRAY, SUSPENSION (ML) NASAL DAILY
Qty: 16 G | Refills: 11 | Status: SHIPPED | OUTPATIENT
Start: 2021-09-28

## 2021-09-28 RX ORDER — CEFDINIR 300 MG/1
300 CAPSULE ORAL 2 TIMES DAILY
Qty: 14 CAPSULE | Refills: 0 | Status: SHIPPED | OUTPATIENT
Start: 2021-09-28 | End: 2021-10-05

## 2021-10-04 ENCOUNTER — TELEPHONE (OUTPATIENT)
Dept: PRIMARY CARE CLINIC | Age: 66
End: 2021-10-04

## 2021-10-04 ENCOUNTER — OFFICE VISIT (OUTPATIENT)
Dept: PRIMARY CARE CLINIC | Age: 66
End: 2021-10-04
Payer: MEDICAID

## 2021-10-04 VITALS
TEMPERATURE: 98.2 F | HEART RATE: 66 BPM | HEIGHT: 66 IN | OXYGEN SATURATION: 98 % | BODY MASS INDEX: 30.67 KG/M2 | DIASTOLIC BLOOD PRESSURE: 87 MMHG | RESPIRATION RATE: 20 BRPM | SYSTOLIC BLOOD PRESSURE: 128 MMHG

## 2021-10-04 DIAGNOSIS — R53.83 FATIGUE, UNSPECIFIED TYPE: ICD-10-CM

## 2021-10-04 DIAGNOSIS — K59.00 CONSTIPATION, UNSPECIFIED CONSTIPATION TYPE: ICD-10-CM

## 2021-10-04 DIAGNOSIS — M51.06 INTERVERTEBRAL LUMBAR DISC DISORDER WITH MYELOPATHY, LUMBAR REGION: ICD-10-CM

## 2021-10-04 DIAGNOSIS — M48.02 CERVICAL SPINAL STENOSIS: ICD-10-CM

## 2021-10-04 DIAGNOSIS — I10 ESSENTIAL HYPERTENSION: ICD-10-CM

## 2021-10-04 DIAGNOSIS — G35 MULTIPLE SCLEROSIS (HCC): Primary | ICD-10-CM

## 2021-10-04 DIAGNOSIS — R60.0 EDEMA OF BOTH LEGS: ICD-10-CM

## 2021-10-04 PROCEDURE — 99349 HOME/RES VST EST MOD MDM 40: CPT | Performed by: PHYSICIAN ASSISTANT

## 2021-10-04 RX ORDER — MAGNESIUM CARB/ALUMINUM HYDROX 105-160MG
TABLET,CHEWABLE ORAL
Qty: 300 ML | Refills: 0 | Status: SHIPPED | OUTPATIENT
Start: 2021-10-04

## 2021-10-04 NOTE — TELEPHONE ENCOUNTER
----- Message from Minor Salmeron RN sent at 10/4/2021 11:59 AM EDT -----  Kristen Squires putting in PT order. Please send to [facility].   Thanks

## 2021-10-04 NOTE — PROGRESS NOTES
10/4/2021     Home visit medically necessary in lieu of an office visit due to: wheelchair bound, difficult to get out. HPI: Says L sciatic nerve pain has gone away this past month. Xrays of the lumbar spine show multilevel degenerative disc disease. Has an order for CT Lumbar spine but has not made appointment yet. She is still unable to walk. She also has trouble with transfers. No she wants to resume PT again through [facility]. She denies sore throat. She is having no UTI symptoms currently. She has not had any cough. She has more swelling of BLE but has not taken Lasix because it makes her have to go to the bathroom. No recent nausea or vomiting. She states last good bowel movement was 2-3 days ago - has been taking Amitiza with Senna and Colace. Has HH on average 4 hours, twice weekly. REVIEW OF SYSTEMS:   General:  Weight stable, generally healthy, no change in strength or exercise tolerance. Heart: No palpitations, no syncope, no orthopnea. EDEMA OF LEGS AT TIMES. Abdomen: Chronic constipation. No change in appetite, no dysphagia, no abdominal pains, no bowel habit changes, no emesis, no melena. : No urinary urgency, no dysuria, no change in nature of urine. JUNIOR CATH. Neurologic:   Unable to walk without holding on to something. In w/c most of the time. No tremor, no seizures, no changes in mentation. All other systems negative. PAST MEDICAL HISTORY: (Major events, hospitalizations, surgeries): Pneumonia (2010), 1998 Vag hyst, 1978 naina, append, freq epidural inclusion cysts tx'd with docycycline. MS dx'd Mar 8, 1991. IV corticosteroids 1991-92. Has never been on MS meds. 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 Known allergies: NKDA. Ongoing medical problems: Multiple sclerosis, Asthma, HTN, hypoglycemia, scoliosis, DDD with sciatic, arthritis, osteoporosis, chronic LBP. Preventative: COVID vac - 3/22/21, 4/19/21. Pneumovax 23 - 11/2009. Prevnar - 10/2019. Flu vac - 10/2020. Smoking cessation counselling 7/2014 Social history:  with 3 children. Smokes 1-1 1/2 ppd 40 yrs. No alcohol. . 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 Nutritional history: Takes a lot of vitamins and supplements every day. PHYSICAL EXAM:    Vitals:    10/04/21 1140   BP: 128/87   Site: Left Wrist   Position: Sitting   Pulse: 66   Resp: 20   Temp: 98.2 °F (36.8 °C)   TempSrc: Infrared   SpO2: 98%   Weight: Comment: No weight   Height: 5' 6\" (1.676 m)      GEN: middle age overweight female patient sitting in WC in NAD. HEAD:  atraumatic, normocephalic. EYES:  EOMI, PERRL, conjunct normal. ENT:  EACs and TMs normal.  ORAL: mouth without lesions, redness/swelling. Pharynx: PND. LUNGS:   clear to ausc, no rales or rhonchi. HEART:  RRR, no murmurs or gallops. ABD:  Obese, soft, non-tender to palp, no palp masses or HSM, normal BS. : Obrien cath draining light yellow urine. BACK:  No CVAT. Scoliosis toward right / kyphosis,  tender to palp over L lumbar area. EXTREM: 1+ pitting pedal edema  Unable to make a strong fist. Venous stasis changes. No ulcerations, varicosities or erythema, deformities. MUSCULOSKEL: good ROM of most joints, non-tender to palp and with movement, except lower extremeties which are weakened from [address] and non-wt bearing. WC bound from MS. NEURO:  Normal motor for her. No tremor, normal sensory,  able to stand and transfer with much effort. Unable to walk. SKIN: No drainage  No ulcerations or breakdown, ecchymosis, or other lesions. Medications reviewed. Labs:7/23/21 HH 15/45, GFR 55, lytes nl  12/28/20 GFR 50, HH 10/35 9/4/20 HH 13/39, GFR>60, lytes nl, AST 44     ASSESSMENT:  Elderly female with has Neck pain; Multiple sclerosis ([address] Bilateral foot-drop; Peripheral polyneuropathy; Essential hypertension; Asthma; Edema of both legs; Constipation;  Intervertebral lumbar disc disorder with myelopathy, lumbar region; Cervical spinal stenosis; Pulmonary venous congestion; Fatigue; Hx of appendectomy; Hx of cholecystectomy; Hx of hysterectomy; Chronic rhinitis; Congestion of upper airway; Recurrent UTI; and Geographic tongue on their problem list.     Isidro Stewart was seen today for constipation, multiple sclerosis and extremity weakness. Diagnoses and all orders for this visit:    Multiple sclerosis ([facility] [address]  -     External Referral To Physical Therapy    Essential hypertension    Constipation, unspecified constipation type  -     Magnesium Citrate 1.745 GM/30ML solution; Drink 1/2 bottle on day 1. If no results drink the other half on day 2. Cervical spinal stenosis    Intervertebral lumbar disc disorder with myelopathy, lumbar region  -     External Referral To Physical Therapy    Edema of both legs    Fatigue, unspecified type  -     External Referral To Physical Therapy       PLAN: Told patient to take Lasix daily for leg swelling and to elevate legs. She only wants to take 20 mg daily - so she will start out with that. Told to call office in a week if swelling has not improved. E-Rx Mg++Citrate for constipation. Order sent to have [facility] to come out again for therapy. Recommend she make appointment to get CT Lumbar spine. Take Tylenol for pain. Recommend against ibuprofen. Encouraged to drink more fluids. Use Ceftin x 3 days at first sign of UTI. Continue Claritin and Flonase for allergies. Encouraged to continue exercises at home. Encouraged elevate legs more. Instructed to take Lasix when she has swelling. Recheck 1 month. 40 minutes spent on visit, 25 minutes involved education/counseling regarding UTIs, MS, BLE edema, HTN disease processes, treatment options, meds and coordination of care. Current Outpatient Medications   Medication Sig Dispense Refill    Magnesium Citrate 1.745 GM/30ML solution Drink 1/2 bottle on day 1.  If no results drink the other half on day 2. 300 mL 0    fluticasone (FLONASE) 50 MCG/ACT nasal spray 1 spray by Each Nostril route daily 16 g 11    sennosides-docusate sodium (SENOKOT-S) 8.6-50 MG tablet Take 1 tablet by mouth daily 30 tablet 11    cefdinir (OMNICEF) 300 MG capsule Take 1 capsule by mouth 2 times daily for 7 days 14 capsule 0    guaiFENesin (ROBITUSSIN) 100 MG/5ML syrup Take 10 mLs by mouth 3 times daily as needed for Congestion 240 mL 5    ondansetron (ZOFRAN-ODT) 4 MG disintegrating tablet Take 1 tablet by mouth 3 times daily as needed for Nausea or Vomiting 21 tablet 2    cefUROXime (CEFTIN) 250 MG tablet Take 1 tablet by mouth 2 times daily for 3 days at first sign of UTI 6 tablet 5    loratadine (CLARITIN) 10 MG tablet Take 1 tablet by mouth daily 30 tablet 11    cyclobenzaprine (FLEXERIL) 5 MG tablet Take 5 mg by mouth 3 times daily as needed for Muscle spasms      albuterol sulfate  (90 Base) MCG/ACT inhaler INHALE 2 PUFFS EVERY 4 TO 6 HOURS AS NEEDED FOR WHEEZING. 6.7 g 11    Propylhexedrine (BENZEDREX) INHA Use every 3-4 hours as needed for nasal congestion.  1 each 5    Disposable Gloves (VINYL GLOVES) MISC 1 box by Does not apply route as needed (patient care) 2 each 11    lubiprostone (AMITIZA) 8 MCG CAPS capsule Take 1 capsule by mouth 2 times daily (with meals) 60 capsule 5    ipratropium (ATROVENT) 0.06 % nasal spray 2 sprays by Each Nostril route 4 times daily 1 Bottle 3    furosemide (LASIX) 40 MG tablet Take 1 tablet by mouth daily 90 tablet 3    Incontinence Supply Disposable (UNDERPADS REGULAR) MISC 60 each by Does not apply route as needed (Incontinence) 60 Bottle 11    cetirizine (ZYRTEC) 10 MG tablet Take 1 tablet by mouth daily 90 tablet 3    potassium chloride (KLOR-CON) 10 MEQ extended release tablet Take 2 tablets by mouth daily as needed (with Lasix) 60 tablet 11    Incontinence Supply Disposable (PROTECTIVE UNDERWEAR LARGE) MISC Use as directed 56 Bottle 11    Incontinence Supply Disposable (POISE MAXIMUM ABSORBENCY) PADS USE AS DIRECTED 54 each 11    metoprolol tartrate (LOPRESSOR) 50 MG tablet Take 1 tablet by mouth 2 times daily 60 tablet 11    STOOL SOFTENER 100 MG capsule Take 1 capsule by mouth 2 times daily as needed for Constipation 60 capsule 11    albuterol sulfate (PROAIR RESPICLICK) 805 (90 Base) MCG/ACT aerosol powder inhalation Inhale into the lungs every 4 hours as needed for Wheezing or Shortness of Breath      melatonin 3 MG TABS tablet Take 1 tablet by mouth nightly as needed (insomnia) 100 tablet 3    polyethylene glycol (GLYCOLAX) 17 g packet Take 25 g by mouth daily as needed for Constipation 527 g 11    Oral Electrolytes (PEDIALYTE) SOLN Drink daily for good fluid intake. 1000 mL 5    b complex vitamins capsule Take 1 capsule by mouth daily      albuterol (PROVENTIL) (2.5 MG/3ML) 0.083% nebulizer solution Take 3 mLs by nebulization See Admin Instructions (Patient taking differently: Take 2.5 mg by nebulization every 4 hours as needed ) 120 each 3    acetaminophen (TYLENOL) 325 MG tablet Take 2 tablets by mouth every 4 hours as needed for Pain or Fever 120 tablet 3    losartan (COZAAR) 50 MG tablet Take 1 tablet by mouth daily 30 tablet 3    Ascorbic Acid (VITAMIN C) 1000 MG tablet Take 1,000 mg by mouth daily      ammonium lactate (LAC-HYDRIN) 12 % lotion Apply topically as needed for Dry Skin Apply topically as needed. No current facility-administered medications for this visit. Return in about 1 month (around 11/4/2021) for regular visit. An electronic signature was used to authenticate this note.     --Luann Kirk PA-C on 10/4/2021 at 12:01 PM

## 2021-10-13 RX ORDER — MULTIVIT WITH MINERALS/LUTEIN
1000 TABLET ORAL DAILY
COMMUNITY
End: 2021-10-13 | Stop reason: SDUPTHER

## 2021-10-13 RX ORDER — MULTIVIT WITH MINERALS/LUTEIN
1000 TABLET ORAL DAILY
Qty: 90 CAPSULE | Refills: 3 | Status: SHIPPED | OUTPATIENT
Start: 2021-10-13

## 2021-10-13 RX ORDER — LOSARTAN POTASSIUM 50 MG/1
50 TABLET ORAL DAILY
Qty: 90 TABLET | Refills: 3 | Status: SHIPPED
Start: 2021-10-13 | End: 2022-10-03

## 2021-10-22 ENCOUNTER — TELEPHONE (OUTPATIENT)
Dept: PRIMARY CARE CLINIC | Age: 66
End: 2021-10-22

## 2021-10-22 RX ORDER — FLUCONAZOLE 150 MG/1
150 TABLET ORAL DAILY
Qty: 3 TABLET | Refills: 0 | Status: SHIPPED | OUTPATIENT
Start: 2021-10-22 | End: 2021-10-25

## 2021-10-22 NOTE — TELEPHONE ENCOUNTER
Cushing called. She states she believes she has a vaginal yeast infection as she is very itchy and has some discharge.  She is asking that you call something in to [facility]

## 2021-10-22 NOTE — TELEPHONE ENCOUNTER
Carissa Miranda called again. She states she has been itching all over for 2 weeks. States it is not her eczema and she does not have a rash. She doesn't know why she is itching.   She is asking if you can order something

## 2021-10-22 NOTE — TELEPHONE ENCOUNTER
Is she using new soap? Is her skin dried out too much? She needs to get some [employer] Anti-itch lotion and apply it liberally to the itchy areas at least BID and more is okay too. That should help until she is seen.

## 2021-10-29 RX ORDER — AMMONIUM LACTATE 12 G/100G
LOTION TOPICAL
Qty: 1 EACH | Refills: 11 | Status: SHIPPED | OUTPATIENT
Start: 2021-10-29

## 2021-11-01 ENCOUNTER — TELEPHONE (OUTPATIENT)
Dept: PRIMARY CARE CLINIC | Age: 66
End: 2021-11-01

## 2021-11-01 NOTE — TELEPHONE ENCOUNTER
Darrel Gurrola called. She states she burned her L wrist on a pan while cooking yesterday. She states it's 4\" x 2\". It blistered and the blister broke. She is asking if you can call a cream into the pharmacy for her to put on it.

## 2021-11-03 ENCOUNTER — OFFICE VISIT (OUTPATIENT)
Dept: PRIMARY CARE CLINIC | Age: 66
End: 2021-11-03
Payer: MEDICAID

## 2021-11-03 VITALS
SYSTOLIC BLOOD PRESSURE: 178 MMHG | HEART RATE: 66 BPM | BODY MASS INDEX: 28.93 KG/M2 | TEMPERATURE: 97.3 F | OXYGEN SATURATION: 98 % | HEIGHT: 66 IN | WEIGHT: 180 LBS | DIASTOLIC BLOOD PRESSURE: 87 MMHG | RESPIRATION RATE: 20 BRPM

## 2021-11-03 DIAGNOSIS — M51.06 INTERVERTEBRAL LUMBAR DISC DISORDER WITH MYELOPATHY, LUMBAR REGION: ICD-10-CM

## 2021-11-03 DIAGNOSIS — R53.83 FATIGUE, UNSPECIFIED TYPE: ICD-10-CM

## 2021-11-03 DIAGNOSIS — T22.20XA SUPERFICIAL PARTIAL THICKNESS BURN OF UPPER EXTREMITY: ICD-10-CM

## 2021-11-03 DIAGNOSIS — G35 MULTIPLE SCLEROSIS (HCC): Primary | ICD-10-CM

## 2021-11-03 DIAGNOSIS — I10 ESSENTIAL HYPERTENSION: ICD-10-CM

## 2021-11-03 DIAGNOSIS — L30.9 ECZEMA, UNSPECIFIED TYPE: ICD-10-CM

## 2021-11-03 DIAGNOSIS — R60.0 EDEMA OF BOTH LEGS: ICD-10-CM

## 2021-11-03 DIAGNOSIS — K59.00 CONSTIPATION, UNSPECIFIED CONSTIPATION TYPE: ICD-10-CM

## 2021-11-03 DIAGNOSIS — M48.02 CERVICAL SPINAL STENOSIS: ICD-10-CM

## 2021-11-03 PROBLEM — L29.9 ITCHY SKIN: Status: RESOLVED | Noted: 2021-11-03 | Resolved: 2021-11-03

## 2021-11-03 PROBLEM — L29.9 ITCHY SKIN: Status: ACTIVE | Noted: 2021-11-03

## 2021-11-03 PROCEDURE — 99350 HOME/RES VST EST HIGH MDM 60: CPT | Performed by: PHYSICIAN ASSISTANT
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
## 2021-11-08 RX ORDER — POLYETHYLENE GLYCOL 3350 17 G/17G
25 POWDER, FOR SOLUTION ORAL DAILY PRN
Qty: 527 G | Refills: 11 | Status: SHIPPED
Start: 2021-11-08 | End: 2022-04-22 | Stop reason: SDUPTHER

## 2021-11-19 DIAGNOSIS — J98.8 CONGESTION OF UPPER AIRWAY: ICD-10-CM

## 2021-11-19 RX ORDER — GUAIFENESIN 100 MG/5ML
200 SYRUP ORAL 3 TIMES DAILY PRN
Qty: 240 ML | Refills: 5 | Status: SHIPPED
Start: 2021-11-19 | End: 2022-01-03

## 2021-12-02 ENCOUNTER — TELEPHONE (OUTPATIENT)
Dept: PRIMARY CARE CLINIC | Age: 66
End: 2021-12-02

## 2021-12-02 RX ORDER — LOPERAMIDE HYDROCHLORIDE 2 MG/1
2 TABLET ORAL 4 TIMES DAILY PRN
Qty: 30 TABLET | Refills: 2 | Status: SHIPPED | OUTPATIENT
Start: 2021-12-02

## 2021-12-06 NOTE — PROGRESS NOTES
12/7/2021     Home visit medically necessary in lieu of an office visit due to: wheelchair bound, difficult to get out. HPI: Patient has had diarrhea for about a week. She has it every time she eats. She started Imodium and that is helping. She continues to have some SOB and a moist cough. She is using her inhaler. Her legs are less swollen. She continues receiving PT through [facility] and thinks it is helping. She says her back pain is currently gone. The itching all over her body is improving with Lac Hydrin. She has HH on average 4 hours, twice weekly. REVIEW OF SYSTEMS: General: Weight stable, generally healthy, no change in strength or exercise tolerance. Heart: No palpitations, no syncope, no orthopnea. EDEMA OF LEGS AT TIMES. Abdomen: Chronic constipation. No change in appetite, no dysphagia, no abdominal pains, no bowel habit changes, no emesis, no melena. : No urinary urgency, no dysuria, no change in nature of urine. JUNIOR CATH. Neurologic:   Unable to walk without holding on to something. In w/c most of the time. No tremor, no seizures, no changes in mentation. All other systems negative. PAST MEDICAL HISTORY: (Major events, hospitalizations, surgeries): Pneumonia (2010), 1998 Vag hyst, 1978 naina, append, freq epidural inclusion cysts tx'd with docycycline. MS dx'd Mar 8, 1991. IV corticosteroids 1991-92. Has never been on MS meds. 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 Known allergies: NKDA. Ongoing medical problems: Multiple sclerosis, Asthma, HTN, hypoglycemia, scoliosis, DDD with sciatic, arthritis, osteoporosis, chronic LBP. Preventative: COVID vac - 3/22/21, 4/19/21. Pneumovax 23 - 11/2009. Prevnar - 10/2019. Flu vac - 10/2020 (refused 11/2021). Smoking cessation counselling 7/2014 Social history:  with 3 children. Smokes 1-1 1/2 ppd 40 yrs. No alcohol. . 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 tongue; and Eczema on their problem list.     Katie Bradford was seen today for diarrhea. Diagnoses and all orders for this visit:    Multiple sclerosis ([facility] [address]    Essential hypertension    Recurrent UTI    Edema of both legs    Diarrhea, unspecified type      PLAN:   Continue Imodium prn diarrhea. Switch to yogurt for probiotic. Encouraged elevate legs more. Continue therapy through [facility]. Encouraged to continue exercises at home. Encouraged to drink more fluids. Use Ceftin x 3 days at first sign of UTI. Recheck 1 month. 40 minutes spent on visit, 25 minutes involved education/counseling regarding UTIs, MS, BLE edema, HTN disease processes, treatment options, meds and coordination of care. Current Outpatient Medications   Medication Sig Dispense Refill    loperamide (IMODIUM A-D) 2 MG tablet Take 1 tablet by mouth 4 times daily as needed for Diarrhea 30 tablet 2    guaiFENesin (ROBITUSSIN) 100 MG/5ML syrup Take 10 mLs by mouth 3 times daily as needed for Congestion 240 mL 5    polyethylene glycol (GLYCOLAX) 17 g packet Take 25 g by mouth daily as needed for Constipation 527 g 11    silver sulfADIAZINE (SILVADENE) 1 % cream Apply topically daily until healed. 50 g 1    ammonium lactate (LAC-HYDRIN) 12 % lotion Apply topically as needed. 1 each 11    losartan (COZAAR) 50 MG tablet Take 1 tablet by mouth daily 90 tablet 3    vitamin D (CHOLECALCIFEROL) 125 MCG (5000 UT) CAPS capsule Take 1 capsule by mouth daily 90 capsule 3    vitamin E 1000 units capsule Take 1 capsule by mouth daily 90 capsule 3    Magnesium Citrate 1.745 GM/30ML solution Drink 1/2 bottle on day 1.  If no results drink the other half on day 2. 300 mL 0    fluticasone (FLONASE) 50 MCG/ACT nasal spray 1 spray by Each Nostril route daily 16 g 11    sennosides-docusate sodium (SENOKOT-S) 8.6-50 MG tablet Take 1 tablet by mouth daily 30 tablet 11    ondansetron (ZOFRAN-ODT) 4 MG disintegrating tablet Take 1 tablet by mouth 3 times nightly as needed (insomnia) 100 tablet 3    Oral Electrolytes (PEDIALYTE) SOLN Drink daily for good fluid intake. 1000 mL 5    b complex vitamins capsule Take 1 capsule by mouth daily      albuterol (PROVENTIL) (2.5 MG/3ML) 0.083% nebulizer solution Take 3 mLs by nebulization See Admin Instructions (Patient taking differently: Take 2.5 mg by nebulization every 4 hours as needed ) 120 each 3    acetaminophen (TYLENOL) 325 MG tablet Take 2 tablets by mouth every 4 hours as needed for Pain or Fever 120 tablet 3    Ascorbic Acid (VITAMIN C) 1000 MG tablet Take 1,000 mg by mouth daily       No current facility-administered medications for this visit. Return in about 1 month (around 1/7/2022). An electronic signature was used to authenticate this note.     --Joleen Wong MD on 12/7/2021 at 1:14 PM

## 2021-12-07 ENCOUNTER — OFFICE VISIT (OUTPATIENT)
Dept: PRIMARY CARE CLINIC | Age: 66
End: 2021-12-07
Payer: MEDICAID

## 2021-12-07 VITALS
BODY MASS INDEX: 29.05 KG/M2 | RESPIRATION RATE: 20 BRPM | HEART RATE: 71 BPM | OXYGEN SATURATION: 98 % | DIASTOLIC BLOOD PRESSURE: 85 MMHG | SYSTOLIC BLOOD PRESSURE: 121 MMHG | TEMPERATURE: 96.6 F | HEIGHT: 66 IN

## 2021-12-07 DIAGNOSIS — I10 ESSENTIAL HYPERTENSION: ICD-10-CM

## 2021-12-07 DIAGNOSIS — R60.0 EDEMA OF BOTH LEGS: ICD-10-CM

## 2021-12-07 DIAGNOSIS — N39.0 RECURRENT UTI: ICD-10-CM

## 2021-12-07 DIAGNOSIS — G35 MULTIPLE SCLEROSIS (HCC): Primary | ICD-10-CM

## 2021-12-07 DIAGNOSIS — R19.7 DIARRHEA, UNSPECIFIED TYPE: ICD-10-CM

## 2021-12-07 PROCEDURE — 99349 HOME/RES VST EST MOD MDM 40: CPT | Performed by: FAMILY MEDICINE

## 2021-12-13 ENCOUNTER — TELEPHONE (OUTPATIENT)
Dept: PRIMARY CARE CLINIC | Age: 66
End: 2021-12-13

## 2021-12-13 RX ORDER — KETOCONAZOLE 20 MG/ML
SHAMPOO TOPICAL
Qty: 120 ML | Refills: 3 | Status: SHIPPED
Start: 2021-12-13 | End: 2022-06-03 | Stop reason: SDUPTHER

## 2021-12-13 RX ORDER — CETIRIZINE HYDROCHLORIDE 10 MG/1
10 TABLET ORAL DAILY
Qty: 90 TABLET | Refills: 3 | Status: SHIPPED | OUTPATIENT
Start: 2021-12-13

## 2021-12-13 NOTE — TELEPHONE ENCOUNTER
Coby Siegel called. She states her eczema is so bad that she is itching all over. Drawing blood even on her scalp. She states she has never had it this bad. She is taking her meds as ordered. She is taking Benadryl at night, but it knocks her out and she can't take it during the day. She is asking if there is anything you can order.

## 2021-12-13 NOTE — TELEPHONE ENCOUNTER
I sent eRx for ketoconazole shampoo to [facility] for her scalp. She needs to let it soak in for 10 minutes before rinsing.

## 2021-12-13 NOTE — TELEPHONE ENCOUNTER
All the meds for itching make you drowsy. Sometimes Zyrtec helps with itching and less drowsy and it is already on her list. Is she apply the moisturizing lotion and creams?

## 2021-12-16 ENCOUNTER — TELEPHONE (OUTPATIENT)
Dept: PRIMARY CARE CLINIC | Age: 66
End: 2021-12-16

## 2021-12-16 DIAGNOSIS — R30.0 DYSURIA: Primary | ICD-10-CM

## 2021-12-16 NOTE — TELEPHONE ENCOUNTER
Cherylene Blunt called. She states she thinks she has a UTI. She has a fever, frequency and her urine is cloudy.   She is asking if you can put in an order for UA C&S

## 2021-12-17 ENCOUNTER — TELEPHONE (OUTPATIENT)
Dept: PRIMARY CARE CLINIC | Age: 66
End: 2021-12-17

## 2021-12-17 DIAGNOSIS — R30.0 DYSURIA: ICD-10-CM

## 2021-12-17 LAB
BACTERIA: ABNORMAL /HPF
BILIRUBIN URINE: NEGATIVE
BLOOD, URINE: ABNORMAL
CLARITY: ABNORMAL
COLOR: YELLOW
EPITHELIAL CELLS, UA: ABNORMAL /HPF
GLUCOSE URINE: NEGATIVE MG/DL
KETONES, URINE: NEGATIVE MG/DL
LEUKOCYTE ESTERASE, URINE: ABNORMAL
NITRITE, URINE: POSITIVE
PH UA: 5.5 (ref 5–9)
PROTEIN UA: ABNORMAL MG/DL
RBC UA: ABNORMAL /HPF (ref 0–2)
SPECIFIC GRAVITY UA: 1.02 (ref 1–1.03)
UROBILINOGEN, URINE: 0.2 E.U./DL
WBC UA: >20 /HPF (ref 0–5)

## 2021-12-17 RX ORDER — CEFUROXIME AXETIL 250 MG/1
250 TABLET ORAL 2 TIMES DAILY
Qty: 14 TABLET | Refills: 0 | Status: SHIPPED | OUTPATIENT
Start: 2021-12-17 | End: 2021-12-24

## 2021-12-17 NOTE — TELEPHONE ENCOUNTER
Kalee Paige called. Her friend took a urine sample to the lab. She is asking if you can send an ATB to her pharmacy since it's the weekend.

## 2021-12-20 ENCOUNTER — TELEPHONE (OUTPATIENT)
Dept: PRIMARY CARE CLINIC | Age: 66
End: 2021-12-20

## 2021-12-20 LAB
ORGANISM: ABNORMAL
URINE CULTURE, ROUTINE: ABNORMAL

## 2021-12-20 NOTE — TELEPHONE ENCOUNTER
Message from Dr Emi Boston:  Mario Ho MD  [facility]. Staff  Please let Tessa Monroy know that she is on the correct antibiotic (Ceftin) and she needs to finish it to clear up UTI.

## 2022-01-10 ENCOUNTER — TELEPHONE (OUTPATIENT)
Dept: PRIMARY CARE CLINIC | Age: 67
End: 2022-01-10

## 2022-01-10 DIAGNOSIS — J01.90 ACUTE SINUSITIS, RECURRENCE NOT SPECIFIED, UNSPECIFIED LOCATION: Primary | ICD-10-CM

## 2022-01-10 RX ORDER — AZITHROMYCIN 250 MG/1
250 TABLET, FILM COATED ORAL SEE ADMIN INSTRUCTIONS
Qty: 6 TABLET | Refills: 0 | Status: SHIPPED | OUTPATIENT
Start: 2022-01-10 | End: 2022-01-15

## 2022-01-10 NOTE — TELEPHONE ENCOUNTER
Deena Krabbe left a message. States she thinks she has a sinus infection and would like an ATB. She has congestion, headache, and a lot of nasal drainage that is becoming yellow. States she also has diarrhea.

## 2022-01-12 ENCOUNTER — TELEPHONE (OUTPATIENT)
Dept: PRIMARY CARE CLINIC | Age: 67
End: 2022-01-12

## 2022-01-12 NOTE — TELEPHONE ENCOUNTER
Rhonda Jarquin called. She states she has been taking the Z-lina for a few days, but is still coughing and congested with L  ear pain. She says she is taking cough syrup and Tylenol. At one point she told me she was still running a fever, but then said that her temp was 96 and running low. She is not taking anything for the congestion. I recommended she try Allegra D. States she is wondering if it is the flu. States she is sure it isn't COVID. I told her that the only way to know for sure what is is or isn't is to get tested for the flu and COVID as the symptoms are the same.   She verbalized understanding, but asked if there is anything else she should be taking

## 2022-01-12 NOTE — TELEPHONE ENCOUNTER
I agree that she needs testing if she wants to know what she has. In addition to Celestea D, she can take Mucinex DM Max Strength BID instead of the cough syrup.

## 2022-01-19 ENCOUNTER — OFFICE VISIT (OUTPATIENT)
Dept: PRIMARY CARE CLINIC | Age: 67
End: 2022-01-19
Payer: MEDICAID

## 2022-01-19 VITALS
HEART RATE: 66 BPM | HEIGHT: 66 IN | OXYGEN SATURATION: 98 % | WEIGHT: 180 LBS | TEMPERATURE: 97.4 F | DIASTOLIC BLOOD PRESSURE: 88 MMHG | BODY MASS INDEX: 28.93 KG/M2 | SYSTOLIC BLOOD PRESSURE: 152 MMHG | RESPIRATION RATE: 20 BRPM

## 2022-01-19 DIAGNOSIS — N39.0 RECURRENT UTI: ICD-10-CM

## 2022-01-19 DIAGNOSIS — R60.0 EDEMA OF BOTH LEGS: ICD-10-CM

## 2022-01-19 DIAGNOSIS — G35 MULTIPLE SCLEROSIS (HCC): Primary | ICD-10-CM

## 2022-01-19 DIAGNOSIS — H60.501 ACUTE OTITIS EXTERNA OF RIGHT EAR, UNSPECIFIED TYPE: ICD-10-CM

## 2022-01-19 DIAGNOSIS — I10 ESSENTIAL HYPERTENSION: ICD-10-CM

## 2022-01-19 PROCEDURE — 99349 HOME/RES VST EST MOD MDM 40: CPT | Performed by: PHYSICIAN ASSISTANT

## 2022-01-19 RX ORDER — NEOMYCIN SULFATE, POLYMYXIN B SULFATE, HYDROCORTISONE 3.5; 10000; 1 MG/ML; [USP'U]/ML; MG/ML
4 SOLUTION/ DROPS AURICULAR (OTIC) 3 TIMES DAILY
Qty: 10 ML | Refills: 0 | Status: SHIPPED | OUTPATIENT
Start: 2022-01-19 | End: 2022-01-29

## 2022-01-19 NOTE — PROGRESS NOTES
1/19/2022         [facility] [address] [facility], [facility] [address]  [phone]     Home visit medically necessary in lieu of an office visit due to: wheelchair bound, difficult to get out. HPI:  Merlene Villalobos complains of right ear pain with some radiation down the right jawline. Also, says her ear is itchy. She is still having nasal drainage and chest congestion. Finished course of Zithromax several days ago. Denies ^ cough, SOB, headache or fever/chills. Has not used Atrovent spray lately. This past month urine culture showed E.coli and she was placed on Ceftin. Has no urinary complains currently. Denies back pain which she attributes to history of kidney stones. Says she had to reschedule an appointment with Dr. Suresh Connor (urology) for April. Not receiving physical therapy at this time. Has been having some constipation and diarrhea at times. There has been no skin (or scalp) itching recently. She has HH on average 4 hours, twice weekly. REVIEW OF SYSTEMS: General: Weight stable, generally healthy, no change in strength or exercise tolerance. Heart: No palpitations, no syncope, no orthopnea. EDEMA OF LEGS AT TIMES. Abdomen: Chronic constipation. No change in appetite, no dysphagia, no abdominal pains, no bowel habit changes, no emesis, no melena. : No urinary urgency, no dysuria, no change in nature of urine. JUNIOR CATH. Neurologic:   Unable to walk without holding on to something. In w/c most of the time. No tremor, no seizures, no changes in mentation. All other systems negative. PAST MEDICAL HISTORY: (Major events, hospitalizations, surgeries): Pneumonia (2010), 1998 Vag hyst, 1978 naina, append, freq epidural inclusion cysts tx'd with docycycline. MS dx'd Mar 8, 1991. IV corticosteroids 1991-92. Has never been on MS meds. 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 Known allergies: NKDA.    Ongoing medical problems: Multiple sclerosis, Asthma, HTN, hypoglycemia, scoliosis, DDD with sciatic, arthritis, osteoporosis, chronic LBP. Preventative: COVID vac - 3/22/21, 4/19/21. Pneumovax 23 - 11/2009. Prevnar - 10/2019. Flu vac - 10/2020 (refused 11/2021). Smoking cessation counselling 7/2014 Social history:  with 3 children. Smokes 1-1 1/2 ppd 40 yrs. No alcohol. . 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 Nutritional history: Takes a lot of vitamins and supplements every day. PHYSICAL EXAM:      Vitals:    01/19/22 1225   BP: (!) 152/88   Pulse: 66   Resp: 20   Temp: 97.4 °F (36.3 °C)   TempSrc: Temporal   SpO2: 98%   Weight: 180 lb (81.6 kg)  Comment: estimate   Height: 5' 6\" (1.676 m)      GEN: middle age overweight female patient sitting in WC in NAD. HEAD:  atraumatic, normocephalic. EYES:  EOMI, PERRL, conjunct normal. ENT:  R tragus tender. R EAC red, tender and swollen. Visualized TM is mildly erythematous and swollen. L EACs and TM is normal.  ORAL: mouth without lesions, redness/swelling. Pharynx: PND. LUNGS: clear to ausc, no rales or rhonchi. HEART:  RRR, no murmurs or gallops. ABD:  Obese, soft, non-tender to palp, no palp masses or HSM, normal BS. BACK:  No CVAT. Scoliosis toward right / kyphosis,  tender to palp over L lumbar area. EXTREM: 1+ Pitting BLE edema. Venous stasis changes. No ulcerations, varicosities or erythema, deformities. MUSCULOSKEL: Unable to make a strong fist, good ROM of most joints, non-tender to palp and with movement, except lower extremeties which are weakened from [address] and non-wt bearing. WC bound from [address]. SKIN: No lesions, erythema, ulcers or drainage. NEURO:  Normal motor for her. No tremor, normal sensory, able to stand and transfer with much effort. Unable to walk. SKIN: No drainage  No ulcerations or breakdown, ecchymosis, or other lesions. Medications reviewed.  Labs: 12/17/21 UA CX e.coli  7/23/21 HH 15/45, GFR 55, lytes nl  12/28/20 GFR 50, HH 10/35  9/4/20 HH 13/39, GFR>60, lytes nl, AST 44     ASSESSMENT:  Elderly female with has Neck pain; Multiple sclerosis ([address] Bilateral foot-drop; Peripheral polyneuropathy; Essential hypertension; Asthma; Edema of both legs; Constipation; Intervertebral lumbar disc disorder with myelopathy, lumbar region; Cervical spinal stenosis; Pulmonary venous congestion; Fatigue; Hx of appendectomy; Hx of cholecystectomy; Hx of hysterectomy; Chronic rhinitis; Congestion of upper airway; Recurrent UTI; Geographic tongue; and Eczema on their problem list.     Gene Moody was seen today for multiple sclerosis. Diagnoses and all orders for this visit:    Multiple sclerosis ([facility] [address]    Essential hypertension    Recurrent UTI    Edema of both legs    Acute otitis externa of right ear, unspecified type  -     neomycin-polymyxin-hydrocortisone 1 % SOLN otic solution; Place 4 drops into the right ear 3 times daily for 10 days       PLAN:  Place on Cortisporin Otic ear drops for R otitis externa. The steroid in the medication may help not only with the ear pain but could help with ear itching. Use Atrovent spray for nasal drainage. Continue Robitussin for chest congestion. Take prn Lasix 40 mg daily for 3-4 days to decrease leg edema. She may break the Lasix in half and take it twice a day. Monitor BP. Continue Imodium prn diarrhea. Take yogurt for probiotic. Encouraged elevate legs more. Encouraged to continue exercises at home. Encouraged to drink more fluids. Use Ceftin x 3 days at first sign of UTI. Recheck 1 month. 40 minutes spent on visit, 25 minutes involved education/counseling regarding UTIs, MS, BLE edema, HTN disease processes, treatment options, meds and coordination of care.     Current Outpatient Medications   Medication Sig Dispense Refill    neomycin-polymyxin-hydrocortisone 1 % SOLN otic solution Place 4 drops into the right ear 3 times daily for 10 days 10 mL 0    AMITIZA 8 MCG CAPS capsule TAKE ONE CAPSULE BY MOUTH TWICE A DAY WITH MEALS 60 capsule 11    ROBAFEN MUCUS/CHEST CONGESTION 200 MG/10ML liquid TAKE 10 MLS BY MOUTH 3 TIMES DAILY AS NEEDED FOR CONGESTION 240 mL 5    silver sulfADIAZINE (SSD) 1 % cream APPLY TOPICALLY DAILY UNTIL HEALED 50 g 0    metoprolol tartrate (LOPRESSOR) 50 MG tablet TAKE ONE TABLET BY MOUTH TWO TIMES DAILY 180 tablet 3    ketoconazole (NIZORAL) 2 % shampoo Shampoo daily as needed. Leave on scalp for 10 minutes before rinsing. 120 mL 3    cetirizine (ZYRTEC) 10 MG tablet Take 1 tablet by mouth daily 90 tablet 3    loperamide (IMODIUM A-D) 2 MG tablet Take 1 tablet by mouth 4 times daily as needed for Diarrhea 30 tablet 2    polyethylene glycol (GLYCOLAX) 17 g packet Take 25 g by mouth daily as needed for Constipation 527 g 11    ammonium lactate (LAC-HYDRIN) 12 % lotion Apply topically as needed. 1 each 11    losartan (COZAAR) 50 MG tablet Take 1 tablet by mouth daily 90 tablet 3    vitamin D (CHOLECALCIFEROL) 125 MCG (5000 UT) CAPS capsule Take 1 capsule by mouth daily 90 capsule 3    vitamin E 1000 units capsule Take 1 capsule by mouth daily 90 capsule 3    Magnesium Citrate 1.745 GM/30ML solution Drink 1/2 bottle on day 1. If no results drink the other half on day 2. 300 mL 0    fluticasone (FLONASE) 50 MCG/ACT nasal spray 1 spray by Each Nostril route daily 16 g 11    sennosides-docusate sodium (SENOKOT-S) 8.6-50 MG tablet Take 1 tablet by mouth daily 30 tablet 11    ondansetron (ZOFRAN-ODT) 4 MG disintegrating tablet Take 1 tablet by mouth 3 times daily as needed for Nausea or Vomiting 21 tablet 2    loratadine (CLARITIN) 10 MG tablet Take 1 tablet by mouth daily 30 tablet 11    cyclobenzaprine (FLEXERIL) 5 MG tablet Take 5 mg by mouth 3 times daily as needed for Muscle spasms      albuterol sulfate  (90 Base) MCG/ACT inhaler INHALE 2 PUFFS EVERY 4 TO 6 HOURS AS NEEDED FOR WHEEZING. 6.7 g 11    Propylhexedrine (BENZEDREX) INHA Use every 3-4 hours as needed for nasal congestion.  1 each 5  Disposable Gloves (VINYL GLOVES) MISC 1 box by Does not apply route as needed (patient care) 2 each 11    ipratropium (ATROVENT) 0.06 % nasal spray 2 sprays by Each Nostril route 4 times daily 1 Bottle 3    furosemide (LASIX) 40 MG tablet Take 1 tablet by mouth daily 90 tablet 3    Incontinence Supply Disposable (UNDERPADS REGULAR) MISC 60 each by Does not apply route as needed (Incontinence) 60 Bottle 11    potassium chloride (KLOR-CON) 10 MEQ extended release tablet Take 2 tablets by mouth daily as needed (with Lasix) 60 tablet 11    Incontinence Supply Disposable (PROTECTIVE UNDERWEAR LARGE) MISC Use as directed 56 Bottle 11    Incontinence Supply Disposable (POISE MAXIMUM ABSORBENCY) PADS USE AS DIRECTED 54 each 11    STOOL SOFTENER 100 MG capsule Take 1 capsule by mouth 2 times daily as needed for Constipation 60 capsule 11    albuterol sulfate (PROAIR RESPICLICK) 067 (90 Base) MCG/ACT aerosol powder inhalation Inhale into the lungs every 4 hours as needed for Wheezing or Shortness of Breath      melatonin 3 MG TABS tablet Take 1 tablet by mouth nightly as needed (insomnia) 100 tablet 3    Oral Electrolytes (PEDIALYTE) SOLN Drink daily for good fluid intake. 1000 mL 5    b complex vitamins capsule Take 1 capsule by mouth daily      albuterol (PROVENTIL) (2.5 MG/3ML) 0.083% nebulizer solution Take 3 mLs by nebulization See Admin Instructions (Patient taking differently: Take 2.5 mg by nebulization every 4 hours as needed ) 120 each 3    acetaminophen (TYLENOL) 325 MG tablet Take 2 tablets by mouth every 4 hours as needed for Pain or Fever 120 tablet 3    Ascorbic Acid (VITAMIN C) 1000 MG tablet Take 1,000 mg by mouth daily       No current facility-administered medications for this visit. Return in about 1 month (around 2/19/2022) for regular visit. An electronic signature was used to authenticate this note.     --Davi Orantes PA-C on 1/19/2022 at 6:39 PM

## 2022-02-07 ENCOUNTER — TELEPHONE (OUTPATIENT)
Dept: PRIMARY CARE CLINIC | Age: 67
End: 2022-02-07

## 2022-02-07 DIAGNOSIS — J06.9 UPPER RESPIRATORY TRACT INFECTION, UNSPECIFIED TYPE: Primary | ICD-10-CM

## 2022-02-07 RX ORDER — ALBUTEROL SULFATE 2.5 MG/3ML
2.5 SOLUTION RESPIRATORY (INHALATION) EVERY 4 HOURS PRN
Qty: 180 EACH | Refills: 11 | Status: SHIPPED | OUTPATIENT
Start: 2022-02-07

## 2022-02-07 RX ORDER — AZITHROMYCIN 250 MG/1
250 TABLET, FILM COATED ORAL SEE ADMIN INSTRUCTIONS
Qty: 6 TABLET | Refills: 0 | Status: SHIPPED | OUTPATIENT
Start: 2022-02-07 | End: 2022-02-12

## 2022-02-07 NOTE — TELEPHONE ENCOUNTER
Joelle Krabbe called. She states she has a headache, sore throat and earache since last week. States her temp is 98.7. She feels like she needs and ATB.

## 2022-02-07 NOTE — TELEPHONE ENCOUNTER
April Curry I sent Elmarie Apley to pharmacy. She also needs to start taking Vitamin D3 5000 IU daily, zinc 50 mg daily, Vitamin C 500 mg BID and Quercetin 250 - 500 mg BID - all of these with meals. They are OTC and will boost her immune system.

## 2022-02-14 ENCOUNTER — OFFICE VISIT (OUTPATIENT)
Dept: PRIMARY CARE CLINIC | Age: 67
End: 2022-02-14
Payer: MEDICAID

## 2022-02-14 VITALS
WEIGHT: 196 LBS | HEART RATE: 67 BPM | TEMPERATURE: 97.3 F | OXYGEN SATURATION: 97 % | BODY MASS INDEX: 31.5 KG/M2 | RESPIRATION RATE: 20 BRPM | DIASTOLIC BLOOD PRESSURE: 96 MMHG | SYSTOLIC BLOOD PRESSURE: 132 MMHG | HEIGHT: 66 IN

## 2022-02-14 DIAGNOSIS — J45.909 ASTHMA, UNSPECIFIED ASTHMA SEVERITY, UNSPECIFIED WHETHER COMPLICATED, UNSPECIFIED WHETHER PERSISTENT: ICD-10-CM

## 2022-02-14 DIAGNOSIS — G35 MULTIPLE SCLEROSIS (HCC): Primary | ICD-10-CM

## 2022-02-14 DIAGNOSIS — I10 ESSENTIAL HYPERTENSION: ICD-10-CM

## 2022-02-14 DIAGNOSIS — R60.0 EDEMA OF BOTH LEGS: ICD-10-CM

## 2022-02-14 DIAGNOSIS — J31.0 CHRONIC RHINITIS: ICD-10-CM

## 2022-02-14 PROCEDURE — 99349 HOME/RES VST EST MOD MDM 40: CPT | Performed by: PHYSICIAN ASSISTANT

## 2022-02-14 NOTE — PROGRESS NOTES
2/14/2022         [facility] at [address] [place] [address], [address] [place] [address]  [phone]     Home visit medically necessary in lieu of an office visit due to: wheelchair bound, difficult to get out. HPI:  Cecil Martino stated she had URI and cough symptoms this past month and was placed on Zpack which she said helped. It was recommended to her that she start taking Vitamin C, Zinc and Quercetin along with her Vitamin D to improve her immune system. She says she is still waiting for the Vitamin C and Zinc to be delivered. She had also had taken an antibiotic ear drop and denies ear pain currently. She is still having nasal drainage and chest congestion. Cecil Martino uses her Albuterol inhaler and/or her nebulizer if she thinks her asthma is acting up. Has Atrovent spay for rhinitis. Legs have been swollen and she has gained some weight - she has been taking some Lasix off and on. Cecil Martino has no urinary complains. Denies back pain which she attributes to history of kidney stones. Says she had to reschedule an appointment with Dr. Lindle Gaucher (urology) for April. Not receiving physical therapy at this time. Has been having some constipation and diarrhea at times. There has been no skin (or scalp) itching recently. Now has [facility] aide for 4 hours 5 days a week. She refuses COVID booster because she thinks the vaccine caused her to have fatigue and SOB. REVIEW OF SYSTEMS: General: Weight stable, generally healthy, no change in strength or exercise tolerance. Heart: No palpitations, no syncope, no orthopnea. EDEMA OF LEGS AT TIMES. Abdomen: Chronic constipation. No change in appetite, no dysphagia, no abdominal pains, no bowel habit changes, no emesis, no melena. : No urinary urgency, no dysuria, no change in nature of urine. JUNIOR CATH. Neurologic:   Unable to walk without holding on to something. In w/c most of the time. No tremor, no seizures, no changes in mentation.    All other systems negative. PAST MEDICAL HISTORY: (Major events, hospitalizations, surgeries): Pneumonia (2010), 1998 Vag hyst, 1978 naina, append, freq epidural inclusion cysts tx'd with docycycline. MS dx'd Mar 8, 1991. IV corticosteroids 1991-92. Has never been on MS meds. 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 Known allergies: NKDA. Ongoing medical problems: Multiple sclerosis, Asthma, HTN, hypoglycemia, scoliosis, DDD with sciatic, arthritis, osteoporosis, chronic LBP. Preventative: COVID vac - 3/22/21, 4/19/21(Refuses booster),  Pneumovax 23 - 11/2009. Prevnar - 10/2019. Flu vac - 10/2020 (refused 11/2021). Smoking cessation counselling 7/2014 Social history:  with 3 children. Smokes 1-1 1/2 ppd 40 yrs. No alcohol. . 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 Nutritional history: Takes a lot of vitamins and supplements every day. PHYSICAL EXAM:      Vitals:    02/14/22 1148   BP: (!) 132/96   Pulse: 67   Resp: 20   Temp: 97.3 °F (36.3 °C)   TempSrc: Temporal   SpO2: 97%   Weight: 196 lb (88.9 kg)   Height: 5' 6\" (1.676 m)      GEN: middle age overweight female patient sitting in WC in NAD. HEAD:  atraumatic, normocephalic. EYES:  EOMI, PERRL, conjunct normal. ENT:  EACs and TMs nl. ORAL: mouth without lesions, redness/swelling. Pharynx: PND. LUNGS: clear to ausc, no rales or rhonchi. HEART:  RRR, no murmurs or gallops. ABD:  Obese, soft, non-tender to palp, no palp masses or HSM, normal BS. BACK:  No CVAT. Scoliosis toward right / kyphosis,  tender to palp over L lumbar area. EXTREM: 1+ Pitting BLE edema. Venous stasis changes. No ulcerations, varicosities or erythema, deformities. MUSCULOSKEL: Unable to make a strong fist, good ROM of most joints, non-tender to palp and with movement, except lower extremeties which are weakened from [address] and non-wt bearing. WC bound from [address]. SKIN: No lesions, erythema, ulcers or drainage.  NEURO:  Normal motor for her. No tremor, normal sensory, able to stand and transfer with much effort. Unable to walk. SKIN: No drainage  No ulcerations or breakdown, ecchymosis, or other lesions. Medications reviewed. Labs: 12/17/21 UA CX e.coli  7/23/21 HH 15/45, GFR 55, lytes nl  12/28/20 GFR 50, HH 10/35  9/4/20 HH 13/39, GFR>60, lytes nl, AST 44     ASSESSMENT:  Elderly female with has Neck pain; Multiple sclerosis ([facility] [address] Bilateral foot-drop; Peripheral polyneuropathy; Essential hypertension; Asthma; Edema of both legs; Constipation; Intervertebral lumbar disc disorder with myelopathy, lumbar region; Cervical spinal stenosis; Pulmonary venous congestion; Fatigue; Hx of appendectomy; Hx of cholecystectomy; Hx of hysterectomy; Chronic rhinitis; Congestion of upper airway; Recurrent UTI; Geographic tongue; and Eczema on their problem list.     Carly Sullivan was seen today for multiple sclerosis. Diagnoses and all orders for this visit:    Multiple sclerosis ([facility]  -     COMPREHENSIVE METABOLIC PANEL; Future  -     Vitamin D 25 Hydroxy; Future  -     VITAMIN B12; Future    Asthma, unspecified asthma severity, unspecified whether complicated, unspecified whether persistent    Essential hypertension  -     CBC; Future  -     COMPREHENSIVE METABOLIC PANEL; Future  -     TSH; Future    Edema of both legs  -     COMPREHENSIVE METABOLIC PANEL; Future    Chronic rhinitis         PLAN:  Yearly labs along with Vit D and B12 levels ordered. She will try to get a ride to a [facility] lab facility. Use Atrovent spray for nasal drainage. Continue Robitussin for chest congestion. Take prn Lasix 40 mg daily for 3-4 days to decrease leg edema. She may break the Lasix in half and take it twice a day. Monitor BP. Continue Imodium prn diarrhea. Take yogurt for probiotic. Encouraged elevate legs more. Encouraged to continue exercises at home. Encouraged to drink more fluids. Use Ceftin x 3 days at first sign of UTI. Recheck 1 month.  40 minutes spent on visit, 25 minutes involved education/counseling regarding UTIs, MS, BLE edema, HTN disease processes, treatment options, meds and coordination of care. Current Outpatient Medications   Medication Sig Dispense Refill    albuterol (PROVENTIL) (2.5 MG/3ML) 0.083% nebulizer solution Take 3 mLs by nebulization every 4 hours as needed for Shortness of Breath 180 each 11    AMITIZA 8 MCG CAPS capsule TAKE ONE CAPSULE BY MOUTH TWICE A DAY WITH MEALS 60 capsule 11    ROBAFEN MUCUS/CHEST CONGESTION 200 MG/10ML liquid TAKE 10 MLS BY MOUTH 3 TIMES DAILY AS NEEDED FOR CONGESTION 240 mL 5    silver sulfADIAZINE (SSD) 1 % cream APPLY TOPICALLY DAILY UNTIL HEALED 50 g 0    metoprolol tartrate (LOPRESSOR) 50 MG tablet TAKE ONE TABLET BY MOUTH TWO TIMES DAILY 180 tablet 3    ketoconazole (NIZORAL) 2 % shampoo Shampoo daily as needed. Leave on scalp for 10 minutes before rinsing. 120 mL 3    cetirizine (ZYRTEC) 10 MG tablet Take 1 tablet by mouth daily 90 tablet 3    loperamide (IMODIUM A-D) 2 MG tablet Take 1 tablet by mouth 4 times daily as needed for Diarrhea 30 tablet 2    polyethylene glycol (GLYCOLAX) 17 g packet Take 25 g by mouth daily as needed for Constipation 527 g 11    ammonium lactate (LAC-HYDRIN) 12 % lotion Apply topically as needed. 1 each 11    losartan (COZAAR) 50 MG tablet Take 1 tablet by mouth daily 90 tablet 3    vitamin D (CHOLECALCIFEROL) 125 MCG (5000 UT) CAPS capsule Take 1 capsule by mouth daily 90 capsule 3    vitamin E 1000 units capsule Take 1 capsule by mouth daily 90 capsule 3    Magnesium Citrate 1.745 GM/30ML solution Drink 1/2 bottle on day 1.  If no results drink the other half on day 2. 300 mL 0    fluticasone (FLONASE) 50 MCG/ACT nasal spray 1 spray by Each Nostril route daily 16 g 11    sennosides-docusate sodium (SENOKOT-S) 8.6-50 MG tablet Take 1 tablet by mouth daily 30 tablet 11    ondansetron (ZOFRAN-ODT) 4 MG disintegrating tablet Take 1 tablet by mouth 3 times daily as facility-administered medications for this visit. Return in about 1 month (around 3/14/2022) for regular visit. An electronic signature was used to authenticate this note.     --Meche Mathews PA-C on 2/14/2022 at 5:33 PM

## 2022-02-28 DIAGNOSIS — G35 MULTIPLE SCLEROSIS (HCC): ICD-10-CM

## 2022-02-28 DIAGNOSIS — R60.0 EDEMA OF BOTH LEGS: ICD-10-CM

## 2022-02-28 DIAGNOSIS — I10 ESSENTIAL HYPERTENSION: ICD-10-CM

## 2022-02-28 LAB
ALBUMIN SERPL-MCNC: 3.9 G/DL (ref 3.5–5.2)
ALP BLD-CCNC: 93 U/L (ref 35–104)
ALT SERPL-CCNC: 25 U/L (ref 0–32)
ANION GAP SERPL CALCULATED.3IONS-SCNC: 11 MMOL/L (ref 7–16)
AST SERPL-CCNC: 27 U/L (ref 0–31)
BILIRUB SERPL-MCNC: 0.6 MG/DL (ref 0–1.2)
BUN BLDV-MCNC: 15 MG/DL (ref 6–23)
CALCIUM SERPL-MCNC: 9.3 MG/DL (ref 8.6–10.2)
CHLORIDE BLD-SCNC: 104 MMOL/L (ref 98–107)
CO2: 24 MMOL/L (ref 22–29)
CREAT SERPL-MCNC: 0.8 MG/DL (ref 0.5–1)
GFR AFRICAN AMERICAN: >60
GFR NON-AFRICAN AMERICAN: >60 ML/MIN/1.73
GLUCOSE BLD-MCNC: 107 MG/DL (ref 74–99)
HCT VFR BLD CALC: 48 % (ref 34–48)
HEMOGLOBIN: 15.6 G/DL (ref 11.5–15.5)
MCH RBC QN AUTO: 33.8 PG (ref 26–35)
MCHC RBC AUTO-ENTMCNC: 32.5 % (ref 32–34.5)
MCV RBC AUTO: 103.9 FL (ref 80–99.9)
PDW BLD-RTO: 13.5 FL (ref 11.5–15)
PLATELET # BLD: 306 E9/L (ref 130–450)
PMV BLD AUTO: 10.9 FL (ref 7–12)
POTASSIUM SERPL-SCNC: 3.8 MMOL/L (ref 3.5–5)
RBC # BLD: 4.62 E12/L (ref 3.5–5.5)
SODIUM BLD-SCNC: 139 MMOL/L (ref 132–146)
TOTAL PROTEIN: 6.5 G/DL (ref 6.4–8.3)
TSH SERPL DL<=0.05 MIU/L-ACNC: 1 UIU/ML (ref 0.27–4.2)
VITAMIN B-12: 437 PG/ML (ref 211–946)
VITAMIN D 25-HYDROXY: 34 NG/ML (ref 30–100)
WBC # BLD: 10.8 E9/L (ref 4.5–11.5)

## 2022-03-14 ENCOUNTER — OFFICE VISIT (OUTPATIENT)
Dept: PRIMARY CARE CLINIC | Age: 67
End: 2022-03-14
Payer: MEDICAID

## 2022-03-14 VITALS
OXYGEN SATURATION: 98 % | TEMPERATURE: 96.7 F | BODY MASS INDEX: 31.82 KG/M2 | WEIGHT: 198 LBS | RESPIRATION RATE: 18 BRPM | HEART RATE: 66 BPM | SYSTOLIC BLOOD PRESSURE: 125 MMHG | DIASTOLIC BLOOD PRESSURE: 88 MMHG | HEIGHT: 66 IN

## 2022-03-14 DIAGNOSIS — R60.0 EDEMA OF BOTH LEGS: ICD-10-CM

## 2022-03-14 DIAGNOSIS — I10 ESSENTIAL HYPERTENSION: ICD-10-CM

## 2022-03-14 DIAGNOSIS — J31.0 CHRONIC RHINITIS: ICD-10-CM

## 2022-03-14 DIAGNOSIS — H93.12 TINNITUS OF LEFT EAR: ICD-10-CM

## 2022-03-14 DIAGNOSIS — R68.89 THROAT CONGESTION: ICD-10-CM

## 2022-03-14 DIAGNOSIS — H92.02 EAR PAIN, LEFT: ICD-10-CM

## 2022-03-14 DIAGNOSIS — J45.909 ASTHMA, UNSPECIFIED ASTHMA SEVERITY, UNSPECIFIED WHETHER COMPLICATED, UNSPECIFIED WHETHER PERSISTENT: ICD-10-CM

## 2022-03-14 DIAGNOSIS — G35 MULTIPLE SCLEROSIS (HCC): Primary | ICD-10-CM

## 2022-03-14 PROCEDURE — 99349 HOME/RES VST EST MOD MDM 40: CPT | Performed by: PHYSICIAN ASSISTANT

## 2022-03-14 NOTE — PROGRESS NOTES
3/14/2022         [id number] [facility] at [address] [place] [address], [address] [place] [address] Drive  [phone]     Home visit medically necessary in lieu of an office visit due to: wheelchair bound, difficult to get out. HPI:  Lilliam Neal says she feels better and has more energy since she is taking Vitamin C, D, Zinc Quercitin and Elderberry. Also, takes a [employer] and a probiotic. Says congestion and ear problems seem to have improved but says she would like to see an ENT because years ago she was told she had a \"water sac\" on her vocal cords. She says she has congestion in her throat all the time despite taking Guaifenesin. 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 Says she has occasional acid reflux symptoms. Also, she complains of having some ringing and pain in the left ear. Lilliam Neal uses her Albuterol inhaler and/or her nebulizer but denies any increased SOB. She says her legs are less swollen. Continues to take 20 mg Lasix twice daily. She says her left calf did drain some fluid and she has been putting Bactroban ointment to the scabbed area. - she has been taking some Lasix off and on. Lilliam Neal has no urinary complains. Denies back pain. Not receiving physical therapy at this time. Has been having some constipation and diarrhea at times. There has been no skin (or scalp) itching recently. She complains of some blurred vision. Wears cheaters and uses a magnifying glass. Has not seen her eye doctor or been to Dr. Norris Soto (neuro) in several years. Now has [facility] aide for 4 hours 5 days a week. Lab tests reviewed. REVIEW OF SYSTEMS: General: Weight stable, generally healthy, no change in strength or exercise tolerance. Heart: No palpitations, no syncope, no orthopnea. EDEMA OF LEGS AT TIMES. Abdomen: Chronic constipation. No change in appetite, no dysphagia, no abdominal pains, no bowel habit changes, no emesis, no melena.  : No urinary urgency, no dysuria, no change in nature of urine. Neurologic:   Unable to walk without holding on to something. In w/c most of the time. No tremor, no seizures, no changes in mentation. All other systems negative. PAST MEDICAL HISTORY: (Major events, hospitalizations, surgeries): Pneumonia (2010), 1998 Vag hyst, 1978 naina, append, freq epidural inclusion cysts tx'd with docycycline. MS dx'd Mar 8, 1991. IV corticosteroids 1991-92. Has never been on MS meds. 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 Known allergies: NKDA. Ongoing medical problems: Multiple sclerosis, Asthma, HTN, hypoglycemia, scoliosis, DDD with sciatic, arthritis, osteoporosis, chronic LBP. Preventative: COVID vac - 3/22/21, 4/19/21(Refuses booster),  Pneumovax 23 - 11/2009. Prevnar - 10/2019. Flu vac - 10/2020 (refused 11/2021). Smoking cessation counselling 7/2014 Social history:  with 3 children. Smokes 1-1 1/2 ppd 40 yrs. No alcohol. . 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 Nutritional history: Takes a lot of vitamins and supplements every day. PHYSICAL EXAM:      Vitals:    03/14/22 1026   BP: 125/88   Pulse: 66   Resp: 18   Temp: 96.7 °F (35.9 °C)   TempSrc: Temporal   SpO2: 98%   Weight: 198 lb (89.8 kg)   Height: 5' 6\" (1.676 m)        GEN: middle age overweight female patient sitting in WC in NAD. HEAD:  atraumatic, normocephalic. EYES:  EOMI, PERRL, conjunct normal. ENT:  EACs and TMs nl. ORAL: mouth without lesions, redness/swelling. Pharynx: PND. LUNGS: clear to ausc, no rales or rhonchi. HEART:  RRR, no murmurs or gallops. ABD:  Obese, soft, non-tender to palp, no palp masses or HSM, normal BS. BACK:  No CVAT. Scoliosis toward right / kyphosis,  tender to palp over L lumbar area. EXTREM: 1+ Pitting BLE edema. Venous stasis changes. Healing scab to left calf. No ulcerations, varicosities or erythema, deformities.  MUSCULOSKEL: Unable to make a strong fist, good ROM of most joints, non-tender to palp and with movement, except lower extremeties which are weakened from [address] and non-wt bearing. WC bound from [address]. SKIN: No lesions, erythema, ulcers or drainage. NEURO:  Normal motor for her. No tremor, normal sensory, able to stand and transfer with much effort. Unable to walk. SKIN: No drainage  No ulcerations or breakdown, ecchymosis, or other lesions. Medications reviewed. Labs: 2/28/22 HH 15.6/48, GFR>60, Glu 107, LFT nl, TSH 0.997, Vit D 34, Vit B12 437  12/17/21 UA CX e.coli  7/23/21 HH 15/45, GFR 55, lytes nl  12/28/20 GFR 50, HH 10/35 9/4/20 HH 13/39, GFR>60, lytes nl, AST 44     ASSESSMENT:  Elderly female with has Neck pain; Multiple sclerosis ([address] Bilateral foot-drop; Peripheral polyneuropathy; Essential hypertension; Asthma; Edema of both legs; Constipation; Intervertebral lumbar disc disorder with myelopathy, lumbar region; Cervical spinal stenosis; Pulmonary venous congestion; Fatigue; Hx of appendectomy; Hx of cholecystectomy; Hx of hysterectomy; Chronic rhinitis; Congestion of upper airway; Recurrent UTI; Geographic tongue; and Eczema on their problem list.     Boston Burton was seen today for multiple sclerosis. Diagnoses and all orders for this visit:    Multiple sclerosis ([facility]  -     Amanda Loredo DO, Otolaryngology, [place]    Asthma, unspecified asthma severity, unspecified whether complicated, unspecified whether persistent    Essential hypertension    Edema of both legs    Chronic rhinitis  -     [facility] Amanda Donohue DO, Otolaryngology, [place]    Tinnitus of left ear  -     Tevin Loredo DO, Otolaryngology, [place]    Ear pain, left  -     Tevin Loredo DO, Otolaryngology, [address] [place] [address], Amanda Schaeffer DO, Otolaryngology, [place]       PLAN: Referral for Dr. Demetrio singh (ENT) for throat and ear problems. Recommend she have her eyes checked.  Recommend she have a check up with Dr. Vincent Lawson (neuro) for MS. Sees Dr. Mily Orosco (urology) next month for kidney stone follow up. Use Atrovent spray for nasal drainage. Continue Robitussin for chest congestion. Take prn Lasix 40 mg (she likes to take 1/2 pill bid) to decrease leg edema. She may break the Lasix in half and take it twice a day. Monitor BP. Continue Imodium prn diarrhea. Take yogurt for probiotic. Encouraged elevate legs more. Encouraged to continue exercises at home. Encouraged to drink more fluids. Use Ceftin x 3 days at first sign of UTI. Recheck 1 month. 40 minutes spent on visit, 25 minutes involved education/counseling regarding UTIs, MS, BLE edema, HTN disease processes, treatment options, meds and coordination of care. Current Outpatient Medications   Medication Sig Dispense Refill    albuterol (PROVENTIL) (2.5 MG/3ML) 0.083% nebulizer solution Take 3 mLs by nebulization every 4 hours as needed for Shortness of Breath 180 each 11    AMITIZA 8 MCG CAPS capsule TAKE ONE CAPSULE BY MOUTH TWICE A DAY WITH MEALS 60 capsule 11    ROBAFEN MUCUS/CHEST CONGESTION 200 MG/10ML liquid TAKE 10 MLS BY MOUTH 3 TIMES DAILY AS NEEDED FOR CONGESTION 240 mL 5    silver sulfADIAZINE (SSD) 1 % cream APPLY TOPICALLY DAILY UNTIL HEALED 50 g 0    metoprolol tartrate (LOPRESSOR) 50 MG tablet TAKE ONE TABLET BY MOUTH TWO TIMES DAILY 180 tablet 3    ketoconazole (NIZORAL) 2 % shampoo Shampoo daily as needed. Leave on scalp for 10 minutes before rinsing. 120 mL 3    cetirizine (ZYRTEC) 10 MG tablet Take 1 tablet by mouth daily 90 tablet 3    loperamide (IMODIUM A-D) 2 MG tablet Take 1 tablet by mouth 4 times daily as needed for Diarrhea 30 tablet 2    polyethylene glycol (GLYCOLAX) 17 g packet Take 25 g by mouth daily as needed for Constipation 527 g 11    ammonium lactate (LAC-HYDRIN) 12 % lotion Apply topically as needed.  1 each 11    losartan (COZAAR) 50 MG tablet Take 1 tablet by mouth daily 90 tablet 3    vitamin D (CHOLECALCIFEROL) 125 MCG (5000 UT) CAPS capsule Take 1 capsule by mouth daily 90 capsule 3    vitamin E 1000 units capsule Take 1 capsule by mouth daily 90 capsule 3    Magnesium Citrate 1.745 GM/30ML solution Drink 1/2 bottle on day 1. If no results drink the other half on day 2. 300 mL 0    fluticasone (FLONASE) 50 MCG/ACT nasal spray 1 spray by Each Nostril route daily 16 g 11    sennosides-docusate sodium (SENOKOT-S) 8.6-50 MG tablet Take 1 tablet by mouth daily 30 tablet 11    ondansetron (ZOFRAN-ODT) 4 MG disintegrating tablet Take 1 tablet by mouth 3 times daily as needed for Nausea or Vomiting 21 tablet 2    loratadine (CLARITIN) 10 MG tablet Take 1 tablet by mouth daily 30 tablet 11    cyclobenzaprine (FLEXERIL) 5 MG tablet Take 5 mg by mouth 3 times daily as needed for Muscle spasms      albuterol sulfate  (90 Base) MCG/ACT inhaler INHALE 2 PUFFS EVERY 4 TO 6 HOURS AS NEEDED FOR WHEEZING. 6.7 g 11    Propylhexedrine (BENZEDREX) INHA Use every 3-4 hours as needed for nasal congestion.  1 each 5    Disposable Gloves (VINYL GLOVES) MISC 1 box by Does not apply route as needed (patient care) 2 each 11    ipratropium (ATROVENT) 0.06 % nasal spray 2 sprays by Each Nostril route 4 times daily 1 Bottle 3    furosemide (LASIX) 40 MG tablet Take 1 tablet by mouth daily 90 tablet 3    Incontinence Supply Disposable (UNDERPADS REGULAR) MISC 60 each by Does not apply route as needed (Incontinence) 60 Bottle 11    potassium chloride (KLOR-CON) 10 MEQ extended release tablet Take 2 tablets by mouth daily as needed (with Lasix) 60 tablet 11    Incontinence Supply Disposable (PROTECTIVE UNDERWEAR LARGE) MISC Use as directed 56 Bottle 11    Incontinence Supply Disposable (POISE MAXIMUM ABSORBENCY) PADS USE AS DIRECTED 54 each 11    STOOL SOFTENER 100 MG capsule Take 1 capsule by mouth 2 times daily as needed for Constipation 60 capsule 11    albuterol sulfate (PROAIR RESPICLICK) 996 (90 Base) MCG/ACT aerosol powder inhalation Inhale into the lungs every 4 hours as needed for Wheezing or Shortness of Breath      melatonin 3 MG TABS tablet Take 1 tablet by mouth nightly as needed (insomnia) 100 tablet 3    Oral Electrolytes (PEDIALYTE) SOLN Drink daily for good fluid intake. 1000 mL 5    b complex vitamins capsule Take 1 capsule by mouth daily      acetaminophen (TYLENOL) 325 MG tablet Take 2 tablets by mouth every 4 hours as needed for Pain or Fever 120 tablet 3    Ascorbic Acid (VITAMIN C) 1000 MG tablet Take 1,000 mg by mouth daily       No current facility-administered medications for this visit. Return in about 1 month (around 4/14/2022) for regular visit. An electronic signature was used to authenticate this note.     --Adrián Jon PA-C on 3/14/2022 at 11:02 AM

## 2022-03-22 RX ORDER — ONDANSETRON 4 MG/1
TABLET, ORALLY DISINTEGRATING ORAL
Qty: 21 TABLET | Refills: 3 | Status: SHIPPED | OUTPATIENT
Start: 2022-03-22

## 2022-03-30 ENCOUNTER — TELEPHONE (OUTPATIENT)
Dept: PRIMARY CARE CLINIC | Age: 67
End: 2022-03-30

## 2022-03-30 DIAGNOSIS — R26.2 UNABLE TO WALK: ICD-10-CM

## 2022-03-30 DIAGNOSIS — G35 MULTIPLE SCLEROSIS (HCC): Primary | ICD-10-CM

## 2022-03-30 DIAGNOSIS — M51.06 INTERVERTEBRAL LUMBAR DISC DISORDER WITH MYELOPATHY, LUMBAR REGION: ICD-10-CM

## 2022-03-30 NOTE — TELEPHONE ENCOUNTER
Fax from [facility] . She needs a new manual wheelchair as hers has been repaired multiple times.   She is requesting that an order for a new one be faxed to her, and she will take care of the rest. I pended the order to you

## 2022-04-22 ENCOUNTER — OFFICE VISIT (OUTPATIENT)
Dept: PRIMARY CARE CLINIC | Age: 67
End: 2022-04-22
Payer: MEDICAID

## 2022-04-22 VITALS
TEMPERATURE: 97.8 F | RESPIRATION RATE: 18 BRPM | WEIGHT: 198 LBS | OXYGEN SATURATION: 96 % | HEIGHT: 66 IN | HEART RATE: 67 BPM | DIASTOLIC BLOOD PRESSURE: 79 MMHG | BODY MASS INDEX: 31.82 KG/M2 | SYSTOLIC BLOOD PRESSURE: 146 MMHG

## 2022-04-22 DIAGNOSIS — R60.0 EDEMA OF BOTH LEGS: ICD-10-CM

## 2022-04-22 DIAGNOSIS — I10 ESSENTIAL HYPERTENSION: ICD-10-CM

## 2022-04-22 DIAGNOSIS — J31.0 CHRONIC RHINITIS: ICD-10-CM

## 2022-04-22 DIAGNOSIS — G35 MULTIPLE SCLEROSIS (HCC): Primary | ICD-10-CM

## 2022-04-22 DIAGNOSIS — K59.00 CONSTIPATION, UNSPECIFIED CONSTIPATION TYPE: ICD-10-CM

## 2022-04-22 DIAGNOSIS — J45.909 ASTHMA, UNSPECIFIED ASTHMA SEVERITY, UNSPECIFIED WHETHER COMPLICATED, UNSPECIFIED WHETHER PERSISTENT: ICD-10-CM

## 2022-04-22 PROCEDURE — 99349 HOME/RES VST EST MOD MDM 40: CPT | Performed by: PHYSICIAN ASSISTANT

## 2022-04-22 RX ORDER — IPRATROPIUM BROMIDE 42 UG/1
2 SPRAY, METERED NASAL 4 TIMES DAILY
Qty: 15 ML | Refills: 5 | Status: SHIPPED
Start: 2022-04-22 | End: 2022-06-23 | Stop reason: SDUPTHER

## 2022-04-22 RX ORDER — POLYETHYLENE GLYCOL 3350 17 G/17G
25 POWDER, FOR SOLUTION ORAL DAILY PRN
Qty: 527 G | Refills: 11 | Status: SHIPPED | OUTPATIENT
Start: 2022-04-22

## 2022-04-22 NOTE — PROGRESS NOTES
vac - 10/2020 (refused 11/2021). Smoking cessation counselling 7/2014 Social history:  with 3 children. Smokes 1-1 1/2 ppd 40 yrs. No alcohol. . 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 Nutritional history: Takes a lot of vitamins and supplements every day. PHYSICAL EXAM:      Vitals:    04/22/22 0740   BP: (!) 146/79   Pulse: 67   Resp: 18   Temp: 97.8 °F (36.6 °C)   TempSrc: Temporal   SpO2: 96%   Weight: 198 lb (89.8 kg)  Comment: constipated   Height: 5' 6\" (1.676 m)      GEN: middle age overweight female patient sitting in WC in NAD. HEAD:  atraumatic, normocephalic. EYES:  EOMI, PERRL, conjunct normal. ENT:  EACs and TMs nl. ORAL: mouth without lesions, redness/swelling. Pharynx: PND. LUNGS: clear to ausc, no rales or rhonchi. HEART:  RRR, no murmurs or gallops. ABD:  Obese, soft, non-tender to palp, no palp masses or HSM, normal BS. BACK:  No CVAT. Scoliosis toward right / kyphosis,  tender to palp over L lumbar area. EXTREM: 1+ Pitting BLE edema. Venous stasis changes. Healing scab to left calf. No ulcerations, varicosities or erythema, deformities. MUSCULOSKEL: Unable to make a strong fist, good ROM of most joints, non-tender to palp and with movement, except lower extremeties which are weakened from [address] and non-wt bearing. WC bound from [address]. SKIN: No lesions, erythema, ulcers or drainage. NEURO:  Normal motor for her. No tremor, normal sensory, able to stand and transfer with much effort. Unable to walk. SKIN: No drainage  No ulcerations or breakdown, ecchymosis, or other lesions. Medications reviewed. Labs: 2/28/22 HH 15.6/48, GFR>60, Glu 107, LFT nl, TSH 0.997, Vit D 34, Vit B12 437  12/17/21 UA CX e.coli  7/23/21 HH 15/45, GFR 55, lytes nl  12/28/20 GFR 50, HH 10/35 9/4/20 HH 13/39, GFR>60, lytes nl, AST 44     ASSESSMENT:  Elderly female with has Neck pain; Multiple sclerosis ([address]  Bilateral foot-drop; Peripheral polyneuropathy; Essential hypertension; Asthma; Edema of both legs; Constipation; Intervertebral lumbar disc disorder with myelopathy, lumbar region; Cervical spinal stenosis; Pulmonary venous congestion; Fatigue; Hx of appendectomy; Hx of cholecystectomy; Hx of hysterectomy; Chronic rhinitis; Congestion of upper airway; Recurrent UTI; Geographic tongue; and Eczema on their problem list.     Layne Carolina was seen today for multiple sclerosis. Diagnoses and all orders for this visit:    Multiple sclerosis ([facility]  -     Roni Benz DO, Gastroenterology, [facility] ([facility])    Asthma, unspecified asthma severity, unspecified whether complicated, unspecified whether persistent    Essential hypertension    Edema of both legs    Chronic rhinitis  -     ipratropium (ATROVENT) 0.06 % nasal spray; 2 sprays by Each Nostril route 4 times daily    Constipation, unspecified constipation type  -     polyethylene glycol (GLYCOLAX) 17 g packet; Take 25 g by mouth daily as needed for Constipation  -     Roni Benz DO, Gastroenterology, [facility] ([facility])         PLAN:  Referral sent for GI. She will call neuro, opth, and PT. She has appointments with ENT an urology in May. Continue Atrovent spray for nasal drainage. Continue Robitussin for chest congestion. Take prn Lasix 40 mg (she likes to take 1/2 pill bid) to decrease leg edema. She may break the Lasix in half and take it twice a day. Monitor BP. Continue Imodium prn diarrhea. Take yogurt for probiotic. Encouraged elevate legs more. Encouraged to continue exercises at home. Encouraged to drink more fluids. Use Ceftin x 3 days at first sign of UTI. Recheck 1 month. 40 minutes spent on visit, 25 minutes involved education/counseling regarding UTIs, MS, BLE edema, HTN disease processes, treatment options, meds and coordination of care.     Current Outpatient Medications   Medication Sig Dispense Refill    ipratropium (ATROVENT) 0.06 % nasal spray 2 sprays by Each Nostril route 4 times daily 15 mL 5    polyethylene glycol (GLYCOLAX) 17 g packet Take 25 g by mouth daily as needed for Constipation 527 g 11    ondansetron (ZOFRAN-ODT) 4 MG disintegrating tablet DISSOLVE ONE TABLET IN MOUTH THREE TIMES A DAY AS NEEDED FOR NAUSEA OR VOMITING 21 tablet 3    albuterol (PROVENTIL) (2.5 MG/3ML) 0.083% nebulizer solution Take 3 mLs by nebulization every 4 hours as needed for Shortness of Breath 180 each 11    AMITIZA 8 MCG CAPS capsule TAKE ONE CAPSULE BY MOUTH TWICE A DAY WITH MEALS 60 capsule 11    ROBAFEN MUCUS/CHEST CONGESTION 200 MG/10ML liquid TAKE 10 MLS BY MOUTH 3 TIMES DAILY AS NEEDED FOR CONGESTION 240 mL 5    silver sulfADIAZINE (SSD) 1 % cream APPLY TOPICALLY DAILY UNTIL HEALED 50 g 0    metoprolol tartrate (LOPRESSOR) 50 MG tablet TAKE ONE TABLET BY MOUTH TWO TIMES DAILY 180 tablet 3    ketoconazole (NIZORAL) 2 % shampoo Shampoo daily as needed. Leave on scalp for 10 minutes before rinsing. 120 mL 3    cetirizine (ZYRTEC) 10 MG tablet Take 1 tablet by mouth daily 90 tablet 3    loperamide (IMODIUM A-D) 2 MG tablet Take 1 tablet by mouth 4 times daily as needed for Diarrhea 30 tablet 2    ammonium lactate (LAC-HYDRIN) 12 % lotion Apply topically as needed. 1 each 11    losartan (COZAAR) 50 MG tablet Take 1 tablet by mouth daily 90 tablet 3    vitamin D (CHOLECALCIFEROL) 125 MCG (5000 UT) CAPS capsule Take 1 capsule by mouth daily 90 capsule 3    vitamin E 1000 units capsule Take 1 capsule by mouth daily 90 capsule 3    Magnesium Citrate 1.745 GM/30ML solution Drink 1/2 bottle on day 1.  If no results drink the other half on day 2. 300 mL 0    fluticasone (FLONASE) 50 MCG/ACT nasal spray 1 spray by Each Nostril route daily 16 g 11    sennosides-docusate sodium (SENOKOT-S) 8.6-50 MG tablet Take 1 tablet by mouth daily 30 tablet 11    loratadine (CLARITIN) 10 MG tablet Take 1 tablet by mouth daily 30 tablet 11    cyclobenzaprine (FLEXERIL) 5 MG tablet Take 5 mg by mouth 3 times daily as needed for Muscle spasms      albuterol sulfate  (90 Base) MCG/ACT inhaler INHALE 2 PUFFS EVERY 4 TO 6 HOURS AS NEEDED FOR WHEEZING. 6.7 g 11    Propylhexedrine (BENZEDREX) INHA Use every 3-4 hours as needed for nasal congestion. 1 each 5    Disposable Gloves (VINYL GLOVES) MISC 1 box by Does not apply route as needed (patient care) 2 each 11    furosemide (LASIX) 40 MG tablet Take 1 tablet by mouth daily 90 tablet 3    Incontinence Supply Disposable (UNDERPADS REGULAR) MISC 60 each by Does not apply route as needed (Incontinence) 60 Bottle 11    potassium chloride (KLOR-CON) 10 MEQ extended release tablet Take 2 tablets by mouth daily as needed (with Lasix) 60 tablet 11    Incontinence Supply Disposable (PROTECTIVE UNDERWEAR LARGE) MISC Use as directed 56 Bottle 11    Incontinence Supply Disposable (POISE MAXIMUM ABSORBENCY) PADS USE AS DIRECTED 54 each 11    STOOL SOFTENER 100 MG capsule Take 1 capsule by mouth 2 times daily as needed for Constipation 60 capsule 11    albuterol sulfate (PROAIR RESPICLICK) 686 (90 Base) MCG/ACT aerosol powder inhalation Inhale into the lungs every 4 hours as needed for Wheezing or Shortness of Breath      melatonin 3 MG TABS tablet Take 1 tablet by mouth nightly as needed (insomnia) 100 tablet 3    Oral Electrolytes (PEDIALYTE) SOLN Drink daily for good fluid intake. 1000 mL 5    b complex vitamins capsule Take 1 capsule by mouth daily      acetaminophen (TYLENOL) 325 MG tablet Take 2 tablets by mouth every 4 hours as needed for Pain or Fever 120 tablet 3    Ascorbic Acid (VITAMIN C) 1000 MG tablet Take 1,000 mg by mouth daily       No current facility-administered medications for this visit. Return in about 1 month (around 5/22/2022) for regular visit. An electronic signature was used to authenticate this note.     --Sergei Patton PA-C on 4/22/2022 at 12:28 PM

## 2022-04-26 ENCOUNTER — TELEPHONE (OUTPATIENT)
Dept: PRIMARY CARE CLINIC | Age: 67
End: 2022-04-26

## 2022-05-04 ENCOUNTER — TELEPHONE (OUTPATIENT)
Dept: PRIMARY CARE CLINIC | Age: 67
End: 2022-05-04

## 2022-05-04 NOTE — TELEPHONE ENCOUNTER
I would recommend she start on Cold Eeze 1 lozenge every 2-3 hours today and tomorrow. Also Afrin BID or Allegra-D 12 hour BID. And Mucinex DM 1 tab BID. If she is not improving by Friday, she should let us know.

## 2022-05-04 NOTE — TELEPHONE ENCOUNTER
Scout Jefferson called. She thinks she has a sinus infection. Throat is sore and red with white patches. T 99.0. Started 3 days ago. Both ears hurt. States she is continually clearing her throat. She is not coughing anything up. C/O headache.

## 2022-05-04 NOTE — TELEPHONE ENCOUNTER
Patient c/o irritation on tongue and feels she has thrush. eRx sent to [facility] for Nystatin oral susp 5 ml QID x 10 days.

## 2022-05-16 RX ORDER — DOCUSATE SODIUM 100 MG/1
CAPSULE, LIQUID FILLED ORAL
Qty: 60 CAPSULE | Refills: 11 | Status: SHIPPED | OUTPATIENT
Start: 2022-05-16

## 2022-05-23 ENCOUNTER — TELEPHONE (OUTPATIENT)
Dept: AUDIOLOGY | Age: 67
End: 2022-05-23

## 2022-05-25 NOTE — PROGRESS NOTES
5/26/2022     Home visit medically necessary in lieu of an office visit due to: wheelchair bound, difficult to get out. HPI:  Patient says she is doing okay. She has been taking Lasix and her legs are less swollen. She still has had some shortness of breath and a cough at times. She has an appointment set up with ENT in June. She has appt with Dr. Ruddy Phipps, urology on 6/16/22. She has plans to see her neurologist (Dr. Terri Gill) for MS. She wants to see the eye doctor, dentist and will call to have PT [facility]) come back out. She continues to go from constipation to diarrhea and says she wants to see a GI specialist. She continues to have an [facility] home health aide for 4 hours 5 days a week. REVIEW OF SYSTEMS: General: Weight stable, generally healthy, no change in strength or exercise tolerance. Heart: No palpitations, no syncope, no orthopnea. EDEMA OF LEGS AT TIMES. Abdomen: Chronic constipation. No change in appetite, no dysphagia, no abdominal pains, no bowel habit changes, no emesis, no melena. : No urinary urgency, no dysuria, no change in nature of urine. Neurologic:   Unable to walk without holding on to something. In w/c most of the time. No tremor, no seizures, no changes in mentation. All other systems negative. PAST MEDICAL HISTORY: (Major events, hospitalizations, surgeries): Pneumonia (2010), 1998 Vag hyst, 1978 naian, append, freq epidural inclusion cysts tx'd with docycycline. MS dx'd Mar 8, 1991. IV corticosteroids 1991-92. Has never been on MS meds. 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 Known allergies: NKDA. Ongoing medical problems: Multiple sclerosis, Asthma, HTN, hypoglycemia, scoliosis, DDD with sciatic, arthritis, osteoporosis, chronic LBP. Preventative: COVID vac - 3/22/21, 4/19/21(Refuses booster),  Pneumovax 23 - 11/2009. Prevnar - 10/2019. Flu vac - 10/2020 (refused 11/2021).  Smoking cessation counselling 7/2014 Social history:  with 3 children. Smokes 1-1 1/2 ppd 40 yrs. No alcohol. . 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 Nutritional history: Takes a lot of vitamins and supplements every day. PHYSICAL EXAM:      Vitals:    05/26/22 1310   BP: 139/84   Site: Right Wrist   Position: Sitting   Pulse: 76   Resp: 20   Temp: 97.4 °F (36.3 °C)   TempSrc: Infrared   SpO2: 96%   Weight: Comment: No weight today   Height: 5' 6\" (1.676 m)      GEN: middle age overweight female patient sitting in WC in NAD. HEAD:  atraumatic, normocephalic. EYES:  EOMI, PERRL, conjunct normal. ENT:  EACs and TMs nl. ORAL: mouth without lesions, redness/swelling. Pharynx: PND. LUNGS: clear to ausc, no rales or rhonchi. HEART:  RRR, no murmurs or gallops. ABD:  Obese, soft, non-tender to palp, no palp masses or HSM, normal BS. BACK:  No CVAT. Scoliosis toward right / kyphosis,  tender to palp over L lumbar area. EXTREM: Trace Pitting BLE edema. Venous stasis changes. Healing scab to left calf. No ulcerations, varicosities or erythema, deformities. MUSCULOSKEL: Unable to make a strong fist, good ROM of most joints, non-tender to palp and with movement, except lower extremeties which are weakened from [address] and non-wt bearing. WC bound from [address]. SKIN: No lesions, erythema, ulcers or drainage. NEURO:  Normal motor for her. No tremor, normal sensory, able to stand and transfer with much effort. Unable to walk. SKIN: No drainage  No ulcerations or breakdown, ecchymosis, or other lesions. Medications reviewed. Labs: 2/28/22 HH 15.6/48, GFR>60, Glu 107, LFT nl, TSH 0.997, Vit D 34, Vit B12 437  12/17/21 UA CX e.coli  7/23/21 HH 15/45, GFR 55, lytes nl     ASSESSMENT:  Elderly female with has Neck pain; Multiple sclerosis ([address] Bilateral foot-drop; Peripheral polyneuropathy; Essential hypertension; Asthma; Edema of both legs; Constipation;  Intervertebral lumbar disc disorder with myelopathy, lumbar region; Cervical spinal stenosis; Pulmonary venous congestion; Fatigue; Hx of appendectomy; Hx of cholecystectomy; Hx of hysterectomy; Chronic rhinitis; Congestion of upper airway; Recurrent UTI; Geographic tongue; and Eczema on their problem list.     Curt Douglas was seen today for sinus problem, fatigue and back pain. Diagnoses and all orders for this visit:    Multiple sclerosis ([facility] [address]  -     External Referral To Physical Therapy    Essential hypertension    Chronic rhinitis    Constipation, unspecified constipation type    Edema of both legs       PLAN:    Referral fof PT for weakness and transfers. Continue Atrovent spray for nasal drainage. Continue Robitussin for chest congestion. Continue Lasix for leg edema. Take yogurt for probiotic. Encouraged elevate legs more. Encouraged to continue exercises at home. Encouraged to drink more fluids. Use Ceftin x 3 days at first sign of UTI. Recheck 1 month. 40 minutes spent on visit, 25 minutes involved education/counseling regarding MS, BLE edema, HTN disease processes, treatment options, meds and coordination of care.     Current Outpatient Medications   Medication Sig Dispense Refill    docusate sodium (COLACE) 100 MG capsule TAKE ONE CAPSULE BY MOUTH TWO TIMES A DAY AS NEEDED FOR CONSTIPATION 60 capsule 11    ipratropium (ATROVENT) 0.06 % nasal spray 2 sprays by Each Nostril route 4 times daily 15 mL 5    polyethylene glycol (GLYCOLAX) 17 g packet Take 25 g by mouth daily as needed for Constipation 527 g 11    ondansetron (ZOFRAN-ODT) 4 MG disintegrating tablet DISSOLVE ONE TABLET IN MOUTH THREE TIMES A DAY AS NEEDED FOR NAUSEA OR VOMITING 21 tablet 3    albuterol (PROVENTIL) (2.5 MG/3ML) 0.083% nebulizer solution Take 3 mLs by nebulization every 4 hours as needed for Shortness of Breath 180 each 11    AMITIZA 8 MCG CAPS capsule TAKE ONE CAPSULE BY MOUTH TWICE A DAY WITH MEALS 60 capsule 11    ROBAFEN MUCUS/CHEST CONGESTION 200 MG/10ML liquid TAKE 10 MLS BY MOUTH 3 TIMES DAILY AS NEEDED FOR CONGESTION 240 mL 5    silver sulfADIAZINE (SSD) 1 % cream APPLY TOPICALLY DAILY UNTIL HEALED 50 g 0    metoprolol tartrate (LOPRESSOR) 50 MG tablet TAKE ONE TABLET BY MOUTH TWO TIMES DAILY 180 tablet 3    ketoconazole (NIZORAL) 2 % shampoo Shampoo daily as needed. Leave on scalp for 10 minutes before rinsing. 120 mL 3    cetirizine (ZYRTEC) 10 MG tablet Take 1 tablet by mouth daily 90 tablet 3    loperamide (IMODIUM A-D) 2 MG tablet Take 1 tablet by mouth 4 times daily as needed for Diarrhea 30 tablet 2    ammonium lactate (LAC-HYDRIN) 12 % lotion Apply topically as needed. 1 each 11    losartan (COZAAR) 50 MG tablet Take 1 tablet by mouth daily 90 tablet 3    vitamin D (CHOLECALCIFEROL) 125 MCG (5000 UT) CAPS capsule Take 1 capsule by mouth daily 90 capsule 3    vitamin E 1000 units capsule Take 1 capsule by mouth daily 90 capsule 3    Magnesium Citrate 1.745 GM/30ML solution Drink 1/2 bottle on day 1. If no results drink the other half on day 2. 300 mL 0    fluticasone (FLONASE) 50 MCG/ACT nasal spray 1 spray by Each Nostril route daily 16 g 11    sennosides-docusate sodium (SENOKOT-S) 8.6-50 MG tablet Take 1 tablet by mouth daily 30 tablet 11    loratadine (CLARITIN) 10 MG tablet Take 1 tablet by mouth daily 30 tablet 11    cyclobenzaprine (FLEXERIL) 5 MG tablet Take 5 mg by mouth 3 times daily as needed for Muscle spasms      albuterol sulfate  (90 Base) MCG/ACT inhaler INHALE 2 PUFFS EVERY 4 TO 6 HOURS AS NEEDED FOR WHEEZING. 6.7 g 11    Propylhexedrine (BENZEDREX) INHA Use every 3-4 hours as needed for nasal congestion.  1 each 5    Disposable Gloves (VINYL GLOVES) MISC 1 box by Does not apply route as needed (patient care) 2 each 11    furosemide (LASIX) 40 MG tablet Take 1 tablet by mouth daily 90 tablet 3    Incontinence Supply Disposable (UNDERPADS REGULAR) MISC 60 each by Does not apply route as needed (Incontinence) 60 Bottle 11    potassium chloride (KLOR-CON) 10 MEQ extended release tablet Take 2 tablets by mouth daily as needed (with Lasix) 60 tablet 11    Incontinence Supply Disposable (PROTECTIVE UNDERWEAR LARGE) MISC Use as directed 56 Bottle 11    Incontinence Supply Disposable (POISE MAXIMUM ABSORBENCY) PADS USE AS DIRECTED 54 each 11    albuterol sulfate (PROAIR RESPICLICK) 288 (90 Base) MCG/ACT aerosol powder inhalation Inhale into the lungs every 4 hours as needed for Wheezing or Shortness of Breath      melatonin 3 MG TABS tablet Take 1 tablet by mouth nightly as needed (insomnia) 100 tablet 3    Oral Electrolytes (PEDIALYTE) SOLN Drink daily for good fluid intake. 1000 mL 5    b complex vitamins capsule Take 1 capsule by mouth daily      acetaminophen (TYLENOL) 325 MG tablet Take 2 tablets by mouth every 4 hours as needed for Pain or Fever 120 tablet 3    Ascorbic Acid (VITAMIN C) 1000 MG tablet Take 1,000 mg by mouth daily       No current facility-administered medications for this visit. Return in about 1 month (around 6/26/2022). An electronic signature was used to authenticate this note.     --Marta Cowan MD on 5/26/2022 at 1:30 PM

## 2022-05-26 ENCOUNTER — TELEPHONE (OUTPATIENT)
Dept: PRIMARY CARE CLINIC | Age: 67
End: 2022-05-26

## 2022-05-26 ENCOUNTER — OFFICE VISIT (OUTPATIENT)
Dept: PRIMARY CARE CLINIC | Age: 67
End: 2022-05-26
Payer: MEDICAID

## 2022-05-26 VITALS
OXYGEN SATURATION: 96 % | TEMPERATURE: 97.4 F | SYSTOLIC BLOOD PRESSURE: 139 MMHG | HEIGHT: 66 IN | DIASTOLIC BLOOD PRESSURE: 84 MMHG | HEART RATE: 76 BPM | RESPIRATION RATE: 20 BRPM | BODY MASS INDEX: 31.96 KG/M2

## 2022-05-26 DIAGNOSIS — R60.0 EDEMA OF BOTH LEGS: ICD-10-CM

## 2022-05-26 DIAGNOSIS — K59.00 CONSTIPATION, UNSPECIFIED CONSTIPATION TYPE: ICD-10-CM

## 2022-05-26 DIAGNOSIS — I10 ESSENTIAL HYPERTENSION: ICD-10-CM

## 2022-05-26 DIAGNOSIS — G35 MULTIPLE SCLEROSIS (HCC): Primary | ICD-10-CM

## 2022-05-26 DIAGNOSIS — J31.0 CHRONIC RHINITIS: ICD-10-CM

## 2022-05-26 PROCEDURE — 1123F ACP DISCUSS/DSCN MKR DOCD: CPT | Performed by: FAMILY MEDICINE

## 2022-05-26 PROCEDURE — 99349 HOME/RES VST EST MOD MDM 40: CPT | Performed by: FAMILY MEDICINE

## 2022-05-26 SDOH — ECONOMIC STABILITY: FOOD INSECURITY: WITHIN THE PAST 12 MONTHS, THE FOOD YOU BOUGHT JUST DIDN'T LAST AND YOU DIDN'T HAVE MONEY TO GET MORE.: NEVER TRUE

## 2022-05-26 SDOH — ECONOMIC STABILITY: FOOD INSECURITY: WITHIN THE PAST 12 MONTHS, YOU WORRIED THAT YOUR FOOD WOULD RUN OUT BEFORE YOU GOT MONEY TO BUY MORE.: NEVER TRUE

## 2022-05-26 ASSESSMENT — PATIENT HEALTH QUESTIONNAIRE - PHQ9
SUM OF ALL RESPONSES TO PHQ QUESTIONS 1-9: 0
SUM OF ALL RESPONSES TO PHQ QUESTIONS 1-9: 0
SUM OF ALL RESPONSES TO PHQ9 QUESTIONS 1 & 2: 0
SUM OF ALL RESPONSES TO PHQ QUESTIONS 1-9: 0
2. FEELING DOWN, DEPRESSED OR HOPELESS: 0
SUM OF ALL RESPONSES TO PHQ QUESTIONS 1-9: 0
1. LITTLE INTEREST OR PLEASURE IN DOING THINGS: 0

## 2022-05-26 ASSESSMENT — SOCIAL DETERMINANTS OF HEALTH (SDOH): HOW HARD IS IT FOR YOU TO PAY FOR THE VERY BASICS LIKE FOOD, HOUSING, MEDICAL CARE, AND HEATING?: NOT HARD AT ALL

## 2022-05-26 NOTE — TELEPHONE ENCOUNTER
----- Message from Jody Duncan RN sent at 5/26/2022  1:22 PM EDT -----  Ordering PT send to [facility].   Thanks

## 2022-06-03 RX ORDER — KETOCONAZOLE 20 MG/ML
SHAMPOO TOPICAL
Qty: 120 ML | Refills: 3 | Status: SHIPPED | OUTPATIENT
Start: 2022-06-03

## 2022-06-13 ENCOUNTER — TELEPHONE (OUTPATIENT)
Dept: PRIMARY CARE CLINIC | Age: 67
End: 2022-06-13

## 2022-06-13 RX ORDER — DOXYCYCLINE 100 MG/1
100 CAPSULE ORAL 2 TIMES DAILY
Qty: 28 CAPSULE | Refills: 0 | Status: SHIPPED | OUTPATIENT
Start: 2022-06-13 | End: 2022-06-27

## 2022-06-13 RX ORDER — FUROSEMIDE 40 MG/1
TABLET ORAL
Qty: 90 TABLET | Refills: 3 | Status: SHIPPED | OUTPATIENT
Start: 2022-06-13

## 2022-06-13 NOTE — TELEPHONE ENCOUNTER
Pranay Amos called. She states she has boils to her abd and breast again. She is asking if you will send an ATB to the pharmacy for her.

## 2022-06-23 DIAGNOSIS — J31.0 CHRONIC RHINITIS: ICD-10-CM

## 2022-06-23 RX ORDER — IPRATROPIUM BROMIDE 42 UG/1
2 SPRAY, METERED NASAL 4 TIMES DAILY
Qty: 15 ML | Refills: 11 | Status: SHIPPED | OUTPATIENT
Start: 2022-06-23

## 2022-06-24 ENCOUNTER — OFFICE VISIT (OUTPATIENT)
Dept: PRIMARY CARE CLINIC | Age: 67
End: 2022-06-24
Payer: MEDICAID

## 2022-06-24 VITALS
TEMPERATURE: 97.8 F | DIASTOLIC BLOOD PRESSURE: 73 MMHG | WEIGHT: 198 LBS | OXYGEN SATURATION: 97 % | HEIGHT: 66 IN | BODY MASS INDEX: 31.82 KG/M2 | RESPIRATION RATE: 18 BRPM | SYSTOLIC BLOOD PRESSURE: 134 MMHG | HEART RATE: 65 BPM

## 2022-06-24 DIAGNOSIS — R60.0 EDEMA OF BOTH LEGS: ICD-10-CM

## 2022-06-24 DIAGNOSIS — J31.0 CHRONIC RHINITIS: ICD-10-CM

## 2022-06-24 DIAGNOSIS — I10 ESSENTIAL HYPERTENSION: ICD-10-CM

## 2022-06-24 DIAGNOSIS — K59.00 CONSTIPATION, UNSPECIFIED CONSTIPATION TYPE: ICD-10-CM

## 2022-06-24 DIAGNOSIS — G35 MULTIPLE SCLEROSIS (HCC): Primary | ICD-10-CM

## 2022-06-24 PROCEDURE — 1123F ACP DISCUSS/DSCN MKR DOCD: CPT | Performed by: PHYSICIAN ASSISTANT

## 2022-06-24 PROCEDURE — 99349 HOME/RES VST EST MOD MDM 40: CPT | Performed by: PHYSICIAN ASSISTANT

## 2022-06-24 NOTE — PROGRESS NOTES
6/24/2022     Home visit medically necessary in lieu of an office visit due to: wheelchair bound, difficult to get out. HPI:  Melvin Duncan says for the last several days she has felt achy, fatigued with some nausea and diarrhea. Wonders if it is related to the Rx of Doxycycline which she took for boils on the chest (that have resolved). As a result she has put off physical therapy for now. Denies fever or ^cough/SOB. Still complains of some allergy symptoms and says a neighbor's medical marijuana smoke bothers her. She still has been taking Lasix in divided doses and her legs are less swollen. She has been out of [place] for constipation for the past couple of days due to a PA issue which has been resolved. Melvin Duncan says the appointment with urology was cancelled due to transportation issues. She still has an ENT appointment coming up in the next several weeks. She still intends to have check ups with neurology,GI, eye and dentist. She continues to have an [facility] home health aide for 4 hours 5 days a week. REVIEW OF SYSTEMS: General: Weight stable, generally healthy, no change in strength or exercise tolerance. Heart: No palpitations, no syncope, no orthopnea. EDEMA OF LEGS AT TIMES. Abdomen: Chronic constipation. No change in appetite, no dysphagia, no abdominal pains, no bowel habit changes, no emesis, no melena. : No urinary urgency, no dysuria, no change in nature of urine. Neurologic:   Unable to walk without holding on to something. In w/c most of the time. No tremor, no seizures, no changes in mentation. All other systems negative. PAST MEDICAL HISTORY: (Major events, hospitalizations, surgeries): Pneumonia (2010), 1998 Vag hyst, 1978 naina, append, freq epidural inclusion cysts tx'd with docycycline. MS dx'd Mar 8, 1991. IV corticosteroids 1991-92. Has never been on MS meds. 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 Known allergies: NKDA.    Ongoing medical problems: Multiple sclerosis, Asthma, HTN, hypoglycemia, scoliosis, DDD with sciatic, arthritis, osteoporosis, chronic LBP. Preventative: COVID vac - 3/22/21, 6/17408/82(FFEJOGW booster),  Pneumovax 23 - 11/2009. Prevnar - 10/2019. Flu vac - 10/2020 (refused 11/2021). Smoking cessation counselling 7/2014 Social history:  with 3 children. Smokes 1-1 1/2 ppd 40 yrs. No alcohol. . 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 Nutritional history: Takes a lot of vitamins and supplements every day. PHYSICAL EXAM:       Vitals:    06/24/22 0918   BP: 134/73   Pulse: 65   Resp: 18   Temp: 97.8 °F (36.6 °C)   TempSrc: Temporal   SpO2: 97%   Weight: 198 lb (89.8 kg)  Comment: estimate   Height: 5' 6\" (1.676 m)      GEN: middle age overweight female patient sitting in WC in NAD. HEAD:  atraumatic, normocephalic. EYES:  EOMI, PERRL, conjunct normal. ENT:  EACs and TMs nl. ORAL: mouth without lesions, redness/swelling. Pharynx: PND. LUNGS: clear to ausc, no rales or rhonchi. HEART:  RRR, no murmurs or gallops. ABD:  Obese, soft, non-tender to palp, no palp masses or HSM, normal BS. BACK:  No CVAT. Scoliosis toward right / kyphosis,  tender to palp over L lumbar area. EXTREM: Trace Pitting BLE edema. Venous stasis changes. Healing scab to left calf. No ulcerations, varicosities or erythema, deformities. MUSCULOSKEL: Unable to make a strong fist, good ROM of most joints, non-tender to palp and with movement, except lower extremeties which are weakened from [address] and non-wt bearing. WC bound from [address]. SKIN: No lesions, erythema, ulcers or drainage. NEURO:  Normal motor for her. No tremor, normal sensory, able to stand and transfer with much effort. Unable to walk. SKIN: No drainage  No ulcerations or breakdown, ecchymosis, or other lesions. Medications reviewed.  Labs: 2/28/22 HH 15.6/48, GFR>60, Glu 107, LFT nl, TSH 0.997, Vit D 34, Vit B12 437  12/17/21 UA CX e.coli  7/23/21 HH 15/45, GFR 55, lytes nl     ASSESSMENT:  Elderly female with has Neck pain; Multiple sclerosis ([facility] [address] Bilateral foot-drop; Peripheral polyneuropathy; Essential hypertension; Asthma; Edema of both legs; Constipation; Intervertebral lumbar disc disorder with myelopathy, lumbar region; Cervical spinal stenosis; Pulmonary venous congestion; Fatigue; Hx of appendectomy; Hx of cholecystectomy; Hx of hysterectomy; Chronic rhinitis; Congestion of upper airway; Recurrent UTI; Geographic tongue; and Eczema on their problem list.     Jamie Gonzales was seen today for multiple sclerosis. Diagnoses and all orders for this visit:    Multiple sclerosis ([facility] [address]    Essential hypertension    Chronic rhinitis    Constipation, unspecified constipation type    Edema of both legs         PLAN:  Stop Doxycycline since this may have caused some of her current symptoms (and the boils have resolved). Patient encouraged to see her specialists and to resume physical therapy  Continue Atrovent spray for nasal drainage. Continue Robitussin for chest congestion. Continue Lasix for leg edema. Take yogurt for probiotic. Encouraged elevate legs more. Encouraged to continue exercises at home. Encouraged to drink more fluids. Use Ceftin x 3 days at first sign of UTI. Recheck 1 month. 40 minutes spent on visit, 25 minutes involved education/counseling regarding MS, BLE edema, HTN disease processes, treatment options, meds and coordination of care. Current Outpatient Medications   Medication Sig Dispense Refill    ipratropium (ATROVENT) 0.06 % nasal spray 2 sprays by Each Nostril route 4 times daily 15 mL 11    furosemide (LASIX) 40 MG tablet Take 1 tablet by mouth daily 90 tablet 3    doxycycline monohydrate (MONODOX) 100 MG capsule Take 1 capsule by mouth 2 times daily for 14 days 28 capsule 0    ketoconazole (NIZORAL) 2 % shampoo Shampoo daily as needed. Leave on scalp for 10 minutes before rinsing.  120 mL 3    docusate sodium (COLACE) 100 MG capsule TAKE ONE CAPSULE BY MOUTH TWO TIMES A DAY AS NEEDED FOR CONSTIPATION 60 capsule 11    polyethylene glycol (GLYCOLAX) 17 g packet Take 25 g by mouth daily as needed for Constipation 527 g 11    ondansetron (ZOFRAN-ODT) 4 MG disintegrating tablet DISSOLVE ONE TABLET IN MOUTH THREE TIMES A DAY AS NEEDED FOR NAUSEA OR VOMITING 21 tablet 3    albuterol (PROVENTIL) (2.5 MG/3ML) 0.083% nebulizer solution Take 3 mLs by nebulization every 4 hours as needed for Shortness of Breath 180 each 11    AMITIZA 8 MCG CAPS capsule TAKE ONE CAPSULE BY MOUTH TWICE A DAY WITH MEALS 60 capsule 11    ROBAFEN MUCUS/CHEST CONGESTION 200 MG/10ML liquid TAKE 10 MLS BY MOUTH 3 TIMES DAILY AS NEEDED FOR CONGESTION 240 mL 5    silver sulfADIAZINE (SSD) 1 % cream APPLY TOPICALLY DAILY UNTIL HEALED 50 g 0    metoprolol tartrate (LOPRESSOR) 50 MG tablet TAKE ONE TABLET BY MOUTH TWO TIMES DAILY 180 tablet 3    cetirizine (ZYRTEC) 10 MG tablet Take 1 tablet by mouth daily 90 tablet 3    loperamide (IMODIUM A-D) 2 MG tablet Take 1 tablet by mouth 4 times daily as needed for Diarrhea 30 tablet 2    ammonium lactate (LAC-HYDRIN) 12 % lotion Apply topically as needed. 1 each 11    losartan (COZAAR) 50 MG tablet Take 1 tablet by mouth daily 90 tablet 3    vitamin D (CHOLECALCIFEROL) 125 MCG (5000 UT) CAPS capsule Take 1 capsule by mouth daily 90 capsule 3    vitamin E 1000 units capsule Take 1 capsule by mouth daily 90 capsule 3    Magnesium Citrate 1.745 GM/30ML solution Drink 1/2 bottle on day 1.  If no results drink the other half on day 2. 300 mL 0    fluticasone (FLONASE) 50 MCG/ACT nasal spray 1 spray by Each Nostril route daily 16 g 11    sennosides-docusate sodium (SENOKOT-S) 8.6-50 MG tablet Take 1 tablet by mouth daily 30 tablet 11    loratadine (CLARITIN) 10 MG tablet Take 1 tablet by mouth daily 30 tablet 11    cyclobenzaprine (FLEXERIL) 5 MG tablet Take 5 mg by mouth 3 times daily as needed for Muscle spasms      albuterol sulfate  (90 Base) MCG/ACT inhaler INHALE 2 PUFFS EVERY 4 TO 6 HOURS AS NEEDED FOR WHEEZING. 6.7 g 11    Propylhexedrine (BENZEDREX) INHA Use every 3-4 hours as needed for nasal congestion. 1 each 5    Disposable Gloves (VINYL GLOVES) MISC 1 box by Does not apply route as needed (patient care) 2 each 11    Incontinence Supply Disposable (UNDERPADS REGULAR) MISC 60 each by Does not apply route as needed (Incontinence) 60 Bottle 11    potassium chloride (KLOR-CON) 10 MEQ extended release tablet Take 2 tablets by mouth daily as needed (with Lasix) 60 tablet 11    Incontinence Supply Disposable (PROTECTIVE UNDERWEAR LARGE) MISC Use as directed 56 Bottle 11    Incontinence Supply Disposable (POISE MAXIMUM ABSORBENCY) PADS USE AS DIRECTED 54 each 11    albuterol sulfate (PROAIR RESPICLICK) 019 (90 Base) MCG/ACT aerosol powder inhalation Inhale into the lungs every 4 hours as needed for Wheezing or Shortness of Breath      melatonin 3 MG TABS tablet Take 1 tablet by mouth nightly as needed (insomnia) 100 tablet 3    Oral Electrolytes (PEDIALYTE) SOLN Drink daily for good fluid intake. 1000 mL 5    b complex vitamins capsule Take 1 capsule by mouth daily      acetaminophen (TYLENOL) 325 MG tablet Take 2 tablets by mouth every 4 hours as needed for Pain or Fever 120 tablet 3    Ascorbic Acid (VITAMIN C) 1000 MG tablet Take 1,000 mg by mouth daily       No current facility-administered medications for this visit. Return in about 1 month (around 7/24/2022) for regular visit. An electronic signature was used to authenticate this note.     --Hayes Dickson PA-C on 6/24/2022 at 9:32 AM

## 2022-07-01 DIAGNOSIS — J31.0 CHRONIC RHINITIS: ICD-10-CM

## 2022-07-01 RX ORDER — LORATADINE 10 MG/1
10 TABLET ORAL DAILY
Qty: 30 TABLET | Refills: 11 | Status: SHIPPED | OUTPATIENT
Start: 2022-07-01

## 2022-07-06 ENCOUNTER — TELEPHONE (OUTPATIENT)
Dept: PRIMARY CARE CLINIC | Age: 67
End: 2022-07-06

## 2022-07-06 NOTE — TELEPHONE ENCOUNTER
Spoke with Tessa Monroy and she said she doesn't know if she can get out to have it done. She will call [facility] to see if they can take her and call us back once she gets that information.

## 2022-07-06 NOTE — TELEPHONE ENCOUNTER
----- Message from Sana Vazquez MD sent at 7/6/2022 11:49 AM EDT -----  Regarding: Mammogram  Please let Roberto Cisneros know that she is due for Mammogram.  Where does she want to have this done so I can order it?

## 2022-07-11 ENCOUNTER — TELEPHONE (OUTPATIENT)
Dept: PRIMARY CARE CLINIC | Age: 67
End: 2022-07-11

## 2022-07-11 DIAGNOSIS — R35.0 URINARY FREQUENCY: Primary | ICD-10-CM

## 2022-07-11 RX ORDER — CEFUROXIME AXETIL 250 MG/1
250 TABLET ORAL 2 TIMES DAILY
Qty: 14 TABLET | Refills: 0 | Status: SHIPPED | OUTPATIENT
Start: 2022-07-11 | End: 2022-07-18

## 2022-07-11 NOTE — TELEPHONE ENCOUNTER
Dennis Shultz called. She states she burned her leg again over the weekend. She is asking if you could call in a refill of Silvadene. She also states she thinks she has a UTI. She is running a fever, foul smelling, cloudy urine. She is asking if you can order an ATB  If you can also order in for UA CS, she will try to have her aide take it to [address] [facility],[address], but there has been issues in the past with [facility] lab.

## 2022-07-11 NOTE — TELEPHONE ENCOUNTER
Spoke to Lou Rayo and gave all instructions. She states her aide is taking the urine sample to the lab tomorrow. She will be sure to get the sample before starting the Ceftin.

## 2022-07-11 NOTE — TELEPHONE ENCOUNTER
Mike Valles I sent eRx for Silvadene and Ceftin. Also order for UA C&S. It is important she get specimen to the lab.

## 2022-07-12 ENCOUNTER — HOSPITAL ENCOUNTER (OUTPATIENT)
Age: 67
Discharge: HOME | End: 2022-07-12
Payer: MEDICAID

## 2022-07-12 DIAGNOSIS — R35.0 URINARY FREQUENCY: ICD-10-CM

## 2022-07-12 LAB
BACTERIA: ABNORMAL /HPF
BILIRUBIN URINE: NEGATIVE
BLOOD, URINE: ABNORMAL
CLARITY: ABNORMAL
COLOR: YELLOW
EPITHELIAL CELLS, UA: ABNORMAL /HPF
GLUCOSE URINE: NEGATIVE MG/DL
KETONES, URINE: NEGATIVE MG/DL
LEUKOCYTE ESTERASE, URINE: ABNORMAL
NITRITE, URINE: POSITIVE
PH UA: 6.5 (ref 5–9)
PROTEIN UA: 30 MG/DL
RBC UA: ABNORMAL /HPF (ref 0–2)
SPECIFIC GRAVITY UA: 1.02 (ref 1–1.03)
UROBILINOGEN, URINE: 0.2 E.U./DL
WBC UA: >20 /HPF (ref 0–5)

## 2022-07-12 PROCEDURE — 87088 URINE BACTERIA CULTURE: CPT

## 2022-07-12 PROCEDURE — 87186 SC STD MICRODIL/AGAR DIL: CPT

## 2022-07-12 PROCEDURE — 81001 URINALYSIS AUTO W/SCOPE: CPT

## 2022-07-14 LAB
ORGANISM: ABNORMAL
URINE CULTURE, ROUTINE: ABNORMAL

## 2022-07-15 ENCOUNTER — TELEPHONE (OUTPATIENT)
Dept: AUDIOLOGY | Age: 67
End: 2022-07-15

## 2022-07-15 NOTE — TELEPHONE ENCOUNTER
FYI: patient cancelled 7/20/22 appointment for Tinnitus of left ear,  Ear pain, left, due to lack of transportation. She will call back to reschedule.

## 2022-07-25 ENCOUNTER — TELEPHONE (OUTPATIENT)
Dept: PRIMARY CARE CLINIC | Age: 67
End: 2022-07-25

## 2022-07-25 NOTE — TELEPHONE ENCOUNTER
OK I talked to her. She will go if it gets worse. I also told her to try her Flonase, and a warm compress. She was happy with those directions.

## 2022-07-25 NOTE — TELEPHONE ENCOUNTER
Lou Rayo left me a message reporting she has L ear pain that woke up last night. Tony sent a Rx for antibiotic ear drops a while ago that helped her. She couldn't remember the name. Akil Davies see's her on Wednesday but she doesn't want to wait. .Can you order something?

## 2022-07-25 NOTE — TELEPHONE ENCOUNTER
Her ear needs to be examined to know what to prescribe. If she can't wait, she should go to Urgent Care.

## 2022-07-27 ENCOUNTER — OFFICE VISIT (OUTPATIENT)
Dept: PRIMARY CARE CLINIC | Age: 67
End: 2022-07-27
Payer: MEDICAID

## 2022-07-27 VITALS
SYSTOLIC BLOOD PRESSURE: 123 MMHG | HEIGHT: 66 IN | OXYGEN SATURATION: 98 % | RESPIRATION RATE: 18 BRPM | HEART RATE: 68 BPM | BODY MASS INDEX: 31.82 KG/M2 | DIASTOLIC BLOOD PRESSURE: 90 MMHG | TEMPERATURE: 96.8 F | WEIGHT: 198 LBS

## 2022-07-27 DIAGNOSIS — R60.0 EDEMA OF BOTH LEGS: ICD-10-CM

## 2022-07-27 DIAGNOSIS — I10 ESSENTIAL HYPERTENSION: ICD-10-CM

## 2022-07-27 DIAGNOSIS — T24.209A SUPERFICIAL PARTIAL THICKNESS BURN OF LOWER EXTREMITY: ICD-10-CM

## 2022-07-27 DIAGNOSIS — K59.00 CONSTIPATION, UNSPECIFIED CONSTIPATION TYPE: ICD-10-CM

## 2022-07-27 DIAGNOSIS — J31.0 CHRONIC RHINITIS: ICD-10-CM

## 2022-07-27 DIAGNOSIS — G35 MULTIPLE SCLEROSIS (HCC): Primary | ICD-10-CM

## 2022-07-27 PROCEDURE — 1123F ACP DISCUSS/DSCN MKR DOCD: CPT | Performed by: PHYSICIAN ASSISTANT

## 2022-07-27 PROCEDURE — 99349 HOME/RES VST EST MOD MDM 40: CPT | Performed by: PHYSICIAN ASSISTANT
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
## 2022-08-01 ENCOUNTER — TELEPHONE (OUTPATIENT)
Dept: PRIMARY CARE CLINIC | Age: 67
End: 2022-08-01

## 2022-08-01 NOTE — TELEPHONE ENCOUNTER
Alesia Navas called with an update. She states her legs looked good yesterday and even first thing this morning, but now they are swollen again. I asked her if she was elevating them. She says no. I encouraged her to keep her legs elevated to help decrease swelling. Verbalized understanding.

## 2022-08-04 ENCOUNTER — TELEPHONE (OUTPATIENT)
Dept: PRIMARY CARE CLINIC | Age: 67
End: 2022-08-04

## 2022-08-05 ENCOUNTER — TELEPHONE (OUTPATIENT)
Dept: PRIMARY CARE CLINIC | Age: 67
End: 2022-08-05

## 2022-08-05 NOTE — TELEPHONE ENCOUNTER
Donte Martinez called. She is asking if you would send in a script for Metamucil to her pharmacy. She would like to use that instead of Miralax.

## 2022-08-24 ENCOUNTER — TELEPHONE (OUTPATIENT)
Dept: PRIMARY CARE CLINIC | Age: 67
End: 2022-08-24

## 2022-08-24 ENCOUNTER — OFFICE VISIT (OUTPATIENT)
Dept: PRIMARY CARE CLINIC | Age: 67
End: 2022-08-24
Payer: MEDICAID

## 2022-08-24 VITALS
SYSTOLIC BLOOD PRESSURE: 138 MMHG | HEIGHT: 66 IN | RESPIRATION RATE: 20 BRPM | HEART RATE: 70 BPM | DIASTOLIC BLOOD PRESSURE: 73 MMHG | BODY MASS INDEX: 31.82 KG/M2 | OXYGEN SATURATION: 99 % | TEMPERATURE: 97.5 F | WEIGHT: 198 LBS

## 2022-08-24 DIAGNOSIS — K59.00 CONSTIPATION, UNSPECIFIED CONSTIPATION TYPE: ICD-10-CM

## 2022-08-24 DIAGNOSIS — I10 ESSENTIAL HYPERTENSION: ICD-10-CM

## 2022-08-24 DIAGNOSIS — G35 MULTIPLE SCLEROSIS (HCC): Primary | ICD-10-CM

## 2022-08-24 DIAGNOSIS — J31.0 CHRONIC RHINITIS: ICD-10-CM

## 2022-08-24 DIAGNOSIS — L30.4 INTERTRIGO: ICD-10-CM

## 2022-08-24 DIAGNOSIS — R60.0 EDEMA OF BOTH LEGS: ICD-10-CM

## 2022-08-24 PROCEDURE — 1123F ACP DISCUSS/DSCN MKR DOCD: CPT | Performed by: PHYSICIAN ASSISTANT

## 2022-08-24 PROCEDURE — 99349 HOME/RES VST EST MOD MDM 40: CPT | Performed by: PHYSICIAN ASSISTANT

## 2022-08-24 RX ORDER — CLOTRIMAZOLE 1 %
CREAM (GRAM) TOPICAL
Qty: 28 G | Refills: 3 | Status: SHIPPED | OUTPATIENT
Start: 2022-08-24 | End: 2022-08-31
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
## 2022-09-02 ENCOUNTER — TELEPHONE (OUTPATIENT)
Dept: PRIMARY CARE CLINIC | Age: 67
End: 2022-09-02

## 2022-09-02 NOTE — TELEPHONE ENCOUNTER
Sj Diss I sent eRx for Bactroban to pharmacy. She should also get Phisohex soap at pharmacy and start bathing with that a couple times/week.

## 2022-09-02 NOTE — TELEPHONE ENCOUNTER
Stone Faust states she has boils starting again. She is asking if you can send in an ATB or ATB cream for her.

## 2022-09-13 ENCOUNTER — TELEPHONE (OUTPATIENT)
Dept: PRIMARY CARE CLINIC | Age: 67
End: 2022-09-13

## 2022-09-13 NOTE — TELEPHONE ENCOUNTER
Received a fax from Dr Samia Keith office stating [facility] has not returned any of their calls to schedule an appt. I called [facility]. She states she was having difficulty arranging transportation, but she has that resolved now. I encouraged her to call Dr Samia Keith office right away so that they know she is still interested. I gave her the number and verified it with her.  She states she will call

## 2022-09-13 NOTE — TELEPHONE ENCOUNTER
Racqule Dahl called. She states that last night she started with nausea, diarrhea, headache, fever (99.2), and chills. States she doesn't feel well. She took Zofran once last night and has been taking Tylenol. I instructed her that she can take the zofran every 8 hrs, Imodium 4 times a day as needed, and continue Tylenol for fever. Verbalized understanding. Any other orders?

## 2022-09-23 ENCOUNTER — OFFICE VISIT (OUTPATIENT)
Dept: PRIMARY CARE CLINIC | Age: 67
End: 2022-09-23
Payer: MEDICAID

## 2022-09-23 VITALS
OXYGEN SATURATION: 98 % | DIASTOLIC BLOOD PRESSURE: 81 MMHG | SYSTOLIC BLOOD PRESSURE: 136 MMHG | WEIGHT: 198 LBS | HEART RATE: 67 BPM | TEMPERATURE: 97.4 F | HEIGHT: 66 IN | RESPIRATION RATE: 18 BRPM | BODY MASS INDEX: 31.82 KG/M2

## 2022-09-23 DIAGNOSIS — R60.0 EDEMA OF BOTH LEGS: ICD-10-CM

## 2022-09-23 DIAGNOSIS — L30.4 INTERTRIGO: ICD-10-CM

## 2022-09-23 DIAGNOSIS — K59.00 CONSTIPATION, UNSPECIFIED CONSTIPATION TYPE: ICD-10-CM

## 2022-09-23 DIAGNOSIS — G35 MULTIPLE SCLEROSIS (HCC): Primary | ICD-10-CM

## 2022-09-23 DIAGNOSIS — I10 ESSENTIAL HYPERTENSION: ICD-10-CM

## 2022-09-23 DIAGNOSIS — J31.0 CHRONIC RHINITIS: ICD-10-CM

## 2022-09-23 PROCEDURE — 1123F ACP DISCUSS/DSCN MKR DOCD: CPT | Performed by: PHYSICIAN ASSISTANT

## 2022-09-23 PROCEDURE — 99349 HOME/RES VST EST MOD MDM 40: CPT | Performed by: PHYSICIAN ASSISTANT

## 2022-09-23 RX ORDER — CLOTRIMAZOLE 1 %
CREAM (GRAM) TOPICAL
COMMUNITY
Start: 2022-09-06 | End: 2022-09-23 | Stop reason: SDUPTHER

## 2022-09-23 RX ORDER — CLOTRIMAZOLE 1 %
CREAM (GRAM) TOPICAL
Qty: 28 G | Refills: 5 | Status: SHIPPED | OUTPATIENT
Start: 2022-09-23
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
## 2022-09-30 ENCOUNTER — TELEPHONE (OUTPATIENT)
Dept: PRIMARY CARE CLINIC | Age: 67
End: 2022-09-30

## 2022-09-30 RX ORDER — SULFAMETHOXAZOLE AND TRIMETHOPRIM 800; 160 MG/1; MG/1
1 TABLET ORAL 2 TIMES DAILY
Qty: 20 TABLET | Refills: 0 | Status: SHIPPED | OUTPATIENT
Start: 2022-09-30 | End: 2022-10-10

## 2022-09-30 RX ORDER — BENZOYL PEROXIDE 100 MG/ML
LIQUID TOPICAL
Qty: 227 G | Refills: 5 | Status: SHIPPED | OUTPATIENT
Start: 2022-09-30

## 2022-09-30 NOTE — TELEPHONE ENCOUNTER
Okay. I sent eRx for antibiotic and lotion for her to wash with to hopefully reduce infection in her skin.

## 2022-09-30 NOTE — TELEPHONE ENCOUNTER
Coby Siegel called. She states she has boils again. She has been using the cream, but it isn't working. She is asking that you send something to the pharmacy. She has switched to [facility].

## 2022-10-03 RX ORDER — LOSARTAN POTASSIUM 50 MG/1
TABLET ORAL
Qty: 90 TABLET | Refills: 3 | Status: SHIPPED | OUTPATIENT
Start: 2022-10-03

## 2022-10-07 NOTE — TELEPHONE ENCOUNTER
Ebonie Germain says the boils are very slowly getting better, but she isn't sure that the ATB is working.

## 2022-10-18 NOTE — TELEPHONE ENCOUNTER
Pt c/o Lasix is causing all kinds of S.E.s, tearing up her stomach and making her go small amts freq. Legs still very swollen. She did not take it over the weekend. Switch to torsemide 10mg daily - sent eRx to [facility]. No

## 2022-10-23 NOTE — PROGRESS NOTES
10/24/2022     Home visit medically necessary in lieu of an office visit due to: wheelchair bound, difficult to get out. HPI:  Patient says she is doing okay. Her leg swelling has worsened over the past month. She has drainage from L leg. She continues taking 40 mg Lasix in the am and elevating her legs. She also has had boils on her lower abdomen and used Bactroban which is clearing it up. Her constipation remains a problem but for now and Senakot-S and Amitiza are working. She only has been taking Miralax once in awhile. She is drinking more water. She has chronic back pain. She is doing more ADL on her own. REVIEW OF SYSTEMS: General: Weight stable, generally healthy, no change in strength or exercise tolerance. Heart: No palpitations, no syncope, no orthopnea. EDEMA OF LEGS AT TIMES. Abdomen: Chronic constipation. No change in appetite, no dysphagia, no abdominal pains, no bowel habit changes, no emesis, no melena. : No urinary urgency, no dysuria, no change in nature of urine. Neurologic:   Unable to walk without holding on to something. In w/c most of the time. No tremor, no seizures, no changes in mentation. All other systems negative. PAST MEDICAL HISTORY: (Major events, hospitalizations, surgeries): Pneumonia (2010), 1998 Vag hyst, 1978 naina, append, freq epidural inclusion cysts tx'd with docycycline. MS dx'd Mar 8, 1991. IV corticosteroids 1991-92. Has never been on MS meds. 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 Known allergies: NKDA. Ongoing medical problems: Multiple sclerosis, Asthma, HTN, hypoglycemia, scoliosis, DDD with sciatic, arthritis, osteoporosis, chronic LBP. Preventative: COVID vac - 3/22/21, 8/64407/30(DVTOROT booster),  Pneumovax 23 - 11/2009. Prevnar - 10/2019. Flu vac - 10/2020 (refused 11/2021). Smoking cessation counselling 7/2014 Social history:  with 3 children. Smokes 1-1 1/2 ppd 40 yrs. No alcohol. .    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 Nutritional history: Takes a lot of vitamins and supplements every day. PHYSICAL EXAM:       Vitals:    10/24/22 1255   BP: 104/70   Site: Left Wrist   Position: Sitting   Pulse: 77   Resp: 20   Temp: 97.1 °F (36.2 °C)   TempSrc: Infrared   SpO2: 99%   Weight: Comment: No weight   Height: 5' 6\" (1.676 m)     GEN: middle age overweight female patient sitting in WC in NAD. HEAD:  atraumatic, normocephalic. EYES:  EOMI, PERRL, conjunct normal. ENT:  EACs and TMs nl. ORAL: mouth without lesions, redness/swelling. Pharynx: PND. LUNGS: clear to ausc, no rales or rhonchi. HEART:  RRR, no murmurs or gallops. ABD:  Obese, soft, non-tender to palp, no palp masses or HSM, normal BS. BACK:  No CVAT. Scoliosis toward right / kyphosis,  tender to palp over L lumbar area. EXTREM: 1-2+ pitting edema BLE with leakage from L leg. Venous stasis changes. Healing scab to left calf. No ulcerations, varicosities or erythema, deformities. MUSCULOSKEL: Unable to make a strong fist, good ROM of most joints, non-tender to palp and with movement, except lower extremeties which are weakened from [address] and non-wt bearing. WC bound from [address]. SKIN: Scattered pustules and furuncles of trunk. No other lesions, erythema, ulcers or drainage. NEURO:  Normal motor for her. No tremor, normal sensory, able to stand and transfer with much effort. Unable to walk. SKIN: No drainage  No ulcerations or breakdown, ecchymosis, or other lesions. Medications reviewed. Labs: 7/12/22 UA+  2/28/22 HH 15.6/48, GFR>60, Glu 107, LFT nl, TSH 0.997, Vit D 34, Vit B12 437  12/17/21 UA CX e.coli  7/23/21 HH 15/45, GFR 55, lytes nl     ASSESSMENT:  Elderly female with has Neck pain; Multiple sclerosis ([address] Bilateral foot-drop; Peripheral polyneuropathy; Essential hypertension; Asthma; Edema of both legs; Constipation;  Intervertebral lumbar disc disorder with myelopathy, lumbar region; Cervical spinal stenosis; Pulmonary venous congestion; Fatigue; Hx of appendectomy; Hx of cholecystectomy; Hx of hysterectomy; Chronic rhinitis; Congestion of upper airway; Recurrent UTI; Geographic tongue; Eczema; and Folliculitis on their problem list.     Diamante Fang was seen today for sinus problem and skin problem. Diagnoses and all orders for this visit:    Essential hypertension  -     Basic Metabolic Panel; Future    Chronic rhinitis    Multiple sclerosis (HCC)    Peripheral polyneuropathy    Cervical spinal stenosis    Congestion of upper airway  -     guaiFENesin (ROBAFEN MUCUS/CHEST CONGESTION) 100 MG/5ML liquid; TAKE 10 MLS BY MOUTH 3 TIMES DAILY AS NEEDED FOR CONGESTION    Folliculitis    Other orders  -     sulfamethoxazole-trimethoprim (BACTRIM DS;SEPTRA DS) 800-160 MG per tablet; Take 1 tablet by mouth 2 times daily for 10 days      PLAN:   Check labs. Add Bactrim DS for skin boils. Continue 40 mg Lasix daily and elevate legs on wedge pillow to reduce leg swelling. Encouraged to continue exercises at home. She needs to call for appointments with specialists and obtain testing. Use Ceftin x 3 days at first sign of UTI. Recheck 1 month. 40 minutes spent on visit, 25 minutes involved education/counseling regarding MS, BLE edema, HTN disease processes, treatment options, meds and coordination of care.     Current Outpatient Medications   Medication Sig Dispense Refill    sulfamethoxazole-trimethoprim (BACTRIM DS;SEPTRA DS) 800-160 MG per tablet Take 1 tablet by mouth 2 times daily for 10 days 20 tablet 0    guaiFENesin (ROBAFEN MUCUS/CHEST CONGESTION) 100 MG/5ML liquid TAKE 10 MLS BY MOUTH 3 TIMES DAILY AS NEEDED FOR CONGESTION 240 mL 11    dextromethorphan-guaiFENesin (MUCINEX DM)  MG per extended release tablet Take 1 tablet by mouth every 12 hours as needed for Congestion 60 tablet 5    Cholecalciferol (VITAMIN D3) 125 MCG (5000 UT) CAPS TAKE ONE CAPSULE BY MOUTH DAILY 90 capsule 3    losartan (COZAAR) 50 MG tablet TAKE ONE TABLET BY MOUTH DAILY 90 tablet 3    benzoyl peroxide 10 % external wash Wash affected areas 2-3 times weekly. 227 g 5    mupirocin (BACTROBAN) 2 % ointment APPLY TOPICALLY 3 TIMES DAILY TO AFFECTED AREA      clotrimazole (LOTRIMIN) 1 % cream APPLY TOPICALLY TO AFFECTED AREA OF SKIN RASH 2 TIMES DAILY AS NEEDED 28 g 5    VENTOLIN  (90 Base) MCG/ACT inhaler INHALE TWO PUFFS BY MOUTH EVERY 4 TO 6 HOURS AS NEEDED FOR WHEEZING 18 g 11    loratadine (CLARITIN) 10 MG tablet Take 1 tablet by mouth daily 30 tablet 11    ipratropium (ATROVENT) 0.06 % nasal spray 2 sprays by Each Nostril route 4 times daily 15 mL 11    furosemide (LASIX) 40 MG tablet Take 1 tablet by mouth daily 90 tablet 3    ketoconazole (NIZORAL) 2 % shampoo Shampoo daily as needed. Leave on scalp for 10 minutes before rinsing. 120 mL 3    docusate sodium (COLACE) 100 MG capsule TAKE ONE CAPSULE BY MOUTH TWO TIMES A DAY AS NEEDED FOR CONSTIPATION 60 capsule 11    polyethylene glycol (GLYCOLAX) 17 g packet Take 25 g by mouth daily as needed for Constipation 527 g 11    ondansetron (ZOFRAN-ODT) 4 MG disintegrating tablet DISSOLVE ONE TABLET IN MOUTH THREE TIMES A DAY AS NEEDED FOR NAUSEA OR VOMITING 21 tablet 3    albuterol (PROVENTIL) (2.5 MG/3ML) 0.083% nebulizer solution Take 3 mLs by nebulization every 4 hours as needed for Shortness of Breath 180 each 11    AMITIZA 8 MCG CAPS capsule TAKE ONE CAPSULE BY MOUTH TWICE A DAY WITH MEALS 60 capsule 11    metoprolol tartrate (LOPRESSOR) 50 MG tablet TAKE ONE TABLET BY MOUTH TWO TIMES DAILY 180 tablet 3    cetirizine (ZYRTEC) 10 MG tablet Take 1 tablet by mouth daily 90 tablet 3    loperamide (IMODIUM A-D) 2 MG tablet Take 1 tablet by mouth 4 times daily as needed for Diarrhea 30 tablet 2    ammonium lactate (LAC-HYDRIN) 12 % lotion Apply topically as needed.  1 each 11    vitamin E 1000 units capsule Take 1 capsule by mouth daily 90 capsule 3    Magnesium Citrate 1.745 GM/30ML solution Drink 1/2 bottle on day 1. If no results drink the other half on day 2. 300 mL 0    fluticasone (FLONASE) 50 MCG/ACT nasal spray 1 spray by Each Nostril route daily 16 g 11    sennosides-docusate sodium (SENOKOT-S) 8.6-50 MG tablet Take 1 tablet by mouth daily 30 tablet 11    cyclobenzaprine (FLEXERIL) 5 MG tablet Take 5 mg by mouth 3 times daily as needed for Muscle spasms      Propylhexedrine (BENZEDREX) INHA Use every 3-4 hours as needed for nasal congestion. 1 each 5    Disposable Gloves (VINYL GLOVES) MISC 1 box by Does not apply route as needed (patient care) 2 each 11    Incontinence Supply Disposable (UNDERPADS REGULAR) MISC 60 each by Does not apply route as needed (Incontinence) 60 Bottle 11    potassium chloride (KLOR-CON) 10 MEQ extended release tablet Take 2 tablets by mouth daily as needed (with Lasix) 60 tablet 11    Incontinence Supply Disposable (PROTECTIVE UNDERWEAR LARGE) MISC Use as directed 56 Bottle 11    Incontinence Supply Disposable (POISE MAXIMUM ABSORBENCY) PADS USE AS DIRECTED 54 each 11    albuterol sulfate (PROAIR RESPICLICK) 932 (90 Base) MCG/ACT aerosol powder inhalation Inhale into the lungs every 4 hours as needed for Wheezing or Shortness of Breath      melatonin 3 MG TABS tablet Take 1 tablet by mouth nightly as needed (insomnia) 100 tablet 3    Oral Electrolytes (PEDIALYTE) SOLN Drink daily for good fluid intake. 1000 mL 5    b complex vitamins capsule Take 1 capsule by mouth daily      acetaminophen (TYLENOL) 325 MG tablet Take 2 tablets by mouth every 4 hours as needed for Pain or Fever 120 tablet 3    Ascorbic Acid (VITAMIN C) 1000 MG tablet Take 1,000 mg by mouth daily       No current facility-administered medications for this visit. Return in about 1 month (around 11/24/2022). An electronic signature was used to authenticate this note.     --Stiven Jamison MD on 10/24/2022 at 1:29 PM

## 2022-10-24 ENCOUNTER — OFFICE VISIT (OUTPATIENT)
Dept: PRIMARY CARE CLINIC | Age: 67
End: 2022-10-24
Payer: MEDICAID

## 2022-10-24 ENCOUNTER — TELEPHONE (OUTPATIENT)
Dept: PRIMARY CARE CLINIC | Age: 67
End: 2022-10-24

## 2022-10-24 VITALS
HEIGHT: 66 IN | OXYGEN SATURATION: 99 % | BODY MASS INDEX: 31.96 KG/M2 | SYSTOLIC BLOOD PRESSURE: 104 MMHG | HEART RATE: 77 BPM | TEMPERATURE: 97.1 F | RESPIRATION RATE: 20 BRPM | DIASTOLIC BLOOD PRESSURE: 70 MMHG

## 2022-10-24 DIAGNOSIS — L73.9 FOLLICULITIS: ICD-10-CM

## 2022-10-24 DIAGNOSIS — M48.02 CERVICAL SPINAL STENOSIS: ICD-10-CM

## 2022-10-24 DIAGNOSIS — G35 MULTIPLE SCLEROSIS (HCC): ICD-10-CM

## 2022-10-24 DIAGNOSIS — G62.9 PERIPHERAL POLYNEUROPATHY: ICD-10-CM

## 2022-10-24 DIAGNOSIS — J31.0 CHRONIC RHINITIS: ICD-10-CM

## 2022-10-24 DIAGNOSIS — J98.8 CONGESTION OF UPPER AIRWAY: ICD-10-CM

## 2022-10-24 DIAGNOSIS — I10 ESSENTIAL HYPERTENSION: Primary | ICD-10-CM

## 2022-10-24 PROBLEM — T24.209A: Status: RESOLVED | Noted: 2022-07-27 | Resolved: 2022-10-24

## 2022-10-24 PROCEDURE — 1123F ACP DISCUSS/DSCN MKR DOCD: CPT | Performed by: FAMILY MEDICINE

## 2022-10-24 PROCEDURE — 99349 HOME/RES VST EST MOD MDM 40: CPT | Performed by: FAMILY MEDICINE

## 2022-10-24 RX ORDER — GUAIFENESIN 100 MG/5ML
SYRUP ORAL
Qty: 240 ML | Refills: 11 | Status: SHIPPED | OUTPATIENT
Start: 2022-10-24

## 2022-10-24 RX ORDER — SULFAMETHOXAZOLE AND TRIMETHOPRIM 800; 160 MG/1; MG/1
1 TABLET ORAL 2 TIMES DAILY
Qty: 20 TABLET | Refills: 0 | Status: SHIPPED | OUTPATIENT
Start: 2022-10-24 | End: 2022-11-03

## 2022-10-24 NOTE — TELEPHONE ENCOUNTER
----- Message from Sheela Connor RN sent at 10/24/2022  1:13 PM EDT -----  Ordered labs.  She goes to [facility].  thanks

## 2022-11-04 ENCOUNTER — TELEPHONE (OUTPATIENT)
Dept: PRIMARY CARE CLINIC | Age: 67
End: 2022-11-04

## 2022-11-04 RX ORDER — SULFAMETHOXAZOLE AND TRIMETHOPRIM 800; 160 MG/1; MG/1
1 TABLET ORAL 2 TIMES DAILY
Qty: 20 TABLET | Refills: 3 | Status: SHIPPED | OUTPATIENT
Start: 2022-11-04

## 2022-11-04 NOTE — TELEPHONE ENCOUNTER
Veronika Domingo called. She has finished the ATB for boils. They are going away, but not completely gone.  Veronika Domingo is asking if you can order a refill of the ATB

## 2022-11-28 ENCOUNTER — OFFICE VISIT (OUTPATIENT)
Dept: PRIMARY CARE CLINIC | Age: 67
End: 2022-11-28
Payer: MEDICAID

## 2022-11-28 VITALS
SYSTOLIC BLOOD PRESSURE: 107 MMHG | HEART RATE: 64 BPM | BODY MASS INDEX: 31.96 KG/M2 | TEMPERATURE: 96.9 F | HEIGHT: 66 IN | RESPIRATION RATE: 18 BRPM | DIASTOLIC BLOOD PRESSURE: 80 MMHG | OXYGEN SATURATION: 95 %

## 2022-11-28 DIAGNOSIS — K59.00 CONSTIPATION, UNSPECIFIED CONSTIPATION TYPE: ICD-10-CM

## 2022-11-28 DIAGNOSIS — R60.0 EDEMA OF BOTH LEGS: ICD-10-CM

## 2022-11-28 DIAGNOSIS — G35 MULTIPLE SCLEROSIS (HCC): Primary | ICD-10-CM

## 2022-11-28 DIAGNOSIS — G62.9 PERIPHERAL POLYNEUROPATHY: ICD-10-CM

## 2022-11-28 DIAGNOSIS — R26.2 UNABLE TO WALK: ICD-10-CM

## 2022-11-28 DIAGNOSIS — J31.0 CHRONIC RHINITIS: ICD-10-CM

## 2022-11-28 PROCEDURE — 3078F DIAST BP <80 MM HG: CPT | Performed by: FAMILY MEDICINE

## 2022-11-28 PROCEDURE — 3074F SYST BP LT 130 MM HG: CPT | Performed by: FAMILY MEDICINE

## 2022-11-28 PROCEDURE — 1123F ACP DISCUSS/DSCN MKR DOCD: CPT | Performed by: FAMILY MEDICINE

## 2022-11-28 PROCEDURE — 99349 HOME/RES VST EST MOD MDM 40: CPT | Performed by: FAMILY MEDICINE

## 2022-11-28 NOTE — PROGRESS NOTES
11/28/2022     Home visit medically necessary in lieu of an office visit due to: wheelchair bound, difficult to get out. HPI:  Patient says she is sick. She has a sore throat and runny nose. She is also constipated. She is taking ClearLax 1 1/2 capfuls daily. She is also on Senakot-S and Amitiza are working. Her leg swelling continues to be a problem although she elevates them at night on wedge pillow. She still has drainage from L leg. She continues taking 40 mg Lasix. The boils on her lower abdomen are clearing it up on Bactrim. She is drinking more water. She has chronic back pain. She is doing more ADL on her own. REVIEW OF SYSTEMS: General: Weight stable, generally healthy, no change in strength or exercise tolerance. Heart: No palpitations, no syncope, no orthopnea. EDEMA OF LEGS AT TIMES. Abdomen: Chronic constipation. No change in appetite, no dysphagia, no abdominal pains, no bowel habit changes, no emesis, no melena. : No urinary urgency, no dysuria, no change in nature of urine. Neurologic:   Unable to walk without holding on to something. In w/c most of the time. No tremor, no seizures, no changes in mentation. All other systems negative. PAST MEDICAL HISTORY: (Major events, hospitalizations, surgeries): Pneumonia (2010), 1998 Vag hyst, 1978 naina, append, freq epidural inclusion cysts tx'd with docycycline. MS dx'd Mar 8, 1991. IV corticosteroids 1991-92. Has never been on MS meds. 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 Known allergies: NKDA. Ongoing medical problems: Multiple sclerosis, Asthma, HTN, hypoglycemia, scoliosis, DDD with sciatic, arthritis, osteoporosis, chronic LBP. Preventative: COVID vac - 3/22/21, 5/39322/[id number]([facility] booster),  Pneumovax 23 - 11/2009. Prevnar - 10/2019. Flu vac - 10/2020 (refused 11/2021). Smoking cessation counselling 7/2014 Social history:  with 3 children. Smokes 1-1 1/2 ppd 40 yrs. No alcohol. . 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 Nutritional history: Takes a lot of vitamins and supplements every day. PHYSICAL EXAM:       Vitals:    11/28/22 1247   BP: 107/80   Site: Left Wrist   Position: Sitting   Pulse: 64   Resp: 18   Temp: 96.9 °F (36.1 °C)   TempSrc: Infrared   SpO2: 95%   Weight: Comment: no weight   Height: 5' 6\" (1.676 m)     GEN: middle age overweight female patient sitting in WC in NAD. HEAD:  atraumatic, normocephalic. EYES:  EOMI, PERRL, conjunct normal. ENT:  EACs and TMs nl. ORAL: mouth without lesions, redness/swelling. Pharynx: PND. LUNGS: clear to ausc, no rales or rhonchi. HEART:  RRR, no murmurs or gallops. ABD:  Obese, soft, non-tender to palp, no palp masses or HSM, normal BS. BACK:  No CVAT. Scoliosis toward right / kyphosis,  tender to palp over L lumbar area. EXTREM: 1-2+ pitting edema BLE with leakage from L leg. Venous stasis changes. Healing scab to left calf. No ulcerations, varicosities or erythema, deformities. MUSCULOSKEL: Unable to make a strong fist, good ROM of most joints, non-tender to palp and with movement, except lower extremeties which are weakened from [address] and non-wt bearing. WC bound from [address]. SKIN: Scattered pustules and furuncles of trunk. No other lesions, erythema, ulcers or drainage. NEURO:  Normal motor for her. No tremor, normal sensory, able to stand and transfer with much effort. Unable to walk. SKIN: No drainage  No ulcerations or breakdown, ecchymosis, or other lesions. Medications reviewed. Labs: 7/12/22 UA+  2/28/22 HH 15.6/48, GFR>60, Glu 107, LFT nl, TSH 0.997, Vit D 34, Vit B12 437  12/17/21 UA CX e.coli  7/23/21 HH 15/45, GFR 55, lytes nl     ASSESSMENT:  Elderly female with has Neck pain; Multiple sclerosis ([address] Bilateral foot-drop; Peripheral polyneuropathy; Essential hypertension; Asthma; Edema of both legs; Constipation;  Intervertebral lumbar disc disorder with myelopathy, lumbar region; Cervical spinal stenosis; Pulmonary venous congestion; Fatigue; Hx of appendectomy; Hx of cholecystectomy; Hx of hysterectomy; Chronic rhinitis; Congestion of upper airway; Recurrent UTI; Geographic tongue; Eczema; Folliculitis; and Unable to walk on their problem list.     Amanda Calderon was seen today for pharyngitis, nasal congestion and constipation. Diagnoses and all orders for this visit:    Multiple sclerosis ([address]    Peripheral polyneuropathy    Chronic rhinitis    Edema of both legs    Constipation, unspecified constipation type    Unable to walk    PLAN:   Encourage her to labs done. Cold Eeze q2h x 2 days, Afrin BID prn congestion. Increase ClearLax to 1 capful BID. Use Bactrim DS for skin boils. Continue 40 mg Lasix daily and elevate legs on wedge pillow to reduce leg swelling. Encouraged to continue exercises at home. She needs to call for appointments with specialists and obtain testing. Use Ceftin x 3 days at first sign of UTI. Recheck 1 month. 40 minutes spent on visit, 25 minutes involved education/counseling regarding MS, BLE edema, HTN disease processes, treatment options, meds and coordination of care. Current Outpatient Medications   Medication Sig Dispense Refill    dextromethorphan-guaiFENesin (MUCINEX DM)  MG per extended release tablet Take 1 tablet by mouth every 12 hours as needed for Congestion 60 tablet 5    sulfamethoxazole-trimethoprim (BACTRIM DS;SEPTRA DS) 800-160 MG per tablet Take 1 tablet by mouth 2 times daily 20 tablet 3    guaiFENesin (ROBAFEN MUCUS/CHEST CONGESTION) 100 MG/5ML liquid TAKE 10 MLS BY MOUTH 3 TIMES DAILY AS NEEDED FOR CONGESTION 240 mL 11    Cholecalciferol (VITAMIN D3) 125 MCG (5000 UT) CAPS TAKE ONE CAPSULE BY MOUTH DAILY 90 capsule 3    losartan (COZAAR) 50 MG tablet TAKE ONE TABLET BY MOUTH DAILY 90 tablet 3    benzoyl peroxide 10 % external wash Wash affected areas 2-3 times weekly.  227 g 5    mupirocin (BACTROBAN) 2 % ointment APPLY TOPICALLY 3 TIMES DAILY TO AFFECTED AREA      clotrimazole (LOTRIMIN) 1 % cream APPLY TOPICALLY TO AFFECTED AREA OF SKIN RASH 2 TIMES DAILY AS NEEDED 28 g 5    VENTOLIN  (90 Base) MCG/ACT inhaler INHALE TWO PUFFS BY MOUTH EVERY 4 TO 6 HOURS AS NEEDED FOR WHEEZING 18 g 11    loratadine (CLARITIN) 10 MG tablet Take 1 tablet by mouth daily 30 tablet 11    ipratropium (ATROVENT) 0.06 % nasal spray 2 sprays by Each Nostril route 4 times daily 15 mL 11    furosemide (LASIX) 40 MG tablet Take 1 tablet by mouth daily 90 tablet 3    ketoconazole (NIZORAL) 2 % shampoo Shampoo daily as needed. Leave on scalp for 10 minutes before rinsing. 120 mL 3    docusate sodium (COLACE) 100 MG capsule TAKE ONE CAPSULE BY MOUTH TWO TIMES A DAY AS NEEDED FOR CONSTIPATION 60 capsule 11    polyethylene glycol (GLYCOLAX) 17 g packet Take 25 g by mouth daily as needed for Constipation 527 g 11    ondansetron (ZOFRAN-ODT) 4 MG disintegrating tablet DISSOLVE ONE TABLET IN MOUTH THREE TIMES A DAY AS NEEDED FOR NAUSEA OR VOMITING 21 tablet 3    albuterol (PROVENTIL) (2.5 MG/3ML) 0.083% nebulizer solution Take 3 mLs by nebulization every 4 hours as needed for Shortness of Breath 180 each 11    AMITIZA 8 MCG CAPS capsule TAKE ONE CAPSULE BY MOUTH TWICE A DAY WITH MEALS 60 capsule 11    metoprolol tartrate (LOPRESSOR) 50 MG tablet TAKE ONE TABLET BY MOUTH TWO TIMES DAILY 180 tablet 3    cetirizine (ZYRTEC) 10 MG tablet Take 1 tablet by mouth daily 90 tablet 3    loperamide (IMODIUM A-D) 2 MG tablet Take 1 tablet by mouth 4 times daily as needed for Diarrhea 30 tablet 2    ammonium lactate (LAC-HYDRIN) 12 % lotion Apply topically as needed. 1 each 11    vitamin E 1000 units capsule Take 1 capsule by mouth daily 90 capsule 3    Magnesium Citrate 1.745 GM/30ML solution Drink 1/2 bottle on day 1.  If no results drink the other half on day 2. 300 mL 0    fluticasone (FLONASE) 50 MCG/ACT nasal spray 1 spray by Each Nostril route daily 16 g 11    sennosides-docusate sodium (SENOKOT-S) 8.6-50 MG tablet Take 1 tablet by mouth daily 30 tablet 11    cyclobenzaprine (FLEXERIL) 5 MG tablet Take 5 mg by mouth 3 times daily as needed for Muscle spasms      Propylhexedrine (BENZEDREX) INHA Use every 3-4 hours as needed for nasal congestion. 1 each 5    Disposable Gloves (VINYL GLOVES) MISC 1 box by Does not apply route as needed (patient care) 2 each 11    Incontinence Supply Disposable (UNDERPADS REGULAR) MISC 60 each by Does not apply route as needed (Incontinence) 60 Bottle 11    potassium chloride (KLOR-CON) 10 MEQ extended release tablet Take 2 tablets by mouth daily as needed (with Lasix) 60 tablet 11    Incontinence Supply Disposable (PROTECTIVE UNDERWEAR LARGE) MISC Use as directed 56 Bottle 11    Incontinence Supply Disposable (POISE MAXIMUM ABSORBENCY) PADS USE AS DIRECTED 54 each 11    albuterol sulfate (PROAIR RESPICLICK) 345 (90 Base) MCG/ACT aerosol powder inhalation Inhale into the lungs every 4 hours as needed for Wheezing or Shortness of Breath      melatonin 3 MG TABS tablet Take 1 tablet by mouth nightly as needed (insomnia) 100 tablet 3    Oral Electrolytes (PEDIALYTE) SOLN Drink daily for good fluid intake. 1000 mL 5    b complex vitamins capsule Take 1 capsule by mouth daily      acetaminophen (TYLENOL) 325 MG tablet Take 2 tablets by mouth every 4 hours as needed for Pain or Fever 120 tablet 3    Ascorbic Acid (VITAMIN C) 1000 MG tablet Take 1,000 mg by mouth daily       No current facility-administered medications for this visit. Return in about 1 month (around 12/28/2022). An electronic signature was used to authenticate this note.     --Brock Coello MD on 11/28/2022 at 1:09 PM

## 2022-11-30 ENCOUNTER — TELEPHONE (OUTPATIENT)
Dept: PRIMARY CARE CLINIC | Age: 67
End: 2022-11-30

## 2022-11-30 DIAGNOSIS — J40 BRONCHITIS: Primary | ICD-10-CM

## 2022-11-30 RX ORDER — AZITHROMYCIN 250 MG/1
250 TABLET, FILM COATED ORAL SEE ADMIN INSTRUCTIONS
Qty: 6 TABLET | Refills: 0 | Status: SHIPPED | OUTPATIENT
Start: 2022-11-30 | End: 2022-12-05

## 2022-11-30 NOTE — TELEPHONE ENCOUNTER
I sent eRx for Sheron Heading to [facility]. She can also increase Mucinex DM to 2 tabs BID for cough.

## 2022-11-30 NOTE — TELEPHONE ENCOUNTER
Aniya Quintanilla called. She states she is getting worse. The Mucinex DM isn't working and she thinks she needs an ATB for her cough.

## 2022-12-05 ENCOUNTER — TELEPHONE (OUTPATIENT)
Dept: PRIMARY CARE CLINIC | Age: 67
End: 2022-12-05

## 2022-12-05 NOTE — TELEPHONE ENCOUNTER
Sounds like she has a cold virus (1 of which is COVID). She needs to take increased dose of Mucinex. She needs to get Afrin nasal and do 2 sprays in each nostril twice daily. If she is really bad, she might want to go to ER to be checked.

## 2022-12-05 NOTE — TELEPHONE ENCOUNTER
Steve Mcdaniels called. She states she is wheezing when she breathes. She finished her Z-Ed yesterday. She has R ear pain and sore throat. She also has a lot of nasal congestion. She is taking the Mucinex, but did not take the increased dose. Instructed her to take the increased dose as ordered. States she has no smell or taste. States she has diarrhea also and headached. No fever.  Please advise

## 2022-12-18 ENCOUNTER — APPOINTMENT (OUTPATIENT)
Dept: GENERAL RADIOLOGY | Age: 67
DRG: 720 | End: 2022-12-18
Payer: MEDICAID

## 2022-12-18 ENCOUNTER — APPOINTMENT (OUTPATIENT)
Dept: CT IMAGING | Age: 67
DRG: 720 | End: 2022-12-18
Payer: MEDICAID

## 2022-12-18 ENCOUNTER — HOSPITAL ENCOUNTER (INPATIENT)
Age: 67
LOS: 3 days | Discharge: NURSING, REHAB OR LONG-TERM CARE | DRG: 720 | End: 2022-12-21
Attending: EMERGENCY MEDICINE | Admitting: INTERNAL MEDICINE
Payer: MEDICAID

## 2022-12-18 DIAGNOSIS — U07.1 COVID-19: Primary | ICD-10-CM

## 2022-12-18 DIAGNOSIS — N20.0 STAGHORN CALCULUS: ICD-10-CM

## 2022-12-18 DIAGNOSIS — J96.01 ACUTE RESPIRATORY FAILURE WITH HYPOXIA (HCC): ICD-10-CM

## 2022-12-18 DIAGNOSIS — N39.0 URINARY TRACT INFECTION WITHOUT HEMATURIA, SITE UNSPECIFIED: ICD-10-CM

## 2022-12-18 LAB
ALBUMIN SERPL-MCNC: 3.8 G/DL (ref 3.5–5.2)
ALP BLD-CCNC: 103 U/L (ref 35–104)
ALT SERPL-CCNC: 52 U/L (ref 0–32)
ANION GAP SERPL CALCULATED.3IONS-SCNC: 12 MMOL/L (ref 7–16)
AST SERPL-CCNC: 59 U/L (ref 0–31)
BACTERIA: ABNORMAL /HPF
BASOPHILS ABSOLUTE: 0.08 E9/L (ref 0–0.2)
BASOPHILS RELATIVE PERCENT: 0.9 % (ref 0–2)
BILIRUB SERPL-MCNC: 0.4 MG/DL (ref 0–1.2)
BILIRUBIN URINE: NEGATIVE
BLOOD, URINE: ABNORMAL
BUN BLDV-MCNC: 13 MG/DL (ref 6–23)
C-REACTIVE PROTEIN: 1.3 MG/DL (ref 0–0.4)
CALCIUM SERPL-MCNC: 9.7 MG/DL (ref 8.6–10.2)
CHLORIDE BLD-SCNC: 105 MMOL/L (ref 98–107)
CLARITY: ABNORMAL
CO2: 21 MMOL/L (ref 22–29)
COLOR: YELLOW
CREAT SERPL-MCNC: 0.8 MG/DL (ref 0.5–1)
D DIMER: 348 NG/ML DDU
EKG ATRIAL RATE: 80 BPM
EKG P AXIS: 4 DEGREES
EKG P-R INTERVAL: 106 MS
EKG Q-T INTERVAL: 396 MS
EKG QRS DURATION: 90 MS
EKG QTC CALCULATION (BAZETT): 456 MS
EKG R AXIS: -5 DEGREES
EKG T AXIS: 56 DEGREES
EKG VENTRICULAR RATE: 80 BPM
EOSINOPHILS ABSOLUTE: 0.25 E9/L (ref 0.05–0.5)
EOSINOPHILS RELATIVE PERCENT: 2.9 % (ref 0–6)
EPITHELIAL CELLS, UA: ABNORMAL /HPF
FERRITIN: 243 NG/ML
GFR SERPL CREATININE-BSD FRML MDRD: >60 ML/MIN/1.73
GLUCOSE BLD-MCNC: 105 MG/DL (ref 74–99)
GLUCOSE URINE: NEGATIVE MG/DL
HCT VFR BLD CALC: 43.9 % (ref 34–48)
HEMOGLOBIN: 14.2 G/DL (ref 11.5–15.5)
IMMATURE GRANULOCYTES #: 0.15 E9/L
IMMATURE GRANULOCYTES %: 1.8 % (ref 0–5)
INFLUENZA A BY PCR: NOT DETECTED
INFLUENZA B BY PCR: NOT DETECTED
KETONES, URINE: NEGATIVE MG/DL
LACTATE DEHYDROGENASE: 171 U/L (ref 135–214)
LACTATE DEHYDROGENASE: 200 U/L (ref 135–214)
LACTIC ACID: 2.5 MMOL/L (ref 0.5–2.2)
LEUKOCYTE ESTERASE, URINE: ABNORMAL
LIPASE: 46 U/L (ref 13–60)
LYMPHOCYTES ABSOLUTE: 1.19 E9/L (ref 1.5–4)
LYMPHOCYTES RELATIVE PERCENT: 14 % (ref 20–42)
MCH RBC QN AUTO: 34.2 PG (ref 26–35)
MCHC RBC AUTO-ENTMCNC: 32.6 % (ref 32–34.5)
MCV RBC AUTO: 105 FL (ref 80–99.9)
MONOCYTES ABSOLUTE: 1.56 E9/L (ref 0.1–0.95)
MONOCYTES RELATIVE PERCENT: 18.3 % (ref 2–12)
NEUTROPHILS ABSOLUTE: 5.29 E9/L (ref 1.8–7.3)
NEUTROPHILS RELATIVE PERCENT: 62.1 % (ref 43–80)
NITRITE, URINE: NEGATIVE
PDW BLD-RTO: 14.4 FL (ref 11.5–15)
PH UA: 6.5 (ref 5–9)
PLATELET # BLD: 322 E9/L (ref 130–450)
PMV BLD AUTO: 9.8 FL (ref 7–12)
POTASSIUM REFLEX MAGNESIUM: 4.2 MMOL/L (ref 3.5–5)
PRO-BNP: 347 PG/ML (ref 0–125)
PROCALCITONIN: 0.17 NG/ML (ref 0–0.08)
PROTEIN UA: 30 MG/DL
RBC # BLD: 4.18 E12/L (ref 3.5–5.5)
RBC UA: ABNORMAL /HPF (ref 0–2)
SARS-COV-2, NAAT: DETECTED
SODIUM BLD-SCNC: 138 MMOL/L (ref 132–146)
SPECIFIC GRAVITY UA: 1.02 (ref 1–1.03)
TOTAL PROTEIN: 6.6 G/DL (ref 6.4–8.3)
TROPONIN, HIGH SENSITIVITY: 52 NG/L (ref 0–9)
TROPONIN, HIGH SENSITIVITY: 56 NG/L (ref 0–9)
TROPONIN, HIGH SENSITIVITY: 66 NG/L (ref 0–9)
TROPONIN, HIGH SENSITIVITY: 70 NG/L (ref 0–9)
UROBILINOGEN, URINE: 0.2 E.U./DL
WBC # BLD: 8.5 E9/L (ref 4.5–11.5)
WBC UA: >20 /HPF (ref 0–5)

## 2022-12-18 PROCEDURE — 86140 C-REACTIVE PROTEIN: CPT

## 2022-12-18 PROCEDURE — 6370000000 HC RX 637 (ALT 250 FOR IP): Performed by: INTERNAL MEDICINE

## 2022-12-18 PROCEDURE — 85378 FIBRIN DEGRADE SEMIQUANT: CPT

## 2022-12-18 PROCEDURE — 93010 ELECTROCARDIOGRAM REPORT: CPT | Performed by: INTERNAL MEDICINE

## 2022-12-18 PROCEDURE — 87449 NOS EACH ORGANISM AG IA: CPT

## 2022-12-18 PROCEDURE — 87635 SARS-COV-2 COVID-19 AMP PRB: CPT

## 2022-12-18 PROCEDURE — 2580000003 HC RX 258: Performed by: STUDENT IN AN ORGANIZED HEALTH CARE EDUCATION/TRAINING PROGRAM

## 2022-12-18 PROCEDURE — 85025 COMPLETE CBC W/AUTO DIFF WBC: CPT

## 2022-12-18 PROCEDURE — 94664 DEMO&/EVAL PT USE INHALER: CPT

## 2022-12-18 PROCEDURE — 71045 X-RAY EXAM CHEST 1 VIEW: CPT

## 2022-12-18 PROCEDURE — 6360000004 HC RX CONTRAST MEDICATION: Performed by: RADIOLOGY

## 2022-12-18 PROCEDURE — 81001 URINALYSIS AUTO W/SCOPE: CPT

## 2022-12-18 PROCEDURE — 71275 CT ANGIOGRAPHY CHEST: CPT

## 2022-12-18 PROCEDURE — 6370000000 HC RX 637 (ALT 250 FOR IP): Performed by: STUDENT IN AN ORGANIZED HEALTH CARE EDUCATION/TRAINING PROGRAM

## 2022-12-18 PROCEDURE — 84484 ASSAY OF TROPONIN QUANT: CPT

## 2022-12-18 PROCEDURE — 83615 LACTATE (LD) (LDH) ENZYME: CPT

## 2022-12-18 PROCEDURE — 36415 COLL VENOUS BLD VENIPUNCTURE: CPT

## 2022-12-18 PROCEDURE — 82728 ASSAY OF FERRITIN: CPT

## 2022-12-18 PROCEDURE — 2700000000 HC OXYGEN THERAPY PER DAY

## 2022-12-18 PROCEDURE — 83690 ASSAY OF LIPASE: CPT

## 2022-12-18 PROCEDURE — 84145 PROCALCITONIN (PCT): CPT

## 2022-12-18 PROCEDURE — 87502 INFLUENZA DNA AMP PROBE: CPT

## 2022-12-18 PROCEDURE — 2060000000 HC ICU INTERMEDIATE R&B

## 2022-12-18 PROCEDURE — 93005 ELECTROCARDIOGRAM TRACING: CPT | Performed by: STUDENT IN AN ORGANIZED HEALTH CARE EDUCATION/TRAINING PROGRAM

## 2022-12-18 PROCEDURE — 2580000003 HC RX 258: Performed by: INTERNAL MEDICINE

## 2022-12-18 PROCEDURE — 83880 ASSAY OF NATRIURETIC PEPTIDE: CPT

## 2022-12-18 PROCEDURE — 6360000002 HC RX W HCPCS: Performed by: INTERNAL MEDICINE

## 2022-12-18 PROCEDURE — 6360000002 HC RX W HCPCS: Performed by: EMERGENCY MEDICINE

## 2022-12-18 PROCEDURE — 94640 AIRWAY INHALATION TREATMENT: CPT

## 2022-12-18 PROCEDURE — 80053 COMPREHEN METABOLIC PANEL: CPT

## 2022-12-18 PROCEDURE — 83605 ASSAY OF LACTIC ACID: CPT

## 2022-12-18 PROCEDURE — 96374 THER/PROPH/DIAG INJ IV PUSH: CPT

## 2022-12-18 PROCEDURE — 74177 CT ABD & PELVIS W/CONTRAST: CPT

## 2022-12-18 PROCEDURE — 99285 EMERGENCY DEPT VISIT HI MDM: CPT

## 2022-12-18 RX ORDER — VITAMIN B COMPLEX
2000 TABLET ORAL DAILY
Status: DISCONTINUED | OUTPATIENT
Start: 2022-12-18 | End: 2022-12-21 | Stop reason: HOSPADM

## 2022-12-18 RX ORDER — CYCLOBENZAPRINE HCL 5 MG
5 TABLET ORAL 3 TIMES DAILY PRN
Status: DISCONTINUED | OUTPATIENT
Start: 2022-12-18 | End: 2022-12-21 | Stop reason: HOSPADM

## 2022-12-18 RX ORDER — ACETAMINOPHEN 650 MG/1
650 SUPPOSITORY RECTAL EVERY 6 HOURS PRN
Status: DISCONTINUED | OUTPATIENT
Start: 2022-12-18 | End: 2022-12-21 | Stop reason: HOSPADM

## 2022-12-18 RX ORDER — ALBUTEROL SULFATE 2.5 MG/3ML
2.5 SOLUTION RESPIRATORY (INHALATION) EVERY 6 HOURS PRN
Status: DISCONTINUED | OUTPATIENT
Start: 2022-12-18 | End: 2022-12-21 | Stop reason: HOSPADM

## 2022-12-18 RX ORDER — ONDANSETRON 2 MG/ML
4 INJECTION INTRAMUSCULAR; INTRAVENOUS EVERY 6 HOURS PRN
Status: DISCONTINUED | OUTPATIENT
Start: 2022-12-18 | End: 2022-12-21 | Stop reason: HOSPADM

## 2022-12-18 RX ORDER — IPRATROPIUM BROMIDE AND ALBUTEROL SULFATE 2.5; .5 MG/3ML; MG/3ML
1 SOLUTION RESPIRATORY (INHALATION) ONCE
Status: COMPLETED | OUTPATIENT
Start: 2022-12-18 | End: 2022-12-18

## 2022-12-18 RX ORDER — SODIUM CHLORIDE 9 MG/ML
INJECTION, SOLUTION INTRAVENOUS CONTINUOUS
Status: DISCONTINUED | OUTPATIENT
Start: 2022-12-18 | End: 2022-12-20

## 2022-12-18 RX ORDER — IPRATROPIUM BROMIDE AND ALBUTEROL SULFATE 2.5; .5 MG/3ML; MG/3ML
1 SOLUTION RESPIRATORY (INHALATION)
Status: DISCONTINUED | OUTPATIENT
Start: 2022-12-18 | End: 2022-12-21 | Stop reason: HOSPADM

## 2022-12-18 RX ORDER — DOCUSATE SODIUM 100 MG/1
100 CAPSULE, LIQUID FILLED ORAL 2 TIMES DAILY
Status: DISCONTINUED | OUTPATIENT
Start: 2022-12-18 | End: 2022-12-21 | Stop reason: HOSPADM

## 2022-12-18 RX ORDER — CETIRIZINE HYDROCHLORIDE 10 MG/1
10 TABLET ORAL DAILY
Status: DISCONTINUED | OUTPATIENT
Start: 2022-12-18 | End: 2022-12-20

## 2022-12-18 RX ORDER — LUBIPROSTONE 8 UG/1
8 CAPSULE ORAL 2 TIMES DAILY WITH MEALS
Status: DISCONTINUED | OUTPATIENT
Start: 2022-12-18 | End: 2022-12-21 | Stop reason: HOSPADM

## 2022-12-18 RX ORDER — ACETAMINOPHEN 325 MG/1
650 TABLET ORAL EVERY 4 HOURS PRN
Status: DISCONTINUED | OUTPATIENT
Start: 2022-12-18 | End: 2022-12-18 | Stop reason: SDUPTHER

## 2022-12-18 RX ORDER — LOSARTAN POTASSIUM 50 MG/1
50 TABLET ORAL NIGHTLY
Status: ON HOLD | COMMUNITY
End: 2022-12-21 | Stop reason: SDUPTHER

## 2022-12-18 RX ORDER — ENOXAPARIN SODIUM 100 MG/ML
30 INJECTION SUBCUTANEOUS 2 TIMES DAILY
Status: DISCONTINUED | OUTPATIENT
Start: 2022-12-18 | End: 2022-12-21 | Stop reason: HOSPADM

## 2022-12-18 RX ORDER — BUDESONIDE 0.5 MG/2ML
500 INHALANT ORAL 2 TIMES DAILY
Status: DISCONTINUED | OUTPATIENT
Start: 2022-12-18 | End: 2022-12-21 | Stop reason: HOSPADM

## 2022-12-18 RX ORDER — ASCORBIC ACID 500 MG
1000 TABLET ORAL DAILY
Status: DISCONTINUED | OUTPATIENT
Start: 2022-12-18 | End: 2022-12-21 | Stop reason: HOSPADM

## 2022-12-18 RX ORDER — POLYETHYLENE GLYCOL 3350 17 G/17G
25 POWDER, FOR SOLUTION ORAL DAILY PRN
Status: DISCONTINUED | OUTPATIENT
Start: 2022-12-18 | End: 2022-12-19

## 2022-12-18 RX ORDER — 0.9 % SODIUM CHLORIDE 0.9 %
500 INTRAVENOUS SOLUTION INTRAVENOUS ONCE
Status: COMPLETED | OUTPATIENT
Start: 2022-12-18 | End: 2022-12-18

## 2022-12-18 RX ORDER — DEXAMETHASONE SODIUM PHOSPHATE 10 MG/ML
6 INJECTION INTRAMUSCULAR; INTRAVENOUS EVERY 24 HOURS
Status: DISCONTINUED | OUTPATIENT
Start: 2022-12-19 | End: 2022-12-21 | Stop reason: HOSPADM

## 2022-12-18 RX ORDER — IPRATROPIUM BROMIDE 42 UG/1
2 SPRAY, METERED NASAL DAILY
COMMUNITY

## 2022-12-18 RX ORDER — ZINC OXIDE 13 %
1 CREAM (GRAM) TOPICAL DAILY
COMMUNITY

## 2022-12-18 RX ORDER — VIT C/B6/B5/MAGNESIUM/HERB 173 50-5-6-5MG
500 CAPSULE ORAL DAILY
COMMUNITY

## 2022-12-18 RX ORDER — AZITHROMYCIN 250 MG/1
500 TABLET, FILM COATED ORAL DAILY
Status: DISCONTINUED | OUTPATIENT
Start: 2022-12-18 | End: 2022-12-21 | Stop reason: HOSPADM

## 2022-12-18 RX ORDER — DOCUSATE SODIUM 100 MG/1
200 CAPSULE, LIQUID FILLED ORAL NIGHTLY
Status: ON HOLD | COMMUNITY
End: 2022-12-21 | Stop reason: HOSPADM

## 2022-12-18 RX ORDER — ACETAMINOPHEN 325 MG/1
650 TABLET ORAL EVERY 6 HOURS PRN
Status: DISCONTINUED | OUTPATIENT
Start: 2022-12-18 | End: 2022-12-21 | Stop reason: HOSPADM

## 2022-12-18 RX ORDER — ONDANSETRON 4 MG/1
4 TABLET, ORALLY DISINTEGRATING ORAL EVERY 8 HOURS PRN
Status: DISCONTINUED | OUTPATIENT
Start: 2022-12-18 | End: 2022-12-21 | Stop reason: HOSPADM

## 2022-12-18 RX ORDER — DEXAMETHASONE SODIUM PHOSPHATE 10 MG/ML
10 INJECTION INTRAMUSCULAR; INTRAVENOUS ONCE
Status: COMPLETED | OUTPATIENT
Start: 2022-12-18 | End: 2022-12-18

## 2022-12-18 RX ADMIN — WATER 1000 MG: 1 INJECTION INTRAMUSCULAR; INTRAVENOUS; SUBCUTANEOUS at 13:21

## 2022-12-18 RX ADMIN — DOCUSATE SODIUM 100 MG: 100 CAPSULE, LIQUID FILLED ORAL at 21:46

## 2022-12-18 RX ADMIN — IPRATROPIUM BROMIDE AND ALBUTEROL SULFATE 1 AMPULE: .5; 2.5 SOLUTION RESPIRATORY (INHALATION) at 08:24

## 2022-12-18 RX ADMIN — OXYCODONE HYDROCHLORIDE AND ACETAMINOPHEN 1000 MG: 500 TABLET ORAL at 13:19

## 2022-12-18 RX ADMIN — DOCUSATE SODIUM 100 MG: 100 CAPSULE, LIQUID FILLED ORAL at 13:19

## 2022-12-18 RX ADMIN — METOPROLOL TARTRATE 25 MG: 25 TABLET, FILM COATED ORAL at 21:46

## 2022-12-18 RX ADMIN — SODIUM CHLORIDE: 9 INJECTION, SOLUTION INTRAVENOUS at 13:31

## 2022-12-18 RX ADMIN — DEXAMETHASONE SODIUM PHOSPHATE 10 MG: 10 INJECTION INTRAMUSCULAR; INTRAVENOUS at 09:54

## 2022-12-18 RX ADMIN — AZITHROMYCIN MONOHYDRATE 500 MG: 250 TABLET ORAL at 13:19

## 2022-12-18 RX ADMIN — ENOXAPARIN SODIUM 30 MG: 100 INJECTION SUBCUTANEOUS at 13:20

## 2022-12-18 RX ADMIN — IOPAMIDOL 75 ML: 755 INJECTION, SOLUTION INTRAVENOUS at 11:28

## 2022-12-18 RX ADMIN — ACETAMINOPHEN 650 MG: 325 TABLET ORAL at 13:19

## 2022-12-18 RX ADMIN — CETIRIZINE HYDROCHLORIDE 10 MG: 10 TABLET, FILM COATED ORAL at 13:20

## 2022-12-18 RX ADMIN — IPRATROPIUM BROMIDE AND ALBUTEROL SULFATE 1 AMPULE: .5; 2.5 SOLUTION RESPIRATORY (INHALATION) at 13:35

## 2022-12-18 RX ADMIN — METOPROLOL TARTRATE 25 MG: 25 TABLET, FILM COATED ORAL at 13:20

## 2022-12-18 RX ADMIN — ENOXAPARIN SODIUM 30 MG: 100 INJECTION SUBCUTANEOUS at 21:47

## 2022-12-18 RX ADMIN — SODIUM CHLORIDE 500 ML: 9 INJECTION, SOLUTION INTRAVENOUS at 07:53

## 2022-12-18 ASSESSMENT — ENCOUNTER SYMPTOMS
DIARRHEA: 0
ABDOMINAL PAIN: 0
SHORTNESS OF BREATH: 0
COUGH: 1
VOMITING: 0
NAUSEA: 0
COLOR CHANGE: 0
BACK PAIN: 0

## 2022-12-18 ASSESSMENT — PAIN SCALES - GENERAL
PAINLEVEL_OUTOF10: 0
PAINLEVEL_OUTOF10: 3

## 2022-12-18 ASSESSMENT — LIFESTYLE VARIABLES: HOW OFTEN DO YOU HAVE A DRINK CONTAINING ALCOHOL: NEVER

## 2022-12-18 ASSESSMENT — PAIN - FUNCTIONAL ASSESSMENT: PAIN_FUNCTIONAL_ASSESSMENT: NONE - DENIES PAIN

## 2022-12-18 NOTE — ED NOTES
Pt report called to 5th floor.  Pt stable and ready for transport      Luis F Ignacio RN  12/18/22 [phone]

## 2022-12-18 NOTE — ED PROVIDER NOTES
HPI   This is a 69-year-old female patient with past history of MS presenting to emergency department for evaluation of fever and generalized fatigue. She has been treated by her PCP with a Z-Ed few weeks prior. She notes symptoms on and off since Thanksgiving. She has associated congestion, cough, sore throat and body aches. Denying any nausea vomiting or diarrhea. She does have some left-sided upper abdominal pain, states she has not had a bowel movement in few days and believes she is constipated as well. She did have fever of 101.9 at home prior to arrival took 2 Tylenol with improvement. denying any urinary complaints. She states she is not having an MS flare. Patient symptoms are moderate in severity. Patient does not ambulate but she is able to stand and bear weight. Week  Review of Systems   Constitutional:  Positive for fatigue and fever. Negative for chills. HENT:  Positive for congestion. Respiratory:  Positive for cough. Negative for shortness of breath. Cardiovascular:  Negative for chest pain. Gastrointestinal:  Negative for abdominal pain, diarrhea, nausea and vomiting. Genitourinary:  Negative for difficulty urinating, dysuria and hematuria. Musculoskeletal:  Negative for back pain. Skin:  Negative for color change. All other systems reviewed and are negative. Physical Exam  Vitals and nursing note reviewed. Constitutional:       Appearance: Normal appearance. HENT:      Head: Normocephalic and atraumatic. Nose: Congestion present. Mouth/Throat:      Mouth: Mucous membranes are moist.      Pharynx: Oropharynx is clear. Eyes:      Conjunctiva/sclera: Conjunctivae normal.      Pupils: Pupils are equal, round, and reactive to light. Cardiovascular:      Rate and Rhythm: Normal rate and regular rhythm. Pulses: Normal pulses. Heart sounds: Normal heart sounds. Pulmonary:      Effort: Pulmonary effort is normal. No respiratory distress. DO Blanca       --------------------------------------------- PAST HISTORY ---------------------------------------------  Past Medical History:  has a past medical history of Arthritis, Asthma, COPD (chronic obstructive pulmonary disease) ([facility] [address] Eczema, Fall, Gout, Hematoma of abdominal wall, Hypertension, Metatarsal fracture, Multiple sclerosis ([facility] [address] Prolonged emergence from general anesthesia, Scoliosis, Spinal stenosis, and UTI (urinary tract infection). Past Surgical History:  has a past surgical history that includes Hysterectomy; Cholecystectomy; Appendectomy; CYSTOSCOPY INSERTION / REMOVAL STENT / STONE (Right, 7/26/2020); and Lithotripsy (Left, 8/7/2020). Social History:  reports that she quit smoking about 3 years ago. Her smoking use included cigarettes. She has a 11.50 pack-year smoking history. She has never used smokeless tobacco. She reports that she does not drink alcohol and does not use drugs. Family History: family history includes Asthma in her father; Breast Cancer in her sister; Cancer in her maternal aunt; Heart Failure in her mother; High Blood Pressure in her father. The patients home medications have been reviewed.     Allergies: Lyrica [pregabalin] and Adhesive tape    -------------------------------------------------- RESULTS -------------------------------------------------    LABS:  Results for orders placed or performed during the hospital encounter of 12/18/22   COVID-19, Rapid    Specimen: Nasopharyngeal Swab   Result Value Ref Range    SARS-CoV-2, NAAT DETECTED (A) Not Detected   Rapid influenza A/B antigens    Specimen: Nasopharyngeal   Result Value Ref Range    Influenza A by PCR Not Detected Not Detected    Influenza B by PCR Not Detected Not Detected   CBC with Auto Differential   Result Value Ref Range    WBC 8.5 4.5 - 11.5 E9/L    RBC 4.18 3.50 - 5.50 E12/L    Hemoglobin 14.2 11.5 - 15.5 g/dL    Hematocrit 43.9 34.0 - 48.0 %    .0 (H) 80.0 - 99.9 fL MCH 34.2 26.0 - 35.0 pg    MCHC 32.6 32.0 - 34.5 %    RDW 14.4 11.5 - 15.0 fL    Platelets 365 983 - 333 E9/L    MPV 9.8 7.0 - 12.0 fL    Neutrophils % 62.1 43.0 - 80.0 %    Immature Granulocytes % 1.8 0.0 - 5.0 %    Lymphocytes % 14.0 (L) 20.0 - 42.0 %    Monocytes % 18.3 (H) 2.0 - 12.0 %    Eosinophils % 2.9 0.0 - 6.0 %    Basophils % 0.9 0.0 - 2.0 %    Neutrophils Absolute 5.29 1.80 - 7.30 E9/L    Immature Granulocytes # 0.15 E9/L    Lymphocytes Absolute 1.19 (L) 1.50 - 4.00 E9/L    Monocytes Absolute 1.56 (H) 0.10 - 0.95 E9/L    Eosinophils Absolute 0.25 0.05 - 0.50 E9/L    Basophils Absolute 0.08 0.00 - 0.20 E9/L   Comprehensive Metabolic Panel w/ Reflex to MG   Result Value Ref Range    Sodium 138 132 - 146 mmol/L    Potassium reflex Magnesium 4.2 3.5 - 5.0 mmol/L    Chloride 105 98 - 107 mmol/L    CO2 21 (L) 22 - 29 mmol/L    Anion Gap 12 7 - 16 mmol/L    Glucose 105 (H) 74 - 99 mg/dL    BUN 13 6 - 23 mg/dL    Creatinine 0.8 0.5 - 1.0 mg/dL    Est, Glom Filt Rate >60 >=60 mL/min/1.73    Calcium 9.7 8.6 - 10.2 mg/dL    Total Protein 6.6 6.4 - 8.3 g/dL    Albumin 3.8 3.5 - 5.2 g/dL    Total Bilirubin 0.4 0.0 - 1.2 mg/dL    Alkaline Phosphatase 103 35 - 104 U/L    ALT 52 (H) 0 - 32 U/L    AST 59 (H) 0 - 31 U/L   Troponin   Result Value Ref Range    Troponin, High Sensitivity 66 (H) 0 - 9 ng/L   Lipase   Result Value Ref Range    Lipase 46 13 - 60 U/L   Lactic Acid   Result Value Ref Range    Lactic Acid 2.5 (H) 0.5 - 2.2 mmol/L   Urinalysis with Microscopic   Result Value Ref Range    Color, UA Yellow Straw/Yellow    Clarity, UA CLOUDY (A) Clear    Glucose, Ur Negative Negative mg/dL    Bilirubin Urine Negative Negative    Ketones, Urine Negative Negative mg/dL    Specific Gravity, UA 1.025 1.005 - 1.030    Blood, Urine TRACE (A) Negative    pH, UA 6.5 5.0 - 9.0    Protein, UA 30 (A) Negative mg/dL    Urobilinogen, Urine 0.2 <2.0 E.U./dL    Nitrite, Urine Negative Negative    Leukocyte Esterase, Urine LARGE (A) Negative    WBC, UA >20 (A) 0 - 5 /HPF    RBC, UA 10-20 (A) 0 - 2 /HPF    Epithelial Cells, UA RARE /HPF    Bacteria, UA MANY (A) None Seen /HPF   Brain Natriuretic Peptide   Result Value Ref Range    Pro- (H) 0 - 125 pg/mL   Troponin   Result Value Ref Range    Troponin, High Sensitivity 70 (H) 0 - 9 ng/L   D-Dimer, Quantitative   Result Value Ref Range    D-Dimer, Quant 348 ng/mL DDU   Lactate Dehydrogenase   Result Value Ref Range     135 - 214 U/L   EKG 12 Lead   Result Value Ref Range    Ventricular Rate 80 BPM    Atrial Rate 80 BPM    P-R Interval 106 ms    QRS Duration 90 ms    Q-T Interval 396 ms    QTc Calculation (Bazett) 456 ms    P Axis 4 degrees    R Axis -5 degrees    T Axis 56 degrees       RADIOLOGY:  CT ABDOMEN PELVIS W IV CONTRAST Additional Contrast? None   Final Result   No evidence of pulmonary embolism. There is consolidation of the lower lung   fields bilaterally concerning for bibasilar infiltrate. Staghorn calculus identified within the left kidney with abnormal increased   density filling defects seen within the left renal pelvis extending into the   proximal ureter which an underlying lesion cannot be excluded. Cystography   and left retrograde pyelogram is recommended for further evaluation. The remainder of the abdomen and pelvis is grossly unremarkable with chronic   age related changes identified. .  Small nodule identified on the left adrenal   gland suggesting an adenoma. Diffuse fatty infiltration identified liver   with small hiatal hernia. Multiple cysts seen in the kidneys bilaterally. CTA PULMONARY W CONTRAST   Final Result   No evidence of pulmonary embolism. There is consolidation of the lower lung   fields bilaterally concerning for bibasilar infiltrate.       Staghorn calculus identified within the left kidney with abnormal increased   density filling defects seen within the left renal pelvis extending into the   proximal ureter which an underlying lesion cannot be excluded. Cystography   and left retrograde pyelogram is recommended for further evaluation. The remainder of the abdomen and pelvis is grossly unremarkable with chronic   age related changes identified. .  Small nodule identified on the left adrenal   gland suggesting an adenoma. Diffuse fatty infiltration identified liver   with small hiatal hernia. Multiple cysts seen in the kidneys bilaterally. XR CHEST PORTABLE   Final Result   1. Linear atelectasis within the left lung base   2. Mild emphysematous changes. 3. There are no findings of failure or pneumonia.               ------------------------- NURSING NOTES AND VITALS REVIEWED ---------------------------  Date / Time Roomed:  12/18/2022  6:54 AM  ED Bed Assignment:  13/13    The nursing notes within the ED encounter and vital signs as below have been reviewed. Patient Vitals for the past 24 hrs:   BP Temp Temp src Pulse Resp SpO2 Weight   12/18/22 1335 -- -- -- 76 -- 96 % --   12/18/22 1315 (!) 161/90 99 °F (37.2 °C) Oral 80 20 95 % --   12/18/22 1255 -- -- -- -- -- -- 198 lb (89.8 kg)   12/18/22 0951 (!) 166/82 98.2 °F (36.8 °C) Oral 69 18 90 % --   12/18/22 0824 -- -- -- 72 20 90 % --   12/18/22 0706 (!) 139/105 98.6 °F (37 °C) Oral 97 18 94 % --       Oxygen Saturation Interpretation: Normal      Counseling:  I have spoken with the patient and discussed todays results, in addition to providing specific details for the plan of care and counseling regarding the diagnosis and prognosis. Their questions are answered at this time and they are agreeable with the plan of admission.    --------------------------------- ADDITIONAL PROVIDER NOTES ---------------------------------  Consultations:  Spoke with Dr. Larry Kat. Discussed case. They will admit the patient.   This patient's ED course included: a personal history and physicial examination, re-evaluation prior to disposition, multiple bedside re-evaluations, IV medications, cardiac monitoring, and continuous pulse oximetry    This patient has remained hemodynamically stable during their ED course. Diagnosis:  1. COVID-19    2. Acute respiratory failure with hypoxia (HCC)    3. Staghorn calculus    4. Urinary tract infection without hematuria, site unspecified        Disposition:  Patient's disposition: Admit to telemetry  Patient's condition is stable.            Elizabeth Paniagua DO  Resident  12/18/22 [id number]

## 2022-12-18 NOTE — ED NOTES
O2 sat 90% on room air. O2 applied at 2L via n/c and O2 sat now 98%.      Rashida Rodriguez RN  12/18/22 3506

## 2022-12-18 NOTE — H&P
Taking? Authorizing Provider   dextromethorphan-guaiFENesin [facility] DM)  MG per extended release tablet Take 2 tablets by mouth every 12 hours as needed for Congestion 12/12/22   Eros Pearson MD   sulfamethoxazole-trimethoprim (BACTRIM DS;SEPTRA DS) 800-160 MG per tablet Take 1 tablet by mouth 2 times daily 11/4/22   Eros Pearson MD   guaiFENesin (ROBAFEN MUCUS/CHEST CONGESTION) 100 MG/5ML liquid TAKE 10 MLS BY MOUTH 3 TIMES DAILY AS NEEDED FOR CONGESTION 10/24/22   Eros Pearson MD   Cholecalciferol (VITAMIN D3) 125 MCG (5000 UT) CAPS TAKE ONE CAPSULE BY MOUTH DAILY 10/14/22   Eros Pearson MD   losartan (COZAAR) 50 MG tablet TAKE ONE TABLET BY MOUTH DAILY 10/3/22   Eros Pearson MD   benzoyl peroxide 10 % external wash Wash affected areas 2-3 times weekly. 9/30/22   Eros Pearson MD   mupirocin (BACTROBAN) 2 % ointment APPLY TOPICALLY 3 TIMES DAILY TO AFFECTED AREA 9/14/22   Historical Provider, MD   clotrimazole (LOTRIMIN) 1 % cream APPLY TOPICALLY TO AFFECTED AREA OF SKIN RASH 2 TIMES DAILY AS NEEDED 9/23/22   Tony Mitchell PA-C   VENTOLIN  (90 Base) MCG/ACT inhaler INHALE TWO PUFFS BY MOUTH EVERY 4 TO 6 HOURS AS NEEDED FOR WHEEZING 8/8/22   Eros Pearson MD   loratadine (CLARITIN) 10 MG tablet Take 1 tablet by mouth daily 7/1/22   Eros Pearson MD   ipratropium (ATROVENT) 0.06 % nasal spray 2 sprays by Each Nostril route 4 times daily 6/23/22   Eros Pearson MD   furosemide (LASIX) 40 MG tablet Take 1 tablet by mouth daily 6/13/22   Eros Pearson MD   ketoconazole (NIZORAL) 2 % shampoo Shampoo daily as needed. Leave on scalp for 10 minutes before rinsing.  6/3/22   Eros Pearson MD   docusate sodium (COLACE) 100 MG capsule TAKE ONE CAPSULE BY MOUTH TWO TIMES A DAY AS NEEDED FOR CONSTIPATION 5/16/22   Eros Pearson MD   polyethylene glycol (GLYCOLAX) 17 g packet Take 25 g by mouth daily as needed for Constipation 4/22/22   Tony Mitchell PA-C   ondansetron (ZOFRAN-ODT) 4 MG disintegrating tablet DISSOLVE ONE TABLET IN MOUTH THREE TIMES A DAY AS NEEDED FOR NAUSEA OR VOMITING 3/22/22   Samia Resendez MD   albuterol (PROVENTIL) (2.5 MG/3ML) 0.083% nebulizer solution Take 3 mLs by nebulization every 4 hours as needed for Shortness of Breath 2/7/22   Samia Resendez MD   AMITIZA 8 MCG CAPS capsule TAKE ONE CAPSULE BY MOUTH TWICE A DAY WITH MEALS 1/13/22   Samia Resendez MD   metoprolol tartrate (LOPRESSOR) 50 MG tablet TAKE ONE TABLET BY MOUTH TWO TIMES DAILY 12/17/21   Samia Resendez MD   cetirizine (ZYRTEC) 10 MG tablet Take 1 tablet by mouth daily 12/13/21   Samia Resendez MD   loperamide (IMODIUM A-D) 2 MG tablet Take 1 tablet by mouth 4 times daily as needed for Diarrhea 12/2/21   Samia Resendez MD   ammonium lactate (LAC-HYDRIN) 12 % lotion Apply topically as needed. 10/29/21   Samia Resendez MD   vitamin E 1000 units capsule Take 1 capsule by mouth daily 10/13/21   Samia Resendez MD   Magnesium Citrate 1.745 GM/30ML solution Drink 1/2 bottle on day 1. If no results drink the other half on day 2. 10/4/21   Payton Mcdonough PA-C   fluticasone (FLONASE) 50 MCG/ACT nasal spray 1 spray by Each Nostril route daily 9/28/21   Samia Resendez MD   sennosides-docusate sodium (SENOKOT-S) 8.6-50 MG tablet Take 1 tablet by mouth daily 9/28/21   Samia Resendez MD   cyclobenzaprine (FLEXERIL) 5 MG tablet Take 5 mg by mouth 3 times daily as needed for Muscle spasms 5/11/21   Samia Resendez MD   Propylhexedrine [facility]) INHA Use every 3-4 hours as needed for nasal congestion.  5/3/21   Samia Resendez MD   Disposable Gloves (VINYL GLOVES) MISC 1 box by Does not apply route as needed (patient care) 4/21/21   Samia Resendez MD   Incontinence Supply Disposable (UNDERPADS REGULAR) MISC 60 each by Does not apply route as needed (Incontinence) 3/9/21   Samia Resendez MD   potassium chloride (KLOR-CON) 10 MEQ extended release tablet Take 2 tablets by mouth daily as needed (with Lasix) 3/8/21   Samia Resendez MD   Incontinence Supply Disposable (PROTECTIVE UNDERWEAR LARGE) MISC Use as directed 2/25/21   Samia Resendez MD Incontinence Supply Disposable (POISE MAXIMUM ABSORBENCY) PADS USE AS DIRECTED 12/10/20   Zabrina Hernandez MD   albuterol sulfate (PROAIR RESPICLICK) 574 (90 Base) MCG/ACT aerosol powder inhalation Inhale into the lungs every 4 hours as needed for Wheezing or Shortness of Breath    Historical Provider, MD   melatonin 3 MG TABS tablet Take 1 tablet by mouth nightly as needed (insomnia) 8/21/20   Zabrina Hernandez MD   Oral Electrolytes (PEDIALYTE) SOLN Drink daily for good fluid intake.  8/21/20   Zabrina Hernandez MD   b complex vitamins capsule Take 1 capsule by mouth daily    Historical Provider, MD   acetaminophen (TYLENOL) 325 MG tablet Take 2 tablets by mouth every 4 hours as needed for Pain or Fever 7/30/20   Keith Awad DO   Ascorbic Acid (VITAMIN C) 1000 MG tablet Take 1,000 mg by mouth daily    Historical Provider, MD       ALLERGIES:  Lyrica [pregabalin] and Adhesive tape    SOCIAL Hx:  Social History     Socioeconomic History    Marital status:      Spouse name: Not on file    Number of children: 2    Years of education: Not on file    Highest education level: Not on file   Occupational History    Not on file   Tobacco Use    Smoking status: Former     Packs/day: 0.25     Years: 46.00     Pack years: 11.50     Types: Cigarettes     Quit date: 2/25/2019     Years since quitting: 3.8    Smokeless tobacco: Never   Vaping Use    Vaping Use: Never used   Substance and Sexual Activity    Alcohol use: No    Drug use: No    Sexual activity: Not Currently   Other Topics Concern    Not on file   Social History Narrative    Son in [place]-    Daughter in [place]     Social Determinants of Health     Financial Resource Strain: Low Risk     Difficulty of Paying Living Expenses: Not hard at all   Food Insecurity: No Food Insecurity    Worried About Running Out of Food in the Last Year: Never true    Ran Out of Food in the Last Year: Never true   Transportation Needs: Not on file   Physical Activity: Not on file Stress: Not on file   Social Connections: Not on file   Intimate Partner Violence: Not on file   Housing Stability: Not on file       ROS:  General:   Admits to generalized malaise and fatigue with inability to complete activities of daily living    Psychological:   Denies anxiety, disorientation or hallucinations    ENT:    Denies epistaxis, headaches, vertigo or visual changes    Cardiovascular:   Denies any chest pain, irregular heartbeats, or palpitations. No paroxysmal nocturnal dyspnea. Respiratory:   Some degree of shortness of breath. She has mild coughing without sputum production. Gastrointestinal:   Denies nausea, vomiting, diarrhea, or constipation. Denies any abdominal pain. Denies change in bowel habits or stools. Genito-Urinary:    Denies any urgency, frequency, hematuria. Voiding without difficulty. Musculoskeletal:   Denies joint pain, joint stiffness, joint swelling or muscle pain    Neurology:    Chronic neurologic deficits related to multiple sclerosis. Derm:    Denies any rashes, ulcers, or excoriations. Denies bruising. Extremities:   Denies any lower extremity swelling or edema. PHYSICAL EXAM:  VITALS:  Vitals:    12/18/22 0951   BP: (!) 166/82   Pulse: 69   Resp: 18   Temp: 98.2 °F (36.8 °C)   SpO2: 90%         CONSTITUTIONAL:    Awake, alert, cooperative, no apparent distress, and appears stated age    EYES:    PERRL, EOMI, sclera clear, conjunctiva normal    ENT:    Normocephalic, atraumatic, sinuses nontender on palpation. External ears without lesions. Oral pharynx with moist mucus membranes. Tonsils without erythema or exudates. Nasal cannula oxygen is in place.     NECK:    Supple, symmetrical, trachea midline, no adenopathy, thyroid symmetric, not enlarged and no tenderness, skin normal, no bruits, no JVD    HEMATOLOGIC/LYMPHATICS:    No cervical lymphadenopathy and no supraclavicular lymphadenopathy    LUNGS:    Diminished bilaterally but with decent aeration throughout. CARDIOVASCULAR:    Normal apical impulse, regular rate and rhythm, normal S1 and S2, no S3 or S4, and no murmur noted    ABDOMEN:    No scars, normal bowel sounds, soft, non-distended, non-tender, no masses palpated, no hepatosplenomegaly, no rebound or guarding elicited on palpation     MUSCULOSKELETAL:    There is no redness, warmth, or swelling of the joints. Full range of motion noted. Motor strength is 5 out of 5 all extremities bilaterally. Tone is normal.    NEUROLOGIC:    Chronic neurologic deficits related to known multiple sclerosis. SKIN:    No bruising or bleeding. No redness, warmth, or swelling    EXTREMITIES:    Peripheral pulses present. No edema, cyanosis, or swelling. OSTEOPATHIC:    Examined in seated and supine positions. Normal thoracic kyphosis and lumbar lordosis. No acute somatic dysfunction.     LABORATORY DATA:  CBC with Differential:    Lab Results   Component Value Date/Time    WBC 8.5 12/18/2022 07:37 AM    RBC 4.18 12/18/2022 07:37 AM    HGB 14.2 12/18/2022 07:37 AM    HCT 43.9 12/18/2022 07:37 AM     12/18/2022 07:37 AM    .0 12/18/2022 07:37 AM    MCH 34.2 12/18/2022 07:37 AM    MCHC 32.6 12/18/2022 07:37 AM    RDW 14.4 12/18/2022 07:37 AM    SEGSPCT 50 05/28/2011 04:30 AM    METASPCT 0.9 12/24/2020 04:28 AM    LYMPHOPCT 14.0 12/18/2022 07:37 AM    MONOPCT 18.3 12/18/2022 07:37 AM    MYELOPCT 4.4 12/28/2020 04:30 AM    BASOPCT 0.9 12/18/2022 07:37 AM    MONOSABS 1.56 12/18/2022 07:37 AM    LYMPHSABS 1.19 12/18/2022 07:37 AM    EOSABS 0.25 12/18/2022 07:37 AM    BASOSABS 0.08 12/18/2022 07:37 AM     BMP:    Lab Results   Component Value Date/Time     12/18/2022 07:37 AM    K 4.2 12/18/2022 07:37 AM     12/18/2022 07:37 AM    CO2 21 12/18/2022 07:37 AM    BUN 13 12/18/2022 07:37 AM    LABALBU 3.8 12/18/2022 07:37 AM    LABALBU 4.0 05/27/2011 05:00 PM    CREATININE 0.8 12/18/2022 07:37 AM    CALCIUM 9.7 12/18/2022 07:37 AM GFRAA >60 02/28/2022 11:05 AM    LABGLOM >60 12/18/2022 07:37 AM    GLUCOSE 105 12/18/2022 07:37 AM    GLUCOSE 96 05/28/2011 04:30 AM       ASSESSMENT:  Sepsis secondary to COVID-pneumonia resulting in acute respiratory failure with hypoxia  Staghorn calculus with resultant urinary tract infection  Multiple sclerosis with progressive failure to thrive    PLAN:  The patient's underlying neurologic situation with multiple sclerosis is currently aggravated by the underlying COVID-pneumonia. I do not appreciate an overtly serious respiratory situation. We will ask the pharmacy team to move forward with COVID protocol. Corticosteroids will be utilized in the setting of nasal cannula oxygen. Nebulized respiratory medications will be provided. CT scan is indicating staghorn calculus with resultant urinary tract infection and the urology team will provide consultation. Gentle fluid resuscitation will be administered along with antibiotics.     Stacy Torre DO, D.O., FACOI  11:59 AM  12/18/2022    Electronically signed by Stacy Torre DO on 12/18/22 at 11:59 AM EST

## 2022-12-18 NOTE — CONSULTS
12/18/2022 1:30 PM  Service: Urology  Group: [facility] urology (Lee/Sowmya/Reg)    [facility]  [id number]     Chief Complaint: left staghorn    History of Present Illness: The patient is a 75 y.o. female patient who presents with the above  She has not seen a urologist in the past  She presented for Covid  She did have a CT done and was found to have a left stag  She has not passed a stone in the past  She has not seen any gross hematruia  She was voiding well   She does have a HO stones    Past Medical History:   Diagnosis Date    Arthritis     Asthma     COPD (chronic obstructive pulmonary disease) ([facility] [address]     Eczema     Fall 5/26/2011    Gout     Hematoma of abdominal wall 5/2011    extraperitoneal    Hypertension     Metatarsal fracture 1/2011    right 3rd and 4th    Multiple sclerosis ([facility] [address]     Prolonged emergence from general anesthesia     Scoliosis     Spinal stenosis     UTI (urinary tract infection)        Past Surgical History:   Procedure Laterality Date    APPENDECTOMY      CHOLECYSTECTOMY      CYSTOSCOPY INSERTION / REMOVAL STENT / STONE Right 7/26/2020    CYSTOSCOPY RETROGRADE PYELOGRAM STENT INSERTION LEFT performed by Coreen Howard DO at [facility] ([address] [place] [address])      LITHOTRIPSY Left 8/7/2020    CYSTOSCOPY RETROGRADE PYELOGRAM URETEROSCOPY  LEFT J-STENT EXCHANGE, INSERTION JUNIOR CATHETER---FACILITY------ performed by Anne Marie Howard DO at [facility] OR       Medications Prior to Admission:    Not in a hospital admission. Allergies:    Lyrica [pregabalin] and Adhesive tape    Social History:    reports that she quit smoking about 3 years ago. Her smoking use included cigarettes. She has a 11.50 pack-year smoking history. She has never used smokeless tobacco. She reports that she does not drink alcohol and does not use drugs.     Family History:   Non-contributory to this urological problem  family history includes Asthma in her father; Breast Cancer in her sister; Cancer in her maternal aunt; Heart Failure in her mother; High Blood Pressure in her father. Review of Systems:  Respiratory: negative for cough and hemoptysis  Cardiovascular: negative for chest pain and dyspnea  Gastrointestinal: negative for abdominal pain, diarrhea, nausea and vomiting  Derm: negative for rash and skin lesion(s)  Neurological: negative for seizures and tremors  Endocrine: negative for diabetic symptoms including polydipsia and polyuria  : As above in the HPI, otherwise negative  All other reviews are negative    Physical Exam:   Vitals: BP (!) 161/90   Pulse 80   Temp 99 °F (37.2 °C) (Oral)   Resp 20   Wt 198 lb (89.8 kg)   LMP  (LMP Unknown)   SpO2 95%   BMI 31.96 kg/m²   General:  Awake, alert, oriented X 3. Well developed, well nourished, well groomed. No apparent distress. HEENT:  Normocephalic, atraumatic. Pupils equal, round. No scleral icterus. No conjunctival injection. Normal lips, teeth, and gums. No nasal discharge. Neck:  Supple, no masses. Heart:  RRR  Lungs:  No audible wheezing. Respirations symmetric and non-labored. Abdomen:  soft, nontender, no masses, no organomegaly, no peritoneal signs  Extremities:  No clubbing, cyanosis, or edema  Skin:  Warm and dry, no open lesions or rashes  Neuro:  Cranial nerves 2-12 intact, no focal deficits  Rectal: deferred  Genitalia:  Obrien no    Labs:   Recent Labs     12/18/22  0737   WBC 8.5   RBC 4.18   HGB 14.2   HCT 43.9   .0*   MCH 34.2   MCHC 32.6   RDW 14.4      MPV 9.8       Recent Labs     12/18/22  0737   CREATININE 0.8     No evidence of pulmonary embolism. There is consolidation of the lower lung   fields bilaterally concerning for bibasilar infiltrate. Staghorn calculus identified within the left kidney with abnormal increased   density filling defects seen within the left renal pelvis extending into the   proximal ureter which an underlying lesion cannot be excluded.   Cystography   and left retrograde pyelogram is recommended for further evaluation. The remainder of the abdomen and pelvis is grossly unremarkable with chronic   age related changes identified. .  Small nodule identified on the left adrenal   gland suggesting an adenoma. Diffuse fatty infiltration identified liver   with small hiatal hernia. Multiple cysts seen in the kidneys bilaterally.      Images:                  Assessment: Christiano Rodrigues 75 y.o. female     HO UTI's  Left stag  Covid    Plan:    CT and KUB were reviewed  Will need outpatient eval  She will need a procedure in the future, ESWL with stent vs PCNL  RBA of the procedures were discussed     DO PAULA Donaldson  Urology

## 2022-12-18 NOTE — LETTER
[place] Department Medicaid  CERTIFICATION OF NECESSITY  FOR NON-EMERGENCY TRANSPORTATION   BY GROUND AMBULANCE      Individual Information   1. Name: Saeed Reis 2. [place] Medicaid Billing Number:    3. Address: [address] [place] [address]      Transportation Provider Information   4. Provider Name: Stevan Lang   5. [place] Medicaid Provider Number: National Provider Identifier (NPI):      Certification  7. Criteria:  During transport, this individual requires:  [] Medical treatment or continuous     supervision by an EMT. [] The administration or regulation of oxygen by another person. [] Supervised protective restraint. 8. Period Beginning Date: 12/21/2022   9. Length  [x] Not more than 1 day(s)  [] One Year     Additional Information Relevant to Certification   10. Comments or Explanations, If Necessary or Appropriate   SEPSIS D/T COVID 19 PNEUMONIA RESULTING IN ACUTE RESPIRATORY FAILURE, MULTIPLE SCLEROSIS WITH PROGRESSIVE FAILURE TO THRIVE, UTI WITH HEMATURIA, DECREASED FUNCTIONAL MOBILITY, FALLS RISK           Certifying Practitioner Information   11. Name of [address] [place] [address]   12. [place] Medicaid Provider Number, If Applicable:  [address] Provider Identifier (NPI): [id number]     Signature Information   14. Date of Signature: 12/21/2022 15. Name of Person Signing: CRISTIAN Hernández   16. Signature and Professional Designation:Pili Perez [facility].12/21/2022.10:49 AM.     MADELYN [id number]  Rev. 7/2015    [facility] Encounter Date/Time: 12/18/2022 [address] Account: [de-identified]    MRN: [id number]    Patient: Saeed Reis    Contact Serial #: [id number]      ENCOUNTER          Patient Class: I Private Enc?   No Unit [facility] [id number]   Hospital Service: MED   Encounter DX: Staghorn calculus [N20.0]   ADM Provider: Davi Mcmahon DO   Procedure:     ATT Provider: Davi Mcmahon DO   REF Provider:        Admission DX: Staghorn calculus, Acute respiratory failure with hypoxia ([facility] [address] Urinary tract infection without hematuria, site unspecified, COVID-19 and DX codes: N20.0, J96.01, N39.0, U07.1      PATIENT  Name: Hector Castro : 1955 (75 yrs)   Address: [place] [address] Sex: Female   [facility] [address]         Marital Status:    Employer: DISABLED         Pentecostalism: Jew   Primary Care Provider: Fernando Rojas MD         Primary Phone: [phone]   EMERGENCY CONTACT   Contact Name Legal Guardian? Relationship to Patient Home Phone Work Phone   1. Dena Stern (DPOA)  2. *No Contact Specified* No    Other    [phone]                 GUARANTOR            Guarantor: Hector Castro     : 1955   Address: [address] [place] [address] Sex: Female   [facility] [address]     Relation to Patient: Self       Home Phone: [phone]   Guarantor ID: [id number]       Work Phone:     Guarantor Employer: DISABLED         Status: DISABLED      COVERAGE        PRIMARY INSURANCE   Payor: MEDICAID [place] Plan: MEDICAID [facility] DEPT OF*   Payor Address: [place] [address],  [place] [address]       Group Number:   Insurance Type: INDEMNITY   Subscriber Name: Ajith Foley : 1955   Subscriber ID: [id number] Pat. Rel. to Sub: Self   SECONDARY INSURANCE   Payor:   Plan:     Payor Address:  ,           Group Number:   Insurance Type:     Subscriber Name:   Subscriber :     Subscriber ID:   Pat.  Rel. to Sub:           CSN: [id number]

## 2022-12-18 NOTE — PROGRESS NOTES
Pharmacy Consultation Note    Consult date: 12/18/2022  Physician/provider: Monique Arteaga DO    Pharmacy has been consulted to evaluate criteria for Baricitinib, Remdesivir, or Tocilizumab therapy. The patient DOES NOT currently meet [facility] P&T approved Covid-19 treatment criteria for Baricitinib, Remdesivir, or Tocilizumab due to labs and CRP 1.3, LD and d-dimer within normal limits. Please re-consult if the clinical condition changes and patient meets criteria for initiation based on [facility] P&T approved criteria for use.     Thank you for the consult,  Rupert Bishop, [facility]

## 2022-12-19 LAB
ANION GAP SERPL CALCULATED.3IONS-SCNC: 10 MMOL/L (ref 7–16)
APTT: 41.2 SEC (ref 24.5–35.1)
BASOPHILS ABSOLUTE: 0.08 E9/L (ref 0–0.2)
BASOPHILS RELATIVE PERCENT: 1 % (ref 0–2)
BUN BLDV-MCNC: 11 MG/DL (ref 6–23)
C-REACTIVE PROTEIN: 0.9 MG/DL (ref 0–0.4)
CALCIUM SERPL-MCNC: 9 MG/DL (ref 8.6–10.2)
CHLORIDE BLD-SCNC: 108 MMOL/L (ref 98–107)
CO2: 22 MMOL/L (ref 22–29)
CREAT SERPL-MCNC: 0.7 MG/DL (ref 0.5–1)
EOSINOPHILS ABSOLUTE: 0 E9/L (ref 0.05–0.5)
EOSINOPHILS RELATIVE PERCENT: 0 % (ref 0–6)
FIBRINOGEN: 341 MG/DL (ref 200–400)
GFR SERPL CREATININE-BSD FRML MDRD: >60 ML/MIN/1.73
GLUCOSE BLD-MCNC: 104 MG/DL (ref 74–99)
HCT VFR BLD CALC: 40.8 % (ref 34–48)
HEMOGLOBIN: 13.5 G/DL (ref 11.5–15.5)
INR BLD: 1
L. PNEUMOPHILA SEROGP 1 UR AG: NORMAL
LYMPHOCYTES ABSOLUTE: 1.58 E9/L (ref 1.5–4)
LYMPHOCYTES RELATIVE PERCENT: 21 % (ref 20–42)
MAGNESIUM: 2.1 MG/DL (ref 1.6–2.6)
MCH RBC QN AUTO: 34.8 PG (ref 26–35)
MCHC RBC AUTO-ENTMCNC: 33.1 % (ref 32–34.5)
MCV RBC AUTO: 105.2 FL (ref 80–99.9)
METAMYELOCYTES RELATIVE PERCENT: 1 % (ref 0–1)
MONOCYTES ABSOLUTE: 1.8 E9/L (ref 0.1–0.95)
MONOCYTES RELATIVE PERCENT: 24 % (ref 2–12)
NEUTROPHILS ABSOLUTE: 4.05 E9/L (ref 1.8–7.3)
NEUTROPHILS RELATIVE PERCENT: 53 % (ref 43–80)
PDW BLD-RTO: 14.6 FL (ref 11.5–15)
PHOSPHORUS: 2.9 MG/DL (ref 2.5–4.5)
PLATELET # BLD: 299 E9/L (ref 130–450)
PMV BLD AUTO: 9.9 FL (ref 7–12)
POTASSIUM SERPL-SCNC: 4.4 MMOL/L (ref 3.5–5)
PROTHROMBIN TIME: 11.5 SEC (ref 9.3–12.4)
RBC # BLD: 3.88 E12/L (ref 3.5–5.5)
SODIUM BLD-SCNC: 140 MMOL/L (ref 132–146)
STREP PNEUMONIAE ANTIGEN, URINE: NORMAL
VITAMIN D 25-HYDROXY: 47 NG/ML (ref 30–100)
WBC # BLD: 7.5 E9/L (ref 4.5–11.5)

## 2022-12-19 PROCEDURE — 82306 VITAMIN D 25 HYDROXY: CPT

## 2022-12-19 PROCEDURE — 6360000002 HC RX W HCPCS: Performed by: INTERNAL MEDICINE

## 2022-12-19 PROCEDURE — 85025 COMPLETE CBC W/AUTO DIFF WBC: CPT

## 2022-12-19 PROCEDURE — 97161 PT EVAL LOW COMPLEX 20 MIN: CPT

## 2022-12-19 PROCEDURE — 83735 ASSAY OF MAGNESIUM: CPT

## 2022-12-19 PROCEDURE — 2700000000 HC OXYGEN THERAPY PER DAY

## 2022-12-19 PROCEDURE — 80048 BASIC METABOLIC PNL TOTAL CA: CPT

## 2022-12-19 PROCEDURE — 97530 THERAPEUTIC ACTIVITIES: CPT

## 2022-12-19 PROCEDURE — 2580000003 HC RX 258: Performed by: INTERNAL MEDICINE

## 2022-12-19 PROCEDURE — 94640 AIRWAY INHALATION TREATMENT: CPT

## 2022-12-19 PROCEDURE — 84100 ASSAY OF PHOSPHORUS: CPT

## 2022-12-19 PROCEDURE — 85384 FIBRINOGEN ACTIVITY: CPT

## 2022-12-19 PROCEDURE — 85730 THROMBOPLASTIN TIME PARTIAL: CPT

## 2022-12-19 PROCEDURE — 97110 THERAPEUTIC EXERCISES: CPT

## 2022-12-19 PROCEDURE — 6370000000 HC RX 637 (ALT 250 FOR IP): Performed by: INTERNAL MEDICINE

## 2022-12-19 PROCEDURE — 85610 PROTHROMBIN TIME: CPT

## 2022-12-19 PROCEDURE — 36415 COLL VENOUS BLD VENIPUNCTURE: CPT

## 2022-12-19 PROCEDURE — 86140 C-REACTIVE PROTEIN: CPT

## 2022-12-19 PROCEDURE — 51702 INSERT TEMP BLADDER CATH: CPT

## 2022-12-19 PROCEDURE — 93005 ELECTROCARDIOGRAM TRACING: CPT | Performed by: INTERNAL MEDICINE

## 2022-12-19 PROCEDURE — 2060000000 HC ICU INTERMEDIATE R&B

## 2022-12-19 PROCEDURE — 94150 VITAL CAPACITY TEST: CPT

## 2022-12-19 RX ORDER — POLYETHYLENE GLYCOL 3350 17 G/17G
17 POWDER, FOR SOLUTION ORAL DAILY PRN
Status: DISCONTINUED | OUTPATIENT
Start: 2022-12-19 | End: 2022-12-21 | Stop reason: HOSPADM

## 2022-12-19 RX ADMIN — LUBIPROSTONE 8 MCG: 8 CAPSULE, GELATIN COATED ORAL at 17:34

## 2022-12-19 RX ADMIN — CETIRIZINE HYDROCHLORIDE 10 MG: 10 TABLET, FILM COATED ORAL at 09:46

## 2022-12-19 RX ADMIN — SODIUM CHLORIDE: 9 INJECTION, SOLUTION INTRAVENOUS at 19:51

## 2022-12-19 RX ADMIN — Medication 2000 UNITS: at 09:46

## 2022-12-19 RX ADMIN — DOCUSATE SODIUM 100 MG: 100 CAPSULE, LIQUID FILLED ORAL at 09:46

## 2022-12-19 RX ADMIN — DOCUSATE SODIUM 100 MG: 100 CAPSULE, LIQUID FILLED ORAL at 20:20

## 2022-12-19 RX ADMIN — IPRATROPIUM BROMIDE AND ALBUTEROL SULFATE 1 AMPULE: .5; 2.5 SOLUTION RESPIRATORY (INHALATION) at 06:21

## 2022-12-19 RX ADMIN — IPRATROPIUM BROMIDE AND ALBUTEROL SULFATE 1 AMPULE: .5; 2.5 SOLUTION RESPIRATORY (INHALATION) at 09:53

## 2022-12-19 RX ADMIN — BUDESONIDE 500 MCG: 0.5 SUSPENSION RESPIRATORY (INHALATION) at 18:11

## 2022-12-19 RX ADMIN — IPRATROPIUM BROMIDE AND ALBUTEROL SULFATE 1 AMPULE: .5; 2.5 SOLUTION RESPIRATORY (INHALATION) at 18:11

## 2022-12-19 RX ADMIN — METOPROLOL TARTRATE 25 MG: 25 TABLET, FILM COATED ORAL at 09:46

## 2022-12-19 RX ADMIN — ACETAMINOPHEN 650 MG: 325 TABLET ORAL at 17:30

## 2022-12-19 RX ADMIN — IPRATROPIUM BROMIDE AND ALBUTEROL SULFATE 1 AMPULE: .5; 2.5 SOLUTION RESPIRATORY (INHALATION) at 14:06

## 2022-12-19 RX ADMIN — OXYCODONE HYDROCHLORIDE AND ACETAMINOPHEN 1000 MG: 500 TABLET ORAL at 09:46

## 2022-12-19 RX ADMIN — LUBIPROSTONE 8 MCG: 8 CAPSULE, GELATIN COATED ORAL at 09:45

## 2022-12-19 RX ADMIN — GUAIFENESIN AND DEXTROMETHORPHAN HYDROBROMIDE 2 TABLET: 600; 30 TABLET, EXTENDED RELEASE ORAL at 09:47

## 2022-12-19 RX ADMIN — WATER 1000 MG: 1 INJECTION INTRAMUSCULAR; INTRAVENOUS; SUBCUTANEOUS at 17:00

## 2022-12-19 RX ADMIN — METOPROLOL TARTRATE 25 MG: 25 TABLET, FILM COATED ORAL at 20:20

## 2022-12-19 RX ADMIN — POLYETHYLENE GLYCOL 3350 17 G: 17 POWDER, FOR SOLUTION ORAL at 10:02

## 2022-12-19 RX ADMIN — AZITHROMYCIN MONOHYDRATE 500 MG: 250 TABLET ORAL at 09:46

## 2022-12-19 RX ADMIN — ENOXAPARIN SODIUM 30 MG: 100 INJECTION SUBCUTANEOUS at 20:20

## 2022-12-19 RX ADMIN — BUDESONIDE 500 MCG: 0.5 SUSPENSION RESPIRATORY (INHALATION) at 06:21

## 2022-12-19 RX ADMIN — ENOXAPARIN SODIUM 30 MG: 100 INJECTION SUBCUTANEOUS at 09:46

## 2022-12-19 RX ADMIN — DEXAMETHASONE SODIUM PHOSPHATE 6 MG: 10 INJECTION INTRAMUSCULAR; INTRAVENOUS at 09:45

## 2022-12-19 RX ADMIN — SODIUM CHLORIDE: 9 INJECTION, SOLUTION INTRAVENOUS at 01:48

## 2022-12-19 ASSESSMENT — PAIN SCALES - GENERAL: PAINLEVEL_OUTOF10: 0

## 2022-12-19 NOTE — ACP (ADVANCE CARE PLANNING)
Advance Care Planning     Advance Care Planning Activator (Inpatient)  Conversation Note      Date of ACP Conversation: 12/19/2022     Conversation Conducted with: Patient with Decision Making Capacity    ACP Activator: Paul Longo. Tally Night    {  Health Care Decision Maker:   PRIMARY DECISION MAKER:  Marium Letters: (POA/friend) [phone]   Pitersedrickisidra Noreen: (child) [phone]  Current Designated Health Care Decision Maker:       Care Preferences    Ventilation: \"If you were in your present state of health and suddenly became very ill and were unable to breathe on your own, what would your preference be about the use of a ventilator (breathing machine) if it were available to you? \"      Would the patient desire the use of ventilator (breathing machine)?: yes    \"If your health worsens and it becomes clear that your chance of recovery is unlikely, what would your preference be about the use of a ventilator (breathing machine) if it were available to you? \"     Would the patient desire the use of ventilator (breathing machine)?: no  Resuscitation  \"CPR works best to restart the heart when there is a sudden event, like a heart attack, in someone who is otherwise healthy. Unfortunately, CPR does not typically restart the heart for people who have serious health conditions or who are very sick. \"    \"In the event your heart stopped as a result of an underlying serious health condition, would you want attempts to be made to restart your heart (answer \"yes\" for attempt to resuscitate) or would you prefer a natural death (answer \"no\" for do not attempt to resuscitate)? \" yes     [x] Yes   [] No   Educated Patient / Kwabena Carroll regarding differences between Advance Directives and portable DNR orders.     Length of ACP Conversation in minutes:      Conversation Outcomes:  [x] ACP discussion completed  [] Existing advance directive reviewed with patient; no changes to patient's previously recorded wishes  [] New Advance Directive completed  [] Portable Do Not Rescitate prepared for Provider review and signature  [] POLST/POST/MOLST/MOST prepared for Provider review and signature      Follow-up plan:    [] Schedule follow-up conversation to continue planning  [] Referred individual to Provider for additional questions/concerns   [] Advised patient/agent/surrogate to review completed ACP document and update if needed with changes in condition, patient preferences or care setting    [] This note routed to one or more involved healthcare providers

## 2022-12-19 NOTE — PROGRESS NOTES
Internal Medicine Progress Note    [facility]    January Lyle. Juan C Siegel., KARL.SEBASTIANOTerrenceI. Autumn Ortiz D.O., SUREKHA Hines D.O. Robby Kahn, MSN, APRN, NP-C  Isidoro Haque. Anselm Merlin, MSN, APRN-CNP     Primary Care Physician: Elsa Helton MD   Admitting Physician:  Ryan Parisi DO  Admission date and time: 12/18/2022  6:54 AM    Room:  [address] [place] [address]  Admitting diagnosis: Staghorn calculus [N20.0]  Acute respiratory failure with hypoxia ([address] [J96.01]  Urinary tract infection without hematuria, site unspecified [N39.0]  COVID-19 [U07.1]    Patient Name: Doroteo Reddy  MRN: [id number]    Date of Service: 12/19/2022     Subjective:  Brenna Garcia is a 75 y.o. female who was seen and examined today,12/19/2022, at the bedside. Brenna Garcia seems to be resting comfortably during my examination today. She is maintained on nasal cannula oxygen. She has been apprised of her staghorn calculus and we are in discussion with the urologic team regarding plan of action moving forward. Review of System:   Constitutional:   Admits to ongoing malaise and fatigue. HEENT:   Denies ear pain, sore throat, sinus or eye problems. Nasal cannula oxygen is in place  Cardiovascular:   Denies any chest pain, irregular heartbeats, or palpitations. Respiratory:   Mild shortness of breath particularly with activity  Gastrointestinal:   Decreased appetite and therefore decreased oral intake  Genitourinary:    Denies any urgency, frequency, hematuria. Voiding  without difficulty. Extremities:   Denies lower extremity swelling, edema or cyanosis. Neurology:    Denies any headache or focal neurological deficits, chronic neurologic deficits related to underlying multiple sclerosis  Psch:   Denies being anxious or depressed. Musculoskeletal:    Denies  myalgias, joint complaints or back pain. Integumentary:   Denies any rashes, ulcers, or excoriations.   Denies bruising. Hematologic/Lymphatic:  Denies bruising or bleeding. Physical Exam:  No intake/output data recorded. Intake/Output Summary (Last 24 hours) at 12/19/2022 1006  Last data filed at 12/19/2022 0430  Gross per 24 hour   Intake 942.62 ml   Output 2400 ml   Net -1457.38 ml   I/O last 3 completed shifts: In: 942.6 [P.O.:480; I.V.:462.6]  Out: 2400 [Urine:2400]  Patient Vitals for the past 96 hrs (Last 3 readings):   Weight   12/19/22 0645 222 lb (100.7 kg)   12/18/22 1845 205 lb (93 kg)   12/18/22 1255 198 lb (89.8 kg)     Vital Signs:   Blood pressure 118/75, pulse 78, temperature 98.2 °F (36.8 °C), temperature source Oral, resp. rate 18, height 5' 6\" (1.676 m), weight 222 lb (100.7 kg), SpO2 98 %, not currently breastfeeding. General appearance:  Alert, responsive, oriented to person, place, and time. Well preserved, alert, no distress. Head:  Normocephalic. No masses, lesions or tenderness. Eyes:  PERRLA. EOMI. Sclera clear. Buccal mucosa moist.  ENT:  Ears normal. Mucosa normal.  Nasal cannula oxygen is in place at 3 L. Neck:    Supple. Trachea midline. No thyromegaly. No JVD. No bruits. Heart:    Rhythm regular. Rate controlled. No murmurs. Lungs:    Symmetrical. Clear to auscultation bilaterally. No wheezes. No rhonchi. No rales. Abdomen:   Soft. Non-tender. Non-distended. Bowel sounds positive. No organomegaly or masses. No pain on palpation. Extremities:    Peripheral pulses present. No peripheral edema. No ulcers. No cyanosis. No clubbing. Neurologic:    Alert x 3. Chronic neurologic deficits related to multiple sclerosis  Psych:   Behavior is normal. Mood appears normal. Speech is not rapid and/or pressured. Musculoskeletal:   Spine ROM normal. Muscular strength intact. Gait not assessed. Integumentary:  No rashes  Skin normal color and texture.   Genitalia/Breast:  Deferred    Medication:  Scheduled Meds:   lubiprostone  8 mcg Oral BID WC    vitamin C  1,000 mg Oral Daily cetirizine  10 mg Oral Daily    docusate sodium  100 mg Oral BID    metoprolol tartrate  25 mg Oral BID    cefTRIAXone (ROCEPHIN) IV  1,000 mg IntraVENous Q24H    azithromycin  500 mg Oral Daily    budesonide  500 mcg Nebulization BID    ipratropium-albuterol  1 ampule Inhalation Q4H WA    Vitamin D  2,000 Units Oral Daily    enoxaparin  30 mg SubCUTAneous BID    dexamethasone  6 mg IntraVENous Q24H     Continuous Infusions:   sodium chloride 75 mL/hr at 12/19/22 0148       Objective Data:  CBC with Differential:    Lab Results   Component Value Date/Time    WBC 8.5 12/18/2022 07:37 AM    RBC 4.18 12/18/2022 07:37 AM    HGB 14.2 12/18/2022 07:37 AM    HCT 43.9 12/18/2022 07:37 AM     12/18/2022 07:37 AM    .0 12/18/2022 07:37 AM    MCH 34.2 12/18/2022 07:37 AM    MCHC 32.6 12/18/2022 07:37 AM    RDW 14.4 12/18/2022 07:37 AM    SEGSPCT 50 05/28/2011 04:30 AM    METASPCT 0.9 12/24/2020 04:28 AM    LYMPHOPCT 14.0 12/18/2022 07:37 AM    MONOPCT 18.3 12/18/2022 07:37 AM    MYELOPCT 4.4 12/28/2020 04:30 AM    BASOPCT 0.9 12/18/2022 07:37 AM    MONOSABS 1.56 12/18/2022 07:37 AM    LYMPHSABS 1.19 12/18/2022 07:37 AM    EOSABS 0.25 12/18/2022 07:37 AM    BASOSABS 0.08 12/18/2022 07:37 AM     BMP:    Lab Results   Component Value Date/Time     12/19/2022 08:47 AM    K 4.4 12/19/2022 08:47 AM    K 4.2 12/18/2022 07:37 AM     12/19/2022 08:47 AM    CO2 22 12/19/2022 08:47 AM    BUN 11 12/19/2022 08:47 AM    LABALBU 3.8 12/18/2022 07:37 AM    LABALBU 4.0 05/27/2011 05:00 PM    CREATININE 0.7 12/19/2022 08:47 AM    CALCIUM 9.0 12/19/2022 08:47 AM    GFRAA >60 02/28/2022 11:05 AM    LABGLOM >60 12/19/2022 08:47 AM    GLUCOSE 104 12/19/2022 08:47 AM    GLUCOSE 96 05/28/2011 04:30 AM       Assessment:  Sepsis secondary to COVID-pneumonia resulting in acute respiratory failure with hypoxia  Staghorn calculus with resultant urinary tract infection  Multiple sclerosis with progressive failure to thrive    Plan:   Maximal COVID protocol is being employed. In the setting of staghorn calculus, urology has provided consultation and I will discuss plan of action moving forward. We are attempting to wean nasal cannula oxygen. Certainly this infectious process as exacerbated her multiple sclerosis leading to increased weakness. She requires extensive work with the therapy teams. We will ask the discharge planning team to investigate the discharge plan as the patient lives at home and functions independently. She may ultimately require temporary skilled nursing facility placement. Continue current therapy. See orders for further plan of care. More than 50% of my  time was spent at the bedside counseling/coordinating care with the patient and/or family with face to face contact. This time was spent reviewing notes and laboratory data as well as instructing and counseling the patient. Time I spent with the family or surrogate(s) is included only if the patient was incapable of providing the necessary information or participating in medical decisions. I also discussed the differential diagnosis and all of the proposed management plans with the patient and individuals accompanying the patient. Henri Arnold requires this high level of physician care and nursing on the IMC/Telemetry unit due the complexity of decision management and chance of rapid decline or death. Continued cardiac monitoring and higher level of nursing are required. I am readily available for any further decision-making and intervention.      Crystal Rao DO, F.A.C.O.I.  12/19/2022  10:06 AM

## 2022-12-19 NOTE — PROGRESS NOTES
Physical Therapy Initial Evaluation/Plan of Care    Room #:  1624/9529-85  Patient Name: Ade Sunshine  YOB: 1955  MRN: [id number]    Date of Service: 12/19/2022     Tentative placement recommendation: Home Health Physical Therapy   Equipment recommendation: To be determined      Evaluating Physical Therapist: Ck Kraft #[id number]      Specific Provider Orders/Date/Referring Provider :   12/18/22 1530    PT eval and treat  Start:  12/18/22 1530,   End:  12/18/22 1530,   ONE TIME,   Standing Count:  1 Occurrences,   R         Stas Clark DO     Admitting Diagnosis:   Staghorn calculus [N20.0]  Acute respiratory failure with hypoxia ([address] [J96.01]  Urinary tract infection without hematuria, site unspecified [N39.0]  COVID-19 [U07.1]      Surgery: none  Visit Diagnoses         Codes    Acute respiratory failure with hypoxia ([address]     J96.01    Staghorn calculus     N20.0    Urinary tract infection without hematuria, site unspecified     N39.0            Patient Active Problem List   Diagnosis    Neck pain    Multiple sclerosis (HCC)    Bilateral foot-drop    Peripheral polyneuropathy    Essential hypertension    Asthma    Edema of both legs    Constipation    Intervertebral lumbar disc disorder with myelopathy, lumbar region    Cervical spinal stenosis    Pulmonary venous congestion    Fatigue    Hx of appendectomy    Hx of cholecystectomy    Hx of hysterectomy    Chronic rhinitis    Congestion of upper airway    Recurrent UTI    Geographic tongue    Eczema    Folliculitis    Unable to walk    COVID-19        ASSESSMENT of Current Deficits Patient exhibits decreased strength, balance, and endurance impairing functional mobility, transfers, gait , gait distance, and tolerance to activity   Pt has multiple sclerosis. W/c transfer with  few steps baseline for the last 10 years. Pt able to stand pivot to chair today with moderate assist. Pt motivated to work with therapy.  The patient will benefit from continued skilled therapy to increase strength and improve balance for safe functional mobility, to decrease risk of falls, and to meet goals at discharge. PHYSICAL THERAPY  PLAN OF CARE       Physical therapy plan of care is established based on physician order,  patient diagnosis and clinical assessment    Current Treatment Recommendations:    -Bed Mobility: Lower extremity exercises , Upper extremity exercises , and Trunk control activities   -Sitting Balance: Incorporate reaching activities to activate trunk muscles   -Standing Balance: Perform strengthening exercises in standing to promote motor control with or without upper extremity support   -Transfers: Provide instruction on proper hand and foot position for adequate transfer of weight onto lower extremities and use of gait device if needed and Cues for hand placement, technique and safety. Provide stabilization to prevent fall   -Gait: Gait training and Standing activities to improve: base of support, weight shift, weight bearing      PT long term treatment goals are located in below grid    Patient and or family understand(s) diagnosis, prognosis, and plan of care. Frequency of treatments: Patient will be seen  daily. Prior Level of Function: She suffers from multiple sclerosis, active in a wheelchair.  Pt does stand and does w/c transfer independent   Rehab Potential: good  for baseline    Past medical history:   Past Medical History:   Diagnosis Date    Arthritis     Asthma     COPD (chronic obstructive pulmonary disease) ([facility]     Eczema     Fall 5/26/2011    Gout     Hematoma of abdominal wall 5/2011    extraperitoneal    Hypertension     Metatarsal fracture 1/2011    right 3rd and 4th    Multiple sclerosis (HCC)     Prolonged emergence from general anesthesia     Scoliosis     Spinal stenosis     UTI (urinary tract infection)      Past Surgical History:   Procedure Laterality Date    APPENDECTOMY      CHOLECYSTECTOMY CYSTOSCOPY INSERTION / REMOVAL STENT / STONE Right 7/26/2020    CYSTOSCOPY RETROGRADE PYELOGRAM STENT INSERTION LEFT performed by Hugo Howard DO at [address] ([facility])      LITHOTRIPSY Left 8/7/2020    CYSTOSCOPY RETROGRADE PYELOGRAM URETEROSCOPY  LEFT J-STENT EXCHANGE, INSERTION JUNIOR CATHETER---FACILITY------ performed by Hugo Howard DO at [address]:    Precautions: Up as tolerated, falls and Droplet plus/COVID-19 , w/c transfer at baseline, MS, feet very sensitive     Social history: Patient lives alone in a apartment 3rd floor with elevator   with No steps  to enter Tub shower  ,     Equipment owned: Wheelchair, Wheeled Walker, Bedside commode, Tub transfer bench, and leg  ,      [address]   How much difficulty turning over in bed?: A Little  How much difficulty sitting down on / standing up from a chair with arms?: A Lot  How much difficulty moving from lying on back to sitting on side of bed?: A Lot  How much help from another person moving to and from a bed to a chair?: A Lot  How much help from another person needed to walk in hospital room?: Total  How much help from another person for climbing 3-5 steps with a railing?: Total  AM-PAC Inpatient Mobility Raw Score : 11  AM-PAC Inpatient T-Scale Score : 33.86  Mobility Inpatient CMS 0-100% Score: 72.57  Mobility Inpatient CMS G-Code Modifier : CL    Nursing cleared patient for PT evaluation. The admitting diagnosis and active problem list as listed above have been reviewed prior to the initiation of this evaluation. OBJECTIVE;   Initial Evaluation  Date: 12/19/2022 Treatment Date:     Short Term/ Long Term   Goals   Was pt agreeable to Eval/treatment?  Yes  To be met in 4 days   Pain level   0/10       Bed Mobility    Rolling: Minimal assist of 1    Supine to sit: Minimal assist of 1    Sit to supine: Minimal assist of 1    Scooting: Minimal assist of 1 Rolling: Independent    Supine to sit: Independent    Sit to supine: Independent    Scooting: Independent     Transfers Sit to stand: Moderate assist of 1 cues for foot placement, assist with foot placement due to MS/weakness  Stand pivot to chair: Mod assist  Sit to stand: Independent   Stand pivot to w/c: Independent    Ambulation    not assessed     3 feet using  wheeled walker with Modified Independent    Stair negotiation: ascended and descended   Not assessed          ROM Within functional limits    Increase range of motion 10% of affected joints    Strength BUE:  refer to OT eval  RLE:  3-/5  LLE:  3-/5  Increase strength in affected mm groups by 1/3 grade   Balance Sitting EOB:  good -  Dynamic Standing:  poor +  Sitting EOB:  good   Dynamic Standing: good      Patient is Alert & Oriented x person, place, time, and situation and follows directions    Sensation:  Patient  reports numbness/tingling bilateral upper extremities   L>R  Edema:  yes bilateral lower extremities and bilateral upper extremities   Endurance: fair      Vitals:  3 liters nasal cannula   Blood Pressure at rest  Blood Pressure during session    Heart Rate at rest 53 Heart Rate during session    SPO2 at rest 99%  SPO2 during session 97  %     Patient education  Patient educated on role of Physical Therapy, risks of immobility, safety and plan of care,  importance of mobility while in hospital , ankle pumps, quad set and glut set for edema control, blood clot prevention, and safety      Patient response to education:   Pt verbalized understanding Pt demonstrated skill Pt requires further education in this area   Yes Partial Yes      Treatment:  Patient practiced and was instructed/facilitated in the following treatment: Patient assisted to EOB. Sat edge of bed 15 minutes with Supervision  to increase dynamic sitting balance and activity tolerance. Pt stood, and stand pivot to chair with mod A. Patient performed incentive spirometer x10 reps. Assistance with exercises and foot placement as patient manually picks up her legs at baseline due to weakness. Therapeutic Exercises:  ankle pumps, quad sets, glut sets, and long arc quad  x 15  reps. AAROM    At end of session, patient in chair with alarm call light and phone within reach,  all lines and tubes intact, nursing notified. Patient would benefit from continued skilled Physical Therapy to improve functional independence and quality of life. Patient's/ family goals   home    Time in  1001  Time out  1044    Total Treatment Time  23 minutes    Evaluation time includes thorough review of current medical information, gathering information on past medical history/social history and prior level of function, completion of standardized testing/informal observation of tasks, assessment of data, and development of Plan of care and goals.      CPT codes:  Low Complexity PT evaluation (65251)  Therapeutic activities (60182)   15 minutes  1 unit(s)  Therapeutic exercises (63188)   8 minutes  1 unit(s)    Saundra Aldridge, PT

## 2022-12-19 NOTE — DISCHARGE INSTRUCTIONS
Call upon discharge to schedule a follow-up visit with Lorrie Howard/Sowmya/Reg ([facility]) at [phone]

## 2022-12-19 NOTE — CARE COORDINATION
SS NOTE: COVID POSITIVE 12/18. SW completed the ACP today with pt. Pt is on 3 liters with no home O2. She has a urine catheter. Pt has a kidney stone- Urology is on. Pt also has MS. Pt has a peripheral line. She is on IV Decadron, Rocephin and oral Zithromax. PT/OT are on and suggest HHC. SW spoke with pt today. Pt relates that she resides alone in her own 3rd floor apt with elevator. Pt relates that she was I pta with adls, and she has hired helpers for IADLs through [address] Tues/ Thurs/ Sat. Pt uses [employer] for transport services. Pt has dme at home- [facility], electric [facility] nebulizer and walker. Pt has a hx of HHC with [facility] - who she requests to have after this [facility] and hx of [facility]. Pt has been to [facility] in the past. Pt's PCP is Dr Lian Tenorio and he sees her in her home. She also uses [employer] for [facility]. Pt's good friend is her POA for finances. Pt requested an Advanced directives packet from this SW today- SW did provide one. Pt plans on returning home at [facility] with [address] will need [address] orders to complete a HHC referral. SS to continue. CRISTIAN Willis.12/19/2022.3:08 PM.

## 2022-12-20 LAB
ANION GAP SERPL CALCULATED.3IONS-SCNC: 11 MMOL/L (ref 7–16)
BASOPHILS ABSOLUTE: 0 E9/L (ref 0–0.2)
BASOPHILS RELATIVE PERCENT: 0.4 % (ref 0–2)
BUN BLDV-MCNC: 15 MG/DL (ref 6–23)
C-REACTIVE PROTEIN: 0.3 MG/DL (ref 0–0.4)
CALCIUM SERPL-MCNC: 8.6 MG/DL (ref 8.6–10.2)
CHLORIDE BLD-SCNC: 107 MMOL/L (ref 98–107)
CO2: 22 MMOL/L (ref 22–29)
CREAT SERPL-MCNC: 0.7 MG/DL (ref 0.5–1)
EKG ATRIAL RATE: 66 BPM
EKG P AXIS: 29 DEGREES
EKG P-R INTERVAL: 154 MS
EKG Q-T INTERVAL: 422 MS
EKG QRS DURATION: 90 MS
EKG QTC CALCULATION (BAZETT): 442 MS
EKG R AXIS: -13 DEGREES
EKG T AXIS: 37 DEGREES
EKG VENTRICULAR RATE: 66 BPM
EOSINOPHILS ABSOLUTE: 0.07 E9/L (ref 0.05–0.5)
EOSINOPHILS RELATIVE PERCENT: 0.9 % (ref 0–6)
GFR SERPL CREATININE-BSD FRML MDRD: >60 ML/MIN/1.73
GLUCOSE BLD-MCNC: 87 MG/DL (ref 74–99)
HCT VFR BLD CALC: 38.4 % (ref 34–48)
HEMOGLOBIN: 13.3 G/DL (ref 11.5–15.5)
LYMPHOCYTES ABSOLUTE: 1.46 E9/L (ref 1.5–4)
LYMPHOCYTES RELATIVE PERCENT: 20.2 % (ref 20–42)
MCH RBC QN AUTO: 37.2 PG (ref 26–35)
MCHC RBC AUTO-ENTMCNC: 34.6 % (ref 32–34.5)
MCV RBC AUTO: 107.3 FL (ref 80–99.9)
MONOCYTES ABSOLUTE: 1.24 E9/L (ref 0.1–0.95)
MONOCYTES RELATIVE PERCENT: 16.7 % (ref 2–12)
MYELOCYTE PERCENT: 0.9 % (ref 0–0)
NEUTROPHILS ABSOLUTE: 4.53 E9/L (ref 1.8–7.3)
NEUTROPHILS RELATIVE PERCENT: 61.4 % (ref 43–80)
PDW BLD-RTO: 14.4 FL (ref 11.5–15)
PLATELET # BLD: 282 E9/L (ref 130–450)
PMV BLD AUTO: 10 FL (ref 7–12)
POLYCHROMASIA: ABNORMAL
POTASSIUM SERPL-SCNC: 3.9 MMOL/L (ref 3.5–5)
PROCALCITONIN: 0.11 NG/ML (ref 0–0.08)
RBC # BLD: 3.58 E12/L (ref 3.5–5.5)
SODIUM BLD-SCNC: 140 MMOL/L (ref 132–146)
WBC # BLD: 7.3 E9/L (ref 4.5–11.5)

## 2022-12-20 PROCEDURE — 2060000000 HC ICU INTERMEDIATE R&B

## 2022-12-20 PROCEDURE — 36415 COLL VENOUS BLD VENIPUNCTURE: CPT

## 2022-12-20 PROCEDURE — 6370000000 HC RX 637 (ALT 250 FOR IP): Performed by: INTERNAL MEDICINE

## 2022-12-20 PROCEDURE — 86140 C-REACTIVE PROTEIN: CPT

## 2022-12-20 PROCEDURE — 6360000002 HC RX W HCPCS: Performed by: INTERNAL MEDICINE

## 2022-12-20 PROCEDURE — 2700000000 HC OXYGEN THERAPY PER DAY

## 2022-12-20 PROCEDURE — 85025 COMPLETE CBC W/AUTO DIFF WBC: CPT

## 2022-12-20 PROCEDURE — 2580000003 HC RX 258: Performed by: INTERNAL MEDICINE

## 2022-12-20 PROCEDURE — 80048 BASIC METABOLIC PNL TOTAL CA: CPT

## 2022-12-20 PROCEDURE — 97530 THERAPEUTIC ACTIVITIES: CPT

## 2022-12-20 PROCEDURE — 97165 OT EVAL LOW COMPLEX 30 MIN: CPT

## 2022-12-20 PROCEDURE — 94640 AIRWAY INHALATION TREATMENT: CPT

## 2022-12-20 PROCEDURE — 84145 PROCALCITONIN (PCT): CPT

## 2022-12-20 RX ADMIN — AZITHROMYCIN MONOHYDRATE 500 MG: 250 TABLET ORAL at 09:16

## 2022-12-20 RX ADMIN — ACETAMINOPHEN 650 MG: 325 TABLET ORAL at 23:34

## 2022-12-20 RX ADMIN — WATER 1000 MG: 1 INJECTION INTRAMUSCULAR; INTRAVENOUS; SUBCUTANEOUS at 14:29

## 2022-12-20 RX ADMIN — BUDESONIDE 500 MCG: 0.5 SUSPENSION RESPIRATORY (INHALATION) at 18:10

## 2022-12-20 RX ADMIN — METOPROLOL TARTRATE 25 MG: 25 TABLET, FILM COATED ORAL at 20:42

## 2022-12-20 RX ADMIN — DOCUSATE SODIUM 100 MG: 100 CAPSULE, LIQUID FILLED ORAL at 20:42

## 2022-12-20 RX ADMIN — Medication 2000 UNITS: at 09:16

## 2022-12-20 RX ADMIN — IPRATROPIUM BROMIDE AND ALBUTEROL SULFATE 1 AMPULE: .5; 2.5 SOLUTION RESPIRATORY (INHALATION) at 18:10

## 2022-12-20 RX ADMIN — ENOXAPARIN SODIUM 30 MG: 100 INJECTION SUBCUTANEOUS at 09:17

## 2022-12-20 RX ADMIN — BUDESONIDE 500 MCG: 0.5 SUSPENSION RESPIRATORY (INHALATION) at 06:52

## 2022-12-20 RX ADMIN — POLYETHYLENE GLYCOL 3350 17 G: 17 POWDER, FOR SOLUTION ORAL at 09:17

## 2022-12-20 RX ADMIN — IPRATROPIUM BROMIDE AND ALBUTEROL SULFATE 1 AMPULE: .5; 2.5 SOLUTION RESPIRATORY (INHALATION) at 14:34

## 2022-12-20 RX ADMIN — METOPROLOL TARTRATE 25 MG: 25 TABLET, FILM COATED ORAL at 09:17

## 2022-12-20 RX ADMIN — ENOXAPARIN SODIUM 30 MG: 100 INJECTION SUBCUTANEOUS at 20:42

## 2022-12-20 RX ADMIN — DEXAMETHASONE SODIUM PHOSPHATE 6 MG: 10 INJECTION INTRAMUSCULAR; INTRAVENOUS at 09:17

## 2022-12-20 RX ADMIN — OXYCODONE HYDROCHLORIDE AND ACETAMINOPHEN 1000 MG: 500 TABLET ORAL at 09:16

## 2022-12-20 RX ADMIN — LUBIPROSTONE 8 MCG: 8 CAPSULE, GELATIN COATED ORAL at 09:16

## 2022-12-20 RX ADMIN — IPRATROPIUM BROMIDE AND ALBUTEROL SULFATE 1 AMPULE: .5; 2.5 SOLUTION RESPIRATORY (INHALATION) at 09:41

## 2022-12-20 RX ADMIN — DOCUSATE SODIUM 100 MG: 100 CAPSULE, LIQUID FILLED ORAL at 09:16

## 2022-12-20 RX ADMIN — LUBIPROSTONE 8 MCG: 8 CAPSULE, GELATIN COATED ORAL at 17:34

## 2022-12-20 RX ADMIN — IPRATROPIUM BROMIDE AND ALBUTEROL SULFATE 1 AMPULE: .5; 2.5 SOLUTION RESPIRATORY (INHALATION) at 06:52

## 2022-12-20 ASSESSMENT — PAIN DESCRIPTION - ORIENTATION: ORIENTATION: LEFT

## 2022-12-20 ASSESSMENT — PAIN SCALES - GENERAL
PAINLEVEL_OUTOF10: 1
PAINLEVEL_OUTOF10: 0

## 2022-12-20 ASSESSMENT — PAIN DESCRIPTION - LOCATION
LOCATION: BACK
LOCATION: KNEE

## 2022-12-20 NOTE — PROGRESS NOTES
[address] [facility]  [facility] Radha Sanchez. [place]        Date:2022                                                  Patient Name: Christopher Raya    MRN: [id number]    : 1955    Room: [facility]      Evaluating OT: Daniel Wright OTR/L #[id number]     Referring Provider and Specific Provider Orders/Date:      22  OT eval and treat  Start:  22,   End:  22,   ONE TIME,   Standing Count:  1 Occurrences,   R         Chai Dale DO       Placement Recommendation: Acute Rehab vs Home Health Occupational Therapy if patient is able to meet goals       Diagnosis:   1. COVID-19    2. Acute respiratory failure with hypoxia (HCC)    3. Staghorn calculus    4.  Urinary tract infection without hematuria, site unspecified         Surgery: None       Pertinent Medical History:       Past Medical History:   Diagnosis Date    Arthritis     Asthma     COPD (chronic obstructive pulmonary disease) ([facility] [address]     Eczema     Fall 2011    Gout     Hematoma of abdominal wall 2011    extraperitoneal    Hypertension     Metatarsal fracture 2011    right 3rd and 4th    Multiple sclerosis (HCC)     Prolonged emergence from general anesthesia     Scoliosis     Spinal stenosis     UTI (urinary tract infection)          Past Surgical History:   Procedure Laterality Date    APPENDECTOMY      CHOLECYSTECTOMY      CYSTOSCOPY INSERTION / REMOVAL STENT / STONE Right 2020    CYSTOSCOPY RETROGRADE PYELOGRAM STENT INSERTION LEFT performed by Janna Howard DO at [address] [place] [address] ([address] [place] [address])      LITHOTRIPSY Left 2020    CYSTOSCOPY RETROGRADE PYELOGRAM URETEROSCOPY  LEFT J-STENT EXCHANGE, INSERTION JUNIOR CATHETER---FACILITY------ performed by Janna Howard DO at [facility] OR      Precautions: fall Risk, up with assistance, alarm, h/o MS , droplet plus isolation, COVID positive      Assessment of current deficits    [x] Functional mobility  [x]ADLs  [x] Strength               []Cognition    [x] Functional transfers   [x] IADLs         [x] Safety Awareness   [x]Endurance    [] Fine Coordination              [x] Balance      [] Vision/perception   []Sensation     []Gross Motor Coordination  [] ROM  [] Delirium                   [] Motor Control     OT PLAN OF CARE   OT POC based on physician orders, patient diagnosis and results of clinical assessment    Frequency/Duration 1-3 days/wk for 2 weeks PRN     Specific OT Treatment Interventions to include:   * Instruction/training on adapted ADL techniques and AE recommendations to increase functional independence within precautions       * Training on energy conservation strategies, correct breathing pattern and techniques to improve independence/tolerance for self-care routine  * Functional transfer/mobility training/DME recommendations for increased independence, safety, and fall prevention  * Patient/Family education to increase follow through with safety techniques and functional independence  * Recommendation of environmental modifications for increased safety with functional transfers/mobility and ADLs  * Therapeutic exercise to improve motor endurance, ROM, and functional strength for ADLs/functional transfers  * Therapeutic activities to facilitate/challenge dynamic balance, stand tolerance for increased safety and independence with ADLs  * Positioning to improve skin integrity, interaction with environment and functional independence    Recommended Adaptive Equipment: TBD      Home Living: Patient lives alone in a apartment 3rd floor with elevator   with No steps  to enter Tub shower  ,      Equipment owned: Wheelchair - manual and electric, Wheeled Walker, Bedside commode, Tub transfer bench, hospital bed, and leg , reacher     Prior Level of Function: Moderate [place] with ADLs , has an aide 5 days a week 6 hours/day to assist with IADLs; wheelchair user at baseline. Pt does stand and pivots to wheelchair independently. Driving: Dependent on transportation service     Pain Level: pt c/o L lower quadrant pain; Nursing notified. Cognition: A&O: 4/4; Follows 3 step directions   Memory: intact   Sequencing: intact   Problem solving: fair    Judgement/safety: fair     [facility]   AM-PAC Daily Activity Inpatient   How much help for putting on and taking off regular lower body clothing?: Total  How much help for Bathing?: A Lot  How much help for Toileting?: Total  How much help for putting on and taking off regular upper body clothing?: A Lot  How much help for taking care of personal grooming?: A Little  How much help for eating meals?: A Little  AM-[facility] Inpatient Daily Activity Raw Score: 12  AM-PAC Inpatient ADL T-Scale Score : 30.6  ADL Inpatient CMS 0-100% Score: 66.57  ADL Inpatient CMS G-Code Modifier : CL     Functional Assessment:    Initial Eval Status  Date: 10/20/22   Treatment Status  Date: STGs = LTGs  Time frame: 10-14 days   Feeding Supervision     Independent    Grooming Minimal Assist    Supervision    UB Dressing Moderate Assist    Supervision    LB Dressing Dependent     Minimal Assist    Bathing Moderate/Maximal Assist     Minimal Assist    Toileting Dependent   Pt has dillon catheter; dep for rear hygiene supine in bed     Minimal Assist    Bed Mobility  Supine to sit: Moderate Assist  Sit to supine: Moderate Assist   Rolling: Minimal/Moderate Assist     Supine to sit:  Independent   Sit to supine: Independent    Functional Transfers Sit to stand: Maximal Assist   Stand to sit: Maximal Assist     Minimal Assist for stand pivot to chair    Functional Mobility Not Assessed     Minimal Assist with use of wheeled walker , short distance pending appropriateness      Balance Sitting:     Static: fair plus     Dynamic: fair plus  Standing: poor     Sitting:     Static: fair plus    Dynamic: fair plus  Standing: fair with AD    Activity Tolerance fair  plus   Increase standing tolerance >1  minutes for improved engagement with functional transfers and indep in ADLs     Visual/  Perceptual Glasses: readers      NA      Hand Dominance: Right      AROM (PROM) Strength Additional Info:  Goal:   RUE  WFL 4-/5 good  and wfl FMC/dexterity noted during ADL tasks   Improve overall RUE strength  for participation in functional tasks   LUE WFL 4-/5 good  and wfl FMC/dexterity noted during ADL tasks   Improve overall LUE strength  for participation in functional tasks     Hearing:  [facility]   Sensation:   No c/o numbness or tingling  Tone:  WFL   Edema:     Comments: RN cleared patient for OT. Upon arrival patient in supine. Therapist facilitated and instructed pt on adapted  techniques & compensatory strategies to improve safety and independence with basic ADLs, bed mobility, functional transfers and mobility to allow pt to achieve highest level of independence and safely. Pt demonstrated fair plus understanding of education & follow through. At end of session, patient was in supine, HOB elevated, with call light and phone within reach, all lines and tubes intact. Overall, patient demonstrated  decreased independence and safety during completion of ADL tasks. Pt would benefit from continued skilled OT to increase safety and independence with completion of ADL tasks and functional mobility for improved quality of life. Treatment: OT treatment provided this date includes:   Instructions/training on safety, sequencing, and adapted techniques for completion of ADLs. Facilitated bed mobility with cues for proper body mechanics and sequencing to prepare for ADL completion. Instruction/training on safe functional mobility/transfer techniques including hand and feet placement   Facilitated proper positioning/alignment to improve interaction with environment, breathing, overall functioning and decrease/prevent edema.   Sitting EOB x 10 minutes to improve dynamic sitting balance and activity tolerance during ADLs    Rehab Potential: Good for established goals. Patient / Family Goal: pt in agreement with POC      Patient and/or family were instructed on functional diagnosis, prognosis/goals and OT plan of care. Demonstrated fair understanding. Eval Complexity: Low    Time In: 11:35 AM   Time Out: 12:10 PM    Total Treatment Time: 15       Min Units   OT Eval Low 97165  X  1    OT Eval Medium 45224      OT Eval High 85998      OT Re-Eval 07157            ADL/Self Care 59205 5 0   Therapeutic Activities 04049 10 1   Therapeutic Ex 14328       Orthotic Management 74148       Manual 42552     Neuro Re-Ed 45136       Non-Billable Time        Evaluation Time additionally includes thorough review of current medical information, gathering information on past medical history/social history and prior level of function, interpretation of standardized testing/informal observation of tasks, assessment of data and development of plan of care and goals.         Evaluating OT: Bj Harrison OTR/L #[id number]

## 2022-12-20 NOTE — PROGRESS NOTES
Internal Medicine Progress Note    [facility]    Alanna Sanders. Alexei Cabrera., KARL.MARCI.ANALIOTerrenceI. Anival No D.O., TRACEE Gaspar Junior, MSN, APRN, NP-C  Gloria Putnam. Verner Ink, MSN, APRN-CNP     Primary Care Physician: Tamar Valentine MD   Admitting Physician:  Ricki Christiansen DO  Admission date and time: 12/18/2022  6:54 AM    Room:  [address] [place] [address]  Admitting diagnosis: Staghorn calculus [N20.0]  Acute respiratory failure with hypoxia ([facility] [address] [J96.01]  Urinary tract infection without hematuria, site unspecified [N39.0]  COVID-19 [U07.1]    Patient Name: Thor Madrid  MRN: [id number]    Date of Service: 12/20/2022     Subjective:  Yoselin Nunez is a 75 y.o. female who was seen and examined today,12/20/2022, at the bedside. Yoselin Nunez continues to improve on a daily basis. Her respiratory status is stable and she has been weaned off nasal cannula oxygen. She became increasingly active with the therapy teams yesterday. Plans are for outpatient urologic follow-up for the staghorn calculi. We discussed removal of the Obrien catheter today and discharge home tomorrow. Review of System:   Constitutional:   Improving malaise and fatigue. HEENT:   Denies ear pain, sore throat, sinus or eye problems. Cardiovascular:   Denies any chest pain, irregular heartbeats, or palpitations. Respiratory:   Improving shortness of breath. Gastrointestinal:   Improving appetite with increased oral intake. Genitourinary:    Denies any urgency, frequency, hematuria. Voiding  without difficulty. Extremities:   Denies lower extremity swelling, edema or cyanosis. Neurology:    Denies any headache or focal neurological deficits, chronic neurologic deficits related to underlying multiple sclerosis  Psch:   Denies being anxious or depressed. Musculoskeletal:    Denies  myalgias, joint complaints or back pain. Integumentary:   Denies any rashes, ulcers, or excoriations.   Denies bruising. Hematologic/Lymphatic:  Denies bruising or bleeding. Physical Exam:  No intake/output data recorded. Intake/Output Summary (Last 24 hours) at 12/20/2022 1010  Last data filed at 12/20/2022 1209  Gross per 24 hour   Intake --   Output 2245 ml   Net -2245 ml   I/O last 3 completed shifts: In: 1302.6 [P.O.:840; I.V.:462.6]  Out: 3545 [OOCNQ:7501]  Patient Vitals for the past 96 hrs (Last 3 readings):   Weight   12/19/22 0645 222 lb (100.7 kg)   12/18/22 1845 205 lb (93 kg)   12/18/22 1255 198 lb (89.8 kg)     Vital Signs:   Blood pressure (!) 148/84, pulse 77, temperature 98.1 °F (36.7 °C), temperature source Oral, resp. rate 20, height 5' 6\" (1.676 m), weight 222 lb (100.7 kg), SpO2 95 %, not currently breastfeeding. General appearance:  Alert, responsive, oriented to person, place, and time. Well preserved, alert, no distress. Head:  Normocephalic. No masses, lesions or tenderness. Eyes:  PERRLA. EOMI. Sclera clear. Buccal mucosa moist.  ENT:  Ears normal. Mucosa normal.  Nasal cannula oxygen is in place at 3 L. Neck:    Supple. Trachea midline. No thyromegaly. No JVD. No bruits. Heart:    Rhythm regular. Rate controlled. No murmurs. Lungs:    Symmetrical. Clear to auscultation bilaterally. No wheezes. No rhonchi. No rales. Abdomen:   Soft. Non-tender. Non-distended. Bowel sounds positive. No organomegaly or masses. No pain on palpation. Extremities:    Peripheral pulses present. No peripheral edema. No ulcers. No cyanosis. No clubbing. Neurologic:    Alert x 3. Chronic neurologic deficits related to multiple sclerosis  Psych:   Behavior is normal. Mood appears normal. Speech is not rapid and/or pressured. Musculoskeletal:   Spine ROM normal. Muscular strength intact. Gait not assessed. Integumentary:  No rashes  Skin normal color and texture.   Genitalia/Breast:  Deferred    Medication:  Scheduled Meds:   lubiprostone  8 mcg Oral BID WC    vitamin C  1,000 mg Oral Daily cetirizine  10 mg Oral Daily    docusate sodium  100 mg Oral BID    metoprolol tartrate  25 mg Oral BID    cefTRIAXone (ROCEPHIN) IV  1,000 mg IntraVENous Q24H    azithromycin  500 mg Oral Daily    budesonide  500 mcg Nebulization BID    ipratropium-albuterol  1 ampule Inhalation Q4H WA    Vitamin D  2,000 Units Oral Daily    enoxaparin  30 mg SubCUTAneous BID    dexamethasone  6 mg IntraVENous Q24H     Continuous Infusions:   sodium chloride 75 mL/hr at 12/19/22 1951       Objective Data:  CBC with Differential:    Lab Results   Component Value Date/Time    WBC 7.3 12/20/2022 08:56 AM    RBC 3.58 12/20/2022 08:56 AM    HGB 13.3 12/20/2022 08:56 AM    HCT 38.4 12/20/2022 08:56 AM     12/20/2022 08:56 AM    .3 12/20/2022 08:56 AM    MCH 37.2 12/20/2022 08:56 AM    MCHC 34.6 12/20/2022 08:56 AM    RDW 14.4 12/20/2022 08:56 AM    SEGSPCT 50 05/28/2011 04:30 AM    METASPCT 1.0 12/19/2022 08:47 AM    LYMPHOPCT 20.2 12/20/2022 08:56 AM    MONOPCT 16.7 12/20/2022 08:56 AM    MYELOPCT 0.9 12/20/2022 08:56 AM    BASOPCT 0.4 12/20/2022 08:56 AM    MONOSABS 1.24 12/20/2022 08:56 AM    LYMPHSABS 1.46 12/20/2022 08:56 AM    EOSABS 0.07 12/20/2022 08:56 AM    BASOSABS 0.00 12/20/2022 08:56 AM     BMP:    Lab Results   Component Value Date/Time     12/20/2022 08:56 AM    K 3.9 12/20/2022 08:56 AM    K 4.2 12/18/2022 07:37 AM     12/20/2022 08:56 AM    CO2 22 12/20/2022 08:56 AM    BUN 15 12/20/2022 08:56 AM    LABALBU 3.8 12/18/2022 07:37 AM    LABALBU 4.0 05/27/2011 05:00 PM    CREATININE 0.7 12/20/2022 08:56 AM    CALCIUM 8.6 12/20/2022 08:56 AM    GFRAA >60 02/28/2022 11:05 AM    LABGLOM >60 12/20/2022 08:56 AM    GLUCOSE 87 12/20/2022 08:56 AM    GLUCOSE 96 05/28/2011 04:30 AM       Assessment:  Sepsis secondary to COVID-pneumonia resulting in acute respiratory failure with hypoxia  Staghorn calculus with resultant urinary tract infection  Multiple sclerosis with progressive failure to thrive    Plan: Aniya Quintanilla has been weaned off nasal cannula oxygen and is doing well from a respiratory standpoint. She remains weak and deconditioned but continues to improve. She will work aggressively with the therapy teams today. Plans are for outpatient follow-up with the urologic team in the setting of the staghorn calculus. Obrien catheter will be removed today. Plans will be for discharge home tomorrow and we will arrange home health care. More than 50% of my  time was spent at the bedside counseling/coordinating care with the patient and/or family with face to face contact. This time was spent reviewing notes and laboratory data as well as instructing and counseling the patient. Time I spent with the family or surrogate(s) is included only if the patient was incapable of providing the necessary information or participating in medical decisions. I also discussed the differential diagnosis and all of the proposed management plans with the patient and individuals accompanying the patient. Aniya Quintanilla requires this high level of physician care and nursing on the IMC/Telemetry unit due the complexity of decision management and chance of rapid decline or death. Continued cardiac monitoring and higher level of nursing are required. I am readily available for any further decision-making and intervention.      Desirae Fish DO, F.A.C.O.I.  12/20/2022  10:10 AM

## 2022-12-20 NOTE — DISCHARGE INSTR - COC
Continuity of Care Form    Patient Name: Bhupendra Camarillo   :  [birth year]  MRN:  [id number]    [facility] date:  2022  Discharge date:  2022    Code Status Order: Full Code   Advance Directives:     Admitting Physician:  Mabelene Sandifer, DO  PCP: Torie Simmons MD    Discharging Nurse: [facility] Unit/Room#: 4462/4013-58  Discharging Unit Phone Number: [phone]    Emergency Contact:   Extended Emergency Contact Information  Primary Emergency Contact: Dena Stern ([address] [place] [address])  Home Phone: [phone]  Mobile Phone: [phone]  Relation: Other   needed?  No    Past Surgical History:  Past Surgical History:   Procedure Laterality Date    APPENDECTOMY      CHOLECYSTECTOMY      CYSTOSCOPY INSERTION / REMOVAL STENT / STONE Right 2020    CYSTOSCOPY RETROGRADE PYELOGRAM STENT INSERTION LEFT performed by Alicia Howard DO at [address] ([address] [facility])      LITHOTRIPSY Left 2020    CYSTOSCOPY RETROGRADE PYELOGRAM URETEROSCOPY  LEFT J-STENT EXCHANGE, INSERTION JUNIOR CATHETER---FACILITY------ performed by Alicia Howard DO at [address]       Immunization History:   Immunization History   Administered Date(s) Administered    COVID-19, MODERNA BLUE border, Primary or Immunocompromised, (age 12y+), IM, 100 mcg/0.5mL 2021, 2021    Influenza Vaccine, unspecified formulation 10/01/2012, 10/01/2017, 10/01/2018    Influenza Virus Vaccine 10/01/2012, 10/01/2017, 10/01/2018    Influenza, FLUAD, (age 72 y+), Adjuvanted, 0.5mL 2020    Influenza, Triv, inactivated, subunit, adjuvanted, IM (Fluad 65 yrs and older) 10/02/2019    Pneumococcal Conjugate 13-valent (Yfeymii62) 10/02/2019    Pneumococcal Polysaccharide (Warpetvov52) 2009       Active Problems:  Patient Active Problem List   Diagnosis Code    Neck pain M54.2    Multiple sclerosis ([facility] [address] G35    Bilateral foot-drop M21.371, M21.372    Peripheral polyneuropathy G62.9    Essential hypertension I10    Asthma J45.909    Edema of both legs R60.0    Constipation K59.00    Intervertebral lumbar disc disorder with myelopathy, 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    Cervical spinal stenosis M48.02    Pulmonary venous congestion R09.89    Fatigue R53.83    Hx of appendectomy Z90.49    Hx of cholecystectomy Z90.49    Hx of hysterectomy Z90.710    Chronic rhinitis J31.0    Congestion of upper airway J98.8    Recurrent UTI N39.0    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    Eczema S79.3    Folliculitis J68.2    Unable to walk R26.2    COVID-19 U07.1       Isolation/Infection:   Isolation            Droplet Plus          Patient Infection Status       Infection Onset Added Last Indicated Last Indicated By Review Planned Expiration Resolved Resolved By    COVID-19 12/18/22 12/18/22 12/18/22 COVID-19, Rapid 12/28/22 01/01/23      Resolved    COVID-19 (Rule Out) 12/18/22 12/18/22 12/18/22 COVID-19, Rapid (Ordered)   12/18/22 Rule-Out Test Resulted    COVID-19 (Rule Out) 12/23/20 12/23/20 12/23/20 Respiratory Panel, Molecular, with COVID-19 (Restricted: peds pts or suitable admitted adults) (Ordered)   12/24/20 Rule-Out Test Resulted    COVID-19 (Rule Out) 12/23/20 12/23/20 12/23/20 COVID-19 (Ordered)   12/23/20 Rule-Out Test Resulted    COVID-19 (Rule Out) 07/30/20 07/30/20 07/30/20 COVID-19 (Ordered)   07/30/20 Rule-Out Test Resulted    COVID-19 (Rule Out) 07/26/20 07/26/20 07/26/20 COVID-19 (Ordered)   07/26/20 Rule-Out Test Resulted            Nurse Assessment:  Last Vital Signs: BP (!) 148/84   Pulse 77   Temp 98.1 °F (36.7 °C) (Oral)   Resp 20   Ht 5' 6\" (1.676 m)   Wt 222 lb (100.7 kg)   LMP  (LMP Unknown)   SpO2 95%   BMI 35.83 kg/m²     Last documented pain score (0-10 scale): Pain Level: 1  Last Weight:   Wt Readings from Last 1 Encounters:   12/19/22 222 lb (100.7 kg)     Mental Status:  oriented, alert, coherent, logical, thought processes intact, and able to concentrate and follow conversation    IV Access:  - None    Nursing Mobility/ADLs:  Walking   Assisted  Transfer  Assisted  Bathing  Assisted  Dressing  Assisted  Toileting  Assisted  Feeding  [address]  Independent  Med Delivery   whole    Wound Care Documentation and Therapy:        Elimination:  Continence: Bowel: Yes  Bladder: No  Urinary Catheter: None   Colostomy/Ileostomy/Ileal Conduit: No       Date of Last BM: 12/21/22    Intake/Output Summary (Last 24 hours) at 12/20/2022 1208  Last data filed at 12/20/2022 2762  Gross per 24 hour   Intake --   Output 2245 ml   Net -2245 ml     I/O last 3 completed shifts: In: 1302.6 [P.O.:840; I.V.:462.6]  Out: 3545 [Urine:3545]    Safety Concerns:     History of Falls (last 30 days) and At Risk for Falls    Impairments/Disabilities:      None    Nutrition Therapy:  Current Nutrition Therapy:   - Oral Diet:  General  - Oral Nutrition Supplement:  Standard  twice a day    Routes of Feeding: Oral  Liquids: No Restrictions  Daily Fluid Restriction: no  Last Modified Barium Swallow with Video (Video Swallowing Test): not done    Treatments at the Time of Hospital Discharge:   Respiratory Treatments: None  Oxygen Therapy:  is not on home oxygen therapy.   Ventilator:    - No ventilator support    Rehab Therapies: Physical Therapy and Occupational Therapy  Weight Bearing Status/Restrictions: No weight bearing restrictions  Other Medical Equipment (for information only, NOT a DME order):  wheelchair, walker, bedside commode, and hospital bed  Other Treatments: PT/OT    Patient's personal belongings (please select all that are sent with patient):  Glasses    RN SIGNATURE:  Electronically signed by Terri Grimm RN on 12/21/22 at 2:09 PM EST    CASE MANAGEMENT/SOCIAL WORK SECTION    Inpatient Status Date: ***    Readmission Risk Assessment Score:  Readmission Risk              Risk of Unplanned Readmission:  14           Discharging to Facility/ Agency   Name: [facility] AT [facility]: [phone]: [phone]     Dialysis Facility (if applicable)   Name:  Address:  Dialysis Schedule:  Phone:  Fax:    / signature: Electronically signed by Edgar Garcia on 12/20/22 at 12:09 PM EST    PHYSICIAN SECTION    Prognosis: Good    Condition at Discharge: Stable    Rehab Potential (if transferring to Rehab): Good    Recommended Labs or Other Treatments After Discharge: ***    Physician Certification: I certify the above information and transfer of Akhil Ryan  is necessary for the continuing treatment of the diagnosis listed and that she requires Acute Rehab for less 30 days.      Update Admission H&P: {CHP DME Changes in State mental health facilityF:[id number]}    PHYSICIAN SIGNATURE:  Electronically signed by Betsy Odonnell DO on 12/21/22 at 10:53 AM EST

## 2022-12-20 NOTE — CARE COORDINATION
SS NOTE: COVID POSITIVE 12/18, TO [place]. Pt is on 3 liters with no home O2. She has a urine catheter. Pt has a kidney stone- Urology is on. Pt also has MS. Pt has a peripheral line. She is on IV Decadron, Rocephin and oral Zithromax. PT/OT are on and suggest HHC. SW spoke with pt again today to advise he of [facility]- [address] declined d/t staffing and [facility] declined d/t [address] will accepted however would not be able to begin til next Tuesday. Pt now reconsidering [facility]- requests that this SW make a referral there to see if they would accept her for a short stay in light of possible dch tomorrow. Referral made to [facility] and they will accept her tomorrow. .For [facility] pt will need NO PRECERT, she will need a signed BREANA, current PT/OT notes. SS to continue. CRISTIAN García.12/20/2022.12:05PM.

## 2022-12-20 NOTE — PLAN OF CARE
Problem: Discharge Planning  Goal: Discharge to home or other facility with appropriate resources  12/20/2022 0210 by Jaycee Gonzales RN  Outcome: Progressing  12/19/2022 1415 by Mariel Edward RN  Outcome: Progressing     Problem: Skin/Tissue Integrity  Goal: Absence of new skin breakdown  Description: 1. Monitor for areas of redness and/or skin breakdown  2. Assess vascular access sites hourly  3. Every 4-6 hours minimum:  Change oxygen saturation probe site  4. Every 4-6 hours:  If on nasal continuous positive airway pressure, respiratory therapy assess nares and determine need for appliance change or resting period.   12/20/2022 0210 by Jaycee Gonzales RN  Outcome: Progressing  12/19/2022 1415 by Mariel Edward RN  Outcome: Progressing     Problem: Safety - Adult  Goal: Free from fall injury  12/20/2022 0210 by Jaycee Gonzales RN  Outcome: Progressing  12/19/2022 1415 by Mariel Edward RN  Outcome: Progressing

## 2022-12-20 NOTE — CARE COORDINATION
SS NOTE: COVID POSITIVE 12/18, PT WILL NEED A PCR IF ACCEPTED TO [facility]. Pt is on 3 liters with no home O2. She has a urine catheter. Pt has a kidney stone- Urology is on. Pt also has MS. Pt has a peripheral line. She is on IV Decadron, Rocephin and oral Zithromax. PT/OT are on and suggest HHC. SW spoke with pt again today to advise he of the findings for [place] [id number] referrals- Replaced by [facility] declined d/t staffing and [facility] declined d/t [email or web address] will accepted however would not be able to begin til next Tuesday. Pt now reconsidering [facility]- requests that this SW make a referral there to see if they would accept her for a short stay in light of possible dch tomorrow. Referral made to [facility] and they will accept her tomorrow. .For [facility] pt will need NO PRECERT, she will need a signed BREANA, current PT/OT notes. SS to continue. CRISTIAN Alatorre.12/20/2022.12:05PM.

## 2022-12-21 VITALS
SYSTOLIC BLOOD PRESSURE: 108 MMHG | OXYGEN SATURATION: 93 % | HEART RATE: 65 BPM | RESPIRATION RATE: 18 BRPM | DIASTOLIC BLOOD PRESSURE: 80 MMHG | BODY MASS INDEX: 35.68 KG/M2 | TEMPERATURE: 98.1 F | WEIGHT: 222 LBS | HEIGHT: 66 IN

## 2022-12-21 LAB
ANION GAP SERPL CALCULATED.3IONS-SCNC: 12 MMOL/L (ref 7–16)
BASOPHILS ABSOLUTE: 0.07 E9/L (ref 0–0.2)
BASOPHILS RELATIVE PERCENT: 0.9 % (ref 0–2)
BUN BLDV-MCNC: 17 MG/DL (ref 6–23)
CALCIUM SERPL-MCNC: 9.1 MG/DL (ref 8.6–10.2)
CHLORIDE BLD-SCNC: 101 MMOL/L (ref 98–107)
CO2: 26 MMOL/L (ref 22–29)
CREAT SERPL-MCNC: 0.8 MG/DL (ref 0.5–1)
EOSINOPHILS ABSOLUTE: 0.13 E9/L (ref 0.05–0.5)
EOSINOPHILS RELATIVE PERCENT: 1.7 % (ref 0–6)
GFR SERPL CREATININE-BSD FRML MDRD: >60 ML/MIN/1.73
GLUCOSE BLD-MCNC: 81 MG/DL (ref 74–99)
HCT VFR BLD CALC: 37.6 % (ref 34–48)
HEMOGLOBIN: 14 G/DL (ref 11.5–15.5)
LYMPHOCYTES ABSOLUTE: 1.31 E9/L (ref 1.5–4)
LYMPHOCYTES RELATIVE PERCENT: 16.5 % (ref 20–42)
MCH RBC QN AUTO: 40.2 PG (ref 26–35)
MCHC RBC AUTO-ENTMCNC: 37.2 % (ref 32–34.5)
MCV RBC AUTO: 108 FL (ref 80–99.9)
MONOCYTES ABSOLUTE: 1.46 E9/L (ref 0.1–0.95)
MONOCYTES RELATIVE PERCENT: 19.1 % (ref 2–12)
NEUTROPHILS ABSOLUTE: 4.77 E9/L (ref 1.8–7.3)
NEUTROPHILS RELATIVE PERCENT: 61.7 % (ref 43–80)
OVALOCYTES: ABNORMAL
PDW BLD-RTO: 14.2 FL (ref 11.5–15)
PLATELET # BLD: 285 E9/L (ref 130–450)
PMV BLD AUTO: 10.3 FL (ref 7–12)
POIKILOCYTES: ABNORMAL
POLYCHROMASIA: ABNORMAL
POTASSIUM SERPL-SCNC: 4.2 MMOL/L (ref 3.5–5)
RBC # BLD: 3.48 E12/L (ref 3.5–5.5)
SODIUM BLD-SCNC: 139 MMOL/L (ref 132–146)
TARGET CELLS: ABNORMAL
WBC # BLD: 7.7 E9/L (ref 4.5–11.5)

## 2022-12-21 PROCEDURE — 97530 THERAPEUTIC ACTIVITIES: CPT

## 2022-12-21 PROCEDURE — 97110 THERAPEUTIC EXERCISES: CPT

## 2022-12-21 PROCEDURE — 6370000000 HC RX 637 (ALT 250 FOR IP): Performed by: INTERNAL MEDICINE

## 2022-12-21 PROCEDURE — 80048 BASIC METABOLIC PNL TOTAL CA: CPT

## 2022-12-21 PROCEDURE — 85025 COMPLETE CBC W/AUTO DIFF WBC: CPT

## 2022-12-21 PROCEDURE — 6360000002 HC RX W HCPCS: Performed by: INTERNAL MEDICINE

## 2022-12-21 PROCEDURE — 94640 AIRWAY INHALATION TREATMENT: CPT

## 2022-12-21 PROCEDURE — 36415 COLL VENOUS BLD VENIPUNCTURE: CPT

## 2022-12-21 RX ORDER — BUDESONIDE AND FORMOTEROL FUMARATE DIHYDRATE 160; 4.5 UG/1; UG/1
2 AEROSOL RESPIRATORY (INHALATION) 2 TIMES DAILY
Qty: 10.2 G | Refills: 1 | DISCHARGE
Start: 2022-12-21

## 2022-12-21 RX ORDER — DEXAMETHASONE 6 MG/1
6 TABLET ORAL
Qty: 10 TABLET | Refills: 0 | DISCHARGE
Start: 2022-12-21 | End: 2022-12-31

## 2022-12-21 RX ORDER — LOSARTAN POTASSIUM 50 MG/1
25 TABLET ORAL NIGHTLY
Qty: 30 TABLET | Refills: 3 | DISCHARGE
Start: 2022-12-21

## 2022-12-21 RX ORDER — CYCLOBENZAPRINE HCL 5 MG
5 TABLET ORAL 3 TIMES DAILY PRN
DISCHARGE
Start: 2022-12-21

## 2022-12-21 RX ORDER — CEFDINIR 300 MG/1
300 CAPSULE ORAL 2 TIMES DAILY
Qty: 10 CAPSULE | Refills: 0 | DISCHARGE
Start: 2022-12-21 | End: 2022-12-26

## 2022-12-21 RX ORDER — PSEUDOEPHEDRINE HCL 30 MG
100 TABLET ORAL 2 TIMES DAILY
DISCHARGE
Start: 2022-12-21

## 2022-12-21 RX ORDER — AZITHROMYCIN 500 MG/1
500 TABLET, FILM COATED ORAL DAILY
Qty: 2 TABLET | Refills: 0 | DISCHARGE
Start: 2022-12-21 | End: 2022-12-23

## 2022-12-21 RX ORDER — FUROSEMIDE 40 MG/1
TABLET ORAL
Qty: 90 TABLET | Refills: 3 | DISCHARGE
Start: 2022-12-28

## 2022-12-21 RX ADMIN — OXYCODONE HYDROCHLORIDE AND ACETAMINOPHEN 1000 MG: 500 TABLET ORAL at 10:16

## 2022-12-21 RX ADMIN — DEXAMETHASONE SODIUM PHOSPHATE 6 MG: 10 INJECTION INTRAMUSCULAR; INTRAVENOUS at 10:13

## 2022-12-21 RX ADMIN — IPRATROPIUM BROMIDE AND ALBUTEROL SULFATE 1 AMPULE: .5; 2.5 SOLUTION RESPIRATORY (INHALATION) at 04:45

## 2022-12-21 RX ADMIN — IPRATROPIUM BROMIDE AND ALBUTEROL SULFATE 1 AMPULE: .5; 2.5 SOLUTION RESPIRATORY (INHALATION) at 13:41

## 2022-12-21 RX ADMIN — POLYETHYLENE GLYCOL 3350 17 G: 17 POWDER, FOR SOLUTION ORAL at 10:14

## 2022-12-21 RX ADMIN — AZITHROMYCIN MONOHYDRATE 500 MG: 250 TABLET ORAL at 10:15

## 2022-12-21 RX ADMIN — LUBIPROSTONE 8 MCG: 8 CAPSULE, GELATIN COATED ORAL at 10:15

## 2022-12-21 RX ADMIN — BUDESONIDE 500 MCG: 0.5 SUSPENSION RESPIRATORY (INHALATION) at 04:46

## 2022-12-21 RX ADMIN — DOCUSATE SODIUM 100 MG: 100 CAPSULE, LIQUID FILLED ORAL at 10:15

## 2022-12-21 RX ADMIN — Medication 2000 UNITS: at 10:15

## 2022-12-21 RX ADMIN — ENOXAPARIN SODIUM 30 MG: 100 INJECTION SUBCUTANEOUS at 10:14

## 2022-12-21 RX ADMIN — METOPROLOL TARTRATE 25 MG: 25 TABLET, FILM COATED ORAL at 10:15

## 2022-12-21 ASSESSMENT — PAIN DESCRIPTION - DESCRIPTORS: DESCRIPTORS: ACHING;DISCOMFORT

## 2022-12-21 ASSESSMENT — PAIN SCALES - GENERAL: PAINLEVEL_OUTOF10: 3

## 2022-12-21 ASSESSMENT — PAIN DESCRIPTION - LOCATION: LOCATION: ABDOMEN

## 2022-12-21 ASSESSMENT — PAIN DESCRIPTION - PAIN TYPE: TYPE: ACUTE PAIN

## 2022-12-21 ASSESSMENT — PAIN DESCRIPTION - ORIENTATION: ORIENTATION: RIGHT;LEFT

## 2022-12-21 NOTE — PROGRESS NOTES
Physical Therapy Treatment Note/Plan of Care    Room #:  5030/2913-16  Patient Name: Cisco Loomis  YOB: 1955  MRN: [id number]    Date of Service: 12/21/2022     Tentative placement recommendation: Home Health Physical Therapy  or Inpatient Rehab  Equipment recommendation: To be determined      Evaluating Physical Therapist: Lucrecia Mota, [address] [facility] #[id number]      Specific Provider Orders/Date/Referring Provider :   12/18/22 1530    PT eval and treat  Start:  12/18/22 1530,   End:  12/18/22 1530,   ONE TIME,   Standing Count:  1 Occurrences,   R         Gerson Hoover DO     Admitting Diagnosis:   Staghorn calculus [N20.0]  Acute respiratory failure with hypoxia ([address] [J96.01]  Urinary tract infection without hematuria, site unspecified [N39.0]  COVID-19 [U07.1]      Surgery: none  Visit Diagnoses         Codes    Acute respiratory failure with hypoxia ([address]     J96.01    Staghorn calculus     N20.0    Urinary tract infection without hematuria, site unspecified     N39.0            Patient Active Problem List   Diagnosis    Neck pain    Multiple sclerosis (HCC)    Bilateral foot-drop    Peripheral polyneuropathy    Essential hypertension    Asthma    Edema of both legs    Constipation    Intervertebral lumbar disc disorder with myelopathy, lumbar region    Cervical spinal stenosis    Pulmonary venous congestion    Fatigue    Hx of appendectomy    Hx of cholecystectomy    Hx of hysterectomy    Chronic rhinitis    Congestion of upper airway    Recurrent UTI    Geographic tongue    Eczema    Folliculitis    Unable to walk    COVID-19        ASSESSMENT of Current Deficits Patient exhibits decreased strength, balance, and endurance impairing functional mobility, transfers, gait , gait distance, and tolerance to activity   Pt has multiple sclerosis. Pt able to stand pivot to chair today with moderate assist. Pt motivated to work with therapy and performed seated exercises and rolling for hygiene.   The patient will benefit from continued skilled therapy to increase strength and improve balance for safe functional mobility, to decrease risk of falls, and to meet goals at discharge. PHYSICAL THERAPY  PLAN OF CARE       Physical therapy plan of care is established based on physician order,  patient diagnosis and clinical assessment    Current Treatment Recommendations:    -Bed Mobility: Lower extremity exercises , Upper extremity exercises , and Trunk control activities   -Sitting Balance: Incorporate reaching activities to activate trunk muscles   -Standing Balance: Perform strengthening exercises in standing to promote motor control with or without upper extremity support   -Transfers: Provide instruction on proper hand and foot position for adequate transfer of weight onto lower extremities and use of gait device if needed and Cues for hand placement, technique and safety. Provide stabilization to prevent fall   -Gait: Gait training and Standing activities to improve: base of support, weight shift, weight bearing      PT long term treatment goals are located in below grid    Patient and or family understand(s) diagnosis, prognosis, and plan of care. Frequency of treatments: Patient will be seen  daily. Prior Level of Function: She suffers from multiple sclerosis, active in a wheelchair.  Pt does stand and does w/c transfer independent   Rehab Potential: good  for baseline    Past medical history:   Past Medical History:   Diagnosis Date    Arthritis     Asthma     COPD (chronic obstructive pulmonary disease) ([facility]     Eczema     Fall 5/26/2011    Gout     Hematoma of abdominal wall 5/2011    extraperitoneal    Hypertension     Metatarsal fracture 1/2011    right 3rd and 4th    Multiple sclerosis (HCC)     Prolonged emergence from general anesthesia     Scoliosis     Spinal stenosis     UTI (urinary tract infection)      Past Surgical History:   Procedure Laterality Date    APPENDECTOMY CHOLECYSTECTOMY      CYSTOSCOPY INSERTION / REMOVAL STENT / STONE Right 7/26/2020    CYSTOSCOPY RETROGRADE PYELOGRAM STENT INSERTION LEFT performed by Kalpesh Howard DO at [address] ([facility])      LITHOTRIPSY Left 8/7/2020    CYSTOSCOPY RETROGRADE PYELOGRAM URETEROSCOPY  LEFT J-STENT EXCHANGE, INSERTION JUNIOR CATHETER---FACILITY------ performed by Kalpesh Howard DO at [address]:    Precautions: Up as tolerated, falls and Droplet plus/COVID-19 , w/c transfer at baseline, MS, feet very sensitive     Social history: Patient lives alone in a apartment 3rd floor with elevator   with No steps  to enter Tub shower  ,     Equipment owned: Wheelchair, Wheeled Walker, Bedside commode, Tub transfer bench, and leg  ,      [address]   How much difficulty turning over in bed?: A Little  How much difficulty sitting down on / standing up from a chair with arms?: A Lot  How much difficulty moving from lying on back to sitting on side of bed?: A Lot  How much help from another person moving to and from a bed to a chair?: A Lot  How much help from another person needed to walk in hospital room?: Total  How much help from another person for climbing 3-5 steps with a railing?: Total  AM-PAC Inpatient Mobility Raw Score : 11  AM-PAC Inpatient T-Scale Score : 33.86  Mobility Inpatient CMS 0-100% Score: 72.57  Mobility Inpatient CMS G-Code Modifier : CL    Nursing cleared patient for PT treatment. OBJECTIVE;   Initial Evaluation  Date: 12/19/2022 Treatment Date:   12/21/2022    Short Term/ Long Term   Goals   Was pt agreeable to Eval/treatment?  Yes yes To be met in 4 days   Pain level   0/10   Bloated feeling    Bed Mobility    Rolling: Minimal assist of 1    Supine to sit: Minimal assist of 1    Sit to supine: Minimal assist of 1    Scooting: Minimal assist of 1   Rolling: Minimal assist of 1   Supine to sit: Minimal assist of 1   Sit to supine: Not assessed patient in chair   Scooting: Supervision     Rolling: Independent    Supine to sit: Independent    Sit to supine: Independent    Scooting: Independent     Transfers Sit to stand: Moderate assist of 1 cues for foot placement, assist with foot placement due to MS/weakness  Stand pivot to chair: Mod assist Sit to stand:  Moderate assist of 1 cues for safety and pacing   Standing pivot with chair on R side Sit to stand: Independent   Stand pivot to w/c: Independent    Ambulation    not assessed  not assessed      3 feet using  wheeled walker with Modified Independent    Stair negotiation: ascended and descended   Not assessed          ROM Within functional limits    Increase range of motion 10% of affected joints    Strength BUE:  refer to OT eval  RLE:  3-/5  LLE:  3-/5  Increase strength in affected mm groups by 1/3 grade   Balance Sitting EOB:  good -  Dynamic Standing:  poor + Sitting EOB: good   Dynamic Standing: poor needing assist   Sitting EOB:  good   Dynamic Standing: good      Patient is Alert & Oriented x person, place, time, and situation and follows directions    Sensation:  Patient  reports numbness/tingling bilateral upper extremities   L>R  Edema:  yes bilateral lower extremities and bilateral upper extremities   Endurance: fair      Vitals: room air   Blood Pressure at rest  Blood Pressure during session    Heart Rate at rest  Heart Rate during session    SPO2 at rest %  SPO2 during session      Patient education  Patient educated on role of Physical Therapy, risks of immobility, safety and plan of care, energy conservation,  importance of mobility while in hospital , ankle pumps, quad set and glut set for edema control, blood clot prevention, safety , and seated exercises      Patient response to education:   Pt verbalized understanding Pt demonstrated skill Pt requires further education in this area   Yes Partial Yes      Treatment:  Patient practiced and was instructed/facilitated in the following treatment: Patient assisted with rolling for hygiene. Patient assisted to to EOB. Sat edge of bed 15 minutes with Supervision  to increase dynamic sitting balance and activity tolerance. Patient assisted with stand pivot to bedside chair, performed seated exercises. Therapeutic Exercises:  ankle pumps, long arc quad, and seated marching  x 15  reps. AAROM with marches, AROM with others    At end of session, patient in chair with     call light and phone within reach,  all lines and tubes intact, nursing notified. Patient would benefit from continued skilled Physical Therapy to improve functional independence and quality of life.          Patient's/ family goals   home    Time in  911  Time out  952    Total Treatment Time  41 minutes    CPT codes:  Therapeutic activities (49040)   33 minutes  2 unit(s)  Therapeutic exercises (96930)   8 minutes  1 unit(s)    Lizzie Sims, [facility]   #[id number]

## 2022-12-21 NOTE — DISCHARGE SUMMARY
Internal Medicine Progress Note     [facility]     [facility]. Eliana Sanabria., TAYLEROLEONARDO. Tha Brady D.O., SUREKHA Goel D.O. Brinda Rider, MSN, APRN, NP-C  Mary Arroyo. Manfred Smith, MSN, [id number] [facility]       Internal Medicine  Discharge Summary    NAME: Carina Ruiz  :  1955  MRN:  [id number]  Abdon Dee MD  ADMITTED: 2022      DISCHARGED: 22    ADMITTING PHYSICIAN: Lenin Marie DO    CONSULTANT(S):   IP CONSULT TO UROLOGY  IP CONSULT TO PHARMACY  IP CONSULT TO SOCIAL WORK  IP CONSULT TO SOCIAL WORK     ADMITTING DIAGNOSIS:   Staghorn calculus [N20.0]  Acute respiratory failure with hypoxia ([facility] [address] [J96.01]  Urinary tract infection without hematuria, site unspecified [N39.0]  COVID-19 [U07.1]     DISCHARGE DIAGNOSES:   Sepsis secondary to COVID-pneumonia resulting in acute respiratory failure with hypoxia  Staghorn calculus with resultant urinary tract infection  Multiple sclerosis with progressive failure to thrive    BRIEF HISTORY OF PRESENT ILLNESS:   Amanda Calderon is a very polite and pleasant 77-year-old female who presented to [address] [place] [address] emergency department for the evaluation of failure to thrive and shortness of breath. She suffers from multiple sclerosis and is debilitated at baseline but is rather active in a wheelchair. Work-up in the emergency department revealed COVID as well as a staghorn calculus with urinary tract infection. She required the application of nasal cannula oxygen and will be admitted to the hospital for complete work-up and treatment.     LABS[de-identified]  Lab Results   Component Value Date    WBC 7.7 2022    HGB 14.0 2022    HCT 37.6 2022     2022     2022    K 4.2 2022     2022    CREATININE 0.8 2022    BUN 17 2022    CO2 26 2022    GLUCOSE 81 2022    ALT 52 (H) 2022    AST 59 (H) 2022    INR 1.0 2022 Lab Results   Component Value Date    INR 1.0 12/19/2022    INR 1.1 12/23/2020    INR 1.3 05/27/2011    PROTIME 11.5 12/19/2022    PROTIME 12.1 12/23/2020    PROTIME 11.9 05/27/2011      Lab Results   Component Value Date    TSH 0.997 02/28/2022     Lab Results   Component Value Date    TRIG 386 (H) 02/08/2020    TRIG 159 (H) 09/04/2014     Lab Results   Component Value Date    HDL 36 02/08/2020    HDL 38 09/04/2014     Lab Results   Component Value Date    LDLCALC 56 02/08/2020    LDLCALC 89 09/04/2014     No results found for: LABA1C    IMAGING:  CT ABDOMEN PELVIS W IV CONTRAST Additional Contrast? None    Result Date: 12/18/2022  EXAMINATION: CT OF THE ABDOMEN AND PELVIS WITH CONTRAST; CTA OF THE CHEST 12/18/2022 10:56 am TECHNIQUE: CT of the abdomen and pelvis was performed with the administration of intravenous contrast. Multiplanar reformatted images are provided for review. Automated exposure control, iterative reconstruction, and/or weight based adjustment of the mA/kV was utilized to reduce the radiation dose to as low as reasonably achievable.; CTA of the chest was performed after the administration of intravenous contrast.  Multiplanar reformatted images are provided for review. MIP images are provided for review. Automated exposure control, iterative reconstruction, and/or weight based adjustment of the mA/kV was utilized to reduce the radiation dose to as low as reasonably achievable. COMPARISON: None.  12/28/2020 HISTORY: ORDERING SYSTEM PROVIDED HISTORY: eval luq pain TECHNOLOGIST PROVIDED HISTORY: Reason for exam:->eval luq pain Additional Contrast?->None Decision Support Exception - unselect if not a suspected or confirmed emergency medical condition->Emergency Medical Condition (MA); ORDERING SYSTEM PROVIDED HISTORY: SOB TECHNOLOGIST PROVIDED HISTORY: Reason for exam:->SOB Decision Support Exception - unselect if not a suspected or confirmed emergency medical condition->Emergency Medical Condition (MA) FINDINGS: Pulmonary Arteries: Pulmonary arteries are adequately opacified for evaluation. No evidence of intraluminal filling defect to suggest pulmonary embolism. Main pulmonary artery is normal in caliber. Mediastinum: No evidence of mediastinal lymphadenopathy. The heart and pericardium demonstrate no acute abnormality. There is no acute abnormality of the thoracic aorta. Thyroid is heterogeneous in appearance with multiple calcified nodule identified in the left lobe. Cardiac chambers are enlarged. There is mild enlargement of the pulmonary arteries bilaterally to suggest pulmonary arterial hypertension. There is coronary artery calcification identified. There is no pericardial effusion. No bulky hilar or axillary lymphadenopathy. Small hiatal hernia. Lungs/pleura: There is consolidative infiltrate identified lung bases bilaterally left greater than right. There is bronchial wall thickening. Findings concerning for bibasilar infiltrate. Underlying chronic and emphysematous changes seen throughout the remainder of the lung fields. Soft Tissues/Bones: Bony structures reveal degenerative changes seen within the spine. No acute chest wall abnormality. Abdomen/Pelvis: Organs: The liver is homogeneous in appearance with low-attenuation seen diffusely throughout to suggest fatty infiltration. No focal mass. No intrahepatic biliary ductal dilatation. The spleen is unremarkable. The gallbladder is not seen and may be related to surgical removal.  There is no extrahepatic biliary ductal dilatation. The pancreas is homogeneous in appearance. No underlying mass or lesion. Both adrenal glands reveal tiny nodularity identified along the lateral limb on the left measuring approximately 1 cm in size. Findings may suggest a small adenoma. The right adrenal gland is unremarkable. Multiple cystic structures identified within the kidneys bilaterally.   There is a staghorn calculus identified on the left. There is soft tissue density identified within the left renal pelvis. Findings concerning for an underlying lesion. Retrograde pyelogram for further evaluation is recommended with cystography. GI/Bowel: The stomach is unremarkable. Small bowel is within normal limits. No mucosal abnormality. Stool seen scattered diffusely throughout the colon. No signs of obvious obstruction or obstructing lesion. Pelvis: The bladder is decompressed with Obrien catheter balloon placement. The uterus has been surgically removed. Peritoneum/Retroperitoneum: There is no abdominal retroperitoneal lymphadenopathy. Vascular calcifications seen diffusely throughout the abdominal aorta and iliac vessels. There is no pelvic adenopathy. Bones/Soft Tissues: Diastasis of the rectus muscles. Small umbilical hernia containing fat only. Degenerative changes seen within the spine. No ventral abdominal wall mass or defect. No evidence of pulmonary embolism. There is consolidation of the lower lung fields bilaterally concerning for bibasilar infiltrate. Staghorn calculus identified within the left kidney with abnormal increased density filling defects seen within the left renal pelvis extending into the proximal ureter which an underlying lesion cannot be excluded. Cystography and left retrograde pyelogram is recommended for further evaluation. The remainder of the abdomen and pelvis is grossly unremarkable with chronic age related changes identified. .  Small nodule identified on the left adrenal gland suggesting an adenoma. Diffuse fatty infiltration identified liver with small hiatal hernia. Multiple cysts seen in the kidneys bilaterally. XR CHEST PORTABLE    Result Date: 12/18/2022  EXAMINATION: ONE XRAY VIEW OF THE CHEST 12/18/2022 7:51 am COMPARISON: None.  HISTORY: ORDERING SYSTEM PROVIDED HISTORY: SOB TECHNOLOGIST PROVIDED HISTORY: Reason for exam:->SOB FINDINGS: The cardiac silhouette is at the upper limits of normal in size. There is no mediastinal widening. No findings of failure. There is no evidence of pneumonia there are mild emphysematous changes. Linear atelectasis is seen within the left lung base. 1. Linear atelectasis within the left lung base 2. Mild emphysematous changes. 3. There are no findings of failure or pneumonia. CTA PULMONARY W CONTRAST    Result Date: 12/18/2022  EXAMINATION: CT OF THE ABDOMEN AND PELVIS WITH CONTRAST; CTA OF THE CHEST 12/18/2022 10:56 am TECHNIQUE: CT of the abdomen and pelvis was performed with the administration of intravenous contrast. Multiplanar reformatted images are provided for review. Automated exposure control, iterative reconstruction, and/or weight based adjustment of the mA/kV was utilized to reduce the radiation dose to as low as reasonably achievable.; CTA of the chest was performed after the administration of intravenous contrast.  Multiplanar reformatted images are provided for review. MIP images are provided for review. Automated exposure control, iterative reconstruction, and/or weight based adjustment of the mA/kV was utilized to reduce the radiation dose to as low as reasonably achievable. COMPARISON: None. 12/28/2020 HISTORY: ORDERING SYSTEM PROVIDED HISTORY: eval luq pain TECHNOLOGIST PROVIDED HISTORY: Reason for exam:->eval luq pain Additional Contrast?->None Decision Support Exception - unselect if not a suspected or confirmed emergency medical condition->Emergency Medical Condition (MA); ORDERING SYSTEM PROVIDED HISTORY: SOB TECHNOLOGIST PROVIDED HISTORY: Reason for exam:->SOB Decision Support Exception - unselect if not a suspected or confirmed emergency medical condition->Emergency Medical Condition (MA) FINDINGS: Pulmonary Arteries: Pulmonary arteries are adequately opacified for evaluation. No evidence of intraluminal filling defect to suggest pulmonary embolism. Main pulmonary artery is normal in caliber.  Mediastinum: No evidence of mediastinal lymphadenopathy. The heart and pericardium demonstrate no acute abnormality. There is no acute abnormality of the thoracic aorta. Thyroid is heterogeneous in appearance with multiple calcified nodule identified in the left lobe. Cardiac chambers are enlarged. There is mild enlargement of the pulmonary arteries bilaterally to suggest pulmonary arterial hypertension. There is coronary artery calcification identified. There is no pericardial effusion. No bulky hilar or axillary lymphadenopathy. Small hiatal hernia. Lungs/pleura: There is consolidative infiltrate identified lung bases bilaterally left greater than right. There is bronchial wall thickening. Findings concerning for bibasilar infiltrate. Underlying chronic and emphysematous changes seen throughout the remainder of the lung fields. Soft Tissues/Bones: Bony structures reveal degenerative changes seen within the spine. No acute chest wall abnormality. Abdomen/Pelvis: Organs: The liver is homogeneous in appearance with low-attenuation seen diffusely throughout to suggest fatty infiltration. No focal mass. No intrahepatic biliary ductal dilatation. The spleen is unremarkable. The gallbladder is not seen and may be related to surgical removal.  There is no extrahepatic biliary ductal dilatation. The pancreas is homogeneous in appearance. No underlying mass or lesion. Both adrenal glands reveal tiny nodularity identified along the lateral limb on the left measuring approximately 1 cm in size. Findings may suggest a small adenoma. The right adrenal gland is unremarkable. Multiple cystic structures identified within the kidneys bilaterally. There is a staghorn calculus identified on the left. There is soft tissue density identified within the left renal pelvis. Findings concerning for an underlying lesion. Retrograde pyelogram for further evaluation is recommended with cystography. GI/Bowel: The stomach is unremarkable.   Small bowel is within normal limits. No mucosal abnormality. Stool seen scattered diffusely throughout the colon. No signs of obvious obstruction or obstructing lesion. Pelvis: The bladder is decompressed with Obrien catheter balloon placement. The uterus has been surgically removed. Peritoneum/Retroperitoneum: There is no abdominal retroperitoneal lymphadenopathy. Vascular calcifications seen diffusely throughout the abdominal aorta and iliac vessels. There is no pelvic adenopathy. Bones/Soft Tissues: Diastasis of the rectus muscles. Small umbilical hernia containing fat only. Degenerative changes seen within the spine. No ventral abdominal wall mass or defect. No evidence of pulmonary embolism. There is consolidation of the lower lung fields bilaterally concerning for bibasilar infiltrate. Staghorn calculus identified within the left kidney with abnormal increased density filling defects seen within the left renal pelvis extending into the proximal ureter which an underlying lesion cannot be excluded. Cystography and left retrograde pyelogram is recommended for further evaluation. The remainder of the abdomen and pelvis is grossly unremarkable with chronic age related changes identified. .  Small nodule identified on the left adrenal gland suggesting an adenoma. Diffuse fatty infiltration identified liver with small hiatal hernia. Multiple cysts seen in the kidneys bilaterally. HOSPITAL COURSE:   Ania Laguerre did very well throughout hospitalization. She was admitted December 18 secondary to failure to thrive symptomatology and shortness of breath. She was found to have COVID-19 infection. She was weaned from oxygen and received maximal COVID protocol. Dexamethasone has been transitioned to oral.  Oral vitamin supplementation and inhaler therapy has been implemented as well and she is responded very well.   This is exacerbated her underlying MS and she would benefit substantially from rehab placement. Acute rehab was arranged. She was identified as having a staghorn calculus with a longstanding history of kidney stones. Urology evaluated and recommended procedure down the line. UTI was identified without any significant septic symptomatology associated with this and blood cultures have been negative. She has been transition to oral antimicrobial therapy. Her chronic morbidities, lab values and vital signs were monitored and addressed accordingly. She will be transition to the acute rehab hospital today before eventual disposition home. Patient is medically stable and acceptable for discharge today. BRIEF PHYSICAL EXAMINATION AND LABORATORIES ON DAY OF DISCHARGE:  VITALS:  /80   Pulse 65   Temp 98.1 °F (36.7 °C) (Oral)   Resp 18   Ht 5' 6\" (1.676 m)   Wt 222 lb (100.7 kg)   LMP  (LMP Unknown)   SpO2 93%   BMI 35.83 kg/m²     HEENT:  PERRLA. EOMI. Sclera clear. Buccal mucosa moist.    Neck:  Supple. Trachea midline. No thyromegaly. No JVD. No bruits. Heart:  Rhythm regular, rate controlled. S1 and S2.    Lungs:  Symmetrical.  There is bibasilar air exchange. Clear to auscultation bilaterally. No wheezes. No rhonchi. No rales. Abdomen: Soft. Morbidly obese. Non-tender. Non-distended. Bowel sounds positive. No organomegaly or masses. No pain on palpation    Extremities:  Peripheral pulses present. No significant pitting peripheral edema. No ulcers. Neurologic:  Alert x 3. Changes related to MS, otherwise no focal deficit. Cranial nerves grossly intact. Skin:  No petechia. No hemorrhage. No wounds. DISPOSITION:  The patient's condition is stable. At this time the patient is without objective evidence of an acute process requiring continuing hospitalization or inpatient management. They are stable for discharge with outpatient follow-up.     I have spoken with the patient and discussed the results of the current hospitalization, in addition to providing specific details for the plan of care and counseling regarding the diagnosis and prognosis. The plan has been discussed in detail and they are aware of the specific conditions for emergent return, as well as the importance of follow-up. Their questions are answered at this time and they are agreeable with the plan for discharge to acute rehab hospital    DISCHARGE MEDICATIONS:   Current Discharge Medication List             Details   budesonide-formoterol (SYMBICORT) 160-4.5 MCG/ACT AERO Inhale 2 puffs into the lungs 2 times daily . Rinse mouth and spit after each use.   Qty: 10.2 g, Refills: 1      dexamethasone (DECADRON) 6 MG tablet Take 1 tablet by mouth daily (with breakfast) for 10 days  Qty: 10 tablet, Refills: 0      azithromycin (ZITHROMAX) 500 MG tablet Take 1 tablet by mouth daily for 2 doses  Qty: 2 tablet, Refills: 0    Associated Diagnoses: Acute respiratory failure with hypoxia (HCC)      cefdinir (OMNICEF) 300 MG capsule Take 1 capsule by mouth 2 times daily for 5 days  Qty: 10 capsule, Refills: 0                Details   metoprolol tartrate (LOPRESSOR) 25 MG tablet Take 1 tablet by mouth 2 times daily  Qty: 60 tablet, Refills: 3      losartan (COZAAR) 50 MG tablet Take 0.5 tablets by mouth nightly  Qty: 30 tablet, Refills: 3      furosemide (LASIX) 40 MG tablet Take 1 tablet by mouth daily  Qty: 90 tablet, Refills: 3      docusate sodium (COLACE, DULCOLAX) 100 MG CAPS Take 100 mg by mouth 2 times daily      cyclobenzaprine (FLEXERIL) 5 MG tablet Take 1 tablet by mouth 3 times daily as needed for Muscle spasms                Details   ipratropium (ATROVENT) 0.06 % nasal spray 2 sprays by Each Nostril route daily      ELDERBERRY PO Take 1 capsule by mouth daily      turmeric 500 MG CAPS Take 500 mg by mouth daily      TART CHERRY PO Take 1 capsule by mouth daily      Probiotic Product (PROBIOTIC DAILY) CAPS Take 1 capsule by mouth daily      APPLE CIDER VINEGAR PO Take 1 tablet by mouth daily dextromethorphan-guaiFENesin (MUCINEX DM)  MG per extended release tablet Take 2 tablets by mouth every 12 hours as needed for Congestion  Qty: 120 tablet, Refills: 5      Cholecalciferol (VITAMIN D3) 125 MCG (5000 UT) CAPS TAKE ONE CAPSULE BY MOUTH DAILY  Qty: 90 capsule, Refills: 3      benzoyl peroxide 10 % external wash Wash affected areas 2-3 times weekly.   Qty: 227 g, Refills: 5      mupirocin (BACTROBAN) 2 % ointment Apply 1 each topically 3 times daily      VENTOLIN  (90 Base) MCG/ACT inhaler INHALE TWO PUFFS BY MOUTH EVERY 4 TO 6 HOURS AS NEEDED FOR WHEEZING  Qty: 18 g, Refills: 11      polyethylene glycol (GLYCOLAX) 17 g packet Take 25 g by mouth daily as needed for Constipation  Qty: 527 g, Refills: 11    Associated Diagnoses: Constipation, unspecified constipation type      ondansetron (ZOFRAN-ODT) 4 MG disintegrating tablet DISSOLVE ONE TABLET IN MOUTH THREE TIMES A DAY AS NEEDED FOR NAUSEA OR VOMITING  Qty: 21 tablet, Refills: 3      albuterol (PROVENTIL) (2.5 MG/3ML) 0.083% nebulizer solution Take 3 mLs by nebulization every 4 hours as needed for Shortness of Breath  Qty: 180 each, Refills: 11      AMITIZA 8 MCG CAPS capsule TAKE ONE CAPSULE BY MOUTH TWICE A DAY WITH MEALS  Qty: 60 capsule, Refills: 11      loperamide (IMODIUM A-D) 2 MG tablet Take 1 tablet by mouth 4 times daily as needed for Diarrhea  Qty: 30 tablet, Refills: 2      vitamin E 1000 units capsule Take 1 capsule by mouth daily  Qty: 90 capsule, Refills: 3      fluticasone (FLONASE) 50 MCG/ACT nasal spray 1 spray by Each Nostril route daily  Qty: 16 g, Refills: 11      b complex vitamins capsule Take 1 capsule by mouth Twice a Week      acetaminophen (TYLENOL) 325 MG tablet Take 2 tablets by mouth every 4 hours as needed for Pain or Fever  Qty: 120 tablet, Refills: 3      Ascorbic Acid (VITAMIN C) 1000 MG tablet Take 1,000 mg by mouth daily             FOLLOW UP/INSTRUCTIONS:  This patient is instructed to follow-up with her primary care physician. Patient is instructed to follow-up with the consults listed above as directed by them. she is instructed to resume home medications and take new medications as indicated in the list above. If the patient has a recurrence of symptoms, she is instructed to go to the ED. Preparing for this patient's discharge, including paperwork, orders, instructions, and meeting with patient did require > 40 minutes.     GRETEL Chavis CNP     12/21/2022  10:14 AM

## 2022-12-21 NOTE — PLAN OF CARE
Problem: Discharge Planning  Goal: Discharge to home or other facility with appropriate resources  12/21/2022 0302 by Howie Ku RN  Outcome: Progressing  12/20/2022 1820 by Latrice Ragsdale RN  Outcome: Progressing     Problem: Skin/Tissue Integrity  Goal: Absence of new skin breakdown  Description: 1. Monitor for areas of redness and/or skin breakdown  2. Assess vascular access sites hourly  3. Every 4-6 hours minimum:  Change oxygen saturation probe site  4. Every 4-6 hours:  If on nasal continuous positive airway pressure, respiratory therapy assess nares and determine need for appliance change or resting period.   12/21/2022 0302 by Howie Ku RN  Outcome: Progressing  12/20/2022 1820 by Latrice Ragsdale RN  Outcome: Progressing     Problem: Safety - Adult  Goal: Free from fall injury  12/21/2022 0302 by Howie Ku RN  Outcome: Progressing  12/20/2022 1820 by Latrice Ragsdale RN  Outcome: Progressing

## 2023-01-11 ENCOUNTER — CARE COORDINATION (OUTPATIENT)
Dept: CARE COORDINATION | Age: 68
End: 2023-01-11

## 2023-01-11 NOTE — CARE COORDINATION
[facility] Care Transitions Initial Follow Up Call    Call within 2 business days of discharge: Yes    Care Transition Nurse contacted the patient by telephone to perform post hospital discharge assessment. Verified name and  with patient as identifiers. Provided introduction to self, and explanation of the Care Transition Nurse role. Patient: Chrissy Land Patient : 1955   MRN: [id number]  Reason for Admission: COVID  Discharge Date: 22 RARS: Readmission Risk Score: 16.3      Last Discharge  [address]       Date Complaint Diagnosis Description Type Department Provider    22 Influenza COVID-19 . .. ED to Hosp-Admission (Discharged) (ADMITTED) [address] Dixon Monson DO; Tosha Robison .. Was this an external facility discharge? Yes, 1/10/23  Discharge Facility: [address] [place] [address] to be reviewed by the provider   Additional needs identified to be addressed with provider: No  none               Method of communication with provider: phone. Spoke to [place]. She came home from [facility] yesterday. She is doing well. She is now on 3L O2 at night. [facility], OT through [address] She has no issues or concerns presently, but will notify the office if any occur prior to the follow up visit on Friday    Care Transition Nurse reviewed discharge instructions with patient who verbalized understanding. The patient was given an opportunity to ask questions and does not have any further questions or concerns at this time. Were discharge instructions available to patient? Yes. Reviewed appropriate site of care based on symptoms and resources available to patient including: PCP. The patient agrees to contact the PCP office for questions related to their healthcare. Advance Care Planning:   Does patient have an Advance Directive: reviewed and current.     Medication reconciliation was performed with patient, who verbalizes understanding of administration of home medications. Medications reviewed, 1111F entered: N/A    Was patient discharged with a pulse oximeter? no    Non-face-to-face services provided:  Scheduled appointment with PCP-1/6/23    Offered patient enrollment in the Remote Patient Monitoring (RPM) program for in-home monitoring: NA.    Care Transitions 24 Hour Call    Schedule Follow Up Appointment with PCP: Completed  Do you have a copy of your discharge instructions?: Yes  Do you have all of your prescriptions and are they filled?: Yes  Have you scheduled your follow up appointment?: Yes  How are you going to get to your appointment?: Other  Do you feel like you have everything you need to keep you well at home?: Yes  Care Transitions Interventions  No Identified Needs         Follow Up  Future Appointments   Date Time Provider Mckenna Treviño   1/13/2023  9:30 AM Frederic Roa PA-C PC AT [address] Transition Nurse provided contact information. No further follow-up call indicated based on severity of symptoms and risk factors.   Plan for next call:  prior to appt    Jalyn Mckeon RN

## 2023-01-12 NOTE — PROGRESS NOTES
1/13/2023   Post-Discharge Transitional Care Follow Up    Doroteo Reddy   YOB: 1955    Date of Office Visit:  1/13/2023  Date of Hospital Admission: 12/18/22  Date of Hospital Discharge: 12/21/22  Readmission Risk Score (high >=14%. Medium >=10%):Readmission Risk Score: 16.3      Care management risk score Rising risk (score 2-5) and Complex Care (Scores >=6): No Risk Score On File     Non face to face  following discharge, date last encounter closed (first attempt may have been earlier): 01/11/2023     Call initiated 2 business days of discharge: Yes     Hospital discharge follow-up  -     IA DISCHARGE MEDS RECONCILED W/ CURRENT OUTPATIENT MED LIST  Multiple sclerosis ([facility]  COVID-19  Staghorn calculus  Recurrent UTI  Peripheral polyneuropathy  Unable to walk  Constipation, unspecified constipation type    Medical Decision Making: moderate complexity  Return in 1 month (on 2/13/2023). Home visit medically necessary in lieu of an office visit due to: wheelchair bound, difficult to get out. Subjective:       HPI:  Last month, Brenna Garcia was having ^ respiratory symptoms. She was taking Mucinex DM and placed on Zithromax. However, wheezing and SOB became worse along with decreased decreased ability to smell or taste so she presented to [address] [place] [address]. She was admitted to the hospital on 12/18/22 with acute respiratory failure with hypoxia, COVID, and staghorn calculous with UTI. She required O2 while in the hospital. While being treated in the hospital for these problems, she states her blood pressure decreased to the point where several times they did not have to give her BP medications. Urology was consulted for the staghorn calculus. Urology determined she will need a procedure either ESWL with stent or PCNL.  Nettie's overall condition improved by 12/21/22 and was discharged with these diagnoses - Sepsis secondary to COVID-pneumonia resulting in acute respiratory failure with hypoxia, staghorn calculus with resultant urinary tract infection and Multiple sclerosis with progressive failure to thrive. She was sent to [facility] for rehab where she stayed until 1/10/23. Since she has been home she states she feels stronger. She says her BP medications were decreased. She says she has moved her bowels four times in the last four days. She has skilled nursing services along with PT and OT through [address] [place] [address] and 7 days a week (6 hours/day) home health aid through [facility]. She is now wearing zippered compression stockings. She is planning to follow up with urology next month to treat the staghorn calculus. She says she has left flank pain but it is tolerable. There is no NVD. She plans to see Dr. Yaneth Obrien (eye) not month. Refills sent for Mucinex DM and Loratadine. PAST MEDICAL HISTORY: (Major events, hospitalizations, surgeries): COVID (2022), Pneumonia (2010), 1998 Vag hyst, 1978 naina, append, freq epidural inclusion cysts tx'd with docycycline. MS dx'd Mar 8, 1991. IV corticosteroids 1991-92. Has never been on MS meds. 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 Known allergies: NKDA. Ongoing medical problems: Multiple sclerosis, Asthma, HTN, hypoglycemia, scoliosis, DDD with sciatic, arthritis, osteoporosis, chronic LBP. Preventative: COVID vac - 3/22/21, 6/10417/06(OGXTBEJ booster),  Pneumovax 23 - 11/2009. Prevnar - 10/2019. Flu vac - 10/2020 (refused 2021, 2022). Smoking cessation counselling 7/2014 Social history:  with 3 children. Smokes 1-1 1/2 ppd 40 yrs. No alcohol. . 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 Nutritional history: Takes a lot of vitamins and supplements every day.       Patient Active Problem List   Diagnosis    Neck pain    Multiple sclerosis (HCC)    Bilateral foot-drop    Peripheral polyneuropathy    Essential hypertension    Asthma    Edema of both legs    Constipation Intervertebral lumbar disc disorder with myelopathy, lumbar region    Cervical spinal stenosis    Pulmonary venous congestion    Fatigue    Hx of appendectomy    Hx of cholecystectomy    Hx of hysterectomy    Chronic rhinitis    Congestion of upper airway    Recurrent UTI    Geographic tongue    Eczema    Folliculitis    Unable to walk    COVID-19    History of COVID-19    Staghorn calculus       Medications listed as ordered at the time of discharge from hospital     Medication List            Accurate as of January 13, 2023  1:05 PM. If you have any questions, ask your nurse or doctor. START taking these medications      loratadine 10 MG tablet  Commonly known as: CLARITIN  Take 1 tablet by mouth daily as needed (rhinitis)  Started by: Ashlee Ferrell PA-C            CONTINUE taking these medications      acetaminophen 325 MG tablet  Commonly known as: TYLENOL  Take 2 tablets by mouth every 4 hours as needed for Pain or Fever     * albuterol (2.5 MG/3ML) 0.083% nebulizer solution  Commonly known as: PROVENTIL  Take 3 mLs by nebulization every 4 hours as needed for Shortness of Breath     * Ventolin  (90 Base) MCG/ACT inhaler  Generic drug: albuterol sulfate HFA  INHALE TWO PUFFS BY MOUTH EVERY 4 TO 6 HOURS AS NEEDED FOR WHEEZING     Amitiza 8 MCG Caps capsule  Generic drug: lubiprostone  TAKE ONE CAPSULE BY MOUTH TWICE A DAY WITH MEALS     APPLE CIDER VINEGAR PO     b complex vitamins capsule     benzoyl peroxide 10 % external wash  Generic drug: Benzoyl Peroxide  Wash affected areas 2-3 times weekly. budesonide-formoterol 160-4.5 MCG/ACT Aero  Commonly known as: Symbicort  Inhale 2 puffs into the lungs 2 times daily . Rinse mouth and spit after each use.      dextromethorphan-guaiFENesin  MG per extended release tablet  Commonly known as: MUCINEX DM  Take 2 tablets by mouth every 12 hours as needed for Congestion     docusate 100 MG Caps  Commonly known as: COLACE, DULCOLAX  Take 100 mg by mouth 2 times daily     ELDERBERRY PO     fluticasone 50 MCG/ACT nasal spray  Commonly known as: FLONASE  1 spray by Each Nostril route daily     furosemide 40 MG tablet  Commonly known as: LASIX  Take 1 tablet by mouth daily     ipratropium 0.06 % nasal spray  Commonly known as: ATROVENT     loperamide 2 MG tablet  Commonly known as: Imodium A-D  Take 1 tablet by mouth 4 times daily as needed for Diarrhea     losartan 25 MG tablet  Commonly known as: COZAAR     metoprolol tartrate 25 MG tablet  Commonly known as: LOPRESSOR  Take 1 tablet by mouth 2 times daily     mupirocin 2 % ointment  Commonly known as: BACTROBAN     ondansetron 4 MG disintegrating tablet  Commonly known as: ZOFRAN-ODT  DISSOLVE ONE TABLET IN MOUTH THREE TIMES A DAY AS NEEDED FOR NAUSEA OR VOMITING     polyethylene glycol 17 g packet  Commonly known as: GLYCOLAX  Take 25 g by mouth daily as needed for Constipation     Probiotic Daily Caps     TART CHERRY PO     turmeric 500 MG Caps     vitamin C 1000 MG tablet     vitamin D3 125 MCG (5000 UT) Caps  TAKE ONE CAPSULE BY MOUTH DAILY     vitamin E 1000 units capsule  Take 1 capsule by mouth daily           * This list has 2 medication(s) that are the same as other medications prescribed for you. Read the directions carefully, and ask your doctor or other care provider to review them with you.                    Where to Get Your Medications        These medications were sent to [facility], [address] [place] [address]., [address] [facility]      Phone: [phone]   dextromethorphan-guaiFENesin  MG per extended release tablet  loratadine 10 MG tablet          Medications marked \"taking\" at this time  Outpatient Medications Marked as Taking for the 1/13/23 encounter (Office Visit) with Jennifer Estevez PA-C   Medication Sig Dispense Refill    dextromethorphan-guaiFENesin (MUCINEX DM)  MG per extended release tablet Take 2 tablets by mouth every 12 hours as needed for Congestion 120 tablet 5    loratadine (CLARITIN) 10 MG tablet Take 1 tablet by mouth daily as needed (rhinitis) 30 tablet 5        Medications patient taking as of now reconciled against medications ordered at time of hospital discharge: Yes    REVIEW OF SYSTEMS: General: Weight stable, generally healthy, no change in strength or exercise tolerance. Heart: No palpitations, no syncope, no orthopnea. EDEMA OF LEGS AT TIMES. Abdomen: Chronic constipation. No change in appetite, no dysphagia, no abdominal pains, no bowel habit changes, no emesis, no melena. : No urinary urgency, no dysuria, no change in nature of urine. Neurologic:   Unable to walk without holding on to something. In w/c most of the time. No tremor, no seizures, no changes in mentation. All other systems negative. ASSESSMENT:  Elderly female with has Neck pain; Multiple sclerosis ([facility] Bilateral foot-drop; Peripheral polyneuropathy; Essential hypertension; Asthma; Edema of both legs; Constipation; Intervertebral lumbar disc disorder with myelopathy, lumbar region; Cervical spinal stenosis; Pulmonary venous congestion; Fatigue; Hx of appendectomy; Hx of cholecystectomy; Hx of hysterectomy; Chronic rhinitis; Congestion of upper airway; Recurrent UTI; Geographic tongue; Eczema; Folliculitis; Unable to walk; and COVID-19 on their problem list.      Objective:    /66   Pulse 73   Temp 97.2 °F (36.2 °C) (Temporal)   Resp 18   Ht 5' 6\" (1.676 m)   Wt 222 lb (100.7 kg) Comment: estimate  LMP  (LMP Unknown)   SpO2 98%   Breastfeeding No   BMI 35.83 kg/m²     PHYSICAL EXAM:       GEN: middle age overweight female patient sitting in WC in NAD. HEAD:  atraumatic, normocephalic. EYES:  EOMI, PERRL, conjunct normal. ENT:  EACs and TMs nl. ORAL: mouth without lesions, redness/swelling. Pharynx: PND. LUNGS: clear to ausc, no rales or rhonchi. HEART:  RRR, no murmurs or gallops.   ABD:  Obese, soft, non-tender to palp, no palp masses or HSM, normal BS. BACK:  No CVAT. Scoliosis toward right / kyphosis,  tender to palp over L lumbar area. EXTREM: BLE leg swelling. Wearing compression stockings. Venous stasis changes. Healing scab to left calf. No ulcerations, varicosities or erythema, deformities. MUSCULOSKEL: Unable to make a strong fist, good ROM of most joints, non-tender to palp and with movement, except lower extremeties which are weakened from [address] and non-wt bearing. WC bound from [address]. SKIN: Scattered pustules and furuncles of trunk. No other lesions, erythema, ulcers or drainage. NEURO:  Normal motor for her. No tremor, normal sensory, able to stand and transfer with much effort. Unable to walk. SKIN: No drainage  No ulcerations or breakdown, ecchymosis, or other lesions. Medications reviewed. Labs: 12/21/22 HH 14/37.6, GFR>60, lytes nl, 7/12/22 UA+  2/28/22 HH 15.6/48, GFR>60, Glu 107, LFT nl, TSH 0.997, Vit D 34, Vit B12 437  12/17/21 UA CX e.coli  7/23/21 HH 15/45, GFR 55, lytes nl      Return in 1 month (on 2/13/2023). An electronic signature was used to authenticate this note.     --Tiffanie Butts PA-C on 1/13/2023 at 1:05 PM

## 2023-01-13 ENCOUNTER — OFFICE VISIT (OUTPATIENT)
Dept: PRIMARY CARE CLINIC | Age: 68
End: 2023-01-13

## 2023-01-13 VITALS
TEMPERATURE: 97.2 F | WEIGHT: 222 LBS | RESPIRATION RATE: 18 BRPM | HEIGHT: 66 IN | HEART RATE: 73 BPM | DIASTOLIC BLOOD PRESSURE: 66 MMHG | OXYGEN SATURATION: 98 % | SYSTOLIC BLOOD PRESSURE: 117 MMHG | BODY MASS INDEX: 35.68 KG/M2

## 2023-01-13 DIAGNOSIS — N20.0 STAGHORN CALCULUS: ICD-10-CM

## 2023-01-13 DIAGNOSIS — U07.1 COVID-19: ICD-10-CM

## 2023-01-13 DIAGNOSIS — G35 MULTIPLE SCLEROSIS (HCC): ICD-10-CM

## 2023-01-13 DIAGNOSIS — N39.0 RECURRENT UTI: ICD-10-CM

## 2023-01-13 DIAGNOSIS — G62.9 PERIPHERAL POLYNEUROPATHY: ICD-10-CM

## 2023-01-13 DIAGNOSIS — R26.2 UNABLE TO WALK: ICD-10-CM

## 2023-01-13 DIAGNOSIS — Z09 HOSPITAL DISCHARGE FOLLOW-UP: Primary | ICD-10-CM

## 2023-01-13 DIAGNOSIS — K59.00 CONSTIPATION, UNSPECIFIED CONSTIPATION TYPE: ICD-10-CM

## 2023-01-13 PROBLEM — Z86.16 HISTORY OF COVID-19: Status: ACTIVE | Noted: 2023-01-13

## 2023-01-13 RX ORDER — LOSARTAN POTASSIUM 25 MG/1
TABLET ORAL
COMMUNITY
Start: 2023-01-10 | End: 2023-01-23 | Stop reason: SDUPTHER

## 2023-01-13 RX ORDER — LORATADINE 10 MG/1
10 TABLET ORAL DAILY PRN
Qty: 30 TABLET | Refills: 5 | Status: SHIPPED | OUTPATIENT
Start: 2023-01-13 | End: 2023-02-12

## 2023-01-23 RX ORDER — LORATADINE 10 MG/1
10 TABLET ORAL DAILY PRN
Qty: 28 TABLET | Refills: 5 | Status: SHIPPED | OUTPATIENT
Start: 2023-01-23 | End: 2023-07-10

## 2023-01-23 RX ORDER — BUDESONIDE AND FORMOTEROL FUMARATE DIHYDRATE 160; 4.5 UG/1; UG/1
2 AEROSOL RESPIRATORY (INHALATION) 2 TIMES DAILY
Qty: 10.2 G | Refills: 11 | Status: SHIPPED | OUTPATIENT
Start: 2023-01-23

## 2023-01-23 RX ORDER — PSEUDOEPHEDRINE HCL 30 MG
100 TABLET ORAL 2 TIMES DAILY
Qty: 56 CAPSULE | Refills: 5 | Status: SHIPPED | OUTPATIENT
Start: 2023-01-23

## 2023-01-23 RX ORDER — ALBUTEROL SULFATE 90 UG/1
AEROSOL, METERED RESPIRATORY (INHALATION)
Qty: 18 G | Refills: 11 | Status: SHIPPED | OUTPATIENT
Start: 2023-01-23

## 2023-01-23 RX ORDER — FUROSEMIDE 40 MG/1
TABLET ORAL
Qty: 30 TABLET | Refills: 3 | Status: SHIPPED | OUTPATIENT
Start: 2023-01-23

## 2023-01-23 RX ORDER — LUBIPROSTONE 8 UG/1
CAPSULE, GELATIN COATED ORAL
Qty: 180 CAPSULE | Refills: 3 | Status: SHIPPED | OUTPATIENT
Start: 2023-01-23

## 2023-01-23 RX ORDER — FLUTICASONE PROPIONATE 50 MCG
1 SPRAY, SUSPENSION (ML) NASAL DAILY
Qty: 16 G | Refills: 11 | Status: SHIPPED | OUTPATIENT
Start: 2023-01-23

## 2023-01-23 RX ORDER — LOSARTAN POTASSIUM 25 MG/1
25 TABLET ORAL DAILY
Qty: 90 TABLET | Refills: 3 | Status: SHIPPED | OUTPATIENT
Start: 2023-01-23

## 2023-01-23 NOTE — TELEPHONE ENCOUNTER
Zeus Butts is switching her pharmacy. She will now be using [facility] , through the [address] , which will pre-pack her meds.

## 2023-01-27 ENCOUNTER — TELEPHONE (OUTPATIENT)
Dept: PRIMARY CARE CLINIC | Age: 68
End: 2023-01-27

## 2023-01-27 DIAGNOSIS — R30.0 DYSURIA: Primary | ICD-10-CM

## 2023-01-27 LAB
BACTERIA: PRESENT /HPF
BILIRUBIN URINE: NEGATIVE
BLOOD, URINE: ABNORMAL
CLARITY: ABNORMAL
COLOR: YELLOW
CRYSTALS, UA: ABNORMAL /HPF
GLUCOSE URINE: NEGATIVE MG/DL
KETONES, URINE: NEGATIVE MG/DL
LEUKOCYTE ESTERASE, URINE: ABNORMAL
NITRITE, URINE: POSITIVE
PH UA: 6 (ref 5–9)
PROTEIN UA: ABNORMAL MG/DL
RBC UA: ABNORMAL /HPF (ref 0–2)
SPECIFIC GRAVITY UA: 1.02 (ref 1–1.03)
UROBILINOGEN, URINE: 0.2 E.U./DL
WBC UA: >20 /HPF (ref 0–5)

## 2023-01-27 NOTE — TELEPHONE ENCOUNTER
Katy Gilmore called. She thinks she is getting a UTI. She is asking if you would put in an order for a UA CS, and the [facility] nurse can take it to the hospital for her.

## 2023-01-29 LAB
ORGANISM: ABNORMAL
URINE CULTURE, ROUTINE: ABNORMAL

## 2023-01-30 ENCOUNTER — TELEPHONE (OUTPATIENT)
Dept: PRIMARY CARE CLINIC | Age: 68
End: 2023-01-30

## 2023-01-30 RX ORDER — CEFUROXIME AXETIL 250 MG/1
250 TABLET ORAL 2 TIMES DAILY
Qty: 14 TABLET | Refills: 0 | Status: SHIPPED | OUTPATIENT
Start: 2023-01-30 | End: 2023-02-06

## 2023-01-30 NOTE — TELEPHONE ENCOUNTER
I sent eRx for Ceftin to [facility]. It looks like she has an Rx for Bactrim. Organism is resistant to Bactrim.

## 2023-02-14 DIAGNOSIS — J98.8 CONGESTION OF UPPER AIRWAY: ICD-10-CM

## 2023-02-14 RX ORDER — GUAIFENESIN 200 MG/10ML
LIQUID ORAL
Qty: 240 ML | Refills: 11 | Status: SHIPPED | OUTPATIENT
Start: 2023-02-14

## 2023-03-01 ENCOUNTER — OFFICE VISIT (OUTPATIENT)
Dept: PRIMARY CARE CLINIC | Age: 68
End: 2023-03-01
Payer: MEDICAID

## 2023-03-01 VITALS
DIASTOLIC BLOOD PRESSURE: 79 MMHG | RESPIRATION RATE: 18 BRPM | TEMPERATURE: 97.8 F | WEIGHT: 222 LBS | SYSTOLIC BLOOD PRESSURE: 142 MMHG | OXYGEN SATURATION: 98 % | BODY MASS INDEX: 35.68 KG/M2 | HEIGHT: 66 IN | HEART RATE: 81 BPM

## 2023-03-01 DIAGNOSIS — J31.0 CHRONIC RHINITIS: ICD-10-CM

## 2023-03-01 DIAGNOSIS — N20.0 KIDNEY STONE: ICD-10-CM

## 2023-03-01 DIAGNOSIS — R26.2 UNABLE TO WALK: ICD-10-CM

## 2023-03-01 DIAGNOSIS — R35.0 URINARY FREQUENCY: ICD-10-CM

## 2023-03-01 DIAGNOSIS — K59.00 CONSTIPATION, UNSPECIFIED CONSTIPATION TYPE: ICD-10-CM

## 2023-03-01 DIAGNOSIS — R60.0 EDEMA OF BOTH LEGS: ICD-10-CM

## 2023-03-01 DIAGNOSIS — G62.9 PERIPHERAL POLYNEUROPATHY: ICD-10-CM

## 2023-03-01 DIAGNOSIS — G35 MULTIPLE SCLEROSIS (HCC): Primary | ICD-10-CM

## 2023-03-01 PROCEDURE — 1123F ACP DISCUSS/DSCN MKR DOCD: CPT | Performed by: PHYSICIAN ASSISTANT

## 2023-03-01 PROCEDURE — 3077F SYST BP >= 140 MM HG: CPT | Performed by: PHYSICIAN ASSISTANT

## 2023-03-01 PROCEDURE — 3078F DIAST BP <80 MM HG: CPT | Performed by: PHYSICIAN ASSISTANT

## 2023-03-01 PROCEDURE — 99349 HOME/RES VST EST MOD MDM 40: CPT | Performed by: PHYSICIAN ASSISTANT

## 2023-03-01 RX ORDER — POLYETHYLENE GLYCOL 3350 17 G/17G
25 POWDER, FOR SOLUTION ORAL DAILY PRN
Qty: 527 G | Refills: 11 | Status: SHIPPED | OUTPATIENT
Start: 2023-03-01

## 2023-03-01 NOTE — PROGRESS NOTES
3/1/2023     Home visit medically necessary in lieu of an office visit due to: wheelchair bound, difficult to get out. HPI:  Mireille Barillas says she is going to have the procedure (Extracorporeal shockwave lithotripsy, with left stent cystoscopy) by the urologist on March 15, 2023. There has been intermittent left flank pain and she cannot wait to get the stone out. Last month she was treated for a UTI with Ceftin. She noticed over the last couple of days having some more frequency and urgency and wants to be tested again for UTI. She denies fever or chills. She does have a runny nose and cough due to the drainage but denies SOB or CP. Mireille Barillas says that she has not moved her bowels in 3 days. She takes Senna and Colace but does not take Miralax daily. She is also on Amitiza twice daily. She has had some leg swelling (but no drainage)  so she is making sure she is taking the 40 mg Lasix daily. She wears the zippered compression hose. Mireille Barillas tries to elevate her legs at night using a wedge pillow. There have been no new boils. She is scheduled to see Dr. Crow Plascencia (OD) on 3/12/23. Mireille Barillas says PT is over for now but it there were certain days she could not exercise because of the L flank pain so after her urological procedure she would like to  PT again. She has a aide 5 days per week. REVIEW OF SYSTEMS: General: Weight stable, generally healthy, no change in strength or exercise tolerance. Heart: No palpitations, no syncope, no orthopnea. EDEMA OF LEGS AT TIMES. Abdomen: Chronic constipation. No change in appetite, no dysphagia, no abdominal pains, no bowel habit changes, no emesis, no melena. : No urinary urgency, no dysuria, no change in nature of urine. Neurologic:   Unable to walk without holding on to something. In w/c most of the time. No tremor, no seizures, no changes in mentation. All other systems negative.        PAST MEDICAL HISTORY: (Major events, hospitalizations, surgeries): Pneumonia (2010), 1998 Vag hyst, 1978 naina, append, freq epidural inclusion cysts tx'd with docycycline. MS dx'd Mar 8, 1991. IV corticosteroids 1991-92. Has never been on MS meds. 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 Known allergies: NKDA. Ongoing medical problems: Multiple sclerosis, Asthma, HTN, hypoglycemia, scoliosis, DDD with sciatic, arthritis, osteoporosis, chronic LBP. Preventative: COVID vac - 3/22/21, 4/56107/66(WIQMSGV booster),  Pneumovax 23 - 11/2009. Prevnar - 10/2019. Flu vac - 10/2020 (refused 11/2021). Smoking cessation counselling 7/2014 Social history:  with 3 children. Smokes 1-1 1/2 ppd 40 yrs. No alcohol. . 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 Nutritional history: Takes a lot of vitamins and supplements every day. PHYSICAL EXAM:       Vitals:    03/01/23 1404   BP: (!) 142/79   Pulse: 81   Resp: 18   Temp: 97.8 °F (36.6 °C)   TempSrc: Infrared   SpO2: 98%   Weight: 222 lb (100.7 kg)  Comment: estimate   Height: 5' 6\" (1.676 m)     GEN: middle age overweight female patient sitting in WC in NAD. HEAD:  atraumatic, normocephalic. EYES:  EOMI, PERRL, conjunct normal. ENT:  EACs and TMs nl. ORAL: mouth without lesions, redness/swelling. Pharynx: PND. LUNGS: clear to ausc, no rales or rhonchi. HEART:  RRR, no murmurs or gallops. ABD:  Obese, soft, non-tender to palp, no palp masses or HSM, normal BS. BACK:  No CVAT. Scoliosis toward right / kyphosis,  tender to palp over L lumbar area. EXTREM: BLE leg swelling without pitting/drainage. Wearing compression stockings. Venous stasis changes. No ulcerations, varicosities or erythema, deformities. MUSCULOSKEL: Unable to make a strong fist, good ROM of most joints, non-tender to palp and with movement, except lower extremeties which are weakened from [address] and non-wt bearing. WC bound from [address]. SKIN: Scattered remnants of pustules and furuncles of trunk.  No lesions, erythema, ulcers or drainage. NEURO:  Normal motor for her. No tremor, normal sensory, able to stand and transfer with much effort. Unable to walk. SKIN: No drainage  No ulcerations or breakdown, ecchymosis, or other lesions. Medications reviewed. Labs: 1/27/23 UA+  12/21/22 HH 14/37.6, GFR>60, lytes nl, 7/12/22 UA+  2/28/22 HH 15.6/48, GFR>60, Glu 107, LFT nl, TSH 0.997, Vit D 34, Vit B12 437  12/17/21 UA CX e.coli  7/23/21 HH 15/45, GFR 55, lytes nl     ASSESSMENT:  Elderly female with has Neck pain; Multiple sclerosis ([address] Bilateral foot-drop; Peripheral polyneuropathy; Essential hypertension; Asthma; Edema of both legs; Constipation; Intervertebral lumbar disc disorder with myelopathy, lumbar region; Cervical spinal stenosis; Pulmonary venous congestion; Fatigue; Hx of appendectomy; Hx of cholecystectomy; Hx of hysterectomy; Chronic rhinitis; Congestion of upper airway; Recurrent UTI; Geographic tongue; Eczema; Folliculitis; Unable to walk; COVID-19; History of COVID-19; and Staghorn calculus on their problem list.     Nikki Thorne was seen today for multiple sclerosis and nephrolithiasis. Diagnoses and all orders for this visit:    Multiple sclerosis ([address]    Peripheral polyneuropathy    Chronic rhinitis    Edema of both legs    Constipation, unspecified constipation type  -     polyethylene glycol (GLYCOLAX) 17 g packet; Take 25 g by mouth daily as needed for Constipation    Unable to walk    Kidney stone  -     Culture, Urine; Future  -     Urinalysis; Future    Urinary frequency  -     Culture, Urine; Future  -     Urinalysis; Future     PLAN:   Check UA C&S for urinary complaints. Aide will bring sample to hospital lab. Recommend for now take the Miralax 1 1/2 capfuls daily to help ease constipation. Continue 40 mg Lasix daily and elevate legs on wedge pillow to reduce leg swelling. Encouraged to continue exercises at home. She needs to call for appointments with specialists and obtain testing.  Recheck 1 month. 40 minutes spent on visit, 25 minutes involved education/counseling regarding MS, BLE edema, HTN disease processes, treatment options, meds and coordination of care. Current Outpatient Medications   Medication Sig Dispense Refill    polyethylene glycol (GLYCOLAX) 17 g packet Take 25 g by mouth daily as needed for Constipation 527 g 11    guaiFENesin (ROBAFEN MUCUS/CHEST CONGESTION) 100 MG/5ML liquid TAKE 10 MLS BY MOUTH 3 TIMES DAILY AS NEEDED FOR CONGESTION 240 mL 11    losartan (COZAAR) 25 MG tablet Take 1 tablet by mouth daily 90 tablet 3    metoprolol tartrate (LOPRESSOR) 25 MG tablet Take 1 tablet by mouth 2 times daily 180 tablet 3    loratadine (CLARITIN) 10 MG tablet Take 1 tablet by mouth daily as needed (rhinitis) 28 tablet 5    docusate (COLACE, DULCOLAX) 100 MG CAPS Take 100 mg by mouth 2 times daily 56 capsule 5    lubiprostone (AMITIZA) 8 MCG CAPS capsule TAKE ONE CAPSULE BY MOUTH TWICE A DAY WITH MEALS 180 capsule 3    furosemide (LASIX) 40 MG tablet Take 1 tablet by mouth daily 30 tablet 3    dextromethorphan-guaiFENesin (MUCINEX DM)  MG per extended release tablet Take 2 tablets by mouth every 12 hours as needed for Congestion 120 tablet 5    fluticasone (FLONASE) 50 MCG/ACT nasal spray 1 spray by Each Nostril route daily 16 g 11    budesonide-formoterol (SYMBICORT) 160-4.5 MCG/ACT AERO Inhale 2 puffs into the lungs 2 times daily . Rinse mouth and spit after each use.  10.2 g 11    albuterol sulfate HFA (VENTOLIN HFA) 108 (90 Base) MCG/ACT inhaler INHALE TWO PUFFS BY MOUTH EVERY 4 TO 6 HOURS AS NEEDED FOR WHEEZING 18 g 11    ipratropium (ATROVENT) 0.06 % nasal spray 2 sprays by Each Nostril route daily      ELDERBERRY PO Take 1 capsule by mouth daily      turmeric 500 MG CAPS Take 500 mg by mouth daily      TART CHERRY PO Take 1 capsule by mouth daily      Probiotic Product (PROBIOTIC DAILY) CAPS Take 1 capsule by mouth daily      APPLE CIDER VINEGAR PO Take 1 tablet by mouth daily Cholecalciferol (VITAMIN D3) 125 MCG (5000 UT) CAPS TAKE ONE CAPSULE BY MOUTH DAILY 90 capsule 3    benzoyl peroxide 10 % external wash Wash affected areas 2-3 times weekly. 227 g 5    mupirocin (BACTROBAN) 2 % ointment Apply 1 each topically 3 times daily      ondansetron (ZOFRAN-ODT) 4 MG disintegrating tablet DISSOLVE ONE TABLET IN MOUTH THREE TIMES A DAY AS NEEDED FOR NAUSEA OR VOMITING 21 tablet 3    albuterol (PROVENTIL) (2.5 MG/3ML) 0.083% nebulizer solution Take 3 mLs by nebulization every 4 hours as needed for Shortness of Breath 180 each 11    loperamide (IMODIUM A-D) 2 MG tablet Take 1 tablet by mouth 4 times daily as needed for Diarrhea 30 tablet 2    vitamin E 1000 units capsule Take 1 capsule by mouth daily 90 capsule 3    b complex vitamins capsule Take 1 capsule by mouth Twice a Week      acetaminophen (TYLENOL) 325 MG tablet Take 2 tablets by mouth every 4 hours as needed for Pain or Fever 120 tablet 3    Ascorbic Acid (VITAMIN C) 1000 MG tablet Take 1,000 mg by mouth daily       No current facility-administered medications for this visit. Return in about 1 month (around 4/1/2023) for regular visit. An electronic signature was used to authenticate this note.     --Jose E Callahan PA-C on 3/1/2023 at 2:37 PM

## 2023-03-02 ENCOUNTER — HOSPITAL ENCOUNTER (OUTPATIENT)
Age: 68
Setting detail: SPECIMEN
Discharge: HOME | End: 2023-03-02
Payer: MEDICAID

## 2023-03-02 DIAGNOSIS — R30.0 DYSURIA: ICD-10-CM

## 2023-03-02 LAB
BACTERIA: ABNORMAL /HPF
BILIRUBIN URINE: NEGATIVE
BLOOD, URINE: ABNORMAL
CLARITY: ABNORMAL
COLOR: YELLOW
CRYSTALS, UA: ABNORMAL /HPF
GLUCOSE URINE: NEGATIVE MG/DL
KETONES, URINE: NEGATIVE MG/DL
LEUKOCYTE ESTERASE, URINE: ABNORMAL
NITRITE, URINE: POSITIVE
PH UA: 5.5 (ref 5–9)
PROTEIN UA: 30 MG/DL
RBC UA: ABNORMAL /HPF (ref 0–2)
SPECIFIC GRAVITY UA: 1.02 (ref 1–1.03)
UROBILINOGEN, URINE: 0.2 E.U./DL
WBC UA: >20 /HPF (ref 0–5)

## 2023-03-02 PROCEDURE — 81001 URINALYSIS AUTO W/SCOPE: CPT

## 2023-03-02 PROCEDURE — 87088 URINE BACTERIA CULTURE: CPT

## 2023-03-02 PROCEDURE — 87186 SC STD MICRODIL/AGAR DIL: CPT

## 2023-03-03 ENCOUNTER — TELEPHONE (OUTPATIENT)
Dept: PRIMARY CARE CLINIC | Age: 68
End: 2023-03-03

## 2023-03-03 RX ORDER — NITROFURANTOIN 25; 75 MG/1; MG/1
100 CAPSULE ORAL 2 TIMES DAILY
Qty: 20 CAPSULE | Refills: 0 | Status: SHIPPED | OUTPATIENT
Start: 2023-03-03 | End: 2023-03-13

## 2023-03-03 NOTE — TELEPHONE ENCOUNTER
Sent eRx (based on urine CS from January) for Macrobid 100 mg BID x 10 days to [facility]. She needs to drink more water and keep bladder flushed out.

## 2023-03-04 LAB
ORGANISM: ABNORMAL
URINE CULTURE, ROUTINE: ABNORMAL

## 2023-03-13 ENCOUNTER — PREP FOR PROCEDURE (OUTPATIENT)
Dept: UROLOGY | Age: 68
End: 2023-03-13

## 2023-03-13 RX ORDER — SODIUM CHLORIDE 9 MG/ML
INJECTION, SOLUTION INTRAVENOUS CONTINUOUS
Status: CANCELLED | OUTPATIENT
Start: 2023-03-13

## 2023-03-13 RX ORDER — SODIUM CHLORIDE 0.9 % (FLUSH) 0.9 %
5-40 SYRINGE (ML) INJECTION EVERY 12 HOURS SCHEDULED
Status: CANCELLED | OUTPATIENT
Start: 2023-03-13

## 2023-03-13 RX ORDER — SODIUM CHLORIDE 0.9 % (FLUSH) 0.9 %
5-40 SYRINGE (ML) INJECTION PRN
Status: CANCELLED | OUTPATIENT
Start: 2023-03-13

## 2023-03-13 RX ORDER — SODIUM CHLORIDE 9 MG/ML
INJECTION, SOLUTION INTRAVENOUS PRN
Status: CANCELLED | OUTPATIENT
Start: 2023-03-13

## 2023-03-16 ENCOUNTER — TELEPHONE (OUTPATIENT)
Dept: PRIMARY CARE CLINIC | Age: 68
End: 2023-03-16

## 2023-03-16 RX ORDER — DOXYCYCLINE HYCLATE 100 MG
100 TABLET ORAL 2 TIMES DAILY
Qty: 20 TABLET | Refills: 0 | Status: SHIPPED | OUTPATIENT
Start: 2023-03-16 | End: 2023-03-26

## 2023-03-16 NOTE — TELEPHONE ENCOUNTER
Shoshana Napier called. She states she is starting to get boils on her breasts and groin again. She is asking if you would call her in a 10 day course of ATB.

## 2023-03-17 DIAGNOSIS — L30.4 INTERTRIGO: ICD-10-CM

## 2023-03-17 RX ORDER — CLOTRIMAZOLE 1 %
CREAM (GRAM) TOPICAL
Qty: 28 G | Refills: 5 | Status: SHIPPED | OUTPATIENT
Start: 2023-03-17

## 2023-03-24 RX ORDER — SODIUM CHLORIDE 0.9 % (FLUSH) 0.9 %
5-40 SYRINGE (ML) INJECTION PRN
Status: CANCELLED | OUTPATIENT
Start: 2023-03-24

## 2023-03-24 RX ORDER — SODIUM CHLORIDE 9 MG/ML
INJECTION, SOLUTION INTRAVENOUS PRN
Status: CANCELLED | OUTPATIENT
Start: 2023-03-24

## 2023-03-24 RX ORDER — SODIUM CHLORIDE 0.9 % (FLUSH) 0.9 %
5-40 SYRINGE (ML) INJECTION EVERY 12 HOURS SCHEDULED
Status: CANCELLED | OUTPATIENT
Start: 2023-03-24

## 2023-03-24 RX ORDER — SODIUM CHLORIDE 9 MG/ML
INJECTION, SOLUTION INTRAVENOUS CONTINUOUS
Status: CANCELLED | OUTPATIENT
Start: 2023-03-24

## 2023-04-02 NOTE — PROGRESS NOTES
1,000 mg by mouth daily       No current facility-administered medications for this visit. Return in about 1 month (around 5/3/2023). An electronic signature was used to authenticate this note.     --Ty Foote MD on 4/3/2023 at 11:51 AM

## 2023-04-03 ENCOUNTER — OFFICE VISIT (OUTPATIENT)
Dept: PRIMARY CARE CLINIC | Age: 68
End: 2023-04-03
Payer: MEDICAID

## 2023-04-03 VITALS
RESPIRATION RATE: 20 BRPM | HEIGHT: 66 IN | TEMPERATURE: 97 F | BODY MASS INDEX: 35.36 KG/M2 | SYSTOLIC BLOOD PRESSURE: 113 MMHG | DIASTOLIC BLOOD PRESSURE: 68 MMHG | HEART RATE: 69 BPM | WEIGHT: 220.01 LBS | OXYGEN SATURATION: 96 %

## 2023-04-03 DIAGNOSIS — G35 MULTIPLE SCLEROSIS (HCC): Primary | ICD-10-CM

## 2023-04-03 DIAGNOSIS — I10 ESSENTIAL HYPERTENSION: ICD-10-CM

## 2023-04-03 DIAGNOSIS — R60.0 EDEMA OF BOTH LEGS: ICD-10-CM

## 2023-04-03 DIAGNOSIS — K59.00 CONSTIPATION, UNSPECIFIED CONSTIPATION TYPE: ICD-10-CM

## 2023-04-03 PROCEDURE — 3078F DIAST BP <80 MM HG: CPT | Performed by: FAMILY MEDICINE

## 2023-04-03 PROCEDURE — 99349 HOME/RES VST EST MOD MDM 40: CPT | Performed by: FAMILY MEDICINE

## 2023-04-03 PROCEDURE — 1123F ACP DISCUSS/DSCN MKR DOCD: CPT | Performed by: FAMILY MEDICINE

## 2023-04-03 PROCEDURE — 3074F SYST BP LT 130 MM HG: CPT | Performed by: FAMILY MEDICINE

## 2023-04-03 RX ORDER — BLACK COHOSH ROOT EXTRACT 80 MG
2 CAPSULE ORAL DAILY
COMMUNITY

## 2023-04-03 RX ORDER — ACETAMINOPHEN 500 MG
500 TABLET ORAL EVERY 4 HOURS PRN
COMMUNITY

## 2023-04-03 SDOH — ECONOMIC STABILITY: INCOME INSECURITY: HOW HARD IS IT FOR YOU TO PAY FOR THE VERY BASICS LIKE FOOD, HOUSING, MEDICAL CARE, AND HEATING?: NOT VERY HARD

## 2023-04-03 SDOH — ECONOMIC STABILITY: FOOD INSECURITY: WITHIN THE PAST 12 MONTHS, YOU WORRIED THAT YOUR FOOD WOULD RUN OUT BEFORE YOU GOT MONEY TO BUY MORE.: NEVER TRUE

## 2023-04-03 SDOH — ECONOMIC STABILITY: FOOD INSECURITY: WITHIN THE PAST 12 MONTHS, THE FOOD YOU BOUGHT JUST DIDN'T LAST AND YOU DIDN'T HAVE MONEY TO GET MORE.: NEVER TRUE

## 2023-04-03 SDOH — ECONOMIC STABILITY: HOUSING INSECURITY
IN THE LAST 12 MONTHS, WAS THERE A TIME WHEN YOU DID NOT HAVE A STEADY PLACE TO SLEEP OR SLEPT IN A SHELTER (INCLUDING NOW)?: NO

## 2023-04-03 ASSESSMENT — PATIENT HEALTH QUESTIONNAIRE - PHQ9
SUM OF ALL RESPONSES TO PHQ9 QUESTIONS 1 & 2: 0
SUM OF ALL RESPONSES TO PHQ QUESTIONS 1-9: 0
2. FEELING DOWN, DEPRESSED OR HOPELESS: 0
1. LITTLE INTEREST OR PLEASURE IN DOING THINGS: 0
SUM OF ALL RESPONSES TO PHQ QUESTIONS 1-9: 0

## 2023-04-04 ENCOUNTER — ANESTHESIA EVENT (OUTPATIENT)
Dept: OPERATING ROOM | Age: 68
End: 2023-04-04
Payer: MEDICAID

## 2023-04-05 ENCOUNTER — HOSPITAL ENCOUNTER (OUTPATIENT)
Dept: GENERAL RADIOLOGY | Age: 68
Setting detail: OUTPATIENT SURGERY
Discharge: HOME | End: 2023-04-07
Attending: STUDENT IN AN ORGANIZED HEALTH CARE EDUCATION/TRAINING PROGRAM
Payer: MEDICAID

## 2023-04-05 ENCOUNTER — ANESTHESIA (OUTPATIENT)
Dept: OPERATING ROOM | Age: 68
End: 2023-04-05
Payer: MEDICAID

## 2023-04-05 ENCOUNTER — HOSPITAL ENCOUNTER (OUTPATIENT)
Age: 68
Setting detail: OUTPATIENT SURGERY
Discharge: HOME | End: 2023-04-05
Attending: STUDENT IN AN ORGANIZED HEALTH CARE EDUCATION/TRAINING PROGRAM | Admitting: STUDENT IN AN ORGANIZED HEALTH CARE EDUCATION/TRAINING PROGRAM
Payer: MEDICAID

## 2023-04-05 VITALS
OXYGEN SATURATION: 100 % | HEIGHT: 66 IN | HEART RATE: 74 BPM | SYSTOLIC BLOOD PRESSURE: 126 MMHG | BODY MASS INDEX: 36.16 KG/M2 | WEIGHT: 225 LBS | RESPIRATION RATE: 16 BRPM | DIASTOLIC BLOOD PRESSURE: 66 MMHG | TEMPERATURE: 97.7 F

## 2023-04-05 DIAGNOSIS — N20.0 STAGHORN CALCULUS: Primary | ICD-10-CM

## 2023-04-05 DIAGNOSIS — R52 PAIN: ICD-10-CM

## 2023-04-05 LAB
ANION GAP SERPL CALCULATED.3IONS-SCNC: 9 MMOL/L (ref 7–16)
BUN SERPL-MCNC: 18 MG/DL (ref 6–23)
CALCIUM SERPL-MCNC: 9.7 MG/DL (ref 8.6–10.2)
CHLORIDE SERPL-SCNC: 104 MMOL/L (ref 98–107)
CO2 SERPL-SCNC: 25 MMOL/L (ref 22–29)
CREAT SERPL-MCNC: 0.9 MG/DL (ref 0.5–1)
ERYTHROCYTE [DISTWIDTH] IN BLOOD BY AUTOMATED COUNT: 14.1 FL (ref 11.5–15)
GLUCOSE SERPL-MCNC: 101 MG/DL (ref 74–99)
HCT VFR BLD AUTO: 40.5 % (ref 34–48)
HGB BLD-MCNC: 14.7 G/DL (ref 11.5–15.5)
MCH RBC QN AUTO: 38.5 PG (ref 26–35)
MCHC RBC AUTO-ENTMCNC: 36.3 % (ref 32–34.5)
MCV RBC AUTO: 106 FL (ref 80–99.9)
METER GLUCOSE: 96 MG/DL (ref 74–99)
PLATELET # BLD AUTO: 289 E9/L (ref 130–450)
PMV BLD AUTO: 10.9 FL (ref 7–12)
POTASSIUM SERPL-SCNC: 4.4 MMOL/L (ref 3.5–5)
RBC # BLD AUTO: 3.82 E12/L (ref 3.5–5.5)
SODIUM SERPL-SCNC: 138 MMOL/L (ref 132–146)
WBC # BLD: 10.3 E9/L (ref 4.5–11.5)

## 2023-04-05 PROCEDURE — 36415 COLL VENOUS BLD VENIPUNCTURE: CPT

## 2023-04-05 PROCEDURE — 6360000002 HC RX W HCPCS: Performed by: NURSE ANESTHETIST, CERTIFIED REGISTERED

## 2023-04-05 PROCEDURE — 6360000002 HC RX W HCPCS: Performed by: NURSE PRACTITIONER

## 2023-04-05 PROCEDURE — 82962 GLUCOSE BLOOD TEST: CPT

## 2023-04-05 PROCEDURE — 7100000001 HC PACU RECOVERY - ADDTL 15 MIN: Performed by: STUDENT IN AN ORGANIZED HEALTH CARE EDUCATION/TRAINING PROGRAM

## 2023-04-05 PROCEDURE — 3700000001 HC ADD 15 MINUTES (ANESTHESIA): Performed by: STUDENT IN AN ORGANIZED HEALTH CARE EDUCATION/TRAINING PROGRAM

## 2023-04-05 PROCEDURE — 85027 COMPLETE CBC AUTOMATED: CPT

## 2023-04-05 PROCEDURE — 2580000003 HC RX 258: Performed by: NURSE ANESTHETIST, CERTIFIED REGISTERED

## 2023-04-05 PROCEDURE — 7100000011 HC PHASE II RECOVERY - ADDTL 15 MIN: Performed by: STUDENT IN AN ORGANIZED HEALTH CARE EDUCATION/TRAINING PROGRAM

## 2023-04-05 PROCEDURE — 7100000010 HC PHASE II RECOVERY - FIRST 15 MIN: Performed by: STUDENT IN AN ORGANIZED HEALTH CARE EDUCATION/TRAINING PROGRAM

## 2023-04-05 PROCEDURE — 2580000003 HC RX 258: Performed by: NURSE PRACTITIONER

## 2023-04-05 PROCEDURE — 2709999900 HC NON-CHARGEABLE SUPPLY: Performed by: STUDENT IN AN ORGANIZED HEALTH CARE EDUCATION/TRAINING PROGRAM

## 2023-04-05 PROCEDURE — 80048 BASIC METABOLIC PNL TOTAL CA: CPT

## 2023-04-05 PROCEDURE — 7100000000 HC PACU RECOVERY - FIRST 15 MIN: Performed by: STUDENT IN AN ORGANIZED HEALTH CARE EDUCATION/TRAINING PROGRAM

## 2023-04-05 PROCEDURE — 3600000012 HC SURGERY LEVEL 2 ADDTL 15MIN: Performed by: STUDENT IN AN ORGANIZED HEALTH CARE EDUCATION/TRAINING PROGRAM

## 2023-04-05 PROCEDURE — 2500000003 HC RX 250 WO HCPCS: Performed by: NURSE ANESTHETIST, CERTIFIED REGISTERED

## 2023-04-05 PROCEDURE — 3700000000 HC ANESTHESIA ATTENDED CARE: Performed by: STUDENT IN AN ORGANIZED HEALTH CARE EDUCATION/TRAINING PROGRAM

## 2023-04-05 PROCEDURE — C2617 STENT, NON-COR, TEM W/O DEL: HCPCS | Performed by: STUDENT IN AN ORGANIZED HEALTH CARE EDUCATION/TRAINING PROGRAM

## 2023-04-05 PROCEDURE — 3600000002 HC SURGERY LEVEL 2 BASE: Performed by: STUDENT IN AN ORGANIZED HEALTH CARE EDUCATION/TRAINING PROGRAM

## 2023-04-05 DEVICE — URETERAL STENT
Type: IMPLANTABLE DEVICE | Site: URETER | Status: FUNCTIONAL
Brand: POLARIS™ ULTRA

## 2023-04-05 RX ORDER — SODIUM CHLORIDE 9 MG/ML
INJECTION, SOLUTION INTRAVENOUS PRN
Status: DISCONTINUED | OUTPATIENT
Start: 2023-04-05 | End: 2023-04-05 | Stop reason: HOSPADM

## 2023-04-05 RX ORDER — PROPOFOL 10 MG/ML
INJECTION, EMULSION INTRAVENOUS PRN
Status: DISCONTINUED | OUTPATIENT
Start: 2023-04-05 | End: 2023-04-05 | Stop reason: SDUPTHER

## 2023-04-05 RX ORDER — FENTANYL CITRATE 50 UG/ML
INJECTION, SOLUTION INTRAMUSCULAR; INTRAVENOUS PRN
Status: DISCONTINUED | OUTPATIENT
Start: 2023-04-05 | End: 2023-04-05 | Stop reason: SDUPTHER

## 2023-04-05 RX ORDER — SODIUM CHLORIDE 9 MG/ML
INJECTION, SOLUTION INTRAVENOUS CONTINUOUS
Status: DISCONTINUED | OUTPATIENT
Start: 2023-04-05 | End: 2023-04-05 | Stop reason: HOSPADM

## 2023-04-05 RX ORDER — SODIUM CHLORIDE 0.9 % (FLUSH) 0.9 %
5-40 SYRINGE (ML) INJECTION EVERY 12 HOURS SCHEDULED
Status: DISCONTINUED | OUTPATIENT
Start: 2023-04-05 | End: 2023-04-05 | Stop reason: HOSPADM

## 2023-04-05 RX ORDER — FENTANYL CITRATE 50 UG/ML
50 INJECTION, SOLUTION INTRAMUSCULAR; INTRAVENOUS EVERY 5 MIN PRN
Status: DISCONTINUED | OUTPATIENT
Start: 2023-04-05 | End: 2023-04-05 | Stop reason: HOSPADM

## 2023-04-05 RX ORDER — MIDAZOLAM HYDROCHLORIDE 1 MG/ML
INJECTION INTRAMUSCULAR; INTRAVENOUS PRN
Status: DISCONTINUED | OUTPATIENT
Start: 2023-04-05 | End: 2023-04-05 | Stop reason: SDUPTHER

## 2023-04-05 RX ORDER — FENTANYL CITRATE 50 UG/ML
25 INJECTION, SOLUTION INTRAMUSCULAR; INTRAVENOUS EVERY 5 MIN PRN
Status: DISCONTINUED | OUTPATIENT
Start: 2023-04-05 | End: 2023-04-05 | Stop reason: HOSPADM

## 2023-04-05 RX ORDER — SODIUM CHLORIDE 0.9 % (FLUSH) 0.9 %
5-40 SYRINGE (ML) INJECTION PRN
Status: DISCONTINUED | OUTPATIENT
Start: 2023-04-05 | End: 2023-04-05 | Stop reason: HOSPADM

## 2023-04-05 RX ORDER — OXYCODONE HYDROCHLORIDE AND ACETAMINOPHEN 5; 325 MG/1; MG/1
1 TABLET ORAL EVERY 6 HOURS PRN
Qty: 12 TABLET | Refills: 0 | Status: SHIPPED | OUTPATIENT
Start: 2023-04-05 | End: 2023-04-08

## 2023-04-05 RX ORDER — LIDOCAINE HYDROCHLORIDE 20 MG/ML
INJECTION, SOLUTION EPIDURAL; INFILTRATION; INTRACAUDAL; PERINEURAL PRN
Status: DISCONTINUED | OUTPATIENT
Start: 2023-04-05 | End: 2023-04-05 | Stop reason: SDUPTHER

## 2023-04-05 RX ORDER — SODIUM CHLORIDE 9 MG/ML
INJECTION, SOLUTION INTRAVENOUS CONTINUOUS PRN
Status: DISCONTINUED | OUTPATIENT
Start: 2023-04-05 | End: 2023-04-05 | Stop reason: SDUPTHER

## 2023-04-05 RX ORDER — ONDANSETRON 2 MG/ML
INJECTION INTRAMUSCULAR; INTRAVENOUS PRN
Status: DISCONTINUED | OUTPATIENT
Start: 2023-04-05 | End: 2023-04-05 | Stop reason: SDUPTHER

## 2023-04-05 RX ORDER — DEXAMETHASONE SODIUM PHOSPHATE 4 MG/ML
INJECTION, SOLUTION INTRA-ARTICULAR; INTRALESIONAL; INTRAMUSCULAR; INTRAVENOUS; SOFT TISSUE PRN
Status: DISCONTINUED | OUTPATIENT
Start: 2023-04-05 | End: 2023-04-05 | Stop reason: SDUPTHER

## 2023-04-05 RX ORDER — ONDANSETRON 2 MG/ML
4 INJECTION INTRAMUSCULAR; INTRAVENOUS
Status: DISCONTINUED | OUTPATIENT
Start: 2023-04-05 | End: 2023-04-05 | Stop reason: HOSPADM

## 2023-04-05 RX ADMIN — PHENYLEPHRINE HYDROCHLORIDE 100 MCG: 10 INJECTION INTRAVENOUS at 12:18

## 2023-04-05 RX ADMIN — PHENYLEPHRINE HYDROCHLORIDE 200 MCG: 10 INJECTION INTRAVENOUS at 12:07

## 2023-04-05 RX ADMIN — PHENYLEPHRINE HYDROCHLORIDE 200 MCG: 10 INJECTION INTRAVENOUS at 12:12

## 2023-04-05 RX ADMIN — CEFEPIME 2000 MG: 2 INJECTION, POWDER, FOR SOLUTION INTRAVENOUS at 11:59

## 2023-04-05 RX ADMIN — PHENYLEPHRINE HYDROCHLORIDE 200 MCG: 10 INJECTION INTRAVENOUS at 12:22

## 2023-04-05 RX ADMIN — PHENYLEPHRINE HYDROCHLORIDE 100 MCG: 10 INJECTION INTRAVENOUS at 12:10

## 2023-04-05 RX ADMIN — SODIUM CHLORIDE, PRESERVATIVE FREE 10 ML: 5 INJECTION INTRAVENOUS at 11:00

## 2023-04-05 RX ADMIN — ONDANSETRON 4 MG: 2 INJECTION INTRAMUSCULAR; INTRAVENOUS at 12:05

## 2023-04-05 RX ADMIN — MIDAZOLAM 1 MG: 1 INJECTION INTRAMUSCULAR; INTRAVENOUS at 11:47

## 2023-04-05 RX ADMIN — PROPOFOL 120 MG: 10 INJECTION, EMULSION INTRAVENOUS at 11:55

## 2023-04-05 RX ADMIN — FENTANYL CITRATE 50 MCG: 50 INJECTION, SOLUTION INTRAMUSCULAR; INTRAVENOUS at 11:55

## 2023-04-05 RX ADMIN — PHENYLEPHRINE HYDROCHLORIDE 100 MCG: 10 INJECTION INTRAVENOUS at 12:16

## 2023-04-05 RX ADMIN — PHENYLEPHRINE HYDROCHLORIDE 200 MCG: 10 INJECTION INTRAVENOUS at 12:42

## 2023-04-05 RX ADMIN — SODIUM CHLORIDE: 9 INJECTION, SOLUTION INTRAVENOUS at 11:40

## 2023-04-05 RX ADMIN — DEXAMETHASONE SODIUM PHOSPHATE 10 MG: 4 INJECTION, SOLUTION INTRAMUSCULAR; INTRAVENOUS at 12:05

## 2023-04-05 RX ADMIN — PHENYLEPHRINE HYDROCHLORIDE 100 MCG: 10 INJECTION INTRAVENOUS at 12:51

## 2023-04-05 RX ADMIN — LIDOCAINE HYDROCHLORIDE 100 MG: 20 INJECTION, SOLUTION EPIDURAL; INFILTRATION; INTRACAUDAL; PERINEURAL at 11:55

## 2023-04-05 ASSESSMENT — PAIN DESCRIPTION - DESCRIPTORS: DESCRIPTORS: CRAMPING

## 2023-04-05 ASSESSMENT — PAIN - FUNCTIONAL ASSESSMENT: PAIN_FUNCTIONAL_ASSESSMENT: 0-10

## 2023-04-05 ASSESSMENT — PAIN SCALES - GENERAL
PAINLEVEL_OUTOF10: 0
PAINLEVEL_OUTOF10: 0

## 2023-04-05 NOTE — ANESTHESIA PRE PROCEDURE
Department of Anesthesiology  Preprocedure Note       Name:  Jose Juan Hart   Age:  70 y.o.  :  1955                                          MRN:  [id number]         Date:  2023      Surgeon: Shanta Baker):  Maverick Gaston MD    Procedure: Procedure(s):  CYSTOSCOPY RETROGRADE PYELOGRAM STENT INSERTION    Medications prior to admission:   Prior to Admission medications    Medication Sig Start Date End Date Taking?  Authorizing Provider   acetaminophen (TYLENOL) 500 MG tablet Take 1 tablet by mouth every 4 hours as needed for Pain    Historical Provider, MD   Bioflavonoid Products (QUERCETIN COMPLEX IMMUNE) CAPS Take 2 capsules by mouth daily    Historical Provider, MD   clotrimazole (LOTRIMIN) 1 % cream APPLY TOPICALLY TO AFFECTED AREA OF SKIN RASH 2 TIMES DAILY AS NEEDED 3/17/23   Raimundo Abrams MD   polyethylene glycol (GLYCOLAX) 17 g packet Take 25 g by mouth daily as needed for Constipation 3/1/23   Tony Mitchell PA-C   guaiFENesin (ROBAFEN MUCUS/CHEST CONGESTION) 100 MG/5ML liquid TAKE 10 MLS BY MOUTH 3 TIMES DAILY AS NEEDED FOR CONGESTION 23   Raimundo Abrams MD   losartan (COZAAR) 25 MG tablet Take 1 tablet by mouth daily  Patient taking differently: Take 1 tablet by mouth nightly 23   Raimundo Abrams MD   metoprolol tartrate (LOPRESSOR) 25 MG tablet Take 1 tablet by mouth 2 times daily 23   Raimundo Abrams MD   loratadine (CLARITIN) 10 MG tablet Take 1 tablet by mouth daily as needed (rhinitis) 1/23/23 7/10/23  Raimundo Abrams MD   docusate (COLACE, DULCOLAX) 100 MG CAPS Take 100 mg by mouth 2 times daily 23   Raimundo Abrams MD   lubiprostone (AMITIZA) 8 MCG CAPS capsule TAKE ONE CAPSULE BY MOUTH TWICE A DAY WITH MEALS 23   Raimundo Abrams MD   furosemide (LASIX) 40 MG tablet Take 1 tablet by mouth daily 23   Raimundo Abrams MD   dextromethorphan-guaiFENesin [facility] DM)  MG per extended release tablet Take 2 tablets by mouth every 12 hours as needed for Congestion 23   Raimundo Abrams MD
05/27/2011 05:11 PM        Type & Screen (If Applicable):  No results found for: LABABO, LABRH    Drug/Infectious Status (If Applicable):  No results found for: HIV, HEPCAB    COVID-19 Screening (If Applicable):   Lab Results   Component Value Date/Time    COVID19 DETECTED 12/18/2022 07:37 AM    COVID19 Not Detected 12/23/2020 09:32 PM         Anesthesia Evaluation  Patient summary reviewed  Airway:         Dental:          Pulmonary:   (+) asthma:                           ROS comment: Former Smoker. Cardiovascular:    (+) hypertension:,       ECG reviewed               Beta Blocker:  Not on Beta Blocker      ROS comment: EKG: Sinus Tachycardia 117, with prolonged QT. Slade Patella Neuro/Psych:   (+) neuromuscular disease:,              ROS comment: Multiple sclerosis  H/O fractures and Fall. GI/Hepatic/Renal:            ROS comment: UTI. .   Endo/Other:                      ROS comment: Hematoma of abdominal wall. Abdominal:           Vascular: negative vascular ROS. Anesthesia Plan      general and MAC     ASA 3 - emergent       Induction: intravenous. BIS  MIPS: Postoperative opioids intended. Anesthetic plan and risks discussed with patient. Plan discussed with CRNA. Attending anesthesiologist reviewed and agrees with [id number] Dashawn Wick MD   4/5/2023               Anesthesia Plan      general     ASA 4       Induction: intravenous. continuous noninvasive hemodynamic monitor  MIPS: Postoperative opioids intended and Prophylactic antiemetics administered. Anesthetic plan and risks discussed with patient. Plan discussed with CRNA.                     Soumya Lyman MD   4/5/2023

## 2023-04-05 NOTE — OP NOTE
Operative Note      Patient: Micheline Moss  YOB: 1955  MRN: [id number]    Date of Procedure: 4/5/2023    Pre-Op Diagnosis: left Kidney stone [N20.0]    Post-Op Diagnosis: Same       Procedure(s):  CYSTOSCOPY LEFT ESWL EXTRACORPOREAL SHOCK WAVE LITHOTRIPSY WITH LEFT STENT INSERTION     Surgeon(s):  Chetna Whitfield MD    Assistant:   * No surgical staff found *    Anesthesia: General    Estimated Blood Loss (mL): Minimal    Complications: None    Specimens:   * No specimens in log *    Implants:  Implant Name Type Inv. Item Serial No.  Lot No. LRB No. Used Action   STENT URET 6FR L26CM PERCFLX HYDR+ DBL PGTL THRD 2 - [id number]  STENT URET 6FR L26CM PERCFLX HYDR+ DBL PGTL THRD 2  [employer] [facility] UROLOGY- [id number] Left 1 Implanted         Drains: * No LDAs found *    Findings: large left renal calculus    Detailed Description of Procedure:         69 yo  who presents with left renal calculus. Pt was counseled on all of the different treatment options. All R/B/ A were discussed with the patient. Informed consent was obtained and signed    Operation:     Pt was wheeled back into the operative suite. Identified by name band and number. Pt was then transferred onto the operative table. Anesthesia was delivered without complication. Surgical time out was taken and all in the room were in agreement. Pt was placed in the dorsal lithotomy position prepped and draped in normal sterile fashion. A 0.035 glide wire was then placed into the left ureteral oriface. This was seen to terminate in the kidney on KUB. A 6 x 26 JJ stent was then placed and found to have good curl in the renal pelvis and the urinary bladder. The bladder was then drained. The lithotriper was moved into position. The stone was identified. A total of 3000 shocks were delivered to the renal calculus at a rate of 90 shocks per minute. There was good fragmentation seen on KUB.  However there did look to be a lot of debris

## 2023-04-05 NOTE — ANESTHESIA POSTPROCEDURE EVALUATION
Department of Anesthesiology  Postprocedure Note    Patient: Tyshawn Oh  MRN: [id number]  YOB: 1955  Date of evaluation: 4/5/2023      Procedure Summary     Date: 04/05/23 Room / Location: [facility] OR 06 / [facility]    Anesthesia Start: 1140 Anesthesia Stop: 1731    Procedure: CYSTOSCOPY LEFT ESWL EXTRACORPOREAL SHOCK WAVE LITHOTRIPSY WITH LEFT STENT INSERTION (HOLD TIME 1130 FOR TRANSPORT) (Left) Diagnosis:       Kidney stone      (Kidney stone [N20.0])    Surgeons: Kallie Tariq MD Responsible Provider: Carmen Alonso MD    Anesthesia Type: general ASA Status: 4          Anesthesia Type: No value filed.     Tenzin Phase I: Tenzin Score: 10    Tenzin Phase II: Tenzin Score: 10      Anesthesia Post Evaluation    Patient location during evaluation: PACU  Patient participation: complete - patient participated  Level of consciousness: awake and alert  Pain score: 2  Airway patency: patent  Nausea & Vomiting: no nausea and no vomiting  Complications: no  Cardiovascular status: blood pressure returned to baseline  Respiratory status: acceptable  Hydration status: euvolemic

## 2023-04-05 NOTE — PROGRESS NOTES
Discharge instructions gone over with patient, medications reviewed with patient. All questions answered, patient verbalized understanding. Pt has transportation home with family and will have adult supervision. After anesthesia instructions gone over and patient verbalized understanding.
Patient admitted to Stage 2 from Stage 1. No distress noted. VSS. Offered nourishment.
Patient had red and excoriated area to abdominal folds and left side was oozing blood after procedure. It has stopped. Dr. Catarina Vale made aware.
Pt states she will call transportation home because she said they take \"hours\".
make up or hair products on the day of surgery    [x] You can expect a call the business day prior to procedure to notify you if your arrival time changes    [x] If you receive a survey after surgery we would greatly appreciate your comments    [] Parent/guardian of a minor must accompany their child and remain on the premises  the entire time they are under our care     [] Pediatric patients may bring favorite toy, blanket or comfort item with them    [] A caregiver or family member must remain with the patient during their stay if they are mentally handicapped, have dementia, disoriented or unable to use a call light or would be a safety concern if left unattended    [x] Please notify surgeon if you develop any illness between now and time of surgery (cold, cough, sore throat, fever, nausea, vomiting) or any signs of infections  including skin, wounds, and dental.    [x]  The Outpatient Pharmacy is available to fill your prescription here on your day of surgery, ask your preop nurse for details    [] Other instructions    EDUCATIONAL MATERIALS PROVIDED:    [] PAT Preoperative Education Packet/Booklet     [] Medication List    [] Transfusion bracelet applied with instructions    [] Shower with soap, lather and rinse well, and use CHG wipes provided the evening before surgery as instructed    [] Incentive spirometer with instructions

## 2023-04-05 NOTE — DISCHARGE INSTRUCTIONS
thinner.  Examples of these include Coumadin, Warfarin or Plavix. A safe plan will need to be made about how and when you take this medication near the time of your surgery. The Day of Surgery  - Please report to the designated area at your confirmed arrival time. -  Before you are taken to the operating room, you will change into a gown and have an IV started. - An anesthesiologist will come speak with you about the surgery and answer any questions that you may have. - Your family may stay with you in the pre-op area until you are taken to the operating room. Home Going Instructions: Activity: You are encouraged to walk every day, increasing the distance each day. You may go up and down steps, but please rest when you are tired. - Do NOT drive for 2-3 weeks after surgery or until you are not taking pain medications. You may ride in a car or plane. Be sure to get up and walk every 1-2 hours when traveling long distances. - Avoid strenuous activity for 2-4 weeks after surgery (running, jumping, lifting more than 10 lbs.)    Diet and Fluid Intake: Eating a well balanced diet is important. If you were on a specific diet before your surgery, you should return to that diet. Otherwise, there are no diet restrictions after surgery. Do NOT drink alcoholic beverages while taking pain medications. Drink at least 8 glasses of fluid per day, preferably water. Bowel Management: Constipation sometimes occurs after surgery, especially when taking pain medication. Eating a well-balanced diet and maintaining a good fluid intake are often all that is necessary to return to you pre-surgical bowel regimen. It is important not to strain excessively when having a bowel movement for the first several weeks following your surgery. A stool softener such as Colace may be helpful. You can purchase stool softeners without a prescription at your local drug store.    If a stool softener is not enough to relieve your

## 2023-04-05 NOTE — H&P
Years: 46.00     Pack years: 11.50     Types: Cigarettes     Quit date: 2019     Years since quittin.1    Smokeless tobacco: Never   Substance Use Topics    Alcohol use: No        Review of Systems:  Respiratory: negative for cough and hemoptysis  Cardiovascular: negative for chest pain and dyspnea  Gastrointestinal: negative for abdominal pain, diarrhea, nausea and vomiting  Genitourinary: as per HPI, otherwise negative. Derm: negative for rash and skin lesion(s)  Neurological: negative for seizures and tremors  Endocrine: negative for diabetic symptoms including polydipsia and polyuria  All other systems negative    Physical Exam:  Vitals:    23 1013   BP: (!) 149/75   Pulse: 68   Resp: 18   Temp: 97.5 °F (36.4 °C)   SpO2: 96%      Skin:  Warm and dry. No rash or bruises  HEENT:  PERRLA, EOMI  Neck:  No JVD, No thyromegaly  Cardiac:  RRR  Lungs:  No audible wheezes, symmetric respirations, non-labored  Abdomen: Soft, non-tender, non-distended  Extremities:  No clubbing, edema or cyanosis  Neurological:  Moves all extremities, normal DTR    Lab Results   Component Value Date    WBC 10.3 2023    HGB 14.7 2023    HCT 40.5 2023    .0 (H) 2023     2023       Lab Results   Component Value Date    CREATININE 0.9 2023       No results found for: PSA    No results for input(s): Ulysses Curry in the last 72 hours. No results for input(s): BC in the last 72 hours. No results for input(s): Roxanna Scott in the last 72 hours. Assessment and Plan:  1.  Cystoscopy stent insertion, left eswl

## 2023-04-21 ENCOUNTER — HOSPITAL ENCOUNTER (OUTPATIENT)
Age: 68
End: 2023-04-21
Payer: MEDICAID

## 2023-04-21 ENCOUNTER — HOSPITAL ENCOUNTER (OUTPATIENT)
Dept: GENERAL RADIOLOGY | Age: 68
End: 2023-04-21
Payer: MEDICAID

## 2023-04-21 DIAGNOSIS — N20.1 CALCULUS OF URETER: ICD-10-CM

## 2023-04-21 PROCEDURE — 74018 RADEX ABDOMEN 1 VIEW: CPT

## 2023-05-01 ENCOUNTER — OFFICE VISIT (OUTPATIENT)
Dept: PRIMARY CARE CLINIC | Age: 68
End: 2023-05-01
Payer: MEDICAID

## 2023-05-01 VITALS
HEART RATE: 68 BPM | SYSTOLIC BLOOD PRESSURE: 111 MMHG | RESPIRATION RATE: 18 BRPM | OXYGEN SATURATION: 97 % | BODY MASS INDEX: 36.16 KG/M2 | DIASTOLIC BLOOD PRESSURE: 79 MMHG | TEMPERATURE: 98 F | HEIGHT: 66 IN | WEIGHT: 225 LBS

## 2023-05-01 DIAGNOSIS — I10 ESSENTIAL HYPERTENSION: ICD-10-CM

## 2023-05-01 DIAGNOSIS — K59.00 CONSTIPATION, UNSPECIFIED CONSTIPATION TYPE: ICD-10-CM

## 2023-05-01 DIAGNOSIS — R60.0 EDEMA OF BOTH LEGS: ICD-10-CM

## 2023-05-01 DIAGNOSIS — G35 MULTIPLE SCLEROSIS (HCC): Primary | ICD-10-CM

## 2023-05-01 DIAGNOSIS — L30.4 INTERTRIGO: ICD-10-CM

## 2023-05-01 DIAGNOSIS — N20.0 KIDNEY STONE: ICD-10-CM

## 2023-05-01 PROCEDURE — 1123F ACP DISCUSS/DSCN MKR DOCD: CPT | Performed by: PHYSICIAN ASSISTANT

## 2023-05-01 PROCEDURE — 99349 HOME/RES VST EST MOD MDM 40: CPT | Performed by: PHYSICIAN ASSISTANT

## 2023-05-01 PROCEDURE — 3074F SYST BP LT 130 MM HG: CPT | Performed by: PHYSICIAN ASSISTANT

## 2023-05-01 PROCEDURE — 3078F DIAST BP <80 MM HG: CPT | Performed by: PHYSICIAN ASSISTANT

## 2023-05-01 RX ORDER — CLOTRIMAZOLE 1 %
CREAM (GRAM) TOPICAL
Qty: 45 G | Refills: 5 | Status: SHIPPED | OUTPATIENT
Start: 2023-05-01

## 2023-05-08 ENCOUNTER — TELEPHONE (OUTPATIENT)
Dept: PRIMARY CARE CLINIC | Age: 68
End: 2023-05-08

## 2023-05-08 DIAGNOSIS — N39.0 RECURRENT UTI: Primary | ICD-10-CM

## 2023-05-08 RX ORDER — NITROFURANTOIN 25; 75 MG/1; MG/1
100 CAPSULE ORAL 2 TIMES DAILY
Qty: 20 CAPSULE | Refills: 0 | Status: SHIPPED | OUTPATIENT
Start: 2023-05-08 | End: 2023-05-18

## 2023-05-08 NOTE — TELEPHONE ENCOUNTER
I put in order for UA CS. I also sent eRx to [facility] for antibiotic. She should not start it until after she get the specimen.

## 2023-05-08 NOTE — TELEPHONE ENCOUNTER
Chaya Ch called. She states she is pretty sure that she has another UTI, and she is running a fever. She is asking if you will put in an order for a UA CS, and she will have someone take a specimen to [facility].

## 2023-05-09 ENCOUNTER — HOSPITAL ENCOUNTER (OUTPATIENT)
Age: 68
Setting detail: SPECIMEN
Discharge: HOME | End: 2023-05-09
Payer: MEDICAID

## 2023-05-09 DIAGNOSIS — N39.0 RECURRENT UTI: ICD-10-CM

## 2023-05-09 LAB
BACTERIA URNS QL MICRO: ABNORMAL /HPF
BILIRUB UR QL STRIP: NEGATIVE
CLARITY UR: ABNORMAL
COLOR UR: YELLOW
EPI CELLS #/AREA URNS HPF: ABNORMAL /HPF
GLUCOSE UR STRIP-MCNC: NEGATIVE MG/DL
HGB UR QL STRIP: ABNORMAL
KETONES UR STRIP-MCNC: NEGATIVE MG/DL
LEUKOCYTE ESTERASE UR QL STRIP: ABNORMAL
NITRITE UR QL STRIP: POSITIVE
PH UR STRIP: 6 [PH] (ref 5–9)
PROT UR STRIP-MCNC: 100 MG/DL
RBC #/AREA URNS HPF: >20 /HPF (ref 0–2)
SP GR UR STRIP: 1.02 (ref 1–1.03)
UROBILINOGEN UR STRIP-ACNC: 0.2 E.U./DL
WBC #/AREA URNS HPF: >20 /HPF (ref 0–5)

## 2023-05-09 PROCEDURE — 81001 URINALYSIS AUTO W/SCOPE: CPT

## 2023-05-09 PROCEDURE — 87088 URINE BACTERIA CULTURE: CPT

## 2023-05-09 PROCEDURE — 87077 CULTURE AEROBIC IDENTIFY: CPT

## 2023-05-09 PROCEDURE — 87186 SC STD MICRODIL/AGAR DIL: CPT

## 2023-05-11 DIAGNOSIS — N39.0 RECURRENT UTI: Primary | ICD-10-CM

## 2023-05-11 LAB
BACTERIA UR CULT: ABNORMAL
ORGANISM: ABNORMAL

## 2023-05-11 RX ORDER — NITROFURANTOIN 25; 75 MG/1; MG/1
100 CAPSULE ORAL
Qty: 45 CAPSULE | Refills: 3 | Status: SHIPPED | OUTPATIENT
Start: 2023-05-12

## 2023-05-22 RX ORDER — FUROSEMIDE 40 MG/1
TABLET ORAL
Qty: 90 TABLET | Refills: 3 | Status: SHIPPED
Start: 2023-05-22 | End: 2023-06-30 | Stop reason: DRUGHIGH

## 2023-05-23 ENCOUNTER — TELEPHONE (OUTPATIENT)
Dept: PRIMARY CARE CLINIC | Age: 68
End: 2023-05-23

## 2023-05-23 DIAGNOSIS — L02.229 BOIL OF TRUNK: Primary | ICD-10-CM

## 2023-05-23 RX ORDER — DOXYCYCLINE HYCLATE 100 MG
100 TABLET ORAL 2 TIMES DAILY
Qty: 56 TABLET | Refills: 0 | Status: SHIPPED | OUTPATIENT
Start: 2023-05-23 | End: 2023-06-20

## 2023-05-23 NOTE — TELEPHONE ENCOUNTER
Joseph Rios called. She states she has boils again, They are on her the back of her legs and also one on her vagina. She is asking if you will send in an ATB for her.

## 2023-05-24 NOTE — PROGRESS NOTES
5/1/2023     Home visit medically necessary in lieu of an office visit due to: wheelchair bound, difficult to get out. HPI:  Jessika Cavazos says she is having post-procedure left flank pain. She denies fever or chills. On 4/5/23 she underwent a cystoscopy with ESWL along with L stent placement. She states she was prescribed Percocet but has not taken any of the medication due to fear of addiction. A KUB xray taken on 4/21/23 revealed partial stone fragmentation and good stent placement. On 4/13/23,  she was placed on Omnicef for dysuria, frequency and cloudy urine. She states the symptoms have cleared up. She is due to have the stent removed on 5/4/23. Jessika Cavazos has been having some joint stiffness and is thinking about calling [facility] to resume PT. She reports no new boils this month. Her last bowel movement was today. She credits her bowels moving due to eating dates. She also takes Senna and Colace. She is still on Amitiza twice daily. She continues to leg swelling on Lasix 40 mg daily. She also wears the zippered compression hose. She tries to elevate her legs more. She has been having allergy symptoms affecting her eyes and sinuses. She has been taking guaifenesin, Flonase and antihistamine eye drops. She has a mild intertrigo rash to groin area and needs more anti-fungal cream. She has an aide 5 days per week. REVIEW OF SYSTEMS: General: Weight stable, generally healthy, no change in strength or exercise tolerance. Heart: No palpitations, no syncope, no orthopnea. EDEMA OF LEGS AT TIMES. Abdomen: Chronic constipation. No change in appetite, no dysphagia, no abdominal pains, no bowel habit changes, no emesis, no melena. : No urinary urgency, no dysuria, no change in nature of urine. Neurologic:   Unable to walk without holding on to something. In w/c most of the time. No tremor, no seizures, no changes in mentation. All other systems negative.        PAST MEDICAL HISTORY: (Major events, 0

## 2023-06-02 ENCOUNTER — OFFICE VISIT (OUTPATIENT)
Dept: PRIMARY CARE CLINIC | Age: 68
End: 2023-06-02
Payer: MEDICAID

## 2023-06-02 VITALS
HEIGHT: 66 IN | SYSTOLIC BLOOD PRESSURE: 111 MMHG | TEMPERATURE: 97 F | HEART RATE: 64 BPM | RESPIRATION RATE: 18 BRPM | WEIGHT: 225 LBS | OXYGEN SATURATION: 96 % | DIASTOLIC BLOOD PRESSURE: 64 MMHG | BODY MASS INDEX: 36.16 KG/M2

## 2023-06-02 DIAGNOSIS — N20.0 KIDNEY STONE: ICD-10-CM

## 2023-06-02 DIAGNOSIS — N39.0 RECURRENT UTI: ICD-10-CM

## 2023-06-02 DIAGNOSIS — K59.00 CONSTIPATION, UNSPECIFIED CONSTIPATION TYPE: ICD-10-CM

## 2023-06-02 DIAGNOSIS — G35 MULTIPLE SCLEROSIS (HCC): Primary | ICD-10-CM

## 2023-06-02 DIAGNOSIS — L73.9 FOLLICULITIS: ICD-10-CM

## 2023-06-02 DIAGNOSIS — I10 ESSENTIAL HYPERTENSION: ICD-10-CM

## 2023-06-02 DIAGNOSIS — R60.0 EDEMA OF BOTH LEGS: ICD-10-CM

## 2023-06-02 PROCEDURE — 99349 HOME/RES VST EST MOD MDM 40: CPT | Performed by: PHYSICIAN ASSISTANT

## 2023-06-02 PROCEDURE — 3074F SYST BP LT 130 MM HG: CPT | Performed by: PHYSICIAN ASSISTANT

## 2023-06-02 PROCEDURE — 3078F DIAST BP <80 MM HG: CPT | Performed by: PHYSICIAN ASSISTANT

## 2023-06-02 PROCEDURE — 1123F ACP DISCUSS/DSCN MKR DOCD: CPT | Performed by: PHYSICIAN ASSISTANT

## 2023-06-02 NOTE — PROGRESS NOTES
obtain testing. Recheck 1 month. 40 minutes spent on visit, 25 minutes involved education/counseling regarding MS, BLE edema, HTN disease processes, treatment options, meds and coordination of care. Current Outpatient Medications   Medication Sig Dispense Refill    doxycycline hyclate (VIBRA-TABS) 100 MG tablet Take 1 tablet by mouth 2 times daily for 28 days 56 tablet 0    furosemide (LASIX) 40 MG tablet TAKE 1 TABLET BY MOUTH DAILY 90 tablet 3    nitrofurantoin, macrocrystal-monohydrate, (MACROBID) 100 MG capsule Take 1 capsule by mouth three times a week (M-W-F) to prevent UTI.  45 capsule 3    clotrimazole (LOTRIMIN) 1 % cream APPLY TOPICALLY TO AFFECTED AREA OF SKIN RASH 2 TIMES DAILY AS NEEDED 45 g 5    acetaminophen (TYLENOL) 500 MG tablet Take 1 tablet by mouth every 4 hours as needed for Pain      Bioflavonoid Products (QUERCETIN COMPLEX IMMUNE) CAPS Take 2 capsules by mouth daily      polyethylene glycol (GLYCOLAX) 17 g packet Take 25 g by mouth daily as needed for Constipation 527 g 11    guaiFENesin (ROBAFEN MUCUS/CHEST CONGESTION) 100 MG/5ML liquid TAKE 10 MLS BY MOUTH 3 TIMES DAILY AS NEEDED FOR CONGESTION 240 mL 11    losartan (COZAAR) 25 MG tablet Take 1 tablet by mouth daily (Patient taking differently: Take 1 tablet by mouth nightly) 90 tablet 3    metoprolol tartrate (LOPRESSOR) 25 MG tablet Take 1 tablet by mouth 2 times daily 180 tablet 3    loratadine (CLARITIN) 10 MG tablet Take 1 tablet by mouth daily as needed (rhinitis) 28 tablet 5    docusate (COLACE, DULCOLAX) 100 MG CAPS Take 100 mg by mouth 2 times daily 56 capsule 5    lubiprostone (AMITIZA) 8 MCG CAPS capsule TAKE ONE CAPSULE BY MOUTH TWICE A DAY WITH MEALS 180 capsule 3    dextromethorphan-guaiFENesin (MUCINEX DM)  MG per extended release tablet Take 2 tablets by mouth every 12 hours as needed for Congestion 120 tablet 5    fluticasone (FLONASE) 50 MCG/ACT nasal spray 1 spray by Each Nostril route daily (Patient not taking:

## 2023-06-29 ENCOUNTER — PREP FOR PROCEDURE (OUTPATIENT)
Dept: UROLOGY | Age: 68
End: 2023-06-29

## 2023-06-29 RX ORDER — SODIUM CHLORIDE 9 MG/ML
INJECTION, SOLUTION INTRAVENOUS PRN
Status: CANCELLED | OUTPATIENT
Start: 2023-06-29

## 2023-06-29 RX ORDER — SODIUM CHLORIDE 9 MG/ML
INJECTION, SOLUTION INTRAVENOUS CONTINUOUS
Status: CANCELLED | OUTPATIENT
Start: 2023-06-29

## 2023-06-29 RX ORDER — SODIUM CHLORIDE 0.9 % (FLUSH) 0.9 %
5-40 SYRINGE (ML) INJECTION PRN
Status: CANCELLED | OUTPATIENT
Start: 2023-06-29

## 2023-06-29 RX ORDER — SODIUM CHLORIDE 0.9 % (FLUSH) 0.9 %
5-40 SYRINGE (ML) INJECTION EVERY 12 HOURS SCHEDULED
Status: CANCELLED | OUTPATIENT
Start: 2023-06-29

## 2023-06-30 ENCOUNTER — OFFICE VISIT (OUTPATIENT)
Dept: PRIMARY CARE CLINIC | Age: 68
End: 2023-06-30
Payer: MEDICAID

## 2023-06-30 VITALS
RESPIRATION RATE: 18 BRPM | OXYGEN SATURATION: 99 % | HEART RATE: 70 BPM | SYSTOLIC BLOOD PRESSURE: 131 MMHG | DIASTOLIC BLOOD PRESSURE: 76 MMHG

## 2023-06-30 DIAGNOSIS — I10 PRIMARY HYPERTENSION: ICD-10-CM

## 2023-06-30 DIAGNOSIS — N39.0 RECURRENT UTI: ICD-10-CM

## 2023-06-30 DIAGNOSIS — G62.9 PERIPHERAL POLYNEUROPATHY: ICD-10-CM

## 2023-06-30 DIAGNOSIS — R26.2 UNABLE TO WALK: ICD-10-CM

## 2023-06-30 DIAGNOSIS — M48.02 CERVICAL SPINAL STENOSIS: ICD-10-CM

## 2023-06-30 DIAGNOSIS — K59.00 CONSTIPATION, UNSPECIFIED CONSTIPATION TYPE: ICD-10-CM

## 2023-06-30 DIAGNOSIS — G35 MULTIPLE SCLEROSIS (HCC): Primary | ICD-10-CM

## 2023-06-30 PROCEDURE — 3075F SYST BP GE 130 - 139MM HG: CPT | Performed by: FAMILY MEDICINE

## 2023-06-30 PROCEDURE — 3078F DIAST BP <80 MM HG: CPT | Performed by: FAMILY MEDICINE

## 2023-06-30 PROCEDURE — 1123F ACP DISCUSS/DSCN MKR DOCD: CPT | Performed by: FAMILY MEDICINE

## 2023-06-30 PROCEDURE — 99349 HOME/RES VST EST MOD MDM 40: CPT | Performed by: FAMILY MEDICINE

## 2023-06-30 RX ORDER — FUROSEMIDE 40 MG/1
40 TABLET ORAL DAILY PRN
Qty: 90 TABLET | Refills: 3 | Status: SHIPPED
Start: 2023-06-30

## 2023-06-30 RX ORDER — PSEUDOEPHEDRINE HCL 30 MG
100 TABLET ORAL 2 TIMES DAILY PRN
Qty: 56 CAPSULE | Refills: 5 | Status: SHIPPED
Start: 2023-06-30

## 2023-07-03 NOTE — PROGRESS NOTES
Patient called stating she has \"edema of legs that can leak at times\". She states she scraper her leg on possibly her wheelchair and tore skin, now is is bleeding. I advised to cleanse with soap and water, elevate, keep MEDINA if resting. I instructed her to call surgeon if area becomes more reddened or draining discolored drainage.

## 2023-07-06 ENCOUNTER — ANESTHESIA (OUTPATIENT)
Dept: OPERATING ROOM | Age: 68
End: 2023-07-06
Payer: MEDICAID

## 2023-07-06 ENCOUNTER — ANESTHESIA EVENT (OUTPATIENT)
Dept: OPERATING ROOM | Age: 68
End: 2023-07-06
Payer: MEDICAID

## 2023-07-06 ENCOUNTER — HOSPITAL ENCOUNTER (OUTPATIENT)
Age: 68
Setting detail: OUTPATIENT SURGERY
Discharge: HOME | End: 2023-07-06
Attending: UROLOGY | Admitting: UROLOGY
Payer: MEDICAID

## 2023-07-06 ENCOUNTER — APPOINTMENT (OUTPATIENT)
Dept: GENERAL RADIOLOGY | Age: 68
End: 2023-07-06
Attending: UROLOGY
Payer: MEDICAID

## 2023-07-06 VITALS
TEMPERATURE: 98.6 F | HEIGHT: 66 IN | SYSTOLIC BLOOD PRESSURE: 144 MMHG | DIASTOLIC BLOOD PRESSURE: 84 MMHG | WEIGHT: 220 LBS | OXYGEN SATURATION: 96 % | HEART RATE: 78 BPM | BODY MASS INDEX: 35.36 KG/M2 | RESPIRATION RATE: 16 BRPM

## 2023-07-06 DIAGNOSIS — N20.0 URIC ACID NEPHROLITHIASIS: ICD-10-CM

## 2023-07-06 PROCEDURE — 2580000003 HC RX 258

## 2023-07-06 PROCEDURE — 6360000002 HC RX W HCPCS: Performed by: NURSE ANESTHETIST, CERTIFIED REGISTERED

## 2023-07-06 PROCEDURE — 3700000000 HC ANESTHESIA ATTENDED CARE: Performed by: UROLOGY

## 2023-07-06 PROCEDURE — 3700000001 HC ADD 15 MINUTES (ANESTHESIA): Performed by: UROLOGY

## 2023-07-06 PROCEDURE — 6360000002 HC RX W HCPCS

## 2023-07-06 PROCEDURE — 7100000000 HC PACU RECOVERY - FIRST 15 MIN: Performed by: UROLOGY

## 2023-07-06 PROCEDURE — 3600000013 HC SURGERY LEVEL 3 ADDTL 15MIN: Performed by: UROLOGY

## 2023-07-06 PROCEDURE — C2617 STENT, NON-COR, TEM W/O DEL: HCPCS | Performed by: UROLOGY

## 2023-07-06 PROCEDURE — 3600000003 HC SURGERY LEVEL 3 BASE: Performed by: UROLOGY

## 2023-07-06 PROCEDURE — 2580000003 HC RX 258: Performed by: NURSE ANESTHETIST, CERTIFIED REGISTERED

## 2023-07-06 PROCEDURE — 7100000001 HC PACU RECOVERY - ADDTL 15 MIN: Performed by: UROLOGY

## 2023-07-06 PROCEDURE — C1758 CATHETER, URETERAL: HCPCS | Performed by: UROLOGY

## 2023-07-06 PROCEDURE — C1769 GUIDE WIRE: HCPCS | Performed by: UROLOGY

## 2023-07-06 PROCEDURE — 6360000002 HC RX W HCPCS: Performed by: UROLOGY

## 2023-07-06 PROCEDURE — 87088 URINE BACTERIA CULTURE: CPT

## 2023-07-06 PROCEDURE — 7100000011 HC PHASE II RECOVERY - ADDTL 15 MIN: Performed by: UROLOGY

## 2023-07-06 PROCEDURE — 87186 SC STD MICRODIL/AGAR DIL: CPT

## 2023-07-06 PROCEDURE — 2709999900 HC NON-CHARGEABLE SUPPLY: Performed by: UROLOGY

## 2023-07-06 PROCEDURE — 7100000010 HC PHASE II RECOVERY - FIRST 15 MIN: Performed by: UROLOGY

## 2023-07-06 DEVICE — URETERAL STENT
Type: IMPLANTABLE DEVICE | Site: URETHRA | Status: FUNCTIONAL
Brand: PERCUFLEX™

## 2023-07-06 RX ORDER — SODIUM CHLORIDE, SODIUM LACTATE, POTASSIUM CHLORIDE, CALCIUM CHLORIDE 600; 310; 30; 20 MG/100ML; MG/100ML; MG/100ML; MG/100ML
INJECTION, SOLUTION INTRAVENOUS CONTINUOUS
Status: DISCONTINUED | OUTPATIENT
Start: 2023-07-06 | End: 2023-07-06 | Stop reason: HOSPADM

## 2023-07-06 RX ORDER — SODIUM CHLORIDE 0.9 % (FLUSH) 0.9 %
5-40 SYRINGE (ML) INJECTION PRN
Status: DISCONTINUED | OUTPATIENT
Start: 2023-07-06 | End: 2023-07-06 | Stop reason: HOSPADM

## 2023-07-06 RX ORDER — PROPOFOL 10 MG/ML
INJECTION, EMULSION INTRAVENOUS CONTINUOUS PRN
Status: DISCONTINUED | OUTPATIENT
Start: 2023-07-06 | End: 2023-07-06 | Stop reason: SDUPTHER

## 2023-07-06 RX ORDER — SODIUM CHLORIDE 9 MG/ML
INJECTION, SOLUTION INTRAVENOUS CONTINUOUS PRN
Status: DISCONTINUED | OUTPATIENT
Start: 2023-07-06 | End: 2023-07-06 | Stop reason: SDUPTHER

## 2023-07-06 RX ORDER — ONDANSETRON 2 MG/ML
4 INJECTION INTRAMUSCULAR; INTRAVENOUS
Status: DISCONTINUED | OUTPATIENT
Start: 2023-07-06 | End: 2023-07-06 | Stop reason: HOSPADM

## 2023-07-06 RX ORDER — SODIUM CHLORIDE 9 MG/ML
INJECTION, SOLUTION INTRAVENOUS PRN
Status: DISCONTINUED | OUTPATIENT
Start: 2023-07-06 | End: 2023-07-06 | Stop reason: HOSPADM

## 2023-07-06 RX ORDER — SODIUM CHLORIDE 0.9 % (FLUSH) 0.9 %
5-40 SYRINGE (ML) INJECTION EVERY 12 HOURS SCHEDULED
Status: DISCONTINUED | OUTPATIENT
Start: 2023-07-06 | End: 2023-07-06 | Stop reason: HOSPADM

## 2023-07-06 RX ORDER — GENTAMICIN SULFATE 80 MG/50ML
80 INJECTION, SOLUTION INTRAVENOUS ONCE
Status: COMPLETED | OUTPATIENT
Start: 2023-07-06 | End: 2023-07-06

## 2023-07-06 RX ORDER — SODIUM CHLORIDE 9 MG/ML
INJECTION, SOLUTION INTRAVENOUS CONTINUOUS
Status: DISCONTINUED | OUTPATIENT
Start: 2023-07-06 | End: 2023-07-06 | Stop reason: HOSPADM

## 2023-07-06 RX ORDER — ONDANSETRON 2 MG/ML
INJECTION INTRAMUSCULAR; INTRAVENOUS PRN
Status: DISCONTINUED | OUTPATIENT
Start: 2023-07-06 | End: 2023-07-06 | Stop reason: SDUPTHER

## 2023-07-06 RX ORDER — HYDROMORPHONE HYDROCHLORIDE 1 MG/ML
0.25 INJECTION, SOLUTION INTRAMUSCULAR; INTRAVENOUS; SUBCUTANEOUS EVERY 5 MIN PRN
Status: DISCONTINUED | OUTPATIENT
Start: 2023-07-06 | End: 2023-07-06 | Stop reason: HOSPADM

## 2023-07-06 RX ORDER — HYDROMORPHONE HYDROCHLORIDE 1 MG/ML
0.5 INJECTION, SOLUTION INTRAMUSCULAR; INTRAVENOUS; SUBCUTANEOUS EVERY 5 MIN PRN
Status: DISCONTINUED | OUTPATIENT
Start: 2023-07-06 | End: 2023-07-06 | Stop reason: HOSPADM

## 2023-07-06 RX ADMIN — GENTAMICIN SULFATE 80 MG: 80 INJECTION, SOLUTION INTRAVENOUS at 11:05

## 2023-07-06 RX ADMIN — ONDANSETRON HYDROCHLORIDE 4 MG: 2 SOLUTION INTRAMUSCULAR; INTRAVENOUS at 13:27

## 2023-07-06 RX ADMIN — SODIUM CHLORIDE: 9 INJECTION, SOLUTION INTRAVENOUS at 13:13

## 2023-07-06 RX ADMIN — SODIUM CHLORIDE: 9 INJECTION, SOLUTION INTRAVENOUS at 10:44

## 2023-07-06 RX ADMIN — CEFAZOLIN 2000 MG: 2 INJECTION, POWDER, FOR SOLUTION INTRAMUSCULAR; INTRAVENOUS at 13:31

## 2023-07-06 RX ADMIN — PROPOFOL 140 MCG/KG/MIN: 10 INJECTION, EMULSION INTRAVENOUS at 13:27

## 2023-07-06 ASSESSMENT — PAIN - FUNCTIONAL ASSESSMENT: PAIN_FUNCTIONAL_ASSESSMENT: 0-10

## 2023-07-06 ASSESSMENT — PAIN SCALES - GENERAL: PAINLEVEL_OUTOF10: 0

## 2023-07-06 NOTE — H&P
[address], [address] [place] [address]                       PREOPERATIVE HISTORY AND PHYSICAL    PATIENT NAME: Qiana Hernandez                 :        1955  MED REC NO:   [id number]                            ROOM:  ACCOUNT NO:   [de-identified]                           ADMIT DATE: 2023  PROVIDER:     Sandro Paredes MD    She is to have the procedure on 2023 with a chief complaint of  retained stent. HISTORY OF PRESENT ILLNESS:  This is a 77-year-old female who had  shockwave lithotripsy in 2023, had a great deal of difficulty getting  back to the office. She is for stent removal.  She is now being  admitted for stent removal.    PAST MEDICAL HISTORY:  Arthritis, asthma, COPD, eczema, gout,  hypertension, kidney stones, multiple sclerosis, scoliosis, and spinal  stenosis. PAST SURGICAL HISTORY:  Appendectomy, cholecystectomy, cystoscopy, stent  placement with shockwave lithotripsy, and hysterectomy. FAMILY HISTORY:  Asthma, high blood pressure, cancer, heart failure, and  breast cancer. MEDICATIONS:  Tylenol, Lotrimin, GlycoLax, Lopressor, Lasix, Symbicort,  Ventolin, and probiotic. SOCIAL HISTORY:  She is a former smoker. ALLERGIES:  LYRICA and ADHESIVE TAPE. PHYSICAL EXAMINATION:  GENERAL:  The patient is an alert and oriented female who appears  comfortable. She is obese. HEAD, EYES, EARS, NOSE, AND THROAT:  Unremarkable. ABDOMEN:  Soft. No palpable organs or masses present. IMPRESSION:  Retained ureteral stent.   The patient is for cystoscopy,  retrograde and stent removal.        Sandro Paredes MD    D: 2023 11:20:27       T: 2023 11:24:09     /[email or web address]  Job#: [id number]     Doc#: [id number]    CC:

## 2023-07-06 NOTE — ANESTHESIA PRE PROCEDURE
Department of Anesthesiology  Preprocedure Note       Name:  Arya Evans   Age:  70 y.o.  :  1955                                          MRN:  [id number]         Date:  2023      Surgeon: Renee Patterson):  Kathy Russo MD    Procedure: Procedure(s):  CYSTOSCOPY RETROGRADE PYELOGRAM STENT INSERTION    Medications prior to admission:   Prior to Admission medications    Medication Sig Start Date End Date Taking?  Authorizing Provider   docusate (COLACE, DULCOLAX) 100 MG CAPS Take 100 mg by mouth 2 times daily as needed 23   Sherryle Monas, MD   furosemide (LASIX) 40 MG tablet Take 1 tablet by mouth daily as needed 23   Sherryle Monas, MD   ondansetron (ZOFRAN-ODT) 4 MG disintegrating tablet DISSOLVE ONE TABLET IN MOUTH THREE TIMES A DAY AS NEEDED FOR NAUSEA OR VOMITING 23   Sherryle Monas, MD   acetaminophen (TYLENOL) 500 MG tablet Take 1 tablet by mouth every 4 hours as needed for Pain    Historical Provider, MD   Bioflavonoid Products (QUERCETIN COMPLEX IMMUNE) CAPS Take 2 capsules by mouth daily    Historical Provider, MD   polyethylene glycol (GLYCOLAX) 17 g packet Take 25 g by mouth daily as needed for Constipation 3/1/23   Tony Mitchell PA-C   guaiFENesin (ROBAFEN MUCUS/CHEST CONGESTION) 100 MG/5ML liquid TAKE 10 MLS BY MOUTH 3 TIMES DAILY AS NEEDED FOR CONGESTION 23   Sherryle Monas, MD   losartan (COZAAR) 25 MG tablet Take 1 tablet by mouth daily  Patient taking differently: Take 1 tablet by mouth nightly 23   Sherryle Monas, MD   metoprolol tartrate (LOPRESSOR) 25 MG tablet Take 1 tablet by mouth 2 times daily 23   Sherryle Monas, MD   loratadine (CLARITIN) 10 MG tablet Take 1 tablet by mouth daily as needed (rhinitis) 1/23/23 7/10/23  Sherryle Monas, MD   dextromethorphan-guaiFENesin [facility] DM)  MG per extended release tablet Take 2 tablets by mouth every 12 hours as needed for Congestion 23   Sherryle Monas, MD   budesonide-formoterol (SYMBICORT) 160-4.5 MCG/ACT AERO Inhale 2 puffs

## 2023-07-06 NOTE — PROGRESS NOTES
Discharge instructions given with caregiver at the bedside.  Pt verbalized understanding of discharge instructions

## 2023-07-06 NOTE — ANESTHESIA POSTPROCEDURE EVALUATION
Department of Anesthesiology  Postprocedure Note    Patient: Jeanne Angel  MRN: [id number]  YOB: 1955  Date of evaluation: 7/6/2023      Procedure Summary     Date: 07/06/23 Room / Location: [facility] OR 11 / [facility]    Anesthesia Start: 1319 Anesthesia Stop: 1400    Procedure: CYSTOSCOPY RETROGRADE PYELOGRAM  left STENT REMOVAL, PLACEMENT OF LEFT STENT (Left: Urethra) Diagnosis:       Uric acid nephrolithiasis      (Uric acid nephrolithiasis [N20.0])    Surgeons: Patrizia Agustin MD Responsible Provider: Lena Martinze MD    Anesthesia Type: MAC ASA Status: 3          Anesthesia Type: No value filed.     Tenzin Phase I: Tenzin Score: 10    Tenzin Phase II:        Anesthesia Post Evaluation    Patient location during evaluation: PACU  Patient participation: complete - patient participated  Level of consciousness: awake  Pain score: 0  Airway patency: patent  Nausea & Vomiting: no nausea  Complications: no  Cardiovascular status: hemodynamically stable  Respiratory status: acceptable  Hydration status: stable  Multimodal analgesia pain management approach

## 2023-07-06 NOTE — BRIEF OP NOTE
Brief Postoperative Note      Patient: Teri Beaver  YOB: 1955  MRN: [id number]    Date of Procedure: 7/6/2023    Pre op dx left stone    Post-Op Diagnosis: Same residual calculi       Procedure(s):  CYSTOSCOPY RETROGRADE PYELOGRAM  left STENT REMOVAL, PLACEMENT OF LEFT STENT    Surgeon(s):  Denice Severs, MD    Assistant:      Anesthesia: Monitor Anesthesia Care    Estimated Blood Loss (mL): Minimal    Complications: None    Specimens:   ID Type Source Tests Collected by Time Destination   1 : URINE FROM BLADDER Urine Urine, Cystoscopic CULTURE, URINE Denice Severs, MD 7/6/2023 1341    2 : LEFT KIDNEY URINE FOR CULTURE Urine Urine, Cystoscopic CULTURE, URINE Denice Severs, MD 7/6/2023 1342        Implants:  Implant Name Type Inv. Item Serial No.  Lot No. LRB No. Used Ricky Holman [facility] PERCFLX FIRM DUROMETER DBL Dean Mota [id number]  Ricardo Blow 6FR [facility] PERCFLX FIRM DUROMETER [facility] [id number] Left 1 Implanted         Drains:   Ureteral Drain/Stent 07/06/23 Left Ureter (Active)       Urinary Catheter 07/06/23 2 Way; [place] (Active)       Findings:       Electronically signed by Denice Severs, MD on 7/6/2023 at 2:00 PM

## 2023-07-09 LAB
BACTERIA UR CULT: ABNORMAL
ORGANISM: ABNORMAL

## 2023-07-14 RX ORDER — SODIUM CHLORIDE 0.9 % (FLUSH) 0.9 %
5-40 SYRINGE (ML) INJECTION PRN
Status: CANCELLED | OUTPATIENT
Start: 2023-07-14

## 2023-07-14 RX ORDER — SODIUM CHLORIDE 0.9 % (FLUSH) 0.9 %
5-40 SYRINGE (ML) INJECTION EVERY 12 HOURS SCHEDULED
Status: CANCELLED | OUTPATIENT
Start: 2023-07-14

## 2023-07-14 RX ORDER — SODIUM CHLORIDE 9 MG/ML
INJECTION, SOLUTION INTRAVENOUS CONTINUOUS
Status: CANCELLED | OUTPATIENT
Start: 2023-07-14

## 2023-07-14 RX ORDER — SODIUM CHLORIDE 9 MG/ML
INJECTION, SOLUTION INTRAVENOUS PRN
Status: CANCELLED | OUTPATIENT
Start: 2023-07-14

## 2023-07-17 ENCOUNTER — HOSPITAL ENCOUNTER (OUTPATIENT)
Age: 68
Setting detail: OUTPATIENT SURGERY
Discharge: HOME | End: 2023-07-17
Attending: UROLOGY | Admitting: UROLOGY
Payer: MEDICAID

## 2023-07-17 ENCOUNTER — ANESTHESIA (OUTPATIENT)
Dept: OPERATING ROOM | Age: 68
End: 2023-07-17
Payer: MEDICAID

## 2023-07-17 ENCOUNTER — HOSPITAL ENCOUNTER (OUTPATIENT)
Dept: GENERAL RADIOLOGY | Age: 68
Discharge: HOME | End: 2023-07-19
Attending: UROLOGY
Payer: MEDICAID

## 2023-07-17 ENCOUNTER — ANESTHESIA EVENT (OUTPATIENT)
Dept: OPERATING ROOM | Age: 68
End: 2023-07-17
Payer: MEDICAID

## 2023-07-17 VITALS
BODY MASS INDEX: 35.36 KG/M2 | OXYGEN SATURATION: 96 % | HEIGHT: 66 IN | SYSTOLIC BLOOD PRESSURE: 147 MMHG | DIASTOLIC BLOOD PRESSURE: 73 MMHG | RESPIRATION RATE: 16 BRPM | WEIGHT: 220 LBS | TEMPERATURE: 97.6 F | HEART RATE: 68 BPM

## 2023-07-17 DIAGNOSIS — R52 PAIN: ICD-10-CM

## 2023-07-17 DIAGNOSIS — G89.18 POST-OP PAIN: Primary | ICD-10-CM

## 2023-07-17 PROCEDURE — 2720000010 HC SURG SUPPLY STERILE: Performed by: UROLOGY

## 2023-07-17 PROCEDURE — 74420 UROGRAPHY RTRGR +-KUB: CPT

## 2023-07-17 PROCEDURE — C1894 INTRO/SHEATH, NON-LASER: HCPCS | Performed by: UROLOGY

## 2023-07-17 PROCEDURE — 6360000002 HC RX W HCPCS

## 2023-07-17 PROCEDURE — 2580000003 HC RX 258

## 2023-07-17 PROCEDURE — C2617 STENT, NON-COR, TEM W/O DEL: HCPCS | Performed by: UROLOGY

## 2023-07-17 PROCEDURE — 3700000000 HC ANESTHESIA ATTENDED CARE: Performed by: UROLOGY

## 2023-07-17 PROCEDURE — 3700000001 HC ADD 15 MINUTES (ANESTHESIA): Performed by: UROLOGY

## 2023-07-17 PROCEDURE — 6360000002 HC RX W HCPCS: Performed by: NURSE PRACTITIONER

## 2023-07-17 PROCEDURE — 7100000011 HC PHASE II RECOVERY - ADDTL 15 MIN: Performed by: UROLOGY

## 2023-07-17 PROCEDURE — 3600000013 HC SURGERY LEVEL 3 ADDTL 15MIN: Performed by: UROLOGY

## 2023-07-17 PROCEDURE — C1769 GUIDE WIRE: HCPCS | Performed by: UROLOGY

## 2023-07-17 PROCEDURE — 7100000010 HC PHASE II RECOVERY - FIRST 15 MIN: Performed by: UROLOGY

## 2023-07-17 PROCEDURE — C1758 CATHETER, URETERAL: HCPCS | Performed by: UROLOGY

## 2023-07-17 PROCEDURE — 2580000003 HC RX 258: Performed by: NURSE PRACTITIONER

## 2023-07-17 PROCEDURE — 6360000004 HC RX CONTRAST MEDICATION: Performed by: UROLOGY

## 2023-07-17 PROCEDURE — 3600000003 HC SURGERY LEVEL 3 BASE: Performed by: UROLOGY

## 2023-07-17 PROCEDURE — 2709999900 HC NON-CHARGEABLE SUPPLY: Performed by: UROLOGY

## 2023-07-17 DEVICE — URETERAL STENT
Type: IMPLANTABLE DEVICE | Site: URETER | Status: FUNCTIONAL
Brand: PERCUFLEX™

## 2023-07-17 RX ORDER — SODIUM CHLORIDE 9 MG/ML
INJECTION, SOLUTION INTRAVENOUS PRN
Status: DISCONTINUED | OUTPATIENT
Start: 2023-07-17 | End: 2023-07-17 | Stop reason: HOSPADM

## 2023-07-17 RX ORDER — MIDAZOLAM HYDROCHLORIDE 1 MG/ML
INJECTION INTRAMUSCULAR; INTRAVENOUS PRN
Status: DISCONTINUED | OUTPATIENT
Start: 2023-07-17 | End: 2023-07-17 | Stop reason: SDUPTHER

## 2023-07-17 RX ORDER — SODIUM CHLORIDE 9 MG/ML
INJECTION, SOLUTION INTRAVENOUS CONTINUOUS
Status: DISCONTINUED | OUTPATIENT
Start: 2023-07-17 | End: 2023-07-17 | Stop reason: HOSPADM

## 2023-07-17 RX ORDER — CIPROFLOXACIN 500 MG/1
500 TABLET, FILM COATED ORAL 2 TIMES DAILY
Qty: 10 TABLET | Refills: 0 | Status: SHIPPED | OUTPATIENT
Start: 2023-07-17 | End: 2023-07-22

## 2023-07-17 RX ORDER — FENTANYL CITRATE 50 UG/ML
INJECTION, SOLUTION INTRAMUSCULAR; INTRAVENOUS PRN
Status: DISCONTINUED | OUTPATIENT
Start: 2023-07-17 | End: 2023-07-17 | Stop reason: SDUPTHER

## 2023-07-17 RX ORDER — DOCUSATE SODIUM 100 MG/1
CAPSULE, LIQUID FILLED ORAL
Qty: 56 CAPSULE | Refills: 5 | Status: SHIPPED | OUTPATIENT
Start: 2023-07-17

## 2023-07-17 RX ORDER — PROPOFOL 10 MG/ML
INJECTION, EMULSION INTRAVENOUS CONTINUOUS PRN
Status: DISCONTINUED | OUTPATIENT
Start: 2023-07-17 | End: 2023-07-17 | Stop reason: SDUPTHER

## 2023-07-17 RX ORDER — OXYCODONE HYDROCHLORIDE AND ACETAMINOPHEN 5; 325 MG/1; MG/1
1 TABLET ORAL EVERY 4 HOURS PRN
Qty: 10 TABLET | Refills: 0 | Status: SHIPPED | OUTPATIENT
Start: 2023-07-17 | End: 2023-07-24

## 2023-07-17 RX ORDER — LORATADINE 10 MG/1
TABLET ORAL
Qty: 28 TABLET | Refills: 5 | Status: SHIPPED | OUTPATIENT
Start: 2023-07-17

## 2023-07-17 RX ORDER — SODIUM CHLORIDE 9 MG/ML
INJECTION, SOLUTION INTRAVENOUS CONTINUOUS PRN
Status: DISCONTINUED | OUTPATIENT
Start: 2023-07-17 | End: 2023-07-17 | Stop reason: SDUPTHER

## 2023-07-17 RX ORDER — SODIUM CHLORIDE 0.9 % (FLUSH) 0.9 %
5-40 SYRINGE (ML) INJECTION PRN
Status: DISCONTINUED | OUTPATIENT
Start: 2023-07-17 | End: 2023-07-17 | Stop reason: HOSPADM

## 2023-07-17 RX ORDER — SODIUM CHLORIDE 0.9 % (FLUSH) 0.9 %
5-40 SYRINGE (ML) INJECTION EVERY 12 HOURS SCHEDULED
Status: DISCONTINUED | OUTPATIENT
Start: 2023-07-17 | End: 2023-07-17 | Stop reason: HOSPADM

## 2023-07-17 RX ADMIN — SODIUM CHLORIDE: 9 INJECTION, SOLUTION INTRAVENOUS at 12:55

## 2023-07-17 RX ADMIN — WATER 2000 MG: 1 INJECTION INTRAMUSCULAR; INTRAVENOUS; SUBCUTANEOUS at 13:04

## 2023-07-17 RX ADMIN — PROPOFOL 115 MCG/KG/MIN: 10 INJECTION, EMULSION INTRAVENOUS at 13:02

## 2023-07-17 RX ADMIN — FENTANYL CITRATE 25 MCG: 50 INJECTION, SOLUTION INTRAMUSCULAR; INTRAVENOUS at 13:02

## 2023-07-17 RX ADMIN — MIDAZOLAM 1 MG: 1 INJECTION INTRAMUSCULAR; INTRAVENOUS at 12:55

## 2023-07-17 ASSESSMENT — PAIN - FUNCTIONAL ASSESSMENT
PAIN_FUNCTIONAL_ASSESSMENT: NONE - DENIES PAIN
PAIN_FUNCTIONAL_ASSESSMENT: ACTIVITIES ARE NOT PREVENTED
PAIN_FUNCTIONAL_ASSESSMENT: ACTIVITIES ARE NOT PREVENTED

## 2023-07-17 ASSESSMENT — LIFESTYLE VARIABLES: SMOKING_STATUS: 0

## 2023-07-17 ASSESSMENT — PAIN SCALES - GENERAL
PAINLEVEL_OUTOF10: 0
PAINLEVEL_OUTOF10: 0

## 2023-07-17 NOTE — ANESTHESIA POSTPROCEDURE EVALUATION
Department of Anesthesiology  Postprocedure Note    Patient: Lexie Smoker  MRN: [id number]  YOB: 1955  Date of evaluation: 7/17/2023      Procedure Summary     Date: 07/17/23 Room / Location: [address]    Anesthesia Start: 1255 Anesthesia Stop: 1343    Procedure: CYSTOSCOPY RETROGRADE PYELOGRAM LEFT URETEROSCOPY  WITH LASER LITHOTRIPSY LEFT J STENT INSERTION (Left: Ureter) Diagnosis:       Calculus of kidney      (Calculus of kidney [N20.0])    Surgeons: Malaika Stacy DO Responsible Provider: Susanne Troy MD    Anesthesia Type: MAC ASA Status: 3          Anesthesia Type: MAC    Tenzin Phase I: Tenzin Score: 10    Tenzin Phase II: Tenzin Score: 10      Anesthesia Post Evaluation    Patient location during evaluation: PACU  Patient participation: complete - patient participated  Level of consciousness: awake  Airway patency: patent  Nausea & Vomiting: no nausea and no vomiting  Complications: no  Cardiovascular status: hemodynamically stable  Respiratory status: acceptable  Hydration status: stable

## 2023-07-17 NOTE — H&P
7/17/2023 10:02 AM  Service: Urology  Group: [facility] (Lee/Sowmya/Reg)    Garham Jimenez  [id number]     Chief Complaint: left renal calc post left ESWL with left stent     History of Present Illness: The patient is a 70 y.o. female patient who presents with the above  The risk of the surgery was discussed at length. The risk of the anesthesia and loss of airway. Cardiovascular risks, myocardial infraction, cerebral vascular accident, pulmonary embolism, deep vein thrombosis. Injury risk, bowel injury, bladder injury, ureteral injury, blood vessel injury, delayed presentation of the injury (ie scar tissue or stricture formation). These can lead to bleeding, requiring transfusion. He risk of infection with can lead to sepsis. The risk of death. The may be other issues that present that are not listed above which can occur. The patient understood these risks, they understood the benefits, they understood the alternatives and fully consent to proceed. The needs for a second procedure may also be required.    We discussed placing to sleep  Looking in the bladder shooting x-rays if needed  Seeing the stone  Getting to the stone  Placement of laser on the stone  Sometimes we use a stone basket  90% of the time being able to get the stone managed  10% we see the stone but cannot get to the stone  We place a stent in those situations and represent for another procedure in 2-3 weeks  If we see infected urine, we place a stent an come back for a second procedure once treated  1/1000 the stone is so impacted we have to abort the surgery and place a PNT  Then additional procedures will need to be preformed     Past Medical History:   Diagnosis Date    Arthritis     Asthma     follows with PCP    COPD (chronic obstructive pulmonary disease) ([address])     Follows with PCP    Eczema     Fall 05/26/2011    Gout     Hypertension     Kidney stone     Multiple sclerosis ([address])     Prolonged emergence from general

## 2023-07-17 NOTE — DISCHARGE INSTRUCTIONS
Follow up with [facility] (Dr. Carina Brooks) in 2- 4 [phone]. A ureteral stent (also know as a double J stent) was inserted during your recent procedure. Unlike a heart \"stent\" which is metal, short, and permanent, this ureteral stent plastic, and temporary. It spans from your kidney, down the ureter, and into your bladder. This will need to be removed either via a procedure in the office or a string in the next week or two. Sometime these stents are left in for long term drainage, but they need to changed every few months. The instructions will be given to you with regards to removal by Dr. Howard/Sowmya/Reg    IMPORTANT - This ureteral stent will likely cause some frequency with urination, urgency with urination, back/flank pain with urination, and/or blood in the urine. This is very normal.  Taking the pain medications and/or anti-inflammatories will help to manage this discomfort if present. If you have any questions or concerns you can contact Dr. Howard/Sowmya/Reg's office at [phone].

## 2023-07-17 NOTE — BRIEF OP NOTE
Brief Postoperative Note      Patient: Jaylin Bauman  YOB: 1955  MRN: [id number]    Date of Procedure: 7/17/2023    Pre-Op Diagnosis Codes:     * Calculus of kidney [N20.0]    Post-Op Diagnosis: Same       Procedure(s):  CYSTOSCOPY RETROGRADE PYELOGRAM URETEROSCOPY J STENT  LASER LITHOTRIPSY LEFT    Surgeon(s):  Wayne Howard DO    Assistant:  * No surgical staff found *    Anesthesia: Monitor Anesthesia Care    Estimated Blood Loss (mL): Minimal    Complications: None    Specimens:   * No specimens in log *    Implants:  * No implants in log *      Drains:   [REMOVED] Ureteral Drain/Stent 07/06/23 Left Ureter (Removed)       [REMOVED] Urinary Catheter 07/06/23 2 Way; [place] (Removed)   $ Urethral catheter insertion Inserted for procedure 07/06/23 1358   Catheter Indications Perioperative use for selected surgical procedures 07/06/23 1358   Urine Color Pink 07/06/23 1545   Urine Appearance Clear 07/06/23 1545   Collection Container Standard 07/06/23 1358   Catheter Best Practices  Drainage tube clipped to bed 07/06/23 1358   Status Draining 07/06/23 1545   Output (mL) 350 mL 07/06/23 1600       Findings: see operative report       Electronically signed by Wayne Howard DO on 7/17/2023 at 10:04 AM

## 2023-07-27 ENCOUNTER — TELEPHONE (OUTPATIENT)
Dept: PRIMARY CARE CLINIC | Age: 68
End: 2023-07-27

## 2023-07-27 NOTE — TELEPHONE ENCOUNTER
Purnima Mcguire called. She is asking for a script for Mupirocin. States she keeps getting sores in her nose and they really hurt.  She is asking that you send it to [facility]

## 2023-08-01 PROBLEM — U07.1 COVID-19: Status: RESOLVED | Noted: 2022-12-18 | Resolved: 2023-08-01

## 2023-08-01 RX ORDER — CLOTRIMAZOLE 1 %
CREAM (GRAM) TOPICAL
COMMUNITY
Start: 2023-07-24

## 2023-08-01 NOTE — PROGRESS NOTES
8/2/2023     Home visit medically necessary in lieu of an office visit due to: wheelchair bound, difficult to get out. HPI:  Patient had urologic procedure done in July. She was told that stone and infection are gone. A stent was put in L side and it came by itself last week. She called and told urologist that it came out. She has f/u appt in January 2024. She continues to have sinus drainage that bothers her. She is using Atrovent BID. She has been taking guaifenesin, Flonase and antihistamine eye drops. She reports that diarrhea resolved after she finished antibiotic. She is drinking Boost supplement daily. She continues to leg swelling on Lasix 40 mg daily as needed. She also wears the zippered compression hose. She tries to elevate her legs more. She has an aide 5 days per week. REVIEW OF SYSTEMS: General: Weight stable, generally healthy, no change in strength or exercise tolerance. Heart: No palpitations, no syncope, no orthopnea. EDEMA OF LEGS AT TIMES. Abdomen: Chronic constipation. No change in appetite, no dysphagia, no abdominal pains, no bowel habit changes, no emesis, no melena. : No urinary urgency, no dysuria, no change in nature of urine. Neurologic:   Unable to walk without holding on to something. In w/c most of the time. No tremor, no seizures, no changes in mentation. All other systems negative. PAST MEDICAL HISTORY: (Major events, hospitalizations, surgeries): Pneumonia (2010), 1998 Vag hyst, 1978 naina, append, freq epidural inclusion cysts tx'd with docycycline. MS dx'd Mar 8, 1991. IV corticosteroids 1991-92. Has never been on MS meds. 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 Known allergies: NKDA. Ongoing medical problems: Multiple sclerosis, Asthma, HTN, hypoglycemia, scoliosis, DDD with sciatic, arthritis, osteoporosis, chronic LBP. Preventative: COVID vac - 3/22/21, 5/67659/00 (Refuses booster),  Pneumovax 23 - 11/2009. Prevnar - 10/2019.  Flu

## 2023-08-02 ENCOUNTER — OFFICE VISIT (OUTPATIENT)
Dept: PRIMARY CARE CLINIC | Age: 68
End: 2023-08-02
Payer: MEDICAID

## 2023-08-02 VITALS
SYSTOLIC BLOOD PRESSURE: 110 MMHG | DIASTOLIC BLOOD PRESSURE: 79 MMHG | HEART RATE: 65 BPM | OXYGEN SATURATION: 98 % | RESPIRATION RATE: 18 BRPM

## 2023-08-02 DIAGNOSIS — R60.0 EDEMA OF BOTH LEGS: ICD-10-CM

## 2023-08-02 DIAGNOSIS — R26.2 UNABLE TO WALK: ICD-10-CM

## 2023-08-02 DIAGNOSIS — G35 MULTIPLE SCLEROSIS (HCC): Primary | ICD-10-CM

## 2023-08-02 DIAGNOSIS — G62.9 PERIPHERAL POLYNEUROPATHY: ICD-10-CM

## 2023-08-02 PROCEDURE — 1123F ACP DISCUSS/DSCN MKR DOCD: CPT | Performed by: FAMILY MEDICINE

## 2023-08-02 PROCEDURE — 99349 HOME/RES VST EST MOD MDM 40: CPT | Performed by: FAMILY MEDICINE

## 2023-08-02 PROCEDURE — 3074F SYST BP LT 130 MM HG: CPT | Performed by: FAMILY MEDICINE

## 2023-08-02 PROCEDURE — 3078F DIAST BP <80 MM HG: CPT | Performed by: FAMILY MEDICINE

## 2023-08-11 RX ORDER — IPRATROPIUM BROMIDE 42 UG/1
2 SPRAY, METERED NASAL DAILY
Qty: 15 ML | Refills: 11 | Status: SHIPPED | OUTPATIENT
Start: 2023-08-11

## 2023-08-21 ENCOUNTER — TELEPHONE (OUTPATIENT)
Dept: PRIMARY CARE CLINIC | Age: 68
End: 2023-08-21

## 2023-08-21 NOTE — TELEPHONE ENCOUNTER
Ardia Robertson called. She states she has been running a low grade fever all weekend. She has a sore throat and pain in her R ear. She states her glands are swollen also. She is asking if you can order something for her.

## 2023-08-21 NOTE — TELEPHONE ENCOUNTER
Spoke to [facility]. She has not been using Cold-eeze. I recommended she try that. She is taking Claritin, Mucinex D, and Tylenol.

## 2023-08-23 ENCOUNTER — TELEPHONE (OUTPATIENT)
Dept: PRIMARY CARE CLINIC | Age: 68
End: 2023-08-23

## 2023-08-23 RX ORDER — AZITHROMYCIN 250 MG/1
250 TABLET, FILM COATED ORAL SEE ADMIN INSTRUCTIONS
Qty: 6 TABLET | Refills: 0 | Status: SHIPPED | OUTPATIENT
Start: 2023-08-23 | End: 2023-08-28

## 2023-08-23 NOTE — TELEPHONE ENCOUNTER
Judit Gutiérrez called. She is feeling worse, headache, nose running, but sinus congestion, and still running a fever on and off. She sounds miserable.

## 2023-08-30 ENCOUNTER — OFFICE VISIT (OUTPATIENT)
Dept: PRIMARY CARE CLINIC | Age: 68
End: 2023-08-30
Payer: MEDICAID

## 2023-08-30 VITALS
RESPIRATION RATE: 20 BRPM | HEART RATE: 66 BPM | SYSTOLIC BLOOD PRESSURE: 109 MMHG | OXYGEN SATURATION: 97 % | DIASTOLIC BLOOD PRESSURE: 81 MMHG | HEIGHT: 66 IN | TEMPERATURE: 97 F | BODY MASS INDEX: 35.51 KG/M2

## 2023-08-30 DIAGNOSIS — R26.2 UNABLE TO WALK: ICD-10-CM

## 2023-08-30 DIAGNOSIS — J31.0 CHRONIC RHINITIS: ICD-10-CM

## 2023-08-30 DIAGNOSIS — I10 PRIMARY HYPERTENSION: Primary | ICD-10-CM

## 2023-08-30 DIAGNOSIS — G62.9 PERIPHERAL POLYNEUROPATHY: ICD-10-CM

## 2023-08-30 DIAGNOSIS — G35 MULTIPLE SCLEROSIS (HCC): ICD-10-CM

## 2023-08-30 PROBLEM — J98.8 CONGESTION OF UPPER AIRWAY: Status: RESOLVED | Noted: 2021-06-04 | Resolved: 2023-08-30

## 2023-08-30 PROCEDURE — 99349 HOME/RES VST EST MOD MDM 40: CPT | Performed by: FAMILY MEDICINE

## 2023-08-30 PROCEDURE — 3079F DIAST BP 80-89 MM HG: CPT | Performed by: FAMILY MEDICINE

## 2023-08-30 PROCEDURE — 3074F SYST BP LT 130 MM HG: CPT | Performed by: FAMILY MEDICINE

## 2023-08-30 PROCEDURE — 1123F ACP DISCUSS/DSCN MKR DOCD: CPT | Performed by: FAMILY MEDICINE

## 2023-08-30 NOTE — PROGRESS NOTES
guaiFENesin (ROBAFEN MUCUS/CHEST CONGESTION) 100 MG/5ML liquid TAKE 10 MLS BY MOUTH 3 TIMES DAILY AS NEEDED FOR CONGESTION 240 mL 11    losartan (COZAAR) 25 MG tablet Take 1 tablet by mouth daily (Patient taking differently: Take 1 tablet by mouth nightly) 90 tablet 3    metoprolol tartrate (LOPRESSOR) 25 MG tablet Take 1 tablet by mouth 2 times daily 180 tablet 3    dextromethorphan-guaiFENesin (MUCINEX DM)  MG per extended release tablet Take 2 tablets by mouth every 12 hours as needed for Congestion 120 tablet 5    budesonide-formoterol (SYMBICORT) 160-4.5 MCG/ACT AERO Inhale 2 puffs into the lungs 2 times daily . Rinse mouth and spit after each use. 10.2 g 11    albuterol sulfate HFA (VENTOLIN HFA) 108 (90 Base) MCG/ACT inhaler INHALE TWO PUFFS BY MOUTH EVERY 4 TO 6 HOURS AS NEEDED FOR WHEEZING 18 g 11    Probiotic Product (PROBIOTIC DAILY) CAPS Take 1 capsule by mouth daily      benzoyl peroxide 10 % external wash Wash affected areas 2-3 times weekly. 227 g 5    albuterol (PROVENTIL) (2.5 MG/3ML) 0.083% nebulizer solution Take 3 mLs by nebulization every 4 hours as needed for Shortness of Breath 180 each 11     No current facility-administered medications for this visit. Return in about 1 month (around 9/30/2023). An electronic signature was used to authenticate this note.     --Bronson Acosta MD on 8/30/2023 at 11:40 AM

## 2023-09-26 NOTE — PROGRESS NOTES
mouth daily      polyethylene glycol (GLYCOLAX) 17 g packet Take 25 g by mouth daily as needed for Constipation 527 g 11    guaiFENesin (ROBAFEN MUCUS/CHEST CONGESTION) 100 MG/5ML liquid TAKE 10 MLS BY MOUTH 3 TIMES DAILY AS NEEDED FOR CONGESTION 240 mL 11    losartan (COZAAR) 25 MG tablet Take 1 tablet by mouth daily (Patient taking differently: Take 1 tablet by mouth nightly) 90 tablet 3    metoprolol tartrate (LOPRESSOR) 25 MG tablet Take 1 tablet by mouth 2 times daily 180 tablet 3    dextromethorphan-guaiFENesin (MUCINEX DM)  MG per extended release tablet Take 2 tablets by mouth every 12 hours as needed for Congestion 120 tablet 5    budesonide-formoterol (SYMBICORT) 160-4.5 MCG/ACT AERO Inhale 2 puffs into the lungs 2 times daily . Rinse mouth and spit after each use. 10.2 g 11    albuterol sulfate HFA (VENTOLIN HFA) 108 (90 Base) MCG/ACT inhaler INHALE TWO PUFFS BY MOUTH EVERY 4 TO 6 HOURS AS NEEDED FOR WHEEZING 18 g 11    Probiotic Product (PROBIOTIC DAILY) CAPS Take 1 capsule by mouth daily      benzoyl peroxide 10 % external wash Wash affected areas 2-3 times weekly. 227 g 5    albuterol (PROVENTIL) (2.5 MG/3ML) 0.083% nebulizer solution Take 3 mLs by nebulization every 4 hours as needed for Shortness of Breath 180 each 11     No current facility-administered medications for this visit. Return in about 1 month (around 10/27/2023). An electronic signature was used to authenticate this note.     --Reggie Babb MD on 9/27/2023 at 1:54 PM

## 2023-09-27 ENCOUNTER — OFFICE VISIT (OUTPATIENT)
Dept: PRIMARY CARE CLINIC | Age: 68
End: 2023-09-27
Payer: MEDICAID

## 2023-09-27 ENCOUNTER — TELEPHONE (OUTPATIENT)
Dept: PRIMARY CARE CLINIC | Age: 68
End: 2023-09-27

## 2023-09-27 VITALS
SYSTOLIC BLOOD PRESSURE: 111 MMHG | BODY MASS INDEX: 35.36 KG/M2 | TEMPERATURE: 97.7 F | OXYGEN SATURATION: 97 % | DIASTOLIC BLOOD PRESSURE: 70 MMHG | HEART RATE: 69 BPM | RESPIRATION RATE: 18 BRPM | WEIGHT: 220 LBS | HEIGHT: 66 IN

## 2023-09-27 DIAGNOSIS — J31.0 CHRONIC RHINITIS: ICD-10-CM

## 2023-09-27 DIAGNOSIS — R26.2 UNABLE TO WALK: ICD-10-CM

## 2023-09-27 DIAGNOSIS — R60.0 EDEMA OF BOTH LEGS: ICD-10-CM

## 2023-09-27 DIAGNOSIS — G35 MULTIPLE SCLEROSIS (HCC): Primary | ICD-10-CM

## 2023-09-27 DIAGNOSIS — I10 PRIMARY HYPERTENSION: ICD-10-CM

## 2023-09-27 PROCEDURE — 3078F DIAST BP <80 MM HG: CPT | Performed by: FAMILY MEDICINE

## 2023-09-27 PROCEDURE — 1123F ACP DISCUSS/DSCN MKR DOCD: CPT | Performed by: FAMILY MEDICINE

## 2023-09-27 PROCEDURE — 3074F SYST BP LT 130 MM HG: CPT | Performed by: FAMILY MEDICINE

## 2023-09-27 PROCEDURE — 99349 HOME/RES VST EST MOD MDM 40: CPT | Performed by: FAMILY MEDICINE

## 2023-09-27 RX ORDER — SENNA AND DOCUSATE SODIUM 50; 8.6 MG/1; MG/1
3 TABLET, FILM COATED ORAL DAILY PRN
COMMUNITY

## 2023-09-27 NOTE — TELEPHONE ENCOUNTER
----- Message from Antoinette Morgan RN sent at 9/27/2023  1:45 PM EDT -----  Ordering lab work, she will go to [address] [place] [address]

## 2023-10-02 ENCOUNTER — TELEPHONE (OUTPATIENT)
Dept: PRIMARY CARE CLINIC | Age: 68
End: 2023-10-02

## 2023-10-02 NOTE — TELEPHONE ENCOUNTER
Elayne ramsay. She spilled hot liquid on her R thigh/leg yesterday. It's now blistered. She wants a Rx for Silvadene cream sent to [facility].

## 2023-10-10 ENCOUNTER — TELEPHONE (OUTPATIENT)
Dept: PRIMARY CARE CLINIC | Age: 68
End: 2023-10-10

## 2023-10-10 RX ORDER — DOXYCYCLINE HYCLATE 100 MG
100 TABLET ORAL 2 TIMES DAILY
Qty: 20 TABLET | Refills: 0 | Status: SHIPPED | OUTPATIENT
Start: 2023-10-10 | End: 2023-10-20

## 2023-10-10 NOTE — TELEPHONE ENCOUNTER
[facility] called. She states she has an area on her forearm that looks infected. She doesn't know if it's an ingrown hair or a boil, but it is swollen, red, and hot to touch.  She put ATB cream on it and it made it worse

## 2023-10-11 ENCOUNTER — HOSPITAL ENCOUNTER (OUTPATIENT)
Age: 68
Discharge: HOME | End: 2023-10-11
Payer: MEDICAID

## 2023-10-11 DIAGNOSIS — I10 PRIMARY HYPERTENSION: ICD-10-CM

## 2023-10-11 LAB
ANION GAP SERPL CALCULATED.3IONS-SCNC: 11 MMOL/L (ref 7–16)
BASOPHILS # BLD: 0.07 K/UL (ref 0–0.2)
BASOPHILS NFR BLD: 1 % (ref 0–2)
BUN SERPL-MCNC: 22 MG/DL (ref 6–23)
CALCIUM SERPL-MCNC: 9.5 MG/DL (ref 8.6–10.2)
CHLORIDE SERPL-SCNC: 102 MMOL/L (ref 98–107)
CO2 SERPL-SCNC: 26 MMOL/L (ref 22–29)
CREAT SERPL-MCNC: 0.9 MG/DL (ref 0.5–1)
EOSINOPHIL # BLD: 0.51 K/UL (ref 0.05–0.5)
EOSINOPHILS RELATIVE PERCENT: 5 % (ref 0–6)
ERYTHROCYTE [DISTWIDTH] IN BLOOD BY AUTOMATED COUNT: 13.7 % (ref 11.5–15)
GFR SERPL CREATININE-BSD FRML MDRD: >60 ML/MIN/1.73M2
GLUCOSE SERPL-MCNC: 94 MG/DL (ref 74–99)
HCT VFR BLD AUTO: 44.8 % (ref 34–48)
HGB BLD-MCNC: 14.8 G/DL (ref 11.5–15.5)
IMM GRANULOCYTES # BLD AUTO: 0.13 K/UL (ref 0–0.58)
IMM GRANULOCYTES NFR BLD: 1 % (ref 0–5)
LYMPHOCYTES NFR BLD: 2.53 K/UL (ref 1.5–4)
LYMPHOCYTES RELATIVE PERCENT: 24 % (ref 20–42)
MCH RBC QN AUTO: 33.7 PG (ref 26–35)
MCHC RBC AUTO-ENTMCNC: 33 G/DL (ref 32–34.5)
MCV RBC AUTO: 102.1 FL (ref 80–99.9)
MONOCYTES NFR BLD: 1.28 K/UL (ref 0.1–0.95)
MONOCYTES NFR BLD: 12 % (ref 2–12)
NEUTROPHILS NFR BLD: 57 % (ref 43–80)
NEUTS SEG NFR BLD: 5.88 K/UL (ref 1.8–7.3)
PLATELET # BLD AUTO: 291 K/UL (ref 130–450)
PMV BLD AUTO: 10.7 FL (ref 7–12)
POTASSIUM SERPL-SCNC: 3.8 MMOL/L (ref 3.5–5)
RBC # BLD AUTO: 4.39 M/UL (ref 3.5–5.5)
SODIUM SERPL-SCNC: 139 MMOL/L (ref 132–146)
WBC OTHER # BLD: 10.4 K/UL (ref 4.5–11.5)

## 2023-10-11 PROCEDURE — 36415 COLL VENOUS BLD VENIPUNCTURE: CPT

## 2023-10-11 PROCEDURE — 85025 COMPLETE CBC W/AUTO DIFF WBC: CPT

## 2023-10-11 PROCEDURE — 80048 BASIC METABOLIC PNL TOTAL CA: CPT

## 2023-10-12 RX ORDER — FLUTICASONE PROPIONATE 50 MCG
1 SPRAY, SUSPENSION (ML) NASAL DAILY
Qty: 16 G | Refills: 11 | Status: SHIPPED | OUTPATIENT
Start: 2023-10-12

## 2023-10-16 RX ORDER — CLOTRIMAZOLE 1 %
CREAM (GRAM) TOPICAL
Qty: 45 G | Refills: 5 | Status: SHIPPED | OUTPATIENT
Start: 2023-10-16

## 2023-10-20 ENCOUNTER — TELEPHONE (OUTPATIENT)
Dept: PRIMARY CARE CLINIC | Age: 68
End: 2023-10-20

## 2023-10-20 RX ORDER — ALBUTEROL SULFATE 90 UG/1
2 AEROSOL, METERED RESPIRATORY (INHALATION) EVERY 4 HOURS PRN
Qty: 54 G | Refills: 2 | Status: SHIPPED | OUTPATIENT
Start: 2023-10-20

## 2023-10-20 RX ORDER — GUAIFENESIN AND DEXTROMETHORPHAN HYDROBROMIDE 600; 30 MG/1; MG/1
2 TABLET, EXTENDED RELEASE ORAL 2 TIMES DAILY PRN
Qty: 30 TABLET | Refills: 5 | Status: SHIPPED | OUTPATIENT
Start: 2023-10-20

## 2023-10-20 RX ORDER — ALBUTEROL SULFATE 2.5 MG/3ML
2.5 SOLUTION RESPIRATORY (INHALATION) EVERY 4 HOURS PRN
Qty: 180 EACH | Refills: 11 | Status: SHIPPED | OUTPATIENT
Start: 2023-10-20

## 2023-10-20 RX ORDER — METHYLPREDNISOLONE 4 MG/1
TABLET ORAL
Qty: 1 KIT | Refills: 0 | Status: SHIPPED | OUTPATIENT
Start: 2023-10-20 | End: 2023-10-26

## 2023-10-20 NOTE — TELEPHONE ENCOUNTER
Bud from [facility]. She feels awful: temp 99.5, cough, aches, and now diarrhea. She was exposed to [facility], but doesn't think she has it. She 'needs help'.

## 2023-10-20 NOTE — TELEPHONE ENCOUNTER
Today is last day of her 10 course of doxycycline. I am sending in eRx for Medrol dose pack and RF for albuterol inhaler 2 puffs every 4 hours prn and albuterol for nebulizer if she would rather use that. She should also take Mucinex  mg 2 tabs BID prn cough. Is she using Afrin, 2 sprays in each nostril BID? She should do that too. Lots of fluids, Tylenol, bedrest. If she gets any worse, she should to ED for evaluation.

## 2023-11-03 ENCOUNTER — OFFICE VISIT (OUTPATIENT)
Dept: PRIMARY CARE CLINIC | Age: 68
End: 2023-11-03
Payer: MEDICAID

## 2023-11-03 VITALS
RESPIRATION RATE: 18 BRPM | DIASTOLIC BLOOD PRESSURE: 83 MMHG | HEART RATE: 73 BPM | WEIGHT: 220 LBS | TEMPERATURE: 97.9 F | OXYGEN SATURATION: 97 % | HEIGHT: 66 IN | SYSTOLIC BLOOD PRESSURE: 113 MMHG | BODY MASS INDEX: 35.36 KG/M2

## 2023-11-03 DIAGNOSIS — T24.211A PARTIAL THICKNESS BURN OF RIGHT THIGH, INITIAL ENCOUNTER: ICD-10-CM

## 2023-11-03 DIAGNOSIS — I10 PRIMARY HYPERTENSION: ICD-10-CM

## 2023-11-03 DIAGNOSIS — Z28.21 REFUSED INFLUENZA VACCINE: ICD-10-CM

## 2023-11-03 DIAGNOSIS — J31.0 CHRONIC RHINITIS: ICD-10-CM

## 2023-11-03 DIAGNOSIS — R26.2 UNABLE TO WALK: ICD-10-CM

## 2023-11-03 DIAGNOSIS — G35 MULTIPLE SCLEROSIS (HCC): Primary | ICD-10-CM

## 2023-11-03 DIAGNOSIS — R60.0 EDEMA OF BOTH LEGS: ICD-10-CM

## 2023-11-03 PROCEDURE — 1123F ACP DISCUSS/DSCN MKR DOCD: CPT | Performed by: PHYSICIAN ASSISTANT

## 2023-11-03 PROCEDURE — 99349 HOME/RES VST EST MOD MDM 40: CPT | Performed by: PHYSICIAN ASSISTANT

## 2023-11-03 PROCEDURE — 3079F DIAST BP 80-89 MM HG: CPT | Performed by: PHYSICIAN ASSISTANT

## 2023-11-03 PROCEDURE — 3074F SYST BP LT 130 MM HG: CPT | Performed by: PHYSICIAN ASSISTANT

## 2023-11-03 NOTE — PROGRESS NOTES
11/3/2023     Home visit medically necessary in lieu of an office visit due to: wheelchair bound, difficult to get out. HPI:  Francis Morales states she is feeling better. Was having bronchitis like symptoms. Took Medrol and used an Albuterol inhaler which helped. . Still has chronic sinus drainage - still would like to see ENT despite taking Atrovent and other medications. Was also taking Doxycycline for a boil on the right forearm which is mostly resolved. She states she had some diarrhea lately but has improved since she finished Doxy. Francis Morales says a blistered burn to the right anterior thigh has almost healed. - was cooking and hot water splashed onto her leg. Was using Bactroban to the site. She has not had any urinary problems. She is moving her bowels well on Senna-docusate. She is drinking Boost supplement daily. She takes Lasix 40 mg daily and tries to elevate her legs but still has had some increased leg swelling. She has not had an aide help her put on the zippered compression hose. She applied to another [address] [place] [phone] agency to have aide. Labs reviewed. She refused flu shot today - does not like how it makes her feel. REVIEW OF SYSTEMS: General: Weight stable, generally healthy, no change in strength or exercise tolerance. Heart: No palpitations, no syncope, no orthopnea. EDEMA OF LEGS AT TIMES. Abdomen: Chronic constipation. No change in appetite, no dysphagia, no abdominal pains, no bowel habit changes, no emesis, no melena. : No urinary urgency, no dysuria, no change in nature of urine. Neurologic:   Unable to walk without holding on to something. In w/c most of the time. No tremor, no seizures, no changes in mentation. All other systems negative. PAST MEDICAL HISTORY: (Major events, hospitalizations, surgeries): Pneumonia (2010), 1998 Vag hyst, 1978 naina, append, freq epidural inclusion cysts tx'd with docycycline. MS dx'd Mar 8, 1991. IV corticosteroids 1991-92.    Has never been on MS

## 2023-11-07 PROBLEM — N39.42 INCONTINENCE WITHOUT SENSORY AWARENESS: Status: ACTIVE | Noted: 2023-11-07

## 2023-11-22 ENCOUNTER — TELEPHONE (OUTPATIENT)
Dept: PRIMARY CARE CLINIC | Age: 68
End: 2023-11-22

## 2023-11-22 RX ORDER — DOXYCYCLINE HYCLATE 100 MG
100 TABLET ORAL 2 TIMES DAILY
Qty: 60 TABLET | Refills: 0 | Status: SHIPPED | OUTPATIENT
Start: 2023-11-22 | End: 2023-12-22

## 2023-11-22 NOTE — TELEPHONE ENCOUNTER
Denzel Dale called.  She has boils again and is asking that you sent a script for an ATB to [facility] Mohs Rapid Report Verbiage: The area of clinically evident tumor was marked with skin marking ink and appropriately hatched.  The initial incision was made following the Mohs approach through the skin.  The specimen was taken to the lab, divided into the necessary number of pieces, chromacoded and processed according to the Mohs protocol.  This was repeated in successive stages until a tumor free defect was achieved.

## 2023-11-27 ENCOUNTER — TELEPHONE (OUTPATIENT)
Dept: PRIMARY CARE CLINIC | Age: 68
End: 2023-11-27

## 2023-11-27 RX ORDER — BUDESONIDE AND FORMOTEROL FUMARATE DIHYDRATE 160; 4.5 UG/1; UG/1
2 AEROSOL RESPIRATORY (INHALATION) 2 TIMES DAILY
Qty: 30.6 G | Refills: 1 | Status: SHIPPED | OUTPATIENT
Start: 2023-11-27

## 2023-11-27 RX ORDER — FLUTICASONE PROPIONATE AND SALMETEROL 250; 50 UG/1; UG/1
1 POWDER RESPIRATORY (INHALATION) 2 TIMES DAILY
Qty: 1 EACH | Refills: 11 | Status: SHIPPED | OUTPATIENT
Start: 2023-11-27

## 2023-11-27 RX ORDER — BUDESONIDE AND FORMOTEROL FUMARATE DIHYDRATE 160; 4.5 UG/1; UG/1
2 AEROSOL RESPIRATORY (INHALATION) 2 TIMES DAILY
Qty: 10.2 G | Refills: 11 | Status: CANCELLED | OUTPATIENT
Start: 2023-11-27

## 2023-11-27 RX ORDER — ALBUTEROL SULFATE 90 UG/1
2 AEROSOL, METERED RESPIRATORY (INHALATION) EVERY 4 HOURS PRN
Qty: 54 G | Refills: 11 | Status: SHIPPED | OUTPATIENT
Start: 2023-11-27

## 2023-11-27 RX ORDER — FLUTICASONE PROPIONATE AND SALMETEROL XINAFOATE 230; 21 UG/1; UG/1
AEROSOL, METERED RESPIRATORY (INHALATION)
Status: CANCELLED | OUTPATIENT
Start: 2023-11-27

## 2023-11-27 NOTE — TELEPHONE ENCOUNTER
Advair requires PA. She had previously been on Symbicort. Do you want me to try to get PA for Advair?

## 2023-11-27 NOTE — TELEPHONE ENCOUNTER
Spoke to the pharmacist at [facility]. He states that Nettie's insurance won't pay for generic Advair or Symbicort, but will pay for brand name of both. They inadvertently filled both.   He is calling [facility] to tell her to keep the Symbicort since that is what she has been on and return the Advair since insurance won't pay for both

## 2023-11-27 NOTE — TELEPHONE ENCOUNTER
I tried to approved Symbicort but it said PA was required for that too. I will send it again. Whatever they approve is fine with me, but it looks like they all require PA.

## 2023-12-04 NOTE — PROGRESS NOTES
12/5/2023     Home visit medically necessary in lieu of an office visit due to: wheelchair bound, difficult to get out. HPI:  Patient states she is congested in sinuses and has cough and runny nose. She is not using her Albuterol inhaler because her breathing is fine. She was taking Doxycycline for a boils but got sick on it so she had to stop. She has had some diarrhea recently. She has not had any dysuria. She continues to have problems with incontinence due to MS. She is drinking Boost supplement daily. She takes Lasix 40 mg daily and tries to elevate her legs but still has had some increased leg swelling. She has not had an aide help her put on the zippered compression hose. She applied to another [facility] agency to have aide. REVIEW OF SYSTEMS: General: Weight stable, generally healthy, no change in strength or exercise tolerance. Heart: No palpitations, no syncope, no orthopnea. EDEMA OF LEGS AT TIMES. Abdomen: Chronic constipation. No change in appetite, no dysphagia, no abdominal pains, no bowel habit changes, no emesis, no melena. : No urinary urgency, no dysuria, no change in nature of urine. Neurologic:   Unable to walk without holding on to something. In w/c most of the time. No tremor, no seizures, no changes in mentation. All other systems negative. PAST MEDICAL HISTORY: (Major events, hospitalizations, surgeries): Pneumonia (2010), 1998 Vag hyst, 1978 naina, append, freq epidural inclusion cysts tx'd with docycycline. MS dx'd Mar 8, 1991. IV corticosteroids 1991-92. Has never been on MS meds. 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 Known allergies: NKDA. Ongoing medical problems: Multiple sclerosis, Asthma, HTN, hypoglycemia, scoliosis, DDD with sciatic, arthritis, osteoporosis, chronic LBP. Preventative: COVID vac - 3/22/21, 2/7199711/52 (Refuses booster),  Pneumovax 23 - 11/2009. Prevnar - 10/2019. Flu vac - 10/2020 (refused 11/2021).  Smoking cessation

## 2023-12-05 ENCOUNTER — OFFICE VISIT (OUTPATIENT)
Dept: PRIMARY CARE CLINIC | Age: 68
End: 2023-12-05
Payer: MEDICAID

## 2023-12-05 VITALS
RESPIRATION RATE: 20 BRPM | HEIGHT: 66 IN | TEMPERATURE: 97.2 F | HEART RATE: 80 BPM | SYSTOLIC BLOOD PRESSURE: 92 MMHG | OXYGEN SATURATION: 97 % | DIASTOLIC BLOOD PRESSURE: 63 MMHG | BODY MASS INDEX: 35.51 KG/M2

## 2023-12-05 DIAGNOSIS — L73.9 FOLLICULITIS: ICD-10-CM

## 2023-12-05 DIAGNOSIS — R09.89 PULMONARY VENOUS CONGESTION: ICD-10-CM

## 2023-12-05 DIAGNOSIS — J31.0 CHRONIC RHINITIS: ICD-10-CM

## 2023-12-05 DIAGNOSIS — G35 MULTIPLE SCLEROSIS (HCC): Primary | ICD-10-CM

## 2023-12-05 DIAGNOSIS — R60.0 EDEMA OF BOTH LEGS: ICD-10-CM

## 2023-12-05 DIAGNOSIS — K59.00 CONSTIPATION, UNSPECIFIED CONSTIPATION TYPE: ICD-10-CM

## 2023-12-05 PROCEDURE — 99349 HOME/RES VST EST MOD MDM 40: CPT | Performed by: FAMILY MEDICINE

## 2023-12-05 PROCEDURE — 1123F ACP DISCUSS/DSCN MKR DOCD: CPT | Performed by: FAMILY MEDICINE

## 2023-12-05 PROCEDURE — 3074F SYST BP LT 130 MM HG: CPT | Performed by: FAMILY MEDICINE

## 2023-12-05 PROCEDURE — 3078F DIAST BP <80 MM HG: CPT | Performed by: FAMILY MEDICINE

## 2023-12-28 RX ORDER — LOSARTAN POTASSIUM 25 MG/1
25 TABLET ORAL DAILY
Qty: 28 TABLET | Refills: 3 | OUTPATIENT
Start: 2023-12-28

## 2023-12-28 RX ORDER — LORATADINE 10 MG/1
TABLET ORAL
Qty: 28 TABLET | Refills: 5 | OUTPATIENT
Start: 2023-12-28

## 2023-12-28 RX ORDER — DOCUSATE SODIUM 100 MG/1
CAPSULE, LIQUID FILLED ORAL
Qty: 56 CAPSULE | Refills: 5 | OUTPATIENT
Start: 2023-12-28

## 2024-01-02 ENCOUNTER — OFFICE VISIT (OUTPATIENT)
Dept: PRIMARY CARE CLINIC | Age: 69
End: 2024-01-02
Payer: MEDICAID

## 2024-01-02 VITALS — HEART RATE: 79 BPM | SYSTOLIC BLOOD PRESSURE: 117 MMHG | OXYGEN SATURATION: 97 % | DIASTOLIC BLOOD PRESSURE: 86 MMHG

## 2024-01-02 DIAGNOSIS — G35 MULTIPLE SCLEROSIS (HCC): Primary | ICD-10-CM

## 2024-01-02 DIAGNOSIS — R26.2 UNABLE TO WALK: ICD-10-CM

## 2024-01-02 DIAGNOSIS — R60.0 EDEMA OF BOTH LEGS: ICD-10-CM

## 2024-01-02 DIAGNOSIS — G82.20 PARAPLEGIA (HCC): ICD-10-CM

## 2024-01-02 DIAGNOSIS — N39.42 INCONTINENCE WITHOUT SENSORY AWARENESS: ICD-10-CM

## 2024-01-02 DIAGNOSIS — I10 PRIMARY HYPERTENSION: ICD-10-CM

## 2024-01-02 PROCEDURE — 1123F ACP DISCUSS/DSCN MKR DOCD: CPT | Performed by: FAMILY MEDICINE

## 2024-01-02 PROCEDURE — 3074F SYST BP LT 130 MM HG: CPT | Performed by: FAMILY MEDICINE

## 2024-01-02 PROCEDURE — 99349 HOME/RES VST EST MOD MDM 40: CPT | Performed by: FAMILY MEDICINE

## 2024-01-02 PROCEDURE — 3079F DIAST BP 80-89 MM HG: CPT | Performed by: FAMILY MEDICINE

## 2024-01-02 NOTE — PROGRESS NOTES
1/2/2024     Home visit medically necessary in lieu of an office visit due to: wheelchair bound, difficult to get out.       HPI:  Patient states she is doing better this month. She feel fantastic today.  She is ready for PT. She continues to have nasal drainage. She is using Atrovent nasal spray which helps. She finished taking Doxycycline for a boils but has one coming back. She is moving her bowels well. She has not had any dysuria. She continues to have problems with incontinence due to MS. She drinks Boost supplement daily. She is taking Lasix 40 mg daily and tries to elevate her legs. She is having an aide start this week (her niece).      REVIEW OF SYSTEMS: General: Weight stable, generally healthy, no change in strength or exercise tolerance. Heart: No palpitations, no syncope, no orthopnea. EDEMA OF LEGS AT TIMES.  Abdomen: Chronic constipation. No change in appetite, no dysphagia, no abdominal pains, no bowel habit changes, no emesis, no melena. : No urinary urgency, no dysuria, no change in nature of urine. Neurologic:   Unable to walk without holding on to something. In w/c most of the time.  No tremor, no seizures, no changes in mentation.   All other systems negative.       PAST MEDICAL HISTORY: (Major events, hospitalizations, surgeries): Pneumonia (2010), 1998 Vag hyst, 1978 naina, append, freq epidural inclusion cysts tx'd with docycycline.   MS dx'd Mar 8, 1991.   IV corticosteroids 1991-92.   Has never been on MS meds. 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   Known allergies: NKDA.   Ongoing medical problems: Multiple sclerosis, Asthma, HTN, hypoglycemia, scoliosis, DDD with sciatic, arthritis, osteoporosis, chronic LBP.  Preventative: COVID vac - 3/22/21, 4/19134/21 (Refuses booster),  Pneumovax 23 - 11/2009. Prevnar - 10/2019. Flu vac - 10/2020 (refused 11/2021). Smoking cessation counselling 7/2014 Social history:  with 3 children.   Smokes 1-1 1/2 ppd 40 yrs.   No

## 2024-01-03 RX ORDER — LORATADINE 10 MG/1
TABLET ORAL
Qty: 30 TABLET | Refills: 11 | Status: SHIPPED | OUTPATIENT
Start: 2024-01-03

## 2024-01-03 RX ORDER — DOCUSATE SODIUM 100 MG/1
100 CAPSULE, LIQUID FILLED ORAL 2 TIMES DAILY
Qty: 60 CAPSULE | Refills: 11 | Status: SHIPPED | OUTPATIENT
Start: 2024-01-03

## 2024-01-03 RX ORDER — LOSARTAN POTASSIUM 25 MG/1
25 TABLET ORAL DAILY
Qty: 30 TABLET | Refills: 11 | Status: SHIPPED | OUTPATIENT
Start: 2024-01-03

## 2024-01-10 ENCOUNTER — TELEPHONE (OUTPATIENT)
Dept: PRIMARY CARE CLINIC | Age: 69
End: 2024-01-10

## 2024-01-10 DIAGNOSIS — R30.0 DYSURIA: Primary | ICD-10-CM

## 2024-01-10 RX ORDER — CEFUROXIME AXETIL 250 MG/1
250 TABLET ORAL 2 TIMES DAILY
Qty: 14 TABLET | Refills: 0 | Status: SHIPPED | OUTPATIENT
Start: 2024-01-10 | End: 2024-01-17

## 2024-01-10 NOTE — TELEPHONE ENCOUNTER
Nettie davis. She states she believes she has a UTI. She is having frequency, slight burning and incontinence. Her urine is a little cloudy and she has some low back pain. She is asking if you can order an ATB.  She sees Dr Howard on 1/28/23.

## 2024-01-10 NOTE — TELEPHONE ENCOUNTER
Okay. I sent eRx for ATB to [facility]. It would be really good if she could go to  lab and give urine specimen before starting ATB since she has had some many UTI. I put order in for UA C&S.

## 2024-01-10 NOTE — TELEPHONE ENCOUNTER
Spoke to Nettie and gave all instructions. Verbalized understanding. She states she has a friend that is going to take a specimen to the lab for her.

## 2024-01-11 DIAGNOSIS — R30.0 DYSURIA: ICD-10-CM

## 2024-01-11 LAB
BACTERIA: ABNORMAL
BILIRUBIN URINE: NEGATIVE
COLOR: YELLOW
GLUCOSE URINE: NEGATIVE MG/DL
KETONES, URINE: NEGATIVE MG/DL
LEUKOCYTE ESTERASE, URINE: ABNORMAL
NITRITE, URINE: NEGATIVE
PH UA: 6 (ref 5–9)
PROTEIN UA: 100 MG/DL
RBC UA: ABNORMAL /HPF
SPECIFIC GRAVITY UA: 1.02 (ref 1–1.03)
TURBIDITY: ABNORMAL
URINE HGB: ABNORMAL
UROBILINOGEN, URINE: 0.2 EU/DL (ref 0–1)
WBC UA: ABNORMAL /HPF

## 2024-01-16 LAB
CULTURE: ABNORMAL
SPECIMEN DESCRIPTION: ABNORMAL

## 2024-01-16 RX ORDER — NITROFURANTOIN 25; 75 MG/1; MG/1
100 CAPSULE ORAL 2 TIMES DAILY
Qty: 14 CAPSULE | Refills: 0 | Status: SHIPPED | OUTPATIENT
Start: 2024-01-16 | End: 2024-01-23

## 2024-01-26 ENCOUNTER — APPOINTMENT (OUTPATIENT)
Dept: CT IMAGING | Age: 69
DRG: 720 | End: 2024-01-26
Payer: MEDICAID

## 2024-01-26 ENCOUNTER — APPOINTMENT (OUTPATIENT)
Dept: GENERAL RADIOLOGY | Age: 69
DRG: 720 | End: 2024-01-26
Payer: MEDICAID

## 2024-01-26 ENCOUNTER — TELEPHONE (OUTPATIENT)
Dept: PRIMARY CARE CLINIC | Age: 69
End: 2024-01-26

## 2024-01-26 ENCOUNTER — HOSPITAL ENCOUNTER (INPATIENT)
Age: 69
LOS: 2 days | Discharge: HOME | DRG: 720 | End: 2024-01-29
Attending: EMERGENCY MEDICINE | Admitting: INTERNAL MEDICINE
Payer: MEDICAID

## 2024-01-26 DIAGNOSIS — R53.1 GENERALIZED WEAKNESS: ICD-10-CM

## 2024-01-26 DIAGNOSIS — J98.8 CONGESTION OF UPPER AIRWAY: ICD-10-CM

## 2024-01-26 DIAGNOSIS — U07.1 COVID: Primary | ICD-10-CM

## 2024-01-26 DIAGNOSIS — R60.0 BILATERAL LOWER EXTREMITY EDEMA: ICD-10-CM

## 2024-01-26 DIAGNOSIS — R06.02 SHORTNESS OF BREATH: ICD-10-CM

## 2024-01-26 LAB
ALBUMIN SERPL-MCNC: 3.8 G/DL (ref 3.5–5.2)
ALP SERPL-CCNC: 94 U/L (ref 35–104)
ALT SERPL-CCNC: 31 U/L (ref 0–32)
ANION GAP SERPL CALCULATED.3IONS-SCNC: 11 MMOL/L (ref 7–16)
AST SERPL-CCNC: 25 U/L (ref 0–31)
BASOPHILS # BLD: 0.12 K/UL (ref 0–0.2)
BASOPHILS NFR BLD: 1 % (ref 0–2)
BILIRUB SERPL-MCNC: 0.6 MG/DL (ref 0–1.2)
BNP SERPL-MCNC: 259 PG/ML (ref 0–450)
BUN SERPL-MCNC: 18 MG/DL (ref 6–23)
CALCIUM SERPL-MCNC: 9.1 MG/DL (ref 8.6–10.2)
CHLORIDE SERPL-SCNC: 104 MMOL/L (ref 98–107)
CO2 SERPL-SCNC: 23 MMOL/L (ref 22–29)
CREAT SERPL-MCNC: 1.1 MG/DL (ref 0.5–1)
EOSINOPHIL # BLD: 0.24 K/UL (ref 0.05–0.5)
EOSINOPHILS RELATIVE PERCENT: 2 % (ref 0–6)
ERYTHROCYTE [DISTWIDTH] IN BLOOD BY AUTOMATED COUNT: 13.7 % (ref 11.5–15)
GFR SERPL CREATININE-BSD FRML MDRD: 57 ML/MIN/1.73M2
GLUCOSE SERPL-MCNC: 122 MG/DL (ref 74–99)
HCT VFR BLD AUTO: 39.4 % (ref 34–48)
HGB BLD-MCNC: 14.2 G/DL (ref 11.5–15.5)
LYMPHOCYTES NFR BLD: 1.58 K/UL (ref 1.5–4)
LYMPHOCYTES RELATIVE PERCENT: 11 % (ref 20–42)
MAGNESIUM SERPL-MCNC: 2 MG/DL (ref 1.6–2.6)
MCH RBC QN AUTO: 37.8 PG (ref 26–35)
MCHC RBC AUTO-ENTMCNC: 36 G/DL (ref 32–34.5)
MCV RBC AUTO: 104.8 FL (ref 80–99.9)
MONOCYTES NFR BLD: 1.22 K/UL (ref 0.1–0.95)
MONOCYTES NFR BLD: 9 % (ref 2–12)
NEUTROPHILS NFR BLD: 77 % (ref 43–80)
NEUTS SEG NFR BLD: 10.83 K/UL (ref 1.8–7.3)
PLATELET # BLD AUTO: 275 K/UL (ref 130–450)
PMV BLD AUTO: 10.2 FL (ref 7–12)
POTASSIUM SERPL-SCNC: 4.2 MMOL/L (ref 3.5–5)
PROT SERPL-MCNC: 6.9 G/DL (ref 6.4–8.3)
RBC # BLD AUTO: 3.76 M/UL (ref 3.5–5.5)
RBC # BLD: ABNORMAL 10*6/UL
SARS-COV-2 RDRP RESP QL NAA+PROBE: DETECTED
SODIUM SERPL-SCNC: 138 MMOL/L (ref 132–146)
SPECIMEN DESCRIPTION: ABNORMAL
TROPONIN I SERPL HS-MCNC: 77 NG/L (ref 0–9)
TROPONIN I SERPL HS-MCNC: 80 NG/L (ref 0–9)
WBC OTHER # BLD: 14 K/UL (ref 4.5–11.5)

## 2024-01-26 PROCEDURE — 85025 COMPLETE CBC W/AUTO DIFF WBC: CPT

## 2024-01-26 PROCEDURE — 71275 CT ANGIOGRAPHY CHEST: CPT

## 2024-01-26 PROCEDURE — 71046 X-RAY EXAM CHEST 2 VIEWS: CPT

## 2024-01-26 PROCEDURE — 83880 ASSAY OF NATRIURETIC PEPTIDE: CPT

## 2024-01-26 PROCEDURE — 87635 SARS-COV-2 COVID-19 AMP PRB: CPT

## 2024-01-26 PROCEDURE — 99285 EMERGENCY DEPT VISIT HI MDM: CPT

## 2024-01-26 PROCEDURE — 36415 COLL VENOUS BLD VENIPUNCTURE: CPT

## 2024-01-26 PROCEDURE — 6360000004 HC RX CONTRAST MEDICATION: Performed by: RADIOLOGY

## 2024-01-26 PROCEDURE — 84484 ASSAY OF TROPONIN QUANT: CPT

## 2024-01-26 PROCEDURE — 83735 ASSAY OF MAGNESIUM: CPT

## 2024-01-26 PROCEDURE — 80053 COMPREHEN METABOLIC PANEL: CPT

## 2024-01-26 PROCEDURE — 93005 ELECTROCARDIOGRAM TRACING: CPT

## 2024-01-26 RX ADMIN — IOPAMIDOL 75 ML: 755 INJECTION, SOLUTION INTRAVENOUS at 22:12

## 2024-01-26 ASSESSMENT — LIFESTYLE VARIABLES
HOW MANY STANDARD DRINKS CONTAINING ALCOHOL DO YOU HAVE ON A TYPICAL DAY: PATIENT DOES NOT DRINK
HOW OFTEN DO YOU HAVE A DRINK CONTAINING ALCOHOL: NEVER

## 2024-01-26 NOTE — TELEPHONE ENCOUNTER
Nettie called. She wanted you to be aware that she has COVID.  You have a visit scheduled with her on Tues, 1/31/29.

## 2024-01-27 PROBLEM — U07.1 COVID: Status: ACTIVE | Noted: 2024-01-27

## 2024-01-27 LAB
EKG ATRIAL RATE: 103 BPM
EKG P AXIS: 34 DEGREES
EKG P-R INTERVAL: 138 MS
EKG Q-T INTERVAL: 360 MS
EKG QRS DURATION: 82 MS
EKG QTC CALCULATION (BAZETT): 471 MS
EKG R AXIS: 11 DEGREES
EKG T AXIS: 66 DEGREES
EKG VENTRICULAR RATE: 103 BPM

## 2024-01-27 PROCEDURE — 6370000000 HC RX 637 (ALT 250 FOR IP): Performed by: INTERNAL MEDICINE

## 2024-01-27 PROCEDURE — 2500000003 HC RX 250 WO HCPCS

## 2024-01-27 PROCEDURE — 6360000002 HC RX W HCPCS

## 2024-01-27 PROCEDURE — 2580000003 HC RX 258

## 2024-01-27 PROCEDURE — 6370000000 HC RX 637 (ALT 250 FOR IP)

## 2024-01-27 PROCEDURE — 93010 ELECTROCARDIOGRAM REPORT: CPT | Performed by: INTERNAL MEDICINE

## 2024-01-27 PROCEDURE — 94640 AIRWAY INHALATION TREATMENT: CPT

## 2024-01-27 PROCEDURE — 1200000000 HC SEMI PRIVATE

## 2024-01-27 RX ORDER — GUAIFENESIN AND DEXTROMETHORPHAN HYDROBROMIDE 600; 30 MG/1; MG/1
2 TABLET, EXTENDED RELEASE ORAL 2 TIMES DAILY PRN
Status: DISCONTINUED | OUTPATIENT
Start: 2024-01-27 | End: 2024-01-29 | Stop reason: HOSPADM

## 2024-01-27 RX ORDER — ASCORBIC ACID 500 MG
1000 TABLET ORAL DAILY
COMMUNITY

## 2024-01-27 RX ORDER — ACETAMINOPHEN 500 MG
TABLET ORAL
Status: COMPLETED
Start: 2024-01-27 | End: 2024-01-27

## 2024-01-27 RX ORDER — SODIUM CHLORIDE 9 MG/ML
INJECTION, SOLUTION INTRAVENOUS CONTINUOUS
Status: DISCONTINUED | OUTPATIENT
Start: 2024-01-27 | End: 2024-01-28

## 2024-01-27 RX ORDER — ENOXAPARIN SODIUM 100 MG/ML
40 INJECTION SUBCUTANEOUS DAILY
Status: DISCONTINUED | OUTPATIENT
Start: 2024-01-27 | End: 2024-01-29 | Stop reason: HOSPADM

## 2024-01-27 RX ORDER — ERGOCALCIFEROL 1.25 MG/1
5000 CAPSULE ORAL DAILY
Status: ON HOLD | COMMUNITY
End: 2024-01-29 | Stop reason: HOSPADM

## 2024-01-27 RX ORDER — CLOTRIMAZOLE 1 %
CREAM (GRAM) TOPICAL 2 TIMES DAILY
Status: DISCONTINUED | OUTPATIENT
Start: 2024-01-27 | End: 2024-01-29 | Stop reason: HOSPADM

## 2024-01-27 RX ORDER — DEXAMETHASONE SODIUM PHOSPHATE 10 MG/ML
6 INJECTION INTRAMUSCULAR; INTRAVENOUS EVERY 24 HOURS
Status: DISCONTINUED | OUTPATIENT
Start: 2024-01-27 | End: 2024-01-29 | Stop reason: HOSPADM

## 2024-01-27 RX ORDER — GUAIFENESIN 200 MG/10ML
100 LIQUID ORAL EVERY 4 HOURS PRN
Status: DISCONTINUED | OUTPATIENT
Start: 2024-01-27 | End: 2024-01-29 | Stop reason: HOSPADM

## 2024-01-27 RX ORDER — ZINC GLUCONATE 50 MG
50 TABLET ORAL DAILY
COMMUNITY

## 2024-01-27 RX ORDER — BUDESONIDE 0.5 MG/2ML
500 INHALANT ORAL
Status: DISCONTINUED | OUTPATIENT
Start: 2024-01-27 | End: 2024-01-29 | Stop reason: HOSPADM

## 2024-01-27 RX ORDER — DOCUSATE SODIUM 100 MG/1
100 CAPSULE, LIQUID FILLED ORAL 2 TIMES DAILY
Status: DISCONTINUED | OUTPATIENT
Start: 2024-01-27 | End: 2024-01-29 | Stop reason: HOSPADM

## 2024-01-27 RX ORDER — FLUTICASONE PROPIONATE 50 MCG
1 SPRAY, SUSPENSION (ML) NASAL DAILY
Status: DISCONTINUED | OUTPATIENT
Start: 2024-01-27 | End: 2024-01-29 | Stop reason: HOSPADM

## 2024-01-27 RX ORDER — CETIRIZINE HYDROCHLORIDE 10 MG/1
10 TABLET ORAL DAILY
Status: DISCONTINUED | OUTPATIENT
Start: 2024-01-27 | End: 2024-01-29 | Stop reason: HOSPADM

## 2024-01-27 RX ORDER — LOSARTAN POTASSIUM 50 MG/1
25 TABLET ORAL DAILY
Status: DISCONTINUED | OUTPATIENT
Start: 2024-01-27 | End: 2024-01-27

## 2024-01-27 RX ORDER — SENNA AND DOCUSATE SODIUM 50; 8.6 MG/1; MG/1
3 TABLET, FILM COATED ORAL DAILY PRN
Status: DISCONTINUED | OUTPATIENT
Start: 2024-01-27 | End: 2024-01-29 | Stop reason: HOSPADM

## 2024-01-27 RX ORDER — ACETAMINOPHEN 500 MG
500 TABLET ORAL EVERY 4 HOURS PRN
Status: DISCONTINUED | OUTPATIENT
Start: 2024-01-27 | End: 2024-01-29 | Stop reason: HOSPADM

## 2024-01-27 RX ORDER — LOSARTAN POTASSIUM 50 MG/1
25 TABLET ORAL EVERY EVENING
Status: DISCONTINUED | OUTPATIENT
Start: 2024-01-27 | End: 2024-01-29 | Stop reason: HOSPADM

## 2024-01-27 RX ORDER — POLYETHYLENE GLYCOL 3350 17 G/17G
17 POWDER, FOR SOLUTION ORAL DAILY
Status: DISCONTINUED | OUTPATIENT
Start: 2024-01-27 | End: 2024-01-29 | Stop reason: HOSPADM

## 2024-01-27 RX ORDER — MULTIVIT WITH MINERALS/LUTEIN
1000 TABLET ORAL DAILY
COMMUNITY

## 2024-01-27 RX ORDER — ARFORMOTEROL TARTRATE 15 UG/2ML
15 SOLUTION RESPIRATORY (INHALATION)
Status: DISCONTINUED | OUTPATIENT
Start: 2024-01-27 | End: 2024-01-29 | Stop reason: HOSPADM

## 2024-01-27 RX ADMIN — ARFORMOTEROL TARTRATE 15 MCG: 15 SOLUTION RESPIRATORY (INHALATION) at 17:21

## 2024-01-27 RX ADMIN — SODIUM CHLORIDE: 9 INJECTION, SOLUTION INTRAVENOUS at 10:18

## 2024-01-27 RX ADMIN — ENOXAPARIN SODIUM 40 MG: 100 INJECTION SUBCUTANEOUS at 16:19

## 2024-01-27 RX ADMIN — BUDESONIDE 500 MCG: 0.5 SUSPENSION RESPIRATORY (INHALATION) at 17:21

## 2024-01-27 RX ADMIN — DOCUSATE SODIUM 100 MG: 100 CAPSULE, LIQUID FILLED ORAL at 10:14

## 2024-01-27 RX ADMIN — Medication 500 MG: at 06:46

## 2024-01-27 RX ADMIN — DEXAMETHASONE SODIUM PHOSPHATE 6 MG: 10 INJECTION INTRAMUSCULAR; INTRAVENOUS at 06:46

## 2024-01-27 RX ADMIN — ACETAMINOPHEN 500 MG: 500 TABLET ORAL at 06:46

## 2024-01-27 RX ADMIN — FLUTICASONE PROPIONATE 1 SPRAY: 50 SPRAY, METERED NASAL at 10:14

## 2024-01-27 RX ADMIN — LOSARTAN POTASSIUM 25 MG: 50 TABLET, FILM COATED ORAL at 16:22

## 2024-01-27 RX ADMIN — GUAIFENESIN 100 MG: 200 SOLUTION ORAL at 22:13

## 2024-01-27 RX ADMIN — METOPROLOL TARTRATE 25 MG: 25 TABLET, FILM COATED ORAL at 22:12

## 2024-01-27 RX ADMIN — SENNOSIDES AND DOCUSATE SODIUM 3 TABLET: 8.6; 5 TABLET ORAL at 23:32

## 2024-01-27 RX ADMIN — DOCUSATE SODIUM 100 MG: 100 CAPSULE, LIQUID FILLED ORAL at 22:12

## 2024-01-27 RX ADMIN — POLYETHYLENE GLYCOL 3350 17 G: 17 POWDER, FOR SOLUTION ORAL at 10:14

## 2024-01-27 RX ADMIN — CLOTRIMAZOLE: 10 CREAM TOPICAL at 14:35

## 2024-01-27 ASSESSMENT — PAIN SCALES - GENERAL
PAINLEVEL_OUTOF10: 0
PAINLEVEL_OUTOF10: 5
PAINLEVEL_OUTOF10: 0
PAINLEVEL_OUTOF10: 0

## 2024-01-27 ASSESSMENT — PAIN DESCRIPTION - LOCATION: LOCATION: GENERALIZED

## 2024-01-27 NOTE — ED PROVIDER NOTES
results, in addition to providing specific details for the plan of care and counseling regarding the diagnosis and prognosis.  Questions are answered at this time and they are agreeable with the plan.       --------------------------------- IMPRESSION AND DISPOSITION ---------------------------------    IMPRESSION  1. COVID    2. Bilateral lower extremity edema    3. Shortness of breath    4. Generalized weakness        DISPOSITION  Disposition: Admit to med/surg floor  Patient condition is stable        NOTE: This report was transcribed using voice recognition software. Every effort was made to ensure accuracy; however, inadvertent computerized transcription errors may be present

## 2024-01-27 NOTE — H&P
[address] [address]                              HISTORY AND PHYSICAL    PATIENT NAME: TRACY LEGER                 :        1955  MED REC NO:   [id number]                            ROOM:       [id number]  ACCOUNT NO:   [id number]                           ADMIT DATE: 2024  PROVIDER:     Ruy Awad DO    CHIEF COMPLAINT AND HISTORY OF CHIEF COMPLAINT:  This is a pleasant  68-year-old white female, who was admitted to [facility].  The  patient presented to the hospital here through the emergency room on  2024.  The patient does have history of MS for approximately 28  years, which has been progressive.  Presently, patient has been becoming  short of breath with increased bilateral swelling of lower extremities.   The patient was tested positive for COVID.  The patient did home chest  x-ray which came back positive.  The patient does have problem with  increasing shortness of breath, weakness and fatigue.  The patient does  live alone.  The patient presented to the hospital here, was seen and  evaluated.  At this time, the patient was admitted to the hospital to  the general telemetry floor under the service of Dr. Awad and Dr. De Leon.  The patient was seen in the emergency room.  Exam at this time  revealed a pleasant 68-year-old white female who did complain of  fatigue.  Her troponin level was elevated, suggesting possible demand  ischemia and her EKG showed no acute finding.  A CTA at this time did  not show any pulmonary embolism, congestive heart failure.  She did  appear to be fatigued.  Currently, she was resting comfortably.  From a  cardiac standpoint view, the patient currently has no complaints of  chest pain.  She does admit to some swelling of lower extremities.   Respiratory wise, she does have nonproductive cough with postnasal drip.  She is not currently wearing oxygen.

## 2024-01-27 NOTE — PLAN OF CARE
Problem: Safety - Adult  Goal: Free from fall injury  1/27/2024 1318 by Liyah Ashton, RN  Outcome: Progressing     Problem: ABCDS Injury Assessment  Goal: Absence of physical injury  1/27/2024 1318 by Liyah Ashton, RN  Outcome: Progressing     Problem: Skin/Tissue Integrity  Goal: Absence of new skin breakdown  Description: 1.  Monitor for areas of redness and/or skin breakdown  2.  Assess vascular access sites hourly  3.  Every 4-6 hours minimum:  Change oxygen saturation probe site  4.  Every 4-6 hours:  If on nasal continuous positive airway pressure, respiratory therapy assess nares and determine need for appliance change or resting period.  1/27/2024 1318 by Liyah Ashton, RN  Outcome: Progressing     Problem: Pain  Goal: Verbalizes/displays adequate comfort level or baseline comfort level  Outcome: Progressing

## 2024-01-28 PROBLEM — U07.1 COVID-19: Status: ACTIVE | Noted: 2024-01-28

## 2024-01-28 LAB
25(OH)D3 SERPL-MCNC: 29.8 NG/ML (ref 30–100)
ALBUMIN SERPL-MCNC: 3.2 G/DL (ref 3.5–5.2)
ALP SERPL-CCNC: 76 U/L (ref 35–104)
ALT SERPL-CCNC: 21 U/L (ref 0–32)
ANION GAP SERPL CALCULATED.3IONS-SCNC: 10 MMOL/L (ref 7–16)
AST SERPL-CCNC: 17 U/L (ref 0–31)
BASOPHILS # BLD: 0 K/UL (ref 0–0.2)
BASOPHILS NFR BLD: 0 % (ref 0–2)
BILIRUB SERPL-MCNC: 0.5 MG/DL (ref 0–1.2)
BUN SERPL-MCNC: 18 MG/DL (ref 6–23)
CALCIUM SERPL-MCNC: 8.8 MG/DL (ref 8.6–10.2)
CHLORIDE SERPL-SCNC: 107 MMOL/L (ref 98–107)
CHOLEST SERPL-MCNC: 154 MG/DL
CO2 SERPL-SCNC: 22 MMOL/L (ref 22–29)
CREAT SERPL-MCNC: 0.8 MG/DL (ref 0.5–1)
EOSINOPHIL # BLD: 0.14 K/UL (ref 0.05–0.5)
EOSINOPHILS RELATIVE PERCENT: 1 % (ref 0–6)
ERYTHROCYTE [DISTWIDTH] IN BLOOD BY AUTOMATED COUNT: 13.7 % (ref 11.5–15)
FOLATE SERPL-MCNC: 12.5 NG/ML (ref 4.8–24.2)
GFR SERPL CREATININE-BSD FRML MDRD: >60 ML/MIN/1.73M2
GLUCOSE SERPL-MCNC: 107 MG/DL (ref 74–99)
HCT VFR BLD AUTO: 39.2 % (ref 34–48)
HDLC SERPL-MCNC: 26 MG/DL
HGB BLD-MCNC: 12.7 G/DL (ref 11.5–15.5)
IRON SATN MFR SERPL: 40 % (ref 15–50)
IRON SERPL-MCNC: 80 UG/DL (ref 37–145)
LDLC SERPL CALC-MCNC: 57 MG/DL
LYMPHOCYTES NFR BLD: 2.9 K/UL (ref 1.5–4)
LYMPHOCYTES RELATIVE PERCENT: 21 % (ref 20–42)
MCH RBC QN AUTO: 33.3 PG (ref 26–35)
MCHC RBC AUTO-ENTMCNC: 32.4 G/DL (ref 32–34.5)
MCV RBC AUTO: 102.9 FL (ref 80–99.9)
MONOCYTES NFR BLD: 1.79 K/UL (ref 0.1–0.95)
MONOCYTES NFR BLD: 13 % (ref 2–12)
NEUTROPHILS NFR BLD: 65 % (ref 43–80)
NEUTS SEG NFR BLD: 8.97 K/UL (ref 1.8–7.3)
PLATELET, FLUORESCENCE: 298 K/UL (ref 130–450)
PMV BLD AUTO: 10.2 FL (ref 7–12)
POTASSIUM SERPL-SCNC: 4.3 MMOL/L (ref 3.5–5)
PROT SERPL-MCNC: 6.1 G/DL (ref 6.4–8.3)
RBC # BLD AUTO: 3.81 M/UL (ref 3.5–5.5)
SODIUM SERPL-SCNC: 139 MMOL/L (ref 132–146)
T4 FREE SERPL-MCNC: 1.1 NG/DL (ref 0.9–1.7)
TIBC SERPL-MCNC: 198 UG/DL (ref 250–450)
TRIGL SERPL-MCNC: 356 MG/DL
TROPONIN I SERPL HS-MCNC: 57 NG/L (ref 0–9)
TSH SERPL DL<=0.05 MIU/L-ACNC: 1.82 UIU/ML (ref 0.27–4.2)
VIT B12 SERPL-MCNC: 870 PG/ML (ref 211–946)
VLDLC SERPL CALC-MCNC: 71 MG/DL
WBC OTHER # BLD: 13.8 K/UL (ref 4.5–11.5)

## 2024-01-28 PROCEDURE — 83540 ASSAY OF IRON: CPT

## 2024-01-28 PROCEDURE — 82607 VITAMIN B-12: CPT

## 2024-01-28 PROCEDURE — 97530 THERAPEUTIC ACTIVITIES: CPT | Performed by: PHYSICAL THERAPIST

## 2024-01-28 PROCEDURE — 6370000000 HC RX 637 (ALT 250 FOR IP)

## 2024-01-28 PROCEDURE — 84484 ASSAY OF TROPONIN QUANT: CPT

## 2024-01-28 PROCEDURE — 6360000002 HC RX W HCPCS

## 2024-01-28 PROCEDURE — 2500000003 HC RX 250 WO HCPCS

## 2024-01-28 PROCEDURE — 36415 COLL VENOUS BLD VENIPUNCTURE: CPT

## 2024-01-28 PROCEDURE — 84439 ASSAY OF FREE THYROXINE: CPT

## 2024-01-28 PROCEDURE — 83550 IRON BINDING TEST: CPT

## 2024-01-28 PROCEDURE — 80061 LIPID PANEL: CPT

## 2024-01-28 PROCEDURE — 82306 VITAMIN D 25 HYDROXY: CPT

## 2024-01-28 PROCEDURE — 80053 COMPREHEN METABOLIC PANEL: CPT

## 2024-01-28 PROCEDURE — 82746 ASSAY OF FOLIC ACID SERUM: CPT

## 2024-01-28 PROCEDURE — 85025 COMPLETE CBC W/AUTO DIFF WBC: CPT

## 2024-01-28 PROCEDURE — 6370000000 HC RX 637 (ALT 250 FOR IP): Performed by: INTERNAL MEDICINE

## 2024-01-28 PROCEDURE — 94640 AIRWAY INHALATION TREATMENT: CPT

## 2024-01-28 PROCEDURE — 97161 PT EVAL LOW COMPLEX 20 MIN: CPT | Performed by: PHYSICAL THERAPIST

## 2024-01-28 PROCEDURE — 1200000000 HC SEMI PRIVATE

## 2024-01-28 PROCEDURE — 84443 ASSAY THYROID STIM HORMONE: CPT

## 2024-01-28 PROCEDURE — 2580000003 HC RX 258

## 2024-01-28 RX ORDER — SORBITOL SOLUTION 70 %
60 SOLUTION, ORAL MISCELLANEOUS ONCE
Status: COMPLETED | OUTPATIENT
Start: 2024-01-28 | End: 2024-01-28

## 2024-01-28 RX ORDER — BISACODYL 5 MG/1
10 TABLET, DELAYED RELEASE ORAL ONCE
Status: COMPLETED | OUTPATIENT
Start: 2024-01-28 | End: 2024-01-28

## 2024-01-28 RX ORDER — MAGNESIUM OXIDE 400 MG/1
400 TABLET ORAL 2 TIMES DAILY
Status: DISCONTINUED | OUTPATIENT
Start: 2024-01-28 | End: 2024-01-29 | Stop reason: HOSPADM

## 2024-01-28 RX ADMIN — WATER 1000 MG: 1 INJECTION INTRAMUSCULAR; INTRAVENOUS; SUBCUTANEOUS at 16:19

## 2024-01-28 RX ADMIN — ARFORMOTEROL TARTRATE 15 MCG: 15 SOLUTION RESPIRATORY (INHALATION) at 07:00

## 2024-01-28 RX ADMIN — DOCUSATE SODIUM 100 MG: 100 CAPSULE, LIQUID FILLED ORAL at 09:09

## 2024-01-28 RX ADMIN — METOPROLOL TARTRATE 25 MG: 25 TABLET, FILM COATED ORAL at 21:47

## 2024-01-28 RX ADMIN — GUAIFENESIN 100 MG: 200 SOLUTION ORAL at 16:18

## 2024-01-28 RX ADMIN — CLOTRIMAZOLE: 10 CREAM TOPICAL at 09:10

## 2024-01-28 RX ADMIN — FLUTICASONE PROPIONATE 1 SPRAY: 50 SPRAY, METERED NASAL at 09:09

## 2024-01-28 RX ADMIN — LOSARTAN POTASSIUM 25 MG: 50 TABLET, FILM COATED ORAL at 17:35

## 2024-01-28 RX ADMIN — BUDESONIDE 500 MCG: 0.5 SUSPENSION RESPIRATORY (INHALATION) at 18:52

## 2024-01-28 RX ADMIN — ENOXAPARIN SODIUM 40 MG: 100 INJECTION SUBCUTANEOUS at 09:09

## 2024-01-28 RX ADMIN — DEXAMETHASONE SODIUM PHOSPHATE 6 MG: 10 INJECTION INTRAMUSCULAR; INTRAVENOUS at 06:53

## 2024-01-28 RX ADMIN — ARFORMOTEROL TARTRATE 15 MCG: 15 SOLUTION RESPIRATORY (INHALATION) at 18:52

## 2024-01-28 RX ADMIN — MAGNESIUM OXIDE 400 MG: 400 TABLET ORAL at 09:10

## 2024-01-28 RX ADMIN — MAGNESIUM OXIDE 400 MG: 400 TABLET ORAL at 21:47

## 2024-01-28 RX ADMIN — DOCUSATE SODIUM 100 MG: 100 CAPSULE, LIQUID FILLED ORAL at 21:47

## 2024-01-28 RX ADMIN — CETIRIZINE HYDROCHLORIDE 10 MG: 10 TABLET, FILM COATED ORAL at 09:09

## 2024-01-28 RX ADMIN — METOPROLOL TARTRATE 25 MG: 25 TABLET, FILM COATED ORAL at 09:09

## 2024-01-28 RX ADMIN — BUDESONIDE 500 MCG: 0.5 SUSPENSION RESPIRATORY (INHALATION) at 07:00

## 2024-01-28 RX ADMIN — GUAIFENESIN 100 MG: 200 SOLUTION ORAL at 06:52

## 2024-01-28 RX ADMIN — SORBITOL SOLUTION (BULK) 60 ML: 70 SOLUTION at 09:10

## 2024-01-28 RX ADMIN — BISACODYL 10 MG: 5 TABLET, COATED ORAL at 09:10

## 2024-01-28 RX ADMIN — GUAIFENESIN 100 MG: 200 SOLUTION ORAL at 21:50

## 2024-01-28 ASSESSMENT — PAIN SCALES - GENERAL: PAINLEVEL_OUTOF10: 0

## 2024-01-28 NOTE — PLAN OF CARE
Problem: Safety - Adult  Goal: Free from fall injury  Outcome: Progressing     Problem: ABCDS Injury Assessment  Goal: Absence of physical injury  Outcome: Progressing     Problem: Skin/Tissue Integrity  Goal: Absence of new skin breakdown  Outcome: Progressing     Problem: Pain  Goal: Verbalizes/displays adequate comfort level or baseline comfort level  Outcome: Progressing

## 2024-01-28 NOTE — ACP (ADVANCE CARE PLANNING)
Referred individual to Provider for additional questions/concerns   [] Advised patient/agent/surrogate to review completed ACP document and update if needed with changes in condition, patient preferences or care setting    [x] This note routed to one or more involved healthcare providers

## 2024-01-29 VITALS
WEIGHT: 216 LBS | DIASTOLIC BLOOD PRESSURE: 84 MMHG | HEIGHT: 66 IN | SYSTOLIC BLOOD PRESSURE: 156 MMHG | BODY MASS INDEX: 34.72 KG/M2 | TEMPERATURE: 98.4 F | OXYGEN SATURATION: 95 % | RESPIRATION RATE: 19 BRPM | HEART RATE: 73 BPM

## 2024-01-29 LAB
ALBUMIN SERPL-MCNC: 3.3 G/DL (ref 3.5–5.2)
ALP SERPL-CCNC: 73 U/L (ref 35–104)
ALT SERPL-CCNC: 34 U/L (ref 0–32)
ANION GAP SERPL CALCULATED.3IONS-SCNC: 12 MMOL/L (ref 7–16)
AST SERPL-CCNC: 35 U/L (ref 0–31)
BASOPHILS # BLD: 0.1 K/UL (ref 0–0.2)
BASOPHILS NFR BLD: 1 % (ref 0–2)
BILIRUB SERPL-MCNC: 0.4 MG/DL (ref 0–1.2)
BUN SERPL-MCNC: 19 MG/DL (ref 6–23)
CALCIUM SERPL-MCNC: 9.2 MG/DL (ref 8.6–10.2)
CHLORIDE SERPL-SCNC: 105 MMOL/L (ref 98–107)
CO2 SERPL-SCNC: 22 MMOL/L (ref 22–29)
CREAT SERPL-MCNC: 0.8 MG/DL (ref 0.5–1)
EOSINOPHIL # BLD: 0.35 K/UL (ref 0.05–0.5)
EOSINOPHILS RELATIVE PERCENT: 3 % (ref 0–6)
ERYTHROCYTE [DISTWIDTH] IN BLOOD BY AUTOMATED COUNT: 13.4 % (ref 11.5–15)
GFR SERPL CREATININE-BSD FRML MDRD: >60 ML/MIN/1.73M2
GLUCOSE SERPL-MCNC: 98 MG/DL (ref 74–99)
HCT VFR BLD AUTO: 36.1 % (ref 34–48)
HGB BLD-MCNC: 13.2 G/DL (ref 11.5–15.5)
IMM GRANULOCYTES # BLD AUTO: 0.31 K/UL (ref 0–0.58)
IMM GRANULOCYTES NFR BLD: 3 % (ref 0–5)
LYMPHOCYTES NFR BLD: 1.5 K/UL (ref 1.5–4)
LYMPHOCYTES RELATIVE PERCENT: 13 % (ref 20–42)
MCH RBC QN AUTO: 38.8 PG (ref 26–35)
MCHC RBC AUTO-ENTMCNC: 36.6 G/DL (ref 32–34.5)
MCV RBC AUTO: 106.2 FL (ref 80–99.9)
MONOCYTES NFR BLD: 1.12 K/UL (ref 0.1–0.95)
MONOCYTES NFR BLD: 10 % (ref 2–12)
NEUTROPHILS NFR BLD: 71 % (ref 43–80)
NEUTS SEG NFR BLD: 8.32 K/UL (ref 1.8–7.3)
PLATELET # BLD AUTO: 285 K/UL (ref 130–450)
PMV BLD AUTO: 10.5 FL (ref 7–12)
POTASSIUM SERPL-SCNC: 4.1 MMOL/L (ref 3.5–5)
PROT SERPL-MCNC: 6.1 G/DL (ref 6.4–8.3)
RBC # BLD AUTO: 3.4 M/UL (ref 3.5–5.5)
SODIUM SERPL-SCNC: 139 MMOL/L (ref 132–146)
WBC OTHER # BLD: 11.7 K/UL (ref 4.5–11.5)

## 2024-01-29 PROCEDURE — 80053 COMPREHEN METABOLIC PANEL: CPT

## 2024-01-29 PROCEDURE — 2500000003 HC RX 250 WO HCPCS

## 2024-01-29 PROCEDURE — 6370000000 HC RX 637 (ALT 250 FOR IP)

## 2024-01-29 PROCEDURE — 85025 COMPLETE CBC W/AUTO DIFF WBC: CPT

## 2024-01-29 PROCEDURE — 94640 AIRWAY INHALATION TREATMENT: CPT

## 2024-01-29 PROCEDURE — 6360000002 HC RX W HCPCS

## 2024-01-29 PROCEDURE — 36415 COLL VENOUS BLD VENIPUNCTURE: CPT

## 2024-01-29 RX ORDER — DEXAMETHASONE 6 MG/1
6 TABLET ORAL
Qty: 10 TABLET | Refills: 0 | Status: SHIPPED | OUTPATIENT
Start: 2024-01-29 | End: 2024-02-01

## 2024-01-29 RX ORDER — FUROSEMIDE 20 MG/1
10 TABLET ORAL DAILY PRN
Qty: 30 TABLET | Refills: 0
Start: 2024-01-29 | End: 2024-02-01 | Stop reason: DRUGHIGH

## 2024-01-29 RX ORDER — MAGNESIUM OXIDE 400 MG/1
400 TABLET ORAL DAILY
Qty: 30 TABLET | Refills: 0 | Status: SHIPPED | OUTPATIENT
Start: 2024-01-29

## 2024-01-29 RX ORDER — CEFDINIR 300 MG/1
300 CAPSULE ORAL 2 TIMES DAILY
Qty: 14 CAPSULE | Refills: 0 | Status: SHIPPED | OUTPATIENT
Start: 2024-01-29 | End: 2024-02-05

## 2024-01-29 RX ORDER — GUAIFENESIN 200 MG/10ML
200 LIQUID ORAL 4 TIMES DAILY PRN
Qty: 1200 ML | Refills: 0 | Status: SHIPPED | OUTPATIENT
Start: 2024-01-29 | End: 2024-02-28

## 2024-01-29 RX ADMIN — BUDESONIDE 500 MCG: 0.5 SUSPENSION RESPIRATORY (INHALATION) at 06:45

## 2024-01-29 RX ADMIN — FLUTICASONE PROPIONATE 1 SPRAY: 50 SPRAY, METERED NASAL at 08:31

## 2024-01-29 RX ADMIN — METOPROLOL TARTRATE 25 MG: 25 TABLET, FILM COATED ORAL at 08:29

## 2024-01-29 RX ADMIN — MAGNESIUM OXIDE 400 MG: 400 TABLET ORAL at 08:30

## 2024-01-29 RX ADMIN — ENOXAPARIN SODIUM 40 MG: 100 INJECTION SUBCUTANEOUS at 08:30

## 2024-01-29 RX ADMIN — GUAIFENESIN 100 MG: 200 SOLUTION ORAL at 06:58

## 2024-01-29 RX ADMIN — CETIRIZINE HYDROCHLORIDE 10 MG: 10 TABLET, FILM COATED ORAL at 08:30

## 2024-01-29 RX ADMIN — POLYETHYLENE GLYCOL 3350 17 G: 17 POWDER, FOR SOLUTION ORAL at 08:30

## 2024-01-29 RX ADMIN — CLOTRIMAZOLE: 10 CREAM TOPICAL at 08:31

## 2024-01-29 RX ADMIN — DEXAMETHASONE SODIUM PHOSPHATE 6 MG: 10 INJECTION INTRAMUSCULAR; INTRAVENOUS at 06:54

## 2024-01-29 RX ADMIN — DOCUSATE SODIUM 100 MG: 100 CAPSULE, LIQUID FILLED ORAL at 08:30

## 2024-01-29 RX ADMIN — ARFORMOTEROL TARTRATE 15 MCG: 15 SOLUTION RESPIRATORY (INHALATION) at 06:45

## 2024-01-29 ASSESSMENT — PAIN SCALES - GENERAL: PAINLEVEL_OUTOF10: 0

## 2024-01-29 NOTE — DISCHARGE SUMMARY
Internal Medicine Progress Note     [facility]     Ruy Awad D.O., JOSE.                         Srikanth De Leon D.O., SUREKHA Bhagat D.O.     Rosalva Mcintosh, MSN, APRN, NP-C  Jah Bella, MSN, APRN-CNP  Lonnie Fuentes, MSN, APRN, NP-C       Internal Medicine  Discharge Summary    NAME: Nettie Lara  :  1955  MRN:  [id number]  PCP:Geraldo Myers MD  ADMITTED: 2024      DISCHARGED: 24    ADMITTING PHYSICIAN: Ruy Awad DO    CONSULTANT(S):   None     ADMITTING DIAGNOSIS:   Shortness of breath [R06.02]  Generalized weakness [R53.1]  Bilateral lower extremity edema [R60.0]  COVID [U07.1]     DISCHARGE DIAGNOSES:   Sepsis secondary to COVID-pneumonia  History of multiple sclerosis with impaired activity of daily living with amatory dysfunction  Macrocytic anemia  Elevated troponins, possibly demand ischemia  Vitamin D deficiency  Essential hypertension  History of asthma  Obesity with elevated BMI of 34.90 kg/m²    BRIEF HISTORY OF PRESENT ILLNESS:   This is a pleasant  68-year-old white female, who was admitted to [facility].  The  patient presented to the hospital here through the emergency room on  2024.  The patient does have history of MS for approximately 28  years, which has been progressive.  Presently, patient has been becoming  short of breath with increased bilateral swelling of lower extremities.   The patient was tested positive for COVID.  The patient did home chest  x-ray which came back positive.  The patient does have problem with  increasing shortness of breath, weakness and fatigue.  The patient does  live alone.  The patient presented to the hospital here, was seen and  evaluated.  At this time, the patient was admitted to the hospital to  the general telemetry floor under the service of Dr. Awad and Dr. De Leon.  The patient was seen in the emergency room.  Exam at this time  revealed a pleasant 68-year-old
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
## 2024-01-29 NOTE — PLAN OF CARE
Problem: Safety - Adult  Goal: Free from fall injury  Outcome: Progressing     Problem: ABCDS Injury Assessment  Goal: Absence of physical injury  Outcome: Progressing     Problem: Skin/Tissue Integrity  Goal: Absence of new skin breakdown  Description: 1.  Monitor for areas of redness and/or skin breakdown  2.  Assess vascular access sites hourly  3.  Every 4-6 hours minimum:  Change oxygen saturation probe site  4.  Every 4-6 hours:  If on nasal continuous positive airway pressure, respiratory therapy assess nares and determine need for appliance change or resting period.  Outcome: Progressing     Problem: Pain  Goal: Verbalizes/displays adequate comfort level or baseline comfort level  Outcome: Progressing     Problem: Discharge Planning  Goal: Discharge to home or other facility with appropriate resources  Outcome: Progressing

## 2024-01-29 NOTE — CARE COORDINATION
1/29/2024 0940 CM note: COVID + 1/26/24, on room air. Pt with hx MS and lives alone in an apt, elevator access. She is in a w/c mostly but she is as independent as she can be. Pt declines HHC, she relays she has an aide 5x/week and will resume services Wed.  W/C transportation to home([facility] [address] [place], [place]) arranged per pt request. PAS w/c transport to pick pt up at 1:30pm. Pt informed and relays her neighbor will be home to assist her.  Nursing informed. Jani VALIENTE     
home.  Pt plans to return home at discharge, will need WC transportation home but states will need to be arranged for later afternoon so that a neighbor can get her WC and meet her at the door.  Denies any other needs at this time.    The Plan for Transition of Care is related to the following treatment goals of Shortness of breath [R06.02]  Generalized weakness [R53.1]  Bilateral lower extremity edema [R60.0]  COVID [U07.1]      Ana Gracia RN  Case Management Department  Ph: [phone]

## 2024-01-29 NOTE — DISCHARGE INSTRUCTIONS
Your information:  Name: Nettie Lara  : 1955    Your instructions:    You are being discharged home. Please follow up with your PCP and take your medications as prescribed.     IF YOU EXPERIENCE ANY OF THE FOLLOWING SYMPTOMS, CHEST PAIN, SHORTNESS OF BREATH, COUGHING UP BLOOD OR BLOODY SPUTUM, STOMACH PAIN OR CRAMPING, DARK, TARRY STOOLS, LOSS OF APPETITE, GENERAL NOT FEELING WELL, SIGNS AND SYMPTOMS OF INFECTION LIKE FEVER AND OR CHILLS, PLEASE CALL DR HAQ OR RETURN TO THE EMERGENCY ROOM.     What to do after you leave the hospital:    Recommended diet: {diet:76214}    Recommended activity: {discharge activity:08661}        The following personal items were collected during your admission and were returned to you:    Belongings  Dental Appliances: Uppers, Lowers, At home  Vision - Corrective Lenses: Eyeglasses, At home  Hearing Aid: None  Clothing: Pajamas, Jacket/Coat, Other (Comment) (slippers)  Jewelry: None  Body Piercings Removed: No  Electronic Devices: Cell Phone,   Weapons (Notify Protective Services/Security): None  Other Valuables: Other (Comment) (none)  Home Medications: None  Valuables Given To: Other (Comment) (n/a)  Provide Name(s) of Who Valuable(s) Were Given To: n/a  Responsible person(s) in the waiting room: no  Patient approves for provider to speak to responsible person post operatively: Yes    Information obtained by:  By signing below, I understand that if any problems occur once I leave the hospital I am to contact ***.  I understand and acknowledge receipt of the instructions indicated above.

## 2024-01-30 ENCOUNTER — CARE COORDINATION (OUTPATIENT)
Dept: PRIMARY CARE CLINIC | Age: 69
End: 2024-01-30

## 2024-01-30 ASSESSMENT — PATIENT HEALTH QUESTIONNAIRE - PHQ9
2. FEELING DOWN, DEPRESSED OR HOPELESS: 0
1. LITTLE INTEREST OR PLEASURE IN DOING THINGS: 0
SUM OF ALL RESPONSES TO PHQ QUESTIONS 1-9: 0
SUM OF ALL RESPONSES TO PHQ9 QUESTIONS 1 & 2: 0
SUM OF ALL RESPONSES TO PHQ QUESTIONS 1-9: 0

## 2024-01-30 NOTE — CARE COORDINATION
Care Transitions Initial Follow Up Call    Call within 2 business days of discharge: Yes    Patient Current Location:  Home: [address] [place] [id number]    Care Transition Nurse contacted the patient by telephone to perform post hospital discharge assessment. Verified name and  with patient as identifiers. Provided introduction to self, and explanation of the Care Transition Nurse role.     Patient: Nettie Lara Patient : 1955   MRN: [id number]  Reason for Admission: COVID pos.  Discharge Date: 24 RARS: Readmission Risk Score: 15.1      Last Discharge Facility       Date Complaint Diagnosis Description Type Department Provider    24 Positive For Covid-19; Leg Swelling COVID ... [facility], ; Buck Jeronimo...            Was this an external facility discharge? No Discharge Facility: [facility]     Challenges to be reviewed by the provider   Additional needs identified to be addressed with provider: No  none               Method of communication with provider: phone.    Spoke with Arian, she was discharged yesterday afternoon, (24). She's feeling better, just weak. She was sent home with several new Rx: Cefdinir, Decadron, Guaifenesin expectorant, and Mag Oxide to help move her bowels.She denies any falls. She has no other needs at this time. Dr. Myers will see her 24 mid afternoon.      Care Transition Nurse reviewed discharge instructions with patient who verbalized understanding. The patient was given an opportunity to ask questions and does not have any further questions or concerns at this time. Were discharge instructions available to patient? Yes. Reviewed appropriate site of care based on symptoms and resources available to patient including: PCP. The patient agrees to contact the PCP office for questions related to their healthcare.     Advance Care Planning:   Does patient have an Advance

## 2024-02-01 ENCOUNTER — OFFICE VISIT (OUTPATIENT)
Dept: PRIMARY CARE CLINIC | Age: 69
End: 2024-02-01

## 2024-02-01 VITALS
RESPIRATION RATE: 18 BRPM | DIASTOLIC BLOOD PRESSURE: 79 MMHG | OXYGEN SATURATION: 95 % | SYSTOLIC BLOOD PRESSURE: 102 MMHG | HEART RATE: 78 BPM

## 2024-02-01 DIAGNOSIS — I10 PRIMARY HYPERTENSION: ICD-10-CM

## 2024-02-01 DIAGNOSIS — N39.0 RECURRENT UTI: ICD-10-CM

## 2024-02-01 DIAGNOSIS — G82.20 PARAPLEGIA (HCC): ICD-10-CM

## 2024-02-01 DIAGNOSIS — R26.2 UNABLE TO WALK: ICD-10-CM

## 2024-02-01 DIAGNOSIS — U07.1 COVID-19: ICD-10-CM

## 2024-02-01 DIAGNOSIS — K59.00 CONSTIPATION, UNSPECIFIED CONSTIPATION TYPE: ICD-10-CM

## 2024-02-01 DIAGNOSIS — G35 MULTIPLE SCLEROSIS (HCC): Primary | ICD-10-CM

## 2024-02-01 PROBLEM — R53.83 FATIGUE: Status: RESOLVED | Noted: 2020-12-04 | Resolved: 2024-02-01

## 2024-02-01 LAB
EKG ATRIAL RATE: 86 BPM
EKG P AXIS: 42 DEGREES
EKG P-R INTERVAL: 146 MS
EKG Q-T INTERVAL: 406 MS
EKG QRS DURATION: 92 MS
EKG QTC CALCULATION (BAZETT): 485 MS
EKG R AXIS: -12 DEGREES
EKG T AXIS: 66 DEGREES
EKG VENTRICULAR RATE: 86 BPM

## 2024-02-01 RX ORDER — DEXAMETHASONE 4 MG/1
TABLET ORAL
COMMUNITY
Start: 2024-01-29

## 2024-02-01 RX ORDER — FUROSEMIDE 40 MG/1
40 TABLET ORAL DAILY
COMMUNITY
Start: 2024-01-29

## 2024-02-01 NOTE — PROGRESS NOTES
2/1/2024     Home visit medically necessary in lieu of an office visit due to: wheelchair bound, difficult to get out.       HPI:  Patient was recently admitted to [facility] for Covid-19 related fatigue from 1/26/24-1/29/24. It was felt she might have UTI. She was treated with oral antibiotic cefdinir and corticosteroids Decadron. She was discharged in improved condition. She says she is still weak but she is able to stand and transfer. She continues to have nasal drainage. She c/o side effects from cefdinir and Decadron and doesn't want to take them. She is having trouble moving her bowels but is not taking PEG. She continues to have problems with incontinence due to MS. She drinks Boost supplement daily. She is taking Lasix 40 mg daily and tries to elevate her legs. She is having an aide start this week (her niece).      REVIEW OF SYSTEMS: General: Weight stable, generally healthy, no change in strength or exercise tolerance. Heart: No palpitations, no syncope, no orthopnea. EDEMA OF LEGS AT TIMES.  Abdomen: Chronic constipation. No change in appetite, no dysphagia, no abdominal pains, no bowel habit changes, no emesis, no melena. : No urinary urgency, no dysuria, no change in nature of urine. Neurologic:   Unable to walk without holding on to something. In w/c most of the time.  No tremor, no seizures, no changes in mentation.   All other systems negative.       PAST MEDICAL HISTORY: (Major events, hospitalizations, surgeries): Pneumonia (2010), 1998 Vag hyst, 1978 naina, append, freq epidural inclusion cysts tx'd with docycycline.   MS dx'd Mar 8, 1991.   IV corticosteroids 1991-92.   Has never been on MS meds. 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   Known allergies: NKDA.   Ongoing medical problems: Multiple sclerosis, Asthma, HTN, hypoglycemia, scoliosis, DDD with sciatic, arthritis, osteoporosis, chronic LBP.  Preventative: COVID vac - 3/22/21, 4/19134/21 (Refuses booster),  Pneumovax 23 - 11/2009.

## 2024-02-25 ENCOUNTER — HOSPITAL ENCOUNTER (INPATIENT)
Age: 69
LOS: 3 days | Discharge: NURSING, REHAB OR LONG-TERM CARE | DRG: 720 | End: 2024-02-28
Attending: EMERGENCY MEDICINE | Admitting: INTERNAL MEDICINE
Payer: MEDICAID

## 2024-02-25 ENCOUNTER — APPOINTMENT (OUTPATIENT)
Dept: GENERAL RADIOLOGY | Age: 69
DRG: 720 | End: 2024-02-25
Payer: MEDICAID

## 2024-02-25 DIAGNOSIS — R31.9 URINARY TRACT INFECTION WITH HEMATURIA, SITE UNSPECIFIED: ICD-10-CM

## 2024-02-25 DIAGNOSIS — J18.9 PNEUMONIA DUE TO INFECTIOUS ORGANISM, UNSPECIFIED LATERALITY, UNSPECIFIED PART OF LUNG: Primary | ICD-10-CM

## 2024-02-25 DIAGNOSIS — R79.89 ELEVATED TROPONIN: ICD-10-CM

## 2024-02-25 DIAGNOSIS — R79.89 ELEVATED LFTS: ICD-10-CM

## 2024-02-25 DIAGNOSIS — N39.0 URINARY TRACT INFECTION WITH HEMATURIA, SITE UNSPECIFIED: ICD-10-CM

## 2024-02-25 LAB
ALBUMIN SERPL-MCNC: 4 G/DL (ref 3.5–5.2)
ALP SERPL-CCNC: 114 U/L (ref 35–104)
ALT SERPL-CCNC: 36 U/L (ref 0–32)
ANION GAP SERPL CALCULATED.3IONS-SCNC: 13 MMOL/L (ref 7–16)
AST SERPL-CCNC: 45 U/L (ref 0–31)
BASOPHILS # BLD: 0.07 K/UL (ref 0–0.2)
BASOPHILS NFR BLD: 1 % (ref 0–2)
BILIRUB SERPL-MCNC: 0.6 MG/DL (ref 0–1.2)
BUN SERPL-MCNC: 18 MG/DL (ref 6–23)
CALCIUM SERPL-MCNC: 9.6 MG/DL (ref 8.6–10.2)
CHLORIDE SERPL-SCNC: 102 MMOL/L (ref 98–107)
CO2 SERPL-SCNC: 21 MMOL/L (ref 22–29)
CREAT SERPL-MCNC: 0.9 MG/DL (ref 0.5–1)
EOSINOPHIL # BLD: 0.27 K/UL (ref 0.05–0.5)
EOSINOPHILS RELATIVE PERCENT: 3 % (ref 0–6)
ERYTHROCYTE [DISTWIDTH] IN BLOOD BY AUTOMATED COUNT: 13.9 % (ref 11.5–15)
GFR SERPL CREATININE-BSD FRML MDRD: >60 ML/MIN/1.73M2
GLUCOSE SERPL-MCNC: 111 MG/DL (ref 74–99)
HCT VFR BLD AUTO: 47.9 % (ref 34–48)
HGB BLD-MCNC: 15.8 G/DL (ref 11.5–15.5)
IMM GRANULOCYTES # BLD AUTO: 0.21 K/UL (ref 0–0.58)
IMM GRANULOCYTES NFR BLD: 2 % (ref 0–5)
LYMPHOCYTES NFR BLD: 0.97 K/UL (ref 1.5–4)
LYMPHOCYTES RELATIVE PERCENT: 10 % (ref 20–42)
MCH RBC QN AUTO: 33.7 PG (ref 26–35)
MCHC RBC AUTO-ENTMCNC: 33 G/DL (ref 32–34.5)
MCV RBC AUTO: 102.1 FL (ref 80–99.9)
MONOCYTES NFR BLD: 0.96 K/UL (ref 0.1–0.95)
MONOCYTES NFR BLD: 10 % (ref 2–12)
NEUTROPHILS NFR BLD: 75 % (ref 43–80)
NEUTS SEG NFR BLD: 7.64 K/UL (ref 1.8–7.3)
PLATELET, FLUORESCENCE: 349 K/UL (ref 130–450)
PMV BLD AUTO: 10.3 FL (ref 7–12)
POTASSIUM SERPL-SCNC: 4.2 MMOL/L (ref 3.5–5)
PROT SERPL-MCNC: 7.5 G/DL (ref 6.4–8.3)
RBC # BLD AUTO: 4.69 M/UL (ref 3.5–5.5)
SARS-COV-2 RDRP RESP QL NAA+PROBE: NOT DETECTED
SODIUM SERPL-SCNC: 136 MMOL/L (ref 132–146)
SPECIMEN DESCRIPTION: NORMAL
TROPONIN I SERPL HS-MCNC: 50 NG/L (ref 0–9)
TROPONIN I SERPL HS-MCNC: 56 NG/L (ref 0–9)
WBC OTHER # BLD: 10.1 K/UL (ref 4.5–11.5)

## 2024-02-25 PROCEDURE — 2580000003 HC RX 258: Performed by: STUDENT IN AN ORGANIZED HEALTH CARE EDUCATION/TRAINING PROGRAM

## 2024-02-25 PROCEDURE — 81001 URINALYSIS AUTO W/SCOPE: CPT

## 2024-02-25 PROCEDURE — 85025 COMPLETE CBC W/AUTO DIFF WBC: CPT

## 2024-02-25 PROCEDURE — 96374 THER/PROPH/DIAG INJ IV PUSH: CPT

## 2024-02-25 PROCEDURE — 83880 ASSAY OF NATRIURETIC PEPTIDE: CPT

## 2024-02-25 PROCEDURE — 93005 ELECTROCARDIOGRAM TRACING: CPT | Performed by: STUDENT IN AN ORGANIZED HEALTH CARE EDUCATION/TRAINING PROGRAM

## 2024-02-25 PROCEDURE — 71045 X-RAY EXAM CHEST 1 VIEW: CPT

## 2024-02-25 PROCEDURE — 1200000000 HC SEMI PRIVATE

## 2024-02-25 PROCEDURE — 2500000003 HC RX 250 WO HCPCS: Performed by: STUDENT IN AN ORGANIZED HEALTH CARE EDUCATION/TRAINING PROGRAM

## 2024-02-25 PROCEDURE — 36415 COLL VENOUS BLD VENIPUNCTURE: CPT

## 2024-02-25 PROCEDURE — 87635 SARS-COV-2 COVID-19 AMP PRB: CPT

## 2024-02-25 PROCEDURE — 99285 EMERGENCY DEPT VISIT HI MDM: CPT

## 2024-02-25 PROCEDURE — 0202U NFCT DS 22 TRGT SARS-COV-2: CPT

## 2024-02-25 PROCEDURE — 6360000002 HC RX W HCPCS: Performed by: STUDENT IN AN ORGANIZED HEALTH CARE EDUCATION/TRAINING PROGRAM

## 2024-02-25 PROCEDURE — 80053 COMPREHEN METABOLIC PANEL: CPT

## 2024-02-25 PROCEDURE — 84484 ASSAY OF TROPONIN QUANT: CPT

## 2024-02-25 RX ORDER — GLUCAGON 1 MG/ML
1 KIT INJECTION PRN
Status: DISCONTINUED | OUTPATIENT
Start: 2024-02-25 | End: 2024-02-28 | Stop reason: HOSPADM

## 2024-02-25 RX ORDER — BUDESONIDE 0.5 MG/2ML
0.5 INHALANT ORAL
Status: DISCONTINUED | OUTPATIENT
Start: 2024-02-25 | End: 2024-02-28 | Stop reason: HOSPADM

## 2024-02-25 RX ORDER — ENOXAPARIN SODIUM 100 MG/ML
40 INJECTION SUBCUTANEOUS DAILY
Status: DISCONTINUED | OUTPATIENT
Start: 2024-02-26 | End: 2024-02-28 | Stop reason: HOSPADM

## 2024-02-25 RX ORDER — SENNA AND DOCUSATE SODIUM 50; 8.6 MG/1; MG/1
3 TABLET, FILM COATED ORAL DAILY PRN
Status: DISCONTINUED | OUTPATIENT
Start: 2024-02-25 | End: 2024-02-28 | Stop reason: HOSPADM

## 2024-02-25 RX ORDER — ACETAMINOPHEN 325 MG/1
650 TABLET ORAL EVERY 6 HOURS PRN
Status: DISCONTINUED | OUTPATIENT
Start: 2024-02-25 | End: 2024-02-28 | Stop reason: HOSPADM

## 2024-02-25 RX ORDER — POLYETHYLENE GLYCOL 3350 17 G/17G
17 POWDER, FOR SOLUTION ORAL DAILY PRN
Status: DISCONTINUED | OUTPATIENT
Start: 2024-02-25 | End: 2024-02-28 | Stop reason: HOSPADM

## 2024-02-25 RX ORDER — BUDESONIDE AND FORMOTEROL FUMARATE DIHYDRATE 160; 4.5 UG/1; UG/1
2 AEROSOL RESPIRATORY (INHALATION) 2 TIMES DAILY
Status: DISCONTINUED | OUTPATIENT
Start: 2024-02-25 | End: 2024-02-25 | Stop reason: CLARIF

## 2024-02-25 RX ORDER — MAGNESIUM OXIDE 400 MG/1
400 TABLET ORAL DAILY
Status: DISCONTINUED | OUTPATIENT
Start: 2024-02-26 | End: 2024-02-28 | Stop reason: HOSPADM

## 2024-02-25 RX ORDER — POTASSIUM CHLORIDE 7.45 MG/ML
10 INJECTION INTRAVENOUS PRN
Status: DISCONTINUED | OUTPATIENT
Start: 2024-02-25 | End: 2024-02-28 | Stop reason: HOSPADM

## 2024-02-25 RX ORDER — MAGNESIUM SULFATE IN WATER 40 MG/ML
2000 INJECTION, SOLUTION INTRAVENOUS PRN
Status: DISCONTINUED | OUTPATIENT
Start: 2024-02-25 | End: 2024-02-28 | Stop reason: HOSPADM

## 2024-02-25 RX ORDER — SODIUM CHLORIDE 0.9 % (FLUSH) 0.9 %
5-40 SYRINGE (ML) INJECTION PRN
Status: DISCONTINUED | OUTPATIENT
Start: 2024-02-25 | End: 2024-02-28 | Stop reason: HOSPADM

## 2024-02-25 RX ORDER — POTASSIUM CHLORIDE 20 MEQ/1
40 TABLET, EXTENDED RELEASE ORAL PRN
Status: DISCONTINUED | OUTPATIENT
Start: 2024-02-25 | End: 2024-02-28 | Stop reason: HOSPADM

## 2024-02-25 RX ORDER — ACETAMINOPHEN 650 MG/1
650 SUPPOSITORY RECTAL EVERY 6 HOURS PRN
Status: DISCONTINUED | OUTPATIENT
Start: 2024-02-25 | End: 2024-02-28 | Stop reason: HOSPADM

## 2024-02-25 RX ORDER — DEXTROSE MONOHYDRATE 100 MG/ML
INJECTION, SOLUTION INTRAVENOUS CONTINUOUS PRN
Status: DISCONTINUED | OUTPATIENT
Start: 2024-02-25 | End: 2024-02-28 | Stop reason: HOSPADM

## 2024-02-25 RX ORDER — ARFORMOTEROL TARTRATE 15 UG/2ML
15 SOLUTION RESPIRATORY (INHALATION)
Status: DISCONTINUED | OUTPATIENT
Start: 2024-02-25 | End: 2024-02-28 | Stop reason: HOSPADM

## 2024-02-25 RX ORDER — SODIUM CHLORIDE 0.9 % (FLUSH) 0.9 %
5-40 SYRINGE (ML) INJECTION EVERY 12 HOURS SCHEDULED
Status: DISCONTINUED | OUTPATIENT
Start: 2024-02-25 | End: 2024-02-28 | Stop reason: HOSPADM

## 2024-02-25 RX ORDER — SODIUM CHLORIDE 9 MG/ML
INJECTION, SOLUTION INTRAVENOUS PRN
Status: DISCONTINUED | OUTPATIENT
Start: 2024-02-25 | End: 2024-02-28 | Stop reason: HOSPADM

## 2024-02-25 RX ORDER — 0.9 % SODIUM CHLORIDE 0.9 %
1000 INTRAVENOUS SOLUTION INTRAVENOUS ONCE
Status: COMPLETED | OUTPATIENT
Start: 2024-02-25 | End: 2024-02-27

## 2024-02-25 RX ADMIN — DOXYCYCLINE 100 MG: 100 INJECTION, POWDER, LYOPHILIZED, FOR SOLUTION INTRAVENOUS at 23:16

## 2024-02-25 RX ADMIN — WATER 1000 MG: 1 INJECTION INTRAMUSCULAR; INTRAVENOUS; SUBCUTANEOUS at 23:08

## 2024-02-25 ASSESSMENT — PAIN SCALES - GENERAL: PAINLEVEL_OUTOF10: 5

## 2024-02-25 ASSESSMENT — PAIN DESCRIPTION - DESCRIPTORS: DESCRIPTORS: ACHING

## 2024-02-25 ASSESSMENT — PAIN - FUNCTIONAL ASSESSMENT: PAIN_FUNCTIONAL_ASSESSMENT: 0-10

## 2024-02-25 ASSESSMENT — LIFESTYLE VARIABLES
HOW OFTEN DO YOU HAVE A DRINK CONTAINING ALCOHOL: NEVER
HOW MANY STANDARD DRINKS CONTAINING ALCOHOL DO YOU HAVE ON A TYPICAL DAY: PATIENT DOES NOT DRINK

## 2024-02-25 ASSESSMENT — PAIN DESCRIPTION - ONSET: ONSET: ON-GOING

## 2024-02-25 ASSESSMENT — PAIN DESCRIPTION - PAIN TYPE: TYPE: ACUTE PAIN

## 2024-02-25 ASSESSMENT — PAIN DESCRIPTION - FREQUENCY: FREQUENCY: CONTINUOUS

## 2024-02-26 LAB
ALBUMIN SERPL-MCNC: 3.2 G/DL (ref 3.5–5.2)
ALP SERPL-CCNC: 96 U/L (ref 35–104)
ALT SERPL-CCNC: 29 U/L (ref 0–32)
ANION GAP SERPL CALCULATED.3IONS-SCNC: 13 MMOL/L (ref 7–16)
AST SERPL-CCNC: 25 U/L (ref 0–31)
B PARAP IS1001 DNA NPH QL NAA+NON-PROBE: NOT DETECTED
B PERT DNA SPEC QL NAA+PROBE: NOT DETECTED
BACTERIA URNS QL MICRO: ABNORMAL
BASOPHILS # BLD: 0.06 K/UL (ref 0–0.2)
BASOPHILS NFR BLD: 1 % (ref 0–2)
BILIRUB SERPL-MCNC: 0.4 MG/DL (ref 0–1.2)
BILIRUB UR QL STRIP: NEGATIVE
BUN SERPL-MCNC: 16 MG/DL (ref 6–23)
C PNEUM DNA NPH QL NAA+NON-PROBE: NOT DETECTED
CALCIUM SERPL-MCNC: 8.5 MG/DL (ref 8.6–10.2)
CHLORIDE SERPL-SCNC: 102 MMOL/L (ref 98–107)
CLARITY UR: ABNORMAL
CO2 SERPL-SCNC: 20 MMOL/L (ref 22–29)
COLOR UR: YELLOW
CREAT SERPL-MCNC: 0.9 MG/DL (ref 0.5–1)
EKG ATRIAL RATE: 98 BPM
EKG P AXIS: 10 DEGREES
EKG P-R INTERVAL: 122 MS
EKG Q-T INTERVAL: 348 MS
EKG QRS DURATION: 84 MS
EKG QTC CALCULATION (BAZETT): 444 MS
EKG R AXIS: -15 DEGREES
EKG T AXIS: 66 DEGREES
EKG VENTRICULAR RATE: 98 BPM
EOSINOPHIL # BLD: 0.2 K/UL (ref 0.05–0.5)
EOSINOPHILS RELATIVE PERCENT: 2 % (ref 0–6)
ERYTHROCYTE [DISTWIDTH] IN BLOOD BY AUTOMATED COUNT: 14.1 % (ref 11.5–15)
FLUAV RNA NPH QL NAA+NON-PROBE: NOT DETECTED
FLUBV RNA NPH QL NAA+NON-PROBE: NOT DETECTED
GFR SERPL CREATININE-BSD FRML MDRD: >60 ML/MIN/1.73M2
GLUCOSE SERPL-MCNC: 113 MG/DL (ref 74–99)
GLUCOSE UR STRIP-MCNC: NEGATIVE MG/DL
HADV DNA NPH QL NAA+NON-PROBE: NOT DETECTED
HCOV 229E RNA NPH QL NAA+NON-PROBE: NOT DETECTED
HCOV HKU1 RNA NPH QL NAA+NON-PROBE: NOT DETECTED
HCOV NL63 RNA NPH QL NAA+NON-PROBE: NOT DETECTED
HCOV OC43 RNA NPH QL NAA+NON-PROBE: NOT DETECTED
HCT VFR BLD AUTO: 39.8 % (ref 34–48)
HGB BLD-MCNC: 14.1 G/DL (ref 11.5–15.5)
HGB UR QL STRIP.AUTO: ABNORMAL
HMPV RNA NPH QL NAA+NON-PROBE: NOT DETECTED
HPIV1 RNA NPH QL NAA+NON-PROBE: NOT DETECTED
HPIV2 RNA NPH QL NAA+NON-PROBE: NOT DETECTED
HPIV3 RNA NPH QL NAA+NON-PROBE: NOT DETECTED
HPIV4 RNA NPH QL NAA+NON-PROBE: NOT DETECTED
IMM GRANULOCYTES # BLD AUTO: 0.26 K/UL (ref 0–0.58)
IMM GRANULOCYTES NFR BLD: 3 % (ref 0–5)
KETONES UR STRIP-MCNC: NEGATIVE MG/DL
LEUKOCYTE ESTERASE UR QL STRIP: ABNORMAL
LYMPHOCYTES NFR BLD: 0.82 K/UL (ref 1.5–4)
LYMPHOCYTES RELATIVE PERCENT: 8 % (ref 20–42)
M PNEUMO DNA NPH QL NAA+NON-PROBE: NOT DETECTED
MAGNESIUM SERPL-MCNC: 1.7 MG/DL (ref 1.6–2.6)
MCH RBC QN AUTO: 36.8 PG (ref 26–35)
MCHC RBC AUTO-ENTMCNC: 35.4 G/DL (ref 32–34.5)
MCV RBC AUTO: 103.9 FL (ref 80–99.9)
MONOCYTES NFR BLD: 1.35 K/UL (ref 0.1–0.95)
MONOCYTES NFR BLD: 13 % (ref 2–12)
NEUTROPHILS NFR BLD: 74 % (ref 43–80)
NEUTS SEG NFR BLD: 7.54 K/UL (ref 1.8–7.3)
NITRITE UR QL STRIP: POSITIVE
PH UR STRIP: 8 [PH] (ref 5–9)
PHOSPHATE SERPL-MCNC: 2.3 MG/DL (ref 2.5–4.5)
PLATELET # BLD AUTO: 276 K/UL (ref 130–450)
PMV BLD AUTO: 10.1 FL (ref 7–12)
POTASSIUM SERPL-SCNC: 3.8 MMOL/L (ref 3.5–5)
PROCALCITONIN SERPL-MCNC: 0.42 NG/ML (ref 0–0.08)
PROT SERPL-MCNC: 6.1 G/DL (ref 6.4–8.3)
PROT UR STRIP-MCNC: 100 MG/DL
RBC # BLD AUTO: 3.83 M/UL (ref 3.5–5.5)
RBC #/AREA URNS HPF: ABNORMAL /HPF
RSV RNA NPH QL NAA+NON-PROBE: NOT DETECTED
RV+EV RNA NPH QL NAA+NON-PROBE: NOT DETECTED
SARS-COV-2 RNA NPH QL NAA+NON-PROBE: NOT DETECTED
SODIUM SERPL-SCNC: 135 MMOL/L (ref 132–146)
SP GR UR STRIP: 1.02 (ref 1–1.03)
SPECIMEN DESCRIPTION: NORMAL
T4 FREE SERPL-MCNC: 1.2 NG/DL (ref 0.9–1.7)
TSH SERPL DL<=0.05 MIU/L-ACNC: 0.61 UIU/ML (ref 0.27–4.2)
UROBILINOGEN UR STRIP-ACNC: 0.2 EU/DL (ref 0–1)
WBC #/AREA URNS HPF: ABNORMAL /HPF
WBC OTHER # BLD: 10.2 K/UL (ref 4.5–11.5)

## 2024-02-26 PROCEDURE — 36415 COLL VENOUS BLD VENIPUNCTURE: CPT

## 2024-02-26 PROCEDURE — 6370000000 HC RX 637 (ALT 250 FOR IP): Performed by: INTERNAL MEDICINE

## 2024-02-26 PROCEDURE — 2500000003 HC RX 250 WO HCPCS: Performed by: INTERNAL MEDICINE

## 2024-02-26 PROCEDURE — 85025 COMPLETE CBC W/AUTO DIFF WBC: CPT

## 2024-02-26 PROCEDURE — 80053 COMPREHEN METABOLIC PANEL: CPT

## 2024-02-26 PROCEDURE — 2580000003 HC RX 258: Performed by: INTERNAL MEDICINE

## 2024-02-26 PROCEDURE — 84439 ASSAY OF FREE THYROXINE: CPT

## 2024-02-26 PROCEDURE — 83735 ASSAY OF MAGNESIUM: CPT

## 2024-02-26 PROCEDURE — 87088 URINE BACTERIA CULTURE: CPT

## 2024-02-26 PROCEDURE — 87077 CULTURE AEROBIC IDENTIFY: CPT

## 2024-02-26 PROCEDURE — 84100 ASSAY OF PHOSPHORUS: CPT

## 2024-02-26 PROCEDURE — 94640 AIRWAY INHALATION TREATMENT: CPT

## 2024-02-26 PROCEDURE — 1200000000 HC SEMI PRIVATE

## 2024-02-26 PROCEDURE — 93010 ELECTROCARDIOGRAM REPORT: CPT | Performed by: INTERNAL MEDICINE

## 2024-02-26 PROCEDURE — 87449 NOS EACH ORGANISM AG IA: CPT

## 2024-02-26 PROCEDURE — 6360000002 HC RX W HCPCS: Performed by: INTERNAL MEDICINE

## 2024-02-26 PROCEDURE — 87899 AGENT NOS ASSAY W/OPTIC: CPT

## 2024-02-26 PROCEDURE — 94664 DEMO&/EVAL PT USE INHALER: CPT

## 2024-02-26 PROCEDURE — 84443 ASSAY THYROID STIM HORMONE: CPT

## 2024-02-26 PROCEDURE — 87086 URINE CULTURE/COLONY COUNT: CPT

## 2024-02-26 PROCEDURE — 84145 PROCALCITONIN (PCT): CPT

## 2024-02-26 RX ORDER — IPRATROPIUM BROMIDE 42 UG/1
2 SPRAY, METERED NASAL DAILY
Status: DISCONTINUED | OUTPATIENT
Start: 2024-02-26 | End: 2024-02-26 | Stop reason: CLARIF

## 2024-02-26 RX ORDER — LOSARTAN POTASSIUM 50 MG/1
25 TABLET ORAL DAILY
Status: DISCONTINUED | OUTPATIENT
Start: 2024-02-26 | End: 2024-02-28 | Stop reason: HOSPADM

## 2024-02-26 RX ORDER — FUROSEMIDE 20 MG/1
20 TABLET ORAL DAILY
Status: DISCONTINUED | OUTPATIENT
Start: 2024-02-26 | End: 2024-02-28 | Stop reason: HOSPADM

## 2024-02-26 RX ORDER — ALBUTEROL SULFATE 2.5 MG/3ML
2.5 SOLUTION RESPIRATORY (INHALATION) EVERY 4 HOURS PRN
Status: DISCONTINUED | OUTPATIENT
Start: 2024-02-26 | End: 2024-02-28 | Stop reason: HOSPADM

## 2024-02-26 RX ADMIN — FUROSEMIDE 20 MG: 20 TABLET ORAL at 11:42

## 2024-02-26 RX ADMIN — MAGNESIUM OXIDE 400 MG: 400 TABLET ORAL at 11:43

## 2024-02-26 RX ADMIN — LOSARTAN POTASSIUM 25 MG: 50 TABLET, FILM COATED ORAL at 11:42

## 2024-02-26 RX ADMIN — BUDESONIDE 500 MCG: 0.5 INHALANT RESPIRATORY (INHALATION) at 05:10

## 2024-02-26 RX ADMIN — ALBUTEROL SULFATE 2.5 MG: 2.5 SOLUTION RESPIRATORY (INHALATION) at 05:10

## 2024-02-26 RX ADMIN — ARFORMOTEROL TARTRATE 15 MCG: 15 SOLUTION RESPIRATORY (INHALATION) at 18:12

## 2024-02-26 RX ADMIN — ACETAMINOPHEN 650 MG: 325 TABLET ORAL at 11:43

## 2024-02-26 RX ADMIN — WATER 40 MG: 1 INJECTION INTRAMUSCULAR; INTRAVENOUS; SUBCUTANEOUS at 11:45

## 2024-02-26 RX ADMIN — SODIUM CHLORIDE, PRESERVATIVE FREE 10 ML: 5 INJECTION INTRAVENOUS at 20:25

## 2024-02-26 RX ADMIN — WATER 40 MG: 1 INJECTION INTRAMUSCULAR; INTRAVENOUS; SUBCUTANEOUS at 17:39

## 2024-02-26 RX ADMIN — SODIUM CHLORIDE 1000 ML: 9 INJECTION, SOLUTION INTRAVENOUS at 01:03

## 2024-02-26 RX ADMIN — METOPROLOL TARTRATE 25 MG: 25 TABLET, FILM COATED ORAL at 20:24

## 2024-02-26 RX ADMIN — DOXYCYCLINE 100 MG: 100 INJECTION, POWDER, LYOPHILIZED, FOR SOLUTION INTRAVENOUS at 11:51

## 2024-02-26 RX ADMIN — ENOXAPARIN SODIUM 40 MG: 100 INJECTION SUBCUTANEOUS at 11:40

## 2024-02-26 RX ADMIN — BUDESONIDE 500 MCG: 0.5 INHALANT RESPIRATORY (INHALATION) at 18:12

## 2024-02-26 RX ADMIN — METOPROLOL TARTRATE 25 MG: 25 TABLET, FILM COATED ORAL at 11:43

## 2024-02-26 RX ADMIN — ARFORMOTEROL TARTRATE 15 MCG: 15 SOLUTION RESPIRATORY (INHALATION) at 05:10

## 2024-02-26 RX ADMIN — SODIUM CHLORIDE, PRESERVATIVE FREE 10 ML: 5 INJECTION INTRAVENOUS at 11:59

## 2024-02-26 RX ADMIN — ACETAMINOPHEN 650 MG: 325 TABLET ORAL at 04:44

## 2024-02-26 ASSESSMENT — PAIN SCALES - GENERAL
PAINLEVEL_OUTOF10: 0
PAINLEVEL_OUTOF10: 7

## 2024-02-26 ASSESSMENT — PAIN DESCRIPTION - DESCRIPTORS
DESCRIPTORS: ACHING;DULL;SORE
DESCRIPTORS: OTHER (COMMENT)

## 2024-02-26 ASSESSMENT — PAIN DESCRIPTION - FREQUENCY: FREQUENCY: INTERMITTENT

## 2024-02-26 ASSESSMENT — PAIN DESCRIPTION - PAIN TYPE: TYPE: ACUTE PAIN

## 2024-02-26 ASSESSMENT — PAIN DESCRIPTION - LOCATION
LOCATION: HEAD
LOCATION: HEAD

## 2024-02-26 ASSESSMENT — PAIN DESCRIPTION - ORIENTATION: ORIENTATION: MID

## 2024-02-26 ASSESSMENT — PAIN - FUNCTIONAL ASSESSMENT: PAIN_FUNCTIONAL_ASSESSMENT: ACTIVITIES ARE NOT PREVENTED

## 2024-02-26 NOTE — ACP (ADVANCE CARE PLANNING)
Advance Care Planning   Healthcare Decision Maker:    Primary Decision Maker: Dena Stern (DP) - Other - [phone]

## 2024-02-26 NOTE — H&P
exchange, clear to auscultation bilaterally, no wheezes, rhonchi, or rales,     CARDIOVASCULAR:    Normal apical impulse, regular rate and rhythm, normal S1 and S2, no S3 or S4, and no murmur noted    ABDOMEN:    No scars, normal bowel sounds, soft, non-distended, non-tender, no masses palpated, no hepatosplenomegaly, no rebound or guarding elicited on palpation     MUSCULOSKELETAL:    There is no redness, warmth, or swelling of the joints.  Full range of motion noted.  Motor strength is 5 out of 5 all extremities bilaterally.  Tone is normal.    NEUROLOGIC:    Awake, alert, oriented to name, place and time.  Cranial nerves II-XII are grossly intact.  Motor is 5 out of 5 bilaterally.      SKIN:    No bruising or bleeding.  No redness, warmth, or swelling    EXTREMITIES:    Peripheral pulses present.  No edema, cyanosis, or swelling.    LINES/CATHETERS     LABORATORY DATA:  CBC with Differential:    Lab Results   Component Value Date/Time    WBC 10.1 02/25/2024 07:34 PM    RBC 4.69 02/25/2024 07:34 PM    HGB 15.8 02/25/2024 07:34 PM    HCT 47.9 02/25/2024 07:34 PM     01/29/2024 08:18 AM    .1 02/25/2024 07:34 PM    MCH 33.7 02/25/2024 07:34 PM    MCHC 33.0 02/25/2024 07:34 PM    RDW 13.9 02/25/2024 07:34 PM    SEGSPCT 50 05/28/2011 04:30 AM    METASPCT 1.0 12/19/2022 08:47 AM    LYMPHOPCT 10 02/25/2024 07:34 PM    MONOPCT 10 02/25/2024 07:34 PM    MYELOPCT 0.9 12/20/2022 08:56 AM    BASOPCT 1 02/25/2024 07:34 PM    MONOSABS 0.96 02/25/2024 07:34 PM    LYMPHSABS 0.97 02/25/2024 07:34 PM    EOSABS 0.27 02/25/2024 07:34 PM    BASOSABS 0.07 02/25/2024 07:34 PM     CMP:    Lab Results   Component Value Date/Time     02/25/2024 07:34 PM    K 4.2 02/25/2024 07:34 PM    K 4.2 12/18/2022 07:37 AM     02/25/2024 07:34 PM    CO2 21 02/25/2024 07:34 PM    BUN 18 02/25/2024 07:34 PM    CREATININE 0.9 02/25/2024 07:34 PM    GFRAA >60 02/28/2022 11:05 AM    LABGLOM >60 02/25/2024 07:34 PM    GLUCOSE 111

## 2024-02-27 LAB
ALBUMIN SERPL-MCNC: 3.2 G/DL (ref 3.5–5.2)
ALP SERPL-CCNC: 83 U/L (ref 35–104)
ALT SERPL-CCNC: 28 U/L (ref 0–32)
ANION GAP SERPL CALCULATED.3IONS-SCNC: 11 MMOL/L (ref 7–16)
AST SERPL-CCNC: 20 U/L (ref 0–31)
BASOPHILS # BLD: 0.05 K/UL (ref 0–0.2)
BASOPHILS NFR BLD: 1 % (ref 0–2)
BILIRUB SERPL-MCNC: 0.3 MG/DL (ref 0–1.2)
BUN SERPL-MCNC: 17 MG/DL (ref 6–23)
CALCIUM SERPL-MCNC: 8.3 MG/DL (ref 8.6–10.2)
CHLORIDE SERPL-SCNC: 105 MMOL/L (ref 98–107)
CO2 SERPL-SCNC: 21 MMOL/L (ref 22–29)
CREAT SERPL-MCNC: 0.8 MG/DL (ref 0.5–1)
EOSINOPHIL # BLD: 0 K/UL (ref 0.05–0.5)
EOSINOPHILS RELATIVE PERCENT: 0 % (ref 0–6)
ERYTHROCYTE [DISTWIDTH] IN BLOOD BY AUTOMATED COUNT: 14 % (ref 11.5–15)
GFR SERPL CREATININE-BSD FRML MDRD: >60 ML/MIN/1.73M2
GLUCOSE SERPL-MCNC: 169 MG/DL (ref 74–99)
HCT VFR BLD AUTO: 41.2 % (ref 34–48)
HGB BLD-MCNC: 14.2 G/DL (ref 11.5–15.5)
IMM GRANULOCYTES # BLD AUTO: 0.36 K/UL (ref 0–0.58)
IMM GRANULOCYTES NFR BLD: 4 % (ref 0–5)
L PNEUMO1 AG UR QL IA.RAPID: NEGATIVE
LYMPHOCYTES NFR BLD: 1.41 K/UL (ref 1.5–4)
LYMPHOCYTES RELATIVE PERCENT: 15 % (ref 20–42)
MAGNESIUM SERPL-MCNC: 1.9 MG/DL (ref 1.6–2.6)
MCH RBC QN AUTO: 35.3 PG (ref 26–35)
MCHC RBC AUTO-ENTMCNC: 34.5 G/DL (ref 32–34.5)
MCV RBC AUTO: 102.5 FL (ref 80–99.9)
MONOCYTES NFR BLD: 0.34 K/UL (ref 0.1–0.95)
MONOCYTES NFR BLD: 4 % (ref 2–12)
NEUTROPHILS NFR BLD: 77 % (ref 43–80)
NEUTS SEG NFR BLD: 7.21 K/UL (ref 1.8–7.3)
PHOSPHATE SERPL-MCNC: 2.3 MG/DL (ref 2.5–4.5)
PLATELET # BLD AUTO: 316 K/UL (ref 130–450)
PMV BLD AUTO: 9.8 FL (ref 7–12)
POTASSIUM SERPL-SCNC: 4.1 MMOL/L (ref 3.5–5)
PROT SERPL-MCNC: 6.3 G/DL (ref 6.4–8.3)
RBC # BLD AUTO: 4.02 M/UL (ref 3.5–5.5)
S PNEUM AG SPEC QL: NEGATIVE
SODIUM SERPL-SCNC: 137 MMOL/L (ref 132–146)
SPECIMEN SOURCE: NORMAL
WBC OTHER # BLD: 9.4 K/UL (ref 4.5–11.5)

## 2024-02-27 PROCEDURE — 1200000000 HC SEMI PRIVATE

## 2024-02-27 PROCEDURE — 6360000002 HC RX W HCPCS: Performed by: INTERNAL MEDICINE

## 2024-02-27 PROCEDURE — 85025 COMPLETE CBC W/AUTO DIFF WBC: CPT

## 2024-02-27 PROCEDURE — 2500000003 HC RX 250 WO HCPCS: Performed by: INTERNAL MEDICINE

## 2024-02-27 PROCEDURE — 6370000000 HC RX 637 (ALT 250 FOR IP): Performed by: INTERNAL MEDICINE

## 2024-02-27 PROCEDURE — 36415 COLL VENOUS BLD VENIPUNCTURE: CPT

## 2024-02-27 PROCEDURE — 80053 COMPREHEN METABOLIC PANEL: CPT

## 2024-02-27 PROCEDURE — 94640 AIRWAY INHALATION TREATMENT: CPT

## 2024-02-27 PROCEDURE — 97161 PT EVAL LOW COMPLEX 20 MIN: CPT | Performed by: PHYSICAL THERAPIST

## 2024-02-27 PROCEDURE — 97530 THERAPEUTIC ACTIVITIES: CPT | Performed by: PHYSICAL THERAPIST

## 2024-02-27 PROCEDURE — 97165 OT EVAL LOW COMPLEX 30 MIN: CPT

## 2024-02-27 PROCEDURE — 84100 ASSAY OF PHOSPHORUS: CPT

## 2024-02-27 PROCEDURE — 2580000003 HC RX 258: Performed by: INTERNAL MEDICINE

## 2024-02-27 PROCEDURE — 83735 ASSAY OF MAGNESIUM: CPT

## 2024-02-27 PROCEDURE — 97110 THERAPEUTIC EXERCISES: CPT | Performed by: PHYSICAL THERAPIST

## 2024-02-27 PROCEDURE — 97530 THERAPEUTIC ACTIVITIES: CPT

## 2024-02-27 PROCEDURE — 97535 SELF CARE MNGMENT TRAINING: CPT

## 2024-02-27 RX ADMIN — MAGNESIUM OXIDE 400 MG: 400 TABLET ORAL at 10:44

## 2024-02-27 RX ADMIN — SENNOSIDES AND DOCUSATE SODIUM 3 TABLET: 8.6; 5 TABLET ORAL at 10:56

## 2024-02-27 RX ADMIN — METOPROLOL TARTRATE 25 MG: 25 TABLET, FILM COATED ORAL at 10:44

## 2024-02-27 RX ADMIN — BUDESONIDE 500 MCG: 0.5 INHALANT RESPIRATORY (INHALATION) at 16:28

## 2024-02-27 RX ADMIN — ARFORMOTEROL TARTRATE 15 MCG: 15 SOLUTION RESPIRATORY (INHALATION) at 16:28

## 2024-02-27 RX ADMIN — DOXYCYCLINE 100 MG: 100 INJECTION, POWDER, LYOPHILIZED, FOR SOLUTION INTRAVENOUS at 22:57

## 2024-02-27 RX ADMIN — DOXYCYCLINE 100 MG: 100 INJECTION, POWDER, LYOPHILIZED, FOR SOLUTION INTRAVENOUS at 00:32

## 2024-02-27 RX ADMIN — SODIUM CHLORIDE, PRESERVATIVE FREE 10 ML: 5 INJECTION INTRAVENOUS at 11:15

## 2024-02-27 RX ADMIN — WATER 1000 MG: 1 INJECTION INTRAMUSCULAR; INTRAVENOUS; SUBCUTANEOUS at 22:45

## 2024-02-27 RX ADMIN — SODIUM CHLORIDE, PRESERVATIVE FREE 10 ML: 5 INJECTION INTRAVENOUS at 21:53

## 2024-02-27 RX ADMIN — METOPROLOL TARTRATE 25 MG: 25 TABLET, FILM COATED ORAL at 21:53

## 2024-02-27 RX ADMIN — WATER 40 MG: 1 INJECTION INTRAMUSCULAR; INTRAVENOUS; SUBCUTANEOUS at 19:24

## 2024-02-27 RX ADMIN — WATER 40 MG: 1 INJECTION INTRAMUSCULAR; INTRAVENOUS; SUBCUTANEOUS at 10:45

## 2024-02-27 RX ADMIN — FUROSEMIDE 20 MG: 20 TABLET ORAL at 10:43

## 2024-02-27 RX ADMIN — WATER 1000 MG: 1 INJECTION INTRAMUSCULAR; INTRAVENOUS; SUBCUTANEOUS at 00:26

## 2024-02-27 RX ADMIN — LOSARTAN POTASSIUM 25 MG: 50 TABLET, FILM COATED ORAL at 10:44

## 2024-02-27 RX ADMIN — DOXYCYCLINE 100 MG: 100 INJECTION, POWDER, LYOPHILIZED, FOR SOLUTION INTRAVENOUS at 10:50

## 2024-02-27 RX ADMIN — WATER 40 MG: 1 INJECTION INTRAMUSCULAR; INTRAVENOUS; SUBCUTANEOUS at 00:26

## 2024-02-27 RX ADMIN — ENOXAPARIN SODIUM 40 MG: 100 INJECTION SUBCUTANEOUS at 10:42

## 2024-02-27 ASSESSMENT — PAIN DESCRIPTION - PAIN TYPE: TYPE: ACUTE PAIN

## 2024-02-27 ASSESSMENT — PAIN DESCRIPTION - ORIENTATION: ORIENTATION: LEFT

## 2024-02-27 ASSESSMENT — PAIN DESCRIPTION - FREQUENCY: FREQUENCY: INTERMITTENT

## 2024-02-27 ASSESSMENT — PAIN SCALES - GENERAL
PAINLEVEL_OUTOF10: 4
PAINLEVEL_OUTOF10: 0

## 2024-02-27 ASSESSMENT — PAIN DESCRIPTION - ONSET: ONSET: ON-GOING

## 2024-02-27 ASSESSMENT — PAIN DESCRIPTION - DESCRIPTORS: DESCRIPTORS: ACHING;DISCOMFORT;TENDER

## 2024-02-27 ASSESSMENT — PAIN DESCRIPTION - LOCATION: LOCATION: OTHER (COMMENT)

## 2024-02-27 ASSESSMENT — PAIN - FUNCTIONAL ASSESSMENT: PAIN_FUNCTIONAL_ASSESSMENT: PREVENTS OR INTERFERES SOME ACTIVE ACTIVITIES AND ADLS

## 2024-02-27 NOTE — DISCHARGE INSTR - COC
Continuity of Care Form    Patient Name: Nettie Lara   :  1955  MRN:  [id number]    Admit date:  2024  Discharge date:  2024    Code Status Order: Full Code   Advance Directives:     Admitting Physician:  Ruy Awad DO  PCP: Geraldo Myers MD    Discharging Nurse: Amanda Vásquez RN   Discharging Hospital Unit/Room#: 0433/0433-02  Discharging Unit Phone Number: [phone]    Emergency Contact:   Extended Emergency Contact Information  Primary Emergency Contact: Dena Stern (DPOA)  Home Phone: [phone]  Mobile Phone: [phone]  Relation: Other   needed? No    Past Surgical History:  Past Surgical History:   Procedure Laterality Date    APPENDECTOMY      BLADDER SURGERY Left 2023    CYSTOSCOPY RETROGRADE PYELOGRAM  left STENT REMOVAL, PLACEMENT OF LEFT STENT performed by Ruy Deng MD at [facility] OR    CHOLECYSTECTOMY      CYSTOSCOPY INSERTION / REMOVAL STENT / STONE Right 2020    CYSTOSCOPY RETROGRADE PYELOGRAM STENT INSERTION LEFT performed by Tony Howard DO at [facility] OR    HYSTERECTOMY (CERVIX STATUS UNKNOWN)      LITHOTRIPSY Left 2020    CYSTOSCOPY RETROGRADE PYELOGRAM URETEROSCOPY  LEFT J-STENT EXCHANGE, INSERTION JUNIOR CATHETER---FACILITY------ performed by Tony Howard DO at [facility] OR    LITHOTRIPSY Left 2023    CYSTOSCOPY LEFT ESWL EXTRACORPOREAL SHOCK WAVE LITHOTRIPSY WITH LEFT STENT INSERTION (HOLD TIME 1130 FOR TRANSPORT) performed by Wilson Finney MD at [facility] OR    LITHOTRIPSY Left 2023    CYSTOSCOPY RETROGRADE PYELOGRAM LEFT URETEROSCOPY  WITH LASER LITHOTRIPSY LEFT J STENT INSERTION performed by Tony Howard DO at [facility] OR    TONSILLECTOMY         Immunization History:   Immunization History   Administered Date(s) Administered    COVID-19, MODERNA BLUE border, Primary or Immunocompromised, (age 12y+), IM, 100 mcg/0.5mL 2021, 2021    Influenza Vaccine, unspecified formulation 10/01/2012, 10/01/2017, 10/01/2018    Influenza

## 2024-02-27 NOTE — PLAN OF CARE
Problem: Pain  Goal: Verbalizes/displays adequate comfort level or baseline comfort level  2/27/2024 0327 by Kathy Ji RN  Outcome: Progressing  2/26/2024 2235 by Kathy Todd RN  Outcome: Progressing     Problem: Skin/Tissue Integrity  Goal: Absence of new skin breakdown  Description: 1.  Monitor for areas of redness and/or skin breakdown  2.  Assess vascular access sites hourly  3.  Every 4-6 hours minimum:  Change oxygen saturation probe site  4.  Every 4-6 hours:  If on nasal continuous positive airway pressure, respiratory therapy assess nares and determine need for appliance change or resting period.  2/27/2024 0327 by Kathy Ji RN  Outcome: Progressing  2/26/2024 2235 by Kathy Todd RN  Outcome: Progressing     Problem: Safety - Adult  Goal: Free from fall injury  2/27/2024 0327 by Kathy Ji RN  Outcome: Progressing  Flowsheets (Taken 2/27/2024 0000)  Free From Fall Injury: Instruct family/caregiver on patient safety  2/26/2024 2235 by Kathy Todd RN  Outcome: Progressing     Problem: ABCDS Injury Assessment  Goal: Absence of physical injury  2/27/2024 0327 by Kathy Ji RN  Outcome: Progressing  Flowsheets (Taken 2/27/2024 0000)  Absence of Physical Injury: Implement safety measures based on patient assessment  2/26/2024 2235 by Kathy Todd RN  Outcome: Progressing

## 2024-02-27 NOTE — PROGRESS NOTES
Pharmacy Medication Reconciliation    The patient was interviewed regarding current PTA medication list, use and drug allergies. Patient was by herself. The patient was questioned regarding use of any other inhalers, topical products, over the counter medications, herbal medications, vitamin products or ophthalmic/nasal/otic medication use.      Using multiple medications PRN at this time with recent respiratory illnesses in the last few months. Did not remove or update as would discontinue the prescriptions which has multiple refills.     Allergy Update: Lyrica [pregabalin] and Adhesive tape    Recommendations/Findings/Discrepancies:   The following amendments were made to the patient's active medication list on file:   1) Additions: n/a    2) Deletions: Senna- S, mupirocin, dexamethasone,     3) Changes: PRN- loratadine, ipratropium, Flonase, Symbicort    Total number of discrepancies: 7    Source/s of information: patient, SureScripts, electronic medical record     Thank you,  Suyapa Helm, PharmD 2/26/2024 4:36 PM  W: [phone]     
4 Eyes Skin Assessment     NAME:  Nettie Lara  YOB: 1955  MEDICAL RECORD NUMBER:  [id number]    The patient is being assessed for  Admission    I agree that at least one RN has performed a thorough Head to Toe Skin Assessment on the patient. ALL assessment sites listed below have been assessed.      Areas assessed by both nurses:    Head, Face, Ears, Shoulders, Back, Chest, Arms, Elbows, Hands, Sacrum. Buttock, Coccyx, Ischium, Legs. Feet and Heels, and Under Medical Devices         Does the Patient have a Wound? No noted wound(s)       Avery Prevention initiated by RN: No  Wound Care Orders initiated by RN: No    Pressure Injury (Stage 3,4, Unstageable, DTI, NWPT, and Complex wounds) if present, place Wound referral order by RN under : No    New Ostomies, if present place, Ostomy referral order under : No     Nurse 1 eSignature: Electronically signed by Angie Aranda RN on 2/26/24 at 1:57 AM EST    **SHARE this note so that the co-signing nurse can place an eSignature**    Nurse 2 eSignature: {Esignature:[id number]}   
OCCUPATIONAL THERAPY INITIAL EVALUATION    [facility]  [address] [place]        Date:2024                                                  Patient Name: Nettie Lara    MRN: [id number]    : 1955    Room: [address] [place] [address]      Evaluating OT: Shaniqua Scott OTR/L; [id number]     Referring Provider and Specific Provider Orders/Date:      24  OT eval and treat  Start:  24,   End:  24,   ONE TIME,   Standing Count:  1 Occurrences,   R         Srikanth De Leon,       Placement Recommendation: Acute Rehab        Diagnosis:   1. Pneumonia due to infectious organism, unspecified laterality, unspecified part of lung    2. Elevated LFTs    3. Elevated troponin    4. Urinary tract infection with hematuria, site unspecified         Surgery: none       Pertinent Medical History:       Past Medical History:   Diagnosis Date    Arthritis     Asthma     follows with PCP    COPD (chronic obstructive pulmonary disease) (HCC)     Follows with PCP    Eczema     Fall 2011    Gout     Hypertension     Kidney stone     Multiple sclerosis (HCC)     Prolonged emergence from general anesthesia     Scoliosis     Spinal stenosis     UTI (urinary tract infection)          Past Surgical History:   Procedure Laterality Date    APPENDECTOMY      BLADDER SURGERY Left 2023    CYSTOSCOPY RETROGRADE PYELOGRAM  left STENT REMOVAL, PLACEMENT OF LEFT STENT performed by Ruy Deng MD at [facility] [place]    CHOLECYSTECTOMY      CYSTOSCOPY INSERTION / REMOVAL STENT / STONE Right 2020    CYSTOSCOPY RETROGRADE PYELOGRAM STENT INSERTION LEFT performed by Tony Howard DO at [facility] OR    HYSTERECTOMY (CERVIX STATUS UNKNOWN)      LITHOTRIPSY Left 2020    CYSTOSCOPY RETROGRADE PYELOGRAM URETEROSCOPY  LEFT J-STENT EXCHANGE, INSERTION JUNIOR CATHETER---FACILITY------ performed by Tony Howard DO at [facility] OR    LITHOTRIPSY Left 2023    
Pharmacy Note    This patient was ordered Atrovent Nasal. Per the Pharmacy & Therapeutics Committee, this medication is non-formulary and not stocked by pharmacy for the reason indicated below. The medication can be reordered at discharge.     Medications in which risks outweigh benefits during hospitalization:           -  oral bisphosphonates         -  raloxifene (Evista)        -  SGLT2 inhibitors (ordered in the hospital for an indication other than heart failure or chronic kidney disease)    Medications that lack necessity during an acute hospital stay:        -  nasal antihistamines        -  nasal ipratropium 0.03% and 0.06%        -  nasal miacalcin        -  acyclovir topical cream/ointment orders for herpes labialis (cold sores)   
headache or focal neurological deficits, Denies generalized weakness or memory difficulty.   Psch:   Denies being anxious or depressed.  Musculoskeletal:    Denies  myalgias, joint complaints or back pain.   Integumentary:   Denies any rashes, ulcers, or excoriations.  Denies bruising.  Hematologic/Lymphatic:  Denies bruising or bleeding.    Physical Exam:  No intake/output data recorded.    Intake/Output Summary (Last 24 hours) at 2/26/2024 0817  Last data filed at 2/26/2024 0418  Gross per 24 hour   Intake 660.08 ml   Output --   Net 660.08 ml   I/O last 3 completed shifts:  In: 660.1 [P.O.:120; IV Piggyback:540.1]  Out: -   Patient Vitals for the past 96 hrs (Last 3 readings):   Weight   02/25/24 1917 98 kg (216 lb)     Vital Signs:   Blood pressure (!) 119/59, pulse (!) 110, temperature (!) 100.6 °F (38.1 °C), temperature source Oral, resp. rate 22, height 1.676 m (5' 6\"), weight 98 kg (216 lb), SpO2 92 %, not currently breastfeeding.    General appearance:  Ill-appearing in general.  Head:  Normocephalic. No masses, lesions or tenderness.  Eyes:  PERRLA.  EOMI.  Sclera clear.  Buccal mucosa moist.  ENT:  Ears normal. Mucosa normal.  Neck:    Supple. Trachea midline. No thyromegaly. No JVD. No bruits.  Heart:    Rhythm regular. Rate controlled.  No murmurs.  Lungs:    Symmetrical. Clear to auscultation bilaterally.  No wheezes. No rhonchi. No rales.  Abdomen:   Soft. Non-tender. Non-distended. Bowel sounds positive. No organomegaly or masses.  No pain on palpation.  Extremities:    Peripheral pulses present.  No peripheral edema.  No ulcers. No cyanosis. No clubbing.  Neurologic:    Alert x 3.  No focal deficit.  Cranial nerves grossly intact. No focal weakness.  Psych:   Behavior is normal. Mood appears normal. Speech is not rapid and/or pressured.  Musculoskeletal:   Spine ROM normal. Muscular strength intact. Gait not assessed.  Integumentary:  No rashes  Skin normal color and 
mg IntraVENous Q8H    sodium chloride flush  5-40 mL IntraVENous 2 times per day    enoxaparin  40 mg SubCUTAneous Daily    magnesium oxide  400 mg Oral Daily    metoprolol tartrate  25 mg Oral BID    arformoterol tartrate  15 mcg Nebulization BID RT    And    budesonide  0.5 mg Nebulization BID RT     Continuous Infusions:   dextrose      sodium chloride         Objective Data:  CBC with Differential:    Lab Results   Component Value Date/Time    WBC 9.4 02/27/2024 06:35 AM    RBC 4.02 02/27/2024 06:35 AM    HGB 14.2 02/27/2024 06:35 AM    HCT 41.2 02/27/2024 06:35 AM     02/27/2024 06:35 AM    .5 02/27/2024 06:35 AM    MCH 35.3 02/27/2024 06:35 AM    MCHC 34.5 02/27/2024 06:35 AM    RDW 14.0 02/27/2024 06:35 AM    SEGSPCT 50 05/28/2011 04:30 AM    METASPCT 1.0 12/19/2022 08:47 AM    LYMPHOPCT 15 02/27/2024 06:35 AM    MONOPCT 4 02/27/2024 06:35 AM    MYELOPCT 0.9 12/20/2022 08:56 AM    BASOPCT 1 02/27/2024 06:35 AM    MONOSABS 0.34 02/27/2024 06:35 AM    LYMPHSABS 1.41 02/27/2024 06:35 AM    EOSABS 0.00 02/27/2024 06:35 AM    BASOSABS 0.05 02/27/2024 06:35 AM       Assessment:  Sepsis secondary to recurrent pneumonia and urinary tract infection  Multiple sclerosis with current failure to thrive symptomatology and inability to complete activities of daily living  Essential hypertension  History of tobacco abuse    Plan:   Cultures remain negative at this point we will maintain IV antibiotics for an additional 24 hours.  She has nearly been afebrile for the past 24 hours.  Her respiratory status has stabilized and she is not requiring nasal cannula oxygen.  We discussed working with the therapy teams today and recommendations for temporary rehabilitation.  She voiced understanding and agreement.  With ongoing stability, plans will be for discharge to [place] tomorrow.    More than 50% of my  time was spent at the bedside counseling/coordinating care with the patient and/or family with face to face 
assistance, falls, alarm, and UTI, Incontinent      Social history: Patient lives alone in a apartment     with no steps  to enter home Tub shower       Equipment owned: Wheelchair, Wheeled Walker, Bedside commode, Tub transfer bench, Electric wheelchair, and Hospital bed    AM-PAC Basic Mobility       AM-PAC Basic Mobility - Inpatient   How much help is needed turning from your back to your side while in a flat bed without using bedrails?: A Little  How much help is needed moving from lying on your back to sitting on the side of a flat bed without using bedrails?: A Little  How much help is needed moving to and from a bed to a chair?: Total  How much help is needed standing up from a chair using your arms?: Total  How much help is needed walking in hospital room?: Total  How much help is needed climbing 3-5 steps with a railing?: Total  AM-PAC Inpatient Mobility Raw Score : 10  AM-PAC Inpatient T-Scale Score : 32.29  Mobility Inpatient CMS 0-100% Score: 76.75  Mobility Inpatient CMS G-Code Modifier : CL    Nursing cleared patient for PT evaluation. The admitting diagnosis and active problem list as listed above have been reviewed prior to the initiation of this evaluation.    OBJECTIVE:   Initial Evaluation  Date: 2/27/2024 Treatment Date:     Short Term/ Long Term   Goals   Was pt agreeable to Eval/treatment? Yes  To be met in 2 days   Pain level   0/10       Bed Mobility  Using rails and head of bed elevated:     Rolling: Minimal assist of 1    Supine to sit: Minimal assist of 1    Sit to supine: Minimal assist of 1    Scooting: Minimal assist of 1    Rolling: Independent    Supine to sit: Independent    Sit to supine: Independent    Scooting: Independent     Transfers Sit to stand: Not assessed  see assessment  Sit to stand: Minimal assist of 1 for stand pivot transfer   ROM Within functional limits    Increase range of motion 10% of affected joints    Strength BUE:  refer to OT eval  RLE:  2/5  LLE:  2/5

## 2024-02-28 VITALS
DIASTOLIC BLOOD PRESSURE: 89 MMHG | TEMPERATURE: 97.4 F | HEIGHT: 66 IN | WEIGHT: 216 LBS | HEART RATE: 76 BPM | BODY MASS INDEX: 34.72 KG/M2 | SYSTOLIC BLOOD PRESSURE: 177 MMHG | RESPIRATION RATE: 18 BRPM | OXYGEN SATURATION: 93 %

## 2024-02-28 LAB
ALBUMIN SERPL-MCNC: 3.4 G/DL (ref 3.5–5.2)
ALP SERPL-CCNC: 88 U/L (ref 35–104)
ALT SERPL-CCNC: 24 U/L (ref 0–32)
ANION GAP SERPL CALCULATED.3IONS-SCNC: 13 MMOL/L (ref 7–16)
AST SERPL-CCNC: 15 U/L (ref 0–31)
BASOPHILS # BLD: 0 K/UL (ref 0–0.2)
BASOPHILS NFR BLD: 0 % (ref 0–2)
BILIRUB SERPL-MCNC: 0.2 MG/DL (ref 0–1.2)
BUN SERPL-MCNC: 22 MG/DL (ref 6–23)
CALCIUM SERPL-MCNC: 8.8 MG/DL (ref 8.6–10.2)
CHLORIDE SERPL-SCNC: 107 MMOL/L (ref 98–107)
CO2 SERPL-SCNC: 18 MMOL/L (ref 22–29)
CREAT SERPL-MCNC: 0.8 MG/DL (ref 0.5–1)
EOSINOPHIL # BLD: 0 K/UL (ref 0.05–0.5)
EOSINOPHILS RELATIVE PERCENT: 0 % (ref 0–6)
ERYTHROCYTE [DISTWIDTH] IN BLOOD BY AUTOMATED COUNT: 13.6 % (ref 11.5–15)
GFR SERPL CREATININE-BSD FRML MDRD: >60 ML/MIN/1.73M2
GLUCOSE SERPL-MCNC: 142 MG/DL (ref 74–99)
HCT VFR BLD AUTO: 40 % (ref 34–48)
HGB BLD-MCNC: 13.8 G/DL (ref 11.5–15.5)
LYMPHOCYTES NFR BLD: 2.72 K/UL (ref 1.5–4)
LYMPHOCYTES RELATIVE PERCENT: 16 % (ref 20–42)
MAGNESIUM SERPL-MCNC: 2 MG/DL (ref 1.6–2.6)
MCH RBC QN AUTO: 34.9 PG (ref 26–35)
MCHC RBC AUTO-ENTMCNC: 34.5 G/DL (ref 32–34.5)
MCV RBC AUTO: 101.3 FL (ref 80–99.9)
MONOCYTES NFR BLD: 1.97 K/UL (ref 0.1–0.95)
MONOCYTES NFR BLD: 12 % (ref 2–12)
NEUTROPHILS NFR BLD: 73 % (ref 43–80)
NEUTS SEG NFR BLD: 12.41 K/UL (ref 1.8–7.3)
PHOSPHATE SERPL-MCNC: 2.5 MG/DL (ref 2.5–4.5)
PLATELET # BLD AUTO: 367 K/UL (ref 130–450)
PMV BLD AUTO: 10 FL (ref 7–12)
POTASSIUM SERPL-SCNC: 4 MMOL/L (ref 3.5–5)
PROT SERPL-MCNC: 6.4 G/DL (ref 6.4–8.3)
RBC # BLD AUTO: 3.95 M/UL (ref 3.5–5.5)
RBC # BLD: ABNORMAL 10*6/UL
SODIUM SERPL-SCNC: 138 MMOL/L (ref 132–146)
WBC OTHER # BLD: 17.1 K/UL (ref 4.5–11.5)

## 2024-02-28 PROCEDURE — 85025 COMPLETE CBC W/AUTO DIFF WBC: CPT

## 2024-02-28 PROCEDURE — 6360000002 HC RX W HCPCS: Performed by: INTERNAL MEDICINE

## 2024-02-28 PROCEDURE — 2500000003 HC RX 250 WO HCPCS: Performed by: INTERNAL MEDICINE

## 2024-02-28 PROCEDURE — 2580000003 HC RX 258: Performed by: INTERNAL MEDICINE

## 2024-02-28 PROCEDURE — 84100 ASSAY OF PHOSPHORUS: CPT

## 2024-02-28 PROCEDURE — 94640 AIRWAY INHALATION TREATMENT: CPT

## 2024-02-28 PROCEDURE — 6370000000 HC RX 637 (ALT 250 FOR IP): Performed by: INTERNAL MEDICINE

## 2024-02-28 PROCEDURE — 36415 COLL VENOUS BLD VENIPUNCTURE: CPT

## 2024-02-28 PROCEDURE — 80053 COMPREHEN METABOLIC PANEL: CPT

## 2024-02-28 PROCEDURE — 83735 ASSAY OF MAGNESIUM: CPT

## 2024-02-28 RX ORDER — DOXYCYCLINE HYCLATE 100 MG
100 TABLET ORAL 2 TIMES DAILY
Qty: 14 TABLET | Refills: 0 | Status: SHIPPED | OUTPATIENT
Start: 2024-02-28 | End: 2024-03-06

## 2024-02-28 RX ORDER — PREDNISONE 10 MG/1
TABLET ORAL
Qty: 15 TABLET | Refills: 0 | Status: SHIPPED | OUTPATIENT
Start: 2024-02-28 | End: 2024-03-09

## 2024-02-28 RX ADMIN — ARFORMOTEROL TARTRATE 15 MCG: 15 SOLUTION RESPIRATORY (INHALATION) at 06:16

## 2024-02-28 RX ADMIN — DOXYCYCLINE 100 MG: 100 INJECTION, POWDER, LYOPHILIZED, FOR SOLUTION INTRAVENOUS at 11:20

## 2024-02-28 RX ADMIN — FUROSEMIDE 20 MG: 20 TABLET ORAL at 10:58

## 2024-02-28 RX ADMIN — METOPROLOL TARTRATE 25 MG: 25 TABLET, FILM COATED ORAL at 10:59

## 2024-02-28 RX ADMIN — WATER 40 MG: 1 INJECTION INTRAMUSCULAR; INTRAVENOUS; SUBCUTANEOUS at 11:07

## 2024-02-28 RX ADMIN — WATER 40 MG: 1 INJECTION INTRAMUSCULAR; INTRAVENOUS; SUBCUTANEOUS at 19:06

## 2024-02-28 RX ADMIN — WATER 40 MG: 1 INJECTION INTRAMUSCULAR; INTRAVENOUS; SUBCUTANEOUS at 01:31

## 2024-02-28 RX ADMIN — POLYETHYLENE GLYCOL 3350 17 G: 17 POWDER, FOR SOLUTION ORAL at 15:53

## 2024-02-28 RX ADMIN — ACETAMINOPHEN 650 MG: 325 TABLET ORAL at 10:59

## 2024-02-28 RX ADMIN — METOPROLOL TARTRATE 25 MG: 25 TABLET, FILM COATED ORAL at 20:35

## 2024-02-28 RX ADMIN — BUDESONIDE 500 MCG: 0.5 INHALANT RESPIRATORY (INHALATION) at 06:16

## 2024-02-28 RX ADMIN — MAGNESIUM OXIDE 400 MG: 400 TABLET ORAL at 10:59

## 2024-02-28 RX ADMIN — ENOXAPARIN SODIUM 40 MG: 100 INJECTION SUBCUTANEOUS at 10:57

## 2024-02-28 RX ADMIN — LOSARTAN POTASSIUM 25 MG: 50 TABLET, FILM COATED ORAL at 10:58

## 2024-02-28 RX ADMIN — SODIUM CHLORIDE, PRESERVATIVE FREE 10 ML: 5 INJECTION INTRAVENOUS at 11:08

## 2024-02-28 RX ADMIN — SENNOSIDES AND DOCUSATE SODIUM 3 TABLET: 8.6; 5 TABLET ORAL at 15:53

## 2024-02-28 ASSESSMENT — PAIN DESCRIPTION - DESCRIPTORS: DESCRIPTORS: DISCOMFORT;ACHING;THROBBING

## 2024-02-28 ASSESSMENT — PAIN DESCRIPTION - LOCATION: LOCATION: HEAD

## 2024-02-28 ASSESSMENT — PAIN SCALES - GENERAL: PAINLEVEL_OUTOF10: 7

## 2024-02-28 ASSESSMENT — PAIN - FUNCTIONAL ASSESSMENT: PAIN_FUNCTIONAL_ASSESSMENT: PREVENTS OR INTERFERES SOME ACTIVE ACTIVITIES AND ADLS

## 2024-02-28 NOTE — DISCHARGE SUMMARY
tablets by mouth daily for 5 days, THEN 1 tablet daily for 5 days.  Qty: 15 tablet, Refills: 0                Details   furosemide (LASIX) 40 MG tablet Take 0.5 tablets by mouth daily      magnesium oxide (MAG-OX) 400 MG tablet Take 1 tablet by mouth daily  Qty: 30 tablet, Refills: 0      guaiFENesin (ROBAFEN MUCUS/CHEST CONGESTION) 100 MG/5ML liquid Take 10 mLs by mouth 4 times daily as needed for Cough  Qty: 1200 mL, Refills: 0    Associated Diagnoses: Congestion of upper airway      Cholecalciferol (VITAMIN D3) 125 MCG (5000 UT) TABS Take by mouth daily      vitamin E 1000 units capsule Take 1 capsule by mouth daily      metoprolol tartrate (LOPRESSOR) 25 MG tablet Take 1 tablet by mouth 2 times daily  Qty: 60 tablet, Refills: 11      losartan (COZAAR) 25 MG tablet Take 1 tablet by mouth daily  Qty: 30 tablet, Refills: 11      albuterol sulfate HFA (VENTOLIN HFA) 108 (90 Base) MCG/ACT inhaler Inhale 2 puffs into the lungs every 4 hours as needed for Wheezing (or cough)  Qty: 54 g, Refills: 11      budesonide-formoterol (SYMBICORT) 160-4.5 MCG/ACT AERO Inhale 2 puffs into the lungs 2 times daily  Qty: 30.6 g, Refills: 1      albuterol (PROVENTIL) (2.5 MG/3ML) 0.083% nebulizer solution Take 3 mLs by nebulization every 4 hours as needed for Shortness of Breath  Qty: 180 each, Refills: 11      clotrimazole (LOTRIMIN) 1 % cream Apply to affected area twice daily until resolved.  Qty: 45 g, Refills: 5      ondansetron (ZOFRAN-ODT) 4 MG disintegrating tablet DISSOLVE ONE TABLET IN MOUTH THREE TIMES A DAY AS NEEDED FOR NAUSEA OR VOMITING  Qty: 21 tablet, Refills: 3      acetaminophen (TYLENOL) 500 MG tablet Take 1 tablet by mouth every 4 hours as needed for Pain      polyethylene glycol (GLYCOLAX) 17 g packet Take 25 g by mouth daily as needed for Constipation  Qty: 527 g, Refills: 11    Associated Diagnoses: Constipation, unspecified constipation type      Probiotic Product (PROBIOTIC DAILY) CAPS Take 1 capsule by mouth

## 2024-02-28 NOTE — CARE COORDINATION
2/27/2024 1338 CM note: Pt has M.S. and resides alone in an apt, elevator access. She is requesting [facility] at d/c and [facility] accepted patient for Acute Rehab. For Acute Rehab, NO PRECERT, will need signed BREANA. BARBY Montenegro RN   
2/28/2024 1042 CM note: Pt is requesting [facility] at d/c and [facility] accepted patient for Acute Rehab, NO PRECERT. Arrangements made for pt to transport to [facility] via [facility] at 4pm. Pt,teamlead and [facility] liaison notified of arrangements. Per pt;s request, pt's friend Dena Stokes notified- message left. N-N [phone]. Jani VALIENTE    
Status: Full Code   Patient's Primary Decision Maker is: Named in Scanned ACP Document    Primary Decision Maker: Dena Stern (DPOA) - Other - [phone]    Discharge Planning:    Patient lives with: Alone Type of Home: Apartment  Primary Care Giver: Other (Comment) (see CM note)  Patient Support Systems include: Family Members, Other (Comment)   Current Financial resources:    Current community resources:    Current services prior to admission: None            Current DME:              Type of Home Care services:  Aide Services    ADLS  Prior functional level: Assistance with the following:, Cooking, Housework, Shopping, Mobility  Current functional level: Assistance with the following:, Cooking, Housework, Shopping, Mobility    PT AM-PAC:   /24  OT AM-PAC:   /24    Family can provide assistance at DC: Yes  Would you like Case Management to discuss the discharge plan with any other family members/significant others, and if so, who? No  Plans to Return to Present Housing: Yes    Potential Assistance needed at discharge: Durable Medical Equipment            Potential DME: Shower Chair  Patient expects to discharge to: Apartment  Plan for transportation at discharge:      Financial    Payor: [place]ICAID [place] / Plan: [place] DEPT [place] / Product Type: *No Product type* /     Does insurance require precert for SNF: No    Potential assistance Purchasing Medications: No  Meds-to-Beds request: Yes      [facility] - [place], [place] - [address] -  [phone] - F [phone]  [address] [place] [address]  Phone: [phone] Fax: [phone]    [id number] - [place] - [address] [facility] -  [phone] - F [phone]  [address] [facility]  [place] [address]  Phone: [phone] Fax: [phone]      Notes:      Additional Case Management Notes: 2/26/2024 1241 CM note: Met with patient for transition of care needs. Pt has M.S. and resides alone in an [facility], elevator access. She is

## 2024-03-01 LAB
MICROORGANISM SPEC CULT: ABNORMAL
MICROORGANISM SPEC CULT: ABNORMAL
SPECIMEN DESCRIPTION: ABNORMAL

## 2024-03-28 ENCOUNTER — TRANSCRIBE ORDERS (OUTPATIENT)
Dept: ADMINISTRATIVE | Age: 69
End: 2024-03-28

## 2024-03-28 DIAGNOSIS — N23 RENAL COLIC: Primary | ICD-10-CM

## 2024-04-02 ENCOUNTER — HOSPITAL ENCOUNTER (OUTPATIENT)
Age: 69
Setting detail: SPECIMEN
Discharge: HOME | End: 2024-04-02
Payer: MEDICAID

## 2024-04-02 ENCOUNTER — APPOINTMENT (OUTPATIENT)
Dept: GENERAL RADIOLOGY | Age: 69
End: 2024-04-02
Payer: MEDICAID

## 2024-04-02 ENCOUNTER — APPOINTMENT (OUTPATIENT)
Dept: CT IMAGING | Age: 69
End: 2024-04-02
Payer: MEDICAID

## 2024-04-02 ENCOUNTER — HOSPITAL ENCOUNTER (INPATIENT)
Age: 69
LOS: 15 days | Discharge: NURSING, REHAB OR LONG-TERM CARE | End: 2024-04-18
Attending: STUDENT IN AN ORGANIZED HEALTH CARE EDUCATION/TRAINING PROGRAM | Admitting: INTERNAL MEDICINE
Payer: MEDICAID

## 2024-04-02 DIAGNOSIS — R60.0 BILATERAL LOWER EXTREMITY EDEMA: ICD-10-CM

## 2024-04-02 DIAGNOSIS — N13.30 HYDRONEPHROSIS, UNSPECIFIED HYDRONEPHROSIS TYPE: ICD-10-CM

## 2024-04-02 DIAGNOSIS — N32.89 BLADDER MASS: Primary | ICD-10-CM

## 2024-04-02 DIAGNOSIS — N20.1 LEFT URETERAL STONE: ICD-10-CM

## 2024-04-02 DIAGNOSIS — N20.1 CALCULUS OF URETER: ICD-10-CM

## 2024-04-02 LAB
ALBUMIN SERPL-MCNC: 3.6 G/DL (ref 3.5–5.2)
ALP SERPL-CCNC: 113 U/L (ref 35–104)
ALT SERPL-CCNC: 22 U/L (ref 0–32)
ANION GAP SERPL CALCULATED.3IONS-SCNC: 8 MMOL/L (ref 7–16)
AST SERPL-CCNC: 27 U/L (ref 0–31)
BASOPHILS # BLD: 0.07 K/UL (ref 0–0.2)
BASOPHILS NFR BLD: 1 % (ref 0–2)
BILIRUB SERPL-MCNC: 0.3 MG/DL (ref 0–1.2)
BNP SERPL-MCNC: 193 PG/ML (ref 0–125)
BUN SERPL-MCNC: 18 MG/DL (ref 6–23)
CALCIUM SERPL-MCNC: 9.6 MG/DL (ref 8.6–10.2)
CHLORIDE SERPL-SCNC: 107 MMOL/L (ref 98–107)
CO2 SERPL-SCNC: 25 MMOL/L (ref 22–29)
CREAT SERPL-MCNC: 0.9 MG/DL (ref 0.5–1)
EOSINOPHIL # BLD: 0.3 K/UL (ref 0.05–0.5)
EOSINOPHILS RELATIVE PERCENT: 3 % (ref 0–6)
ERYTHROCYTE [DISTWIDTH] IN BLOOD BY AUTOMATED COUNT: 14 % (ref 11.5–15)
GFR SERPL CREATININE-BSD FRML MDRD: 65 ML/MIN/1.73M2
GLUCOSE SERPL-MCNC: 123 MG/DL (ref 74–99)
HCT VFR BLD AUTO: 43.4 % (ref 34–48)
HGB BLD-MCNC: 13.9 G/DL (ref 11.5–15.5)
IMM GRANULOCYTES # BLD AUTO: 0.15 K/UL (ref 0–0.58)
IMM GRANULOCYTES NFR BLD: 2 % (ref 0–5)
LYMPHOCYTES NFR BLD: 2.32 K/UL (ref 1.5–4)
LYMPHOCYTES RELATIVE PERCENT: 23 % (ref 20–42)
MCH RBC QN AUTO: 33.1 PG (ref 26–35)
MCHC RBC AUTO-ENTMCNC: 32 G/DL (ref 32–34.5)
MCV RBC AUTO: 103.3 FL (ref 80–99.9)
MONOCYTES NFR BLD: 1.06 K/UL (ref 0.1–0.95)
MONOCYTES NFR BLD: 11 % (ref 2–12)
NEUTROPHILS NFR BLD: 61 % (ref 43–80)
NEUTS SEG NFR BLD: 6.02 K/UL (ref 1.8–7.3)
PLATELET # BLD AUTO: 337 K/UL (ref 130–450)
PMV BLD AUTO: 10.2 FL (ref 7–12)
POTASSIUM SERPL-SCNC: 4.3 MMOL/L (ref 3.5–5)
PROT SERPL-MCNC: 6.6 G/DL (ref 6.4–8.3)
RBC # BLD AUTO: 4.2 M/UL (ref 3.5–5.5)
SODIUM SERPL-SCNC: 140 MMOL/L (ref 132–146)
TROPONIN I SERPL HS-MCNC: 54 NG/L (ref 0–9)
WBC OTHER # BLD: 9.9 K/UL (ref 4.5–11.5)

## 2024-04-02 PROCEDURE — 87088 URINE BACTERIA CULTURE: CPT

## 2024-04-02 PROCEDURE — 93005 ELECTROCARDIOGRAM TRACING: CPT

## 2024-04-02 PROCEDURE — 99285 EMERGENCY DEPT VISIT HI MDM: CPT

## 2024-04-02 PROCEDURE — 71045 X-RAY EXAM CHEST 1 VIEW: CPT

## 2024-04-02 PROCEDURE — 83880 ASSAY OF NATRIURETIC PEPTIDE: CPT

## 2024-04-02 PROCEDURE — 80053 COMPREHEN METABOLIC PANEL: CPT

## 2024-04-02 PROCEDURE — 85025 COMPLETE CBC W/AUTO DIFF WBC: CPT

## 2024-04-02 PROCEDURE — 84484 ASSAY OF TROPONIN QUANT: CPT

## 2024-04-02 PROCEDURE — 87086 URINE CULTURE/COLONY COUNT: CPT

## 2024-04-02 PROCEDURE — 74176 CT ABD & PELVIS W/O CONTRAST: CPT

## 2024-04-02 NOTE — ED PROVIDER NOTES
[facility] EMERGENCY DEPARTMENT  EMERGENCY DEPARTMENT ENCOUNTER        Pt Name: Nettie Lara  MRN: [id number]  Birthdate 1955  Date of evaluation: 4/2/2024  Provider: Leandro Corral DO  PCP: Geraldo Myers MD  Note Started: 5:44 PM EDT 4/2/24    CHIEF COMPLAINT       Chief Complaint   Patient presents with    Leg Swelling    Flank Pain     Patient c/o flank pain and difficulty urinating . Hx kidney stone        HISTORY OF PRESENT ILLNESS: 1 or more Elements   Nettie Lara is a 69 y.o. female who presents to the emergency department with chief complaint of leg swelling.  Patient states that leg swelling has been gradually worsening over the past several weeks and that over the past couple days she has had worsening left flank pain.  Patient also states she is having some difficulty urinating.  She has history of for kidney stones and is concerned that she has a kidney stone again.  Patient states that she was instructed to come in for CT imaging and potential nephrology consultation.  Patient at this time other than dull achy left flank pain without radiation and swelling in her bilateral lower extremities with some with weeping in the legs has no complaints currently.  She denies any fever, chills, nausea, vomiting, chest pain, shortness of breath, abdominal pain, hematuria, constipation or diarrhea.    Nursing Notes were all reviewed and agreed with or any disagreements were addressed in the HPI.    REVIEW OF SYSTEMS :      Positives and Pertinent negatives as per HPI.     PAST MEDICAL HISTORY/Chronic Conditions Affecting Care      has a past medical history of Arthritis, Asthma, COPD (chronic obstructive pulmonary disease) ([facility]), Eczema, Fall (05/26/2011), Gout, Hypertension, Kidney stone, Multiple sclerosis (HCC), Prolonged emergence from general anesthesia, Scoliosis, Spinal stenosis, and UTI (urinary tract infection).     SURGICAL HISTORY     Past Surgical

## 2024-04-03 ENCOUNTER — ANESTHESIA EVENT (OUTPATIENT)
Dept: OPERATING ROOM | Age: 69
End: 2024-04-03
Payer: MEDICAID

## 2024-04-03 PROBLEM — N32.89 BLADDER MASS: Status: ACTIVE | Noted: 2024-04-03

## 2024-04-03 PROBLEM — N13.9 URINARY OBSTRUCTION: Status: ACTIVE | Noted: 2024-04-03

## 2024-04-03 LAB
ANION GAP SERPL CALCULATED.3IONS-SCNC: 12 MMOL/L (ref 7–16)
BACTERIA URNS QL MICRO: ABNORMAL
BASOPHILS # BLD: 0.07 K/UL (ref 0–0.2)
BASOPHILS NFR BLD: 1 % (ref 0–2)
BILIRUB UR QL STRIP: NEGATIVE
BUN SERPL-MCNC: 15 MG/DL (ref 6–23)
CALCIUM SERPL-MCNC: 9.2 MG/DL (ref 8.6–10.2)
CHLORIDE SERPL-SCNC: 110 MMOL/L (ref 98–107)
CLARITY UR: ABNORMAL
CO2 SERPL-SCNC: 21 MMOL/L (ref 22–29)
COLOR UR: YELLOW
CREAT SERPL-MCNC: 0.8 MG/DL (ref 0.5–1)
EKG ATRIAL RATE: 88 BPM
EKG P AXIS: 36 DEGREES
EKG P-R INTERVAL: 138 MS
EKG Q-T INTERVAL: 396 MS
EKG QRS DURATION: 90 MS
EKG QTC CALCULATION (BAZETT): 479 MS
EKG R AXIS: 5 DEGREES
EKG T AXIS: 50 DEGREES
EKG VENTRICULAR RATE: 88 BPM
EOSINOPHIL # BLD: 0.35 K/UL (ref 0.05–0.5)
EOSINOPHILS RELATIVE PERCENT: 4 % (ref 0–6)
ERYTHROCYTE [DISTWIDTH] IN BLOOD BY AUTOMATED COUNT: 14.2 % (ref 11.5–15)
GFR SERPL CREATININE-BSD FRML MDRD: 76 ML/MIN/1.73M2
GLUCOSE SERPL-MCNC: 93 MG/DL (ref 74–99)
GLUCOSE UR STRIP-MCNC: NEGATIVE MG/DL
HCT VFR BLD AUTO: 35.4 % (ref 34–48)
HGB BLD-MCNC: 13.1 G/DL (ref 11.5–15.5)
HGB UR QL STRIP.AUTO: ABNORMAL
IMM GRANULOCYTES # BLD AUTO: 0.14 K/UL (ref 0–0.58)
IMM GRANULOCYTES NFR BLD: 2 % (ref 0–5)
KETONES UR STRIP-MCNC: NEGATIVE MG/DL
LEUKOCYTE ESTERASE UR QL STRIP: ABNORMAL
LYMPHOCYTES NFR BLD: 2.45 K/UL (ref 1.5–4)
LYMPHOCYTES RELATIVE PERCENT: 26 % (ref 20–42)
MCH RBC QN AUTO: 38.5 PG (ref 26–35)
MCHC RBC AUTO-ENTMCNC: 37 G/DL (ref 32–34.5)
MCV RBC AUTO: 104.1 FL (ref 80–99.9)
MONOCYTES NFR BLD: 1.38 K/UL (ref 0.1–0.95)
MONOCYTES NFR BLD: 15 % (ref 2–12)
NEUTROPHILS NFR BLD: 54 % (ref 43–80)
NEUTS SEG NFR BLD: 5.13 K/UL (ref 1.8–7.3)
NITRITE UR QL STRIP: NEGATIVE
PH UR STRIP: 7 [PH] (ref 5–9)
PLATELET # BLD AUTO: 313 K/UL (ref 130–450)
PMV BLD AUTO: 10.7 FL (ref 7–12)
POTASSIUM SERPL-SCNC: 3.7 MMOL/L (ref 3.5–5)
PROT UR STRIP-MCNC: 100 MG/DL
RBC # BLD AUTO: 3.4 M/UL (ref 3.5–5.5)
RBC #/AREA URNS HPF: ABNORMAL /HPF
SODIUM SERPL-SCNC: 143 MMOL/L (ref 132–146)
SP GR UR STRIP: 1.02 (ref 1–1.03)
TROPONIN I SERPL HS-MCNC: 57 NG/L (ref 0–9)
TROPONIN I SERPL HS-MCNC: 57 NG/L (ref 0–9)
UROBILINOGEN UR STRIP-ACNC: 0.2 EU/DL (ref 0–1)
WBC #/AREA URNS HPF: ABNORMAL /HPF
WBC OTHER # BLD: 9.5 K/UL (ref 4.5–11.5)

## 2024-04-03 PROCEDURE — 36415 COLL VENOUS BLD VENIPUNCTURE: CPT

## 2024-04-03 PROCEDURE — 97530 THERAPEUTIC ACTIVITIES: CPT

## 2024-04-03 PROCEDURE — 94640 AIRWAY INHALATION TREATMENT: CPT

## 2024-04-03 PROCEDURE — 87077 CULTURE AEROBIC IDENTIFY: CPT

## 2024-04-03 PROCEDURE — 6360000002 HC RX W HCPCS: Performed by: INTERNAL MEDICINE

## 2024-04-03 PROCEDURE — 93010 ELECTROCARDIOGRAM REPORT: CPT | Performed by: INTERNAL MEDICINE

## 2024-04-03 PROCEDURE — 2580000003 HC RX 258: Performed by: NURSE PRACTITIONER

## 2024-04-03 PROCEDURE — 80048 BASIC METABOLIC PNL TOTAL CA: CPT

## 2024-04-03 PROCEDURE — 1200000000 HC SEMI PRIVATE

## 2024-04-03 PROCEDURE — 94664 DEMO&/EVAL PT USE INHALER: CPT

## 2024-04-03 PROCEDURE — 97166 OT EVAL MOD COMPLEX 45 MIN: CPT

## 2024-04-03 PROCEDURE — 87086 URINE CULTURE/COLONY COUNT: CPT

## 2024-04-03 PROCEDURE — 6370000000 HC RX 637 (ALT 250 FOR IP): Performed by: NURSE PRACTITIONER

## 2024-04-03 PROCEDURE — 87088 URINE BACTERIA CULTURE: CPT

## 2024-04-03 PROCEDURE — 88112 CYTOPATH CELL ENHANCE TECH: CPT

## 2024-04-03 PROCEDURE — 85025 COMPLETE CBC W/AUTO DIFF WBC: CPT

## 2024-04-03 PROCEDURE — 84484 ASSAY OF TROPONIN QUANT: CPT

## 2024-04-03 PROCEDURE — 6370000000 HC RX 637 (ALT 250 FOR IP): Performed by: INTERNAL MEDICINE

## 2024-04-03 PROCEDURE — 97161 PT EVAL LOW COMPLEX 20 MIN: CPT

## 2024-04-03 PROCEDURE — 6360000002 HC RX W HCPCS: Performed by: NURSE PRACTITIONER

## 2024-04-03 PROCEDURE — 81001 URINALYSIS AUTO W/SCOPE: CPT

## 2024-04-03 RX ORDER — ONDANSETRON 2 MG/ML
4 INJECTION INTRAMUSCULAR; INTRAVENOUS EVERY 6 HOURS PRN
Status: DISCONTINUED | OUTPATIENT
Start: 2024-04-03 | End: 2024-04-18 | Stop reason: HOSPADM

## 2024-04-03 RX ORDER — POTASSIUM CHLORIDE 20 MEQ/1
40 TABLET, EXTENDED RELEASE ORAL PRN
Status: DISCONTINUED | OUTPATIENT
Start: 2024-04-03 | End: 2024-04-18 | Stop reason: HOSPADM

## 2024-04-03 RX ORDER — PETROLATUM 42 G/100G
OINTMENT TOPICAL PRN
Status: DISCONTINUED | OUTPATIENT
Start: 2024-04-03 | End: 2024-04-05

## 2024-04-03 RX ORDER — MAGNESIUM SULFATE IN WATER 40 MG/ML
2000 INJECTION, SOLUTION INTRAVENOUS PRN
Status: DISCONTINUED | OUTPATIENT
Start: 2024-04-03 | End: 2024-04-18 | Stop reason: HOSPADM

## 2024-04-03 RX ORDER — POTASSIUM CHLORIDE 7.45 MG/ML
10 INJECTION INTRAVENOUS PRN
Status: DISCONTINUED | OUTPATIENT
Start: 2024-04-03 | End: 2024-04-18 | Stop reason: HOSPADM

## 2024-04-03 RX ORDER — ACETAMINOPHEN 650 MG/1
650 SUPPOSITORY RECTAL EVERY 6 HOURS PRN
Status: DISCONTINUED | OUTPATIENT
Start: 2024-04-03 | End: 2024-04-05

## 2024-04-03 RX ORDER — SODIUM CHLORIDE 0.9 % (FLUSH) 0.9 %
5-40 SYRINGE (ML) INJECTION EVERY 12 HOURS SCHEDULED
Status: DISCONTINUED | OUTPATIENT
Start: 2024-04-03 | End: 2024-04-18 | Stop reason: HOSPADM

## 2024-04-03 RX ORDER — SODIUM CHLORIDE 0.9 % (FLUSH) 0.9 %
5-40 SYRINGE (ML) INJECTION PRN
Status: DISCONTINUED | OUTPATIENT
Start: 2024-04-03 | End: 2024-04-18 | Stop reason: HOSPADM

## 2024-04-03 RX ORDER — ENOXAPARIN SODIUM 100 MG/ML
40 INJECTION SUBCUTANEOUS DAILY
Status: DISCONTINUED | OUTPATIENT
Start: 2024-04-03 | End: 2024-04-06

## 2024-04-03 RX ORDER — ARFORMOTEROL TARTRATE 15 UG/2ML
15 SOLUTION RESPIRATORY (INHALATION)
Status: DISCONTINUED | OUTPATIENT
Start: 2024-04-03 | End: 2024-04-07 | Stop reason: SDUPTHER

## 2024-04-03 RX ORDER — LOSARTAN POTASSIUM 25 MG/1
25 TABLET ORAL DAILY
Status: DISCONTINUED | OUTPATIENT
Start: 2024-04-03 | End: 2024-04-18 | Stop reason: HOSPADM

## 2024-04-03 RX ORDER — ACETAMINOPHEN 325 MG/1
650 TABLET ORAL EVERY 6 HOURS PRN
Status: DISCONTINUED | OUTPATIENT
Start: 2024-04-03 | End: 2024-04-04 | Stop reason: SDUPTHER

## 2024-04-03 RX ORDER — POLYETHYLENE GLYCOL 3350 17 G/17G
17 POWDER, FOR SOLUTION ORAL DAILY PRN
Status: DISCONTINUED | OUTPATIENT
Start: 2024-04-03 | End: 2024-04-18 | Stop reason: HOSPADM

## 2024-04-03 RX ORDER — ACETAMINOPHEN 325 MG/1
650 TABLET ORAL ONCE
Status: COMPLETED | OUTPATIENT
Start: 2024-04-03 | End: 2024-04-03

## 2024-04-03 RX ORDER — FUROSEMIDE 20 MG/1
20 TABLET ORAL DAILY
Status: DISCONTINUED | OUTPATIENT
Start: 2024-04-03 | End: 2024-04-10 | Stop reason: SDUPTHER

## 2024-04-03 RX ORDER — SODIUM CHLORIDE 9 MG/ML
INJECTION, SOLUTION INTRAVENOUS PRN
Status: DISCONTINUED | OUTPATIENT
Start: 2024-04-03 | End: 2024-04-18 | Stop reason: HOSPADM

## 2024-04-03 RX ORDER — ONDANSETRON 4 MG/1
4 TABLET, ORALLY DISINTEGRATING ORAL EVERY 8 HOURS PRN
Status: DISCONTINUED | OUTPATIENT
Start: 2024-04-03 | End: 2024-04-18 | Stop reason: HOSPADM

## 2024-04-03 RX ORDER — ALBUTEROL SULFATE 2.5 MG/3ML
2.5 SOLUTION RESPIRATORY (INHALATION) EVERY 4 HOURS PRN
Status: DISCONTINUED | OUTPATIENT
Start: 2024-04-03 | End: 2024-04-18 | Stop reason: HOSPADM

## 2024-04-03 RX ORDER — BUDESONIDE 0.5 MG/2ML
0.5 INHALANT ORAL
Status: DISCONTINUED | OUTPATIENT
Start: 2024-04-03 | End: 2024-04-07 | Stop reason: SDUPTHER

## 2024-04-03 RX ADMIN — METOPROLOL TARTRATE 25 MG: 25 TABLET, FILM COATED ORAL at 19:49

## 2024-04-03 RX ADMIN — SODIUM CHLORIDE, PRESERVATIVE FREE 10 ML: 5 INJECTION INTRAVENOUS at 09:18

## 2024-04-03 RX ADMIN — ACETAMINOPHEN 650 MG: 325 TABLET ORAL at 21:28

## 2024-04-03 RX ADMIN — ENOXAPARIN SODIUM 40 MG: 100 INJECTION SUBCUTANEOUS at 09:17

## 2024-04-03 RX ADMIN — BUDESONIDE INHALATION 500 MCG: 0.5 SUSPENSION RESPIRATORY (INHALATION) at 21:09

## 2024-04-03 RX ADMIN — FUROSEMIDE 20 MG: 20 TABLET ORAL at 11:05

## 2024-04-03 RX ADMIN — ACETAMINOPHEN 650 MG: 325 TABLET ORAL at 15:08

## 2024-04-03 RX ADMIN — WATER 1000 MG: 1 INJECTION INTRAMUSCULAR; INTRAVENOUS; SUBCUTANEOUS at 03:24

## 2024-04-03 RX ADMIN — ARFORMOTEROL TARTRATE 15 MCG: 15 SOLUTION RESPIRATORY (INHALATION) at 21:09

## 2024-04-03 RX ADMIN — BUDESONIDE INHALATION 500 MCG: 0.5 SUSPENSION RESPIRATORY (INHALATION) at 07:27

## 2024-04-03 RX ADMIN — ACETAMINOPHEN 650 MG: 325 TABLET ORAL at 01:20

## 2024-04-03 RX ADMIN — ACETAMINOPHEN 650 MG: 325 TABLET ORAL at 09:20

## 2024-04-03 RX ADMIN — SODIUM CHLORIDE, PRESERVATIVE FREE 10 ML: 5 INJECTION INTRAVENOUS at 19:50

## 2024-04-03 RX ADMIN — METOPROLOL TARTRATE 25 MG: 25 TABLET, FILM COATED ORAL at 09:18

## 2024-04-03 RX ADMIN — LOSARTAN POTASSIUM 25 MG: 25 TABLET, FILM COATED ORAL at 09:18

## 2024-04-03 RX ADMIN — ARFORMOTEROL TARTRATE 15 MCG: 15 SOLUTION RESPIRATORY (INHALATION) at 07:27

## 2024-04-03 ASSESSMENT — PAIN DESCRIPTION - LOCATION
LOCATION: BUTTOCKS;BACK
LOCATION: ABDOMEN
LOCATION: ABDOMEN

## 2024-04-03 ASSESSMENT — PAIN DESCRIPTION - ORIENTATION
ORIENTATION: LEFT
ORIENTATION: LEFT
ORIENTATION: RIGHT;LEFT

## 2024-04-03 ASSESSMENT — PAIN DESCRIPTION - DESCRIPTORS
DESCRIPTORS: ACHING
DESCRIPTORS: SHARP;SHOOTING;BURNING
DESCRIPTORS: ACHING;SORE;DISCOMFORT

## 2024-04-03 ASSESSMENT — PAIN SCALES - GENERAL
PAINLEVEL_OUTOF10: 10
PAINLEVEL_OUTOF10: 0
PAINLEVEL_OUTOF10: 5
PAINLEVEL_OUTOF10: 1
PAINLEVEL_OUTOF10: 0
PAINLEVEL_OUTOF10: 10

## 2024-04-03 ASSESSMENT — PAIN DESCRIPTION - FREQUENCY: FREQUENCY: CONTINUOUS

## 2024-04-03 ASSESSMENT — PAIN DESCRIPTION - PAIN TYPE: TYPE: ACUTE PAIN

## 2024-04-03 ASSESSMENT — PAIN - FUNCTIONAL ASSESSMENT: PAIN_FUNCTIONAL_ASSESSMENT: PREVENTS OR INTERFERES SOME ACTIVE ACTIVITIES AND ADLS

## 2024-04-03 ASSESSMENT — PAIN DESCRIPTION - ONSET: ONSET: ON-GOING

## 2024-04-03 NOTE — ACP (ADVANCE CARE PLANNING)
Advance Care Planning   Healthcare Decision Maker:    Primary Decision Maker: Dena Stern (DBOA) - Other - [phone]    Click here to complete Healthcare Decision Makers including selection of the Healthcare Decision Maker Relationship (ie \"Primary\").  Today we documented Decision Maker(s) consistent with ACP documents on file.       If the relationship to the patient does NOT follow our state's Next of Kin hierarchy, the patient MUST complete an ACP Document to allow him/her to act on the patient's behalf. :

## 2024-04-03 NOTE — CONSULTS
4/3/2024  7:18 AM  Service: Urology  Group: [facility] (Lee/Sowmya/Reg)    Nettie Lara  [id number]     Chief Complaint/Reason for consultation:   Bladder mass      History of Present Illness:      The patient is a 69 y.o. female patient who presents with a chief complaint of leg swelling. She reports that she also has worsening left flank pain and difficulty urinating over the last couple of days.  She is known to Dr. BLANCHE Howard.   She has a hx of left staghorn calc, hydronephrosis,uti, urinary incontinence, incomplete bladder emptying.   She has undergone multiple stone procedures with the last being a cystoscopy, ureteroscopy, laser lithotripsy, left stent exchange 7/17/23   She has a hx of MS and is not ambulatory.  She was a 30 year smoker and quit 4 years ago.   She reports no gross hematuria or dysuria.  She does have feelings of a full bladder but can't void at this time.   She reports that she was straight cathed at 2 am for an unknown amount and she has not voided since.         Past Medical History:   Diagnosis Date    Arthritis     Asthma     follows with PCP    COPD (chronic obstructive pulmonary disease) (HCC)     Follows with PCP    Eczema     Fall 05/26/2011    Gout     Hypertension     Kidney stone     Multiple sclerosis (HCC)     Prolonged emergence from general anesthesia     Scoliosis     Spinal stenosis     UTI (urinary tract infection)          Past Surgical History:   Procedure Laterality Date    APPENDECTOMY      BLADDER SURGERY Left 7/6/2023    CYSTOSCOPY RETROGRADE PYELOGRAM  left STENT REMOVAL, PLACEMENT OF LEFT STENT performed by Ruy Deng MD at [facility] [place]    CHOLECYSTECTOMY      CYSTOSCOPY INSERTION / REMOVAL STENT / STONE Right 07/26/2020    CYSTOSCOPY RETROGRADE PYELOGRAM STENT INSERTION LEFT performed by Tony Howard DO at [facility] OR    HYSTERECTOMY (CERVIX STATUS UNKNOWN)      LITHOTRIPSY Left 08/07/2020    CYSTOSCOPY RETROGRADE PYELOGRAM URETEROSCOPY  LEFT

## 2024-04-03 NOTE — PROGRESS NOTES
OCCUPATIONAL THERAPY INITIAL EVALUATION    [facility] [address] [place], [place]       Date:4/3/2024                                                  Patient Name: Nettie Lara  MRN: [id number]  : 1955  Room: [address] [place] [address]    Evaluating OT: Kenneth Bowden OTR/L [id number]    Referring Provider: Janki Armstrong APRN - CNP      Specific Provider Orders/Date: OT evaluation and treatment 4/3/24 0315    Diagnosis:  Calculus of ureter [N20.1]  Urinary obstruction [N13.9]  Bladder mass [N32.89]  Bilateral lower extremity edema [R60.0]  Hydronephrosis, unspecified hydronephrosis type [N13.30]      Pertinent Medical History:  has a past medical history of Arthritis, Asthma, COPD (chronic obstructive pulmonary disease) ([facility]), Eczema, Fall, Gout, Hypertension, Kidney stone, Multiple sclerosis ([facility]), Prolonged emergence from general anesthesia, Scoliosis, Spinal stenosis, and UTI (urinary tract infection).   Past Surgical History:   Procedure Laterality Date    APPENDECTOMY      BLADDER SURGERY Left 2023    CYSTOSCOPY RETROGRADE PYELOGRAM  left STENT REMOVAL, PLACEMENT OF LEFT STENT performed by Ruy Deng MD at [facility] [place]    CHOLECYSTECTOMY      CYSTOSCOPY INSERTION / REMOVAL STENT / STONE Right 2020    CYSTOSCOPY RETROGRADE PYELOGRAM STENT INSERTION LEFT performed by Tony Howard DO at [facility] OR    HYSTERECTOMY (CERVIX STATUS UNKNOWN)      LITHOTRIPSY Left 2020    CYSTOSCOPY RETROGRADE PYELOGRAM URETEROSCOPY  LEFT J-STENT EXCHANGE, INSERTION JUNIOR CATHETER---FACILITY------ performed by Tony Howard DO at [facility] OR    LITHOTRIPSY Left 2023    CYSTOSCOPY LEFT ESWL EXTRACORPOREAL SHOCK WAVE LITHOTRIPSY WITH LEFT STENT INSERTION (HOLD TIME 1130 FOR TRANSPORT) performed by Wilson Finney MD at [facility] OR    LITHOTRIPSY Left 2023    CYSTOSCOPY RETROGRADE PYELOGRAM LEFT URETEROSCOPY  WITH LASER LITHOTRIPSY LEFT J STENT INSERTION  functional diagnosis, prognosis/goals and OT plan of care. Pt/family demonstrated understanding.       Eval Complexity:      Description  Performance deficits  Clinical decision making  Co-morbidities affecting occupational performance  Modification or assistance to complete eval    Low Complexity   1 to 3 []  Low []  None []  None []   Moderate Complexity   3 to 5 [x]  Mod [x]  Maybe []  Min to Mod [x]   High Complexity   5 or more []  High []  Yes [x]  Max []     The above evaluation is classified as moderate complexity based off the noted performance deficits, personal factors, co-morbidities, assistance required, and other factors as noted in the clinical evaluation and functional testing.     Time In: 0916  Time Out: 0941  Total Treatment Time: 10 minutes    Min Units   OT Eval Low 59725       OT Eval Moderate 97166  x 1   OT Eval High 83395       OT Re-Eval 72356       Therapeutic Ex 59736       Therapeutic Activities 56618  10 1   ADL/Self Care 15827      Orthotic Management 58477       Neuro Re-Ed 00869       Non-Billable Time          Evaluation Time includes thorough review of current medical information, gathering information on past medical history/social history and prior level of function, completion of standardized testing/informal observation of tasks, assessment of data and education on plan of care and goals.          Kenneth Bowdne OTR/L [id number]

## 2024-04-03 NOTE — PROGRESS NOTES
Patient voiding using purewick external catheter for urine collection. Bladder scanned patient for 507. Patient then voided 350 mL in cannister and some urine noted on pad. Repeat bladder scan for 252 mL approximately 30 minutes after voiding. Will re-scan patient after next voiding

## 2024-04-03 NOTE — H&P
[facility]  History and Physical      CHIEF COMPLAINT:    Chief Complaint   Patient presents with    Leg Swelling    Flank Pain     Patient c/o flank pain and difficulty urinating . Hx kidney stone         Patient of Geraldo Myers MD presents with:  Bladder mass    History of Present Illness:   Patient is a 69-year-old female with a past medical history of asthma, COPD, gout, HTN, kidney stones, MS, scoliosis who presents to the emergency room for flank pain and leg swelling with difficulty urinating.  On arrival labs revealed a mildly elevated proBNP of 193, troponin 54-57-57 with a UA consistent for urinary tract infection. A CT abdomen pelvis revealed multiple bladder masses largest 3 cm concerning for malignancy, mild to moderate left hydronephrosis.  Urology was consulted in the emergency room and patient is admitted to [facility] with telemetry for further workup and treatment.  On evaluation resting comfortably in no apparent acute distress.  She is sitting up in chair and denies any recent hematuria.  Has been evaluated by urology, awaiting cystoscopy tomorrow    REVIEW OF SYSTEMS:  Pertinent negatives are above in HPI.  10 point ROS otherwise negative.      Past Medical History:   Diagnosis Date    Arthritis     Asthma     follows with PCP    COPD (chronic obstructive pulmonary disease) (HCC)     Follows with PCP    Eczema     Fall 05/26/2011    Gout     Hypertension     Kidney stone     Multiple sclerosis (HCC)     Prolonged emergence from general anesthesia     Scoliosis     Spinal stenosis     UTI (urinary tract infection)          Past Surgical History:   Procedure Laterality Date    APPENDECTOMY      BLADDER SURGERY Left 7/6/2023    CYSTOSCOPY RETROGRADE PYELOGRAM  left STENT REMOVAL, PLACEMENT OF LEFT STENT performed by Ruy Deng MD at [facility] OR    CHOLECYSTECTOMY      CYSTOSCOPY INSERTION / REMOVAL STENT / STONE Right 07/26/2020    CYSTOSCOPY RETROGRADE PYELOGRAM STENT INSERTION

## 2024-04-03 NOTE — PROGRESS NOTES
4 Eyes Skin Assessment     NAME:  Nettie Lara  YOB: 1955  MEDICAL RECORD NUMBER:  [id number]    The patient is being assessed for  Admission    I agree that at least one RN has performed a thorough Head to Toe Skin Assessment on the patient. ALL assessment sites listed below have been assessed.      Areas assessed by both nurses:    Head, Face, Ears, Shoulders, Back, Chest, Arms, Elbows, Hands, Sacrum. Buttock, Coccyx, Ischium, and Legs. Feet and Heels    Patient has two wounds. Wound on Right buttocks (1cm x 1cm x 0.1cm) and Wound on right pinky toe (1cm x 0.5cm x 0.1cm) Patient has blanchable redness on buttocks. Patient BLE swollen and red, but blanchable. Patient heels red, but blanchable. Patient's right labia majora red and swollen.         Does the Patient have a Wound? Yes wound(s) were present on assessment. LDA wound assessment was Initiated and completed by ROCCO Varela Prevention initiated by RN: Yes  Wound Care Orders initiated by RN: Yes    Pressure Injury (Stage 3,4, Unstageable, DTI, NWPT, and Complex wounds) if present, place Wound referral order by RN under : No    New Ostomies, if present place, Ostomy referral order under : No     Nurse 1 eSignature: Electronically signed by Rocio Hoover RN on 4/3/24 at 3:17 AM EDT    **SHARE this note so that the co-signing nurse can place an eSignature**    Nurse 2 eSignature: Electronically signed by Vivi Molina RN on 4/3/24 at 4:25 AM EDT

## 2024-04-03 NOTE — PLAN OF CARE
Problem: Safety - Adult  Goal: Free from fall injury  4/3/2024 1133 by Jannie Flores RN  Outcome: Progressing     Problem: Pain  Goal: Verbalizes/displays adequate comfort level or baseline comfort level  Outcome: Progressing     Problem: Skin/Tissue Integrity  Goal: Absence of new skin breakdown  Description: 1.  Monitor for areas of redness and/or skin breakdown  2.  Assess vascular access sites hourly  3.  Every 4-6 hours minimum:  Change oxygen saturation probe site  4.  Every 4-6 hours:  If on nasal continuous positive airway pressure, respiratory therapy assess nares and determine need for appliance change or resting period.  Outcome: Progressing

## 2024-04-03 NOTE — PROGRESS NOTES
Patient incontinent of unmeasurable amount of urine. Patient states that she does not feel urge to urinate anymore and would like to hold off on catheter insertion for now. Will bladder scan at a later time.

## 2024-04-03 NOTE — CARE COORDINATION
CM transition of care.  Patient presented to [facility] ER secondary to leg swelling and flank pain.  Admitted inpatient with urinary obstruction.  Urology on consult.  IV rocephin q 24.  Plan is for OR tomorrow for a cysto retro pyelogram and laser lithotripsy with possible bladder biopsy and resection of bladder.  Met with patient to discuss discharge planning.  Patient lives alone in an apartment with elevator access.  She is mostly wheelchair bound.  She has HHA M-F 9-2.  She reports that [facility] was just starting for SN and PT/OT.  She owns a slide board, sc and walker.  Has been to [facility] in the past.  Patient plans to discharge to home with [facility] when ready.  Spoke with Sabrina at [facility].  Pt is active and will need VICENTA orders prior to discharge.  VICENTA orders placed.  Pt will need ambulance transport home on day of discharge.  Ambulance form with envelope placed on the soft chart.  CM/CONI to follow. Arian Phan RN  [phone]      Case Management Assessment  Initial Evaluation    Date/Time of Evaluation: 4/3/2024 2:42 PM  Assessment Completed by: Nettie Phan RN    If patient is discharged prior to next notation, then this note serves as note for discharge by case management.    Patient Name: Nettie Lara                   YOB: 1955  Diagnosis: Calculus of ureter [N20.1]  Urinary obstruction [N13.9]  Bladder mass [N32.89]  Bilateral lower extremity edema [R60.0]  Hydronephrosis, unspecified hydronephrosis type [N13.30]                   Date / Time: 4/2/2024  5:31 PM    Patient Admission Status: Inpatient   Readmission Risk (Low < 19, Mod (19-27), High > 27): Readmission Risk Score: 18.3    Current PCP: Geraldo Myers MD  PCP verified by ? Yes    Chart Reviewed: Yes      History Provided by: Patient  Patient Orientation: Alert and Oriented    Patient Cognition: Alert    Hospitalization in the last 30 days (Readmission):  No    If yes, Readmission Assessment in  Navigator will be  Caregiver support, Cooperative, and Pleasant    Barriers to discharge: Medical complications    Additional Case Management Notes:     The Plan for Transition of Care is related to the following treatment goals of Calculus of ureter [N20.1]  Urinary obstruction [N13.9]  Bladder mass [N32.89]  Bilateral lower extremity edema [R60.0]  Hydronephrosis, unspecified hydronephrosis type [N13.30]    IF APPLICABLE: The Patient and/or patient representative Nettie and her family were provided with a choice of provider and agrees with the discharge plan. Freedom of choice list with basic dialogue that supports the patient's individualized plan of care/goals and shares the quality data associated with the providers was provided to: Patient   Patient Representative Name:       The Patient and/or Patient Representative Agree with the Discharge Plan? Yes    Nettie Phan RN  Case Management Department  Ph: [phone] Fax:

## 2024-04-03 NOTE — PROGRESS NOTES
Physical Therapy  Physical Therapy Initial Assessment     Name: Nettie Lara  : 1955  MRN: [id number]      Date of Service: 4/3/2024    Evaluating PT:  Kyle Sanderson PT, DPT    Room #:  8410/8410-B  Diagnosis:  Calculus of ureter [N20.1]  Urinary obstruction [N13.9]  Bladder mass [N32.89]  Bilateral lower extremity edema [R60.0]  Hydronephrosis, unspecified hydronephrosis type [N13.30]  PMHx/PSHx:  MS, arthritis, asthma, COPD, HTN, spinal stenosis  Procedure/Surgery:  N/A  Precautions:  fall risk, BLE weakness (R>L), non ambulatory  Equipment Owned: manual w/c, electric w/c, ww  Equipment Needs:  TBD    SUBJECTIVE:    Pt lives alone (aide assisting 5d/wk) in a 3rd floor apt with elevator access. Pt transfer to w/c and mobilized in manual w/c independently PTA.    OBJECTIVE:   Initial Evaluation  Date: 4/3/24 Treatment Short Term/ Long Term   Goals   AM-PAC 6 Clicks      Was pt agreeable to Eval/treatment? yes     Does pt have pain? 5/10 L side     Bed Mobility  Rolling: min A  Supine to sit: mod A  Sit to supine: NT  Scooting: min A  Rolling: mod I  Supine to sit: mod I  Sit to supine: mod I  Scooting: mod I   Transfers Sit to stand: mod A  Stand to sit: mod A  Stand pivot: NT  Sit pivot: CGA  Sit to stand: mod I  Stand to sit: mod I    Sit pivot: mod I   Ambulation    NT     Stair negotiation: ascended and descended  NT       Strength/ROM:   BLE grossly 2/5  BLE AROM limited by weakness    Balance:   Static Sitting: SBA  Dynamic Sitting: SBA  Static Standing: mod A with ww  Dynamic Standing: NT    Pt is A & O x 3  Sensation:  Pt reports sensation changes in B feet/ankles, weaker sensation compared to above calf  Edema:  BLE swelling    Vitals:  SpO2 and HR were stable during session    Therapeutic Exercises:    Bed mobility: supine<>sit, cued for EOB positioning  Transfers: STS x1, sit pivot x1, cued for hand placement and postural correction  BLE AROM    Patient education  Pt educated on role of  posture, joint and skin integrity, and interaction with environment.       Pt's/ family goals   1. Return home    Prognosis is fair for reaching above PT goals.    Patient and or family understand(s) diagnosis, prognosis, and plan of care.  yes    PHYSICAL THERAPY PLAN OF CARE:    PT POC is established based on physician order and patient diagnosis     Referring provider/PT Order:  Janki Armstrong APRN - CNP   Diagnosis:  Calculus of ureter [N20.1]  Urinary obstruction [N13.9]  Bladder mass [N32.89]  Bilateral lower extremity edema [R60.0]  Hydronephrosis, unspecified hydronephrosis type [N13.30]  Specific instructions for next treatment:  Progress as tolerated    Current Treatment Recommendations:     [x] Strengthening to improve independence with functional mobility   [] ROM to improve independence with functional mobility   [x] Balance Training to improve static/dynamic balance and to reduce fall risk  [x] Endurance Training to improve activity tolerance during functional mobility   [x] Transfer Training to improve safety and independence with all functional transfers   [x] Gait Training to improve gait mechanics, endurance and assess need for appropriate assistive device  [] Stair Training in preparation for safe discharge home and/or into the community   [] Positioning to prevent skin breakdown and contractures  [x] Safety and Education Training   [x] Patient/Caregiver Education   [] HEP  [] Other     PT long term treatment goals are located in above grid    Frequency of treatments: 2-5x/week x 1-2 weeks.    Time in  0916  Time out  0942    Total Treatment Time  15 minutes     Evaluation Time includes thorough review of current medical information, gathering information on past medical history/social history and prior level of function, completion of standardized testing/informal observation of tasks, assessment of data and education on plan of care and goals.    CPT codes:  [x] Low Complexity PT evaluation

## 2024-04-04 ENCOUNTER — ANESTHESIA (OUTPATIENT)
Dept: OPERATING ROOM | Age: 69
End: 2024-04-04
Payer: MEDICAID

## 2024-04-04 ENCOUNTER — APPOINTMENT (OUTPATIENT)
Dept: GENERAL RADIOLOGY | Age: 69
End: 2024-04-04
Payer: MEDICAID

## 2024-04-04 LAB
ALBUMIN SERPL-MCNC: 3.2 G/DL (ref 3.5–5.2)
ALP SERPL-CCNC: 83 U/L (ref 35–104)
ALT SERPL-CCNC: 19 U/L (ref 0–32)
ANION GAP SERPL CALCULATED.3IONS-SCNC: 12 MMOL/L (ref 7–16)
AST SERPL-CCNC: 23 U/L (ref 0–31)
BASOPHILS # BLD: 0.08 K/UL (ref 0–0.2)
BASOPHILS NFR BLD: 1 % (ref 0–2)
BILIRUB DIRECT SERPL-MCNC: <0.2 MG/DL (ref 0–0.3)
BILIRUB INDIRECT SERPL-MCNC: ABNORMAL MG/DL (ref 0–1)
BILIRUB SERPL-MCNC: 0.3 MG/DL (ref 0–1.2)
BUN SERPL-MCNC: 13 MG/DL (ref 6–23)
CALCIUM SERPL-MCNC: 8.8 MG/DL (ref 8.6–10.2)
CHLORIDE SERPL-SCNC: 106 MMOL/L (ref 98–107)
CO2 SERPL-SCNC: 22 MMOL/L (ref 22–29)
CREAT SERPL-MCNC: 1 MG/DL (ref 0.5–1)
EOSINOPHIL # BLD: 0.3 K/UL (ref 0.05–0.5)
EOSINOPHILS RELATIVE PERCENT: 3 % (ref 0–6)
ERYTHROCYTE [DISTWIDTH] IN BLOOD BY AUTOMATED COUNT: 14 % (ref 11.5–15)
GFR SERPL CREATININE-BSD FRML MDRD: 64 ML/MIN/1.73M2
GLUCOSE SERPL-MCNC: 92 MG/DL (ref 74–99)
HCT VFR BLD AUTO: 36.9 % (ref 34–48)
HGB BLD-MCNC: 13 G/DL (ref 11.5–15.5)
IMM GRANULOCYTES # BLD AUTO: 0.19 K/UL (ref 0–0.58)
IMM GRANULOCYTES NFR BLD: 2 % (ref 0–5)
LYMPHOCYTES NFR BLD: 2 K/UL (ref 1.5–4)
LYMPHOCYTES RELATIVE PERCENT: 22 % (ref 20–42)
MCH RBC QN AUTO: 36.3 PG (ref 26–35)
MCHC RBC AUTO-ENTMCNC: 35.2 G/DL (ref 32–34.5)
MCV RBC AUTO: 103.1 FL (ref 80–99.9)
MONOCYTES NFR BLD: 1.28 K/UL (ref 0.1–0.95)
MONOCYTES NFR BLD: 14 % (ref 2–12)
NEUTROPHILS NFR BLD: 58 % (ref 43–80)
NEUTS SEG NFR BLD: 5.34 K/UL (ref 1.8–7.3)
PLATELET # BLD AUTO: 281 K/UL (ref 130–450)
PMV BLD AUTO: 10.5 FL (ref 7–12)
POTASSIUM SERPL-SCNC: 3.7 MMOL/L (ref 3.5–5)
PROT SERPL-MCNC: 5.7 G/DL (ref 6.4–8.3)
RBC # BLD AUTO: 3.58 M/UL (ref 3.5–5.5)
SODIUM SERPL-SCNC: 140 MMOL/L (ref 132–146)
WBC OTHER # BLD: 9.2 K/UL (ref 4.5–11.5)

## 2024-04-04 PROCEDURE — 1200000000 HC SEMI PRIVATE

## 2024-04-04 PROCEDURE — 6370000000 HC RX 637 (ALT 250 FOR IP): Performed by: NURSE PRACTITIONER

## 2024-04-04 PROCEDURE — 3700000001 HC ADD 15 MINUTES (ANESTHESIA): Performed by: UROLOGY

## 2024-04-04 PROCEDURE — 3700000000 HC ANESTHESIA ATTENDED CARE: Performed by: UROLOGY

## 2024-04-04 PROCEDURE — 6360000002 HC RX W HCPCS: Performed by: NURSE PRACTITIONER

## 2024-04-04 PROCEDURE — 2500000003 HC RX 250 WO HCPCS

## 2024-04-04 PROCEDURE — 87086 URINE CULTURE/COLONY COUNT: CPT

## 2024-04-04 PROCEDURE — 87077 CULTURE AEROBIC IDENTIFY: CPT

## 2024-04-04 PROCEDURE — 6360000002 HC RX W HCPCS: Performed by: INTERNAL MEDICINE

## 2024-04-04 PROCEDURE — 2709999900 HC NON-CHARGEABLE SUPPLY: Performed by: UROLOGY

## 2024-04-04 PROCEDURE — BT1F1ZZ FLUOROSCOPY OF LEFT KIDNEY, URETER AND BLADDER USING LOW OSMOLAR CONTRAST: ICD-10-PCS | Performed by: UROLOGY

## 2024-04-04 PROCEDURE — 80053 COMPREHEN METABOLIC PANEL: CPT

## 2024-04-04 PROCEDURE — 2580000003 HC RX 258: Performed by: NURSE PRACTITIONER

## 2024-04-04 PROCEDURE — 6360000002 HC RX W HCPCS

## 2024-04-04 PROCEDURE — 2580000003 HC RX 258: Performed by: UROLOGY

## 2024-04-04 PROCEDURE — 82248 BILIRUBIN DIRECT: CPT

## 2024-04-04 PROCEDURE — 0202U NFCT DS 22 TRGT SARS-COV-2: CPT

## 2024-04-04 PROCEDURE — 0T778DZ DILATION OF LEFT URETER WITH INTRALUMINAL DEVICE, VIA NATURAL OR ARTIFICIAL OPENING ENDOSCOPIC: ICD-10-PCS | Performed by: UROLOGY

## 2024-04-04 PROCEDURE — 7100000000 HC PACU RECOVERY - FIRST 15 MIN: Performed by: UROLOGY

## 2024-04-04 PROCEDURE — 3600000004 HC SURGERY LEVEL 4 BASE: Performed by: UROLOGY

## 2024-04-04 PROCEDURE — 7100000001 HC PACU RECOVERY - ADDTL 15 MIN: Performed by: UROLOGY

## 2024-04-04 PROCEDURE — 6370000000 HC RX 637 (ALT 250 FOR IP): Performed by: UROLOGY

## 2024-04-04 PROCEDURE — 94640 AIRWAY INHALATION TREATMENT: CPT

## 2024-04-04 PROCEDURE — C2617 STENT, NON-COR, TEM W/O DEL: HCPCS | Performed by: UROLOGY

## 2024-04-04 PROCEDURE — 36415 COLL VENOUS BLD VENIPUNCTURE: CPT

## 2024-04-04 PROCEDURE — 0TBB8ZZ EXCISION OF BLADDER, VIA NATURAL OR ARTIFICIAL OPENING ENDOSCOPIC: ICD-10-PCS | Performed by: UROLOGY

## 2024-04-04 PROCEDURE — C1769 GUIDE WIRE: HCPCS | Performed by: UROLOGY

## 2024-04-04 PROCEDURE — 85025 COMPLETE CBC W/AUTO DIFF WBC: CPT

## 2024-04-04 PROCEDURE — 2720000010 HC SURG SUPPLY STERILE: Performed by: UROLOGY

## 2024-04-04 PROCEDURE — 3600000014 HC SURGERY LEVEL 4 ADDTL 15MIN: Performed by: UROLOGY

## 2024-04-04 PROCEDURE — 88307 TISSUE EXAM BY PATHOLOGIST: CPT

## 2024-04-04 PROCEDURE — 2500000003 HC RX 250 WO HCPCS: Performed by: ANESTHESIOLOGY

## 2024-04-04 PROCEDURE — 88112 CYTOPATH CELL ENHANCE TECH: CPT

## 2024-04-04 PROCEDURE — 6360000002 HC RX W HCPCS: Performed by: ANESTHESIOLOGY

## 2024-04-04 DEVICE — URETERAL STENT
Type: IMPLANTABLE DEVICE | Site: URETHRA | Status: FUNCTIONAL
Brand: PERCUFLEX™

## 2024-04-04 RX ORDER — PROPOFOL 10 MG/ML
INJECTION, EMULSION INTRAVENOUS CONTINUOUS PRN
Status: DISCONTINUED | OUTPATIENT
Start: 2024-04-04 | End: 2024-04-04 | Stop reason: SDUPTHER

## 2024-04-04 RX ORDER — SODIUM CHLORIDE 0.9 % (FLUSH) 0.9 %
5-40 SYRINGE (ML) INJECTION EVERY 12 HOURS SCHEDULED
Status: DISCONTINUED | OUTPATIENT
Start: 2024-04-04 | End: 2024-04-04 | Stop reason: HOSPADM

## 2024-04-04 RX ORDER — HYDROMORPHONE HYDROCHLORIDE 1 MG/ML
0.5 INJECTION, SOLUTION INTRAMUSCULAR; INTRAVENOUS; SUBCUTANEOUS EVERY 5 MIN PRN
Status: COMPLETED | OUTPATIENT
Start: 2024-04-04 | End: 2024-04-04

## 2024-04-04 RX ORDER — SODIUM CHLORIDE, SODIUM LACTATE, POTASSIUM CHLORIDE, CALCIUM CHLORIDE 600; 310; 30; 20 MG/100ML; MG/100ML; MG/100ML; MG/100ML
INJECTION, SOLUTION INTRAVENOUS CONTINUOUS
Status: DISCONTINUED | OUTPATIENT
Start: 2024-04-04 | End: 2024-04-09

## 2024-04-04 RX ORDER — FENTANYL CITRATE 50 UG/ML
25 INJECTION, SOLUTION INTRAMUSCULAR; INTRAVENOUS EVERY 5 MIN PRN
Status: DISCONTINUED | OUTPATIENT
Start: 2024-04-04 | End: 2024-04-04 | Stop reason: HOSPADM

## 2024-04-04 RX ORDER — SODIUM CHLORIDE 9 MG/ML
INJECTION, SOLUTION INTRAVENOUS PRN
Status: DISCONTINUED | OUTPATIENT
Start: 2024-04-04 | End: 2024-04-04 | Stop reason: HOSPADM

## 2024-04-04 RX ORDER — IPRATROPIUM BROMIDE AND ALBUTEROL SULFATE 2.5; .5 MG/3ML; MG/3ML
1 SOLUTION RESPIRATORY (INHALATION)
Status: DISCONTINUED | OUTPATIENT
Start: 2024-04-04 | End: 2024-04-04 | Stop reason: HOSPADM

## 2024-04-04 RX ORDER — DEXTROSE MONOHYDRATE 100 MG/ML
INJECTION, SOLUTION INTRAVENOUS CONTINUOUS PRN
Status: DISCONTINUED | OUTPATIENT
Start: 2024-04-04 | End: 2024-04-04 | Stop reason: HOSPADM

## 2024-04-04 RX ORDER — OXYCODONE HYDROCHLORIDE AND ACETAMINOPHEN 5; 325 MG/1; MG/1
1 TABLET ORAL EVERY 4 HOURS PRN
Status: DISCONTINUED | OUTPATIENT
Start: 2024-04-04 | End: 2024-04-05

## 2024-04-04 RX ORDER — NALOXONE HYDROCHLORIDE 0.4 MG/ML
INJECTION, SOLUTION INTRAMUSCULAR; INTRAVENOUS; SUBCUTANEOUS PRN
Status: DISCONTINUED | OUTPATIENT
Start: 2024-04-04 | End: 2024-04-04 | Stop reason: HOSPADM

## 2024-04-04 RX ORDER — DIPHENHYDRAMINE HYDROCHLORIDE 50 MG/ML
12.5 INJECTION INTRAMUSCULAR; INTRAVENOUS
Status: DISCONTINUED | OUTPATIENT
Start: 2024-04-04 | End: 2024-04-04 | Stop reason: HOSPADM

## 2024-04-04 RX ORDER — ONDANSETRON 2 MG/ML
4 INJECTION INTRAMUSCULAR; INTRAVENOUS
Status: DISCONTINUED | OUTPATIENT
Start: 2024-04-04 | End: 2024-04-04 | Stop reason: HOSPADM

## 2024-04-04 RX ORDER — PROCHLORPERAZINE EDISYLATE 5 MG/ML
5 INJECTION INTRAMUSCULAR; INTRAVENOUS
Status: DISCONTINUED | OUTPATIENT
Start: 2024-04-04 | End: 2024-04-04 | Stop reason: HOSPADM

## 2024-04-04 RX ORDER — SODIUM CHLORIDE 0.9 % (FLUSH) 0.9 %
5-40 SYRINGE (ML) INJECTION PRN
Status: DISCONTINUED | OUTPATIENT
Start: 2024-04-04 | End: 2024-04-04 | Stop reason: HOSPADM

## 2024-04-04 RX ADMIN — PROPOFOL 30 MG: 10 INJECTION, EMULSION INTRAVENOUS at 11:43

## 2024-04-04 RX ADMIN — PROPOFOL 20 MG: 10 INJECTION, EMULSION INTRAVENOUS at 11:48

## 2024-04-04 RX ADMIN — ARFORMOTEROL TARTRATE 15 MCG: 15 SOLUTION RESPIRATORY (INHALATION) at 08:24

## 2024-04-04 RX ADMIN — SODIUM CHLORIDE, PRESERVATIVE FREE 10 ML: 5 INJECTION INTRAVENOUS at 20:50

## 2024-04-04 RX ADMIN — PROPOFOL 30 MG: 10 INJECTION, EMULSION INTRAVENOUS at 11:52

## 2024-04-04 RX ADMIN — PHENYLEPHRINE HYDROCHLORIDE 200 MCG: 10 INJECTION INTRAVENOUS at 11:59

## 2024-04-04 RX ADMIN — FUROSEMIDE 20 MG: 20 TABLET ORAL at 08:06

## 2024-04-04 RX ADMIN — SODIUM CHLORIDE, POTASSIUM CHLORIDE, SODIUM LACTATE AND CALCIUM CHLORIDE: 600; 310; 30; 20 INJECTION, SOLUTION INTRAVENOUS at 13:17

## 2024-04-04 RX ADMIN — PHENYLEPHRINE HYDROCHLORIDE 200 MCG: 10 INJECTION INTRAVENOUS at 12:04

## 2024-04-04 RX ADMIN — ONDANSETRON 4 MG: 2 INJECTION INTRAMUSCULAR; INTRAVENOUS at 20:50

## 2024-04-04 RX ADMIN — LOSARTAN POTASSIUM 25 MG: 25 TABLET, FILM COATED ORAL at 08:07

## 2024-04-04 RX ADMIN — PHENYLEPHRINE HYDROCHLORIDE 100 MCG: 10 INJECTION INTRAVENOUS at 11:56

## 2024-04-04 RX ADMIN — OXYCODONE HYDROCHLORIDE AND ACETAMINOPHEN 1 TABLET: 5; 325 TABLET ORAL at 20:49

## 2024-04-04 RX ADMIN — BUDESONIDE INHALATION 500 MCG: 0.5 SUSPENSION RESPIRATORY (INHALATION) at 08:24

## 2024-04-04 RX ADMIN — METOPROLOL TARTRATE 25 MG: 25 TABLET, FILM COATED ORAL at 20:49

## 2024-04-04 RX ADMIN — SODIUM CHLORIDE: 9 INJECTION, SOLUTION INTRAVENOUS at 11:40

## 2024-04-04 RX ADMIN — SODIUM CHLORIDE, POTASSIUM CHLORIDE, SODIUM LACTATE AND CALCIUM CHLORIDE: 600; 310; 30; 20 INJECTION, SOLUTION INTRAVENOUS at 20:55

## 2024-04-04 RX ADMIN — HYDROMORPHONE HYDROCHLORIDE 0.5 MG: 1 INJECTION, SOLUTION INTRAMUSCULAR; INTRAVENOUS; SUBCUTANEOUS at 13:09

## 2024-04-04 RX ADMIN — PHENYLEPHRINE HYDROCHLORIDE 100 MCG: 10 INJECTION INTRAVENOUS at 12:13

## 2024-04-04 RX ADMIN — PROPOFOL 100 MCG/KG/MIN: 10 INJECTION, EMULSION INTRAVENOUS at 11:44

## 2024-04-04 RX ADMIN — FENTANYL CITRATE 25 MCG: 50 INJECTION INTRAMUSCULAR; INTRAVENOUS at 13:23

## 2024-04-04 RX ADMIN — PHENYLEPHRINE HYDROCHLORIDE 50 MCG: 10 INJECTION INTRAVENOUS at 11:54

## 2024-04-04 RX ADMIN — WATER 1000 MG: 1 INJECTION INTRAMUSCULAR; INTRAVENOUS; SUBCUTANEOUS at 03:21

## 2024-04-04 RX ADMIN — METOPROLOL TARTRATE 25 MG: 25 TABLET, FILM COATED ORAL at 08:07

## 2024-04-04 RX ADMIN — HYDROMORPHONE HYDROCHLORIDE 0.5 MG: 1 INJECTION, SOLUTION INTRAMUSCULAR; INTRAVENOUS; SUBCUTANEOUS at 13:16

## 2024-04-04 RX ADMIN — ACETAMINOPHEN 650 MG: 325 TABLET ORAL at 08:06

## 2024-04-04 RX ADMIN — SODIUM CHLORIDE, PRESERVATIVE FREE 10 ML: 5 INJECTION INTRAVENOUS at 08:07

## 2024-04-04 ASSESSMENT — PAIN DESCRIPTION - LOCATION
LOCATION: ABDOMEN;PELVIS
LOCATION: ABDOMEN
LOCATION: ABDOMEN

## 2024-04-04 ASSESSMENT — PAIN DESCRIPTION - DESCRIPTORS
DESCRIPTORS: DULL;ACHING
DESCRIPTORS: CRAMPING;DISCOMFORT
DESCRIPTORS: OTHER (COMMENT)

## 2024-04-04 ASSESSMENT — PAIN SCALES - GENERAL
PAINLEVEL_OUTOF10: 4
PAINLEVEL_OUTOF10: 7
PAINLEVEL_OUTOF10: 8
PAINLEVEL_OUTOF10: 8
PAINLEVEL_OUTOF10: 7

## 2024-04-04 ASSESSMENT — PAIN DESCRIPTION - ORIENTATION
ORIENTATION: LOWER
ORIENTATION: INNER

## 2024-04-04 ASSESSMENT — PAIN DESCRIPTION - FREQUENCY: FREQUENCY: CONTINUOUS

## 2024-04-04 ASSESSMENT — PAIN DESCRIPTION - ONSET: ONSET: ON-GOING

## 2024-04-04 NOTE — BRIEF OP NOTE
Brief Postoperative Note      Patient: Nettie Lara  YOB: 1955  MRN: [id number]    Date of Procedure: 4/4/2024    Pre-Op Diagnosis Codes:     * Left ureteral stone [N20.1]     * Bladder mass [N32.89]    Post-Op Diagnosis: Same       Procedure(s):  CYSTOSCOPY RETROGRADE PYELOGRAM LEFT LASER LITHOTRIPSY  BLADDER BIOPSY/ RESECTION OF BLADDER TUMOR    Surgeon(s):  Ruy Deng MD    Assistant:      Anesthesia: Monitor Anesthesia Care    Estimated Blood Loss (mL): less than 100     Complications: None    Specimens:   ID Type Source Tests Collected by Time Destination   1 : Urine Urine Urine, Cystoscopic CULTURE, URINE Ruy Deng MD 4/4/2024 1211    A : Urine Urine Urine, Cystoscopic CYTOLOGY, NON-GYN Ruy Deng MD 4/4/2024 1211        Implants:  Implant Name Type Inv. Item Serial No.  Lot No. LRB No. Used Action   STENT URET 6FR L24CM PERCFLX FIRM DUROMETER DBL PGTL TAPR - [id number]  STENT URET 6FR L24CM PERCFLX FIRM DUROMETER DBL PGTL TAPR  [employer] [id number] Left 1 Implanted         Drains:     Findings:  Infection Present At Time Of Surgery (PATOS) (choose all levels that have infection present):  - Superficial Infection (skin/subcutaneous) present as evidenced by purulent fluid  Other Findings:     Electronically signed by Ruy Deng MD on 4/4/2024 at 12:49 PM

## 2024-04-04 NOTE — PROGRESS NOTES
In OR during rounds. Will review chart.    Electronically signed by GRETEL Alba CNP on 4/4/2024 at 2:07 PM

## 2024-04-04 NOTE — ANESTHESIA POSTPROCEDURE EVALUATION
Department of Anesthesiology  Postprocedure Note    Patient: Nettie Lara  MRN: [id number]  YOB: 1955  Date of evaluation: 4/4/2024    Procedure Summary       Date: 04/04/24 Room / Location: [address]    Anesthesia Start: 1135 Anesthesia Stop: 1307    Procedures:       CYSTOSCOPY RETROGRADE PYELOGRAM (Left: Urethra)      BLADDER BIOPSY/ RESECTION OF BLADDER TUMOR (Bladder) Diagnosis:       Left ureteral stone      Bladder mass      (Left ureteral stone [N20.1])      (Bladder mass [N32.89])    Surgeons: Ruy Deng MD Responsible Provider: Wilmer Apple DO    Anesthesia Type: MAC ASA Status: 3            Anesthesia Type: MAC    Tenzin Phase I: Tenzin Score: 9    Tenzin Phase II:      Anesthesia Post Evaluation    Patient location during evaluation: PACU  Patient participation: complete - patient participated  Level of consciousness: awake  Airway patency: patent  Nausea & Vomiting: no nausea and no vomiting  Cardiovascular status: hemodynamically stable  Respiratory status: acceptable  Hydration status: stable  Pain management: adequate    No notable events documented.

## 2024-04-04 NOTE — ANESTHESIA PRE PROCEDURE
Department of Anesthesiology  Preprocedure Note       Name:  Nettie Lara   Age:  69 y.o.  :  1955                                          MRN:  [id number]         Date:  2024      Surgeon: Surgeon(s):  Ruy Deng MD    Procedure: Procedure(s):  CYSTOSCOPY RETROGRADE PYELOGRAM LEFT LASER LITHOTRIPSY  POSSIBLE BLADDER BIOPSY/ RESECTION OF BLADDER TUMOR    Medications prior to admission:   Prior to Admission medications    Medication Sig Start Date End Date Taking? Authorizing Provider   furosemide (LASIX) 40 MG tablet Take 0.5 tablets by mouth daily  Patient not taking: Reported on 4/3/2024 1/29/24   Harleen Cavazos MD   magnesium oxide (MAG-OX) 400 MG tablet Take 1 tablet by mouth daily 24   Jah Bella APRN - CNP   Cholecalciferol (VITAMIN D3) 125 MCG (5000 UT) TABS Take by mouth daily    Harleen Cavazos MD   vitamin E 1000 units capsule Take 1 capsule by mouth daily    Harleen Cavazos MD   metoprolol tartrate (LOPRESSOR) 25 MG tablet Take 1 tablet by mouth 2 times daily 1/3/24   Geraldo Myers MD   losartan (COZAAR) 25 MG tablet Take 1 tablet by mouth daily 1/3/24   Geraldo Myers MD   albuterol sulfate HFA (VENTOLIN HFA) 108 (90 Base) MCG/ACT inhaler Inhale 2 puffs into the lungs every 4 hours as needed for Wheezing (or cough) 23   Geraldo Myers MD   budesonide-formoterol (SYMBICORT) 160-4.5 MCG/ACT AERO Inhale 2 puffs into the lungs 2 times daily 23   Geraldo Myers MD   albuterol (PROVENTIL) (2.5 MG/3ML) 0.083% nebulizer solution Take 3 mLs by nebulization every 4 hours as needed for Shortness of Breath 10/20/23   Geraldo Myers MD   clotrimazole (LOTRIMIN) 1 % cream Apply to affected area twice daily until resolved.  Patient not taking: Reported on 4/3/2024 10/16/23   Geraldo Myers MD   ondansetron (ZOFRAN-ODT) 4 MG disintegrating tablet DISSOLVE ONE TABLET IN MOUTH THREE TIMES A DAY AS NEEDED FOR NAUSEA OR VOMITING 23   Geraldo Myers MD   acetaminophen

## 2024-04-04 NOTE — OP NOTE
[facility]              [address] [place] [address]                            OPERATIVE REPORT      PATIENT NAME: TRACY LEGER           : 1955  MED REC NO: [id number]                        ROOM: [facility]  ACCOUNT NO: [id number]                       ADMIT DATE: 2024  PROVIDER: Ruy Deng MD      DATE OF PROCEDURE:      SURGEON:  Ruy Deng MD    PREOPERATIVE DIAGNOSIS:  Rule out ureteral obstruction plus filling defects and bladder.    POSTOPERATIVE DIAGNOSES:  Probable multiple fragmented stones in the renal pelvis on the left, 3 large superficial bladder tumors.    OPERATIONS:  Cystopanendoscopy, retrograde pyelogram, stent placement on the left, and transurethral resection of multiple bladder lesions.    ESTIMATED BLOOD LOSS:  100 mL.    ANESTHESIA:  LMAC.    DESCRIPTION OF PROCEDURE:  With the patient in the lithotomy position under satisfactory sedation, the genitalia were prepped and draped in a sterile manner.  A 21-Malagasy panendoscope passed into the bladder.  The urine was grossly purulent.  This was sent for culture and for cytology.  Inspection with the right angle lens revealed there were 2 large bladder lesions, which were papillary and were coated with mucus.  In the dome of the bladder, there was a 3rd lesion in the posterior bladder wall area, which was smaller, but also was a papillary lesion.  The trigone was symmetrical.  The ureteral orifices of normal configuration and location.  A retrograde on the right side showed no evidence of obstruction.  On the left side, the ureter was dilated.  There was a very regular appearance of her renal pelvis.  It appeared that there may have been small fragmented stones, however, it may well have been a tumor in her renal pelvis because of the stones that were in the calices and the ratty appearance of her renal pelvis, I elected to place a stent, which was a 24  cm 6-Somali double-J stent.  Following this, a 28-Somali resectoscope sheath was passed into the bladder.  With the aid of an assistant pushing on the dome of the bladder, I was able to resect all her lesions.  There were 2 very large lesions and the 3rd was moderate size.  These did appear to be superficial.  The base of the lesions was fulgurated thoroughly for hemostasis and for destruction of any additional tumor.  The bladder was drained with a 24-Somali 3-way.  The patient was sent to the recovery room in satisfactory condition.          CORTNEY CABRERA MD      D:  04/04/2024 13:01:36     T:  04/04/2024 13:14:23     [id number]/[id number]  Job #:  [id number]     Doc#:  [id number]

## 2024-04-04 NOTE — CARE COORDINATION
CM update note.  Discharge plan is home with [facility].  [facility] orders are in place.  Patient will need ambulance transport to home on day of discharge.  Ambulance form is on the soft chart.  Patient is s/p Cystopanendoscopy, retrograde pyelogram, stent placement on the left, and transurethral resection of multiple bladder lesions today.  IVF @ 125 ml/hr, IV rocephin q 24, Obrien with CBI to keep urine light pink ordered.  CM/SW to follow.  Arian Phan RN [phone].

## 2024-04-05 ENCOUNTER — APPOINTMENT (OUTPATIENT)
Dept: GENERAL RADIOLOGY | Age: 69
End: 2024-04-05
Payer: MEDICAID

## 2024-04-05 LAB
ANION GAP SERPL CALCULATED.3IONS-SCNC: 18 MMOL/L (ref 7–16)
B PARAP IS1001 DNA NPH QL NAA+NON-PROBE: NOT DETECTED
B PERT DNA SPEC QL NAA+PROBE: NOT DETECTED
BASOPHILS # BLD: 0 K/UL (ref 0–0.2)
BASOPHILS NFR BLD: 0 % (ref 0–2)
BUN SERPL-MCNC: 13 MG/DL (ref 6–23)
C PNEUM DNA NPH QL NAA+NON-PROBE: NOT DETECTED
CALCIUM SERPL-MCNC: 8.8 MG/DL (ref 8.6–10.2)
CHLORIDE SERPL-SCNC: 101 MMOL/L (ref 98–107)
CO2 SERPL-SCNC: 20 MMOL/L (ref 22–29)
CREAT SERPL-MCNC: 1 MG/DL (ref 0.5–1)
D DIMER: 580 NG/ML DDU (ref 0–232)
EOSINOPHIL # BLD: 0.34 K/UL (ref 0.05–0.5)
EOSINOPHILS RELATIVE PERCENT: 2 % (ref 0–6)
ERYTHROCYTE [DISTWIDTH] IN BLOOD BY AUTOMATED COUNT: 14.3 % (ref 11.5–15)
FLUAV RNA NPH QL NAA+NON-PROBE: NOT DETECTED
FLUBV RNA NPH QL NAA+NON-PROBE: NOT DETECTED
GFR SERPL CREATININE-BSD FRML MDRD: 60 ML/MIN/1.73M2
GLUCOSE SERPL-MCNC: 85 MG/DL (ref 74–99)
HADV DNA NPH QL NAA+NON-PROBE: NOT DETECTED
HCOV 229E RNA NPH QL NAA+NON-PROBE: NOT DETECTED
HCOV HKU1 RNA NPH QL NAA+NON-PROBE: NOT DETECTED
HCOV NL63 RNA NPH QL NAA+NON-PROBE: NOT DETECTED
HCOV OC43 RNA NPH QL NAA+NON-PROBE: NOT DETECTED
HCT VFR BLD AUTO: 38.4 % (ref 34–48)
HGB BLD-MCNC: 13.2 G/DL (ref 11.5–15.5)
HMPV RNA NPH QL NAA+NON-PROBE: NOT DETECTED
HPIV1 RNA NPH QL NAA+NON-PROBE: NOT DETECTED
HPIV2 RNA NPH QL NAA+NON-PROBE: NOT DETECTED
HPIV3 RNA NPH QL NAA+NON-PROBE: NOT DETECTED
HPIV4 RNA NPH QL NAA+NON-PROBE: NOT DETECTED
LACTATE BLDV-SCNC: 1.8 MMOL/L (ref 0.5–2.2)
LYMPHOCYTES NFR BLD: 2.23 K/UL (ref 1.5–4)
LYMPHOCYTES RELATIVE PERCENT: 11 % (ref 20–42)
M PNEUMO DNA NPH QL NAA+NON-PROBE: NOT DETECTED
MCH RBC QN AUTO: 36 PG (ref 26–35)
MCHC RBC AUTO-ENTMCNC: 34.4 G/DL (ref 32–34.5)
MCV RBC AUTO: 104.6 FL (ref 80–99.9)
MICROORGANISM SPEC CULT: ABNORMAL
MICROORGANISM SPEC CULT: ABNORMAL
MONOCYTES NFR BLD: 17 % (ref 2–12)
MONOCYTES NFR BLD: 3.43 K/UL (ref 0.1–0.95)
MYELOCYTES ABSOLUTE COUNT: 0.51 K/UL
MYELOCYTES: 3 %
NEUTROPHILS NFR BLD: 67 % (ref 43–80)
NEUTS SEG NFR BLD: 13.19 K/UL (ref 1.8–7.3)
NON-GYN CYTOLOGY REPORT: NORMAL
PLATELET # BLD AUTO: 320 K/UL (ref 130–450)
PMV BLD AUTO: 10.9 FL (ref 7–12)
POTASSIUM SERPL-SCNC: 4.1 MMOL/L (ref 3.5–5)
RBC # BLD AUTO: 3.67 M/UL (ref 3.5–5.5)
RBC # BLD: ABNORMAL 10*6/UL
RSV RNA NPH QL NAA+NON-PROBE: NOT DETECTED
RV+EV RNA NPH QL NAA+NON-PROBE: NOT DETECTED
SARS-COV-2 RNA NPH QL NAA+NON-PROBE: NOT DETECTED
SODIUM SERPL-SCNC: 139 MMOL/L (ref 132–146)
SPECIMEN DESCRIPTION: ABNORMAL
SPECIMEN DESCRIPTION: NORMAL
SPECIMEN SOURCE: NORMAL
STREP A, MOLECULAR: NEGATIVE
WBC OTHER # BLD: 19.7 K/UL (ref 4.5–11.5)

## 2024-04-05 PROCEDURE — 85379 FIBRIN DEGRADATION QUANT: CPT

## 2024-04-05 PROCEDURE — 71045 X-RAY EXAM CHEST 1 VIEW: CPT

## 2024-04-05 PROCEDURE — 6370000000 HC RX 637 (ALT 250 FOR IP): Performed by: NURSE PRACTITIONER

## 2024-04-05 PROCEDURE — 6360000002 HC RX W HCPCS: Performed by: NURSE PRACTITIONER

## 2024-04-05 PROCEDURE — 87040 BLOOD CULTURE FOR BACTERIA: CPT

## 2024-04-05 PROCEDURE — 83605 ASSAY OF LACTIC ACID: CPT

## 2024-04-05 PROCEDURE — 94640 AIRWAY INHALATION TREATMENT: CPT

## 2024-04-05 PROCEDURE — 87081 CULTURE SCREEN ONLY: CPT

## 2024-04-05 PROCEDURE — 2580000003 HC RX 258: Performed by: NURSE PRACTITIONER

## 2024-04-05 PROCEDURE — 87651 STREP A DNA AMP PROBE: CPT

## 2024-04-05 PROCEDURE — 80048 BASIC METABOLIC PNL TOTAL CA: CPT

## 2024-04-05 PROCEDURE — 2580000003 HC RX 258: Performed by: UROLOGY

## 2024-04-05 PROCEDURE — 36415 COLL VENOUS BLD VENIPUNCTURE: CPT

## 2024-04-05 PROCEDURE — 85025 COMPLETE CBC W/AUTO DIFF WBC: CPT

## 2024-04-05 PROCEDURE — 2700000000 HC OXYGEN THERAPY PER DAY

## 2024-04-05 PROCEDURE — 6360000002 HC RX W HCPCS: Performed by: INTERNAL MEDICINE

## 2024-04-05 PROCEDURE — 1200000000 HC SEMI PRIVATE

## 2024-04-05 RX ORDER — 0.9 % SODIUM CHLORIDE 0.9 %
500 INTRAVENOUS SOLUTION INTRAVENOUS ONCE
Status: COMPLETED | OUTPATIENT
Start: 2024-04-05 | End: 2024-04-05

## 2024-04-05 RX ORDER — PETROLATUM 42 G/100G
OINTMENT TOPICAL 3 TIMES DAILY PRN
Status: DISCONTINUED | OUTPATIENT
Start: 2024-04-05 | End: 2024-04-18 | Stop reason: HOSPADM

## 2024-04-05 RX ORDER — PETROLATUM 42 G/100G
OINTMENT TOPICAL 2 TIMES DAILY
Status: DISCONTINUED | OUTPATIENT
Start: 2024-04-05 | End: 2024-04-18 | Stop reason: HOSPADM

## 2024-04-05 RX ORDER — ACETAMINOPHEN 325 MG/1
650 TABLET ORAL EVERY 4 HOURS PRN
Status: DISCONTINUED | OUTPATIENT
Start: 2024-04-05 | End: 2024-04-18 | Stop reason: HOSPADM

## 2024-04-05 RX ADMIN — WATER 1000 MG: 1 INJECTION INTRAMUSCULAR; INTRAVENOUS; SUBCUTANEOUS at 03:45

## 2024-04-05 RX ADMIN — SODIUM CHLORIDE, POTASSIUM CHLORIDE, SODIUM LACTATE AND CALCIUM CHLORIDE: 600; 310; 30; 20 INJECTION, SOLUTION INTRAVENOUS at 12:02

## 2024-04-05 RX ADMIN — ACETAMINOPHEN 650 MG: 325 TABLET ORAL at 17:42

## 2024-04-05 RX ADMIN — METOPROLOL TARTRATE 25 MG: 25 TABLET, FILM COATED ORAL at 21:23

## 2024-04-05 RX ADMIN — BUDESONIDE INHALATION 500 MCG: 0.5 SUSPENSION RESPIRATORY (INHALATION) at 20:20

## 2024-04-05 RX ADMIN — ACETAMINOPHEN 650 MG: 325 TABLET ORAL at 10:04

## 2024-04-05 RX ADMIN — BUDESONIDE INHALATION 500 MCG: 0.5 SUSPENSION RESPIRATORY (INHALATION) at 07:35

## 2024-04-05 RX ADMIN — CEFEPIME 2000 MG: 2 INJECTION, POWDER, FOR SOLUTION INTRAVENOUS at 23:14

## 2024-04-05 RX ADMIN — ARFORMOTEROL TARTRATE 15 MCG: 15 SOLUTION RESPIRATORY (INHALATION) at 07:35

## 2024-04-05 RX ADMIN — SODIUM CHLORIDE 500 ML: 9 INJECTION, SOLUTION INTRAVENOUS at 13:02

## 2024-04-05 RX ADMIN — ACETAMINOPHEN 650 MG: 325 TABLET ORAL at 23:12

## 2024-04-05 RX ADMIN — SODIUM CHLORIDE, POTASSIUM CHLORIDE, SODIUM LACTATE AND CALCIUM CHLORIDE: 600; 310; 30; 20 INJECTION, SOLUTION INTRAVENOUS at 03:52

## 2024-04-05 RX ADMIN — SODIUM CHLORIDE, POTASSIUM CHLORIDE, SODIUM LACTATE AND CALCIUM CHLORIDE: 600; 310; 30; 20 INJECTION, SOLUTION INTRAVENOUS at 22:04

## 2024-04-05 RX ADMIN — ARFORMOTEROL TARTRATE 15 MCG: 15 SOLUTION RESPIRATORY (INHALATION) at 20:20

## 2024-04-05 RX ADMIN — SODIUM CHLORIDE 500 ML: 9 INJECTION, SOLUTION INTRAVENOUS at 20:51

## 2024-04-05 ASSESSMENT — PAIN DESCRIPTION - LOCATION: LOCATION: HEAD;BACK

## 2024-04-05 ASSESSMENT — PAIN SCALES - GENERAL: PAINLEVEL_OUTOF10: 3

## 2024-04-05 NOTE — PLAN OF CARE
Problem: Safety - Adult  Goal: Free from fall injury  4/5/2024 1038 by Kathy Apple, RN  Outcome: Progressing  4/4/2024 2326 by Ashanti Castro, RN  Outcome: Progressing     Problem: Pain  Goal: Verbalizes/displays adequate comfort level or baseline comfort level  Outcome: Progressing     Problem: Skin/Tissue Integrity  Goal: Absence of new skin breakdown  Description: 1.  Monitor for areas of redness and/or skin breakdown  2.  Assess vascular access sites hourly  3.  Every 4-6 hours minimum:  Change oxygen saturation probe site  4.  Every 4-6 hours:  If on nasal continuous positive airway pressure, respiratory therapy assess nares and determine need for appliance change or resting period.  Outcome: Progressing

## 2024-04-05 NOTE — FLOWSHEET NOTE
Inpatient Wound Care (Initial consult) 8407B    Admit Date: 4/2/2024  5:31 PM    Reason for consult:  Right buttock, right foot toes    Significant history:  Per H&P    History of Present Illness:     Patient is a 69-year-old female with a past medical history of asthma, COPD, gout, HTN, kidney stones, MS, scoliosis who presents to the emergency room for flank pain and leg swelling with difficulty urinating.  On arrival labs revealed a mildly elevated proBNP of 193, troponin 54-57-57 with a UA consistent for urinary tract infection. A CT abdomen pelvis revealed multiple bladder masses largest 3 cm concerning for malignancy, mild to moderate left hydronephrosis.  Urology was consulted in the emergency room and patient is admitted to [facility] with telemetry for further workup and treatment    Findings:     04/05/24 1423   Skin Integumentary    Skin Integrity   (dry crusted area)   Location right 3rd toe   Skin Integrity Site 2   Skin Integrity Location 2   (dry flaky, redness swelling)   Location 2 BLE   Wound 04/03/24 Right Right 5th toe   Date First Assessed/Time First Assessed: 04/03/24 0252   Present on Original Admission: Yes  Wound Location Orientation: Right  Wound Description (Comments): Right 5th toe   Wound Image    Wound Etiology Traumatic   Dressing/Treatment Open to air   Wound Length (cm) 0.9 cm   Wound Width (cm) 0.9 cm   Wound Depth (cm) 0.1 cm   Wound Surface Area (cm^2) 0.81 cm^2   Change in Wound Size % (l*w) -62   Wound Volume (cm^3) 0.081 cm^3   Wound Healing % -62   Wound Assessment Pink/red   Drainage Amount None (dry)   Odor None   Vita-wound Assessment Dry/flaky   Wound 04/03/24 Buttocks Right   Date First Assessed/Time First Assessed: 04/03/24 0252   Present on Original Admission: Yes  Location: Buttocks  Wound Location Orientation: Right   Wound Image    Wound Etiology Pressure Stage 2   Dressing/Treatment Pharmaceutical agent (see MAR)   Wound Length (cm) 1 cm   Wound Width (cm) 1 cm   Wound  Depth (cm) 0.1 cm   Wound Surface Area (cm^2) 1 cm^2   Change in Wound Size % (l*w) 0   Wound Volume (cm^3) 0.1 cm^3   Wound Healing % 0   Wound Assessment Pink/red   Drainage Amount None (dry)   Odor None   Vita-wound Assessment Dry/flaky       **Informed Consent**    The patient has given verbal consent to have photos taken of wounds and inserted into their chart as part of their permanent medical record for purposes of documentation, treatment management and/or medical review.   All Images taken on 4/5/24 of patient name: Nettie Lara were transmitted and stored on secured Epic  Site located within Media Folder Tab by a registered [employer] Mobile Application Device.        Impression:  Right buttock: Stage 2  Right 5th toe; Traumatic    Plan: Aquaphor  TAPS  Heel protectors  Patient will need continued preventative care.      Wendy Bella RN 4/5/2024 3:00 PM

## 2024-04-05 NOTE — PROGRESS NOTES
[facility]                                Progress note    Subjective:    Resting comfortably in bed  States she feels tired and weak  Denies any pain    Objective:    BP (!) 86/42   Pulse 92   Temp 98.6 °F (37 °C) (Oral)   Resp 20   Ht 1.676 m (5' 6\")   Wt 99.8 kg (220 lb)   LMP  (LMP Unknown)   SpO2 94%   BMI 35.51 kg/m²     In: 2241 [P.O.:60; I.V.:2181]  Out: 91713   In: 2241   Out: 57334 [Urine:70881]    General appearance: NAD, conversant, ill-appearing  HEENT: AT/NC, MMM  Neck: FROM, supple  Lungs: Diminished, intermittently using 2 L nasal cannula  CV: Tachycardic, no MRGs  Vasc: Radial pulses 2+  Abdomen: Soft, non-tender; no masses or HSM  Extremities: No peripheral edema or digital cyanosis  Skin: no rash, lesions or ulcers  Psych: Alert and oriented to person, place and time  Neuro: Alert and interactive     Recent Labs     04/03/24 0429 04/04/24 0432 04/05/24 0428   WBC 9.5 9.2 19.7*   HGB 13.1 13.0 13.2   HCT 35.4 36.9 38.4    281 320       Recent Labs     04/03/24 0429 04/04/24 0432 04/05/24 0428    140 139   K 3.7 3.7 4.1   * 106 101   CO2 21* 22 20*   BUN 15 13 13   CREATININE 0.8 1.0 1.0   CALCIUM 9.2 8.8 8.8       Assessment:    Principal Problem:    Bladder mass  Active Problems:    Renal calculi    Urinary obstruction  Resolved Problems:    * No resolved hospital problems. *      Plan:  Patient is a 69-year-old female admitted to [facility] for  Urinary obstruction due to renal calculi and bladder mass  -Monitor labs  -Monitor kidney function  -Urology consulted  -Plans for cystoscopy with laser lithotripsy and possible bladder biopsy with possible resection of bladder mass tomorrow 4/4-await procedure  -N.p.o. after midnight  -IV Rocephin pending urine cultures  -Monitor I's and O's     Chronically elevated troponin  -Troponin 90-79-28-nothing to do, no chest pain     Hypertension  -Continue home medications with parameters  -Continue to monitor

## 2024-04-05 NOTE — DISCHARGE INSTR - COC
Continuity of Care Form    Patient Name: Nettie Lara   :  1955  MRN:  [id number]    Admit date:  2024  Discharge date:  24    Code Status Order: Full Code   Advance Directives:     Admitting Physician:  Uma Hensley MD  PCP: Geraldo Myers MD    Discharging Nurse: ROCCO Chavez   Discharging Hospital Unit/Room#: 8407/8407-B  Discharging Unit Phone Number: [phone]    Emergency Contact:   Extended Emergency Contact Information  Primary Emergency Contact: Dena Stern (DPOA)  Home Phone: [phone]  Mobile Phone: [phone]  Relation: Other   needed? No  Secondary Emergency Contact: Rosalba Aaron  Home Phone: [phone]  Mobile Phone: [phone]  Relation: Brother/Sister    Past Surgical History:  Past Surgical History:   Procedure Laterality Date    APPENDECTOMY      BLADDER SURGERY Left 2023    CYSTOSCOPY RETROGRADE PYELOGRAM  left STENT REMOVAL, PLACEMENT OF LEFT STENT performed [place]uy [place] [facility] [place]    CHOLECYSTECTOMY      CYSTOSCOPY N/A 2024    BLADDER BIOPSY/ RESECTION OF BLADDER TUMOR performed by Ruy Deng MD at [facility] [place]    CYSTOSCOPY INSERTION / REMOVAL STENT / STONE Right 2020    CYSTOSCOPY RETROGRADE PYELOGRAM STENT INSERTION LEFT performed by Tony Howard DO at [facility]    HYSTERECTOMY (CERVIX STATUS UNKNOWN)      LITHOTRIPSY Left 2020    CYSTOSCOPY RETROGRADE PYELOGRAM URETEROSCOPY  LEFT J-STENT EXCHANGE, INSERTION JUNIOR CATHETER---FACILITY------ performed [place] Howard DO [place] [facility] [place]    LITHOTRIPSY Left 2023    CYSTOSCOPY LEFT ESWL EXTRACORPOREAL SHOCK WAVE LITHOTRIPSY WITH LEFT STENT INSERTION (HOLD TIME 1130 FOR TRANSPORT) performed by Wilson Finney MD at [facility] OR    LITHOTRIPSY Left 2023    CYSTOSCOPY RETROGRADE PYELOGRAM LEFT URETEROSCOPY  WITH LASER LITHOTRIPSY LEFT J STENT INSERTION performed by Tony Howard DO at [facility] OR    SHOULDER SURGERY Left 2024    CYSTOSCOPY RETROGRADE PYELOGRAM performed by  I.V.:2181]  Out: 17006 [Urine:88806]    Safety Concerns:     At Risk for Falls    Impairments/Disabilities:      None    Nutrition Therapy:  Current Nutrition Therapy:   - Oral Diet:  General    Routes of Feeding: Oral  Liquids: No Restrictions  Daily Fluid Restriction: no  Last Modified Barium Swallow with Video (Video Swallowing Test): not done    Treatments at the Time of Hospital Discharge:   Respiratory Treatments: ***  Oxygen Therapy:  is not on home oxygen therapy.  Ventilator:    - No ventilator support    Rehab Therapies: Physical Therapy and Occupational Therapy  Weight Bearing Status/Restrictions: No weight bearing restrictions  Other Medical Equipment (for information only, NOT a DME order):  wheelchair, walker  Other Treatments: ***    Patient's personal belongings (please select all that are sent with patient):  None    RN SIGNATURE:  Electronically signed by Kathy Apple RN on 4/18/24 at 3:45 PM EDT    CASE MANAGEMENT/SOCIAL WORK SECTION    Inpatient Status Date: 4/3/24    Readmission Risk Assessment Score:  Readmission Risk              Risk of Unplanned Readmission:  14           Discharging to Facility/ Agency   Name: [facility]  Address:  Phone:  Fax:    Dialysis Facility (if applicable)   Name:  Address:  Dialysis Schedule:  Phone:  Fax:    / signature: Electronically signed by Arlet Miller RN on 4/5/2024 at 11:34 AM      PHYSICIAN SECTION    Prognosis: {Prognosis:7192766549}    Condition at Discharge: { Patient Condition:432625366}    Rehab Potential (if transferring to Rehab): {Prognosis:6336022292}    Recommended Labs or Other Treatments After Discharge: ***    Physician Certification: I certify the above information and transfer of Nettie Lara  is necessary for the continuing treatment of the diagnosis listed and that she requires Skilled Nursing for less 30 days.     Update Admission H&P: {CHP DME Changes in

## 2024-04-05 NOTE — CARE COORDINATION
Patient is POD#1 CYSTOSCOPY RETROGRADE PYELOGRAM LEFT LASER LITHOTRIPSY BLADDER BIOPSY/ RESECTION OF BLADDER TUMOR for Left ureteral stone and Bladder mass. Per urology note today, Stop irrigation.  Awaiting path and cytology. Left kidney suspicious for TCC. Will need future cysto ureteroscopy. Plan is home with [facility] when medically ready. VICENTA orders are in place. Patient lives alone in an apartment with elevator access. She is mostly wheelchair bound. She has HHA M-F 9-2. She owns a slide board, sc and walker. BREANA/Destination complete. Pt will need ambulance transport home on day of discharge. Ambulance form with envelope placed on the soft chart.   Arlet Miller RN CM  [phone]     [facility] [address] [facility]  11/17/20 [id number]

## 2024-04-05 NOTE — PROGRESS NOTES
Trying to order a hypoallergenic electrode due to the ones she has on she stated is making her itch at this time.

## 2024-04-05 NOTE — PLAN OF CARE
Problem: Safety - Adult  Goal: Free from fall injury  4/4/2024 2326 by Ashanti Castro, RN  Outcome: Progressing  4/4/2024 1302 by Kathy Apple RN  Outcome: Progressing     Problem: Pain  Goal: Verbalizes/displays adequate comfort level or baseline comfort level  4/4/2024 1302 by Kathy Apple, RN  Outcome: Progressing     Problem: Skin/Tissue Integrity  Goal: Absence of new skin breakdown  4/4/2024 1302 by Kathy Apple, RN  Outcome: Progressing

## 2024-04-05 NOTE — PROGRESS NOTES
Pt post op had sterile water irrigation. I was told post op it is to be NS. Pt urine remains light pink to yellow.

## 2024-04-05 NOTE — PROGRESS NOTES
SUBJECTIVE:    Temp 99  S/p tur large bt's stent placement left  Urine clearing  Pt feels well    OBJECTIVE:    BP (!) 96/52   Pulse 90   Temp 97.8 °F (36.6 °C)   Resp 20   Ht 1.676 m (5' 6\")   Wt 99.8 kg (220 lb)   LMP  (LMP Unknown)   SpO2 94%   BMI 35.51 kg/m²     PHYSICAL EXAMINATION:  Skin: dry, without rashes  Respirations: non-labored, intact  Abdomen: soft, non-tender, non-distended      Lab Results   Component Value Date    WBC 19.7 (H) 2024    HGB 13.2 2024    HCT 38.4 2024    .6 (H) 2024     2024       Lab Results   Component Value Date    CREATININE 1.0 2024       No results found for: \"PSA\"    REVIEW OF SYSTEMS:    CONSTITUTIONAL: negative  HEENT: negative  HEMATOLOGIC: negative  ENDOCRINE: negative  RESPIRATORY: negative  CV: negative  GI: negative  NEURO: negative  ORTHOPEDICS: negative  PSYCHIATRIC: negative  : as above    PAST FAMILY HISTORY:    Family History   Problem Relation Age of Onset    Asthma Father     High Blood Pressure Father     Cancer Maternal Aunt     Heart Failure Mother     Breast Cancer Sister      PAST SOCIAL HISTORY:    Social History     Socioeconomic History    Marital status:     Number of children: 2   Tobacco Use    Smoking status: Former     Current packs/day: 0.00     Average packs/day: 0.3 packs/day for 46.0 years (11.5 ttl pk-yrs)     Types: Cigarettes     Start date: 1973     Quit date: 2019     Years since quittin.1     Passive exposure: Past    Smokeless tobacco: Never   Vaping Use    Vaping Use: Never used   Substance and Sexual Activity    Alcohol use: No    Drug use: No   Social History Narrative    Son in [place]-    Daughter in [place]     Social Determinants of Health     Financial Resource Strain: Low Risk  (4/3/2023)    Overall Financial Resource Strain (CARDIA)     Difficulty of Paying Living

## 2024-04-06 ENCOUNTER — APPOINTMENT (OUTPATIENT)
Dept: CT IMAGING | Age: 69
End: 2024-04-06
Payer: MEDICAID

## 2024-04-06 LAB
ANION GAP SERPL CALCULATED.3IONS-SCNC: 8 MMOL/L (ref 7–16)
BASOPHILS # BLD: 0 K/UL (ref 0–0.2)
BASOPHILS NFR BLD: 0 % (ref 0–2)
BUN SERPL-MCNC: 10 MG/DL (ref 6–23)
CALCIUM SERPL-MCNC: 9 MG/DL (ref 8.6–10.2)
CHLORIDE SERPL-SCNC: 103 MMOL/L (ref 98–107)
CO2 SERPL-SCNC: 27 MMOL/L (ref 22–29)
CREAT SERPL-MCNC: 0.8 MG/DL (ref 0.5–1)
EOSINOPHIL # BLD: 0.52 K/UL (ref 0.05–0.5)
EOSINOPHILS RELATIVE PERCENT: 4 % (ref 0–6)
ERYTHROCYTE [DISTWIDTH] IN BLOOD BY AUTOMATED COUNT: 14.2 % (ref 11.5–15)
GFR SERPL CREATININE-BSD FRML MDRD: 78 ML/MIN/1.73M2
GLUCOSE SERPL-MCNC: 96 MG/DL (ref 74–99)
HCT VFR BLD AUTO: 38.4 % (ref 34–48)
HGB BLD-MCNC: 12.6 G/DL (ref 11.5–15.5)
L PNEUMO1 AG UR QL IA.RAPID: NEGATIVE
LYMPHOCYTES NFR BLD: 2.06 K/UL (ref 1.5–4)
LYMPHOCYTES RELATIVE PERCENT: 14 % (ref 20–42)
MCH RBC QN AUTO: 35.1 PG (ref 26–35)
MCHC RBC AUTO-ENTMCNC: 32.8 G/DL (ref 32–34.5)
MCV RBC AUTO: 107 FL (ref 80–99.9)
MICROORGANISM SPEC CULT: NORMAL
MICROORGANISM SPEC CULT: NORMAL
MONOCYTES NFR BLD: 1.42 K/UL (ref 0.1–0.95)
MONOCYTES NFR BLD: 10 % (ref 2–12)
NEUTROPHILS NFR BLD: 73 % (ref 43–80)
NEUTS SEG NFR BLD: 10.7 K/UL (ref 1.8–7.3)
PLATELET # BLD AUTO: 256 K/UL (ref 130–450)
PMV BLD AUTO: 10.7 FL (ref 7–12)
POTASSIUM SERPL-SCNC: 4.1 MMOL/L (ref 3.5–5)
PROCALCITONIN SERPL-MCNC: 0.3 NG/ML (ref 0–0.08)
RBC # BLD AUTO: 3.59 M/UL (ref 3.5–5.5)
RBC # BLD: ABNORMAL 10*6/UL
RBC # BLD: ABNORMAL 10*6/UL
S PNEUM AG SPEC QL: NEGATIVE
SODIUM SERPL-SCNC: 138 MMOL/L (ref 132–146)
SPECIMEN DESCRIPTION: NORMAL
SPECIMEN SOURCE: NORMAL
WBC OTHER # BLD: 14.7 K/UL (ref 4.5–11.5)

## 2024-04-06 PROCEDURE — 6360000004 HC RX CONTRAST MEDICATION: Performed by: RADIOLOGY

## 2024-04-06 PROCEDURE — 6370000000 HC RX 637 (ALT 250 FOR IP): Performed by: NURSE PRACTITIONER

## 2024-04-06 PROCEDURE — 2580000003 HC RX 258: Performed by: NURSE PRACTITIONER

## 2024-04-06 PROCEDURE — 99254 IP/OBS CNSLTJ NEW/EST MOD 60: CPT | Performed by: INTERNAL MEDICINE

## 2024-04-06 PROCEDURE — 71275 CT ANGIOGRAPHY CHEST: CPT

## 2024-04-06 PROCEDURE — 92610 EVALUATE SWALLOWING FUNCTION: CPT

## 2024-04-06 PROCEDURE — 94640 AIRWAY INHALATION TREATMENT: CPT

## 2024-04-06 PROCEDURE — 6360000002 HC RX W HCPCS: Performed by: NURSE PRACTITIONER

## 2024-04-06 PROCEDURE — 2700000000 HC OXYGEN THERAPY PER DAY

## 2024-04-06 PROCEDURE — 87899 AGENT NOS ASSAY W/OPTIC: CPT

## 2024-04-06 PROCEDURE — 6370000000 HC RX 637 (ALT 250 FOR IP): Performed by: INTERNAL MEDICINE

## 2024-04-06 PROCEDURE — 80048 BASIC METABOLIC PNL TOTAL CA: CPT

## 2024-04-06 PROCEDURE — 36415 COLL VENOUS BLD VENIPUNCTURE: CPT

## 2024-04-06 PROCEDURE — 6370000000 HC RX 637 (ALT 250 FOR IP): Performed by: SPECIALIST

## 2024-04-06 PROCEDURE — 84145 PROCALCITONIN (PCT): CPT

## 2024-04-06 PROCEDURE — 85025 COMPLETE CBC W/AUTO DIFF WBC: CPT

## 2024-04-06 PROCEDURE — 1200000000 HC SEMI PRIVATE

## 2024-04-06 PROCEDURE — 6360000002 HC RX W HCPCS: Performed by: INTERNAL MEDICINE

## 2024-04-06 PROCEDURE — 87449 NOS EACH ORGANISM AG IA: CPT

## 2024-04-06 RX ORDER — DIPHENHYDRAMINE HCL 25 MG
25 TABLET ORAL EVERY 6 HOURS PRN
Status: DISCONTINUED | OUTPATIENT
Start: 2024-04-06 | End: 2024-04-18 | Stop reason: HOSPADM

## 2024-04-06 RX ORDER — ENOXAPARIN SODIUM 100 MG/ML
1 INJECTION SUBCUTANEOUS 2 TIMES DAILY
Status: DISCONTINUED | OUTPATIENT
Start: 2024-04-06 | End: 2024-04-17

## 2024-04-06 RX ORDER — GUAIFENESIN/DEXTROMETHORPHAN 100-10MG/5
5 SYRUP ORAL EVERY 4 HOURS PRN
Status: DISCONTINUED | OUTPATIENT
Start: 2024-04-06 | End: 2024-04-18 | Stop reason: HOSPADM

## 2024-04-06 RX ORDER — BISACODYL 10 MG
10 SUPPOSITORY, RECTAL RECTAL
Status: COMPLETED | OUTPATIENT
Start: 2024-04-06 | End: 2024-04-06

## 2024-04-06 RX ORDER — AMOXICILLIN 125 MG/5ML
500 POWDER, FOR SUSPENSION ORAL EVERY 8 HOURS SCHEDULED
Status: DISCONTINUED | OUTPATIENT
Start: 2024-04-06 | End: 2024-04-08

## 2024-04-06 RX ORDER — CEFDINIR 300 MG/1
300 CAPSULE ORAL EVERY 12 HOURS SCHEDULED
Status: COMPLETED | OUTPATIENT
Start: 2024-04-06 | End: 2024-04-13

## 2024-04-06 RX ADMIN — CEFDINIR 300 MG: 300 CAPSULE ORAL at 20:35

## 2024-04-06 RX ADMIN — CEFEPIME 2000 MG: 2 INJECTION, POWDER, FOR SOLUTION INTRAVENOUS at 14:56

## 2024-04-06 RX ADMIN — DIPHENHYDRAMINE HYDROCHLORIDE 25 MG: 25 TABLET ORAL at 20:34

## 2024-04-06 RX ADMIN — SODIUM CHLORIDE, PRESERVATIVE FREE 10 ML: 5 INJECTION INTRAVENOUS at 20:36

## 2024-04-06 RX ADMIN — ARFORMOTEROL TARTRATE 15 MCG: 15 SOLUTION RESPIRATORY (INHALATION) at 09:07

## 2024-04-06 RX ADMIN — IOPAMIDOL 75 ML: 755 INJECTION, SOLUTION INTRAVENOUS at 01:22

## 2024-04-06 RX ADMIN — AMOXICILLIN 500 MG: 125 POWDER, FOR SUSPENSION ORAL at 22:16

## 2024-04-06 RX ADMIN — METOPROLOL TARTRATE 25 MG: 25 TABLET, FILM COATED ORAL at 20:34

## 2024-04-06 RX ADMIN — ENOXAPARIN SODIUM 100 MG: 100 INJECTION SUBCUTANEOUS at 10:06

## 2024-04-06 RX ADMIN — ACETAMINOPHEN 650 MG: 325 TABLET ORAL at 16:40

## 2024-04-06 RX ADMIN — GUAIFENESIN SYRUP AND DEXTROMETHORPHAN 5 ML: 100; 10 SYRUP ORAL at 16:40

## 2024-04-06 RX ADMIN — BUDESONIDE INHALATION 500 MCG: 0.5 SUSPENSION RESPIRATORY (INHALATION) at 09:07

## 2024-04-06 RX ADMIN — ENOXAPARIN SODIUM 100 MG: 100 INJECTION SUBCUTANEOUS at 22:16

## 2024-04-06 RX ADMIN — CEFEPIME 2000 MG: 2 INJECTION, POWDER, FOR SOLUTION INTRAVENOUS at 06:10

## 2024-04-06 RX ADMIN — BUDESONIDE INHALATION 500 MCG: 0.5 SUSPENSION RESPIRATORY (INHALATION) at 19:34

## 2024-04-06 RX ADMIN — ACETAMINOPHEN 650 MG: 325 TABLET ORAL at 11:10

## 2024-04-06 RX ADMIN — ARFORMOTEROL TARTRATE 15 MCG: 15 SOLUTION RESPIRATORY (INHALATION) at 19:34

## 2024-04-06 RX ADMIN — BISACODYL 10 MG: 10 SUPPOSITORY RECTAL at 10:06

## 2024-04-06 RX ADMIN — HYDROPHOR: 42 OINTMENT TOPICAL at 10:07

## 2024-04-06 RX ADMIN — HYDROPHOR: 42 OINTMENT TOPICAL at 20:36

## 2024-04-06 ASSESSMENT — PAIN SCALES - GENERAL
PAINLEVEL_OUTOF10: 6
PAINLEVEL_OUTOF10: 0
PAINLEVEL_OUTOF10: 5

## 2024-04-06 ASSESSMENT — PAIN DESCRIPTION - LOCATION
LOCATION: HEAD
LOCATION: HEAD

## 2024-04-06 ASSESSMENT — PAIN DESCRIPTION - ORIENTATION
ORIENTATION: ANTERIOR
ORIENTATION: MID

## 2024-04-06 ASSESSMENT — PAIN DESCRIPTION - DESCRIPTORS
DESCRIPTORS: ACHING;DULL;DISCOMFORT;SORE
DESCRIPTORS: ACHING;DULL;DISCOMFORT

## 2024-04-06 NOTE — PROGRESS NOTES
Please pass on and ask when doctor rounds for benadryl cream or spray for pt chest when It start itching.  Electrodes removed and cmu notified of chest rash from electrodes.  Call [facility] for hypo allergenic electrodes.

## 2024-04-06 NOTE — PLAN OF CARE
Problem: Safety - Adult  Goal: Free from fall injury  Outcome: Progressing     Problem: Pain  Goal: Verbalizes/displays adequate comfort level or baseline comfort level  Outcome: Progressing     Problem: Skin/Tissue Integrity  Goal: Absence of new skin breakdown  Outcome: Progressing

## 2024-04-06 NOTE — CONSULTS
[facility]  [facility]  Consultation Note     Admit Date: 4/2/2024  5:31 PM    Reason for Consult:   UTI.  Antibiotic management    Attending Physician:  Uma Hensley MD    HISTORY OF PRESENT ILLNESS:             The history is obtained from extensive review of available past medical records. The patient is a 69 y.o. female who is previously known to the ID service.  The patient presented to the ED at [facility] on 4/2/2024 with leg swelling.  She was admitted with a diagnosis of bladder mass and calculus of ureter.  Seen by urology and underwent cystoscopy, laser lithotripsy, bladder biopsy with resection of bladder tumor on 4/4/2024.  She had a low-grade fever of 100.6 °F after the procedure.  She also had some slight hypotension.  Urine cultures are growing quinolone and nitrofurantoin resistant E. coli and Enterococcus.  The patient is actually feeling better today.  She would like to go home.    Past Medical History:        Diagnosis Date    Arthritis     Asthma     follows with PCP    COPD (chronic obstructive pulmonary disease) ([facility])     Follows with PCP    Eczema     Fall 05/26/2011    Gout     Hypertension     Kidney stone     Multiple sclerosis ([facility])     Prolonged emergence from general anesthesia     Scoliosis     Spinal stenosis     UTI (urinary tract infection)      December 2020.  Admitted to [facility] with fatigue and fever.  Seen by Dr. Castillo for sepsis secondary to UTI.  Treated with Ceftriaxone.    July 2020.  Admitted to [facility] with weakness.  Underwent cystoscopy with left ureteral stenting because of a large nonobstructing left renal calculus and hydronephrosis with a small distal ureteral stone.  Seen by Dr. Castillo for E. coli bacteremia.  Treated with Zosyn.  This was changed to Ceftriaxone.    Past Surgical History:        Procedure Laterality Date    APPENDECTOMY      BLADDER SURGERY Left 7/6/2023    CYSTOSCOPY RETROGRADE  non-pertinent to the chief complaint.    REVIEW OF SYSTEMS:    Constitutional: Negative for fevers, chills, diaphoresis  Neurologic: Negative   Psychiatric: Negative  Rheumatologic: Negative   Endocrine: Negative  Hematologic: Negative  Immunologic: Negative  ENT: Negative  Respiratory: Negative   Cardiovascular: Negative  GI: Negative  : As in the HPI  Musculoskeletal: Negative  Skin: No rashes.     PHYSICAL EXAM:    Vitals:   BP (!) 87/60   Pulse 84   Temp 98.5 °F (36.9 °C) (Temporal)   Resp 19   Ht 1.676 m (5' 6\")   Wt 100.7 kg (222 lb)   LMP  (LMP Unknown)   SpO2 95%   BMI 35.83 kg/m²   Constitutional: The patient is awake, alert, and oriented.  Sitting up in bed.  No distress.  Skin: Warm and dry. No rashes were noted.   HEENT: Eyes show round, and reactive pupils. No jaundice. Moist mucous membranes, no ulcerations, no thrush.   Neck: Supple to movements. No lymphadenopathy.   Chest: No use of accessory muscles to breathe. Symmetrical expansion. Auscultation reveals no wheezing, crackles, or rhonchi.   Cardiovascular: S1 and S2 are rhythmic and regular. No murmurs appreciated.   Abdomen: Positive bowel sounds to auscultation. Benign to palpation. No masses felt. No hepatosplenomegaly.  Extremities: No clubbing, no cyanosis, no edema.  Lines: Peripheral.    Lab Results   Component Value Date    CRP 0.3 12/20/2022    CRP 0.9 (H) 12/19/2022    CRP 1.3 (H) 12/18/2022      No results found for: \"CRPHS\"  No results found for: \"SEDRATE\"    Radiology:  Noted    Microbiology:  Respiratory panel: Negative  Blood cultures 4/5/2024: Negative so far  Urine culture/3/24: >100 K E. coli (resistant to quinolones, Nitrofurantoin.  Intermediate to Zosyn), Enterococcus species    Recent Labs     04/06/24  0451   PROCAL 0.30*       Assessment:  Complicated UTI associated to left ureteral stone  Status post cystoscopy with stenting, bladder biopsy and removal of mass  Low-grade fever following

## 2024-04-06 NOTE — PROGRESS NOTES
Patient arm red swollen and itchy where old IV site was, patient has an allergy to adhesive tape, called at present for an order for PRN Benadryl.

## 2024-04-06 NOTE — CONSULTS
Pulmonary Critical Care Medicine      PULMONARY CRITICAL CARE CONSULTATION NOTE.    04/06/24    CONSULTING  PHYSICIAN: Dr. Hensley    REASON FOR REFERRAL:  PE      Assessment / Recommendations-   Acute on chronic respiratory insufficiency - this is likely secondary to acute exacerbation of COPD from a viral syndrome  Or small right subsegmental PE  Bladder mass and left kidney suspicious for renal cell carcinoma status post cystoscopy, panendoscopy endoscopy, retrograde pyelogram, placement of left ureteral stent and transurethral resection of multiple bladder lesions.  E. coli UTI    -Start Brovana and Pulmicort nebs  -Continue bronchodilators  -PT OT, out of bed to chair  -Continue supplemental oxygen and wean as tolerated  - Currently receiving oral cephalosporin, cefdinir 300 mg twice daily, continue for 5 to 7 days for E. coli UTI  -Currently on Lovenox, therapeutic dose.  Can be started on Eliquis 5 twice daily at the time of discharge.    History of Present Illness    Ms. Nettie Lara is a 69 y.o. 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  She was transferred to [facility] after discovering possible renal cell carcinoma involving the bladder and requiring urology input.  She is currently status post cystoscopy, panendoscopy, placement of the left ureteral stent and transurethral resection of several bladder lesions.  Final pathology is  pending.  Urine cytology is negative    Baseline Exercise tolerance/MMRC score( Bold )     MMRC Dyspnea Scale  Grade Description of Breathlessness   0 I only get breathless with strenuous exercise.   1 I get short of breath when hurrying on level ground or walking up a slight hill.   2 On level ground, I walk slower than people of the same age because of

## 2024-04-06 NOTE — PROGRESS NOTES
[facility]                                Progress note    Subjective:    Resting comfortably in bed, patient is easily arouseable.  No acute events overnight.   States she feels tired and weak  Denies any pain    Objective:    BP (!) 87/60   Pulse 84   Temp 98.5 °F (36.9 °C) (Temporal)   Resp 19   Ht 1.676 m (5' 6\")   Wt 100.7 kg (222 lb)   LMP  (LMP Unknown)   SpO2 95%   BMI 35.83 kg/m²     In: 4138 [P.O.:600; I.V.:3538]  Out: 4000   In: 4138   Out: 4000 [Urine:4000]    General appearance: NAD, conversant, fatigued  HEENT: AT/NC, MMM  Neck: FROM, supple  Lungs: Diminished, intermittently using 2 L nasal cannula  CV: Tachycardic, no MRGs  Vasc: Radial pulses 2+  Abdomen: Soft, non-tender; no masses or HSM  Extremities: No peripheral edema or digital cyanosis  Skin: no rash, lesions or ulcers  Psych: Alert and oriented to person, place and time  Neuro: Alert and interactive     Recent Labs     04/04/24  0432 04/05/24  0428 04/06/24  0512   WBC 9.2 19.7* 14.7*   HGB 13.0 13.2 12.6   HCT 36.9 38.4 38.4    320 256       Recent Labs     04/04/24  0432 04/05/24  0428 04/06/24  0512    139 138   K 3.7 4.1 4.1    101 103   CO2 22 20* 27   BUN 13 13 10   CREATININE 1.0 1.0 0.8   CALCIUM 8.8 8.8 9.0       Assessment:    Principal Problem:    Bladder mass  Active Problems:    Renal calculi    Urinary obstruction  Resolved Problems:    * No resolved hospital problems. *      Plan:  Patient is a 69-year-old female admitted to [facility] for  Urinary obstruction due to renal calculi and bladder mass  -Monitor labs  -Monitor kidney function  -Urology consulted  -Plans for cystoscopy with laser lithotripsy and possible bladder biopsy with possible resection of bladder mass tomorrow 4/4-await procedure  -N.p.o. after midnight  -IV Rocephin pending urine cultures  -Monitor I's and O's     Chronically elevated troponin  -Troponin 60-36-76-nothing to do, no chest pain      Hypertension  -Continue home medications with parameters  -Continue to monitor Bps    4/5/24:  -s/p cystoscopy with left stent placement and bladder tumor resection and biopsy  -WBC 19.7 likely reactive following surgery  -Urine culture positive for E. Coli >100K  -Continue IV Rocephin  -H&H stable following surgery  -Hypotension and low-grade fevers today, 500 cc bolus initiated and parameters placed on BP meds  -CBI discontinued by urology  -Now requiring 2 L nasal cannula, check chest x-ray in the a.m.    4/6/2024  IV cefepime discontinued   Oral amoxicillin and Cefdinir  Patient will discharge on po atb's   Urine culture positive for E coli  WBC 14.7  Passed swallow - soft and bite size food with thin liquids  Urine is clear  Await PT/OT scores      Code status: Full  Consultants: Urology     DVT Prophylaxis PCD's  PT/OT  Discharge planning       I have spent a total time of 30 minutes of this patient encounter reviewing chart, labs, coordinating care with interdisciplinary teams, face to face encounter with patient, providing counseling/education to patient/family.      Debra Ashby, APRN - CNP  5:19 PM  4/6/2024

## 2024-04-06 NOTE — PLAN OF CARE
Problem: Safety - Adult  Goal: Free from fall injury  4/6/2024 1100 by Blossom Bella RN  Outcome: Progressing  4/6/2024 0246 by Ashanti Castro RN  Outcome: Progressing     Problem: Pain  Goal: Verbalizes/displays adequate comfort level or baseline comfort level  4/6/2024 1100 by Blossom Bella RN  Outcome: Progressing  4/6/2024 0246 by Ashanti Castro RN  Outcome: Progressing     Problem: Skin/Tissue Integrity  Goal: Absence of new skin breakdown  Description: 1.  Monitor for areas of redness and/or skin breakdown  2.  Assess vascular access sites hourly  3.  Every 4-6 hours minimum:  Change oxygen saturation probe site  4.  Every 4-6 hours:  If on nasal continuous positive airway pressure, respiratory therapy assess nares and determine need for appliance change or resting period.  4/6/2024 1100 by Blossom Bella RN  Outcome: Progressing  4/6/2024 0246 by Ashanti Castro RN  Outcome: Progressing     Problem: Discharge Planning  Goal: Discharge to home or other facility with appropriate resources  Outcome: Progressing

## 2024-04-06 NOTE — PROGRESS NOTES
Patient unable to come for CT exam at this time per transport. Please call CT dept when patient is ready for exam.

## 2024-04-06 NOTE — PROGRESS NOTES
Placed pt ivf and antibiotic to pole attached to bed along with CBI bags, O2 2 liters . Pt off to CT.

## 2024-04-06 NOTE — PROGRESS NOTES
Pt electrodes have caused  red itch skin to chest. Took off electodes and will see what other options we have.

## 2024-04-06 NOTE — PROGRESS NOTES
4/6/2024  9:09 AM  Service: Urology  Group: [facility] (Lee/Sowmya/Reg)    Nettie Lara  [id number]       Subjective: \"I can't move my bowels\"  No flank pain  No fever or chills  She wants to get OOB      Past Medical History:   Diagnosis Date    Arthritis     Asthma     follows with PCP    COPD (chronic obstructive pulmonary disease) ([facility])     Follows with PCP    Eczema     Fall 05/26/2011    Gout     Hypertension     Kidney stone     Multiple sclerosis ([facility])     Prolonged emergence from general anesthesia     Scoliosis     Spinal stenosis     UTI (urinary tract infection)          Past Surgical History:   Procedure Laterality Date    APPENDECTOMY      BLADDER SURGERY Left 7/6/2023    CYSTOSCOPY RETROGRADE PYELOGRAM  left STENT REMOVAL, PLACEMENT OF LEFT STENT performed by Ruy Deng MD at [facility] [place]    CHOLECYSTECTOMY      CYSTOSCOPY N/A 4/4/2024    BLADDER BIOPSY/ RESECTION OF BLADDER TUMOR performed by Ruy Deng MD at [facility] [place]    CYSTOSCOPY INSERTION / REMOVAL STENT / STONE Right 07/26/2020    CYSTOSCOPY RETROGRADE PYELOGRAM STENT INSERTION LEFT performed by Tony Howard DO at [facility] OR    HYSTERECTOMY (CERVIX STATUS UNKNOWN)      LITHOTRIPSY Left 08/07/2020    CYSTOSCOPY RETROGRADE PYELOGRAM URETEROSCOPY  LEFT J-STENT EXCHANGE, INSERTION JUNIOR CATHETER---FACILITY------ performed by Tony Howard DO at [facility] OR    LITHOTRIPSY Left 4/5/2023    CYSTOSCOPY LEFT ESWL EXTRACORPOREAL SHOCK WAVE LITHOTRIPSY WITH LEFT STENT INSERTION (HOLD TIME 1130 FOR TRANSPORT) performed by Wilson Finney MD at [facility] OR    LITHOTRIPSY Left 7/17/2023    CYSTOSCOPY RETROGRADE PYELOGRAM LEFT URETEROSCOPY  WITH LASER LITHOTRIPSY LEFT J STENT INSERTION performed by Tony Howard DO at [facility] OR    SHOULDER SURGERY Left 4/4/2024    CYSTOSCOPY RETROGRADE PYELOGRAM performed by Ruy Deng MD at [facility] OR    TONSILLECTOMY         Medications Prior to Admission:    Medications Prior to Admission:

## 2024-04-06 NOTE — PROGRESS NOTES
Comprehensive Nutrition Assessment    Type and Reason for Visit:  Initial, Positive Nutrition Screen, Consult, Wound    Nutrition Recommendations/Plan:   Continue Diet.    Will Start ONS and monitor.       Malnutrition Assessment:  Malnutrition Status:  Insufficient data (04/06/24 1641)    Context:  Acute Illness     Findings of the 6 clinical characteristics of malnutrition:  Energy Intake:  Mild decrease in energy intake (Comment) (since adm)  Weight Loss:  No significant weight loss     Body Fat Loss:  Unable to assess     Muscle Mass Loss:  Unable to assess    Fluid Accumulation:  Unable to assess (2/2 multi-factorial)     Strength:  Not Performed    Nutrition Assessment:    Pt adm w/ leg swelling, Lt flank pain and difficulty urinating.  PMHx MS, paraplegia, spinal stenosis, COPD.  Adm w/ Bladder mass suspicious for TCC, renal cysts, renal calculi, UTI, renal stones, s/p Cysto w/ tumor resection/bx 4/4.  Noted BSE w/ mild oral dysphagia 4/6.  At risk d/t increased needs for healing of multiple wounds w/ ongoing poor appetite/intake.  Will Start ONS and monitor.    Nutrition Related Findings:    A&O, dentition WNL, Abd/BS WDL, +constipation, no edema, -I/O's, labs reviewed/generally WNL Wound Type: Pressure Injury, Stage II, Open Wounds (traumatic wound to Rt 5th toe)       Current Nutrition Intake & Therapies:    Average Meal Intake: 26-50%  Average Supplements Intake: None Ordered  ADULT DIET; Dysphagia - Soft and Bite Sized; Low Sodium (2 gm)  ADULT ORAL NUTRITION SUPPLEMENT; Breakfast, Lunch; Wound Healing Oral Supplement  ADULT ORAL NUTRITION SUPPLEMENT; Breakfast, Dinner; Standard High Calorie/High Protein Oral Supplement    Anthropometric Measures:  Height: 167.6 cm (5' 6\")  Ideal Body Weight (IBW): 130 lbs (59 kg)    Admission Body Weight: 99.8 kg (220 lb) (bed 4/5)  Current Body Weight: 100.7 kg (222 lb) (bed 4/6), 170.8 % IBW. Weight Source: Bed Scale  Current BMI (kg/m2): 35.8  Usual Body Weight:
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
## 2024-04-07 LAB
ANION GAP SERPL CALCULATED.3IONS-SCNC: 12 MMOL/L (ref 7–16)
BASOPHILS # BLD: 0 K/UL (ref 0–0.2)
BASOPHILS NFR BLD: 0 % (ref 0–2)
BUN SERPL-MCNC: 8 MG/DL (ref 6–23)
CALCIUM SERPL-MCNC: 9.1 MG/DL (ref 8.6–10.2)
CHLORIDE SERPL-SCNC: 103 MMOL/L (ref 98–107)
CO2 SERPL-SCNC: 23 MMOL/L (ref 22–29)
CREAT SERPL-MCNC: 0.7 MG/DL (ref 0.5–1)
CRP SERPL HS-MCNC: 121 MG/L (ref 0–5)
EOSINOPHIL # BLD: 0.23 K/UL (ref 0.05–0.5)
EOSINOPHILS RELATIVE PERCENT: 2 % (ref 0–6)
ERYTHROCYTE [DISTWIDTH] IN BLOOD BY AUTOMATED COUNT: 13.6 % (ref 11.5–15)
ERYTHROCYTE [SEDIMENTATION RATE] IN BLOOD BY WESTERGREN METHOD: 111 MM/HR (ref 0–20)
GFR SERPL CREATININE-BSD FRML MDRD: 87 ML/MIN/1.73M2
GLUCOSE SERPL-MCNC: 109 MG/DL (ref 74–99)
HCT VFR BLD AUTO: 33.8 % (ref 34–48)
HGB BLD-MCNC: 11.1 G/DL (ref 11.5–15.5)
LYMPHOCYTES NFR BLD: 1.14 K/UL (ref 1.5–4)
LYMPHOCYTES RELATIVE PERCENT: 9 % (ref 20–42)
MCH RBC QN AUTO: 34.3 PG (ref 26–35)
MCHC RBC AUTO-ENTMCNC: 32.8 G/DL (ref 32–34.5)
MCV RBC AUTO: 104.3 FL (ref 80–99.9)
METAMYELOCYTES ABSOLUTE COUNT: 0.11 K/UL (ref 0–0.12)
METAMYELOCYTES: 1 % (ref 0–1)
MICROORGANISM SPEC CULT: ABNORMAL
MICROORGANISM SPEC CULT: ABNORMAL
MONOCYTES NFR BLD: 1.25 K/UL (ref 0.1–0.95)
MONOCYTES NFR BLD: 10 % (ref 2–12)
MYELOCYTES ABSOLUTE COUNT: 0.23 K/UL
MYELOCYTES: 2 %
NEUTROPHILS NFR BLD: 77 % (ref 43–80)
NEUTS SEG NFR BLD: 10.14 K/UL (ref 1.8–7.3)
PLATELET # BLD AUTO: 260 K/UL (ref 130–450)
PMV BLD AUTO: 10.6 FL (ref 7–12)
POTASSIUM SERPL-SCNC: 3.7 MMOL/L (ref 3.5–5)
RBC # BLD AUTO: 3.24 M/UL (ref 3.5–5.5)
RBC # BLD: ABNORMAL 10*6/UL
RBC # BLD: ABNORMAL 10*6/UL
SODIUM SERPL-SCNC: 138 MMOL/L (ref 132–146)
SPECIMEN DESCRIPTION: ABNORMAL
WBC OTHER # BLD: 13.1 K/UL (ref 4.5–11.5)

## 2024-04-07 PROCEDURE — 2700000000 HC OXYGEN THERAPY PER DAY

## 2024-04-07 PROCEDURE — 99233 SBSQ HOSP IP/OBS HIGH 50: CPT | Performed by: INTERNAL MEDICINE

## 2024-04-07 PROCEDURE — 94640 AIRWAY INHALATION TREATMENT: CPT

## 2024-04-07 PROCEDURE — 6370000000 HC RX 637 (ALT 250 FOR IP): Performed by: NURSE PRACTITIONER

## 2024-04-07 PROCEDURE — 2580000003 HC RX 258: Performed by: NURSE PRACTITIONER

## 2024-04-07 PROCEDURE — 6370000000 HC RX 637 (ALT 250 FOR IP): Performed by: FAMILY MEDICINE

## 2024-04-07 PROCEDURE — 6370000000 HC RX 637 (ALT 250 FOR IP): Performed by: INTERNAL MEDICINE

## 2024-04-07 PROCEDURE — 36415 COLL VENOUS BLD VENIPUNCTURE: CPT

## 2024-04-07 PROCEDURE — 6360000002 HC RX W HCPCS: Performed by: NURSE PRACTITIONER

## 2024-04-07 PROCEDURE — 85652 RBC SED RATE AUTOMATED: CPT

## 2024-04-07 PROCEDURE — 85025 COMPLETE CBC W/AUTO DIFF WBC: CPT

## 2024-04-07 PROCEDURE — 80048 BASIC METABOLIC PNL TOTAL CA: CPT

## 2024-04-07 PROCEDURE — 6360000002 HC RX W HCPCS: Performed by: INTERNAL MEDICINE

## 2024-04-07 PROCEDURE — 86140 C-REACTIVE PROTEIN: CPT

## 2024-04-07 PROCEDURE — 6370000000 HC RX 637 (ALT 250 FOR IP): Performed by: SPECIALIST

## 2024-04-07 PROCEDURE — 1200000000 HC SEMI PRIVATE

## 2024-04-07 RX ORDER — IPRATROPIUM BROMIDE AND ALBUTEROL SULFATE 2.5; .5 MG/3ML; MG/3ML
1 SOLUTION RESPIRATORY (INHALATION)
Status: DISCONTINUED | OUTPATIENT
Start: 2024-04-07 | End: 2024-04-08

## 2024-04-07 RX ORDER — ARFORMOTEROL TARTRATE 15 UG/2ML
15 SOLUTION RESPIRATORY (INHALATION)
Status: DISCONTINUED | OUTPATIENT
Start: 2024-04-07 | End: 2024-04-18 | Stop reason: HOSPADM

## 2024-04-07 RX ORDER — ENEMA 19; 7 G/133ML; G/133ML
1 ENEMA RECTAL
Status: ACTIVE | OUTPATIENT
Start: 2024-04-07 | End: 2024-04-08

## 2024-04-07 RX ORDER — BUDESONIDE 0.5 MG/2ML
0.5 INHALANT ORAL
Status: DISCONTINUED | OUTPATIENT
Start: 2024-04-07 | End: 2024-04-18 | Stop reason: HOSPADM

## 2024-04-07 RX ADMIN — ARFORMOTEROL TARTRATE 15 MCG: 15 SOLUTION RESPIRATORY (INHALATION) at 19:52

## 2024-04-07 RX ADMIN — HYDROPHOR: 42 OINTMENT TOPICAL at 08:48

## 2024-04-07 RX ADMIN — AMOXICILLIN 500 MG: 125 POWDER, FOR SUSPENSION ORAL at 21:21

## 2024-04-07 RX ADMIN — IPRATROPIUM BROMIDE AND ALBUTEROL SULFATE 1 DOSE: 2.5; .5 SOLUTION RESPIRATORY (INHALATION) at 19:52

## 2024-04-07 RX ADMIN — AMOXICILLIN 500 MG: 125 POWDER, FOR SUSPENSION ORAL at 13:58

## 2024-04-07 RX ADMIN — GUAIFENESIN SYRUP AND DEXTROMETHORPHAN 5 ML: 100; 10 SYRUP ORAL at 21:19

## 2024-04-07 RX ADMIN — BUDESONIDE INHALATION 500 MCG: 0.5 SUSPENSION RESPIRATORY (INHALATION) at 19:52

## 2024-04-07 RX ADMIN — DIPHENHYDRAMINE HYDROCHLORIDE AND ZINC ACETATE: 10; 1 CREAM TOPICAL at 13:43

## 2024-04-07 RX ADMIN — METOPROLOL TARTRATE 25 MG: 25 TABLET, FILM COATED ORAL at 21:21

## 2024-04-07 RX ADMIN — CEFDINIR 300 MG: 300 CAPSULE ORAL at 21:21

## 2024-04-07 RX ADMIN — ARFORMOTEROL TARTRATE 15 MCG: 15 SOLUTION RESPIRATORY (INHALATION) at 08:01

## 2024-04-07 RX ADMIN — CEFDINIR 300 MG: 300 CAPSULE ORAL at 08:47

## 2024-04-07 RX ADMIN — ENOXAPARIN SODIUM 100 MG: 100 INJECTION SUBCUTANEOUS at 08:48

## 2024-04-07 RX ADMIN — BUDESONIDE INHALATION 500 MCG: 0.5 SUSPENSION RESPIRATORY (INHALATION) at 08:01

## 2024-04-07 RX ADMIN — SODIUM CHLORIDE, PRESERVATIVE FREE 10 ML: 5 INJECTION INTRAVENOUS at 21:22

## 2024-04-07 RX ADMIN — SODIUM CHLORIDE, PRESERVATIVE FREE 10 ML: 5 INJECTION INTRAVENOUS at 08:48

## 2024-04-07 RX ADMIN — ENOXAPARIN SODIUM 100 MG: 100 INJECTION SUBCUTANEOUS at 21:22

## 2024-04-07 RX ADMIN — ACETAMINOPHEN 650 MG: 325 TABLET ORAL at 06:48

## 2024-04-07 RX ADMIN — ACETAMINOPHEN 650 MG: 325 TABLET ORAL at 14:55

## 2024-04-07 RX ADMIN — HYDROPHOR: 42 OINTMENT TOPICAL at 21:22

## 2024-04-07 RX ADMIN — AMOXICILLIN 500 MG: 125 POWDER, FOR SUSPENSION ORAL at 05:47

## 2024-04-07 ASSESSMENT — PAIN SCALES - GENERAL
PAINLEVEL_OUTOF10: 5
PAINLEVEL_OUTOF10: 0
PAINLEVEL_OUTOF10: 0
PAINLEVEL_OUTOF10: 8
PAINLEVEL_OUTOF10: 6

## 2024-04-07 ASSESSMENT — PAIN DESCRIPTION - LOCATION: LOCATION: BACK

## 2024-04-07 ASSESSMENT — PAIN DESCRIPTION - ORIENTATION: ORIENTATION: RIGHT;LEFT;MID

## 2024-04-07 ASSESSMENT — PAIN DESCRIPTION - DESCRIPTORS: DESCRIPTORS: ACHING

## 2024-04-07 NOTE — PROGRESS NOTES
[facility]  [address] [id number]  [phone]  FAX [phone]      CHIEF UROLOGIC COMPLAINT: Bladder tumors    HISTORY OF PRESENT ILLNESS:  Patient complains of constipation and rash from EKG leads after removal.  Obrien catheter draining clear, yellow urine.    REVIEW OF SYSTEMS:   CONSTITUTIONAL: As above  HEENT: negative  HEMATOLOGIC: negative  ENDOCRINE: negative  RESPIRATORY: negative  CV: negative  GI: As above  NEURO: negative  ORTHOPEDICS: negative  PSYCHIATRIC: negative  : as above    PAST FAMILY HISTORY:    Family History   Problem Relation Age of Onset    Asthma Father     High Blood Pressure Father     Cancer Maternal Aunt     Heart Failure Mother     Breast Cancer Sister      PAST SOCIAL HISTORY:    Social History     Socioeconomic History    Marital status:     Number of children: 2   Tobacco Use    Smoking status: Former     Current packs/day: 0.00     Average packs/day: 0.3 packs/day for 46.0 years (11.5 ttl pk-yrs)     Types: Cigarettes     Start date: 1973     Quit date: 2019     Years since quittin.1     Passive exposure: Past    Smokeless tobacco: Never   Vaping Use    Vaping Use: Never used   Substance and Sexual Activity    Alcohol use: No    Drug use: No   Social History Narrative    Son in [place]-    Daughter in [place]     Social Determinants of Health     Financial Resource Strain: Low Risk  (4/3/2023)    Overall Financial Resource Strain (CARDIA)     Difficulty of Paying Living Expenses: Not very hard   Food Insecurity: No Food Insecurity (2024)    Hunger Vital Sign     Worried About Running Out of Food in the Last Year: Never true     Ran Out of Food in the Last Year: Never true   Transportation Needs: No Transportation Needs (2024)    PRAPARE - Transportation     Lack of Transportation (Medical): No     Lack of Transportation (Non-Medical): No   Housing Stability: Low Risk  (2024)     Housing Stability Vital Sign     Unable to Pay for Housing in the Last Year: No     Number of Places Lived in the Last Year: 1     Unstable Housing in the Last Year: No   Recent Concern: Housing Stability - High Risk (1/27/2024)    Housing Stability Vital Sign     Unable to Pay for Housing in the Last Year: No     Number of Places Lived in the Last Year: 1     Unstable Housing in the Last Year: Yes       Scheduled Meds:   enoxaparin  1 mg/kg SubCUTAneous BID    amoxicillin  500 mg Oral 3 times per day    cefdinir  300 mg Oral 2 times per day    mineral oil-hydrophilic petrolatum   Topical BID    [Held by provider] furosemide  20 mg Oral Daily    [Held by provider] losartan  25 mg Oral Daily    metoprolol tartrate  25 mg Oral BID    sodium chloride flush  5-40 mL IntraVENous 2 times per day    budesonide  0.5 mg Nebulization BID RT    arformoterol tartrate  15 mcg Nebulization BID RT     Continuous Infusions:   lactated ringers IV soln 125 mL/hr at 04/05/24 2204    sodium chloride Stopped (04/04/24 1239)     PRN Meds:.diphenhydrAMINE-zinc acetate, sodium phosphate, guaiFENesin-dextromethorphan, diphenhydrAMINE, acetaminophen, mineral oil-hydrophilic petrolatum, albuterol, sodium chloride flush, sodium chloride, potassium chloride **OR** potassium alternative oral replacement **OR** potassium chloride, magnesium sulfate, ondansetron **OR** ondansetron, polyethylene glycol    BP (!) 98/59 Comment: notify the nurse  Pulse 78   Temp 97.6 °F (36.4 °C) (Temporal)   Resp 17   Ht 1.676 m (5' 6\")   Wt 101.6 kg (224 lb)   LMP  (LMP Unknown)   SpO2 95%   BMI 36.15 kg/m²     Lab Results   Component Value Date    WBC 13.1 (H) 04/07/2024    HGB 11.1 (L) 04/07/2024    HCT 33.8 (L) 04/07/2024    .3 (H) 04/07/2024     04/07/2024       Lab Results   Component Value Date    CREATININE 0.7 04/07/2024         Lab Results   Component Value Date    LABURIN >100,000 CFU/ml 07/06/2023    LABURIN 75,000 CFU/ml 07/06/2023

## 2024-04-07 NOTE — PLAN OF CARE
Problem: Safety - Adult  Goal: Free from fall injury  4/7/2024 1025 by Blossom Bella RN  Outcome: Progressing  4/7/2024 0121 by Laurie Purdy RN  Outcome: Progressing     Problem: Pain  Goal: Verbalizes/displays adequate comfort level or baseline comfort level  4/7/2024 1025 by Blossom Bella RN  Outcome: Progressing  4/7/2024 0121 by Laurie Purdy RN  Outcome: Progressing     Problem: Skin/Tissue Integrity  Goal: Absence of new skin breakdown  Description: 1.  Monitor for areas of redness and/or skin breakdown  2.  Assess vascular access sites hourly  3.  Every 4-6 hours minimum:  Change oxygen saturation probe site  4.  Every 4-6 hours:  If on nasal continuous positive airway pressure, respiratory therapy assess nares and determine need for appliance change or resting period.  4/7/2024 1025 by Blossom Bella RN  Outcome: Progressing  4/7/2024 0121 by Laurie Purdy, RN  Outcome: Progressing     Problem: Discharge Planning  Goal: Discharge to home or other facility with appropriate resources  4/7/2024 1025 by Blossom Bella RN  Outcome: Progressing  4/7/2024 0121 by Laurie Purdy, RN  Outcome: Progressing     Problem: Nutrition Deficit:  Goal: Optimize nutritional status  4/7/2024 1025 by Blossom Bella RN  Outcome: Progressing  4/7/2024 0121 by Laurie Purdy, RN  Outcome: Progressing

## 2024-04-07 NOTE — PROGRESS NOTES
Pulmonary Critical Care Medicine           PULMONARY  CRITICAL CARE   SERVICE DAILY PROGRESS  NOTE     2024   Hospital LOS:  LOS: 4 days     Impression / Recommendations:  Acute on chronic respiratory insufficiency - this is likely secondary to acute exacerbation of COPD from a viral syndrome +  small right subsegmental PE  Bladder mass and left kidney suspicious for renal cell carcinoma status post cystoscopy, panendoscopy endoscopy, retrograde pyelogram, placement of left ureteral stent and transurethral resection of multiple bladder lesions.  E. coli UTI     -Continue Brovana and Pulmicort nebs  -Continue bronchodilators  -PT OT, out of bed to chair  -Continue supplemental oxygen and wean as tolerated, will need home O2 eval prior to discharge  - Currently receiving oral cephalosporin, cefdinir 300 mg twice daily, continue for 5 to 7 days for E. coli UTI  -Currently on Lovenox, therapeutic dose.  Can be started on Eliquis 5 twice daily at the time of discharge.      Interval History/Event Changes:  Getting cleaned from the time of seeing patient  No new complaints    Allergies  Allergies   Allergen Reactions    Lyrica [Pregabalin] Shortness Of Breath    Adhesive Tape Itching     Bandaids       Review of Systems    A pertinent review of systems was performed and was otherwise non-contributory.    Vitals-     BP (!) 98/59 Comment: notify the nurse  Pulse 78   Temp 97.6 °F (36.4 °C) (Temporal)   Resp 17   Ht 1.676 m (5' 6\")   Wt 101.6 kg (224 lb)   LMP  (LMP Unknown)   SpO2 95%   BMI 36.15 kg/m²    Tmax: Temp (24hrs), Av.7 °F (36.5 °C), Min:97.4 °F (36.3 °C), Max:97.9 °F (36.6 °C)      Hemodynamics:  Cuff:   Systolic (24hrs), Av , Min:98 , Max:138   /Diastolic (24hrs), Av, Min:59, Max:72    Cuff MAP:MAP (mmHg)  Av.3  Min: 70  Max: 110  P:   Pulse  Av.8  Min: 78  Max: 96      Airway:     Observed RR: Resp  Av  Min: 16  Max: 18   Observed O2 sats: SpO2  Av.2 %  Min: 84

## 2024-04-07 NOTE — PROGRESS NOTES
[facility]                                Progress note    Subjective:    Patient is awake and alert.  Patient stated she has not had a bowel movement she feels very distended.  No acute events overnight.   States she feels tired and weak  Denies any pain    Objective:    BP (!) 98/59 Comment: notify the nurse  Pulse 78   Temp 97.6 °F (36.4 °C) (Temporal)   Resp 17   Ht 1.676 m (5' 6\")   Wt 101.6 kg (224 lb)   LMP  (LMP Unknown)   SpO2 95%   BMI 36.15 kg/m²     In: 4011.6 [P.O.:360; I.V.:3651.6]  Out: 5150   In: 4011.6   Out: 5150 [Urine:5150]    General appearance: NAD, conversant, fatigued  HEENT: AT/NC, MMM  Neck: FROM, supple  Lungs: Diminished, intermittently using 2 L nasal cannula  CV: Tachycardic, no MRGs  Vasc: Radial pulses 2+  Abdomen: Soft, non-tender; no masses or HSM  Extremities: No peripheral edema or digital cyanosis  Skin: no rash, lesions or ulcers  Psych: Alert and oriented to person, place and time  Neuro: Alert and interactive     Recent Labs     04/05/24  0428 04/06/24  0512 04/07/24  0704   WBC 19.7* 14.7* 13.1*   HGB 13.2 12.6 11.1*   HCT 38.4 38.4 33.8*    256 260       Recent Labs     04/05/24  0428 04/06/24  0512 04/07/24  0704    138 138   K 4.1 4.1 3.7    103 103   CO2 20* 27 23   BUN 13 10 8   CREATININE 1.0 0.8 0.7   CALCIUM 8.8 9.0 9.1       Assessment:    Principal Problem:    Bladder mass  Active Problems:    Renal calculi    Urinary obstruction  Resolved Problems:    * No resolved hospital problems. *      Plan:  Patient is a 69-year-old female admitted to [facility] for  Urinary obstruction due to renal calculi and bladder mass  -Monitor labs  -Monitor kidney function  -Urology consulted  -Plans for cystoscopy with laser lithotripsy and possible bladder biopsy with possible resection of bladder mass tomorrow 4/4-await procedure  -N.p.o. after midnight  -IV Rocephin pending urine cultures  -Monitor I's and O's     Chronically elevated

## 2024-04-07 NOTE — PROGRESS NOTES
[facility]  [facility]  Progress Note    SUBJECTIVE:  Chief Complaint   Patient presents with    Leg Swelling    Flank Pain     Patient c/o flank pain and difficulty urinating . Hx kidney stone      Patient is tolerating medications.  States the catheter is making her have a sense of urgency.  No other complaints.  No reported adverse drug reactions.  No nausea, vomiting, diarrhea.    Review of systems:  As stated above in the chief complaint, otherwise negative.    Medications:  Scheduled Meds:   enoxaparin  1 mg/kg SubCUTAneous BID    amoxicillin  500 mg Oral 3 times per day    cefdinir  300 mg Oral 2 times per day    mineral oil-hydrophilic petrolatum   Topical BID    [Held by provider] furosemide  20 mg Oral Daily    [Held by provider] losartan  25 mg Oral Daily    metoprolol tartrate  25 mg Oral BID    sodium chloride flush  5-40 mL IntraVENous 2 times per day    budesonide  0.5 mg Nebulization BID RT    arformoterol tartrate  15 mcg Nebulization BID RT     Continuous Infusions:   lactated ringers IV soln 125 mL/hr at 24 2204    sodium chloride Stopped (24 1239)     PRN Meds:diphenhydrAMINE-zinc acetate, sodium phosphate, guaiFENesin-dextromethorphan, diphenhydrAMINE, acetaminophen, mineral oil-hydrophilic petrolatum, albuterol, sodium chloride flush, sodium chloride, potassium chloride **OR** potassium alternative oral replacement **OR** potassium chloride, magnesium sulfate, ondansetron **OR** ondansetron, polyethylene glycol    OBJECTIVE:  BP (!) 98/59 Comment: notify the nurse  Pulse 78   Temp 97.6 °F (36.4 °C) (Temporal)   Resp 17   Ht 1.676 m (5' 6\")   Wt 101.6 kg (224 lb)   LMP  (LMP Unknown)   SpO2 95%   BMI 36.15 kg/m²   Temp  Av.7 °F (36.5 °C)  Min: 97.4 °F (36.3 °C)  Max: 97.9 °F (36.6 °C)  Constitutional: No acute distress  Skin: Warm and dry. No rashes were noted.   Neuro: Alert and oriented  HEENT: Round and reactive pupils.  Moist mucous

## 2024-04-08 LAB
ANION GAP SERPL CALCULATED.3IONS-SCNC: 13 MMOL/L (ref 7–16)
BASOPHILS # BLD: 0 K/UL (ref 0–0.2)
BASOPHILS NFR BLD: 0 % (ref 0–2)
BUN SERPL-MCNC: 8 MG/DL (ref 6–23)
CALCIUM SERPL-MCNC: 8.5 MG/DL (ref 8.6–10.2)
CHLORIDE SERPL-SCNC: 104 MMOL/L (ref 98–107)
CO2 SERPL-SCNC: 24 MMOL/L (ref 22–29)
CREAT SERPL-MCNC: 0.7 MG/DL (ref 0.5–1)
EOSINOPHIL # BLD: 0.19 K/UL (ref 0.05–0.5)
EOSINOPHILS RELATIVE PERCENT: 2 % (ref 0–6)
ERYTHROCYTE [DISTWIDTH] IN BLOOD BY AUTOMATED COUNT: 13.4 % (ref 11.5–15)
GFR SERPL CREATININE-BSD FRML MDRD: >90 ML/MIN/1.73M2
GLUCOSE SERPL-MCNC: 80 MG/DL (ref 74–99)
HCT VFR BLD AUTO: 31.1 % (ref 34–48)
HGB BLD-MCNC: 10.3 G/DL (ref 11.5–15.5)
LYMPHOCYTES NFR BLD: 1.15 K/UL (ref 1.5–4)
LYMPHOCYTES RELATIVE PERCENT: 10 % (ref 20–42)
MCH RBC QN AUTO: 34 PG (ref 26–35)
MCHC RBC AUTO-ENTMCNC: 33.1 G/DL (ref 32–34.5)
MCV RBC AUTO: 102.6 FL (ref 80–99.9)
METAMYELOCYTES ABSOLUTE COUNT: 0.19 K/UL (ref 0–0.12)
METAMYELOCYTES: 2 % (ref 0–1)
MICROORGANISM SPEC CULT: ABNORMAL
MICROORGANISM SPEC CULT: ABNORMAL
MONOCYTES NFR BLD: 1.91 K/UL (ref 0.1–0.95)
MONOCYTES NFR BLD: 17 % (ref 2–12)
NEUTROPHILS NFR BLD: 69 % (ref 43–80)
NEUTS SEG NFR BLD: 7.56 K/UL (ref 1.8–7.3)
NON-GYN CYTOLOGY REPORT: NORMAL
PLATELET # BLD AUTO: 264 K/UL (ref 130–450)
PMV BLD AUTO: 10.8 FL (ref 7–12)
POTASSIUM SERPL-SCNC: 3.8 MMOL/L (ref 3.5–5)
RBC # BLD AUTO: 3.03 M/UL (ref 3.5–5.5)
RBC # BLD: ABNORMAL 10*6/UL
RBC # BLD: ABNORMAL 10*6/UL
SODIUM SERPL-SCNC: 141 MMOL/L (ref 132–146)
SPECIMEN DESCRIPTION: ABNORMAL
WBC OTHER # BLD: 11 K/UL (ref 4.5–11.5)

## 2024-04-08 PROCEDURE — 6370000000 HC RX 637 (ALT 250 FOR IP): Performed by: NURSE PRACTITIONER

## 2024-04-08 PROCEDURE — 85025 COMPLETE CBC W/AUTO DIFF WBC: CPT

## 2024-04-08 PROCEDURE — 94640 AIRWAY INHALATION TREATMENT: CPT

## 2024-04-08 PROCEDURE — 6370000000 HC RX 637 (ALT 250 FOR IP): Performed by: INTERNAL MEDICINE

## 2024-04-08 PROCEDURE — 80048 BASIC METABOLIC PNL TOTAL CA: CPT

## 2024-04-08 PROCEDURE — 2580000003 HC RX 258: Performed by: NURSE PRACTITIONER

## 2024-04-08 PROCEDURE — 6360000002 HC RX W HCPCS: Performed by: NURSE PRACTITIONER

## 2024-04-08 PROCEDURE — 2700000000 HC OXYGEN THERAPY PER DAY

## 2024-04-08 PROCEDURE — 6360000002 HC RX W HCPCS: Performed by: INTERNAL MEDICINE

## 2024-04-08 PROCEDURE — 36415 COLL VENOUS BLD VENIPUNCTURE: CPT

## 2024-04-08 PROCEDURE — 2580000003 HC RX 258: Performed by: UROLOGY

## 2024-04-08 PROCEDURE — 1200000000 HC SEMI PRIVATE

## 2024-04-08 PROCEDURE — 6370000000 HC RX 637 (ALT 250 FOR IP): Performed by: SPECIALIST

## 2024-04-08 RX ORDER — AMOXICILLIN 250 MG/1
500 CAPSULE ORAL EVERY 8 HOURS SCHEDULED
Status: COMPLETED | OUTPATIENT
Start: 2024-04-08 | End: 2024-04-13

## 2024-04-08 RX ORDER — IPRATROPIUM BROMIDE AND ALBUTEROL SULFATE 2.5; .5 MG/3ML; MG/3ML
1 SOLUTION RESPIRATORY (INHALATION) EVERY 4 HOURS PRN
Status: DISCONTINUED | OUTPATIENT
Start: 2024-04-08 | End: 2024-04-18 | Stop reason: HOSPADM

## 2024-04-08 RX ADMIN — AMOXICILLIN 500 MG: 250 CAPSULE ORAL at 21:00

## 2024-04-08 RX ADMIN — DIPHENHYDRAMINE HYDROCHLORIDE 25 MG: 25 TABLET ORAL at 20:16

## 2024-04-08 RX ADMIN — ENOXAPARIN SODIUM 100 MG: 100 INJECTION SUBCUTANEOUS at 08:56

## 2024-04-08 RX ADMIN — BUDESONIDE INHALATION 500 MCG: 0.5 SUSPENSION RESPIRATORY (INHALATION) at 19:37

## 2024-04-08 RX ADMIN — ARFORMOTEROL TARTRATE 15 MCG: 15 SOLUTION RESPIRATORY (INHALATION) at 19:37

## 2024-04-08 RX ADMIN — AMOXICILLIN 500 MG: 125 POWDER, FOR SUSPENSION ORAL at 13:38

## 2024-04-08 RX ADMIN — GUAIFENESIN SYRUP AND DEXTROMETHORPHAN 5 ML: 100; 10 SYRUP ORAL at 20:58

## 2024-04-08 RX ADMIN — AMOXICILLIN 500 MG: 125 POWDER, FOR SUSPENSION ORAL at 06:09

## 2024-04-08 RX ADMIN — HYDROPHOR: 42 OINTMENT TOPICAL at 20:16

## 2024-04-08 RX ADMIN — CEFDINIR 300 MG: 300 CAPSULE ORAL at 08:55

## 2024-04-08 RX ADMIN — ARFORMOTEROL TARTRATE 15 MCG: 15 SOLUTION RESPIRATORY (INHALATION) at 08:19

## 2024-04-08 RX ADMIN — CEFDINIR 300 MG: 300 CAPSULE ORAL at 20:16

## 2024-04-08 RX ADMIN — ACETAMINOPHEN 650 MG: 325 TABLET ORAL at 14:54

## 2024-04-08 RX ADMIN — SODIUM CHLORIDE, POTASSIUM CHLORIDE, SODIUM LACTATE AND CALCIUM CHLORIDE: 600; 310; 30; 20 INJECTION, SOLUTION INTRAVENOUS at 23:21

## 2024-04-08 RX ADMIN — ACETAMINOPHEN 650 MG: 325 TABLET ORAL at 07:41

## 2024-04-08 RX ADMIN — METOPROLOL TARTRATE 25 MG: 25 TABLET, FILM COATED ORAL at 20:16

## 2024-04-08 RX ADMIN — IPRATROPIUM BROMIDE AND ALBUTEROL SULFATE 1 DOSE: 2.5; .5 SOLUTION RESPIRATORY (INHALATION) at 12:04

## 2024-04-08 RX ADMIN — METOPROLOL TARTRATE 25 MG: 25 TABLET, FILM COATED ORAL at 08:55

## 2024-04-08 RX ADMIN — ACETAMINOPHEN 650 MG: 325 TABLET ORAL at 20:58

## 2024-04-08 RX ADMIN — SODIUM CHLORIDE, PRESERVATIVE FREE 10 ML: 5 INJECTION INTRAVENOUS at 20:15

## 2024-04-08 RX ADMIN — IPRATROPIUM BROMIDE AND ALBUTEROL SULFATE 1 DOSE: 2.5; .5 SOLUTION RESPIRATORY (INHALATION) at 08:18

## 2024-04-08 RX ADMIN — ENOXAPARIN SODIUM 100 MG: 100 INJECTION SUBCUTANEOUS at 20:15

## 2024-04-08 RX ADMIN — BUDESONIDE INHALATION 500 MCG: 0.5 SUSPENSION RESPIRATORY (INHALATION) at 08:19

## 2024-04-08 RX ADMIN — HYDROPHOR: 42 OINTMENT TOPICAL at 08:56

## 2024-04-08 ASSESSMENT — PAIN SCALES - GENERAL
PAINLEVEL_OUTOF10: 0
PAINLEVEL_OUTOF10: 3
PAINLEVEL_OUTOF10: 8

## 2024-04-08 ASSESSMENT — PAIN DESCRIPTION - ORIENTATION: ORIENTATION: LEFT

## 2024-04-08 ASSESSMENT — PAIN DESCRIPTION - LOCATION
LOCATION: HEAD;ABDOMEN
LOCATION: ABDOMEN

## 2024-04-08 ASSESSMENT — PAIN DESCRIPTION - PAIN TYPE: TYPE: CHRONIC PAIN

## 2024-04-08 ASSESSMENT — PAIN DESCRIPTION - DESCRIPTORS: DESCRIPTORS: PRESSURE

## 2024-04-08 NOTE — PROGRESS NOTES
Went in to empty patients dillon and patients urine was bloody, currently has an order to remove dillon at 0600, notified Dr. Dillan Deng at present who stated to leave the dillon.

## 2024-04-08 NOTE — PROGRESS NOTES
[facility]  [facility]  Progress Note    C.C : complicated UTI     Denies fever or chills  Reports abdomen pain, arm swelling   Afebrile         Review of systems:  As stated above in the chief complaint, otherwise negative.    Medications:  Scheduled Meds:   arformoterol tartrate  15 mcg Nebulization BID RT    budesonide  0.5 mg Nebulization BID RT    enoxaparin  1 mg/kg SubCUTAneous BID    amoxicillin  500 mg Oral 3 times per day    cefdinir  300 mg Oral 2 times per day    mineral oil-hydrophilic petrolatum   Topical BID    [Held by provider] furosemide  20 mg Oral Daily    [Held by provider] losartan  25 mg Oral Daily    metoprolol tartrate  25 mg Oral BID    sodium chloride flush  5-40 mL IntraVENous 2 times per day     Continuous Infusions:   lactated ringers IV soln 125 mL/hr at 244    sodium chloride Stopped (24 1239)     PRN Meds:ipratropium 0.5 mg-albuterol 2.5 mg, diphenhydrAMINE-zinc acetate, guaiFENesin-dextromethorphan, diphenhydrAMINE, acetaminophen, mineral oil-hydrophilic petrolatum, albuterol, sodium chloride flush, sodium chloride, potassium chloride **OR** potassium alternative oral replacement **OR** potassium chloride, magnesium sulfate, ondansetron **OR** ondansetron, polyethylene glycol    OBJECTIVE:  /71   Pulse 85   Temp 97.5 °F (36.4 °C) (Temporal)   Resp 18   Ht 1.676 m (5' 6\")   Wt 101.8 kg (224 lb 8 oz)   LMP  (LMP Unknown)   SpO2 94%   BMI 36.24 kg/m²   Temp  Av.9 °F (36.6 °C)  Min: 97.4 °F (36.3 °C)  Max: 98.4 °F (36.9 °C)  Constitutional: No acute distress  Skin: Warm and dry. No rashes were noted.   Neuro: Alert and oriented  HEENT: No pallor, no icterus, no LN   Chest: .Respirations unlabored. Breath sounds clear.   Cardiovascular: RRR  Abdomen: Soft with bowel sounds.  Obrien - with bloody urine   Extremities: ++ edema   Lines: PIV.      Laboratory and Tests:  Lab Results   Component Value Date    WBC 11.0 2024              Blood cx neg on 4/5/2024     Cytology :    Satisfactory for evaluation.   NEGATIVE FOR HIGH GRADE UROTHELIAL CARCINOMA.   Numerous neutrophils present   Benign squamous epithelial cells present.   Blood present       CT chest -      1. Small right pulmonary embolism at the origin of a subsegmental artery.  No  evidence of right heart strain.  2. Bilateral consolidations in the lower lungs new from prior imaging, this  may represent infection, aspiration, or atelectasis.  3. Persistent fat stranding around the left kidney.  4. Bilateral emphysema.     Assessment:  Complicated UTI associated to left ureteral stone ( E coli , Enterococcus )   Status post cystoscopy with stenting, bladder biopsy and removal of mass  Fever /Leukocytosis - improved        Plan:   Oral Amoxicillin 500 mg po q 8 hrs and Cefdinir 300 mg po q 12 hrs           Polo Rizo MD  1:27 PM  4/8/2024

## 2024-04-08 NOTE — PROGRESS NOTES
[facility]                                Progress note    Subjective:    Patient is awake and alert.  Patient has moved her bowels.  Patient states she is not experiencing any abdominal pain, shortness of breath fevers or chills.  Patient is having increased hematuria in Obrien catheter.  Do not remove Obrien catheter.  Denies any pain    Objective:    /71   Pulse 85   Temp 97.5 °F (36.4 °C) (Temporal)   Resp 18   Ht 1.676 m (5' 6\")   Wt 101.8 kg (224 lb 8 oz)   LMP  (LMP Unknown)   SpO2 94%   BMI 36.24 kg/m²     In: 3577.4 [P.O.:740; I.V.:2837.4]  Out: 3300   In: 3577.4   Out: 3300 [Urine:3300]    General appearance: NAD, conversant, fatigued  HEENT: AT/NC, MMM  Neck: FROM, supple  Lungs: Diminished, intermittently using 2 L nasal cannula  CV: Tachycardic, no MRGs  Vasc: Radial pulses 2+  Abdomen: Soft, non-tender; no masses or HSM  Extremities: No peripheral edema or digital cyanosis  Skin: no rash, lesions or ulcers  Psych: Alert and oriented to person, place and time  Neuro: Alert and interactive     Recent Labs     04/06/24  0512 04/07/24  0704 04/08/24  0438   WBC 14.7* 13.1* 11.0   HGB 12.6 11.1* 10.3*   HCT 38.4 33.8* 31.1*    260 264       Recent Labs     04/06/24  0512 04/07/24  0704 04/08/24  0438    138 141   K 4.1 3.7 3.8    103 104   CO2 27 23 24   BUN 10 8 8   CREATININE 0.8 0.7 0.7   CALCIUM 9.0 9.1 8.5*       Assessment:    Principal Problem:    Bladder mass  Active Problems:    Renal calculi    Urinary obstruction  Resolved Problems:    * No resolved hospital problems. *      Plan:  Patient is a 69-year-old female admitted to [facility] for  Urinary obstruction due to renal calculi and bladder mass  -Monitor labs  -Monitor kidney function  -Urology consulted  -Plans for cystoscopy with laser lithotripsy and possible bladder biopsy with possible resection of bladder mass tomorrow 4/4-await procedure  -N.p.o. after midnight  -IV Rocephin pending urine

## 2024-04-08 NOTE — CARE COORDINATION
Patient is POD#4 CYSTOSCOPY RETROGRADE PYELOGRAM LEFT LASER LITHOTRIPSY BLADDER BIOPSY/ RESECTION OF BLADDER TUMOR for Left ureteral stone and Bladder mass. Per RN note today, Went in to empty patients dillon and patients urine was bloody, currently has an order to remove dillon at 0600, notified Dr. Dillan Deng at present who stated to leave the dillon. Await further input & plan from urology. Plan is home with [facility] when medically ready. [facility]IC[facility] orders are in place. Patient lives alone in an apartment with elevator access. She is mostly wheelchair bound. She has HHA M-F 9-2. She owns a slide board, sc and walker. BREANA/Destination complete. Pt will need ambulance transport home on day of discharge. Ambulance form with envelope placed on the soft chart.   Arlet Miller RN CM  [phone]

## 2024-04-08 NOTE — PLAN OF CARE
Problem: Safety - Adult  Goal: Free from fall injury  4/8/2024 1056 by Bri Hines RN  Outcome: Progressing  4/8/2024 0348 by Laurie Purdy RN  Outcome: Progressing     Problem: Pain  Goal: Verbalizes/displays adequate comfort level or baseline comfort level  4/8/2024 1056 by Bri Hines RN  Outcome: Progressing  4/8/2024 0348 by Laurie Purdy RN  Outcome: Progressing     Problem: Skin/Tissue Integrity  Goal: Absence of new skin breakdown  Description: 1.  Monitor for areas of redness and/or skin breakdown  2.  Assess vascular access sites hourly  3.  Every 4-6 hours minimum:  Change oxygen saturation probe site  4.  Every 4-6 hours:  If on nasal continuous positive airway pressure, respiratory therapy assess nares and determine need for appliance change or resting period.  4/8/2024 1056 by Bri Hines RN  Outcome: Progressing  4/8/2024 0348 by Laurie Purdy RN  Outcome: Progressing     Problem: Discharge Planning  Goal: Discharge to home or other facility with appropriate resources  4/8/2024 1056 by Bri Hines RN  Outcome: Progressing  4/8/2024 0348 by Laurie Purdy, RN  Outcome: Progressing     Problem: Nutrition Deficit:  Goal: Optimize nutritional status  4/8/2024 1056 by Bri Hines RN  Outcome: Progressing  4/8/2024 0348 by Laurie Purdy RN  Outcome: Progressing

## 2024-04-08 NOTE — RT PROTOCOL NOTE
RT Inhaler-Nebulizer Bronchodilator Protocol Note    There is a bronchodilator order in the chart from a provider indicating to follow the RT Bronchodilator Protocol and there is an “Initiate RT Inhaler-Nebulizer Bronchodilator Protocol” order as well (see protocol at bottom of note).    CXR Findings:  No results found.    The findings from the last RT Protocol Assessment were as follows:   History Pulmonary Disease: Chronic pulmonary disease  Respiratory Pattern: Regular pattern and RR 12-20 bpm  Breath Sounds: Slightly diminished and/or crackles  Cough: Strong, spontaneous, non-productive  Indication for Bronchodilator Therapy: On home bronchodilators (ONLY PRN)  Bronchodilator Assessment Score: 4    PATIENT REQUESTED TO ONLY RECEIVE DUONEB AS NEEDED. SHE STATED THEY MAKE HER FEEL VERY CONGESTED.    Aerosolized bronchodilator medication orders have been revised according to the RT Inhaler-Nebulizer Bronchodilator Protocol below.    Respiratory Therapist to perform RT Therapy Protocol Assessment initially then follow the protocol.  Repeat RT Therapy Protocol Assessment PRN for score 0-3 or on second treatment, BID, and PRN for scores above 3.    No Indications - adjust the frequency to every 6 hours PRN wheezing or bronchospasm, if no treatments needed after 48 hours then discontinue using Per Protocol order mode.     If indication present, adjust the RT bronchodilator orders based on the Bronchodilator Assessment Score as indicated below.  Use Inhaler orders unless patient has one or more of the following: on home nebulizer, not able to hold breath for 10 seconds, is not alert and oriented, cannot activate and use MDI correctly, or respiratory rate 25 breaths per minute or more, then use the equivalent nebulizer order(s) with same Frequency and PRN reasons based on the score.  If a patient is on this medication at home then do not decrease Frequency below that used at home.    0-3 - enter or revise RT  bronchodilator order(s) to equivalent RT Bronchodilator order with Frequency of every 4 hours PRN for wheezing or increased work of breathing using Per Protocol order mode.        4-6 - enter or revise RT Bronchodilator order(s) to two equivalent RT bronchodilator orders with one order with BID Frequency and one order with Frequency of every 4 hours PRN wheezing or increased work of breathing using Per Protocol order mode.        7-10 - enter or revise RT Bronchodilator order(s) to two equivalent RT bronchodilator orders with one order with TID Frequency and one order with Frequency of every 4 hours PRN wheezing or increased work of breathing using Per Protocol order mode.       11-13 - enter or revise RT Bronchodilator order(s) to one equivalent RT bronchodilator order with QID Frequency and an Albuterol order with Frequency of every 4 hours PRN wheezing or increased work of breathing using Per Protocol order mode.      Greater than 13 - enter or revise RT Bronchodilator order(s) to one equivalent RT bronchodilator order with every 4 hours Frequency and an Albuterol order with Frequency of every 2 hours PRN wheezing or increased work of breathing using Per Protocol order mode.     RT to enter RT Home Evaluation for COPD & MDI Assessment order using Per Protocol order mode.    Electronically signed by Sharon Wesley RCP on 4/8/2024 at 12:06 PM

## 2024-04-09 LAB
BNP SERPL-MCNC: 1178 PG/ML (ref 0–125)
HCT VFR BLD AUTO: 34.2 % (ref 34–48)
HGB BLD-MCNC: 11.4 G/DL (ref 11.5–15.5)
SURGICAL PATHOLOGY REPORT: NORMAL

## 2024-04-09 PROCEDURE — 2700000000 HC OXYGEN THERAPY PER DAY

## 2024-04-09 PROCEDURE — 6370000000 HC RX 637 (ALT 250 FOR IP): Performed by: INTERNAL MEDICINE

## 2024-04-09 PROCEDURE — 6360000002 HC RX W HCPCS: Performed by: FAMILY MEDICINE

## 2024-04-09 PROCEDURE — 2580000003 HC RX 258: Performed by: NURSE PRACTITIONER

## 2024-04-09 PROCEDURE — 85018 HEMOGLOBIN: CPT

## 2024-04-09 PROCEDURE — 6370000000 HC RX 637 (ALT 250 FOR IP): Performed by: SPECIALIST

## 2024-04-09 PROCEDURE — 2580000003 HC RX 258: Performed by: UROLOGY

## 2024-04-09 PROCEDURE — 6370000000 HC RX 637 (ALT 250 FOR IP): Performed by: NURSE PRACTITIONER

## 2024-04-09 PROCEDURE — 6370000000 HC RX 637 (ALT 250 FOR IP): Performed by: FAMILY MEDICINE

## 2024-04-09 PROCEDURE — 6360000002 HC RX W HCPCS: Performed by: NURSE PRACTITIONER

## 2024-04-09 PROCEDURE — 1200000000 HC SEMI PRIVATE

## 2024-04-09 PROCEDURE — 97535 SELF CARE MNGMENT TRAINING: CPT

## 2024-04-09 PROCEDURE — 94640 AIRWAY INHALATION TREATMENT: CPT

## 2024-04-09 PROCEDURE — 6360000002 HC RX W HCPCS: Performed by: INTERNAL MEDICINE

## 2024-04-09 PROCEDURE — 36415 COLL VENOUS BLD VENIPUNCTURE: CPT

## 2024-04-09 PROCEDURE — 83880 ASSAY OF NATRIURETIC PEPTIDE: CPT

## 2024-04-09 PROCEDURE — 85014 HEMATOCRIT: CPT

## 2024-04-09 RX ORDER — ENEMA 19; 7 G/133ML; G/133ML
1 ENEMA RECTAL
Status: COMPLETED | OUTPATIENT
Start: 2024-04-09 | End: 2024-04-09

## 2024-04-09 RX ORDER — FUROSEMIDE 10 MG/ML
40 INJECTION INTRAMUSCULAR; INTRAVENOUS ONCE
Status: COMPLETED | OUTPATIENT
Start: 2024-04-09 | End: 2024-04-09

## 2024-04-09 RX ADMIN — SODIUM CHLORIDE, PRESERVATIVE FREE 10 ML: 5 INJECTION INTRAVENOUS at 20:26

## 2024-04-09 RX ADMIN — POTASSIUM CHLORIDE 40 MEQ: 1500 TABLET, EXTENDED RELEASE ORAL at 09:27

## 2024-04-09 RX ADMIN — HYDROPHOR: 42 OINTMENT TOPICAL at 20:26

## 2024-04-09 RX ADMIN — AMOXICILLIN 500 MG: 250 CAPSULE ORAL at 05:38

## 2024-04-09 RX ADMIN — ACETAMINOPHEN 650 MG: 325 TABLET ORAL at 05:38

## 2024-04-09 RX ADMIN — AMOXICILLIN 500 MG: 250 CAPSULE ORAL at 13:57

## 2024-04-09 RX ADMIN — METOPROLOL TARTRATE 25 MG: 25 TABLET, FILM COATED ORAL at 09:28

## 2024-04-09 RX ADMIN — Medication 1 ENEMA: at 20:33

## 2024-04-09 RX ADMIN — ENOXAPARIN SODIUM 100 MG: 100 INJECTION SUBCUTANEOUS at 20:26

## 2024-04-09 RX ADMIN — DIPHENHYDRAMINE HYDROCHLORIDE AND ZINC ACETATE: 10; 1 CREAM TOPICAL at 13:12

## 2024-04-09 RX ADMIN — CEFDINIR 300 MG: 300 CAPSULE ORAL at 09:27

## 2024-04-09 RX ADMIN — ENOXAPARIN SODIUM 100 MG: 100 INJECTION SUBCUTANEOUS at 09:26

## 2024-04-09 RX ADMIN — SODIUM CHLORIDE, POTASSIUM CHLORIDE, SODIUM LACTATE AND CALCIUM CHLORIDE: 600; 310; 30; 20 INJECTION, SOLUTION INTRAVENOUS at 07:34

## 2024-04-09 RX ADMIN — CEFDINIR 300 MG: 300 CAPSULE ORAL at 20:24

## 2024-04-09 RX ADMIN — ACETAMINOPHEN 650 MG: 325 TABLET ORAL at 15:48

## 2024-04-09 RX ADMIN — ARFORMOTEROL TARTRATE 15 MCG: 15 SOLUTION RESPIRATORY (INHALATION) at 08:17

## 2024-04-09 RX ADMIN — ACETAMINOPHEN 650 MG: 325 TABLET ORAL at 21:50

## 2024-04-09 RX ADMIN — AMOXICILLIN 500 MG: 250 CAPSULE ORAL at 21:50

## 2024-04-09 RX ADMIN — DIPHENHYDRAMINE HYDROCHLORIDE AND ZINC ACETATE: 10; 1 CREAM TOPICAL at 20:25

## 2024-04-09 RX ADMIN — BUDESONIDE INHALATION 500 MCG: 0.5 SUSPENSION RESPIRATORY (INHALATION) at 08:17

## 2024-04-09 RX ADMIN — HYDROPHOR: 42 OINTMENT TOPICAL at 09:28

## 2024-04-09 RX ADMIN — GUAIFENESIN SYRUP AND DEXTROMETHORPHAN 5 ML: 100; 10 SYRUP ORAL at 05:38

## 2024-04-09 RX ADMIN — METOPROLOL TARTRATE 25 MG: 25 TABLET, FILM COATED ORAL at 20:25

## 2024-04-09 RX ADMIN — SODIUM CHLORIDE, PRESERVATIVE FREE 10 ML: 5 INJECTION INTRAVENOUS at 09:26

## 2024-04-09 RX ADMIN — FUROSEMIDE 40 MG: 10 INJECTION, SOLUTION INTRAMUSCULAR; INTRAVENOUS at 12:46

## 2024-04-09 RX ADMIN — GUAIFENESIN SYRUP AND DEXTROMETHORPHAN 5 ML: 100; 10 SYRUP ORAL at 20:24

## 2024-04-09 ASSESSMENT — PAIN SCALES - GENERAL: PAINLEVEL_OUTOF10: 5

## 2024-04-09 ASSESSMENT — PAIN DESCRIPTION - DESCRIPTORS: DESCRIPTORS: POUNDING;DISCOMFORT;THROBBING

## 2024-04-09 ASSESSMENT — PAIN DESCRIPTION - LOCATION: LOCATION: HEAD

## 2024-04-09 NOTE — CARE COORDINATION
Patient is POD#5 CYSTOSCOPY RETROGRADE PYELOGRAM LEFT LASER LITHOTRIPSY BLADDER BIOPSY/ RESECTION OF BLADDER TUMOR for Left ureteral stone and Bladder mass. Per internal med note today, H&H stable-11.4/34.2. Patient's hematuria resolving. Patient complains of constipation-fleets enema x 1. Urology to see patient today. Await further input and plan. Discharge plan is home with [facility] when medically ready. VICENTA orders are in place. Patient lives alone in an apartment with elevator access. She is mostly wheelchair bound. She has HHA M-F 9-2. She owns a slide board, sc and walker. BREANA/Destination complete. Pt will need ambulance transport home on day of discharge. Ambulance form with envelope placed on the soft chart.   Arlet Miller RN CM  [phone]

## 2024-04-09 NOTE — PLAN OF CARE
Problem: Safety - Adult  Goal: Free from fall injury  4/9/2024 1140 by Jannie Flores RN  Outcome: Progressing     Problem: Pain  Goal: Verbalizes/displays adequate comfort level or baseline comfort level  4/9/2024 1140 by Jannie Flores, RN  Outcome: Progressing     Problem: Discharge Planning  Goal: Discharge to home or other facility with appropriate resources  4/9/2024 1140 by Jannie Flores, RN  Outcome: Progressing     Problem: Nutrition Deficit:  Goal: Optimize nutritional status  4/9/2024 1140 by Jannie Flores, RN  Outcome: Progressing

## 2024-04-09 NOTE — PROGRESS NOTES
4/9/2024 4:00 PM  Nettie Lara  [id number]    Subjective:  her urine is yellow in the dillon catheter tubing   She complains of constipation   No family present    Review of Systems  Constitutional: No fever or chills   Respiratory: negative for cough and hemoptysis  Cardiovascular: negative for chest pain and dyspnea  Gastrointestinal: negative for abdominal pain, diarrhea, nausea and vomiting   : See above  Derm: negative for rash and skin lesion(s)  Neurological: negative for seizures and tremors  Musculoskeletal: Negative    Psychiatric: Negative   All other reviews are negative      Scheduled Meds:   amoxicillin  500 mg Oral 3 times per day    arformoterol tartrate  15 mcg Nebulization BID RT    budesonide  0.5 mg Nebulization BID RT    enoxaparin  1 mg/kg SubCUTAneous BID    cefdinir  300 mg Oral 2 times per day    mineral oil-hydrophilic petrolatum   Topical BID    [Held by provider] furosemide  20 mg Oral Daily    [Held by provider] losartan  25 mg Oral Daily    metoprolol tartrate  25 mg Oral BID    sodium chloride flush  5-40 mL IntraVENous 2 times per day       Objective:  Vitals:    04/09/24 0817   BP:    Pulse: 84   Resp: 19   Temp:    SpO2: 96%         Allergies: Lyrica [pregabalin] and Adhesive tape    General Appearance: alert and oriented to person, place and time and in no acute distress  Skin: no rash or erythema  Head: normocephalic and atraumatic  Pulmonary/Chest: normal air movement, no respiratory distress  Abdomen: soft, non-tender, non-distended  Genitourinary:  Dillon catheter draining yellow urine  Extremities: no cyanosis, clubbing or edema         Labs:     Recent Labs     04/08/24  0438      K 3.8      CO2 24   BUN 8   CREATININE 0.7   GLUCOSE 80   CALCIUM 8.5*       Lab Results   Component Value Date/Time    HGB 11.4 04/09/2024 11:42 AM    HCT 34.2 04/09/2024 11:42 AM       No results found for: \"PSA\"  Path Number: [id number]     -- Diagnosis --   A.

## 2024-04-09 NOTE — PROGRESS NOTES
[facility]                                Progress note    Subjective:    Patient is awake and alert.  Patient sitting up in bed.  Hematuria is resolved.  Patient states she has not had a bowel movement however she cannot have enema.  Denies any chest pain, shortness of breath.    Objective:    BP (!) 142/72   Pulse 84   Temp 98.2 °F (36.8 °C) (Temporal)   Resp 19   Ht 1.676 m (5' 6\")   Wt 102.1 kg (225 lb 1.6 oz)   LMP  (LMP Unknown)   SpO2 96%   BMI 36.33 kg/m²     In: 1250 [I.V.:1250]  Out: 3150   In: 1250   Out: 3150 [Urine:3150]    General appearance: NAD, conversant, fatigued  HEENT: AT/NC, MMM  Neck: FROM, supple  Lungs: Diminished, intermittently using 2 L nasal cannula  CV: Tachycardic, no MRGs  Vasc: Radial pulses 2+  Abdomen: Soft, non-tender; no masses or HSM  Extremities: No peripheral edema or digital cyanosis  Skin: no rash, lesions or ulcers  Psych: Alert and oriented to person, place and time  Neuro: Alert and interactive     Recent Labs     04/07/24  0704 04/08/24  0438 04/09/24  1142   WBC 13.1* 11.0  --    HGB 11.1* 10.3* 11.4*   HCT 33.8* 31.1* 34.2    264  --        Recent Labs     04/07/24  0704 04/08/24  0438    141   K 3.7 3.8    104   CO2 23 24   BUN 8 8   CREATININE 0.7 0.7   CALCIUM 9.1 8.5*       Assessment:    Principal Problem:    Bladder mass  Active Problems:    Renal calculi    Urinary obstruction  Resolved Problems:    * No resolved hospital problems. *      Plan:  Patient is a 69-year-old female admitted to [facility] for  Urinary obstruction due to renal calculi and bladder mass  -Monitor labs  -Monitor kidney function  -Urology consulted  -Plans for cystoscopy with laser lithotripsy and possible bladder biopsy with possible resection of bladder mass tomorrow 4/4-await procedure  -N.p.o. after midnight  -IV Rocephin pending urine cultures  -Monitor I's and O's     Chronically elevated troponin  -Troponin 91-37-02-nothing to do, no chest

## 2024-04-09 NOTE — PROGRESS NOTES
Obrien catheter irrigated/flush as urine noted to be pickish red with few small clots and patient complaint of pressure. Manually irrigated with no complications.

## 2024-04-09 NOTE — PLAN OF CARE
Problem: Safety - Adult  Goal: Free from fall injury  4/9/2024 0034 by Esmer Orta RN  Outcome: Progressing     Problem: Pain  Goal: Verbalizes/displays adequate comfort level or baseline comfort level  4/9/2024 0034 by Esmer Orta RN  Outcome: Progressing     Problem: Skin/Tissue Integrity  Goal: Absence of new skin breakdown  Description: 1.  Monitor for areas of redness and/or skin breakdown  2.  Assess vascular access sites hourly  3.  Every 4-6 hours minimum:  Change oxygen saturation probe site  4.  Every 4-6 hours:  If on nasal continuous positive airway pressure, respiratory therapy assess nares and determine need for appliance change or resting period.  4/9/2024 0034 by Esmer Orta RN  Outcome: Progressing     Problem: Discharge Planning  Goal: Discharge to home or other facility with appropriate resources  4/9/2024 0034 by Esmer Orta RN  Outcome: Progressing     Problem: Nutrition Deficit:  Goal: Optimize nutritional status  4/9/2024 0034 by Esmer Orta RN  Outcome: Progressing

## 2024-04-09 NOTE — PROGRESS NOTES
OCCUPATIONAL THERAPY TREATMENT NOTE  [facility] [address], [place]      Date:2024  Patient Name: Nettie Lara  MRN: [id number]  : 1955  Room: [address] [place] [address]     Evaluating OT: Kenneth Bowden OTR/L [id number]     Referring Provider: Janki Armstrong APRN - CNP                                     Specific Provider Orders/Date: OT evaluation and treatment 4/3/24 0315     Diagnosis:  Calculus of ureter [N20.1]  Urinary obstruction [N13.9]  Bladder mass [N32.89]  Bilateral lower extremity edema [R60.0]  Hydronephrosis, unspecified hydronephrosis type [N13.30]       Pertinent Medical History:  has a past medical history of Arthritis, Asthma, COPD (chronic obstructive pulmonary disease) ([facility]), Eczema, Fall, Gout, Hypertension, Kidney stone, Multiple sclerosis ([facility]), Prolonged emergence from general anesthesia, Scoliosis, Spinal stenosis, and UTI (urinary tract infection).   Past Surgical History         Past Surgical History:   Procedure Laterality Date    APPENDECTOMY        BLADDER SURGERY Left 2023     CYSTOSCOPY RETROGRADE PYELOGRAM  left STENT REMOVAL, PLACEMENT OF LEFT STENT performed by Ruy Deng MD at [facility] OR    CHOLECYSTECTOMY        CYSTOSCOPY INSERTION / REMOVAL STENT / STONE Right 2020     CYSTOSCOPY RETROGRADE PYELOGRAM STENT INSERTION LEFT performed by Tony Howard DO at [facility] OR    HYSTERECTOMY (CERVIX STATUS UNKNOWN)        LITHOTRIPSY Left 2020     CYSTOSCOPY RETROGRADE PYELOGRAM URETEROSCOPY  LEFT J-STENT EXCHANGE, INSERTION JUNIOR CATHETER---FACILITY------ performed by Tony Howard DO at [facility] OR    LITHOTRIPSY Left 2023     CYSTOSCOPY LEFT ESWL EXTRACORPOREAL SHOCK WAVE LITHOTRIPSY WITH LEFT STENT INSERTION (HOLD TIME 1130 FOR TRANSPORT) performed by Wilson Finney MD at [facility] OR    LITHOTRIPSY Left 2023     CYSTOSCOPY RETROGRADE PYELOGRAM LEFT URETEROSCOPY  WITH LASER LITHOTRIPSY

## 2024-04-09 NOTE — PROGRESS NOTES
[facility]  [facility]  Progress Note    C.C : complicated UTI     Denies fever or chills  Reports abdomen pain, arm swelling   Afebrile         Review of systems:  As stated above in the chief complaint, otherwise negative.    Medications:  Scheduled Meds:   furosemide  40 mg IntraVENous Once    amoxicillin  500 mg Oral 3 times per day    arformoterol tartrate  15 mcg Nebulization BID RT    budesonide  0.5 mg Nebulization BID RT    enoxaparin  1 mg/kg SubCUTAneous BID    cefdinir  300 mg Oral 2 times per day    mineral oil-hydrophilic petrolatum   Topical BID    [Held by provider] furosemide  20 mg Oral Daily    [Held by provider] losartan  25 mg Oral Daily    metoprolol tartrate  25 mg Oral BID    sodium chloride flush  5-40 mL IntraVENous 2 times per day     Continuous Infusions:   sodium chloride Stopped (24 1239)     PRN Meds:ipratropium 0.5 mg-albuterol 2.5 mg, diphenhydrAMINE-zinc acetate, guaiFENesin-dextromethorphan, diphenhydrAMINE, acetaminophen, mineral oil-hydrophilic petrolatum, albuterol, sodium chloride flush, sodium chloride, potassium chloride **OR** potassium alternative oral replacement **OR** potassium chloride, magnesium sulfate, ondansetron **OR** ondansetron, polyethylene glycol    OBJECTIVE:  BP (!) 142/72   Pulse 84   Temp 98.2 °F (36.8 °C) (Temporal)   Resp 19   Ht 1.676 m (5' 6\")   Wt 102.1 kg (225 lb 1.6 oz)   LMP  (LMP Unknown)   SpO2 96%   BMI 36.33 kg/m²   Temp  Av.1 °F (36.7 °C)  Min: 98 °F (36.7 °C)  Max: 98.2 °F (36.8 °C)  Constitutional: No acute distress  Skin: Warm and dry. No rashes were noted.   Neuro: Alert and oriented  HEENT: No pallor, no icterus, no LN   Chest: .Clear    Cardiovascular: RRR  Abdomen: Soft with bowel sounds.  Obrien - with bloody urine - clearing   Extremities: ++ edema   Lines: PIV.      Laboratory and Tests:  Lab Results   Component Value Date    WBC 11.0 2024    WBC 13.1 (H) 2024    WBC 14.7

## 2024-04-10 LAB
MICROORGANISM SPEC CULT: NORMAL
SERVICE CMNT-IMP: NORMAL
SPECIMEN DESCRIPTION: NORMAL

## 2024-04-10 PROCEDURE — 97530 THERAPEUTIC ACTIVITIES: CPT

## 2024-04-10 PROCEDURE — 6370000000 HC RX 637 (ALT 250 FOR IP): Performed by: FAMILY MEDICINE

## 2024-04-10 PROCEDURE — 6370000000 HC RX 637 (ALT 250 FOR IP): Performed by: INTERNAL MEDICINE

## 2024-04-10 PROCEDURE — 94640 AIRWAY INHALATION TREATMENT: CPT

## 2024-04-10 PROCEDURE — 97535 SELF CARE MNGMENT TRAINING: CPT

## 2024-04-10 PROCEDURE — 1200000000 HC SEMI PRIVATE

## 2024-04-10 PROCEDURE — 6370000000 HC RX 637 (ALT 250 FOR IP): Performed by: SPECIALIST

## 2024-04-10 PROCEDURE — 6370000000 HC RX 637 (ALT 250 FOR IP): Performed by: NURSE PRACTITIONER

## 2024-04-10 PROCEDURE — 2580000003 HC RX 258: Performed by: NURSE PRACTITIONER

## 2024-04-10 PROCEDURE — 51798 US URINE CAPACITY MEASURE: CPT

## 2024-04-10 PROCEDURE — 6370000000 HC RX 637 (ALT 250 FOR IP)

## 2024-04-10 PROCEDURE — 2700000000 HC OXYGEN THERAPY PER DAY

## 2024-04-10 PROCEDURE — 6360000002 HC RX W HCPCS: Performed by: NURSE PRACTITIONER

## 2024-04-10 PROCEDURE — 6360000002 HC RX W HCPCS: Performed by: INTERNAL MEDICINE

## 2024-04-10 RX ORDER — LACTULOSE 10 G/15ML
20 SOLUTION ORAL ONCE
Status: COMPLETED | OUTPATIENT
Start: 2024-04-10 | End: 2024-04-10

## 2024-04-10 RX ORDER — FUROSEMIDE 20 MG/1
20 TABLET ORAL DAILY
Status: DISCONTINUED | OUTPATIENT
Start: 2024-04-10 | End: 2024-04-18 | Stop reason: HOSPADM

## 2024-04-10 RX ADMIN — ARFORMOTEROL TARTRATE 15 MCG: 15 SOLUTION RESPIRATORY (INHALATION) at 09:42

## 2024-04-10 RX ADMIN — ENOXAPARIN SODIUM 100 MG: 100 INJECTION SUBCUTANEOUS at 20:04

## 2024-04-10 RX ADMIN — AMOXICILLIN 500 MG: 250 CAPSULE ORAL at 13:41

## 2024-04-10 RX ADMIN — FUROSEMIDE 20 MG: 20 TABLET ORAL at 16:09

## 2024-04-10 RX ADMIN — BUDESONIDE INHALATION 500 MCG: 0.5 SUSPENSION RESPIRATORY (INHALATION) at 09:42

## 2024-04-10 RX ADMIN — AMOXICILLIN 500 MG: 250 CAPSULE ORAL at 21:01

## 2024-04-10 RX ADMIN — SODIUM CHLORIDE, PRESERVATIVE FREE 10 ML: 5 INJECTION INTRAVENOUS at 08:36

## 2024-04-10 RX ADMIN — CEFDINIR 300 MG: 300 CAPSULE ORAL at 20:04

## 2024-04-10 RX ADMIN — METOPROLOL TARTRATE 25 MG: 25 TABLET, FILM COATED ORAL at 08:35

## 2024-04-10 RX ADMIN — HYDROPHOR: 42 OINTMENT TOPICAL at 08:38

## 2024-04-10 RX ADMIN — LACTULOSE 20 G: 20 SOLUTION ORAL at 22:35

## 2024-04-10 RX ADMIN — METOPROLOL TARTRATE 25 MG: 25 TABLET, FILM COATED ORAL at 20:04

## 2024-04-10 RX ADMIN — ACETAMINOPHEN 650 MG: 325 TABLET ORAL at 13:42

## 2024-04-10 RX ADMIN — POLYETHYLENE GLYCOL 3350 17 G: 17 POWDER, FOR SOLUTION ORAL at 21:59

## 2024-04-10 RX ADMIN — SODIUM CHLORIDE, PRESERVATIVE FREE 10 ML: 5 INJECTION INTRAVENOUS at 20:08

## 2024-04-10 RX ADMIN — ACETAMINOPHEN 650 MG: 325 TABLET ORAL at 05:42

## 2024-04-10 RX ADMIN — ENOXAPARIN SODIUM 100 MG: 100 INJECTION SUBCUTANEOUS at 08:35

## 2024-04-10 RX ADMIN — GUAIFENESIN SYRUP AND DEXTROMETHORPHAN 5 ML: 100; 10 SYRUP ORAL at 13:51

## 2024-04-10 RX ADMIN — ACETAMINOPHEN 650 MG: 325 TABLET ORAL at 20:08

## 2024-04-10 RX ADMIN — AMOXICILLIN 500 MG: 250 CAPSULE ORAL at 05:42

## 2024-04-10 RX ADMIN — CEFDINIR 300 MG: 300 CAPSULE ORAL at 08:35

## 2024-04-10 RX ADMIN — HYDROPHOR: 42 OINTMENT TOPICAL at 20:08

## 2024-04-10 ASSESSMENT — PAIN DESCRIPTION - LOCATION: LOCATION: ABDOMEN;HEAD

## 2024-04-10 ASSESSMENT — PAIN SCALES - GENERAL
PAINLEVEL_OUTOF10: 7
PAINLEVEL_OUTOF10: 5

## 2024-04-10 NOTE — PROGRESS NOTES
[facility]                                Progress note    Subjective:    Patient seen sitting up in bed, awake, alert and oriented. Denies any SOB, CP, Pain. Patient is happy to have moved her bowels. Expressed financial concerns about discharge plan.       Objective:    /60   Pulse 72   Temp 97.2 °F (36.2 °C) (Temporal)   Resp 18   Ht 1.676 m (5' 6\")   Wt 102.1 kg (225 lb 1.6 oz)   LMP  (LMP Unknown)   SpO2 96%   BMI 36.33 kg/m²     In: 940 [P.O.:690; I.V.:250]  Out: 6250   In: 940   Out: 6250 [Urine:6250]    General appearance: NAD, conversant  HEENT: AT/NC, MMM  Neck: FROM, supple  Lungs: Diminished, intermittently using 2 L nasal cannula  CV: Tachycardic, no MRGs  Vasc: Radial pulses 2+  Abdomen: Soft, non-tender; no masses or HSM  Extremities: No peripheral edema or digital cyanosis  Skin: no rash, lesions or ulcers  Psych: Alert and oriented to person, place and time  Neuro: Alert and interactive     Recent Labs     04/08/24  0438 04/09/24  1142   WBC 11.0  --    HGB 10.3* 11.4*   HCT 31.1* 34.2     --          Recent Labs     04/08/24  0438      K 3.8      CO2 24   BUN 8   CREATININE 0.7   CALCIUM 8.5*         Assessment:    Principal Problem:    Bladder mass  Active Problems:    Renal calculi    Urinary obstruction  Resolved Problems:    * No resolved hospital problems. *      Plan:  Patient is a 69-year-old female admitted to [facility] for  Urinary obstruction due to renal calculi and bladder mass  -Monitor labs  -Monitor kidney function  -Urology consulted  -Plans for cystoscopy with laser lithotripsy and possible bladder biopsy with possible resection of bladder mass tomorrow 4/4-await procedure  -N.p.o. after midnight  -IV Rocephin pending urine cultures  -Monitor I's and O's     Chronically elevated troponin  -Troponin 59-29-28-nothing to do, no chest pain     Hypertension  -Continue home medications with parameters  -Continue to monitor  patient, providing counseling/education to patient/family.      Debra Ashby APRN - CNP  2:55 PM  4/10/2024

## 2024-04-10 NOTE — PROGRESS NOTES
[facility]  [facility]  Progress Note    C.C : complicated UTI     Denies fever or chills  Denies dysuria, Obrien catheter removed   Still has hematuria     Review of systems:  As stated above in the chief complaint, otherwise negative.    Medications:  Scheduled Meds:   furosemide  20 mg Oral Daily    amoxicillin  500 mg Oral 3 times per day    arformoterol tartrate  15 mcg Nebulization BID RT    budesonide  0.5 mg Nebulization BID RT    enoxaparin  1 mg/kg SubCUTAneous BID    cefdinir  300 mg Oral 2 times per day    mineral oil-hydrophilic petrolatum   Topical BID    [Held by provider] losartan  25 mg Oral Daily    metoprolol tartrate  25 mg Oral BID    sodium chloride flush  5-40 mL IntraVENous 2 times per day     Continuous Infusions:   sodium chloride Stopped (24 1239)     PRN Meds:ipratropium 0.5 mg-albuterol 2.5 mg, diphenhydrAMINE-zinc acetate, guaiFENesin-dextromethorphan, diphenhydrAMINE, acetaminophen, mineral oil-hydrophilic petrolatum, albuterol, sodium chloride flush, sodium chloride, potassium chloride **OR** potassium alternative oral replacement **OR** potassium chloride, magnesium sulfate, ondansetron **OR** ondansetron, polyethylene glycol    OBJECTIVE:  /60   Pulse 72   Temp 97.2 °F (36.2 °C) (Temporal)   Resp 18   Ht 1.676 m (5' 6\")   Wt 102.1 kg (225 lb 1.6 oz)   LMP  (LMP Unknown)   SpO2 96%   BMI 36.33 kg/m²   Temp  Av.5 °F (36.4 °C)  Min: 97.2 °F (36.2 °C)  Max: 97.8 °F (36.6 °C)  Constitutional: No acute distress  Skin: Warm and dry. No rashes were noted.   Neuro: Alert and oriented  HEENT: No pallor, no icterus, no LN   Chest: .Clear    Cardiovascular: RRR  Abdomen: Soft with bowel sounds.  Obrien - with bloody urine - clearing   Extremities: ++ edema   Lines: PIV.      Laboratory and Tests:  Lab Results   Component Value Date    WBC 11.0 2024    WBC 13.1 (H) 2024    WBC 14.7 (H) 2024    HGB 11.4 (L) 2024    HCT

## 2024-04-10 NOTE — PLAN OF CARE
Problem: Safety - Adult  Goal: Free from fall injury  Outcome: Progressing     Problem: Pain  Goal: Verbalizes/displays adequate comfort level or baseline comfort level  Outcome: Progressing     Problem: Skin/Tissue Integrity  Goal: Absence of new skin breakdown  Description: 1.  Monitor for areas of redness and/or skin breakdown  2.  Assess vascular access sites hourly  3.  Every 4-6 hours minimum:  Change oxygen saturation probe site  4.  Every 4-6 hours:  If on nasal continuous positive airway pressure, respiratory therapy assess nares and determine need for appliance change or resting period.  Outcome: Progressing     Problem: Discharge Planning  Goal: Discharge to home or other facility with appropriate resources  Outcome: Progressing     Problem: Nutrition Deficit:  Goal: Optimize nutritional status  Outcome: Progressing

## 2024-04-10 NOTE — PLAN OF CARE
Problem: Safety - Adult  Goal: Free from fall injury  4/10/2024 1102 by Jannie Flores RN  Outcome: Progressing     Problem: Pain  Goal: Verbalizes/displays adequate comfort level or baseline comfort level  4/10/2024 1102 by Jannie Flores RN  Outcome: Progressing     Problem: Skin/Tissue Integrity  Goal: Absence of new skin breakdown  Description: 1.  Monitor for areas of redness and/or skin breakdown  2.  Assess vascular access sites hourly  3.  Every 4-6 hours minimum:  Change oxygen saturation probe site  4.  Every 4-6 hours:  If on nasal continuous positive airway pressure, respiratory therapy assess nares and determine need for appliance change or resting period.  4/10/2024 1102 by Jannie Flores RN  Outcome: Progressing     Problem: Discharge Planning  Goal: Discharge to home or other facility with appropriate resources  4/10/2024 1102 by Jannie Flores RN  Outcome: Progressing     Problem: Nutrition Deficit:  Goal: Optimize nutritional status  4/10/2024 1102 by Jannie Flores RN  Outcome: Progressing

## 2024-04-10 NOTE — PROGRESS NOTES
4/10/2024 12:59 PM  Nettie Lara  [id number]    Subjective:    Dillon was removed today for a voiding trial   Voiding with purewick  Urine bart in color  Awaiting PVRs    Review of Systems  Constitutional: No fever or chills   Respiratory: negative for cough and hemoptysis  Cardiovascular: negative for chest pain and dyspnea  Gastrointestinal: negative for abdominal pain, diarrhea, nausea and vomiting   : See above  Derm: negative for rash and skin lesion(s)  Neurological: negative for seizures and tremors  Musculoskeletal: Negative    Psychiatric: Negative   All other reviews are negative      Scheduled Meds:   amoxicillin  500 mg Oral 3 times per day    arformoterol tartrate  15 mcg Nebulization BID RT    budesonide  0.5 mg Nebulization BID RT    enoxaparin  1 mg/kg SubCUTAneous BID    cefdinir  300 mg Oral 2 times per day    mineral oil-hydrophilic petrolatum   Topical BID    [Held by provider] furosemide  20 mg Oral Daily    [Held by provider] losartan  25 mg Oral Daily    metoprolol tartrate  25 mg Oral BID    sodium chloride flush  5-40 mL IntraVENous 2 times per day       Objective:  Vitals:    04/10/24 0745   BP: 116/60   Pulse: 72   Resp: 18   Temp: 97.2 °F (36.2 °C)   SpO2:          Allergies: Lyrica [pregabalin] and Adhesive tape    General Appearance: alert and oriented to person, place and time and in no acute distress  Skin: no rash or erythema  Head: normocephalic and atraumatic  Pulmonary/Chest: normal air movement, no respiratory distress  Abdomen: soft, non-tender, non-distended  Genitourinary:  no dillon   Extremities: no cyanosis, clubbing or edema         Labs:     Recent Labs     04/08/24  0438      K 3.8      CO2 24   BUN 8   CREATININE 0.7   GLUCOSE 80   CALCIUM 8.5*       Lab Results   Component Value Date/Time    HGB 11.4 04/09/2024 11:42 AM    HCT 34.2 04/09/2024 11:42 AM       No results found for: \"PSA\"  Path Number: [id number]     -- Diagnosis --   A.

## 2024-04-10 NOTE — PROGRESS NOTES
OCCUPATIONAL THERAPY TREATMENT NOTE  FAM [facility] [address] [place], [place]      Date:4/10/2024  Patient Name: Nettie Lara  MRN: [id number]  : 1955  Room: [address] [place] [address]     Evaluating OT: Kenneth Bowden OTR/L [id number]     Referring Provider: Janki Armstrong APRN - CNP                                     Specific Provider Orders/Date: OT evaluation and treatment 4/3/24 0315     Diagnosis:  Calculus of ureter [N20.1]  Urinary obstruction [N13.9]  Bladder mass [N32.89]  Bilateral lower extremity edema [R60.0]  Hydronephrosis, unspecified hydronephrosis type [N13.30]       Pertinent Medical History:  has a past medical history of Arthritis, Asthma, COPD (chronic obstructive pulmonary disease) ([facility]), Eczema, Fall, Gout, Hypertension, Kidney stone, Multiple sclerosis ([facility]), Prolonged emergence from general anesthesia, Scoliosis, Spinal stenosis, and UTI (urinary tract infection).   Past Surgical History         Past Surgical History:   Procedure Laterality Date    APPENDECTOMY        BLADDER SURGERY Left 2023     CYSTOSCOPY RETROGRADE PYELOGRAM  left STENT REMOVAL, PLACEMENT OF LEFT STENT performed by Ruy Deng MD at [facility] [place]    CHOLECYSTECTOMY        CYSTOSCOPY INSERTION / REMOVAL STENT / STONE Right 2020     CYSTOSCOPY RETROGRADE PYELOGRAM STENT INSERTION LEFT performed by Tony Howard DO at [facility] OR    HYSTERECTOMY (CERVIX STATUS UNKNOWN)        LITHOTRIPSY Left 2020     CYSTOSCOPY RETROGRADE PYELOGRAM URETEROSCOPY  LEFT J-STENT EXCHANGE, INSERTION JUNIOR CATHETER---FACILITY------ performed by Tony Howard DO at [facility]    LITHOTRIPSY Left 2023     CYSTOSCOPY LEFT ESWL EXTRACORPOREAL SHOCK WAVE LITHOTRIPSY WITH LEFT STENT INSERTION (HOLD TIME 1130 FOR TRANSPORT) performed by Wilson Finney MD at [facility] OR    LITHOTRIPSY Left 2023     CYSTOSCOPY RETROGRADE PYELOGRAM LEFT URETEROSCOPY  WITH LASER LITHOTRIPSY  righting/equilibrium reactions, midline orientation, scapular stability/mobility, normalization of muscle tone, and facilitation of volitional active controled movement  * Positioning to improve skin integrity, interaction with environment and functional independence     Recommended Adaptive Equipment: TBD      Per OT Eval:  Home Living:  Pt lives alone  in a 1 story  apartment  on the 3rd floor with 0 steps to enter  and elevator access   Bedroom setup: main level   hospital bed   Bathroom setup: main level  tub/shower combination   Equipment owned: manual wc, electric wc, hospital bed, ETB, ww      Prior Level of Function:  Pt I with most ADLs, PRN assist for bathing; A with IADLs, and completed functional mobility via manual wc   Aide services 9a-2p   Uses a transportation service   Falls: denies   Driving: no   Occupation/leisure: enjoys being involved in [employer]      Pleasant and cooperative throughout session        Pain Level: no c/o pain  Cognition: A&O: 3/4;               Follows single step directions: Good              Memory: Fair +              Sequencing: Fair +              Problem solving: Fair +              Judgement/safety: Fair +              Attention:  Fair +      Functional Assessment: AM-PAC Inpatient Daily Activity Raw Score: 12 /24    Initial Eval Status  Date: 4/3/2024   Treatment Status  Date:  4/10/24 STG=LTG  Time frame: 10-14 days   Feeding Independent     set up  Sitting upright in bed     Grooming Set up    Min A  Sitting EOB due to balance deficits Mod I    UB Dressing Min A      ModA  To don/doff hospital gown sitting EOB due to trunk control deficits Mod I    LB Dressing Total/Dependent  Footwear seated EOB   Dependent  To don/doff socks bed level. Min A    Bathing Mod A   For thoroughness simulated  Max A  Simulated task sitting EOB   Min A    Toileting Max A  Incontinence max assist for matt care bed level   Dep  For hygiene after episode of incontinence using pure wick Min A

## 2024-04-10 NOTE — PROGRESS NOTES
Physical Therapy  Physical Therapy treatment note     Name: Nettie Lara  : 1955  MRN: [id number]      Date of Service: 4/10/2024    Evaluating PT:  Kyle Sanderson PT, DPT    Room #:  8407/8407-B  Diagnosis:  Calculus of ureter [N20.1]  Urinary obstruction [N13.9]  Bladder mass [N32.89]  Bilateral lower extremity edema [R60.0]  Hydronephrosis, unspecified hydronephrosis type [N13.30]  PMHx/PSHx:  MS, arthritis, asthma, COPD, HTN, spinal stenosis  Procedure/Surgery:  Cystopanendoscopy, retrograde pyelogram, stent placement on the left, and transurethral resection of multiple bladder lesions 24  Precautions:  fall risk, BLE weakness (R>L), non ambulatory, MS, incontinence   Equipment Owned: manual w/c, electric w/c, ww  Equipment Needs:  TBD    SUBJECTIVE:    Pt lives alone (aide assisting 5d/wk) in a 3rd floor apt with elevator access. Pt transfer to w/c and mobilized in manual w/c independently PTA.    OBJECTIVE:   Initial Evaluation  Date: 4/3/24 Treatment  4/10/24 Short Term/ Long Term   Goals   AM-PAC 6 Clicks     Was pt agreeable to Eval/treatment? yes Yes     Does pt have pain? 5/10 L side No complaints     Bed Mobility  Rolling: min A  Supine to sit: mod A  Sit to supine: NT  Scooting: min A Rolling: ModA  Supine to sit: ModA x2  Sit to supine: ModA x2  Scooting: ModA to EOB Rolling: mod I  Supine to sit: mod I  Sit to supine: mod I  Scooting: mod I   Transfers Sit to stand: mod A  Stand to sit: mod A  Stand pivot: NT  Sit pivot: CGA Sit to stand: MaxA x2  Stand to sit: MaxA x2  Stand pivot: NT  Sit pivot: NT Sit to stand: mod I  Stand to sit: mod I    Sit pivot: mod I   Ambulation    NT NT    Stair negotiation: ascended and descended  NT NT      Strength/ROM:   BLE grossly 2/5  BLE AROM limited by weakness    Balance:   Static Sitting: SBA  Dynamic Sitting: Mary  Static Standing: MaxA x2  Dynamic Standing: NT    Pt is A & O x 3. Pt follows all commands.   Sensation:  Pt denies numbness  and tingling to extremities  Edema:  B LE     Vitals: 2L  Spo2 91-96%   ACTIVITY     Therapeutic Exercises:  B LAQ and ankle pumps x5 reps each within active ROM    Patient education  Pt educated on safety with all mobility, benefits of continued rehabilitation.     Patient response to education:   Pt verbalized understanding Pt demonstrated skill Pt requires further education in this area   Yes  Yes  Reinforce      ASSESSMENT:    Comments:  Pt received in supine and agreeable to PT treatment upon arrival. Pt with red colored urine this date, doctor in during session and reports no concerns. Pt incontinent of urine this date, purewick system failing, multiple bouts of rolling completed for pericare and new pad placement. Pt completed supine <> sit transfers this date with increased assistance required compared to IE. Pt completed dynamic sitting and therapeutic exercise at EOB > 15 minutes to target upright tolerance, LE strengthening, and core stability. VC for upright posture and occasional assistance at trunk for stability required. Pt completed STS transfer from EOB; pt quickly fatigued and returned to sitting with reduced eccentric control. Further transfer deferred for safety this date. Pt skillfully positioned in supine at end of session with all needs in place and lines managed. Patient would benefit from continued skilled PT to maximize functional mobility independence.     Treatment:  Patient practiced and was instructed in the following treatment:    Bed Mobility: Verbal cues for proper positioning and sequencing to perform bed mobility to facilitate maximum independence.   dynamic sitting and therapeutic exercise at EOB > 15 minutes to target upright tolerance, LE strengthening, and core stability. Lateral scooting at EOB x2 reps.   Transfers: Verbal cues for proper positioning and sequencing to perform transfers safely with maximum independence.   Skillful positioning in bed to protect skin and joint

## 2024-04-10 NOTE — CARE COORDINATION
Patient is POD#6 CYSTOSCOPY RETROGRADE PYELOGRAM LEFT LASER LITHOTRIPSY BLADDER BIOPSY/ RESECTION OF BLADDER TUMOR for Left ureteral stone and Bladder mass. Per urology note from yesterday, · Creatinine stable. Currently her Obrien catheter is draining yellow urine. Continue the Obrien at this time. She can undergo a voiding trial when she has a good bowel regimen in place.  She states she is to have an enema this afternoon and if she is able to have a bowel movement we will take the Obrien catheter out probably tomorrow morning for a voiding trial. Per RN note from yesterday, Fleet enema given and patient was able to have large amount of bowel movement. Per RN note today, Obrien catheter removed at 5 am with no complications. Patient is aware that she had to void by 11 am.  Plan is home with [facility] when medically ready. VICENTA orders are in place. Patient lives alone in an apartment with elevator access. She is mostly wheelchair bound. She has HHA M-F 9-2. She owns a slide board, sc and walker. [facility]/Destination complete. Pt will need ambulance transport home on day of discharge. Ambulance form with envelope placed on the soft chart.   Arlet Miller RN CM  [phone]          Met with patient to discuss therapy evals and transition of care. She said after her therapy she now feels she needs rehab. She would like [facility]. If not accepted to [facility], she reviewed a list of facilities and would like 1. [facility] 2. [facility] 3. [facility]. Referral made to all of these facilities. None of those [facility] accept straight Medicaid. Await acceptance from [facility] and will follow up with patient for more [facility] choices if [facility] cannot accept. Ambulance form with envelope placed on the soft chart.    Arlet Miller RN CM  [phone]

## 2024-04-10 NOTE — PROGRESS NOTES
Obrien catheter removed at 5 am with no complications. Patient is aware that she had to void by 11 am.

## 2024-04-11 LAB
ANION GAP SERPL CALCULATED.3IONS-SCNC: 11 MMOL/L (ref 7–16)
BUN SERPL-MCNC: 22 MG/DL (ref 6–23)
CALCIUM SERPL-MCNC: 9.2 MG/DL (ref 8.6–10.2)
CHLORIDE SERPL-SCNC: 99 MMOL/L (ref 98–107)
CO2 SERPL-SCNC: 28 MMOL/L (ref 22–29)
CREAT SERPL-MCNC: 0.7 MG/DL (ref 0.5–1)
ERYTHROCYTE [DISTWIDTH] IN BLOOD BY AUTOMATED COUNT: 13.7 % (ref 11.5–15)
GFR SERPL CREATININE-BSD FRML MDRD: >90 ML/MIN/1.73M2
GLUCOSE SERPL-MCNC: 85 MG/DL (ref 74–99)
HCT VFR BLD AUTO: 34.8 % (ref 34–48)
HGB BLD-MCNC: 11.9 G/DL (ref 11.5–15.5)
MCH RBC QN AUTO: 35.3 PG (ref 26–35)
MCHC RBC AUTO-ENTMCNC: 34.2 G/DL (ref 32–34.5)
MCV RBC AUTO: 103.3 FL (ref 80–99.9)
PLATELET # BLD AUTO: 369 K/UL (ref 130–450)
PMV BLD AUTO: 10.2 FL (ref 7–12)
POTASSIUM SERPL-SCNC: 3.8 MMOL/L (ref 3.5–5)
RBC # BLD AUTO: 3.37 M/UL (ref 3.5–5.5)
SODIUM SERPL-SCNC: 138 MMOL/L (ref 132–146)
WBC OTHER # BLD: 16 K/UL (ref 4.5–11.5)

## 2024-04-11 PROCEDURE — 6370000000 HC RX 637 (ALT 250 FOR IP): Performed by: INTERNAL MEDICINE

## 2024-04-11 PROCEDURE — 94640 AIRWAY INHALATION TREATMENT: CPT

## 2024-04-11 PROCEDURE — 6370000000 HC RX 637 (ALT 250 FOR IP): Performed by: NURSE PRACTITIONER

## 2024-04-11 PROCEDURE — 6360000002 HC RX W HCPCS: Performed by: NURSE PRACTITIONER

## 2024-04-11 PROCEDURE — 6370000000 HC RX 637 (ALT 250 FOR IP): Performed by: SPECIALIST

## 2024-04-11 PROCEDURE — 2580000003 HC RX 258: Performed by: NURSE PRACTITIONER

## 2024-04-11 PROCEDURE — 85027 COMPLETE CBC AUTOMATED: CPT

## 2024-04-11 PROCEDURE — 6360000002 HC RX W HCPCS: Performed by: INTERNAL MEDICINE

## 2024-04-11 PROCEDURE — 36415 COLL VENOUS BLD VENIPUNCTURE: CPT

## 2024-04-11 PROCEDURE — 6370000000 HC RX 637 (ALT 250 FOR IP): Performed by: FAMILY MEDICINE

## 2024-04-11 PROCEDURE — 6370000000 HC RX 637 (ALT 250 FOR IP): Performed by: UROLOGY

## 2024-04-11 PROCEDURE — 80048 BASIC METABOLIC PNL TOTAL CA: CPT

## 2024-04-11 PROCEDURE — 1200000000 HC SEMI PRIVATE

## 2024-04-11 RX ORDER — MAGNESIUM CARB/ALUMINUM HYDROX 105-160MG
296 TABLET,CHEWABLE ORAL ONCE
Status: COMPLETED | OUTPATIENT
Start: 2024-04-11 | End: 2024-04-11

## 2024-04-11 RX ADMIN — AMOXICILLIN 500 MG: 250 CAPSULE ORAL at 20:08

## 2024-04-11 RX ADMIN — DIPHENHYDRAMINE HYDROCHLORIDE AND ZINC ACETATE: 10; 1 CREAM TOPICAL at 18:53

## 2024-04-11 RX ADMIN — POLYETHYLENE GLYCOL 3350 17 G: 17 POWDER, FOR SOLUTION ORAL at 15:56

## 2024-04-11 RX ADMIN — AMOXICILLIN 500 MG: 250 CAPSULE ORAL at 14:27

## 2024-04-11 RX ADMIN — HYDROPHOR: 42 OINTMENT TOPICAL at 09:05

## 2024-04-11 RX ADMIN — SODIUM CHLORIDE, PRESERVATIVE FREE 10 ML: 5 INJECTION INTRAVENOUS at 20:08

## 2024-04-11 RX ADMIN — ENOXAPARIN SODIUM 100 MG: 100 INJECTION SUBCUTANEOUS at 09:04

## 2024-04-11 RX ADMIN — HYDROPHOR: 42 OINTMENT TOPICAL at 20:10

## 2024-04-11 RX ADMIN — METOPROLOL TARTRATE 25 MG: 25 TABLET, FILM COATED ORAL at 09:04

## 2024-04-11 RX ADMIN — ACETAMINOPHEN 650 MG: 325 TABLET ORAL at 12:03

## 2024-04-11 RX ADMIN — SODIUM CHLORIDE, PRESERVATIVE FREE 10 ML: 5 INJECTION INTRAVENOUS at 09:05

## 2024-04-11 RX ADMIN — HYDROPHOR: 42 OINTMENT TOPICAL at 18:52

## 2024-04-11 RX ADMIN — FUROSEMIDE 20 MG: 20 TABLET ORAL at 09:04

## 2024-04-11 RX ADMIN — CEFDINIR 300 MG: 300 CAPSULE ORAL at 20:08

## 2024-04-11 RX ADMIN — ARFORMOTEROL TARTRATE 15 MCG: 15 SOLUTION RESPIRATORY (INHALATION) at 10:16

## 2024-04-11 RX ADMIN — AMOXICILLIN 500 MG: 250 CAPSULE ORAL at 07:03

## 2024-04-11 RX ADMIN — BUDESONIDE INHALATION 500 MCG: 0.5 SUSPENSION RESPIRATORY (INHALATION) at 10:16

## 2024-04-11 RX ADMIN — CEFDINIR 300 MG: 300 CAPSULE ORAL at 09:04

## 2024-04-11 RX ADMIN — ACETAMINOPHEN 650 MG: 325 TABLET ORAL at 23:31

## 2024-04-11 RX ADMIN — MAGNESIUM CITRATE 296 ML: 1.75 LIQUID ORAL at 11:55

## 2024-04-11 ASSESSMENT — PAIN DESCRIPTION - DESCRIPTORS: DESCRIPTORS: ACHING;DISCOMFORT;GNAWING

## 2024-04-11 ASSESSMENT — PAIN SCALES - GENERAL
PAINLEVEL_OUTOF10: 10
PAINLEVEL_OUTOF10: 0
PAINLEVEL_OUTOF10: 9

## 2024-04-11 ASSESSMENT — PAIN DESCRIPTION - LOCATION: LOCATION: OTHER (COMMENT)

## 2024-04-11 NOTE — PROGRESS NOTES
PROGRESS NOTE      Chief Complaint:   High-grade urothelial cell carcinoma/Gross hematuria/UTI/Left hydronephrosis/Left distal ureteral stone/Bilateral renal cysts/Left renal atrophy/Left renal calculi/OAB with urge incontinence    HPI:   She is voiding comfortably and not passing clots.  She is having hematuria and using a pure wick.  She is miserable with constipation issues.  Her pain is relatively controlled at this time.    Vitals:    04/10/24 1945   BP: (!) 154/82   Pulse: 81   Resp: 20   Temp: 97.5 °F (36.4 °C)   SpO2: 94%       Allergies: Lyrica [pregabalin] and Adhesive tape    PAST MEDICAL HISTORY:   Past Medical History:   Diagnosis Date    Arthritis     Asthma     follows with PCP    COPD (chronic obstructive pulmonary disease) (HCC)     Follows with PCP    Eczema     Fall 05/26/2011    Gout     Hypertension     Kidney stone     Multiple sclerosis (H[facility])     Prolonged emergence from general anesthesia     Scoliosis     Spinal stenosis     UTI (urinary tract infection)        PAST SURGICAL HISTORY:   Past Surgical History:   Procedure Laterality Date    APPENDECTOMY      BLADDER SURGERY Left 7/6/2023    CYSTOSCOPY RETROGRADE PYELOGRAM  left STENT REMOVAL, PLACEMENT OF LEFT STENT performed by Ruy Deng MD at [facility]    CHOLECYSTECTOMY      CYSTOSCOPY N/A 4/4/2024    BLADDER BIOPSY/ RESECTION OF BLADDER TUMOR performed by Ruy Deng MD at [facility]    CYSTOSCOPY INSERTION / REMOVAL STENT / STONE Right 07/26/2020    CYSTOSCOPY RETROGRADE PYELOGRAM STENT INSERTION LEFT performed by Tony Howard DO at [facility] OR    HYSTERECTOMY (CERVIX STATUS UNKNOWN)      LITHOTRIPSY Left 08/07/2020    CYSTOSCOPY RETROGRADE PYELOGRAM URETEROSCOPY  LEFT J-STENT EXCHANGE, INSERTION JUNIOR CATHETER---FACILITY------ performed by Tony Howard DO at [facility]    LITHOTRIPSY Left 4/5/2023    CYSTOSCOPY LEFT ESWL EXTRACORPOREAL SHOCK WAVE LITHOTRIPSY WITH LEFT STENT INSERTION (HOLD TIME 1130 FOR TRANSPORT)  Her abdomen is obese  Alert and oriented x 3.  No focal motor/sensory deficits  Pure wick in place with dark, red-colored urine in the canister without clots    Assessment and Plan:  POD#7--S/P Cysto, left ureteral stent insertion, transurethral resection of multiple bladder tumors  -Urine cytology on 4/3/2024 was negative.  Pathology from the bladder tumor resection, however, showed high-grade, necrotic, urothelial carcinoma with invasion into the lamina propria.  No muscle tissue was present.  This was discussed with the patient.  She would likely require a second look cystoscopy with bladder biopsy to check for muscle invasion and potentially intravesical BCG versus chemotherapy pending the pathology results  -Hematuria will be monitored.  She would like to continue without a Obrien for now if possible.  If her hematuria worsens, a catheter will need to be replaced and restarted on CBI.  Continue anticoagulation for now.  -Magnesium citrate will be tried for her constipation issues  -Urine culture on 4/3/2024 grew E. coli and Enterococcus.  Continue antibiotics per infectious disease recommendations  -Continue the left ureteral stent.  She will require left ureteroscopy with laser lithotripsy to the ureteral and renal stones in the future and likely at the same time as her second look cystoscopy to restage her bladder cancer  -Check bladder scan residuals  -We will continue to follow her during her hospital stay      Ronan Deng MD  4/11/2024  7:03 AM

## 2024-04-11 NOTE — CARE COORDINATION
Patient is POD#7 CYSTOSCOPY RETROGRADE PYELOGRAM LEFT LASER LITHOTRIPSY BLADDER BIOPSY/ RESECTION OF BLADDER TUMOR for Left ureteral stone and Bladder mass. Urology is following. Patient said [facility] no [facility] because she has an appointment coming up with urology that she cannot miss. I informed her that none of the previous [facility] choices she gave ne accept traditional [place] Medicaid. She said she would then like to try [facility]. Referral made to [facility] at [place]. Await acceptance. A 7000 will need to be completed for the accepting facility. Ambulance form with envelope placed on the soft chart.    Arlet Miller RN CM  [phone]      Update:  Patient was denied by [facility].  Met with patient at bedside for additional [facility] choices.  She choiced 1. [facility] 2.  [facility].  Referral information given to [facility].  Await response.  [facility] will follow.  CRISTIAN Luna  4/11/2024

## 2024-04-11 NOTE — PROGRESS NOTES
[facility]  [facility]  Progress Note    C.C : complicated UTI     Denies fever or chills  Denies dysuria  Reports constipation    Still has hematuria '    Review of systems:  As stated above in the chief complaint, otherwise negative.    Medications:  Scheduled Meds:   furosemide  20 mg Oral Daily    amoxicillin  500 mg Oral 3 times per day    arformoterol tartrate  15 mcg Nebulization BID RT    budesonide  0.5 mg Nebulization BID RT    enoxaparin  1 mg/kg SubCUTAneous BID    cefdinir  300 mg Oral 2 times per day    mineral oil-hydrophilic petrolatum   Topical BID    [Held by provider] losartan  25 mg Oral Daily    metoprolol tartrate  25 mg Oral BID    sodium chloride flush  5-40 mL IntraVENous 2 times per day     Continuous Infusions:   sodium chloride Stopped (24 1239)     PRN Meds:ipratropium 0.5 mg-albuterol 2.5 mg, diphenhydrAMINE-zinc acetate, guaiFENesin-dextromethorphan, diphenhydrAMINE, acetaminophen, mineral oil-hydrophilic petrolatum, albuterol, sodium chloride flush, sodium chloride, potassium chloride **OR** potassium alternative oral replacement **OR** potassium chloride, magnesium sulfate, ondansetron **OR** ondansetron, polyethylene glycol    OBJECTIVE:  /69   Pulse 83   Temp 98.7 °F (37.1 °C) (Temporal)   Resp 18   Ht 1.676 m (5' 6\")   Wt 102.1 kg (225 lb 1.6 oz)   LMP  (LMP Unknown)   SpO2 96%   BMI 36.33 kg/m²   Temp  Av.1 °F (36.7 °C)  Min: 97.5 °F (36.4 °C)  Max: 98.7 °F (37.1 °C)  Constitutional: No acute distress  Skin: Warm and dry. No rashes were noted.   Neuro: Alert and oriented  HEENT: No pallor, no icterus, no LN   Chest: .Clear    Cardiovascular: RRR  Abdomen: Soft with bowel sounds.  Obrien - with bloody urine - clearing   Extremities: ++ edema   Lines: PIV.      Laboratory and Tests:  Lab Results   Component Value Date    WBC 16.0 (H) 2024    WBC 11.0 2024    WBC 13.1 (H) 2024    HGB 11.9 2024    HCT

## 2024-04-11 NOTE — PROGRESS NOTES
[facility]                                Progress note    Subjective:    Patient seen sitting up in bed, awake, alert and oriented. Concerned that she is having more blood in urine and again, not moving bowels. Does not want dillon reinserted.       Objective:    /69   Pulse 83   Temp 98.7 °F (37.1 °C) (Temporal)   Resp 18   Ht 1.676 m (5' 6\")   Wt 102.1 kg (225 lb 1.6 oz)   LMP  (LMP Unknown)   SpO2 96%   BMI 36.33 kg/m²     In: 420 [P.O.:420]  Out: 2850   In: 420   Out: 2850 [Urine:2850]    General appearance: NAD, conversant, obese  HEENT: AT/NC, MMM  Neck: FROM, supple  Lungs: Diminished   CV: No MRGs,   Vasc: Radial pulses 2+ pedal pulses 2+  Abdomen: Soft, non-tender; no masses or HSM, mild distention.   Extremities: No peripheral edema or digital cyanosis  Skin: no rash, lesions or ulcers  Psych: Alert and oriented to person, place and time  Neuro: Alert and interactive, frustrated with lack bowel movement today.     Recent Labs     04/09/24  1142 04/11/24  0426   WBC  --  16.0*   HGB 11.4* 11.9   HCT 34.2 34.8   PLT  --  369         Recent Labs     04/11/24  0426      K 3.8   CL 99   CO2 28   BUN 22   CREATININE 0.7   CALCIUM 9.2         Assessment:    Principal Problem:    Bladder mass  Active Problems:    Renal calculi    Urinary obstruction  Resolved Problems:    * No resolved hospital problems. *      Plan:  Patient is a 69-year-old female admitted to [facility] for  Urinary obstruction due to renal calculi and bladder mass  -Monitor labs  -Monitor kidney function  -Urology consulted  -Plans for cystoscopy with laser lithotripsy and possible bladder biopsy with possible resection of bladder mass tomorrow 4/4-await procedure  -N.p.o. after midnight  -IV Rocephin pending urine cultures  -Monitor I's and O's     Chronically elevated troponin  -Troponin 30-33-03-nothing to do, no chest pain     Hypertension  -Continue home medications with parameters  -Continue to monitor  CBI.  Continue to monitor  Check H&H in the a.m.          Code status: Full  Consultants: Urology     DVT Prophylaxis PCD's  PT/OT  Discharge planning       I have spent a total time of 30 minutes of this patient encounter reviewing chart, labs, coordinating care with interdisciplinary teams, face to face encounter with patient, providing counseling/education to patient/family.      GRETEL Benavides - CNP  3:01 PM  4/11/2024

## 2024-04-12 LAB
BASOPHILS # BLD: 0 K/UL (ref 0–0.2)
BASOPHILS NFR BLD: 0 % (ref 0–2)
EOSINOPHIL # BLD: 0.77 K/UL (ref 0.05–0.5)
EOSINOPHILS RELATIVE PERCENT: 5 % (ref 0–6)
ERYTHROCYTE [DISTWIDTH] IN BLOOD BY AUTOMATED COUNT: 14.3 % (ref 11.5–15)
HCT VFR BLD AUTO: 30.8 % (ref 34–48)
HGB BLD-MCNC: 10.3 G/DL (ref 11.5–15.5)
LYMPHOCYTES NFR BLD: 1.79 K/UL (ref 1.5–4)
LYMPHOCYTES RELATIVE PERCENT: 12 % (ref 20–42)
MCH RBC QN AUTO: 35 PG (ref 26–35)
MCHC RBC AUTO-ENTMCNC: 33.4 G/DL (ref 32–34.5)
MCV RBC AUTO: 104.8 FL (ref 80–99.9)
MONOCYTES NFR BLD: 1.02 K/UL (ref 0.1–0.95)
MONOCYTES NFR BLD: 7 % (ref 2–12)
NEUTROPHILS NFR BLD: 76 % (ref 43–80)
NEUTS SEG NFR BLD: 11.12 K/UL (ref 1.8–7.3)
PLATELET # BLD AUTO: 350 K/UL (ref 130–450)
PMV BLD AUTO: 9.9 FL (ref 7–12)
RBC # BLD AUTO: 2.94 M/UL (ref 3.5–5.5)
RBC # BLD: ABNORMAL 10*6/UL
RBC # BLD: ABNORMAL 10*6/UL
WBC OTHER # BLD: 14.7 K/UL (ref 4.5–11.5)

## 2024-04-12 PROCEDURE — 85025 COMPLETE CBC W/AUTO DIFF WBC: CPT

## 2024-04-12 PROCEDURE — 36415 COLL VENOUS BLD VENIPUNCTURE: CPT

## 2024-04-12 PROCEDURE — 6370000000 HC RX 637 (ALT 250 FOR IP): Performed by: INTERNAL MEDICINE

## 2024-04-12 PROCEDURE — 94640 AIRWAY INHALATION TREATMENT: CPT

## 2024-04-12 PROCEDURE — 2580000003 HC RX 258: Performed by: NURSE PRACTITIONER

## 2024-04-12 PROCEDURE — 6360000002 HC RX W HCPCS: Performed by: INTERNAL MEDICINE

## 2024-04-12 PROCEDURE — 6370000000 HC RX 637 (ALT 250 FOR IP): Performed by: SPECIALIST

## 2024-04-12 PROCEDURE — 6370000000 HC RX 637 (ALT 250 FOR IP): Performed by: NURSE PRACTITIONER

## 2024-04-12 PROCEDURE — 2700000000 HC OXYGEN THERAPY PER DAY

## 2024-04-12 PROCEDURE — 1200000000 HC SEMI PRIVATE

## 2024-04-12 RX ADMIN — AMOXICILLIN 500 MG: 250 CAPSULE ORAL at 06:07

## 2024-04-12 RX ADMIN — ARFORMOTEROL TARTRATE 15 MCG: 15 SOLUTION RESPIRATORY (INHALATION) at 19:54

## 2024-04-12 RX ADMIN — SODIUM CHLORIDE, PRESERVATIVE FREE 10 ML: 5 INJECTION INTRAVENOUS at 20:54

## 2024-04-12 RX ADMIN — HYDROPHOR: 42 OINTMENT TOPICAL at 20:55

## 2024-04-12 RX ADMIN — CEFDINIR 300 MG: 300 CAPSULE ORAL at 20:53

## 2024-04-12 RX ADMIN — BUDESONIDE INHALATION 500 MCG: 0.5 SUSPENSION RESPIRATORY (INHALATION) at 09:08

## 2024-04-12 RX ADMIN — ARFORMOTEROL TARTRATE 15 MCG: 15 SOLUTION RESPIRATORY (INHALATION) at 09:08

## 2024-04-12 RX ADMIN — AMOXICILLIN 500 MG: 250 CAPSULE ORAL at 13:22

## 2024-04-12 RX ADMIN — BUDESONIDE INHALATION 500 MCG: 0.5 SUSPENSION RESPIRATORY (INHALATION) at 19:54

## 2024-04-12 RX ADMIN — HYDROPHOR: 42 OINTMENT TOPICAL at 08:50

## 2024-04-12 RX ADMIN — AMOXICILLIN 500 MG: 250 CAPSULE ORAL at 20:54

## 2024-04-12 RX ADMIN — GUAIFENESIN SYRUP AND DEXTROMETHORPHAN 5 ML: 100; 10 SYRUP ORAL at 16:51

## 2024-04-12 RX ADMIN — ACETAMINOPHEN 650 MG: 325 TABLET ORAL at 16:51

## 2024-04-12 RX ADMIN — SODIUM CHLORIDE, PRESERVATIVE FREE 10 ML: 5 INJECTION INTRAVENOUS at 08:51

## 2024-04-12 RX ADMIN — CEFDINIR 300 MG: 300 CAPSULE ORAL at 08:49

## 2024-04-12 ASSESSMENT — PAIN SCALES - GENERAL
PAINLEVEL_OUTOF10: 0
PAINLEVEL_OUTOF10: 0
PAINLEVEL_OUTOF10: 7
PAINLEVEL_OUTOF10: 0

## 2024-04-12 ASSESSMENT — PAIN DESCRIPTION - DESCRIPTORS: DESCRIPTORS: ACHING;DISCOMFORT

## 2024-04-12 ASSESSMENT — PAIN DESCRIPTION - LOCATION: LOCATION: HEAD

## 2024-04-12 NOTE — PROGRESS NOTES
4/12/2024 1:07 PM  Nettie Lara  [id number]    Subjective:    Urine dark red  Incontinent of urine     Review of Systems  Constitutional: No fever or chills   Respiratory: negative for cough and hemoptysis  Cardiovascular: negative for chest pain and dyspnea  Gastrointestinal: negative for abdominal pain, diarrhea, nausea and vomiting   : See above  Derm: negative for rash and skin lesion(s)  Neurological: negative for seizures and tremors  Musculoskeletal: Negative    Psychiatric: Negative   All other reviews are negative      Scheduled Meds:   furosemide  20 mg Oral Daily    amoxicillin  500 mg Oral 3 times per day    arformoterol tartrate  15 mcg Nebulization BID RT    budesonide  0.5 mg Nebulization BID RT    enoxaparin  1 mg/kg SubCUTAneous BID    cefdinir  300 mg Oral 2 times per day    mineral oil-hydrophilic petrolatum   Topical BID    [Held by provider] losartan  25 mg Oral Daily    metoprolol tartrate  25 mg Oral BID    sodium chloride flush  5-40 mL IntraVENous 2 times per day       Objective:  Vitals:    04/12/24 0908   BP:    Pulse:    Resp:    Temp:    SpO2: 94%         Allergies: Lyrica [pregabalin] and Adhesive tape    General Appearance: alert and oriented to person, place and time and in no acute distress  Skin: no rash or erythema  Head: normocephalic and atraumatic  Pulmonary/Chest: normal air movement, no respiratory distress  Abdomen: soft, non-tender, non-distended  Genitourinary: urine dark red on pad  Extremities: no cyanosis, clubbing or edema         Labs:     Recent Labs     04/11/24  0426      K 3.8   CL 99   CO2 28   BUN 22   CREATININE 0.7   GLUCOSE 85   CALCIUM 9.2       Lab Results   Component Value Date/Time    HGB 10.3 04/12/2024 07:46 AM    HCT 30.8 04/12/2024 07:46 AM       No results found for: \"PSA\"      Assessment/Plan:  POD#8--cystoscopy, left ureteral stent insertion, transurethral resection of multiple bladder tumors  3mm left distal stone   Left renal  stones   Bladder tumor   Gross hematuria     Her genitalia were prepped and draped in sterile fashion. Following this a 22F 3 way dillon was inserted into her bladder. Dark red urine was returned. This was hand irrigated until the urine was light pink, small amount of clot removed. She was started on CBI to keep the urine clear.     Continue the dillon   Manually irrigate every 2 hours and PRN   Continue the CBI   Will be maintained with the dillon for now   Hopefully the hematuria will clear with irrigations and CBI, if not we will need to consider take back to the OR on Monday   Antibiotics per ID  She will eventually need a second look cystoscopy and bladder biopsy to obtain muscle tissue to rule out muscle invasion as well as cystoscopy with left ureteroscopy and laser lithotripsy of the left ureteral stone in the future as an outpatient     GRETEL Malloy - CNP   [facility]  Urology

## 2024-04-12 NOTE — PROGRESS NOTES
[facility]  [facility]  Progress Note    C.C : complicated UTI     Denies fever or chills  Denies dysuria  Reports constipation    Still has hematuria '    Review of systems:  As stated above in the chief complaint, otherwise negative.    Medications:  Scheduled Meds:   furosemide  20 mg Oral Daily    amoxicillin  500 mg Oral 3 times per day    arformoterol tartrate  15 mcg Nebulization BID RT    budesonide  0.5 mg Nebulization BID RT    enoxaparin  1 mg/kg SubCUTAneous BID    cefdinir  300 mg Oral 2 times per day    mineral oil-hydrophilic petrolatum   Topical BID    [Held by provider] losartan  25 mg Oral Daily    metoprolol tartrate  25 mg Oral BID    sodium chloride flush  5-40 mL IntraVENous 2 times per day     Continuous Infusions:   sodium chloride Stopped (24 1239)     PRN Meds:ipratropium 0.5 mg-albuterol 2.5 mg, diphenhydrAMINE-zinc acetate, guaiFENesin-dextromethorphan, diphenhydrAMINE, acetaminophen, mineral oil-hydrophilic petrolatum, albuterol, sodium chloride flush, sodium chloride, potassium chloride **OR** potassium alternative oral replacement **OR** potassium chloride, magnesium sulfate, ondansetron **OR** ondansetron, polyethylene glycol    OBJECTIVE:  BP 91/68   Pulse 92   Temp 98.2 °F (36.8 °C) (Temporal)   Resp 20   Ht 1.676 m (5' 5.98\")   Wt 97 kg (213 lb 12.8 oz)   LMP  (LMP Unknown)   SpO2 94%   BMI 34.52 kg/m²   Temp  Av.5 °F (36.9 °C)  Min: 98.2 °F (36.8 °C)  Max: 98.7 °F (37.1 °C)  Constitutional: No acute distress  Skin: Warm and dry. No rashes were noted.   Neuro: Alert and oriented  HEENT: No pallor, no icterus, no LN   Chest: .Clear    Cardiovascular: RRR  Abdomen: Soft with bowel sounds.  Obrien - with bloody urine - clearing   Extremities: ++ edema   Lines: PIV.      Laboratory and Tests:  Lab Results   Component Value Date    WBC 14.7 (H) 2024    WBC 16.0 (H) 2024    WBC 11.0 2024    HGB 10.3 (L) 2024    HCT  30.8 (L) 04/12/2024    .8 (H) 04/12/2024     04/12/2024     Lab Results   Component Value Date    .0 (H) 04/07/2024    CRP 0.3 12/20/2022    CRP 0.9 (H) 12/19/2022     Lab Results   Component Value Date    SEDRATE 111 (H) 04/07/2024     No components found for: \"PROCAL.3\"  Radiology:      Microbiology:   Susceptibility    Enterococcus faecalis (1)    Antibiotic Interpretation Microscan Method Status   ampicillin Sensitive <=2 BACTERIAL SUSCEPTIBILITY PANEL MARY Final   Gentamicin, High Level Resistant  BACTERIAL SUSCEPTIBILITY PANEL MARY Final   linezolid Sensitive 2 BACTERIAL SUSCEPTIBILITY PANEL MARY Final   nitrofurantoin Sensitive <=16 BACTERIAL SUSCEPTIBILITY PANEL MARY Final   vancomycin Sensitive 1 BACTERIAL SUSCEPTIBILITY PANEL MARY Final   Escherichia coli (2)    Antibiotic Interpretation Microscan Method Status   ceFAZolin Sensitive <=4 BACTERIAL SUSCEPTIBILITY PANEL MARY Final    Cefazolin sensitivity results can be used to predict the effectiveness of oral  cephalosporins (eg. Cephalexin) in uncomplicated Urinary Tract Infections due to E. coli, K.   pneumoniae, and P. mirabilis      cefepime Sensitive <=0.12 BACTERIAL SUSCEPTIBILITY PANEL MARY Final   cefotaxime Sensitive <=0.25 BACTERIAL SUSCEPTIBILITY PANEL MARY Final   cefOXitin Intermediate 16 BACTERIAL SUSCEPTIBILITY PANEL MARY Final   cefTAZidime Sensitive <=1 BACTERIAL SUSCEPTIBILITY PANEL MARY Final   ciprofloxacin Sensitive <=0.06 BACTERIAL SUSCEPTIBILITY PANEL MARY Final   levofloxacin Sensitive 0.25 BACTERIAL SUSCEPTIBILITY PANEL MARY Final   meropenem Sensitive 1 BACTERIAL SUSCEPTIBILITY PANEL MARY Final   nitrofurantoin Sensitive <=16 BACTERIAL SUSCEPTIBILITY PANEL MARY Final   piperacillin-tazobactam Sensitive <=4 BACTERIAL SUSCEPTIBILITY PANEL MARY Final   trimethoprim-sulfamethoxazole Resistant >=320 BACTERIAL SUSCEPTIBILITY PANEL MARY Final                Pathology -    -- Diagnosis --   A.          Bladder Tumor:         INVASIVE

## 2024-04-12 NOTE — PROGRESS NOTES
[facility]                                Progress note    Subjective:    Patient seen resting in bed, drowsy. Reports she's had episodes of diarrhea overnight, did not sleep well due to roommate. Alert and oriented, denies SOB, CP. Discharge planning discussed with patient and case management.       Objective:    BP 91/68   Pulse 92   Temp 98.2 °F (36.8 °C) (Temporal)   Resp 20   Ht 1.676 m (5' 5.98\")   Wt 97 kg (213 lb 12.8 oz)   LMP  (LMP Unknown)   SpO2 94%   BMI 34.52 kg/m²     In: 780 [P.O.:780]  Out: 2250   In: 780   Out: 2250 [Urine:2250]    General appearance: NAD, conversant, obese  HEENT: AT/NC, MMM  Neck: FROM, supple  Lungs: Diminished   CV: No MRGs,   Vasc: Radial pulses 2+ pedal pulses 2+  Abdomen: Soft, non-tender; no masses or HSM,   Extremities: No peripheral edema or digital cyanosis  Skin: dry, no rash, lesions or ulcers  Psych: Alert and oriented to person, place and time  Neuro: Alert and interactive, drowsy today.     Recent Labs     04/11/24  0426 04/12/24  0746   WBC 16.0* 14.7*   HGB 11.9 10.3*   HCT 34.8 30.8*    350         Recent Labs     04/11/24  0426      K 3.8   CL 99   CO2 28   BUN 22   CREATININE 0.7   CALCIUM 9.2         Assessment:    Principal Problem:    Bladder mass  Active Problems:    Renal calculi    Urinary obstruction  Resolved Problems:    * No resolved hospital problems. *      Plan:  Patient is a 69-year-old female admitted to [facility] for  Urinary obstruction due to renal calculi and bladder mass  -Monitor labs  -Monitor kidney function  -Urology consulted  -Plans for cystoscopy with laser lithotripsy and possible bladder biopsy with possible resection of bladder mass tomorrow 4/4-await procedure  -N.p.o. after midnight  -IV Rocephin pending urine cultures  -Monitor I's and O's     Chronically elevated troponin  -Troponin 60-64-96-nothing to do, no chest pain     Hypertension  -Continue home medications with  reinsertion of Dillon catheter with CBI.  Continue to monitor  Check H&H in the a.m.    4/12/2024  Vital signs stable   Incontinent of bloody urine- urology placed dillon with irrigation   Hgb/ Hct 10.3/30.8  Recheck Am labs   WBC improving slowly 14.7   Patient will need TERRELL at DC     Code status: Full  Consultants: Urology     DVT Prophylaxis PCD's  PT/OT  Discharge planning       I have spent a total time of 30 minutes of this patient encounter reviewing chart, labs, coordinating care with interdisciplinary teams, face to face encounter with patient, providing counseling/education to patient/family.      Debra Ashby, APRN - CNP  1:23 PM  4/12/2024

## 2024-04-12 NOTE — CARE COORDINATION
Per Tony from [facility], they are able to accept patient when medically ready, no precert required. I informed the patient and she is agreeable. Met with internal med who said patient can discharge when okay with urology. Spoke with urology who will assess patient to determine if ready for discharge. BREANA/Destination updated. 7000 placed in envelope in soft chart. Ambulance form with envelope placed on the soft chart.    Arlet Miller RN CM  [phone]        Per urology note today, Gross hematuria...a 22F 3 way dillon was inserted into her bladder. Dark red urine was returned. This was hand irrigated until the urine was light pink, small amount of clot removed. She was started on CBI to keep the urine clear. Continue the dillon. Manually irrigate every 2 hours and PRN. Continue the CBI. Will be maintained with the dillon for now. Hopefully the hematuria will clear with irrigations and CBI, if not we will need to consider take back to the OR on Monday. Antibiotics per ID. She will eventually need a second look cystoscopy and bladder biopsy to obtain muscle tissue to rule out muscle invasion as well as cystoscopy with left ureteroscopy and laser lithotripsy of the left ureteral stone in the future as an outpatient. Hgb 10.3 today.   Arlet Miller RN CM  [phone]

## 2024-04-12 NOTE — PROGRESS NOTES
Comprehensive Nutrition Assessment    Type and Reason for Visit:  Reassess    Nutrition Recommendations/Plan:   Continue current diet w/ ONS as ordered to optimize nutrient intake/wound healing. Will continue to monitor.     Malnutrition Assessment:  Malnutrition Status:  Insufficient data (04/06/24 1641)    Context:  Acute Illness     Findings of the 6 clinical characteristics of malnutrition:  Energy Intake:  Mild decrease in energy intake (Comment) (since adm)  Weight Loss:  No significant weight loss     Body Fat Loss:  Unable to assess     Muscle Mass Loss:  Unable to assess    Fluid Accumulation:  Unable to assess (2/2 multi-factorial)     Strength:  Not Performed    Nutrition Assessment:    Pt. remains at risk d/t increased needs for healing of multiple wounds. Adm w/ leg swelling, Lt flank pain and difficulty urinating. Adm w/ Bladder mass suspicious for TCC, renal cysts, renal calculi, UTI, renal stones, s/p Cysto w/ tumor resection/bx 4/4.PMHx MS, paraplegia, spinal stenosis, COPD.  Noted BSE w/ mild oral dysphagia 4/6. Pt. remains on soft and bite sized diet. Tolerating diet w/ % at most meals. Will continue ONS and monitor.    Nutrition Related Findings:    A&Ox4, -I/O, soft abd, +BS, +1/+3 edema, labs reviewed. Wound Type: Pressure Injury, Stage II, Open Wounds (traumatic wound to Rt 5th toe)       Current Nutrition Intake & Therapies:    Average Meal Intake: % (at most meals; varied)  Average Supplements Intake: %  ADULT DIET; Dysphagia - Soft and Bite Sized; Low Sodium (2 gm)  ADULT ORAL NUTRITION SUPPLEMENT; Breakfast, Lunch; Wound Healing Oral Supplement  ADULT ORAL NUTRITION SUPPLEMENT; Breakfast, Dinner; Standard High Calorie/High Protein Oral Supplement    Anthropometric Measures:  Height: 167.6 cm (5' 5.98\")  Ideal Body Weight (IBW): 130 lbs (59 kg)    Admission Body Weight: 99.8 kg (220 lb) (bed 4/5)  Current Body Weight: 97 kg (213 lb 13.5 oz) (4/12 BS), 164.5 % IBW.

## 2024-04-13 LAB
ANION GAP SERPL CALCULATED.3IONS-SCNC: 10 MMOL/L (ref 7–16)
BASOPHILS # BLD: 0 K/UL (ref 0–0.2)
BASOPHILS NFR BLD: 0 % (ref 0–2)
BUN SERPL-MCNC: 26 MG/DL (ref 6–23)
CALCIUM SERPL-MCNC: 9.2 MG/DL (ref 8.6–10.2)
CHLORIDE SERPL-SCNC: 99 MMOL/L (ref 98–107)
CO2 SERPL-SCNC: 27 MMOL/L (ref 22–29)
CREAT SERPL-MCNC: 0.8 MG/DL (ref 0.5–1)
EOSINOPHIL # BLD: 0.46 K/UL (ref 0.05–0.5)
EOSINOPHILS RELATIVE PERCENT: 4 % (ref 0–6)
ERYTHROCYTE [DISTWIDTH] IN BLOOD BY AUTOMATED COUNT: 14.2 % (ref 11.5–15)
GFR SERPL CREATININE-BSD FRML MDRD: 83 ML/MIN/1.73M2
GLUCOSE SERPL-MCNC: 114 MG/DL (ref 74–99)
HCT VFR BLD AUTO: 30.2 % (ref 34–48)
HGB BLD-MCNC: 9.7 G/DL (ref 11.5–15.5)
LYMPHOCYTES NFR BLD: 1.73 K/UL (ref 1.5–4)
LYMPHOCYTES RELATIVE PERCENT: 13 % (ref 20–42)
MCH RBC QN AUTO: 33.4 PG (ref 26–35)
MCHC RBC AUTO-ENTMCNC: 32.1 G/DL (ref 32–34.5)
MCV RBC AUTO: 104.1 FL (ref 80–99.9)
METAMYELOCYTES ABSOLUTE COUNT: 0.12 K/UL (ref 0–0.12)
METAMYELOCYTES: 1 % (ref 0–1)
MONOCYTES NFR BLD: 1.16 K/UL (ref 0.1–0.95)
MONOCYTES NFR BLD: 9 % (ref 2–12)
NEUTROPHILS NFR BLD: 74 % (ref 43–80)
NEUTS SEG NFR BLD: 9.83 K/UL (ref 1.8–7.3)
PLATELET # BLD AUTO: 357 K/UL (ref 130–450)
PMV BLD AUTO: 9.9 FL (ref 7–12)
POTASSIUM SERPL-SCNC: 4.1 MMOL/L (ref 3.5–5)
RBC # BLD AUTO: 2.9 M/UL (ref 3.5–5.5)
RBC # BLD: ABNORMAL 10*6/UL
SODIUM SERPL-SCNC: 136 MMOL/L (ref 132–146)
WBC OTHER # BLD: 13.3 K/UL (ref 4.5–11.5)

## 2024-04-13 PROCEDURE — 6360000002 HC RX W HCPCS: Performed by: INTERNAL MEDICINE

## 2024-04-13 PROCEDURE — 6360000002 HC RX W HCPCS: Performed by: NURSE PRACTITIONER

## 2024-04-13 PROCEDURE — 36415 COLL VENOUS BLD VENIPUNCTURE: CPT

## 2024-04-13 PROCEDURE — 6370000000 HC RX 637 (ALT 250 FOR IP): Performed by: NURSE PRACTITIONER

## 2024-04-13 PROCEDURE — 2580000003 HC RX 258: Performed by: NURSE PRACTITIONER

## 2024-04-13 PROCEDURE — 2700000000 HC OXYGEN THERAPY PER DAY

## 2024-04-13 PROCEDURE — 6370000000 HC RX 637 (ALT 250 FOR IP): Performed by: INTERNAL MEDICINE

## 2024-04-13 PROCEDURE — 6370000000 HC RX 637 (ALT 250 FOR IP): Performed by: SPECIALIST

## 2024-04-13 PROCEDURE — 85025 COMPLETE CBC W/AUTO DIFF WBC: CPT

## 2024-04-13 PROCEDURE — 6370000000 HC RX 637 (ALT 250 FOR IP): Performed by: FAMILY MEDICINE

## 2024-04-13 PROCEDURE — 94640 AIRWAY INHALATION TREATMENT: CPT

## 2024-04-13 PROCEDURE — 1200000000 HC SEMI PRIVATE

## 2024-04-13 PROCEDURE — 80048 BASIC METABOLIC PNL TOTAL CA: CPT

## 2024-04-13 RX ORDER — FLUTICASONE PROPIONATE 50 MCG
2 SPRAY, SUSPENSION (ML) NASAL DAILY
Status: DISCONTINUED | OUTPATIENT
Start: 2024-04-13 | End: 2024-04-18 | Stop reason: HOSPADM

## 2024-04-13 RX ADMIN — SODIUM CHLORIDE, PRESERVATIVE FREE 10 ML: 5 INJECTION INTRAVENOUS at 20:28

## 2024-04-13 RX ADMIN — ACETAMINOPHEN 650 MG: 325 TABLET ORAL at 20:28

## 2024-04-13 RX ADMIN — GUAIFENESIN SYRUP AND DEXTROMETHORPHAN 5 ML: 100; 10 SYRUP ORAL at 12:54

## 2024-04-13 RX ADMIN — POLYETHYLENE GLYCOL 3350 17 G: 17 POWDER, FOR SOLUTION ORAL at 08:57

## 2024-04-13 RX ADMIN — SODIUM CHLORIDE, PRESERVATIVE FREE 10 ML: 5 INJECTION INTRAVENOUS at 08:48

## 2024-04-13 RX ADMIN — ARFORMOTEROL TARTRATE 15 MCG: 15 SOLUTION RESPIRATORY (INHALATION) at 19:16

## 2024-04-13 RX ADMIN — AMOXICILLIN 500 MG: 250 CAPSULE ORAL at 14:29

## 2024-04-13 RX ADMIN — ACETAMINOPHEN 650 MG: 325 TABLET ORAL at 08:47

## 2024-04-13 RX ADMIN — CEFDINIR 300 MG: 300 CAPSULE ORAL at 08:48

## 2024-04-13 RX ADMIN — ARFORMOTEROL TARTRATE 15 MCG: 15 SOLUTION RESPIRATORY (INHALATION) at 10:34

## 2024-04-13 RX ADMIN — METOPROLOL TARTRATE 25 MG: 25 TABLET, FILM COATED ORAL at 08:47

## 2024-04-13 RX ADMIN — FUROSEMIDE 20 MG: 20 TABLET ORAL at 08:47

## 2024-04-13 RX ADMIN — FLUTICASONE PROPIONATE 2 SPRAY: 50 SPRAY, METERED NASAL at 15:48

## 2024-04-13 RX ADMIN — BUDESONIDE INHALATION 500 MCG: 0.5 SUSPENSION RESPIRATORY (INHALATION) at 10:34

## 2024-04-13 RX ADMIN — HYDROPHOR: 42 OINTMENT TOPICAL at 20:28

## 2024-04-13 RX ADMIN — BUDESONIDE INHALATION 500 MCG: 0.5 SUSPENSION RESPIRATORY (INHALATION) at 19:16

## 2024-04-13 RX ADMIN — AMOXICILLIN 500 MG: 250 CAPSULE ORAL at 05:43

## 2024-04-13 RX ADMIN — HYDROPHOR: 42 OINTMENT TOPICAL at 08:48

## 2024-04-13 RX ADMIN — GUAIFENESIN SYRUP AND DEXTROMETHORPHAN 5 ML: 100; 10 SYRUP ORAL at 21:57

## 2024-04-13 ASSESSMENT — PAIN SCALES - GENERAL
PAINLEVEL_OUTOF10: 4
PAINLEVEL_OUTOF10: 3
PAINLEVEL_OUTOF10: 2
PAINLEVEL_OUTOF10: 0

## 2024-04-13 NOTE — PLAN OF CARE
Problem: Safety - Adult  Goal: Free from fall injury  Outcome: Progressing     Problem: Pain  Goal: Verbalizes/displays adequate comfort level or baseline comfort level  Outcome: Progressing     Problem: Skin/Tissue Integrity  Goal: Absence of new skin breakdown  Description: 1.  Monitor for areas of redness and/or skin breakdown  2.  Assess vascular access sites hourly  3.  Every 4-6 hours minimum:  Change oxygen saturation probe site  4.  Every 4-6 hours:  If on nasal continuous positive airway pressure, respiratory therapy assess nares and determine need for appliance change or resting period.  Outcome: Progressing     Problem: Skin/Tissue Integrity  Goal: Absence of new skin breakdown  Description: 1.  Monitor for areas of redness and/or skin breakdown  2.  Assess vascular access sites hourly  3.  Every 4-6 hours minimum:  Change oxygen saturation probe site  4.  Every 4-6 hours:  If on nasal continuous positive airway pressure, respiratory therapy assess nares and determine need for appliance change or resting period.  Outcome: Progressing     Problem: Discharge Planning  Goal: Discharge to home or other facility with appropriate resources  Outcome: Progressing     Problem: Nutrition Deficit:  Goal: Optimize nutritional status  Outcome: Progressing

## 2024-04-13 NOTE — PROGRESS NOTES
PROGRESS NOTE      Chief Complaint:   High-grade urothelial cell carcinoma/Gross hematuria/UTI/Left hydronephrosis/Left distal ureteral stone/Bilateral renal cysts/Left renal atrophy/Left renal calculi/OAB with urge incontinence    HPI:   She is not having pain.  She has mild catheter discomfort.  She hopes to go to rehab soon    Vitals:    04/13/24 1245   BP: 100/75   Pulse: 74   Resp: 18   Temp: 97.4 °F (36.3 °C)   SpO2: 93%       Allergies: Lyrica [pregabalin] and Adhesive tape    PAST MEDICAL HISTORY:   Past Medical History:   Diagnosis Date    Arthritis     Asthma     follows with PCP    COPD (chronic obstructive pulmonary disease) (HCC)     Follows with PCP    Eczema     Fall 05/26/2011    Gout     Hypertension     Kidney stone     Multiple sclerosis (HCC)     Prolonged emergence from general anesthesia     Scoliosis     Spinal stenosis     UTI (urinary tract infection)        PAST SURGICAL HISTORY:   Past Surgical History:   Procedure Laterality Date    APPENDECTOMY      BLADDER SURGERY Left 7/6/2023    CYSTOSCOPY RETROGRADE PYELOGRAM  left STENT REMOVAL, PLACEMENT OF LEFT STENT performed by Ruy Deng MD at [facility] OR    CHOLECYSTECTOMY      CYSTOSCOPY N/A 4/4/2024    BLADDER BIOPSY/ RESECTION OF BLADDER TUMOR performed by Ruy Deng MD at [facility] OR    CYSTOSCOPY INSERTION / REMOVAL STENT / STONE Right 07/26/2020    CYSTOSCOPY RETROGRADE PYELOGRAM STENT INSERTION LEFT performed by Tony Howard DO at [facility] OR    HYSTERECTOMY (CERVIX STATUS UNKNOWN)      LITHOTRIPSY Left 08/07/2020    CYSTOSCOPY RETROGRADE PYELOGRAM URETEROSCOPY  LEFT J-STENT EXCHANGE, INSERTION JUNIOR CATHETER---FACILITY------ performed by Tony Howard DO at [facility] OR    LITHOTRIPSY Left 4/5/2023    CYSTOSCOPY LEFT ESWL EXTRACORPOREAL SHOCK WAVE LITHOTRIPSY WITH LEFT STENT INSERTION (HOLD TIME 1130 FOR TRANSPORT) performed by Wilson Finney MD at [facility] OR    LITHOTRIPSY Left 7/17/2023    CYSTOSCOPY RETROGRADE PYELOGRAM LEFT

## 2024-04-13 NOTE — PROGRESS NOTES
[facility]  [facility]  Progress Note    C.C : complicated UTI     Denies fever or chills  Denies dysuria  Reports constipation    Still has hematuria '    Review of systems:  As stated above in the chief complaint, otherwise negative.    Medications:  Scheduled Meds:   fluticasone  2 spray Each Nostril Daily    furosemide  20 mg Oral Daily    arformoterol tartrate  15 mcg Nebulization BID RT    budesonide  0.5 mg Nebulization BID RT    [Held by provider] enoxaparin  1 mg/kg SubCUTAneous BID    mineral oil-hydrophilic petrolatum   Topical BID    [Held by provider] losartan  25 mg Oral Daily    metoprolol tartrate  25 mg Oral BID    sodium chloride flush  5-40 mL IntraVENous 2 times per day     Continuous Infusions:   sodium chloride Stopped (24 1239)     PRN Meds:ipratropium 0.5 mg-albuterol 2.5 mg, diphenhydrAMINE-zinc acetate, guaiFENesin-dextromethorphan, diphenhydrAMINE, acetaminophen, mineral oil-hydrophilic petrolatum, albuterol, sodium chloride flush, sodium chloride, potassium chloride **OR** potassium alternative oral replacement **OR** potassium chloride, magnesium sulfate, ondansetron **OR** ondansetron, polyethylene glycol    OBJECTIVE:  /75   Pulse 74   Temp 97.4 °F (36.3 °C) (Temporal)   Resp 18   Ht 1.676 m (5' 5.98\")   Wt 95.9 kg (211 lb 5 oz)   LMP  (LMP Unknown)   SpO2 93%   BMI 34.12 kg/m²   Temp  Av.8 °F (36.6 °C)  Min: 97.3 °F (36.3 °C)  Max: 98.6 °F (37 °C)  Constitutional: No acute distress  Skin: Warm and dry. No rashes were noted.   Neuro: Alert and oriented  HEENT: No pallor, no icterus, no LN   Chest: .Clear    Cardiovascular: RRR  Abdomen: Soft with bowel sounds.  Obrien - with bloody urine - clearing   Extremities: ++ edema   Lines: PIV.      Laboratory and Tests:  Lab Results   Component Value Date    WBC 13.3 (H) 2024    WBC 14.7 (H) 2024    WBC 16.0 (H) 2024    HGB 9.7 (L) 2024    HCT 30.2 (L) 2024

## 2024-04-13 NOTE — PLAN OF CARE
Problem: Safety - Adult  Goal: Free from fall injury  4/13/2024 1457 by Nidhi Apple RN  Outcome: Progressing     Problem: Pain  Goal: Verbalizes/displays adequate comfort level or baseline comfort level  4/13/2024 1457 by Nidhi Apple RN  Outcome: Progressing     Problem: Skin/Tissue Integrity  Goal: Absence of new skin breakdown  Description: 1.  Monitor for areas of redness and/or skin breakdown  2.  Assess vascular access sites hourly  3.  Every 4-6 hours minimum:  Change oxygen saturation probe site  4.  Every 4-6 hours:  If on nasal continuous positive airway pressure, respiratory therapy assess nares and determine need for appliance change or resting period.  4/13/2024 1457 by Nidhi Apple RN  Outcome: Progressing     Problem: Discharge Planning  Goal: Discharge to home or other facility with appropriate resources  4/13/2024 1457 by Nidhi Apple, RN  Outcome: Progressing     Problem: Nutrition Deficit:  Goal: Optimize nutritional status  4/13/2024 1457 by Nidhi Apple, RN  Outcome: Progressing

## 2024-04-13 NOTE — PROGRESS NOTES
[facility]                                Progress note    Subjective:    Patient seen resting in bed, awake alert oriented x 4  Overall feeling better  Only slight tinge of blood in Obrien catheter      Objective:    BP (!) 118/53   Pulse 85   Temp 97.3 °F (36.3 °C) (Temporal)   Resp 18   Ht 1.676 m (5' 5.98\")   Wt 95.9 kg (211 lb 5 oz)   LMP  (LMP Unknown)   SpO2 93%   BMI 34.12 kg/m²     In: 360 [P.O.:360]  Out: -7150   In: 360   Out: -7150     General appearance: NAD, conversant, obese  HEENT: AT/NC, MMM  Neck: FROM, supple  Lungs: Diminished   CV: No MRGs,   Vasc: Radial pulses 2+ pedal pulses 2+  Abdomen: Soft, non-tender; no masses or HSM,   Extremities: No peripheral edema or digital cyanosis  Skin: dry, no rash, lesions or ulcers  Psych: Alert and oriented to person, place and time  Neuro: Alert and interactive, drowsy today.     Recent Labs     04/11/24  0426 04/12/24  0746   WBC 16.0* 14.7*   HGB 11.9 10.3*   HCT 34.8 30.8*    350       Recent Labs     04/11/24  0426      K 3.8   CL 99   CO2 28   BUN 22   CREATININE 0.7   CALCIUM 9.2       Assessment:    Principal Problem:    Bladder mass  Active Problems:    Renal calculi    Urinary obstruction  Resolved Problems:    * No resolved hospital problems. *      Plan:  Patient is a 69-year-old female admitted to [facility] for  Urinary obstruction due to renal calculi and bladder mass  -Monitor labs  -Monitor kidney function  -Urology consulted  -Plans for cystoscopy with laser lithotripsy and possible bladder biopsy with possible resection of bladder mass tomorrow 4/4-await procedure  -N.p.o. after midnight  -IV Rocephin pending urine cultures  -Monitor I's and O's     Chronically elevated troponin  -Troponin 28-38-86-nothing to do, no chest pain     Hypertension  -Continue home medications with parameters  -Continue to monitor Bps    4/5/24:  -s/p cystoscopy with left stent placement and bladder tumor resection and  biopsy  -WBC 19.7 likely reactive following surgery  -Urine culture positive for E. Coli >100K  -Continue IV Rocephin  -H&H stable following surgery  -Hypotension and low-grade fevers today, 500 cc bolus initiated and parameters placed on BP meds  -CBI discontinued by urology  -Now requiring 2 L nasal cannula, check chest x-ray in the a.m.    4/6/2024  IV cefepime discontinued   Oral amoxicillin and Cefdinir  Patient will discharge on po atb's   Urine culture positive for E coli  WBC 14.7  Passed swallow - soft and bite size food with thin liquids  Urine is clear  Await PT/OT scores    4/7/2024  Enema today  Continue oral ATB's  Discontinue Obrien catheter in the a.m.  Strict I's and O's  Benadryl cream for allergic reaction to telemetry patches  Patient will likely discharge in the next 24 to 48 hours.    4/8/2024  Vital signs stable  Patient have an increase hematuria-await urology  H&H-10.3/31.1  Unable to remove Obrien catheter  Patient continues on amoxicillin and cefdinir  Consult for discharge in the next 24 hours  WBC improving 13.1  Check with urology when can Eliquis be resumed.    4/9/2024  Vital signs stable-patient continues on 2 L O2  H&H stable-11.4/34.2  Patient's hematuria resolving  Patient complains of constipation-fleets enema x 1  Urology to see patient today  BNP 1178  One-time dose of Lasix for peripheral edema  Discontinue IV fluids    4/10/2024  Vital signs stable  Voiding trial this am- urinating without difficulty  ml    Patient medically stable for discharge- Need PT/OT to re-eval to determine discharge needs   Check a.m. labs    4/11/2024  Vital signs stable  Dark red blood noted from external catheter-   mL   Discharge plan changed to [facility]- donnelliting insurance authorization  Monitor labs-elevated WBC 16.0 - 11.0   Urology seen patient today-possible reinsertion of Obrien catheter with CBI.  Continue to monitor  Check H&H in the a.m.    4/12/2024  Vital signs stable

## 2024-04-14 PROCEDURE — 6370000000 HC RX 637 (ALT 250 FOR IP): Performed by: NURSE PRACTITIONER

## 2024-04-14 PROCEDURE — 6360000002 HC RX W HCPCS: Performed by: INTERNAL MEDICINE

## 2024-04-14 PROCEDURE — 2580000003 HC RX 258: Performed by: NURSE PRACTITIONER

## 2024-04-14 PROCEDURE — 94640 AIRWAY INHALATION TREATMENT: CPT

## 2024-04-14 PROCEDURE — 51700 IRRIGATION OF BLADDER: CPT

## 2024-04-14 PROCEDURE — 2700000000 HC OXYGEN THERAPY PER DAY

## 2024-04-14 PROCEDURE — 1200000000 HC SEMI PRIVATE

## 2024-04-14 PROCEDURE — 6370000000 HC RX 637 (ALT 250 FOR IP): Performed by: INTERNAL MEDICINE

## 2024-04-14 RX ORDER — DOCUSATE SODIUM 100 MG/1
200 CAPSULE, LIQUID FILLED ORAL DAILY
Status: DISCONTINUED | OUTPATIENT
Start: 2024-04-14 | End: 2024-04-18 | Stop reason: HOSPADM

## 2024-04-14 RX ADMIN — HYDROPHOR: 42 OINTMENT TOPICAL at 20:35

## 2024-04-14 RX ADMIN — ACETAMINOPHEN 650 MG: 325 TABLET ORAL at 10:07

## 2024-04-14 RX ADMIN — GUAIFENESIN SYRUP AND DEXTROMETHORPHAN 5 ML: 100; 10 SYRUP ORAL at 10:07

## 2024-04-14 RX ADMIN — METOPROLOL TARTRATE 25 MG: 25 TABLET, FILM COATED ORAL at 20:35

## 2024-04-14 RX ADMIN — ARFORMOTEROL TARTRATE 15 MCG: 15 SOLUTION RESPIRATORY (INHALATION) at 19:16

## 2024-04-14 RX ADMIN — ARFORMOTEROL TARTRATE 15 MCG: 15 SOLUTION RESPIRATORY (INHALATION) at 08:50

## 2024-04-14 RX ADMIN — GUAIFENESIN SYRUP AND DEXTROMETHORPHAN 5 ML: 100; 10 SYRUP ORAL at 20:35

## 2024-04-14 RX ADMIN — DOCUSATE SODIUM 200 MG: 100 CAPSULE, LIQUID FILLED ORAL at 10:07

## 2024-04-14 RX ADMIN — BUDESONIDE INHALATION 500 MCG: 0.5 SUSPENSION RESPIRATORY (INHALATION) at 08:50

## 2024-04-14 RX ADMIN — SODIUM CHLORIDE, PRESERVATIVE FREE 10 ML: 5 INJECTION INTRAVENOUS at 20:35

## 2024-04-14 RX ADMIN — BUDESONIDE INHALATION 500 MCG: 0.5 SUSPENSION RESPIRATORY (INHALATION) at 19:16

## 2024-04-14 RX ADMIN — FLUTICASONE PROPIONATE 2 SPRAY: 50 SPRAY, METERED NASAL at 08:35

## 2024-04-14 RX ADMIN — ACETAMINOPHEN 650 MG: 325 TABLET ORAL at 20:35

## 2024-04-14 RX ADMIN — SODIUM CHLORIDE, PRESERVATIVE FREE 10 ML: 5 INJECTION INTRAVENOUS at 08:35

## 2024-04-14 RX ADMIN — HYDROPHOR: 42 OINTMENT TOPICAL at 08:35

## 2024-04-14 ASSESSMENT — PAIN SCALES - GENERAL
PAINLEVEL_OUTOF10: 5
PAINLEVEL_OUTOF10: 3

## 2024-04-14 ASSESSMENT — PAIN DESCRIPTION - ONSET: ONSET: GRADUAL

## 2024-04-14 ASSESSMENT — PAIN DESCRIPTION - FREQUENCY: FREQUENCY: INTERMITTENT

## 2024-04-14 ASSESSMENT — PAIN DESCRIPTION - ORIENTATION: ORIENTATION: LEFT

## 2024-04-14 ASSESSMENT — PAIN DESCRIPTION - DESCRIPTORS: DESCRIPTORS: SORE

## 2024-04-14 ASSESSMENT — PAIN DESCRIPTION - PAIN TYPE: TYPE: ACUTE PAIN

## 2024-04-14 ASSESSMENT — PAIN - FUNCTIONAL ASSESSMENT: PAIN_FUNCTIONAL_ASSESSMENT: ACTIVITIES ARE NOT PREVENTED

## 2024-04-14 ASSESSMENT — PAIN DESCRIPTION - LOCATION: LOCATION: ABDOMEN

## 2024-04-14 NOTE — PROGRESS NOTES
[facility]                                Progress note    Subjective:    Patient seen resting in bed, awake alert oriented x 4  Overall feeling better  Only slight tinge of blood in Obrien catheter      Objective:    BP 95/65   Pulse 81   Temp 97.4 °F (36.3 °C) (Temporal)   Resp 18   Ht 1.676 m (5' 5.98\")   Wt 96.6 kg (212 lb 14.4 oz)   LMP  (LMP Unknown)   SpO2 93%   BMI 34.38 kg/m²     In: 840 [P.O.:840]  Out: 3175   In: 840   Out: 3175 [Urine:3175]    General appearance: NAD, conversant, obese  HEENT: AT/NC, MMM  Neck: FROM, supple  Lungs: Diminished   CV: No MRGs,   Vasc: Radial pulses 2+ pedal pulses 2+  Abdomen: Soft, non-tender; no masses or HSM,   Extremities: No peripheral edema or digital cyanosis  Skin: dry, no rash, lesions or ulcers  Psych: Alert and oriented to person, place and time  Neuro: Alert and interactive, drowsy today.     Recent Labs     04/12/24  0746 04/13/24  0944   WBC 14.7* 13.3*   HGB 10.3* 9.7*   HCT 30.8* 30.2*    357       Recent Labs     04/13/24  0944      K 4.1   CL 99   CO2 27   BUN 26*   CREATININE 0.8   CALCIUM 9.2       Assessment:    Principal Problem:    Bladder mass  Active Problems:    Renal calculi    Urinary obstruction  Resolved Problems:    * No resolved hospital problems. *      Plan:  Patient is a 69-year-old female admitted to [facility] for  Urinary obstruction due to renal calculi and bladder mass  -Monitor labs  -Monitor kidney function  -Urology consulted  -Plans for cystoscopy with laser lithotripsy and possible bladder biopsy with possible resection of bladder mass tomorrow 4/4-await procedure  -N.p.o. after midnight  -IV Rocephin pending urine cultures  -Monitor I's and O's     Chronically elevated troponin  -Troponin 15-29-23-nothing to do, no chest pain     Hypertension  -Continue home medications with parameters  -Continue to monitor Bps    4/5/24:  -s/p cystoscopy with left stent placement and bladder tumor resection

## 2024-04-14 NOTE — PLAN OF CARE
Problem: Safety - Adult  Goal: Free from fall injury  4/13/2024 2233 by Oneal Pastrana RN  Outcome: Progressing  4/13/2024 1457 by Nidhi Apple RN  Outcome: Progressing     Problem: Pain  Goal: Verbalizes/displays adequate comfort level or baseline comfort level  4/13/2024 2233 by Oneal Pastrana RN  Outcome: Progressing  4/13/2024 1457 by Nidhi Apple RN  Outcome: Progressing     Problem: Skin/Tissue Integrity  Goal: Absence of new skin breakdown  Description: 1.  Monitor for areas of redness and/or skin breakdown  2.  Assess vascular access sites hourly  3.  Every 4-6 hours minimum:  Change oxygen saturation probe site  4.  Every 4-6 hours:  If on nasal continuous positive airway pressure, respiratory therapy assess nares and determine need for appliance change or resting period.  4/13/2024 2233 by Oneal Pastrana RN  Outcome: Progressing  4/13/2024 1457 by Nidhi Apple RN  Outcome: Progressing     Problem: Discharge Planning  Goal: Discharge to home or other facility with appropriate resources  4/13/2024 2233 by Oneal Pastrana RN  Outcome: Progressing  4/13/2024 1457 by Nidhi Apple RN  Outcome: Progressing     Problem: Nutrition Deficit:  Goal: Optimize nutritional status  4/13/2024 2233 by Oneal Pastrana RN  Outcome: Progressing  4/13/2024 1457 by Nidhi Apple RN  Outcome: Progressing

## 2024-04-14 NOTE — PROGRESS NOTES
2023    CYSTOSCOPY RETROGRADE PYELOGRAM LEFT URETEROSCOPY  WITH LASER LITHOTRIPSY LEFT J STENT INSERTION performed by Tony Howard DO at [facility] OR    SHOULDER SURGERY Left 2024    CYSTOSCOPY RETROGRADE PYELOGRAM performed by Ruy Deng MD at [facility] OR    TONSILLECTOMY          PAST FAMILY HISTORY:    Family History   Problem Relation Age of Onset    Asthma Father     High Blood Pressure Father     Cancer Maternal Aunt     Heart Failure Mother     Breast Cancer Sister        PAST SOCIAL HISTORY:    Social History     Socioeconomic History    Marital status:     Number of children: 2   Tobacco Use    Smoking status: Former     Current packs/day: 0.00     Average packs/day: 0.3 packs/day for 46.0 years (11.5 ttl pk-yrs)     Types: Cigarettes     Start date: 1973     Quit date: 2019     Years since quittin.1     Passive exposure: Past    Smokeless tobacco: Never   Vaping Use    Vaping Use: Never used   Substance and Sexual Activity    Alcohol use: No    Drug use: No   Social History Narrative    Son in [place]-    Daughter in [place]     Social Determinants of Health     Financial Resource Strain: Low Risk  (4/3/2023)    Overall Financial Resource Strain (CARDIA)     Difficulty of Paying Living Expenses: Not very hard   Food Insecurity: No Food Insecurity (2024)    Hunger Vital Sign     Worried About Running Out of Food in the Last Year: Never true     Ran Out of Food in the Last Year: Never true   Transportation Needs: No Transportation Needs (2024)    PRAPARE - Transportation     Lack of Transportation (Medical): No     Lack of Transportation (Non-Medical): No   Housing Stability: Low Risk  (2024)    Housing Stability Vital Sign     Unable to Pay for Housing in the Last Year: No     Number of Places Lived in the Last Year: 1     Unstable Housing in the Last Year: No   Recent Concern: Housing Stability - High Risk (2024)    Housing Stability Vital Sign      Unable to Pay for Housing in the Last Year: No     Number of Places Lived in the Last Year: 1     Unstable Housing in the Last Year: Yes    with 3 children    IV:    sodium chloride Stopped (04/04/24 1239)       PRN: ipratropium 0.5 mg-albuterol 2.5 mg, diphenhydrAMINE-zinc acetate, guaiFENesin-dextromethorphan, diphenhydrAMINE, acetaminophen, mineral oil-hydrophilic petrolatum, albuterol, sodium chloride flush, sodium chloride, potassium chloride **OR** potassium alternative oral replacement **OR** potassium chloride, magnesium sulfate, ondansetron **OR** ondansetron, polyethylene glycol    Scheduled:    docusate sodium  200 mg Oral Daily    fluticasone  2 spray Each Nostril Daily    furosemide  20 mg Oral Daily    arformoterol tartrate  15 mcg Nebulization BID RT    budesonide  0.5 mg Nebulization BID RT    [Held by provider] enoxaparin  1 mg/kg SubCUTAneous BID    mineral oil-hydrophilic petrolatum   Topical BID    [Held by provider] losartan  25 mg Oral Daily    metoprolol tartrate  25 mg Oral BID    sodium chloride flush  5-40 mL IntraVENous 2 times per day       Lab Results   Component Value Date/Time     04/13/2024 09:44 AM    K 4.1 04/13/2024 09:44 AM    K 4.2 12/18/2022 07:37 AM    BUN 26 04/13/2024 09:44 AM    CREATININE 0.8 04/13/2024 09:44 AM        Lab Results   Component Value Date/Time    HGB 9.7 04/13/2024 09:44 AM    HCT 30.2 04/13/2024 09:44 AM       Review Of Systems:  Constitutional: Tired  Eyes: negative  Ears, nose, mouth, throat, and face: negative  Respiratory: Asthma  Cardiovascular: negative  Gastrointestinal: Constipation  Genitourinary: Hematuria  Musculoskeletal: Arthritis  Neurological: MS  Behavioral/Psych: negative  Endocrine: negative    Physical Exam:  Skin is dry, and without rashes  Respirations are non-labored, intact  Abdomen is soft, non-tender, non-distended.  Active bowel sounds are present.  No rebound or guarding.  Her abdomen is obese  Alert and oriented x 3.

## 2024-04-14 NOTE — PLAN OF CARE
Problem: Safety - Adult  Goal: Free from fall injury  4/14/2024 1020 by Sue Brito RN  Outcome: Progressing  4/13/2024 2233 by Oneal Pastrana RN  Outcome: Progressing     Problem: Pain  Goal: Verbalizes/displays adequate comfort level or baseline comfort level  4/14/2024 1020 by Sue Brito RN  Outcome: Progressing  4/13/2024 2233 by Oneal Pastrana RN  Outcome: Progressing     Problem: Skin/Tissue Integrity  Goal: Absence of new skin breakdown  Description: 1.  Monitor for areas of redness and/or skin breakdown  2.  Assess vascular access sites hourly  3.  Every 4-6 hours minimum:  Change oxygen saturation probe site  4.  Every 4-6 hours:  If on nasal continuous positive airway pressure, respiratory therapy assess nares and determine need for appliance change or resting period.  4/14/2024 1020 by uSe Brito RN  Outcome: Progressing  4/13/2024 2233 by Oneal Pastrana RN  Outcome: Progressing     Problem: Discharge Planning  Goal: Discharge to home or other facility with appropriate resources  4/14/2024 1020 by Sue Brito RN  Outcome: Progressing  4/13/2024 2233 by Oneal Pastrana RN  Outcome: Progressing     Problem: Nutrition Deficit:  Goal: Optimize nutritional status  4/14/2024 1020 by Sue Brito RN  Outcome: Progressing  4/14/2024 1020 by Sue Brito RN  Outcome: Progressing  4/13/2024 2233 by Oneal Pastrana RN  Outcome: Progressing

## 2024-04-14 NOTE — PROGRESS NOTES
Bladder Tumor:         INVASIVE HIGH-GRADE PAPILLARY UROTHELIAL CARCINOMA (HG-UrCa) with   necrosis and invading lamina propria.   Muscularis propria is not present.       CT chest -      1. Small right pulmonary embolism at the origin of a subsegmental artery.  No  evidence of right heart strain.  2. Bilateral consolidations in the lower lungs new from prior imaging, this  may represent infection, aspiration, or atelectasis.  3. Persistent fat stranding around the left kidney.  4. Bilateral emphysema.     Assessment:  Complicated UTI associated to left ureteral stone ( E coli , Enterococcus )   Status post cystoscopy with stenting - bladder cancer   Leukocytosis WBC down to 14 K   Persistent hematuria - Obrien re inserted ,and on 3 way bladder drainage        Plan:   Oral Amoxicillin 500 mg po q 8 hrs and Cefdinir 300 mg po q 12 hrs   CBC with diff           Polo Rizo MD  3:03 PM  4/14/2024

## 2024-04-15 LAB
ALBUMIN SERPL-MCNC: 3.5 G/DL (ref 3.5–5.2)
ALP SERPL-CCNC: 90 U/L (ref 35–104)
ALT SERPL-CCNC: 42 U/L (ref 0–32)
ANION GAP SERPL CALCULATED.3IONS-SCNC: 11 MMOL/L (ref 7–16)
AST SERPL-CCNC: 49 U/L (ref 0–31)
BASOPHILS # BLD: 0 K/UL (ref 0–0.2)
BASOPHILS NFR BLD: 0 % (ref 0–2)
BILIRUB SERPL-MCNC: 0.3 MG/DL (ref 0–1.2)
BUN SERPL-MCNC: 28 MG/DL (ref 6–23)
CALCIUM SERPL-MCNC: 9.9 MG/DL (ref 8.6–10.2)
CHLORIDE SERPL-SCNC: 100 MMOL/L (ref 98–107)
CO2 SERPL-SCNC: 26 MMOL/L (ref 22–29)
CREAT SERPL-MCNC: 0.7 MG/DL (ref 0.5–1)
EOSINOPHIL # BLD: 0.6 K/UL (ref 0.05–0.5)
EOSINOPHILS RELATIVE PERCENT: 4 % (ref 0–6)
ERYTHROCYTE [DISTWIDTH] IN BLOOD BY AUTOMATED COUNT: 14.6 % (ref 11.5–15)
GFR SERPL CREATININE-BSD FRML MDRD: 88 ML/MIN/1.73M2
GLUCOSE SERPL-MCNC: 99 MG/DL (ref 74–99)
HCT VFR BLD AUTO: 32.9 % (ref 34–48)
HGB BLD-MCNC: 11.7 G/DL (ref 11.5–15.5)
LYMPHOCYTES NFR BLD: 1.65 K/UL (ref 1.5–4)
LYMPHOCYTES RELATIVE PERCENT: 10 % (ref 20–42)
MCH RBC QN AUTO: 38.1 PG (ref 26–35)
MCHC RBC AUTO-ENTMCNC: 35.6 G/DL (ref 32–34.5)
MCV RBC AUTO: 107.2 FL (ref 80–99.9)
METAMYELOCYTES ABSOLUTE COUNT: 0.45 K/UL (ref 0–0.12)
METAMYELOCYTES: 3 % (ref 0–1)
MONOCYTES NFR BLD: 1.5 K/UL (ref 0.1–0.95)
MONOCYTES NFR BLD: 9 % (ref 2–12)
MYELOCYTES ABSOLUTE COUNT: 0.3 K/UL
MYELOCYTES: 2 %
NEUTROPHILS NFR BLD: 74 % (ref 43–80)
NEUTS SEG NFR BLD: 12.71 K/UL (ref 1.8–7.3)
PLATELET # BLD AUTO: 462 K/UL (ref 130–450)
PMV BLD AUTO: 9.8 FL (ref 7–12)
POTASSIUM SERPL-SCNC: 4.3 MMOL/L (ref 3.5–5)
PROT SERPL-MCNC: 7 G/DL (ref 6.4–8.3)
RBC # BLD AUTO: 3.07 M/UL (ref 3.5–5.5)
RBC # BLD: ABNORMAL 10*6/UL
SODIUM SERPL-SCNC: 137 MMOL/L (ref 132–146)
WBC OTHER # BLD: 17.2 K/UL (ref 4.5–11.5)

## 2024-04-15 PROCEDURE — 6360000002 HC RX W HCPCS: Performed by: INTERNAL MEDICINE

## 2024-04-15 PROCEDURE — 2700000000 HC OXYGEN THERAPY PER DAY

## 2024-04-15 PROCEDURE — 6370000000 HC RX 637 (ALT 250 FOR IP): Performed by: FAMILY MEDICINE

## 2024-04-15 PROCEDURE — 6370000000 HC RX 637 (ALT 250 FOR IP): Performed by: INTERNAL MEDICINE

## 2024-04-15 PROCEDURE — 80053 COMPREHEN METABOLIC PANEL: CPT

## 2024-04-15 PROCEDURE — 1200000000 HC SEMI PRIVATE

## 2024-04-15 PROCEDURE — 6370000000 HC RX 637 (ALT 250 FOR IP): Performed by: NURSE PRACTITIONER

## 2024-04-15 PROCEDURE — 51700 IRRIGATION OF BLADDER: CPT

## 2024-04-15 PROCEDURE — 94640 AIRWAY INHALATION TREATMENT: CPT

## 2024-04-15 PROCEDURE — 36415 COLL VENOUS BLD VENIPUNCTURE: CPT

## 2024-04-15 PROCEDURE — 85025 COMPLETE CBC W/AUTO DIFF WBC: CPT

## 2024-04-15 PROCEDURE — 2580000003 HC RX 258: Performed by: NURSE PRACTITIONER

## 2024-04-15 RX ORDER — CEFDINIR 300 MG/1
300 CAPSULE ORAL EVERY 12 HOURS SCHEDULED
Status: DISCONTINUED | OUTPATIENT
Start: 2024-04-15 | End: 2024-04-18 | Stop reason: HOSPADM

## 2024-04-15 RX ORDER — AMOXICILLIN 250 MG/1
500 CAPSULE ORAL EVERY 8 HOURS SCHEDULED
Status: DISCONTINUED | OUTPATIENT
Start: 2024-04-15 | End: 2024-04-18 | Stop reason: HOSPADM

## 2024-04-15 RX ADMIN — ARFORMOTEROL TARTRATE 15 MCG: 15 SOLUTION RESPIRATORY (INHALATION) at 19:30

## 2024-04-15 RX ADMIN — METOPROLOL TARTRATE 25 MG: 25 TABLET, FILM COATED ORAL at 20:37

## 2024-04-15 RX ADMIN — SODIUM CHLORIDE, PRESERVATIVE FREE 10 ML: 5 INJECTION INTRAVENOUS at 08:14

## 2024-04-15 RX ADMIN — BUDESONIDE INHALATION 500 MCG: 0.5 SUSPENSION RESPIRATORY (INHALATION) at 19:30

## 2024-04-15 RX ADMIN — DOCUSATE SODIUM 200 MG: 100 CAPSULE, LIQUID FILLED ORAL at 08:13

## 2024-04-15 RX ADMIN — ACETAMINOPHEN 650 MG: 325 TABLET ORAL at 08:31

## 2024-04-15 RX ADMIN — CEFDINIR 300 MG: 300 CAPSULE ORAL at 20:37

## 2024-04-15 RX ADMIN — HYDROPHOR: 42 OINTMENT TOPICAL at 20:39

## 2024-04-15 RX ADMIN — AMOXICILLIN 500 MG: 250 CAPSULE ORAL at 20:37

## 2024-04-15 RX ADMIN — FLUTICASONE PROPIONATE 2 SPRAY: 50 SPRAY, METERED NASAL at 08:22

## 2024-04-15 RX ADMIN — AMOXICILLIN 500 MG: 250 CAPSULE ORAL at 14:54

## 2024-04-15 RX ADMIN — BUDESONIDE INHALATION 500 MCG: 0.5 SUSPENSION RESPIRATORY (INHALATION) at 09:26

## 2024-04-15 RX ADMIN — FUROSEMIDE 20 MG: 20 TABLET ORAL at 08:13

## 2024-04-15 RX ADMIN — ARFORMOTEROL TARTRATE 15 MCG: 15 SOLUTION RESPIRATORY (INHALATION) at 09:26

## 2024-04-15 RX ADMIN — HYDROPHOR: 42 OINTMENT TOPICAL at 08:14

## 2024-04-15 RX ADMIN — ACETAMINOPHEN 650 MG: 325 TABLET ORAL at 19:08

## 2024-04-15 RX ADMIN — GUAIFENESIN SYRUP AND DEXTROMETHORPHAN 5 ML: 100; 10 SYRUP ORAL at 20:37

## 2024-04-15 RX ADMIN — METOPROLOL TARTRATE 25 MG: 25 TABLET, FILM COATED ORAL at 08:13

## 2024-04-15 RX ADMIN — SODIUM CHLORIDE, PRESERVATIVE FREE 10 ML: 5 INJECTION INTRAVENOUS at 20:38

## 2024-04-15 ASSESSMENT — PAIN DESCRIPTION - LOCATION
LOCATION: ABDOMEN;HEAD
LOCATION: ABDOMEN;HEAD

## 2024-04-15 ASSESSMENT — PAIN SCALES - GENERAL
PAINLEVEL_OUTOF10: 6
PAINLEVEL_OUTOF10: 5

## 2024-04-15 NOTE — PLAN OF CARE
Problem: Safety - Adult  Goal: Free from fall injury  4/14/2024 2303 by Oneal Pastrana RN  Outcome: Progressing  4/14/2024 1020 by Sue Brito RN  Outcome: Progressing     Problem: Pain  Goal: Verbalizes/displays adequate comfort level or baseline comfort level  4/14/2024 2303 by Oneal Pastrana RN  Outcome: Progressing  4/14/2024 1020 by Sue Brito RN  Outcome: Progressing     Problem: Skin/Tissue Integrity  Goal: Absence of new skin breakdown  Description: 1.  Monitor for areas of redness and/or skin breakdown  2.  Assess vascular access sites hourly  3.  Every 4-6 hours minimum:  Change oxygen saturation probe site  4.  Every 4-6 hours:  If on nasal continuous positive airway pressure, respiratory therapy assess nares and determine need for appliance change or resting period.  4/14/2024 2303 by Oneal Pastrana RN  Outcome: Progressing  4/14/2024 1020 by Sue Brito RN  Outcome: Progressing     Problem: Discharge Planning  Goal: Discharge to home or other facility with appropriate resources  4/14/2024 2303 by Oneal Pastrana RN  Outcome: Progressing  4/14/2024 1020 by Sue Brito RN  Outcome: Progressing     Problem: Nutrition Deficit:  Goal: Optimize nutritional status  4/14/2024 2303 by Oneal Pastrana RN  Outcome: Progressing  4/14/2024 1020 by Sue Brito RN  Outcome: Progressing

## 2024-04-15 NOTE — PROGRESS NOTES
Located within [facility]  [facility]  Progress Note    C.C : complicated UTI     Denies fever or chills  Denies dysuria  Denies abdomen pain   Reports constipation  Afebrile      Review of systems:  As stated above in the chief complaint, otherwise negative.    Medications:  Scheduled Meds:   docusate sodium  200 mg Oral Daily    fluticasone  2 spray Each Nostril Daily    furosemide  20 mg Oral Daily    arformoterol tartrate  15 mcg Nebulization BID RT    budesonide  0.5 mg Nebulization BID RT    [Held by provider] enoxaparin  1 mg/kg SubCUTAneous BID    mineral oil-hydrophilic petrolatum   Topical BID    [Held by provider] losartan  25 mg Oral Daily    metoprolol tartrate  25 mg Oral BID    sodium chloride flush  5-40 mL IntraVENous 2 times per day     Continuous Infusions:   sodium chloride Stopped (24 1239)     PRN Meds:ipratropium 0.5 mg-albuterol 2.5 mg, diphenhydrAMINE-zinc acetate, guaiFENesin-dextromethorphan, diphenhydrAMINE, acetaminophen, mineral oil-hydrophilic petrolatum, albuterol, sodium chloride flush, sodium chloride, potassium chloride **OR** potassium alternative oral replacement **OR** potassium chloride, magnesium sulfate, ondansetron **OR** ondansetron, polyethylene glycol    OBJECTIVE:  BP (!) 126/56   Pulse 73   Temp 97.4 °F (36.3 °C) (Temporal)   Resp 18   Ht 1.676 m (5' 5.98\")   Wt 96.6 kg (212 lb 14.4 oz)   LMP  (LMP Unknown)   SpO2 94%   BMI 34.38 kg/m²   Temp  Av.3 °F (36.3 °C)  Min: 97.2 °F (36.2 °C)  Max: 97.4 °F (36.3 °C)  Constitutional: No acute distress  Skin: Warm and dry. No rashes were noted.   Neuro: Alert and oriented  HEENT: No pallor, no icterus, no LN   Chest: Clear bilaterally    Cardiovascular: RRR  Abdomen: Soft with bowel sounds.  Obrien - with bloody urine  Extremities: ++ edema   Lines: PIV.      Laboratory and Tests:  Lab Results   Component Value Date    WBC 17.2 (H) 04/15/2024    WBC 13.3 (H) 2024    WBC 14.7 (H) 2024

## 2024-04-15 NOTE — CARE COORDINATION
Plan is [facility] when medically ready, no precert required. Per urology note from yesterday,  I manually irrigated the catheter myself, and multiple large clots were evacuated.  I again placed an order for the catheter to be manually irrigated every 2 hours and as needed.  Wean CBI off as her hematuria resolves. Correct constipation issues...Continue the left ureteral stent.  She will require left ureteroscopy with laser lithotripsy to the ureteral and renal stones in the future and likely at the same time as her second look cystoscopy to restage her bladder cancer. Hgb 9.7 today. BREANA/Destination updated. 7000 placed in envelope in soft chart. Ambulance form with envelope placed on the soft chart.    Arlet Miller RN   [phone]
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
## 2024-04-15 NOTE — PROGRESS NOTES
[facility]                                Progress note    Subjective:    Patient seen resting in bed, awake alert oriented x 4  Resting comfortably in bed playing games on her phone  Still with very light-tinged pink urine in Obrien      Objective:    BP (!) 126/56   Pulse 73   Temp 97.4 °F (36.3 °C) (Temporal)   Resp 18   Ht 1.676 m (5' 5.98\")   Wt 96.6 kg (212 lb 14.4 oz)   LMP  (LMP Unknown)   SpO2 94%   BMI 34.38 kg/m²     In: 240 [P.O.:240]  Out: 3540   In: 240   Out: 3540 [Urine:3540]    General appearance: NAD, conversant, obese  HEENT: AT/NC, MMM  Neck: FROM, supple  Lungs: Diminished   CV: No MRGs,   Vasc: Radial pulses 2+ pedal pulses 2+  Abdomen: Soft, non-tender; no masses or HSM,   Extremities: No peripheral edema or digital cyanosis  Skin: dry, no rash, lesions or ulcers  Psych: Alert and oriented to person, place and time  Neuro: Alert and interactive, drowsy today.     Recent Labs     04/13/24  0944 04/15/24  1247   WBC 13.3* 17.2*   HGB 9.7* 11.7   HCT 30.2* 32.9*    462*         Recent Labs     04/13/24  0944 04/15/24  1247    137   K 4.1 4.3   CL 99 100   CO2 27 26   BUN 26* 28*   CREATININE 0.8 0.7   CALCIUM 9.2 9.9         Assessment:    Principal Problem:    Bladder mass  Active Problems:    Renal calculi    Urinary obstruction  Resolved Problems:    * No resolved hospital problems. *      Plan:  Patient is a 69-year-old female admitted to [facility] for  Urinary obstruction due to renal calculi and bladder mass  -Monitor labs  -Monitor kidney function  -Urology consulted  -Plans for cystoscopy with laser lithotripsy and possible bladder biopsy with possible resection of bladder mass tomorrow 4/4-await procedure  -N.p.o. after midnight  -IV Rocephin pending urine cultures  -Monitor I's and O's     Chronically elevated troponin  -Troponin [id number]-nothing to do, no chest pain     Hypertension  -Continue home medications with parameters  -Continue to monitor  Bps    4/5/24:  -s/p cystoscopy with left stent placement and bladder tumor resection and biopsy  -WBC 19.7 likely reactive following surgery  -Urine culture positive for E. Coli >100K  -Continue IV Rocephin  -H&H stable following surgery  -Hypotension and low-grade fevers today, 500 cc bolus initiated and parameters placed on BP meds  -CBI discontinued by urology  -Now requiring 2 L nasal cannula, check chest x-ray in the a.m.    4/6/2024  IV cefepime discontinued   Oral amoxicillin and Cefdinir  Patient will discharge on po atb's   Urine culture positive for E coli  WBC 14.7  Passed swallow - soft and bite size food with thin liquids  Urine is clear  Await PT/OT scores    4/7/2024  Enema today  Continue oral ATB's  Discontinue Obrien catheter in the a.m.  Strict I's and O's  Benadryl cream for allergic reaction to telemetry patches  Patient will likely discharge in the next 24 to 48 hours.    4/8/2024  Vital signs stable  Patient have an increase hematuria-await urology  H&H-10.3/31.1  Unable to remove Obrien catheter  Patient continues on amoxicillin and cefdinir  Consult for discharge in the next 24 hours  WBC improving 13.1  Check with urology when can Eliquis be resumed.    4/9/2024  Vital signs stable-patient continues on 2 L O2  H&H stable-11.4/34.2  Patient's hematuria resolving  Patient complains of constipation-fleets enema x 1  Urology to see patient today  BNP 1178  One-time dose of Lasix for peripheral edema  Discontinue IV fluids    4/10/2024  Vital signs stable  Voiding trial this am- urinating without difficulty  ml    Patient medically stable for discharge- Need PT/OT to re-eval to determine discharge needs   Check a.m. labs    4/11/2024  Vital signs stable  Dark red blood noted from external catheter-   mL   Discharge plan changed to [facility]- awaiting insurance authorization  Monitor labs-elevated WBC 16.0 - 11.0   Urology seen patient today-possible reinsertion of Obrien catheter with

## 2024-04-16 LAB
ALBUMIN SERPL-MCNC: 3.7 G/DL (ref 3.5–5.2)
ALP SERPL-CCNC: 94 U/L (ref 35–104)
ALT SERPL-CCNC: 40 U/L (ref 0–32)
ANION GAP SERPL CALCULATED.3IONS-SCNC: 16 MMOL/L (ref 7–16)
AST SERPL-CCNC: 33 U/L (ref 0–31)
BASOPHILS # BLD: 0 K/UL (ref 0–0.2)
BASOPHILS NFR BLD: 0 % (ref 0–2)
BILIRUB SERPL-MCNC: 0.2 MG/DL (ref 0–1.2)
BUN SERPL-MCNC: 28 MG/DL (ref 6–23)
CALCIUM SERPL-MCNC: 9.7 MG/DL (ref 8.6–10.2)
CHLORIDE SERPL-SCNC: 103 MMOL/L (ref 98–107)
CO2 SERPL-SCNC: 20 MMOL/L (ref 22–29)
CREAT SERPL-MCNC: 0.8 MG/DL (ref 0.5–1)
EOSINOPHIL # BLD: 1.13 K/UL (ref 0.05–0.5)
EOSINOPHILS RELATIVE PERCENT: 7 % (ref 0–6)
ERYTHROCYTE [DISTWIDTH] IN BLOOD BY AUTOMATED COUNT: 14.9 % (ref 11.5–15)
GFR SERPL CREATININE-BSD FRML MDRD: 76 ML/MIN/1.73M2
GLUCOSE SERPL-MCNC: 97 MG/DL (ref 74–99)
HCT VFR BLD AUTO: 32.4 % (ref 34–48)
HGB BLD-MCNC: 11 G/DL (ref 11.5–15.5)
LYMPHOCYTES NFR BLD: 3.83 K/UL (ref 1.5–4)
LYMPHOCYTES RELATIVE PERCENT: 24 % (ref 20–42)
MCH RBC QN AUTO: 35.6 PG (ref 26–35)
MCHC RBC AUTO-ENTMCNC: 34 G/DL (ref 32–34.5)
MCV RBC AUTO: 104.9 FL (ref 80–99.9)
MONOCYTES NFR BLD: 1.98 K/UL (ref 0.1–0.95)
MONOCYTES NFR BLD: 12 % (ref 2–12)
NEUTROPHILS NFR BLD: 57 % (ref 43–80)
NEUTS SEG NFR BLD: 9.35 K/UL (ref 1.8–7.3)
PLATELET # BLD AUTO: 456 K/UL (ref 130–450)
PMV BLD AUTO: 10 FL (ref 7–12)
POTASSIUM SERPL-SCNC: 3.9 MMOL/L (ref 3.5–5)
PROT SERPL-MCNC: 6.5 G/DL (ref 6.4–8.3)
RBC # BLD AUTO: 3.09 M/UL (ref 3.5–5.5)
RBC # BLD: ABNORMAL 10*6/UL
SODIUM SERPL-SCNC: 139 MMOL/L (ref 132–146)
WBC OTHER # BLD: 16.3 K/UL (ref 4.5–11.5)

## 2024-04-16 PROCEDURE — 2700000000 HC OXYGEN THERAPY PER DAY

## 2024-04-16 PROCEDURE — 80053 COMPREHEN METABOLIC PANEL: CPT

## 2024-04-16 PROCEDURE — 85025 COMPLETE CBC W/AUTO DIFF WBC: CPT

## 2024-04-16 PROCEDURE — 6370000000 HC RX 637 (ALT 250 FOR IP): Performed by: INTERNAL MEDICINE

## 2024-04-16 PROCEDURE — 6370000000 HC RX 637 (ALT 250 FOR IP): Performed by: NURSE PRACTITIONER

## 2024-04-16 PROCEDURE — 36415 COLL VENOUS BLD VENIPUNCTURE: CPT

## 2024-04-16 PROCEDURE — 2580000003 HC RX 258: Performed by: NURSE PRACTITIONER

## 2024-04-16 PROCEDURE — 1200000000 HC SEMI PRIVATE

## 2024-04-16 RX ADMIN — ACETAMINOPHEN 650 MG: 325 TABLET ORAL at 00:44

## 2024-04-16 RX ADMIN — GUAIFENESIN SYRUP AND DEXTROMETHORPHAN 5 ML: 100; 10 SYRUP ORAL at 13:30

## 2024-04-16 RX ADMIN — DOCUSATE SODIUM 200 MG: 100 CAPSULE, LIQUID FILLED ORAL at 07:45

## 2024-04-16 RX ADMIN — FLUTICASONE PROPIONATE 2 SPRAY: 50 SPRAY, METERED NASAL at 07:46

## 2024-04-16 RX ADMIN — CEFDINIR 300 MG: 300 CAPSULE ORAL at 07:45

## 2024-04-16 RX ADMIN — AMOXICILLIN 500 MG: 250 CAPSULE ORAL at 20:39

## 2024-04-16 RX ADMIN — SODIUM CHLORIDE, PRESERVATIVE FREE 10 ML: 5 INJECTION INTRAVENOUS at 07:45

## 2024-04-16 RX ADMIN — CEFDINIR 300 MG: 300 CAPSULE ORAL at 20:39

## 2024-04-16 RX ADMIN — ACETAMINOPHEN 650 MG: 325 TABLET ORAL at 23:07

## 2024-04-16 RX ADMIN — HYDROPHOR: 42 OINTMENT TOPICAL at 20:41

## 2024-04-16 RX ADMIN — HYDROPHOR: 42 OINTMENT TOPICAL at 07:46

## 2024-04-16 RX ADMIN — METOPROLOL TARTRATE 25 MG: 25 TABLET, FILM COATED ORAL at 20:39

## 2024-04-16 RX ADMIN — AMOXICILLIN 500 MG: 250 CAPSULE ORAL at 13:30

## 2024-04-16 RX ADMIN — ACETAMINOPHEN 650 MG: 325 TABLET ORAL at 13:30

## 2024-04-16 RX ADMIN — SODIUM CHLORIDE, PRESERVATIVE FREE 5 ML: 5 INJECTION INTRAVENOUS at 20:40

## 2024-04-16 RX ADMIN — AMOXICILLIN 500 MG: 250 CAPSULE ORAL at 06:30

## 2024-04-16 ASSESSMENT — PAIN SCALES - GENERAL: PAINLEVEL_OUTOF10: 0

## 2024-04-16 NOTE — PROGRESS NOTES
[facility]                                Progress note    Subjective:    Patient resting comfortably in bed  Still with CBI running  Eating lunch on assessment      Objective:    BP (!) 98/56   Pulse 70   Temp 97.2 °F (36.2 °C) (Temporal)   Resp 19   Ht 1.676 m (5' 5.98\")   Wt 96.6 kg (212 lb 14.4 oz)   LMP  (LMP Unknown)   SpO2 95%   BMI 34.38 kg/m²     In: 1200 [P.O.:1200]  Out: 3020   In: 1200   Out: 3020 [Urine:3020]    General appearance: NAD, conversant, obese  HEENT: AT/NC, MMM  Neck: FROM, supple  Lungs: Diminished   CV: No MRGs,   Vasc: Radial pulses 2+ pedal pulses 2+  Abdomen: Soft, non-tender; no masses or HSM,   Extremities: No peripheral edema or digital cyanosis  Skin: dry, no rash, lesions or ulcers  Psych: Alert and oriented to person, place and time  Neuro: Alert and interactive, drowsy today.     Recent Labs     04/15/24  1247 04/16/24  0430   WBC 17.2* 16.3*   HGB 11.7 11.0*   HCT 32.9* 32.4*   * 456*         Recent Labs     04/15/24  1247 04/16/24  0430    139   K 4.3 3.9    103   CO2 26 20*   BUN 28* 28*   CREATININE 0.7 0.8   CALCIUM 9.9 9.7         Assessment:    Principal Problem:    Bladder mass  Active Problems:    Renal calculi    Urinary obstruction  Resolved Problems:    * No resolved hospital problems. *      Plan:  Patient is a 69-year-old female admitted to [facility] for  Urinary obstruction due to renal calculi and bladder mass  -Monitor labs  -Monitor kidney function  -Urology consulted  -Plans for cystoscopy with laser lithotripsy and possible bladder biopsy with possible resection of bladder mass tomorrow 4/4-await procedure  -N.p.o. after midnight  -IV Rocephin pending urine cultures  -Monitor I's and O's     Chronically elevated troponin  -Troponin 92-08-93-nothing to do, no chest pain     Hypertension  -Continue home medications with parameters  -Continue to monitor Bps    4/5/24:  -s/p cystoscopy with left stent placement and

## 2024-04-16 NOTE — PROGRESS NOTES
[facility]  [facility]  Progress Note    C.C : complicated UTI     Denies fever or chills  Denies dysuria  Denies abdomen pain   Reports constipation  Afebrile      Review of systems:  As stated above in the chief complaint, otherwise negative.    Medications:  Scheduled Meds:   cefdinir  300 mg Oral 2 times per day    amoxicillin  500 mg Oral 3 times per day    docusate sodium  200 mg Oral Daily    fluticasone  2 spray Each Nostril Daily    furosemide  20 mg Oral Daily    arformoterol tartrate  15 mcg Nebulization BID RT    budesonide  0.5 mg Nebulization BID RT    [Held by provider] enoxaparin  1 mg/kg SubCUTAneous BID    mineral oil-hydrophilic petrolatum   Topical BID    [Held by provider] losartan  25 mg Oral Daily    metoprolol tartrate  25 mg Oral BID    sodium chloride flush  5-40 mL IntraVENous 2 times per day     Continuous Infusions:   sodium chloride Stopped (24 1239)     PRN Meds:ipratropium 0.5 mg-albuterol 2.5 mg, diphenhydrAMINE-zinc acetate, guaiFENesin-dextromethorphan, diphenhydrAMINE, acetaminophen, mineral oil-hydrophilic petrolatum, albuterol, sodium chloride flush, sodium chloride, potassium chloride **OR** potassium alternative oral replacement **OR** potassium chloride, magnesium sulfate, ondansetron **OR** ondansetron, polyethylene glycol    OBJECTIVE:  BP (!) 98/56   Pulse 70   Temp 97.2 °F (36.2 °C) (Temporal)   Resp 19   Ht 1.676 m (5' 5.98\")   Wt 96.6 kg (212 lb 14.4 oz)   LMP  (LMP Unknown)   SpO2 95%   BMI 34.38 kg/m²   Temp  Av.4 °F (36.3 °C)  Min: 97 °F (36.1 °C)  Max: 98.1 °F (36.7 °C)  Constitutional: No acute distress  Skin: Warm and dry. No rashes were noted.   Neuro: Alert and oriented  HEENT: no pallor, no icterus, no LN   Chest: Clear bilaterally    Cardiovascular: RRR  Abdomen: Soft with bowel sounds.  Obrien - with bloody urine  Extremities: ++ edema   Lines: PIV.      Laboratory and Tests:  Lab Results   Component Value

## 2024-04-16 NOTE — PLAN OF CARE
Problem: Safety - Adult  Goal: Free from fall injury  Outcome: Progressing     Problem: Pain  Goal: Verbalizes/displays adequate comfort level or baseline comfort level  Outcome: Progressing     Problem: Nutrition Deficit:  Goal: Optimize nutritional status  Outcome: Progressing

## 2024-04-16 NOTE — PROGRESS NOTES
4/16/2024 5:32 PM  Nettie Lara  [id number]    Subjective: Awake and alert  Her Obrien catheter is currently draining yellow urine  I did hand irrigate the Obrien catheter no clots were obtained.  Urine remained clear    Review of Systems  Constitutional: No fever or chills   Respiratory: negative for cough and hemoptysis  Cardiovascular: negative for chest pain and dyspnea  Gastrointestinal: negative for abdominal pain, diarrhea, nausea and vomiting   : See above  Derm: negative for rash and skin lesion(s)  Neurological: negative for seizures and tremors  Musculoskeletal: Negative    Psychiatric: Negative   All other reviews are negative      Scheduled Meds:   cefdinir  300 mg Oral 2 times per day    amoxicillin  500 mg Oral 3 times per day    docusate sodium  200 mg Oral Daily    fluticasone  2 spray Each Nostril Daily    furosemide  20 mg Oral Daily    arformoterol tartrate  15 mcg Nebulization BID RT    budesonide  0.5 mg Nebulization BID RT    [Held by provider] enoxaparin  1 mg/kg SubCUTAneous BID    mineral oil-hydrophilic petrolatum   Topical BID    [Held by provider] losartan  25 mg Oral Daily    metoprolol tartrate  25 mg Oral BID    sodium chloride flush  5-40 mL IntraVENous 2 times per day       Objective:  Vitals:    04/16/24 0730   BP: (!) 98/56   Pulse: 70   Resp: 19   Temp: 97.2 °F (36.2 °C)   SpO2: 95%         Allergies: Lyrica [pregabalin] and Adhesive tape    General Appearance: alert and oriented to person, place and time and in no acute distress  Skin: no rash or erythema  Head: normocephalic and atraumatic  Pulmonary/Chest: normal air movement, no respiratory distress  Abdomen: soft, non-tender, non-distended  Genitourinary: Obrien catheter draining yellow urine with CBI at slow rate  Extremities: no cyanosis, clubbing or edema         Labs:     Recent Labs     04/16/24  0430      K 3.9      CO2 20*   BUN 28*   CREATININE 0.8   GLUCOSE 97   CALCIUM 9.7       Lab Results    Component Value Date/Time    HGB 11.0 04/16/2024 04:30 AM    HCT 32.4 04/16/2024 04:30 AM       No results found for: \"PSA\"      Assessment/Plan:  POD #14--cystoscopy, left ureteral stent insertion, transurethral resection of multiple bladder tumors    Pathology for bladder tumor resection showed high-grade necrotic urothelial carcinoma with invasion into the lamina propria.  There was not any muscle tissue present and she will need to have a second look cystoscopy with repeat bladder biopsy to obtain muscle to rule out any muscle invasion.  We discussed this at the bedside and this will be done as an outpatient in the near future.  Continue the antibiotics per infectious disease  I did hand irrigate the Obrien catheter today and did not obtain any clots.  Her urine remained clear yellow.  We will attempt to wean the CBI today  Irrigate the catheter every 4 hours and as needed gross hematuria  She will be discharged with Obrien catheter and will need to undergo a voiding trial as an outpatient   Continue the left ureteral stent  She will require left ureteroscopy with laser lithotripsy      GRETEL Malloy - CNP   [facility]  Urology

## 2024-04-16 NOTE — CARE COORDINATION
Plan is [facility] when medically ready, no precert required. CBI continues for hematuria. Await Urology plan. Hgb 11.0 today. BREANA/Destination updated. 7000 placed in envelope in soft chart. Ambulance form with envelope placed on the soft chart.      Arlet Miller RN   [phone]

## 2024-04-16 NOTE — PLAN OF CARE
Problem: Safety - Adult  Goal: Free from fall injury  4/16/2024 1529 by Jannie Flores, RN  Outcome: Progressing     Problem: Pain  Goal: Verbalizes/displays adequate comfort level or baseline comfort level  4/16/2024 1529 by Jannie Flores, RN  Outcome: Progressing

## 2024-04-17 LAB
ALBUMIN SERPL-MCNC: 3.5 G/DL (ref 3.5–5.2)
ALP SERPL-CCNC: 86 U/L (ref 35–104)
ALT SERPL-CCNC: 37 U/L (ref 0–32)
ANION GAP SERPL CALCULATED.3IONS-SCNC: 12 MMOL/L (ref 7–16)
AST SERPL-CCNC: 33 U/L (ref 0–31)
BASOPHILS # BLD: 0 K/UL (ref 0–0.2)
BASOPHILS NFR BLD: 0 % (ref 0–2)
BILIRUB SERPL-MCNC: 0.3 MG/DL (ref 0–1.2)
BUN SERPL-MCNC: 28 MG/DL (ref 6–23)
CALCIUM SERPL-MCNC: 9.5 MG/DL (ref 8.6–10.2)
CHLORIDE SERPL-SCNC: 103 MMOL/L (ref 98–107)
CO2 SERPL-SCNC: 25 MMOL/L (ref 22–29)
CREAT SERPL-MCNC: 0.8 MG/DL (ref 0.5–1)
EOSINOPHIL # BLD: 0.43 K/UL (ref 0.05–0.5)
EOSINOPHILS RELATIVE PERCENT: 3 % (ref 0–6)
ERYTHROCYTE [DISTWIDTH] IN BLOOD BY AUTOMATED COUNT: 14.9 % (ref 11.5–15)
GFR SERPL CREATININE-BSD FRML MDRD: 75 ML/MIN/1.73M2
GLUCOSE SERPL-MCNC: 90 MG/DL (ref 74–99)
HCT VFR BLD AUTO: 31.3 % (ref 34–48)
HGB BLD-MCNC: 10.5 G/DL (ref 11.5–15.5)
LYMPHOCYTES NFR BLD: 2.84 K/UL (ref 1.5–4)
LYMPHOCYTES RELATIVE PERCENT: 17 % (ref 20–42)
MCH RBC QN AUTO: 35.7 PG (ref 26–35)
MCHC RBC AUTO-ENTMCNC: 33.5 G/DL (ref 32–34.5)
MCV RBC AUTO: 106.5 FL (ref 80–99.9)
METAMYELOCYTES ABSOLUTE COUNT: 0.28 K/UL (ref 0–0.12)
METAMYELOCYTES: 2 % (ref 0–1)
MONOCYTES NFR BLD: 1.71 K/UL (ref 0.1–0.95)
MONOCYTES NFR BLD: 10 % (ref 2–12)
MYELOCYTES ABSOLUTE COUNT: 0.57 K/UL
MYELOCYTES: 4 %
NEUTROPHILS NFR BLD: 65 % (ref 43–80)
NEUTS SEG NFR BLD: 10.67 K/UL (ref 1.8–7.3)
PLATELET # BLD AUTO: 425 K/UL (ref 130–450)
PMV BLD AUTO: 10.1 FL (ref 7–12)
POTASSIUM SERPL-SCNC: 4.3 MMOL/L (ref 3.5–5)
PROT SERPL-MCNC: 6.3 G/DL (ref 6.4–8.3)
RBC # BLD AUTO: 2.94 M/UL (ref 3.5–5.5)
RBC # BLD: ABNORMAL 10*6/UL
SODIUM SERPL-SCNC: 140 MMOL/L (ref 132–146)
WBC OTHER # BLD: 16.5 K/UL (ref 4.5–11.5)

## 2024-04-17 PROCEDURE — 2700000000 HC OXYGEN THERAPY PER DAY

## 2024-04-17 PROCEDURE — 6370000000 HC RX 637 (ALT 250 FOR IP): Performed by: INTERNAL MEDICINE

## 2024-04-17 PROCEDURE — 6370000000 HC RX 637 (ALT 250 FOR IP): Performed by: UROLOGY

## 2024-04-17 PROCEDURE — 85025 COMPLETE CBC W/AUTO DIFF WBC: CPT

## 2024-04-17 PROCEDURE — 2580000003 HC RX 258: Performed by: UROLOGY

## 2024-04-17 PROCEDURE — 1200000000 HC SEMI PRIVATE

## 2024-04-17 PROCEDURE — 6370000000 HC RX 637 (ALT 250 FOR IP): Performed by: NURSE PRACTITIONER

## 2024-04-17 PROCEDURE — 6360000002 HC RX W HCPCS: Performed by: INTERNAL MEDICINE

## 2024-04-17 PROCEDURE — 36415 COLL VENOUS BLD VENIPUNCTURE: CPT

## 2024-04-17 PROCEDURE — 94640 AIRWAY INHALATION TREATMENT: CPT

## 2024-04-17 PROCEDURE — 80053 COMPREHEN METABOLIC PANEL: CPT

## 2024-04-17 PROCEDURE — 6370000000 HC RX 637 (ALT 250 FOR IP): Performed by: FAMILY MEDICINE

## 2024-04-17 RX ORDER — LACTULOSE 10 G/15ML
20 SOLUTION ORAL ONCE
Status: COMPLETED | OUTPATIENT
Start: 2024-04-17 | End: 2024-04-17

## 2024-04-17 RX ORDER — SENNOSIDES A AND B 8.6 MG/1
1 TABLET, FILM COATED ORAL NIGHTLY
Status: DISCONTINUED | OUTPATIENT
Start: 2024-04-17 | End: 2024-04-18 | Stop reason: HOSPADM

## 2024-04-17 RX ADMIN — AMOXICILLIN 500 MG: 250 CAPSULE ORAL at 06:22

## 2024-04-17 RX ADMIN — AMOXICILLIN 500 MG: 250 CAPSULE ORAL at 20:51

## 2024-04-17 RX ADMIN — FUROSEMIDE 20 MG: 20 TABLET ORAL at 08:28

## 2024-04-17 RX ADMIN — HYDROPHOR: 42 OINTMENT TOPICAL at 20:53

## 2024-04-17 RX ADMIN — SENNOSIDES 8.6 MG: 8.6 TABLET, FILM COATED ORAL at 20:51

## 2024-04-17 RX ADMIN — CEFDINIR 300 MG: 300 CAPSULE ORAL at 20:51

## 2024-04-17 RX ADMIN — BUDESONIDE INHALATION 500 MCG: 0.5 SUSPENSION RESPIRATORY (INHALATION) at 07:50

## 2024-04-17 RX ADMIN — AMOXICILLIN 500 MG: 250 CAPSULE ORAL at 14:44

## 2024-04-17 RX ADMIN — APIXABAN 5 MG: 5 TABLET, FILM COATED ORAL at 20:51

## 2024-04-17 RX ADMIN — CEFDINIR 300 MG: 300 CAPSULE ORAL at 08:27

## 2024-04-17 RX ADMIN — METOPROLOL TARTRATE 25 MG: 25 TABLET, FILM COATED ORAL at 08:28

## 2024-04-17 RX ADMIN — DOCUSATE SODIUM 200 MG: 100 CAPSULE, LIQUID FILLED ORAL at 08:27

## 2024-04-17 RX ADMIN — GUAIFENESIN SYRUP AND DEXTROMETHORPHAN 5 ML: 100; 10 SYRUP ORAL at 20:54

## 2024-04-17 RX ADMIN — METOPROLOL TARTRATE 25 MG: 25 TABLET, FILM COATED ORAL at 20:51

## 2024-04-17 RX ADMIN — HYDROPHOR: 42 OINTMENT TOPICAL at 08:38

## 2024-04-17 RX ADMIN — SODIUM CHLORIDE, PRESERVATIVE FREE 10 ML: 5 INJECTION INTRAVENOUS at 20:53

## 2024-04-17 RX ADMIN — ARFORMOTEROL TARTRATE 15 MCG: 15 SOLUTION RESPIRATORY (INHALATION) at 07:50

## 2024-04-17 RX ADMIN — SODIUM CHLORIDE, PRESERVATIVE FREE 5 ML: 5 INJECTION INTRAVENOUS at 08:30

## 2024-04-17 RX ADMIN — ACETAMINOPHEN 650 MG: 325 TABLET ORAL at 20:51

## 2024-04-17 RX ADMIN — GUAIFENESIN SYRUP AND DEXTROMETHORPHAN 5 ML: 100; 10 SYRUP ORAL at 14:48

## 2024-04-17 RX ADMIN — LACTULOSE 20 G: 20 SOLUTION ORAL at 14:44

## 2024-04-17 ASSESSMENT — PAIN SCALES - GENERAL: PAINLEVEL_OUTOF10: 0

## 2024-04-17 NOTE — PROGRESS NOTES
[facility]                                Progress note    Subjective:    Patient resting comfortably in bed  CBI stopped  No complaints on assessment      Objective:    BP (!) 124/58   Pulse 72   Temp 98 °F (36.7 °C) (Oral)   Resp 18   Ht 1.676 m (5' 5.98\")   Wt 96.6 kg (212 lb 14.4 oz)   LMP  (LMP Unknown)   SpO2 98%   BMI 34.38 kg/m²     In: 1440 [P.O.:1440]  Out: 1650   In: 1440   Out: 1650 [Urine:1650]    General appearance: NAD, conversant, obese  HEENT: AT/NC, MMM  Neck: FROM, supple  Lungs: Diminished   CV: No MRGs,   Vasc: Radial pulses 2+ pedal pulses 2+  Abdomen: Soft, non-tender; no masses or HSM,   Extremities: No peripheral edema or digital cyanosis  Skin: dry, no rash, lesions or ulcers  Psych: Alert and oriented to person, place and time  Neuro: Alert and interactive, drowsy today.     Recent Labs     04/15/24  1247 04/16/24  0430 04/17/24  0424   WBC 17.2* 16.3* 16.5*   HGB 11.7 11.0* 10.5*   HCT 32.9* 32.4* 31.3*   * 456* 425         Recent Labs     04/15/24  1247 04/16/24  0430 04/17/24  0424    139 140   K 4.3 3.9 4.3    103 103   CO2 26 20* 25   BUN 28* 28* 28*   CREATININE 0.7 0.8 0.8   CALCIUM 9.9 9.7 9.5         Assessment:    Principal Problem:    Bladder mass  Active Problems:    Renal calculi    Urinary obstruction  Resolved Problems:    * No resolved hospital problems. *      Plan:  Patient is a 69-year-old female admitted to [facility] for  Urinary obstruction due to renal calculi and bladder mass  -Monitor labs  -Monitor kidney function  -Urology consulted  -Plans for cystoscopy with laser lithotripsy and possible bladder biopsy with possible resection of bladder mass tomorrow 4/4-await procedure  -N.p.o. after midnight  -IV Rocephin pending urine cultures  -Monitor I's and O's     Chronically elevated troponin  -Troponin 40-35-44-nothing to do, no chest pain     Hypertension  -Continue home medications with parameters  -Continue to monitor

## 2024-04-17 NOTE — PROGRESS NOTES
Tests:  Lab Results   Component Value Date    WBC 16.5 (H) 04/17/2024    WBC 16.3 (H) 04/16/2024    WBC 17.2 (H) 04/15/2024    HGB 10.5 (L) 04/17/2024    HCT 31.3 (L) 04/17/2024    .5 (H) 04/17/2024     04/17/2024     Lab Results   Component Value Date    .0 (H) 04/07/2024    CRP 0.3 12/20/2022    CRP 0.9 (H) 12/19/2022     Lab Results   Component Value Date    SEDRATE 111 (H) 04/07/2024     No components found for: \"PROCAL.3\"  Radiology:      Microbiology:   Susceptibility    Enterococcus faecalis (1)    Antibiotic Interpretation Microscan Method Status   ampicillin Sensitive <=2 BACTERIAL SUSCEPTIBILITY PANEL MARY Final   Gentamicin, High Level Resistant  BACTERIAL SUSCEPTIBILITY PANEL MARY Final   linezolid Sensitive 2 BACTERIAL SUSCEPTIBILITY PANEL MARY Final   nitrofurantoin Sensitive <=16 BACTERIAL SUSCEPTIBILITY PANEL MARY Final   vancomycin Sensitive 1 BACTERIAL SUSCEPTIBILITY PANEL MARY Final   Escherichia coli (2)    Antibiotic Interpretation Microscan Method Status   ceFAZolin Sensitive <=4 BACTERIAL SUSCEPTIBILITY PANEL MARY Final    Cefazolin sensitivity results can be used to predict the effectiveness of oral  cephalosporins (eg. Cephalexin) in uncomplicated Urinary Tract Infections due to E. coli, K.   pneumoniae, and P. mirabilis      cefepime Sensitive <=0.12 BACTERIAL SUSCEPTIBILITY PANEL MARY Final   cefotaxime Sensitive <=0.25 BACTERIAL SUSCEPTIBILITY PANEL MARY Final   cefOXitin Intermediate 16 BACTERIAL SUSCEPTIBILITY PANEL MARY Final   cefTAZidime Sensitive <=1 BACTERIAL SUSCEPTIBILITY PANEL MAYR Final   ciprofloxacin Sensitive <=0.06 BACTERIAL SUSCEPTIBILITY PANEL MARY Final   levofloxacin Sensitive 0.25 BACTERIAL SUSCEPTIBILITY PANEL MARY Final   meropenem Sensitive 1 BACTERIAL SUSCEPTIBILITY PANEL MARY Final   nitrofurantoin Sensitive <=16 BACTERIAL SUSCEPTIBILITY PANEL MARY Final   piperacillin-tazobactam Sensitive <=4 BACTERIAL SUSCEPTIBILITY PANEL MARY Final

## 2024-04-17 NOTE — PROGRESS NOTES
4/17/2024 2:44 PM  Nettie Lara  [id number]    Subjective: Awake and alert  Her Obrien catheter is currently draining yellow urine  No CBI   Hematuria has resolved     Review of Systems  Constitutional: No fever or chills   Respiratory: negative for cough and hemoptysis  Cardiovascular: negative for chest pain and dyspnea  Gastrointestinal: negative for abdominal pain, diarrhea, nausea and vomiting   : See above  Derm: negative for rash and skin lesion(s)  Neurological: negative for seizures and tremors  Musculoskeletal: Negative    Psychiatric: Negative   All other reviews are negative      Scheduled Meds:   senna  1 tablet Oral Nightly    cefdinir  300 mg Oral 2 times per day    amoxicillin  500 mg Oral 3 times per day    docusate sodium  200 mg Oral Daily    fluticasone  2 spray Each Nostril Daily    furosemide  20 mg Oral Daily    arformoterol tartrate  15 mcg Nebulization BID RT    budesonide  0.5 mg Nebulization BID RT    [Held by provider] enoxaparin  1 mg/kg SubCUTAneous BID    mineral oil-hydrophilic petrolatum   Topical BID    [Held by provider] losartan  25 mg Oral Daily    metoprolol tartrate  25 mg Oral BID    sodium chloride flush  5-40 mL IntraVENous 2 times per day       Objective:  Vitals:    04/17/24 0828   BP:    Pulse: 72   Resp:    Temp:    SpO2:          Allergies: Lyrica [pregabalin] and Adhesive tape    General Appearance: alert and oriented to person, place and time and in no acute distress  Skin: no rash or erythema  Head: normocephalic and atraumatic  Pulmonary/Chest: normal air movement, no respiratory distress  Abdomen: soft, non-tender, non-distended  Genitourinary: Obrien catheter draining yellow urine  Extremities: no cyanosis, clubbing or edema         Labs:     Recent Labs     04/17/24  0424      K 4.3      CO2 25   BUN 28*   CREATININE 0.8   GLUCOSE 90   CALCIUM 9.5       Lab Results   Component Value Date/Time    HGB 10.5 04/17/2024 04:24 AM    HCT 31.3

## 2024-04-17 NOTE — CARE COORDINATION
Plan is [facility] when medically ready, no precert required. Per urology note today, Pathology for bladder tumor resection showed high-grade necrotic urothelial carcinoma with invasion into the lamina propria.  There was not any muscle tissue present and she will need to have a second look cystoscopy with repeat bladder biopsy to obtain muscle to rule out any muscle invasion.  We discussed this at the bedside and this will be done as an outpatient in the near future...Her hematuria has resolved. Urine is yellow. Spoke with primary, patient was diagnosed with PE and needs to be started on Eliquis. We can restart this but monitor for hematuria obviously. Continue the left ureteral stent. She will require left ureteroscopy with laser lithotripsy. Ok for discharge from  standpoint when ok with others. BREANA/Destination updated. 7000 placed in envelope in soft chart. Ambulance form with envelope placed on the soft chart.   Arlet Miller RN   [phone]

## 2024-04-18 ENCOUNTER — OUTSIDE SERVICES (OUTPATIENT)
Dept: PRIMARY CARE CLINIC | Age: 69
End: 2024-04-18

## 2024-04-18 VITALS
SYSTOLIC BLOOD PRESSURE: 109 MMHG | RESPIRATION RATE: 18 BRPM | HEIGHT: 66 IN | HEART RATE: 69 BPM | OXYGEN SATURATION: 95 % | DIASTOLIC BLOOD PRESSURE: 57 MMHG | TEMPERATURE: 97.1 F | BODY MASS INDEX: 34.23 KG/M2 | WEIGHT: 213 LBS

## 2024-04-18 LAB
ALBUMIN SERPL-MCNC: 3.6 G/DL (ref 3.5–5.2)
ALP SERPL-CCNC: 89 U/L (ref 35–104)
ALT SERPL-CCNC: 44 U/L (ref 0–32)
ANION GAP SERPL CALCULATED.3IONS-SCNC: 16 MMOL/L (ref 7–16)
AST SERPL-CCNC: 39 U/L (ref 0–31)
BASOPHILS # BLD: 0.14 K/UL (ref 0–0.2)
BASOPHILS NFR BLD: 1 % (ref 0–2)
BILIRUB SERPL-MCNC: 0.3 MG/DL (ref 0–1.2)
BUN SERPL-MCNC: 28 MG/DL (ref 6–23)
CALCIUM SERPL-MCNC: 9.7 MG/DL (ref 8.6–10.2)
CHLORIDE SERPL-SCNC: 102 MMOL/L (ref 98–107)
CO2 SERPL-SCNC: 24 MMOL/L (ref 22–29)
CREAT SERPL-MCNC: 0.9 MG/DL (ref 0.5–1)
EOSINOPHIL # BLD: 0.69 K/UL (ref 0.05–0.5)
EOSINOPHILS RELATIVE PERCENT: 4 % (ref 0–6)
ERYTHROCYTE [DISTWIDTH] IN BLOOD BY AUTOMATED COUNT: 15 % (ref 11.5–15)
GFR SERPL CREATININE-BSD FRML MDRD: 69 ML/MIN/1.73M2
GLUCOSE SERPL-MCNC: 92 MG/DL (ref 74–99)
HCT VFR BLD AUTO: 33.3 % (ref 34–48)
HGB BLD-MCNC: 11.4 G/DL (ref 11.5–15.5)
LYMPHOCYTES NFR BLD: 3.04 K/UL (ref 1.5–4)
LYMPHOCYTES RELATIVE PERCENT: 20 % (ref 20–42)
MCH RBC QN AUTO: 36.4 PG (ref 26–35)
MCHC RBC AUTO-ENTMCNC: 34.2 G/DL (ref 32–34.5)
MCV RBC AUTO: 106.4 FL (ref 80–99.9)
MONOCYTES NFR BLD: 14 % (ref 2–12)
MONOCYTES NFR BLD: 2.21 K/UL (ref 0.1–0.95)
MYELOCYTES ABSOLUTE COUNT: 0.14 K/UL
MYELOCYTES: 1 %
NEUTROPHILS NFR BLD: 60 % (ref 43–80)
NEUTS SEG NFR BLD: 9.39 K/UL (ref 1.8–7.3)
PLATELET # BLD AUTO: 452 K/UL (ref 130–450)
PMV BLD AUTO: 9.9 FL (ref 7–12)
POTASSIUM SERPL-SCNC: 4.5 MMOL/L (ref 3.5–5)
PROT SERPL-MCNC: 6.5 G/DL (ref 6.4–8.3)
RBC # BLD AUTO: 3.13 M/UL (ref 3.5–5.5)
RBC # BLD: ABNORMAL 10*6/UL
RBC # BLD: ABNORMAL 10*6/UL
SODIUM SERPL-SCNC: 142 MMOL/L (ref 132–146)
WBC OTHER # BLD: 15.6 K/UL (ref 4.5–11.5)

## 2024-04-18 PROCEDURE — 6370000000 HC RX 637 (ALT 250 FOR IP): Performed by: UROLOGY

## 2024-04-18 PROCEDURE — 6370000000 HC RX 637 (ALT 250 FOR IP): Performed by: INTERNAL MEDICINE

## 2024-04-18 PROCEDURE — 85025 COMPLETE CBC W/AUTO DIFF WBC: CPT

## 2024-04-18 PROCEDURE — 6370000000 HC RX 637 (ALT 250 FOR IP): Performed by: FAMILY MEDICINE

## 2024-04-18 PROCEDURE — 6370000000 HC RX 637 (ALT 250 FOR IP): Performed by: NURSE PRACTITIONER

## 2024-04-18 PROCEDURE — 2580000003 HC RX 258: Performed by: UROLOGY

## 2024-04-18 PROCEDURE — 2700000000 HC OXYGEN THERAPY PER DAY

## 2024-04-18 PROCEDURE — 36415 COLL VENOUS BLD VENIPUNCTURE: CPT

## 2024-04-18 PROCEDURE — 80053 COMPREHEN METABOLIC PANEL: CPT

## 2024-04-18 PROCEDURE — 94640 AIRWAY INHALATION TREATMENT: CPT

## 2024-04-18 PROCEDURE — 6360000002 HC RX W HCPCS: Performed by: INTERNAL MEDICINE

## 2024-04-18 RX ORDER — SENNOSIDES A AND B 8.6 MG/1
1 TABLET, FILM COATED ORAL NIGHTLY
Qty: 30 TABLET | Refills: 0 | DISCHARGE
Start: 2024-04-18 | End: 2024-05-18

## 2024-04-18 RX ORDER — AMOXICILLIN 500 MG/1
500 CAPSULE ORAL EVERY 8 HOURS SCHEDULED
Qty: 12 CAPSULE | Refills: 0 | DISCHARGE
Start: 2024-04-18 | End: 2024-04-22

## 2024-04-18 RX ORDER — PSEUDOEPHEDRINE HCL 30 MG
200 TABLET ORAL DAILY
DISCHARGE
Start: 2024-04-19

## 2024-04-18 RX ORDER — ENEMA 19; 7 G/133ML; G/133ML
1 ENEMA RECTAL
Status: DISCONTINUED | OUTPATIENT
Start: 2024-04-18 | End: 2024-04-18 | Stop reason: HOSPADM

## 2024-04-18 RX ORDER — CEFDINIR 300 MG/1
300 CAPSULE ORAL EVERY 12 HOURS SCHEDULED
Qty: 8 CAPSULE | Refills: 0 | DISCHARGE
Start: 2024-04-18 | End: 2024-04-22

## 2024-04-18 RX ADMIN — FUROSEMIDE 20 MG: 20 TABLET ORAL at 08:15

## 2024-04-18 RX ADMIN — HYDROPHOR: 42 OINTMENT TOPICAL at 08:16

## 2024-04-18 RX ADMIN — METOPROLOL TARTRATE 25 MG: 25 TABLET, FILM COATED ORAL at 08:15

## 2024-04-18 RX ADMIN — APIXABAN 5 MG: 5 TABLET, FILM COATED ORAL at 08:14

## 2024-04-18 RX ADMIN — BUDESONIDE INHALATION 500 MCG: 0.5 SUSPENSION RESPIRATORY (INHALATION) at 07:47

## 2024-04-18 RX ADMIN — CEFDINIR 300 MG: 300 CAPSULE ORAL at 08:15

## 2024-04-18 RX ADMIN — AMOXICILLIN 500 MG: 250 CAPSULE ORAL at 05:59

## 2024-04-18 RX ADMIN — ACETAMINOPHEN 650 MG: 325 TABLET ORAL at 16:38

## 2024-04-18 RX ADMIN — GUAIFENESIN SYRUP AND DEXTROMETHORPHAN 5 ML: 100; 10 SYRUP ORAL at 15:08

## 2024-04-18 RX ADMIN — SODIUM CHLORIDE, PRESERVATIVE FREE 10 ML: 5 INJECTION INTRAVENOUS at 08:17

## 2024-04-18 RX ADMIN — AMOXICILLIN 500 MG: 250 CAPSULE ORAL at 15:08

## 2024-04-18 RX ADMIN — DOCUSATE SODIUM 200 MG: 100 CAPSULE, LIQUID FILLED ORAL at 08:14

## 2024-04-18 RX ADMIN — ARFORMOTEROL TARTRATE 15 MCG: 15 SOLUTION RESPIRATORY (INHALATION) at 07:47

## 2024-04-18 NOTE — PROGRESS NOTES
Comprehensive Nutrition Assessment    Type and Reason for Visit:  Reassess    Nutrition Recommendations/Plan:   Continue current diet/ONS to optimize nutrient intake/wound healing. Will continue to monitor.     Malnutrition Assessment:  Malnutrition Status:  Insufficient data (04/06/24 1641)    Context:  Acute Illness     Findings of the 6 clinical characteristics of malnutrition:  Energy Intake:  No significant decrease in energy intake  Weight Loss:  No significant weight loss     Body Fat Loss:  Unable to assess     Muscle Mass Loss:  Unable to assess    Fluid Accumulation:  Unable to assess (2/2 multi-factorial)     Strength:  Not Performed    Nutrition Assessment:    Pt. remains at risk d/t increased needs for healing of multiple wounds. Adm w/ leg swelling, Lt flank pain and difficulty urinating/persistent hematuria. Adm w/ Bladder mass suspicious for TCC, renal cysts, renal calculi, small R pulmonary embolism, complicated UTI associated w/ left ureteral stone. S/p Cysto w/ tumor resection/bx 4/4. Pathology for bladder tumor resection showed high-grade necrotic urothelial carcinoma with invasion into the lamina propria. PMHx MS, paraplegia, spinal stenosis, COPD. Pt. tolerating regular diet w/ % at most meals/ONS. Will continue ONS and monitor.    Nutrition Related Findings:    A&Ox4, -I/O (10L), rounded/soft abd, +BS, trace/+1edema,elevated BUN, Wound Type: Pressure Injury, Stage II, Open Wounds (traumatic wound to Rt 5th toe)       Current Nutrition Intake & Therapies:    Average Meal Intake: %  Average Supplements Intake: %  ADULT ORAL NUTRITION SUPPLEMENT; Breakfast, Lunch; Wound Healing Oral Supplement  ADULT ORAL NUTRITION SUPPLEMENT; Breakfast, Dinner; Standard High Calorie/High Protein Oral Supplement  ADULT DIET; Regular    Anthropometric Measures:  Height: 167.6 cm (5' 5.98\")  Ideal Body Weight (IBW): 130 lbs (59 kg)    Admission Body Weight: 99.8 kg (220 lb) (bed 4/5)  Current

## 2024-04-18 NOTE — CARE COORDINATION
Plan is [facility] when medically ready, no precert required. Per internal med note from yesterday, CBI stopped and urine improved. H&H remained stable at 10.5/31.3. Discussion with urology regarding anticoagulation for noted PE on admission, plan to start Eliquis 5 mg twice daily and monitor urine and H&H today into tomorrow and okay for discharge if no further bleeding. Per RN, no bleeding noted but patient constipated. Bowel regimen is in place. Ok for discharge from  standpoint when ok with others. BREANA/Destination updated. 7000 placed in envelope in soft chart. Ambulance form with envelope placed on the soft chart.    Arlet Miller RN CM  [phone]

## 2024-04-18 NOTE — DISCHARGE SUMMARY
medications prescribed for you. Read the directions carefully, and ask your doctor or other care provider to review them with you.                STOP taking these medications      furosemide 40 MG tablet  Commonly known as: LASIX     losartan 25 MG tablet  Commonly known as: COZAAR            ASK your doctor about these medications      clotrimazole 1 % cream  Commonly known as: LOTRIMIN  Apply to affected area twice daily until resolved.               Where to Get Your Medications        Information about where to get these medications is not yet available    Ask your nurse or doctor about these medications  amoxicillin 500 MG capsule  apixaban 5 MG Tabs tablet  cefdinir 300 MG capsule  docusate 100 MG Caps  senna 8.6 MG tablet       Activity: activity as tolerated  Diet: cardiac diet    Pt has been advised to:    Follow-up with Geraldo Myers MD in 1 week.  Follow-up with consultants as recommended by them    Note that over 30 minutes was spent in preparing discharge papers, discussing discharge with patient, medication review, etc.    Signed:  GRETEL Alba CNP  4/18/2024  11:21 AM

## 2024-04-18 NOTE — PROGRESS NOTES
[facility]  [facility]  Progress Note    C.C : complicated UTI     Denies fever or chills  Hematuria -stopped   3 way drainage - removed     Afebrile       Review of systems:  As stated above in the chief complaint, otherwise negative.    Medications:  Scheduled Meds:   senna  1 tablet Oral Nightly    apixaban  5 mg Oral BID    cefdinir  300 mg Oral 2 times per day    amoxicillin  500 mg Oral 3 times per day    docusate sodium  200 mg Oral Daily    fluticasone  2 spray Each Nostril Daily    furosemide  20 mg Oral Daily    arformoterol tartrate  15 mcg Nebulization BID RT    budesonide  0.5 mg Nebulization BID RT    mineral oil-hydrophilic petrolatum   Topical BID    [Held by provider] losartan  25 mg Oral Daily    metoprolol tartrate  25 mg Oral BID    sodium chloride flush  5-40 mL IntraVENous 2 times per day     Continuous Infusions:   sodium chloride Stopped (24 1239)     PRN Meds:sodium phosphate, ipratropium 0.5 mg-albuterol 2.5 mg, diphenhydrAMINE-zinc acetate, guaiFENesin-dextromethorphan, diphenhydrAMINE, acetaminophen, mineral oil-hydrophilic petrolatum, albuterol, sodium chloride flush, sodium chloride, potassium chloride **OR** potassium alternative oral replacement **OR** potassium chloride, magnesium sulfate, ondansetron **OR** ondansetron, polyethylene glycol    OBJECTIVE:  BP (!) 109/57 Comment: notify the nurse  Pulse 69   Temp 97.1 °F (36.2 °C) (Temporal)   Resp 18   Ht 1.676 m (5' 5.98\")   Wt 96.6 kg (213 lb)   LMP  (LMP Unknown)   SpO2 95%   BMI 34.40 kg/m²   Temp  Av.5 °F (36.4 °C)  Min: 97.1 °F (36.2 °C)  Max: 97.8 °F (36.6 °C)  Constitutional: No acute distress  Skin: Warm and dry. No rashes were noted.   Neuro: Alert and oriented  HEENT: no pallor, no icterus, no LN   Chest: Clear bilaterally    Cardiovascular: RRR  Abdomen: Soft with bowel sounds.  Obrien - clear urine   Extremities: ++ edema   Lines: PIV.      Laboratory and Tests:  Lab

## 2024-04-18 NOTE — CARE COORDINATION
Discharge order noted. [facility]. Charge nurse, RN, liaison and patient all notified. Voicemail message left patient's DPOA Dena to let her know of the discharge and transport time.  7000 placed in envelope in soft chart. Completed and signed Ambulance form with envelope placed on the soft chart.    Arlet Miller RN   [phone]

## 2024-04-18 NOTE — PLAN OF CARE
Problem: Safety - Adult  Goal: Free from fall injury  4/18/2024 1120 by Kathy Apple, RN  Outcome: Progressing  4/17/2024 2254 by Laurie Purdy RN  Outcome: Progressing     Problem: Pain  Goal: Verbalizes/displays adequate comfort level or baseline comfort level  4/18/2024 1120 by Kathy Apple, RN  Outcome: Progressing  4/17/2024 2254 by Laurie Purdy, RN  Outcome: Progressing     Problem: Skin/Tissue Integrity  Goal: Absence of new skin breakdown  Description: 1.  Monitor for areas of redness and/or skin breakdown  2.  Assess vascular access sites hourly  3.  Every 4-6 hours minimum:  Change oxygen saturation probe site  4.  Every 4-6 hours:  If on nasal continuous positive airway pressure, respiratory therapy assess nares and determine need for appliance change or resting period.  4/18/2024 1120 by Kathy Apple, RN  Outcome: Progressing  4/17/2024 2254 by Laurie Purdy, RN  Outcome: Progressing

## 2024-04-22 RX ORDER — FUROSEMIDE 40 MG/1
40 TABLET ORAL DAILY
Qty: 30 TABLET | Refills: 3 | Status: SHIPPED | OUTPATIENT
Start: 2024-04-22

## 2024-04-24 ENCOUNTER — OUTSIDE SERVICES (OUTPATIENT)
Dept: PRIMARY CARE CLINIC | Age: 69
End: 2024-04-24

## 2024-04-24 DIAGNOSIS — G35 MULTIPLE SCLEROSIS (HCC): ICD-10-CM

## 2024-04-24 DIAGNOSIS — G82.20 PARAPLEGIA (HCC): ICD-10-CM

## 2024-04-24 DIAGNOSIS — I10 PRIMARY HYPERTENSION: ICD-10-CM

## 2024-04-24 DIAGNOSIS — N39.0 URINARY TRACT INFECTION WITHOUT HEMATURIA, SITE UNSPECIFIED: Primary | ICD-10-CM

## 2024-04-24 DIAGNOSIS — C67.9 MALIGNANT NEOPLASM OF URINARY BLADDER, UNSPECIFIED SITE (HCC): ICD-10-CM

## 2024-04-24 DIAGNOSIS — I26.99 PULMONARY EMBOLISM, OTHER, UNSPECIFIED CHRONICITY, UNSPECIFIED WHETHER ACUTE COR PULMONALE PRESENT (HCC): ICD-10-CM

## 2024-04-24 DIAGNOSIS — K59.00 CONSTIPATION, UNSPECIFIED CONSTIPATION TYPE: ICD-10-CM

## 2024-05-01 ENCOUNTER — OUTSIDE SERVICES (OUTPATIENT)
Dept: PRIMARY CARE CLINIC | Age: 69
End: 2024-05-01
Payer: MEDICAID

## 2024-05-01 DIAGNOSIS — C67.9 MALIGNANT NEOPLASM OF URINARY BLADDER, UNSPECIFIED SITE (HCC): ICD-10-CM

## 2024-05-01 DIAGNOSIS — J44.9 CHRONIC OBSTRUCTIVE PULMONARY DISEASE, UNSPECIFIED COPD TYPE (HCC): ICD-10-CM

## 2024-05-01 DIAGNOSIS — I48.0 PAROXYSMAL ATRIAL FIBRILLATION (HCC): ICD-10-CM

## 2024-05-01 DIAGNOSIS — G35 MULTIPLE SCLEROSIS (HCC): Primary | ICD-10-CM

## 2024-05-01 DIAGNOSIS — I26.99 PULMONARY EMBOLISM, OTHER, UNSPECIFIED CHRONICITY, UNSPECIFIED WHETHER ACUTE COR PULMONALE PRESENT (HCC): ICD-10-CM

## 2024-05-01 DIAGNOSIS — G82.20 PARAPLEGIA (HCC): ICD-10-CM

## 2024-05-01 DIAGNOSIS — I10 PRIMARY HYPERTENSION: ICD-10-CM

## 2024-05-01 DIAGNOSIS — K59.00 CONSTIPATION, UNSPECIFIED CONSTIPATION TYPE: ICD-10-CM

## 2024-05-01 PROCEDURE — 99309 SBSQ NF CARE MODERATE MDM 30: CPT | Performed by: INTERNAL MEDICINE

## 2024-05-02 ASSESSMENT — VISUAL ACUITY: OU: 1

## 2024-05-02 ASSESSMENT — ENCOUNTER SYMPTOMS
SORE THROAT: 0
VOICE CHANGE: 0
SINUS PAIN: 0
WHEEZING: 0
CONSTIPATION: 1
COUGH: 0
SINUS PRESSURE: 0
EYE DISCHARGE: 0
EYE ITCHING: 0
RHINORRHEA: 0
TROUBLE SWALLOWING: 0
SHORTNESS OF BREATH: 0
EYE REDNESS: 0

## 2024-05-02 NOTE — PROGRESS NOTES
Visit Date: 4/24/24  Nettie Lara  1955  female 69 y.o.      Subjective:    CC: Patient presents with uti and MS. Patient presents for follow up of pulmonary embolism, bladder cancer and constipation.    HPI:  Urinary Tract Infection  This is a new problem. The current episode started in the past 7 days. The problem has been gradually improving since onset.   Other  This is a chronic (MS paraplegia she is not on any meds) problem. The current episode started more than 1 year ago. The problem occurs constantly. The problem has been unchanged. Pertinent negatives include no congestion, coughing, myalgias or sore throat. Nothing aggravates the symptoms. She has tried nothing for the symptoms.   Constipation  This is a new problem. The current episode started in the past 7 days. The problem is unchanged. Pertinent negatives include no difficulty urinating.       ROS:  Review of Systems   Constitutional: Negative.    HENT:  Negative for congestion, ear discharge, ear pain, mouth sores, nosebleeds, postnasal drip, rhinorrhea, sinus pressure, sinus pain, sneezing, sore throat, trouble swallowing and voice change.    Eyes:  Negative for discharge, redness, itching and visual disturbance.        Denies diplopia, recent change in visual acuity, blurred vision, and night vision loss. Does not wear corrective lenses.   Respiratory:  Negative for cough, shortness of breath and wheezing.         Denies asthma, COPD, hemoptysis   Cardiovascular: Negative.    Gastrointestinal:  Positive for constipation.   Endocrine: Negative.    Genitourinary:  Negative for difficulty urinating and urgency.        Denies hesitancy, incomplete voiding, and polyuria   Musculoskeletal:  Negative for myalgias.        Denies joint pain   Skin: Negative.    Neurological: Negative.    Hematological: Negative.    Psychiatric/Behavioral:  Negative for confusion and suicidal ideas. The patient is not nervous/anxious.         Denies difficulty

## 2024-05-08 ENCOUNTER — OUTSIDE SERVICES (OUTPATIENT)
Dept: PRIMARY CARE CLINIC | Age: 69
End: 2024-05-08
Payer: MEDICAID

## 2024-05-08 DIAGNOSIS — I26.99 PULMONARY EMBOLISM, OTHER, UNSPECIFIED CHRONICITY, UNSPECIFIED WHETHER ACUTE COR PULMONALE PRESENT (HCC): ICD-10-CM

## 2024-05-08 DIAGNOSIS — I10 PRIMARY HYPERTENSION: ICD-10-CM

## 2024-05-08 DIAGNOSIS — G82.20 PARAPLEGIA (HCC): ICD-10-CM

## 2024-05-08 DIAGNOSIS — G35 MULTIPLE SCLEROSIS (HCC): Primary | ICD-10-CM

## 2024-05-08 DIAGNOSIS — I48.0 PAROXYSMAL ATRIAL FIBRILLATION (HCC): ICD-10-CM

## 2024-05-08 DIAGNOSIS — J44.9 CHRONIC OBSTRUCTIVE PULMONARY DISEASE, UNSPECIFIED COPD TYPE (HCC): ICD-10-CM

## 2024-05-08 DIAGNOSIS — K59.00 CONSTIPATION, UNSPECIFIED CONSTIPATION TYPE: ICD-10-CM

## 2024-05-08 DIAGNOSIS — C67.9 MALIGNANT NEOPLASM OF URINARY BLADDER, UNSPECIFIED SITE (HCC): ICD-10-CM

## 2024-05-08 PROCEDURE — 99309 SBSQ NF CARE MODERATE MDM 30: CPT | Performed by: INTERNAL MEDICINE

## 2024-05-16 ENCOUNTER — OUTSIDE SERVICES (OUTPATIENT)
Dept: PRIMARY CARE CLINIC | Age: 69
End: 2024-05-16
Payer: MEDICAID

## 2024-05-16 DIAGNOSIS — G35 MULTIPLE SCLEROSIS (HCC): Primary | ICD-10-CM

## 2024-05-16 DIAGNOSIS — I26.99 PULMONARY EMBOLISM, OTHER, UNSPECIFIED CHRONICITY, UNSPECIFIED WHETHER ACUTE COR PULMONALE PRESENT (HCC): ICD-10-CM

## 2024-05-16 DIAGNOSIS — L89.309 PRESSURE INJURY OF SKIN OF BUTTOCK, UNSPECIFIED INJURY STAGE, UNSPECIFIED LATERALITY: ICD-10-CM

## 2024-05-16 DIAGNOSIS — J44.9 CHRONIC OBSTRUCTIVE PULMONARY DISEASE, UNSPECIFIED COPD TYPE (HCC): ICD-10-CM

## 2024-05-16 DIAGNOSIS — C67.9 MALIGNANT NEOPLASM OF URINARY BLADDER, UNSPECIFIED SITE (HCC): ICD-10-CM

## 2024-05-16 DIAGNOSIS — G82.20 PARAPLEGIA (HCC): ICD-10-CM

## 2024-05-16 DIAGNOSIS — I48.0 PAROXYSMAL ATRIAL FIBRILLATION (HCC): ICD-10-CM

## 2024-05-16 DIAGNOSIS — I10 PRIMARY HYPERTENSION: ICD-10-CM

## 2024-05-16 PROCEDURE — 99316 NF DSCHRG MGMT 30 MIN+: CPT | Performed by: INTERNAL MEDICINE

## 2024-05-17 ENCOUNTER — TELEPHONE (OUTPATIENT)
Dept: PRIMARY CARE CLINIC | Age: 69
End: 2024-05-17

## 2024-05-17 RX ORDER — LOSARTAN POTASSIUM 25 MG/1
25 TABLET ORAL DAILY
COMMUNITY
Start: 2024-04-22

## 2024-05-17 RX ORDER — APIXABAN 2.5 MG/1
2.5 TABLET, FILM COATED ORAL 2 TIMES DAILY
COMMUNITY
Start: 2024-05-01

## 2024-05-17 ASSESSMENT — ENCOUNTER SYMPTOMS
COUGH: 0
EYE ITCHING: 0
SINUS PAIN: 0
RHINORRHEA: 0
SINUS PRESSURE: 0
SORE THROAT: 0
GASTROINTESTINAL NEGATIVE: 1
EYE REDNESS: 0
WHEEZING: 0
SHORTNESS OF BREATH: 0
VOICE CHANGE: 0
EYE DISCHARGE: 0

## 2024-05-17 ASSESSMENT — VISUAL ACUITY: OU: 1

## 2024-05-17 NOTE — PROGRESS NOTES
Visit Date: 4/24/24  Nettie Lara  1955  female 69 y.o.      Subjective:    CC: Patient presents with uti and weakness. Patient presents for follow up of MS, htn, bladder cancer, and constipation.    HPI:  Urinary Tract Infection  This is a new problem. The current episode started in the past 7 days. The problem has been gradually improving since onset.   Extremity Weakness  This is a new problem. The current episode started in the past 7 days. The problem occurs constantly. The problem has been unchanged. Pertinent negatives include no congestion, coughing, myalgias or sore throat. Treatments tried: pt/ot eval and treat.   Other  This is a chronic (MS with paraplegia) problem. The current episode started more than 1 year ago. The problem occurs constantly. The problem has been unchanged. Pertinent negatives include no congestion, coughing, myalgias or sore throat.   Hypertension  This is a chronic problem. The current episode started more than 1 year ago. The problem is unchanged. The problem is controlled. Pertinent negatives include no shortness of breath. There are no associated agents to hypertension. Risk factors for coronary artery disease include obesity. Treatments tried: taking medications, no medication side effects. The current treatment provides mild improvement. There are no compliance problems.        ROS:  Review of Systems   Constitutional: Negative.    HENT:  Negative for congestion, ear discharge, ear pain, mouth sores, nosebleeds, postnasal drip, rhinorrhea, sinus pressure, sinus pain, sneezing, sore throat, trouble swallowing and voice change.    Eyes:  Negative for discharge, redness, itching and visual disturbance.        Denies diplopia, recent change in visual acuity, blurred vision, and night vision loss. Does not wear corrective lenses.   Respiratory:  Negative for cough, shortness of breath and wheezing.         Denies asthma, COPD, hemoptysis   Cardiovascular: Negative.

## 2024-05-17 NOTE — TELEPHONE ENCOUNTER
I called Arian and told her to restart losartan. She also is going to hold Eliquis if she has bleeding from her Obrien cath.  She plans to see me in the office on 5/21 as scheduled.

## 2024-05-17 NOTE — TELEPHONE ENCOUNTER
Patient phoned in stating her BP has been consistently elevated lately 180/95.  She is taking her metoprolol but when she was in the hospital she said they stopped her losartan and water pill.  She wants to know if she should restart them because her BP readings.  She is also stating she has blood in her urine-small clots and was wondering if that could be from the Eliquis.    She has an appt 5/21/2024.    Please advise.

## 2024-05-20 ASSESSMENT — ENCOUNTER SYMPTOMS
SINUS PRESSURE: 0
TROUBLE SWALLOWING: 0
SORE THROAT: 0
WHEEZING: 0
SHORTNESS OF BREATH: 0
GASTROINTESTINAL NEGATIVE: 1
COUGH: 0
EYE ITCHING: 0
RHINORRHEA: 0
SINUS PAIN: 0
EYE REDNESS: 0
VOICE CHANGE: 0
EYE DISCHARGE: 0

## 2024-05-20 ASSESSMENT — VISUAL ACUITY: OU: 1

## 2024-05-20 NOTE — PROGRESS NOTES
Visit Date: 5/1/24  Nettie Lara  1955  female 69 y.o.      Subjective:    CC: Patient presents with uti and weakness. Patient presents for follow up of MS, htn, bladder cancer, and constipation.    HPI:  Extremity Weakness  This is a new problem. The current episode started in the past 7 days. The problem occurs constantly. The problem has been unchanged. Pertinent negatives include no congestion, coughing, myalgias or sore throat. Treatments tried: pt/ot eval and treat.   Other  This is a chronic (MS with paraplegia) problem. The current episode started more than 1 year ago. The problem occurs constantly. The problem has been unchanged. Pertinent negatives include no congestion, coughing, myalgias or sore throat.   Hypertension  This is a chronic problem. The current episode started more than 1 year ago. The problem is unchanged. The problem is controlled. Pertinent negatives include no shortness of breath. There are no associated agents to hypertension. Risk factors for coronary artery disease include obesity. Treatments tried: taking medications, no medication side effects. The current treatment provides mild improvement. There are no compliance problems.        ROS:  Review of Systems   Constitutional: Negative.    HENT:  Negative for congestion, ear discharge, ear pain, mouth sores, nosebleeds, postnasal drip, rhinorrhea, sinus pressure, sinus pain, sneezing, sore throat, trouble swallowing and voice change.    Eyes:  Negative for discharge, redness, itching and visual disturbance.        Denies diplopia, recent change in visual acuity, blurred vision, and night vision loss. Does not wear corrective lenses.   Respiratory:  Negative for cough, shortness of breath and wheezing.         Denies asthma, COPD, hemoptysis   Cardiovascular: Negative.    Gastrointestinal: Negative.    Endocrine: Negative.    Genitourinary:  Negative for difficulty urinating and urgency.        Denies hesitancy, incomplete

## 2024-05-21 ENCOUNTER — OFFICE VISIT (OUTPATIENT)
Dept: PRIMARY CARE CLINIC | Age: 69
End: 2024-05-21
Payer: MEDICAID

## 2024-05-21 VITALS
DIASTOLIC BLOOD PRESSURE: 78 MMHG | TEMPERATURE: 97.4 F | HEIGHT: 66 IN | RESPIRATION RATE: 16 BRPM | SYSTOLIC BLOOD PRESSURE: 118 MMHG | HEART RATE: 73 BPM | BODY MASS INDEX: 34.4 KG/M2 | OXYGEN SATURATION: 98 %

## 2024-05-21 DIAGNOSIS — K59.00 CONSTIPATION, UNSPECIFIED CONSTIPATION TYPE: ICD-10-CM

## 2024-05-21 DIAGNOSIS — N13.9 URINARY OBSTRUCTION: ICD-10-CM

## 2024-05-21 DIAGNOSIS — I10 PRIMARY HYPERTENSION: Primary | ICD-10-CM

## 2024-05-21 DIAGNOSIS — G35 MULTIPLE SCLEROSIS (HCC): ICD-10-CM

## 2024-05-21 DIAGNOSIS — G82.20 PARAPLEGIA (HCC): ICD-10-CM

## 2024-05-21 DIAGNOSIS — R26.2 UNABLE TO WALK: ICD-10-CM

## 2024-05-21 LAB
ALBUMIN: 4.2 G/DL (ref 3.5–5.2)
ALP BLD-CCNC: 101 U/L (ref 35–104)
ALT SERPL-CCNC: 23 U/L (ref 0–32)
ANION GAP SERPL CALCULATED.3IONS-SCNC: 14 MMOL/L (ref 7–16)
AST SERPL-CCNC: 29 U/L (ref 0–31)
BASOPHILS ABSOLUTE: 0.09 K/UL (ref 0–0.2)
BASOPHILS RELATIVE PERCENT: 1 % (ref 0–2)
BILIRUB SERPL-MCNC: 0.5 MG/DL (ref 0–1.2)
BUN BLDV-MCNC: 13 MG/DL (ref 6–23)
CALCIUM SERPL-MCNC: 10 MG/DL (ref 8.6–10.2)
CHLORIDE BLD-SCNC: 106 MMOL/L (ref 98–107)
CHOLESTEROL, TOTAL: 196 MG/DL
CO2: 22 MMOL/L (ref 22–29)
CREAT SERPL-MCNC: 0.9 MG/DL (ref 0.5–1)
EOSINOPHILS ABSOLUTE: 0.81 K/UL (ref 0.05–0.5)
EOSINOPHILS RELATIVE PERCENT: 7 % (ref 0–6)
GFR, ESTIMATED: 68 ML/MIN/1.73M2
GLUCOSE BLD-MCNC: 101 MG/DL (ref 74–99)
HCT VFR BLD CALC: 43.7 % (ref 34–48)
HDLC SERPL-MCNC: 28 MG/DL
HEMOGLOBIN: 14 G/DL (ref 11.5–15.5)
IMMATURE GRANULOCYTES %: 1 % (ref 0–5)
IMMATURE GRANULOCYTES ABSOLUTE: 0.15 K/UL (ref 0–0.58)
LDL CHOLESTEROL: 88 MG/DL
LYMPHOCYTES ABSOLUTE: 2.44 K/UL (ref 1.5–4)
LYMPHOCYTES RELATIVE PERCENT: 20 % (ref 20–42)
MCH RBC QN AUTO: 32.9 PG (ref 26–35)
MCHC RBC AUTO-ENTMCNC: 32 G/DL (ref 32–34.5)
MCV RBC AUTO: 102.6 FL (ref 80–99.9)
MONOCYTES ABSOLUTE: 1.26 K/UL (ref 0.1–0.95)
MONOCYTES RELATIVE PERCENT: 10 % (ref 2–12)
NEUTROPHILS ABSOLUTE: 7.57 K/UL (ref 1.8–7.3)
NEUTROPHILS RELATIVE PERCENT: 62 % (ref 43–80)
PDW BLD-RTO: 13.7 % (ref 11.5–15)
PLATELET # BLD: 436 K/UL (ref 130–450)
PMV BLD AUTO: 10.4 FL (ref 7–12)
POTASSIUM SERPL-SCNC: 5 MMOL/L (ref 3.5–5)
RBC # BLD: 4.26 M/UL (ref 3.5–5.5)
SODIUM BLD-SCNC: 142 MMOL/L (ref 132–146)
TOTAL PROTEIN: 7 G/DL (ref 6.4–8.3)
TRIGL SERPL-MCNC: 398 MG/DL
TSH SERPL DL<=0.05 MIU/L-ACNC: 0.85 UIU/ML (ref 0.27–4.2)
VLDLC SERPL CALC-MCNC: 80 MG/DL
WBC # BLD: 12.3 K/UL (ref 4.5–11.5)

## 2024-05-21 PROCEDURE — 99214 OFFICE O/P EST MOD 30 MIN: CPT | Performed by: FAMILY MEDICINE

## 2024-05-21 PROCEDURE — 3074F SYST BP LT 130 MM HG: CPT | Performed by: FAMILY MEDICINE

## 2024-05-21 PROCEDURE — 1123F ACP DISCUSS/DSCN MKR DOCD: CPT | Performed by: FAMILY MEDICINE

## 2024-05-21 PROCEDURE — 3078F DIAST BP <80 MM HG: CPT | Performed by: FAMILY MEDICINE

## 2024-05-21 PROCEDURE — G2211 COMPLEX E/M VISIT ADD ON: HCPCS | Performed by: FAMILY MEDICINE

## 2024-05-21 SDOH — ECONOMIC STABILITY: FOOD INSECURITY: WITHIN THE PAST 12 MONTHS, YOU WORRIED THAT YOUR FOOD WOULD RUN OUT BEFORE YOU GOT MONEY TO BUY MORE.: NEVER TRUE

## 2024-05-21 SDOH — ECONOMIC STABILITY: FOOD INSECURITY: WITHIN THE PAST 12 MONTHS, THE FOOD YOU BOUGHT JUST DIDN'T LAST AND YOU DIDN'T HAVE MONEY TO GET MORE.: NEVER TRUE

## 2024-05-21 SDOH — ECONOMIC STABILITY: INCOME INSECURITY: HOW HARD IS IT FOR YOU TO PAY FOR THE VERY BASICS LIKE FOOD, HOUSING, MEDICAL CARE, AND HEATING?: NOT HARD AT ALL

## 2024-05-21 ASSESSMENT — ENCOUNTER SYMPTOMS
GASTROINTESTINAL NEGATIVE: 1
COUGH: 0
SINUS PRESSURE: 0
VOICE CHANGE: 0
SINUS PAIN: 0
SHORTNESS OF BREATH: 0
SHORTNESS OF BREATH: 0
VOICE CHANGE: 0
TROUBLE SWALLOWING: 0
SORE THROAT: 0
EYE ITCHING: 0
EYE REDNESS: 0
SORE THROAT: 0
COUGH: 0
SINUS PAIN: 0
EYE DISCHARGE: 0
EYE DISCHARGE: 0
GASTROINTESTINAL NEGATIVE: 1
WHEEZING: 0
EYE REDNESS: 0
WHEEZING: 0
TROUBLE SWALLOWING: 0
EYE ITCHING: 0
SINUS PRESSURE: 0
RHINORRHEA: 0
RHINORRHEA: 0

## 2024-05-21 ASSESSMENT — VISUAL ACUITY
OU: 1
OU: 1

## 2024-05-21 NOTE — PROGRESS NOTES
Visit Date: 5/8/24  Nettie Lara  1955  female 69 y.o.      Subjective:    CC: Patient presents with uti and weakness. Patient presents for follow up of MS, htn, bladder cancer, and constipation.    HPI:  Extremity Weakness  This is a new problem. The current episode started 1 to 4 weeks ago. The problem occurs constantly. The problem has been gradually improving. Pertinent negatives include no congestion, coughing, myalgias or sore throat. Treatments tried: working with therpy. The treatment provided mild relief.   Other  This is a chronic (MS with paraplegia) problem. The current episode started more than 1 year ago. The problem occurs constantly. The problem has been unchanged. Pertinent negatives include no congestion, coughing, myalgias or sore throat.   Hypertension  This is a chronic problem. The current episode started more than 1 year ago. The problem is unchanged. The problem is controlled. Pertinent negatives include no shortness of breath. There are no associated agents to hypertension. Risk factors for coronary artery disease include obesity. Treatments tried: taking medications, no medication side effects. The current treatment provides mild improvement. There are no compliance problems.        ROS:  Review of Systems   Constitutional: Negative.    HENT:  Negative for congestion, ear discharge, ear pain, mouth sores, nosebleeds, postnasal drip, rhinorrhea, sinus pressure, sinus pain, sneezing, sore throat, trouble swallowing and voice change.    Eyes:  Negative for discharge, redness, itching and visual disturbance.        Denies diplopia, recent change in visual acuity, blurred vision, and night vision loss. Does not wear corrective lenses.   Respiratory:  Negative for cough, shortness of breath and wheezing.         Denies asthma, COPD, hemoptysis   Cardiovascular: Negative.    Gastrointestinal: Negative.    Endocrine: Negative.    Genitourinary:  Negative for difficulty urinating and

## 2024-05-21 NOTE — PROGRESS NOTES
5/21/2024     Home visit medically necessary in lieu of an office visit due to: wheelchair bound, difficult to get out.       HPI:  Seen with niece and CG Erika. Patient just finished rehab at [facility] 4/17/24-5/17/24. She says she did not have good experience.  She has some superficial pressure sores on her bottom that she got from rehab.  She states she only had 30 minutes of therapy time twice a week.  Her catheter has been leaking, although is not leaking the day. She is having more trouble with constipation. She says she is still weak but she is able to stand and transfer. She has been having trouble moving her bowels on and off but sometimes has relief with lactulose and Colace.  Apparently she is not taking PEG.  She has never had a colonoscopy.  She continues to have problems with incontinence due to MS. She drinks Boost supplement daily. She has not been taking Lasix 40 mg daily.  She plans to begin elevating her legs.     REVIEW OF SYSTEMS: General: Weight stable, generally healthy, no change in strength or exercise tolerance. Heart: No palpitations, no syncope, no orthopnea. EDEMA OF LEGS AT TIMES.  Abdomen: Chronic constipation. No change in appetite, no dysphagia, no abdominal pains, no bowel habit changes, no emesis, no melena. : No urinary urgency, no dysuria, no change in nature of urine. Neurologic:   Unable to walk without holding on to something. In w/c most of the time.  No tremor, no seizures, no changes in mentation.   All other systems negative.       PAST MEDICAL HISTORY: (Major events, hospitalizations, surgeries): Pneumonia (2010), 1998 Vag hyst, 1978 naina, append, freq epidural inclusion cysts tx'd with docycycline.   MS dx'd Mar 8, 1991.   IV corticosteroids 1991-92.   Has never been on MS meds. 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   Known allergies: NKDA.   Ongoing medical problems: Multiple sclerosis, Asthma, HTN, hypoglycemia, scoliosis, DDD with sciatic, arthritis,

## 2024-05-21 NOTE — PROGRESS NOTES
Visit Date: 5/16/24  Nettie Lara  1955  female 69 y.o.      Subjective:    CC: Patient presents with uti and weakness. Patient presents for follow up of MS, htn, bladder cancer, and constipation.    HPI:  Extremity Weakness  This is a new problem. The current episode started more than 1 month ago. The problem occurs constantly. The problem has been gradually improving. Pertinent negatives include no congestion, coughing, myalgias or sore throat. Treatments tried: working with therpy. The treatment provided mild relief.   Other  This is a chronic (MS with paraplegia) problem. The current episode started more than 1 year ago. The problem occurs constantly. The problem has been unchanged. Pertinent negatives include no congestion, coughing, myalgias or sore throat.   Hypertension  This is a chronic problem. The current episode started more than 1 year ago. The problem is unchanged. The problem is controlled. Pertinent negatives include no shortness of breath. There are no associated agents to hypertension. Risk factors for coronary artery disease include obesity. Treatments tried: taking medications, no medication side effects. The current treatment provides mild improvement. There are no compliance problems.        ROS:  Review of Systems   Constitutional: Negative.    HENT:  Negative for congestion, ear discharge, ear pain, mouth sores, nosebleeds, postnasal drip, rhinorrhea, sinus pressure, sinus pain, sneezing, sore throat, trouble swallowing and voice change.    Eyes:  Negative for discharge, redness, itching and visual disturbance.        Denies diplopia, recent change in visual acuity, blurred vision, and night vision loss. Does not wear corrective lenses.   Respiratory:  Negative for cough, shortness of breath and wheezing.         Denies asthma, COPD, hemoptysis   Cardiovascular: Negative.    Gastrointestinal: Negative.    Endocrine: Negative.    Genitourinary:  Negative for difficulty urinating

## 2024-05-24 ENCOUNTER — TELEPHONE (OUTPATIENT)
Dept: PRIMARY CARE CLINIC | Age: 69
End: 2024-05-24

## 2024-05-24 RX ORDER — LORATADINE 10 MG/1
TABLET ORAL
COMMUNITY
Start: 2024-05-23

## 2024-05-24 NOTE — TELEPHONE ENCOUNTER
Pt calling she did stop her eliquis for a few days because she had blood in urine, it did help but when she tried to resume it - she has blood clots in urine that she can see in her catheter bag. Should she stop, can it be changed to another med or baby asa?    She also states she takes lasix bid and concerned that since she puts out so much urine can that effect her potassium. Should she be on potassium supplement or is blood work ok     advise

## 2024-05-24 NOTE — TELEPHONE ENCOUNTER
She should stop the Eliquis and start aspirin 81 mg daily.  Her K on Tuesday was on the high side 5.0. Losartan has the opposite effect as Lasix and causes kidneys to preserve K. If she needs to keep taking Lasix every day, we'll need to get another K level.  Right now K is high enough and she doesn't need extra

## 2024-05-31 ENCOUNTER — HOSPITAL ENCOUNTER (EMERGENCY)
Age: 69
Discharge: HOME | End: 2024-05-31
Attending: EMERGENCY MEDICINE
Payer: MEDICAID

## 2024-05-31 ENCOUNTER — APPOINTMENT (OUTPATIENT)
Dept: GENERAL RADIOLOGY | Age: 69
End: 2024-05-31
Payer: MEDICAID

## 2024-05-31 VITALS
TEMPERATURE: 99.4 F | BODY MASS INDEX: 34.23 KG/M2 | DIASTOLIC BLOOD PRESSURE: 61 MMHG | RESPIRATION RATE: 17 BRPM | OXYGEN SATURATION: 94 % | SYSTOLIC BLOOD PRESSURE: 109 MMHG | HEART RATE: 89 BPM | HEIGHT: 66 IN | WEIGHT: 213 LBS

## 2024-05-31 DIAGNOSIS — N39.0 URINARY TRACT INFECTION ASSOCIATED WITH INDWELLING URETHRAL CATHETER, INITIAL ENCOUNTER (HCC): Primary | ICD-10-CM

## 2024-05-31 DIAGNOSIS — T83.511A URINARY TRACT INFECTION ASSOCIATED WITH INDWELLING URETHRAL CATHETER, INITIAL ENCOUNTER (HCC): Primary | ICD-10-CM

## 2024-05-31 LAB
ALBUMIN SERPL-MCNC: 3.3 G/DL (ref 3.5–5.2)
ALP SERPL-CCNC: 96 U/L (ref 35–104)
ALT SERPL-CCNC: 17 U/L (ref 0–32)
ANION GAP SERPL CALCULATED.3IONS-SCNC: 12 MMOL/L (ref 7–16)
AST SERPL-CCNC: 29 U/L (ref 0–31)
BACTERIA URNS QL MICRO: ABNORMAL
BASOPHILS # BLD: 0 K/UL (ref 0–0.2)
BASOPHILS NFR BLD: 0 % (ref 0–2)
BILIRUB SERPL-MCNC: 0.6 MG/DL (ref 0–1.2)
BILIRUB UR QL STRIP: NEGATIVE
BUN SERPL-MCNC: 19 MG/DL (ref 6–23)
CALCIUM SERPL-MCNC: 9.3 MG/DL (ref 8.6–10.2)
CHLORIDE SERPL-SCNC: 101 MMOL/L (ref 98–107)
CLARITY UR: ABNORMAL
CO2 SERPL-SCNC: 24 MMOL/L (ref 22–29)
COLOR UR: YELLOW
CREAT SERPL-MCNC: 0.9 MG/DL (ref 0.5–1)
EOSINOPHIL # BLD: 0.52 K/UL (ref 0.05–0.5)
EOSINOPHILS RELATIVE PERCENT: 4 % (ref 0–6)
EPI CELLS #/AREA URNS HPF: ABNORMAL /HPF
ERYTHROCYTE [DISTWIDTH] IN BLOOD BY AUTOMATED COUNT: 13.6 % (ref 11.5–15)
GFR, ESTIMATED: 67 ML/MIN/1.73M2
GLUCOSE SERPL-MCNC: 111 MG/DL (ref 74–99)
GLUCOSE UR STRIP-MCNC: NEGATIVE MG/DL
HCT VFR BLD AUTO: 38.8 % (ref 34–48)
HGB BLD-MCNC: 13 G/DL (ref 11.5–15.5)
HGB UR QL STRIP.AUTO: ABNORMAL
KETONES UR STRIP-MCNC: NEGATIVE MG/DL
LACTATE BLDV-SCNC: 1.4 MMOL/L (ref 0.5–1.9)
LEUKOCYTE ESTERASE UR QL STRIP: ABNORMAL
LYMPHOCYTES NFR BLD: 2.09 K/UL (ref 1.5–4)
LYMPHOCYTES RELATIVE PERCENT: 14 % (ref 20–42)
MCH RBC QN AUTO: 31.9 PG (ref 26–35)
MCHC RBC AUTO-ENTMCNC: 33.5 G/DL (ref 32–34.5)
MCV RBC AUTO: 95.1 FL (ref 80–99.9)
MONOCYTES NFR BLD: 1.44 K/UL (ref 0.1–0.95)
MONOCYTES NFR BLD: 10 % (ref 2–12)
MUCOUS THREADS URNS QL MICRO: PRESENT
MYELOCYTES ABSOLUTE COUNT: 0.13 K/UL
MYELOCYTES: 1 %
NEUTROPHILS NFR BLD: 72 % (ref 43–80)
NEUTS SEG NFR BLD: 10.72 K/UL (ref 1.8–7.3)
NITRITE UR QL STRIP: NEGATIVE
PH UR STRIP: 8.5 [PH] (ref 5–9)
PLATELET, FLUORESCENCE: 394 K/UL (ref 130–450)
PMV BLD AUTO: 10.6 FL (ref 7–12)
POTASSIUM SERPL-SCNC: 5.1 MMOL/L (ref 3.5–5)
PROT SERPL-MCNC: 6.7 G/DL (ref 6.4–8.3)
PROT UR STRIP-MCNC: 300 MG/DL
RBC # BLD AUTO: 4.08 M/UL (ref 3.5–5.5)
RBC # BLD: NORMAL 10*6/UL
RBC #/AREA URNS HPF: ABNORMAL /HPF
SODIUM SERPL-SCNC: 137 MMOL/L (ref 132–146)
SP GR UR STRIP: 1.01 (ref 1–1.03)
UROBILINOGEN UR STRIP-ACNC: 0.2 EU/DL (ref 0–1)
WBC #/AREA URNS HPF: ABNORMAL /HPF
WBC OTHER # BLD: 15.5 K/UL (ref 4.5–11.5)

## 2024-05-31 PROCEDURE — 96374 THER/PROPH/DIAG INJ IV PUSH: CPT

## 2024-05-31 PROCEDURE — 87040 BLOOD CULTURE FOR BACTERIA: CPT

## 2024-05-31 PROCEDURE — 2580000003 HC RX 258: Performed by: EMERGENCY MEDICINE

## 2024-05-31 PROCEDURE — 85025 COMPLETE CBC W/AUTO DIFF WBC: CPT

## 2024-05-31 PROCEDURE — 6370000000 HC RX 637 (ALT 250 FOR IP): Performed by: EMERGENCY MEDICINE

## 2024-05-31 PROCEDURE — 71045 X-RAY EXAM CHEST 1 VIEW: CPT

## 2024-05-31 PROCEDURE — 80053 COMPREHEN METABOLIC PANEL: CPT

## 2024-05-31 PROCEDURE — 99284 EMERGENCY DEPT VISIT MOD MDM: CPT

## 2024-05-31 PROCEDURE — 6360000002 HC RX W HCPCS: Performed by: EMERGENCY MEDICINE

## 2024-05-31 PROCEDURE — 81015 MICROSCOPIC EXAM OF URINE: CPT

## 2024-05-31 PROCEDURE — 83605 ASSAY OF LACTIC ACID: CPT

## 2024-05-31 RX ORDER — CEFDINIR 300 MG/1
300 CAPSULE ORAL 2 TIMES DAILY
Qty: 14 CAPSULE | Refills: 0 | Status: SHIPPED | OUTPATIENT
Start: 2024-05-31 | End: 2024-06-07

## 2024-05-31 RX ORDER — ACETAMINOPHEN 500 MG
1000 TABLET ORAL ONCE
Status: COMPLETED | OUTPATIENT
Start: 2024-05-31 | End: 2024-05-31

## 2024-05-31 RX ADMIN — ACETAMINOPHEN 1000 MG: 500 TABLET ORAL at 14:41

## 2024-05-31 RX ADMIN — WATER 1000 MG: 1 INJECTION INTRAMUSCULAR; INTRAVENOUS; SUBCUTANEOUS at 11:26

## 2024-05-31 ASSESSMENT — PAIN - FUNCTIONAL ASSESSMENT: PAIN_FUNCTIONAL_ASSESSMENT: NONE - DENIES PAIN

## 2024-05-31 ASSESSMENT — ENCOUNTER SYMPTOMS
CHEST TIGHTNESS: 0
ABDOMINAL PAIN: 0
SHORTNESS OF BREATH: 0

## 2024-05-31 ASSESSMENT — PAIN SCALES - GENERAL: PAINLEVEL_OUTOF10: 10

## 2024-05-31 NOTE — ED PROVIDER NOTES
LMP  (LMP Unknown)   SpO2 96%   BMI 34.38 kg/m²   Oxygen Saturation Interpretation: Normal      ------------------------------------------ PROGRESS NOTES ------------------------------------------  I have spoken with the patient and discussed today’s results, in addition to providing specific details for the plan of care and counseling regarding the diagnosis and prognosis.  Their questions are answered at this time and they are agreeable with the plan. I discussed at length with them reasons for immediate return here for re evaluation. They will followup with primary care by calling their office tomorrow.      --------------------------------- ADDITIONAL PROVIDER NOTES ---------------------------------  At this time the patient is without objective evidence of an acute process requiring hospitalization or inpatient management.  They have remained hemodynamically stable throughout their entire ED visit and are stable for discharge with outpatient follow-up.     The plan has been discussed in detail and they are aware of the specific conditions for emergent return, as well as the importance of follow-up.      New Prescriptions    CEFDINIR (OMNICEF) 300 MG CAPSULE    Take 1 capsule by mouth 2 times daily for 7 days       Diagnosis:  1. Urinary tract infection associated with indwelling urethral catheter, initial encounter ([facility])        Disposition:  Patient's disposition: Discharge to home  Patient's condition is stable.              Biju Jeronimo DO  05/31/24 2824

## 2024-06-02 LAB
MICROORGANISM SPEC CULT: NORMAL
MICROORGANISM SPEC CULT: NORMAL
SERVICE CMNT-IMP: NORMAL
SERVICE CMNT-IMP: NORMAL
SPECIMEN DESCRIPTION: NORMAL
SPECIMEN DESCRIPTION: NORMAL

## 2024-06-05 ENCOUNTER — TELEPHONE (OUTPATIENT)
Dept: PRIMARY CARE CLINIC | Age: 69
End: 2024-06-05

## 2024-06-05 NOTE — TELEPHONE ENCOUNTER
Patient phoned stating her legs are starting to swell a little bit.  She would like to restart Lasix 10mg (she cuts the 40mg in quarters).  She states they removed her catheter and is able to urinate on her own but its slow.  She also would like to know if restarting the Lasix will damage her kidneys in any way because she is going to have a procedure on her kidney 6/21/2024.    Please advise.

## 2024-06-11 ENCOUNTER — OFFICE VISIT (OUTPATIENT)
Dept: PRIMARY CARE CLINIC | Age: 69
End: 2024-06-11
Payer: MEDICAID

## 2024-06-11 ENCOUNTER — PREP FOR PROCEDURE (OUTPATIENT)
Dept: UROLOGY | Age: 69
End: 2024-06-11

## 2024-06-11 VITALS
TEMPERATURE: 97.7 F | WEIGHT: 213 LBS | HEART RATE: 67 BPM | OXYGEN SATURATION: 100 % | BODY MASS INDEX: 34.23 KG/M2 | RESPIRATION RATE: 16 BRPM | SYSTOLIC BLOOD PRESSURE: 120 MMHG | HEIGHT: 66 IN | DIASTOLIC BLOOD PRESSURE: 80 MMHG

## 2024-06-11 DIAGNOSIS — D49.4 BLADDER TUMOR: Primary | ICD-10-CM

## 2024-06-11 DIAGNOSIS — Z01.818 PRE-OP EXAM: ICD-10-CM

## 2024-06-11 DIAGNOSIS — R60.0 EDEMA OF BOTH LEGS: ICD-10-CM

## 2024-06-11 DIAGNOSIS — N39.0 RECURRENT UTI: ICD-10-CM

## 2024-06-11 DIAGNOSIS — N32.89 BLADDER MASS: ICD-10-CM

## 2024-06-11 DIAGNOSIS — G35 MULTIPLE SCLEROSIS (HCC): Primary | ICD-10-CM

## 2024-06-11 DIAGNOSIS — G82.20 PARAPLEGIA (HCC): ICD-10-CM

## 2024-06-11 PROBLEM — T24.211A PARTIAL THICKNESS BURN OF RIGHT THIGH: Status: RESOLVED | Noted: 2023-11-03 | Resolved: 2024-06-11

## 2024-06-11 PROBLEM — J18.9 PNEUMONIA DUE TO INFECTIOUS ORGANISM: Status: RESOLVED | Noted: 2024-02-25 | Resolved: 2024-06-11

## 2024-06-11 PROCEDURE — 3079F DIAST BP 80-89 MM HG: CPT | Performed by: FAMILY MEDICINE

## 2024-06-11 PROCEDURE — 3074F SYST BP LT 130 MM HG: CPT | Performed by: FAMILY MEDICINE

## 2024-06-11 PROCEDURE — 93000 ELECTROCARDIOGRAM COMPLETE: CPT | Performed by: FAMILY MEDICINE

## 2024-06-11 PROCEDURE — 99214 OFFICE O/P EST MOD 30 MIN: CPT | Performed by: FAMILY MEDICINE

## 2024-06-11 PROCEDURE — 1123F ACP DISCUSS/DSCN MKR DOCD: CPT | Performed by: FAMILY MEDICINE

## 2024-06-11 RX ORDER — SODIUM CHLORIDE 0.9 % (FLUSH) 0.9 %
5-40 SYRINGE (ML) INJECTION PRN
Status: CANCELLED | OUTPATIENT
Start: 2024-06-11

## 2024-06-11 RX ORDER — SODIUM CHLORIDE 9 MG/ML
INJECTION, SOLUTION INTRAVENOUS PRN
Status: CANCELLED | OUTPATIENT
Start: 2024-06-11

## 2024-06-11 RX ORDER — SODIUM CHLORIDE 9 MG/ML
INJECTION, SOLUTION INTRAVENOUS CONTINUOUS
Status: CANCELLED | OUTPATIENT
Start: 2024-06-11

## 2024-06-11 RX ORDER — CEFDINIR 300 MG/1
300 CAPSULE ORAL 2 TIMES DAILY
Qty: 14 CAPSULE | Refills: 0 | Status: SHIPPED | OUTPATIENT
Start: 2024-06-11 | End: 2024-06-18

## 2024-06-11 RX ORDER — SODIUM CHLORIDE 0.9 % (FLUSH) 0.9 %
5-40 SYRINGE (ML) INJECTION EVERY 12 HOURS SCHEDULED
Status: CANCELLED | OUTPATIENT
Start: 2024-06-11

## 2024-06-11 NOTE — PROGRESS NOTES
6/11/2024     Chief Complaint   Patient presents with    Pre-op Exam    Vaginitis    Urinary Tract Infection     HPI:  Patient comes in today for preop evaluation.  She is scheduled for cystoscopy, retrograde pyelogram, bladder biopsy and fulguration with gemcitabine instillation for treatment of bladder cancer.  She says she is doing pretty good except for some swelling of her lower extremities.  She does have a wound on her left posterior calf which she keeps gauze on it because it leaks serous fluid.  Otherwise she is not having any difficulty with breathing, chest pain, abdominal pain or constipation.  She does feel like she has another UTI and possibly a yeast infection.  She does have a appointment set up with gastroenterology for evaluation and colonoscopy.    REVIEW OF SYSTEMS: General: Weight stable, generally healthy, no change in strength or exercise tolerance. Heart: No palpitations, no syncope, no orthopnea. EDEMA OF LEGS AT TIMES.  Abdomen: Chronic constipation. No change in appetite, no dysphagia, no abdominal pains, no bowel habit changes, no emesis, no melena. : No urinary urgency, no dysuria, no change in nature of urine. Neurologic:   Unable to walk without holding on to something. In w/c most of the time.  No tremor, no seizures, no changes in mentation.   All other systems negative.       PAST MEDICAL HISTORY: (Major events, hospitalizations, surgeries): Pneumonia (2010), 1998 Vag hyst, 1978 naina, append, freq epidural inclusion cysts tx'd with docycycline.   MS dx'd Mar 8, 1991.   IV corticosteroids 1991-92.   Has never been on MS meds. 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   Known allergies: NKDA.   Ongoing medical problems: Multiple sclerosis, Asthma, HTN, hypoglycemia, scoliosis, DDD with sciatic, arthritis, osteoporosis, chronic LBP.  Preventative: COVID vac - 3/22/21, 4/19134/21 (Refuses booster),  Pneumovax 23 - 11/2009. Prevnar - 10/2019. Flu vac - 10/2020 (refused

## 2024-06-12 DIAGNOSIS — N39.0 RECURRENT UTI: ICD-10-CM

## 2024-06-12 LAB
BACTERIA: ABNORMAL
BILIRUBIN, URINE: NEGATIVE
COLOR: YELLOW
CRYSTALS, UA: ABNORMAL /HPF
GLUCOSE URINE: NEGATIVE MG/DL
KETONES, URINE: NEGATIVE MG/DL
LEUKOCYTE ESTERASE, URINE: ABNORMAL
NITRITE, URINE: NEGATIVE
PH, URINE: 8.5 (ref 5–9)
PROTEIN UA: 100 MG/DL
RBC UA: ABNORMAL /HPF
SPECIFIC GRAVITY UA: 1.01 (ref 1–1.03)
TURBIDITY: ABNORMAL
URINE HGB: ABNORMAL
UROBILINOGEN, URINE: 0.2 EU/DL (ref 0–1)
WBC UA: ABNORMAL /HPF

## 2024-06-13 LAB
CULTURE: NORMAL
SPECIMEN DESCRIPTION: NORMAL

## 2024-06-17 ENCOUNTER — TELEPHONE (OUTPATIENT)
Dept: PRIMARY CARE CLINIC | Age: 69
End: 2024-06-17

## 2024-06-17 RX ORDER — ONDANSETRON 8 MG/1
8 TABLET, ORALLY DISINTEGRATING ORAL EVERY 8 HOURS PRN
Qty: 20 TABLET | Refills: 1 | Status: ON HOLD | OUTPATIENT
Start: 2024-06-17

## 2024-06-17 NOTE — TELEPHONE ENCOUNTER
Pt calling she is having a lot of nausea, she is still taking antibiotic for UTI not much help. She states she still running low temp 98.3 which is up for her she usually 97. can you send rx for nausea pill to [facility]

## 2024-06-17 NOTE — TELEPHONE ENCOUNTER
Okay. I sent eRx to [facility] for Zofran.  She needs to make sure she maintains good fluid intake.

## 2024-06-18 NOTE — PROGRESS NOTES
[facility] PRE-ADMISSION TESTING INSTRUCTIONS    The Preadmission Testing patient is instructed accordingly using the following criteria (check applicable):    ARRIVAL INSTRUCTIONS:  [x] Parking the day of Surgery is located in the Main Entrance lot.  Upon entering the door, make an immediate right to the surgery reception desk    [x] Bring photo ID and insurance card    [x] Bring in a copy of Living will or Durable Power of  papers.    [x] Please be sure to arrange for a responsible adult to provide transportation to and from the hospital    [x] Please arrange for a responsible adult to be with you for the 24 hour period post procedure due to having anesthesia    [x] If you awake am of surgery not feeling well or have temperature >100 please call [phone]    GENERAL INSTRUCTIONS:    [x] No solid foods after midnight, may have up to 8oz of water until 4 hours prior to surgery. No gum, no candy, no mints. NPO time:       [x] You may brush your teeth, do not swallow any toothpaste    [x] Take medications as instructed     [x] Stop herbal supplements and vitamins 5 days prior to procedure    [] Follow preop dosing of blood thinners per physician instructions    [] Take 1/2 dose of evening insulin, but no insulin after midnight    [] No oral diabetic medications after midnight    [] If diabetic and have low blood sugar or feel symptomatic, take 1-2oz apple juice only    [] Bring inhalers day of surgery    [] Bring urine specimen day of surgery    [x] Shower or bath with soap, lather and rinse well, AM of Surgery, no lotion, powders or creams to surgical site    [] Follow bowel prep as instructed per surgeon    [x] No tobacco products within 24 hours of surgery     [x] No alcohol or illegal drug use, marijuana included, within 24 hours of surgery.    [x] Jewelry, body piercing's, eyeglasses, contact lenses and dentures are not permitted into surgery (bring cases)      [x] Please do

## 2024-06-21 ENCOUNTER — ANESTHESIA (OUTPATIENT)
Dept: OPERATING ROOM | Age: 69
End: 2024-06-21
Payer: MEDICAID

## 2024-06-21 ENCOUNTER — HOSPITAL ENCOUNTER (INPATIENT)
Age: 69
LOS: 6 days | Discharge: NURSING, REHAB OR LONG-TERM CARE | DRG: 461 | End: 2024-06-28
Attending: UROLOGY | Admitting: UROLOGY
Payer: MEDICAID

## 2024-06-21 ENCOUNTER — HOSPITAL ENCOUNTER (OUTPATIENT)
Dept: GENERAL RADIOLOGY | Age: 69
Setting detail: OUTPATIENT SURGERY
Discharge: HOME | End: 2024-06-23
Attending: UROLOGY
Payer: MEDICAID

## 2024-06-21 ENCOUNTER — ANESTHESIA EVENT (OUTPATIENT)
Dept: OPERATING ROOM | Age: 69
End: 2024-06-21
Payer: MEDICAID

## 2024-06-21 DIAGNOSIS — R52 PAIN: ICD-10-CM

## 2024-06-21 DIAGNOSIS — C67.9 MALIGNANT NEOPLASM OF URINARY BLADDER, UNSPECIFIED SITE (HCC): Primary | ICD-10-CM

## 2024-06-21 DIAGNOSIS — E86.1 HYPOTENSION DUE TO HYPOVOLEMIA: ICD-10-CM

## 2024-06-21 PROBLEM — R65.10 SIRS (SYSTEMIC INFLAMMATORY RESPONSE SYNDROME) (HCC): Status: ACTIVE | Noted: 2024-06-21

## 2024-06-21 PROBLEM — N39.0 AT RISK FOR SEPSIS DUE TO URINARY TRACT INFECTION: Status: ACTIVE | Noted: 2024-06-21

## 2024-06-21 PROBLEM — Z91.89 AT RISK FOR SEPSIS DUE TO URINARY TRACT INFECTION: Status: ACTIVE | Noted: 2024-06-21

## 2024-06-21 PROBLEM — R65.21 SEPTIC SHOCK (HCC): Status: ACTIVE | Noted: 2020-12-23

## 2024-06-21 LAB
ALBUMIN SERPL-MCNC: 3.6 G/DL (ref 3.5–5.2)
ALP SERPL-CCNC: 101 U/L (ref 35–104)
ALT SERPL-CCNC: 24 U/L (ref 0–32)
ANION GAP SERPL CALCULATED.3IONS-SCNC: 14 MMOL/L (ref 7–16)
AST SERPL-CCNC: 34 U/L (ref 0–31)
BASOPHILS # BLD: 0.06 K/UL (ref 0–0.2)
BASOPHILS NFR BLD: 0 % (ref 0–2)
BILIRUB SERPL-MCNC: 0.6 MG/DL (ref 0–1.2)
BUN SERPL-MCNC: 13 MG/DL (ref 6–23)
CALCIUM SERPL-MCNC: 9 MG/DL (ref 8.6–10.2)
CHLORIDE SERPL-SCNC: 103 MMOL/L (ref 98–107)
CO2 SERPL-SCNC: 22 MMOL/L (ref 22–29)
CREAT SERPL-MCNC: 1 MG/DL (ref 0.5–1)
EOSINOPHIL # BLD: 0 K/UL (ref 0.05–0.5)
EOSINOPHILS RELATIVE PERCENT: 0 % (ref 0–6)
ERYTHROCYTE [DISTWIDTH] IN BLOOD BY AUTOMATED COUNT: 15 % (ref 11.5–15)
GFR, ESTIMATED: 58 ML/MIN/1.73M2
GLUCOSE BLD-MCNC: 170 MG/DL (ref 74–99)
GLUCOSE SERPL-MCNC: 168 MG/DL (ref 74–99)
HCT VFR BLD AUTO: 41.9 % (ref 34–48)
HGB BLD-MCNC: 13.3 G/DL (ref 11.5–15.5)
IMM GRANULOCYTES # BLD AUTO: 0.21 K/UL (ref 0–0.58)
IMM GRANULOCYTES NFR BLD: 1 % (ref 0–5)
LACTATE BLDV-SCNC: 4.4 MMOL/L (ref 0.5–2.2)
LYMPHOCYTES NFR BLD: 0.25 K/UL (ref 1.5–4)
LYMPHOCYTES RELATIVE PERCENT: 1 % (ref 20–42)
MCH RBC QN AUTO: 32.8 PG (ref 26–35)
MCHC RBC AUTO-ENTMCNC: 31.7 G/DL (ref 32–34.5)
MCV RBC AUTO: 103.2 FL (ref 80–99.9)
MONOCYTES NFR BLD: 0.46 K/UL (ref 0.1–0.95)
MONOCYTES NFR BLD: 2 % (ref 2–12)
NEUTROPHILS NFR BLD: 96 % (ref 43–80)
NEUTS SEG NFR BLD: 23.99 K/UL (ref 1.8–7.3)
PLATELET # BLD AUTO: 347 K/UL (ref 130–450)
PMV BLD AUTO: 9.5 FL (ref 7–12)
POTASSIUM SERPL-SCNC: 3.9 MMOL/L (ref 3.5–5)
PROT SERPL-MCNC: 6.8 G/DL (ref 6.4–8.3)
RBC # BLD AUTO: 4.06 M/UL (ref 3.5–5.5)
RBC # BLD: NORMAL 10*6/UL
SODIUM SERPL-SCNC: 139 MMOL/L (ref 132–146)
WBC OTHER # BLD: 25 K/UL (ref 4.5–11.5)

## 2024-06-21 PROCEDURE — 3600000002 HC SURGERY LEVEL 2 BASE: Performed by: UROLOGY

## 2024-06-21 PROCEDURE — 0TBB8ZX EXCISION OF BLADDER, VIA NATURAL OR ARTIFICIAL OPENING ENDOSCOPIC, DIAGNOSTIC: ICD-10-PCS | Performed by: UROLOGY

## 2024-06-21 PROCEDURE — 7100000001 HC PACU RECOVERY - ADDTL 15 MIN: Performed by: UROLOGY

## 2024-06-21 PROCEDURE — 83605 ASSAY OF LACTIC ACID: CPT

## 2024-06-21 PROCEDURE — 87154 CUL TYP ID BLD PTHGN 6+ TRGT: CPT

## 2024-06-21 PROCEDURE — 6360000002 HC RX W HCPCS: Performed by: NURSE ANESTHETIST, CERTIFIED REGISTERED

## 2024-06-21 PROCEDURE — 86140 C-REACTIVE PROTEIN: CPT

## 2024-06-21 PROCEDURE — 3600000012 HC SURGERY LEVEL 2 ADDTL 15MIN: Performed by: UROLOGY

## 2024-06-21 PROCEDURE — 7100000011 HC PHASE II RECOVERY - ADDTL 15 MIN: Performed by: UROLOGY

## 2024-06-21 PROCEDURE — C1769 GUIDE WIRE: HCPCS | Performed by: UROLOGY

## 2024-06-21 PROCEDURE — C1758 CATHETER, URETERAL: HCPCS | Performed by: UROLOGY

## 2024-06-21 PROCEDURE — 2500000003 HC RX 250 WO HCPCS: Performed by: NURSE ANESTHETIST, CERTIFIED REGISTERED

## 2024-06-21 PROCEDURE — 2580000003 HC RX 258: Performed by: UROLOGY

## 2024-06-21 PROCEDURE — 85025 COMPLETE CBC W/AUTO DIFF WBC: CPT

## 2024-06-21 PROCEDURE — 87040 BLOOD CULTURE FOR BACTERIA: CPT

## 2024-06-21 PROCEDURE — 2580000003 HC RX 258: Performed by: STUDENT IN AN ORGANIZED HEALTH CARE EDUCATION/TRAINING PROGRAM

## 2024-06-21 PROCEDURE — 6360000002 HC RX W HCPCS: Performed by: UROLOGY

## 2024-06-21 PROCEDURE — 2500000003 HC RX 250 WO HCPCS: Performed by: UROLOGY

## 2024-06-21 PROCEDURE — 84145 PROCALCITONIN (PCT): CPT

## 2024-06-21 PROCEDURE — 96372 THER/PROPH/DIAG INJ SC/IM: CPT

## 2024-06-21 PROCEDURE — 6370000000 HC RX 637 (ALT 250 FOR IP): Performed by: UROLOGY

## 2024-06-21 PROCEDURE — 7100000000 HC PACU RECOVERY - FIRST 15 MIN: Performed by: UROLOGY

## 2024-06-21 PROCEDURE — 82962 GLUCOSE BLOOD TEST: CPT

## 2024-06-21 PROCEDURE — 74420 UROGRAPHY RTRGR +-KUB: CPT

## 2024-06-21 PROCEDURE — 84484 ASSAY OF TROPONIN QUANT: CPT

## 2024-06-21 PROCEDURE — G0378 HOSPITAL OBSERVATION PER HR: HCPCS

## 2024-06-21 PROCEDURE — 93005 ELECTROCARDIOGRAM TRACING: CPT | Performed by: STUDENT IN AN ORGANIZED HEALTH CARE EDUCATION/TRAINING PROGRAM

## 2024-06-21 PROCEDURE — 2720000010 HC SURG SUPPLY STERILE: Performed by: UROLOGY

## 2024-06-21 PROCEDURE — 88305 TISSUE EXAM BY PATHOLOGIST: CPT

## 2024-06-21 PROCEDURE — 3700000000 HC ANESTHESIA ATTENDED CARE: Performed by: UROLOGY

## 2024-06-21 PROCEDURE — 0TB78ZX EXCISION OF LEFT URETER, VIA NATURAL OR ARTIFICIAL OPENING ENDOSCOPIC, DIAGNOSTIC: ICD-10-PCS | Performed by: UROLOGY

## 2024-06-21 PROCEDURE — 87077 CULTURE AEROBIC IDENTIFY: CPT

## 2024-06-21 PROCEDURE — 6370000000 HC RX 637 (ALT 250 FOR IP): Performed by: STUDENT IN AN ORGANIZED HEALTH CARE EDUCATION/TRAINING PROGRAM

## 2024-06-21 PROCEDURE — 2580000003 HC RX 258

## 2024-06-21 PROCEDURE — 6360000002 HC RX W HCPCS: Performed by: ANESTHESIOLOGY

## 2024-06-21 PROCEDURE — 80053 COMPREHEN METABOLIC PANEL: CPT

## 2024-06-21 PROCEDURE — 2709999900 HC NON-CHARGEABLE SUPPLY: Performed by: UROLOGY

## 2024-06-21 PROCEDURE — 6360000002 HC RX W HCPCS

## 2024-06-21 PROCEDURE — 87086 URINE CULTURE/COLONY COUNT: CPT

## 2024-06-21 PROCEDURE — BT141ZZ FLUOROSCOPY OF KIDNEYS, URETERS AND BLADDER USING LOW OSMOLAR CONTRAST: ICD-10-PCS | Performed by: UROLOGY

## 2024-06-21 PROCEDURE — 0TP98DZ REMOVAL OF INTRALUMINAL DEVICE FROM URETER, VIA NATURAL OR ARTIFICIAL OPENING ENDOSCOPIC: ICD-10-PCS | Performed by: UROLOGY

## 2024-06-21 PROCEDURE — 3700000001 HC ADD 15 MINUTES (ANESTHESIA): Performed by: UROLOGY

## 2024-06-21 PROCEDURE — 2700000000 HC OXYGEN THERAPY PER DAY

## 2024-06-21 PROCEDURE — 82533 TOTAL CORTISOL: CPT

## 2024-06-21 PROCEDURE — 7100000010 HC PHASE II RECOVERY - FIRST 15 MIN: Performed by: UROLOGY

## 2024-06-21 RX ORDER — SODIUM CHLORIDE 9 MG/ML
INJECTION, SOLUTION INTRAVENOUS CONTINUOUS
Status: DISCONTINUED | OUTPATIENT
Start: 2024-06-21 | End: 2024-06-21 | Stop reason: HOSPADM

## 2024-06-21 RX ORDER — IBUPROFEN 800 MG/1
800 TABLET ORAL EVERY 8 HOURS PRN
Status: DISCONTINUED | OUTPATIENT
Start: 2024-06-21 | End: 2024-06-22

## 2024-06-21 RX ORDER — PHENAZOPYRIDINE HYDROCHLORIDE 100 MG/1
100 TABLET, FILM COATED ORAL 3 TIMES DAILY PRN
Status: DISCONTINUED | OUTPATIENT
Start: 2024-06-21 | End: 2024-06-28 | Stop reason: HOSPADM

## 2024-06-21 RX ORDER — DEXTROSE MONOHYDRATE, SODIUM CHLORIDE, AND POTASSIUM CHLORIDE 50; 1.49; 4.5 G/1000ML; G/1000ML; G/1000ML
INJECTION, SOLUTION INTRAVENOUS CONTINUOUS
Status: DISCONTINUED | OUTPATIENT
Start: 2024-06-21 | End: 2024-06-22

## 2024-06-21 RX ORDER — LOSARTAN POTASSIUM 25 MG/1
25 TABLET ORAL NIGHTLY
Status: DISCONTINUED | OUTPATIENT
Start: 2024-06-22 | End: 2024-06-28 | Stop reason: HOSPADM

## 2024-06-21 RX ORDER — ALBUTEROL SULFATE 90 UG/1
2 AEROSOL, METERED RESPIRATORY (INHALATION) EVERY 4 HOURS PRN
Status: DISCONTINUED | OUTPATIENT
Start: 2024-06-21 | End: 2024-06-21 | Stop reason: CLARIF

## 2024-06-21 RX ORDER — LANOLIN ALCOHOL/MO/W.PET/CERES
400 CREAM (GRAM) TOPICAL DAILY
Status: DISCONTINUED | OUTPATIENT
Start: 2024-06-21 | End: 2024-06-28 | Stop reason: HOSPADM

## 2024-06-21 RX ORDER — ENOXAPARIN SODIUM 100 MG/ML
40 INJECTION SUBCUTANEOUS NIGHTLY
Status: DISCONTINUED | OUTPATIENT
Start: 2024-06-21 | End: 2024-06-22

## 2024-06-21 RX ORDER — CIPROFLOXACIN 2 MG/ML
400 INJECTION, SOLUTION INTRAVENOUS EVERY 12 HOURS
Status: DISCONTINUED | OUTPATIENT
Start: 2024-06-21 | End: 2024-06-22

## 2024-06-21 RX ORDER — SODIUM CHLORIDE 0.9 % (FLUSH) 0.9 %
5-40 SYRINGE (ML) INJECTION EVERY 12 HOURS SCHEDULED
Status: DISCONTINUED | OUTPATIENT
Start: 2024-06-21 | End: 2024-06-28 | Stop reason: HOSPADM

## 2024-06-21 RX ORDER — ARFORMOTEROL TARTRATE 15 UG/2ML
15 SOLUTION RESPIRATORY (INHALATION)
Status: DISCONTINUED | OUTPATIENT
Start: 2024-06-21 | End: 2024-06-28 | Stop reason: HOSPADM

## 2024-06-21 RX ORDER — CIPROFLOXACIN 500 MG/1
500 TABLET, FILM COATED ORAL 2 TIMES DAILY
Qty: 10 TABLET | Refills: 0 | Status: SHIPPED | OUTPATIENT
Start: 2024-06-21 | End: 2024-06-28 | Stop reason: HOSPADM

## 2024-06-21 RX ORDER — ALBUTEROL SULFATE 2.5 MG/3ML
2.5 SOLUTION RESPIRATORY (INHALATION) EVERY 4 HOURS PRN
Status: DISCONTINUED | OUTPATIENT
Start: 2024-06-21 | End: 2024-06-28 | Stop reason: HOSPADM

## 2024-06-21 RX ORDER — OXYCODONE HYDROCHLORIDE AND ACETAMINOPHEN 5; 325 MG/1; MG/1
2 TABLET ORAL EVERY 4 HOURS PRN
Status: DISCONTINUED | OUTPATIENT
Start: 2024-06-21 | End: 2024-06-26

## 2024-06-21 RX ORDER — SODIUM CHLORIDE 9 MG/ML
INJECTION, SOLUTION INTRAVENOUS PRN
Status: DISCONTINUED | OUTPATIENT
Start: 2024-06-21 | End: 2024-06-28 | Stop reason: HOSPADM

## 2024-06-21 RX ORDER — PROPOFOL 10 MG/ML
INJECTION, EMULSION INTRAVENOUS CONTINUOUS PRN
Status: DISCONTINUED | OUTPATIENT
Start: 2024-06-21 | End: 2024-06-21 | Stop reason: SDUPTHER

## 2024-06-21 RX ORDER — SODIUM CHLORIDE 9 MG/ML
INJECTION, SOLUTION INTRAVENOUS CONTINUOUS
Status: DISCONTINUED | OUTPATIENT
Start: 2024-06-21 | End: 2024-06-22

## 2024-06-21 RX ORDER — MEPERIDINE HYDROCHLORIDE 25 MG/ML
12.5 INJECTION INTRAMUSCULAR; INTRAVENOUS; SUBCUTANEOUS
Status: COMPLETED | OUTPATIENT
Start: 2024-06-21 | End: 2024-06-21

## 2024-06-21 RX ORDER — 0.9 % SODIUM CHLORIDE 0.9 %
2000 INTRAVENOUS SOLUTION INTRAVENOUS ONCE
Status: COMPLETED | OUTPATIENT
Start: 2024-06-21 | End: 2024-06-21

## 2024-06-21 RX ORDER — SODIUM CHLORIDE 0.9 % (FLUSH) 0.9 %
5-40 SYRINGE (ML) INJECTION PRN
Status: DISCONTINUED | OUTPATIENT
Start: 2024-06-21 | End: 2024-06-21 | Stop reason: HOSPADM

## 2024-06-21 RX ORDER — SODIUM CHLORIDE 9 MG/ML
INJECTION, SOLUTION INTRAVENOUS PRN
Status: DISCONTINUED | OUTPATIENT
Start: 2024-06-21 | End: 2024-06-21 | Stop reason: HOSPADM

## 2024-06-21 RX ORDER — SODIUM CHLORIDE 0.9 % (FLUSH) 0.9 %
5-40 SYRINGE (ML) INJECTION EVERY 12 HOURS SCHEDULED
Status: DISCONTINUED | OUTPATIENT
Start: 2024-06-21 | End: 2024-06-21 | Stop reason: HOSPADM

## 2024-06-21 RX ORDER — SODIUM CHLORIDE 0.9 % (FLUSH) 0.9 %
5-40 SYRINGE (ML) INJECTION PRN
Status: DISCONTINUED | OUTPATIENT
Start: 2024-06-21 | End: 2024-06-28 | Stop reason: HOSPADM

## 2024-06-21 RX ORDER — BUDESONIDE AND FORMOTEROL FUMARATE DIHYDRATE 160; 4.5 UG/1; UG/1
2 AEROSOL RESPIRATORY (INHALATION) 2 TIMES DAILY
Status: DISCONTINUED | OUTPATIENT
Start: 2024-06-21 | End: 2024-06-21 | Stop reason: CLARIF

## 2024-06-21 RX ORDER — OXYCODONE HYDROCHLORIDE AND ACETAMINOPHEN 5; 325 MG/1; MG/1
1 TABLET ORAL EVERY 4 HOURS PRN
Status: DISCONTINUED | OUTPATIENT
Start: 2024-06-21 | End: 2024-06-26

## 2024-06-21 RX ORDER — FENTANYL CITRATE 50 UG/ML
INJECTION, SOLUTION INTRAMUSCULAR; INTRAVENOUS PRN
Status: DISCONTINUED | OUTPATIENT
Start: 2024-06-21 | End: 2024-06-21 | Stop reason: SDUPTHER

## 2024-06-21 RX ORDER — BUDESONIDE 0.5 MG/2ML
0.5 INHALANT ORAL
Status: DISCONTINUED | OUTPATIENT
Start: 2024-06-21 | End: 2024-06-28 | Stop reason: HOSPADM

## 2024-06-21 RX ORDER — LIDOCAINE HYDROCHLORIDE 20 MG/ML
INJECTION, SOLUTION INFILTRATION; PERINEURAL PRN
Status: DISCONTINUED | OUTPATIENT
Start: 2024-06-21 | End: 2024-06-21 | Stop reason: SDUPTHER

## 2024-06-21 RX ORDER — MEPERIDINE HYDROCHLORIDE 25 MG/ML
INJECTION INTRAMUSCULAR; INTRAVENOUS; SUBCUTANEOUS
Status: COMPLETED
Start: 2024-06-21 | End: 2024-06-21

## 2024-06-21 RX ADMIN — MEPERIDINE HYDROCHLORIDE 12.5 MG: 25 INJECTION INTRAMUSCULAR; INTRAVENOUS; SUBCUTANEOUS at 12:30

## 2024-06-21 RX ADMIN — SODIUM CHLORIDE: 9 INJECTION, SOLUTION INTRAVENOUS at 10:47

## 2024-06-21 RX ADMIN — LIDOCAINE HYDROCHLORIDE 100 MG: 20 INJECTION, SOLUTION INFILTRATION; PERINEURAL at 11:07

## 2024-06-21 RX ADMIN — PROPOFOL 30 MG: 10 INJECTION, EMULSION INTRAVENOUS at 11:06

## 2024-06-21 RX ADMIN — POTASSIUM CHLORIDE, DEXTROSE MONOHYDRATE AND SODIUM CHLORIDE: 150; 5; 450 INJECTION, SOLUTION INTRAVENOUS at 15:50

## 2024-06-21 RX ADMIN — SODIUM CHLORIDE: 9 INJECTION, SOLUTION INTRAVENOUS at 19:24

## 2024-06-21 RX ADMIN — FENTANYL CITRATE 25 MCG: 50 INJECTION, SOLUTION INTRAMUSCULAR; INTRAVENOUS at 11:07

## 2024-06-21 RX ADMIN — MAGNESIUM OXIDE 400 MG (241.3 MG MAGNESIUM) TABLET 400 MG: TABLET at 22:33

## 2024-06-21 RX ADMIN — SODIUM CHLORIDE, PRESERVATIVE FREE 10 ML: 5 INJECTION INTRAVENOUS at 22:34

## 2024-06-21 RX ADMIN — CIPROFLOXACIN 400 MG: 400 INJECTION, SOLUTION INTRAVENOUS at 19:19

## 2024-06-21 RX ADMIN — SODIUM CHLORIDE 1000 ML: 9 INJECTION, SOLUTION INTRAVENOUS at 20:31

## 2024-06-21 RX ADMIN — MEPERIDINE HYDROCHLORIDE 12.5 MG: 25 INJECTION INTRAMUSCULAR; INTRAVENOUS; SUBCUTANEOUS at 12:57

## 2024-06-21 RX ADMIN — ENOXAPARIN SODIUM 40 MG: 100 INJECTION SUBCUTANEOUS at 22:34

## 2024-06-21 RX ADMIN — PROPOFOL 150 MCG/KG/MIN: 10 INJECTION, EMULSION INTRAVENOUS at 11:07

## 2024-06-21 RX ADMIN — SODIUM CHLORIDE: 9 INJECTION, SOLUTION INTRAVENOUS at 20:59

## 2024-06-21 RX ADMIN — WATER 2000 MG: 1 INJECTION INTRAMUSCULAR; INTRAVENOUS; SUBCUTANEOUS at 10:50

## 2024-06-21 RX ADMIN — PHENAZOPYRIDINE HYDROCHLORIDE 100 MG: 100 TABLET ORAL at 17:01

## 2024-06-21 RX ADMIN — POTASSIUM CHLORIDE, DEXTROSE MONOHYDRATE AND SODIUM CHLORIDE: 150; 5; 450 INJECTION, SOLUTION INTRAVENOUS at 23:25

## 2024-06-21 RX ADMIN — IBUPROFEN 800 MG: 800 TABLET, FILM COATED ORAL at 17:01

## 2024-06-21 NOTE — CONSULTS
100%   BMI 34.38 kg/m²   General-ill-appearing, drowsy and sleepy.  Respiratory-clear to auscultation bilaterally, no added sounds, equal air entry bilaterally.  CVS-rate rhythm regular, S1-S2 normal, no murmur rub or gallop.  Abdomen-slight tenderness lower abdomen postsurgery, bowel sounds present, no palpable organomegaly no guarding or rigidity.  Extremities-no cyanosis clubbing bilateral pitting edema  Neuro-within normal limits      LABS:  No results for input(s): \"NA\", \"K\", \"CL\", \"CO2\", \"BUN\", \"CREATININE\", \"GLUCOSE\", \"CALCIUM\" in the last 72 hours.    Recent Labs     06/21/24  1903   WBC 25.0*   RBC 4.06   HGB 13.3   HCT 41.9   .2*   MCH 32.8   MCHC 31.7*   RDW 15.0      MPV 9.5       Recent Labs     06/21/24  1858   POCGLU 170*           Radiology:   No orders to display       EKG:       ASSESSMENT:      Principal Problem:    At risk for sepsis due to urinary tract infection  Resolved Problems:    * No resolved hospital problems. *      PLAN:    SIRS-with WBC count of 25, heart rate in 120s, respiratory rate more than 20 s/p surgery and risk of UTI, s/p cystoscopic retrograde pyelogram bladder biopsy fulguration, blood cultures and urine culture sent, for patient to monitor floor, patient already on Ancef, will continue antibiotics, IV hydration with 1 L of normal saline bolus now followed by 100 cc per hour, check labs including CBC CMP and morning, lactic acid, will follow.  Hypotension-blood pressure running low s/p surgery, will hold onto blood pressure medication for now, IV normal saline bolus, repeat vitals every 4 hours and follow.  COPD-not in exacerbation, continue home inhalers and nebulization treatments, will follow.  Osteoarthritis-pain control.      45 minutes or more were spent in assessing patient, reviewing charts, discussing plan of care and documentation.    Thank you very much for this interesting consult and we will continue to follow along. Feel free to call with any  questions at [phone].      Electronically signed by William Julio MD on 6/21/2024 at 7:16 PM    NOTE: This report was transcribed using voice recognition software. Every effort was made to ensure accuracy; however, inadvertent computerized transcription errors may be present.

## 2024-06-21 NOTE — PROGRESS NOTES
4 Eyes Skin Assessment     NAME:  Nettie Lara  YOB: 1955  MEDICAL RECORD NUMBER:  [id number]    The patient is being assessed for  Admission    I agree that at least one RN has performed a thorough Head to Toe Skin Assessment on the patient. ALL assessment sites listed below have been assessed.      Areas assessed by both nurses:    Head, Face, Ears, Shoulders, Back, Chest, Arms, Elbows, Hands, Sacrum. Buttock, Coccyx, Ischium, Legs. Feet and Heels, and Under Medical Devices         Does the Patient have a Wound? No noted wound(s)       Avery Prevention initiated by RN: Yes  Wound Care Orders initiated by RN: No    Pressure Injury (Stage 3,4, Unstageable, DTI, NWPT, and Complex wounds) if present, place Wound referral order by RN under : No    New Ostomies, if present place, Ostomy referral order under : No     Nurse 1 eSignature: Electronically signed by Juan Miguel Torres RN on 6/21/24 at 8:00 PM EDT    **SHARE this note so that the co-signing nurse can place an eSignature**    Nurse 2 eSignature: Electronically signed by Ara Cid RN on 6/21/24 at 8:08 PM EDT

## 2024-06-21 NOTE — DISCHARGE INSTRUCTIONS
Follow up Dr. Tony Howard in 1-4 week, [phone]    When you are discharged, it is okay to climb stairs, take a shower, and no heavy lifting greater than 5-10 pounds.  You will need to call the office to make an appointment to see Dr. Howard/Sowmya/Reg in 10-14 days.  Pain medications will be written as well as antibiotics.  Hold on Driving until cleared by the physician.  Any questions or concerns arise call the office, [phone].

## 2024-06-21 NOTE — ANESTHESIA PRE PROCEDURE
for Shortness of Breath 10/20/23   Geraldo Myers MD   clotrimazole (LOTRIMIN) 1 % cream Apply to affected area twice daily until resolved. 10/16/23   Geraldo Myers MD   ondansetron (ZOFRAN-ODT) 4 MG disintegrating tablet DISSOLVE ONE TABLET IN MOUTH THREE TIMES A DAY AS NEEDED FOR NAUSEA OR VOMITING 6/12/23   Geraldo Myers MD   acetaminophen (TYLENOL) 500 MG tablet Take 1 tablet by mouth every 4 hours as needed for Pain    ProviderHarleen MD   polyethylene glycol (GLYCOLAX) 17 g packet Take 25 g by mouth daily as needed for Constipation 3/1/23   Tony Mitchell PA-C   Probiotic Product (PROBIOTIC DAILY) CAPS Take 1 capsule by mouth daily    Provider, MD Harleen       Current medications:    Current Facility-Administered Medications   Medication Dose Route Frequency Provider Last Rate Last Admin    0.9 % sodium chloride infusion   IntraVENous Continuous Dylon Lynn PA-C        sodium chloride flush 0.9 % injection 5-40 mL  5-40 mL IntraVENous 2 times per day Dylon Lynn PA-C        sodium chloride flush 0.9 % injection 5-40 mL  5-40 mL IntraVENous PRN Dylon Lynn PA-C        0.9 % sodium chloride infusion   IntraVENous PRN Dylon Lynn PA-C        ceFAZolin (ANCEF) 2,000 mg in sterile water 20 mL IV syringe  2,000 mg IntraVENous On Call to OR Dylon Lynn PA-C           Allergies:    Allergies   Allergen Reactions    Lyrica [Pregabalin] Shortness Of Breath    Adhesive Tape Itching     Bandaids       Problem List:    Patient Active Problem List   Diagnosis Code    Multiple sclerosis (HCC) G35    Peripheral polyneuropathy G62.9    Primary hypertension I10    Asthma J45.909    Edema of both legs R60.0    Constipation K59.00    Intervertebral lumbar disc disorder with myelopathy, 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    Cervical spinal stenosis M48.02    Pulmonary venous congestion R09.89    Hx of appendectomy Z90.49    Hx of cholecystectomy Z90.49    Hx of hysterectomy Z90.710    Chronic rhinitis J31.0    Recurrent UTI N39.0

## 2024-06-21 NOTE — BRIEF OP NOTE
Brief Postoperative Note      Patient: Nettie Lara  YOB: 1955  MRN: [id number]    Date of Procedure: 6/21/2024    Pre-Op Diagnosis Codes:     * Malignant neoplasm of urinary bladder, unspecified site (HCC) [C67.9]    Post-Op Diagnosis: Same       Procedure(s):  CYSTOSCOPY RETROGRADE PYELOGRAM BLADDER BIOPSY FULGURATION GEMCITABINE INSTALLATION         ++KEEP 1200 TIME++    Surgeon(s):  Tony Howard DO    Assistant:  * No surgical staff found *    Anesthesia: Monitor Anesthesia Care    Estimated Blood Loss (mL): Minimal    Complications: None    Specimens:   * No specimens in log *    Implants:  * No implants in log *      Drains: * No LDAs found *    Findings:  Infection Present At Time Of Surgery (PATOS) (choose all levels that have infection present):  No infection present  Other Findings: see operative report     Electronically signed by Tony Howard DO on 6/21/2024 at 10:45 AM

## 2024-06-21 NOTE — OP NOTE
[place] [address]                            OPERATIVE REPORT      PATIENT NAME: TRACY LEGER           : 1955  MED REC NO: [id number]                        ROOM: [facility]  ACCOUNT NO: [id number]                       ADMIT DATE: 2024  PROVIDER: Tony Howard DO      DATE OF PROCEDURE:  2024    SURGEON:  Tony Howard DO    PREOPERATIVE DIAGNOSIS:  Bladder cancer.    POSTOPERATIVE DIAGNOSIS:  Bladder cancer but also noted a left ureteral tumor consistent with an upper tract urothelial carcinoma.    PROCEDURES:    1. Cystoscopy.  2. Left stent removal.  3. Bilateral retrograde pyelogram was done under fluoroscopy.  4. Ureteroscopy.  5. Ureteral biopsy.  6. Bladder biopsy.  7. Obrien catheter placement.  8. Gemcitabine instillation.    ANESTHESIA:  Monitored anesthesia.    ESTIMATED BLOOD LOSS:  Minimal.    CONDITION:  PACU, stable.    PREOPERATIVE ANTIBIOTICS:  Administered.    PATHOLOGIC SPECIMENS:  Ureteral biopsy as well as a bladder biopsy.    HISTORY:  This is a pleasant 69-year-old female who is known to me in , had a ureteroscopy, laser, and subsequent stent removal.  The patient also suffered from functional incontinence in March.  So, the patient had been taken to the operative setting by my partner, Dr. Ruy Deng.  At which time, underwent a transurethral resection of bladder tumor x3, which was found to be T1 grade 3 in its general nature.  There were 3 fairly large tumors that were seen on CAT scan.  The patient re-presented to the office on May 30.  At which time, we had an open discussion about the imaging.  The pathologic specimen handed for repeat biopsy.  At that time, the stent had been deployed.  The risks, benefits, and alternatives of the proposed surgical procedure today were extensively explained to the patient, which included but not limited to the risk of the anesthesia;  loss of airway; cardiovascular risk; heart attack; stroke; pulmonary embolism; clot in leg; injury to the bowel, bladder, ureter; bleeding; infection.  The patient does have a history of pulmonary embolism.  She is currently taking Eliquis.  The patient's friend was present and we talked to him about getting a repeat specimen today.  They understood and has elected to progress.    DESCRIPTION OF PROCEDURE:  This pleasant 69-year-old  female who is nonambulatory in a wheelchair, has history of PE, taking Eliquis, was brought to the operative setting, placed in the supine position, induced with monitored anesthesia.  Anesthesia monitored head and neck area, IV access, and vital signs throughout the course of the case.  Status post induction, the patient was placed in the dorsal lithotomy position.  All lines, points of pressure were padded.  SCDs were applied.  She was prepped and draped in normal sterile fashion.  Spot fluoroscopy demonstrated the stent was malpositioned.  I was able to place the scope through the meatus in atraumatic fashion.  I was able to grasp the stent, externalized and removed it.  At this point, the panendoscopic visualization of the bladder was devoid of any significant masses, tumors, lesions, or defects.  We were able to see some mild redness on the back wall, but nothing that was very concerning for malignancy.  At this time, I did shoot a right retrograde pyelogram under fluoroscopy.  The distal, mid, and proximal portions of the right ureter were devoid of any significant masses, tumors, lesions, or defects.  When I went to get a left retrograde pyelogram in the proximal ureter, she had what looked to be an upper tract urothelial carcinoma with a goblet sign.  I, at this point, did deploy a 0.035 Glidewire through the semi-rigid ureteroscope and into the proximal ureter and did confirm fairly enlarged tumor about 1 cm.  There was really limited visualization because of its

## 2024-06-21 NOTE — PROGRESS NOTES
RN message out to  Lee re: report vitals.   [facility] along with order to transfer to higher level of care.

## 2024-06-21 NOTE — PROGRESS NOTES
1146 - Patient on back from OR with gemcitabine instilled. Patient tolerating fairly well with catheter clamped.     1205 - Patient turned to left side. Tolerating fair. C/O shivering, patient states \"always cold.\"      1225 - Patient turned to right side. Shivering worsened. VSS. Dr. Baird updated, orders obtained.     1245 - Patient returned to back. Gemcitabine drained under strict chemo precautions and catheter removed. Patient reports shivering improving but still present. Noted on right side.

## 2024-06-21 NOTE — ANESTHESIA POSTPROCEDURE EVALUATION
Department of Anesthesiology  Postprocedure Note    Patient: Nettie Lara  MRN: [id number]  YOB: 1955  Date of evaluation: 6/21/2024    Procedure Summary       Date: 06/21/24 Room / Location: [address]    Anesthesia Start: 1050 Anesthesia Stop: 1146    Procedure: CYSTOSCOPY RETROGRADE PYELOGRAM BLADDER BIOPSY FULGURATION GEMCITABINE INSTALLATION Diagnosis:       Malignant neoplasm of urinary bladder, unspecified site (HCC)      (Malignant neoplasm of urinary bladder, unspecified site (HCC) [C67.9])    Surgeons: Tony Howard DO Responsible Provider: Rupert Baird DO    Anesthesia Type: MAC ASA Status: 3            Anesthesia Type: MAC    Tenzin Phase I: Tenzin Score: 10    Tenzin Phase II: Tenzin Score: 10    Anesthesia Post Evaluation    Patient location during evaluation: bedside  Patient participation: complete - patient cannot participate  Level of consciousness: awake and alert  Airway patency: patent  Nausea & Vomiting: no nausea and no vomiting  Cardiovascular status: blood pressure returned to baseline  Respiratory status: acceptable  Hydration status: euvolemic  Multimodal analgesia pain management approach    No notable events documented.

## 2024-06-22 ENCOUNTER — APPOINTMENT (OUTPATIENT)
Dept: ULTRASOUND IMAGING | Age: 69
DRG: 461 | End: 2024-06-22
Attending: UROLOGY
Payer: MEDICAID

## 2024-06-22 ENCOUNTER — APPOINTMENT (OUTPATIENT)
Dept: GENERAL RADIOLOGY | Age: 69
DRG: 461 | End: 2024-06-22
Attending: UROLOGY
Payer: MEDICAID

## 2024-06-22 PROBLEM — C67.9 MALIGNANT NEOPLASM OF URINARY BLADDER (HCC): Status: ACTIVE | Noted: 2024-06-22

## 2024-06-22 LAB
ALBUMIN SERPL-MCNC: 2.4 G/DL (ref 3.5–5.2)
ALP SERPL-CCNC: 62 U/L (ref 35–104)
ALT SERPL-CCNC: 17 U/L (ref 0–32)
ANION GAP SERPL CALCULATED.3IONS-SCNC: 10 MMOL/L (ref 7–16)
ANION GAP SERPL CALCULATED.3IONS-SCNC: 11 MMOL/L (ref 7–16)
AST SERPL-CCNC: 29 U/L (ref 0–31)
BACTERIA URNS QL MICRO: ABNORMAL
BASOPHILS # BLD: 0.03 K/UL (ref 0–0.2)
BASOPHILS # BLD: 0.04 K/UL (ref 0–0.2)
BASOPHILS NFR BLD: 0 % (ref 0–2)
BASOPHILS NFR BLD: 0 % (ref 0–2)
BILIRUB SERPL-MCNC: 0.7 MG/DL (ref 0–1.2)
BILIRUB UR QL STRIP: NEGATIVE
BNP SERPL-MCNC: 2438 PG/ML (ref 0–125)
BUN SERPL-MCNC: 11 MG/DL (ref 6–23)
BUN SERPL-MCNC: 13 MG/DL (ref 6–23)
CA-I BLD-SCNC: 1.12 MMOL/L (ref 1.15–1.33)
CALCIUM SERPL-MCNC: 6.2 MG/DL (ref 8.6–10.2)
CALCIUM SERPL-MCNC: 7.5 MG/DL (ref 8.6–10.2)
CHLORIDE SERPL-SCNC: 110 MMOL/L (ref 98–107)
CHLORIDE SERPL-SCNC: 115 MMOL/L (ref 98–107)
CLARITY UR: ABNORMAL
CO2 SERPL-SCNC: 17 MMOL/L (ref 22–29)
CO2 SERPL-SCNC: 21 MMOL/L (ref 22–29)
COLOR UR: YELLOW
CORTIS SERPL-MCNC: 27.3 UG/DL (ref 2.7–18.4)
CREAT SERPL-MCNC: 0.8 MG/DL (ref 0.5–1)
CREAT SERPL-MCNC: 1 MG/DL (ref 0.5–1)
CRP SERPL HS-MCNC: 30 MG/L (ref 0–5)
EOSINOPHIL # BLD: 0.09 K/UL (ref 0.05–0.5)
EOSINOPHIL # BLD: 0.1 K/UL (ref 0.05–0.5)
EOSINOPHILS RELATIVE PERCENT: 1 % (ref 0–6)
EOSINOPHILS RELATIVE PERCENT: 1 % (ref 0–6)
EPI CELLS #/AREA URNS HPF: ABNORMAL /HPF
ERYTHROCYTE [DISTWIDTH] IN BLOOD BY AUTOMATED COUNT: 15 % (ref 11.5–15)
ERYTHROCYTE [DISTWIDTH] IN BLOOD BY AUTOMATED COUNT: 15.1 % (ref 11.5–15)
GFR, ESTIMATED: 64 ML/MIN/1.73M2
GFR, ESTIMATED: 82 ML/MIN/1.73M2
GLUCOSE SERPL-MCNC: 125 MG/DL (ref 74–99)
GLUCOSE SERPL-MCNC: 132 MG/DL (ref 74–99)
GLUCOSE UR STRIP-MCNC: NEGATIVE MG/DL
HCT VFR BLD AUTO: 29.9 % (ref 34–48)
HCT VFR BLD AUTO: 34.6 % (ref 34–48)
HGB BLD-MCNC: 10.7 G/DL (ref 11.5–15.5)
HGB BLD-MCNC: 9.2 G/DL (ref 11.5–15.5)
HGB UR QL STRIP.AUTO: ABNORMAL
IMM GRANULOCYTES # BLD AUTO: 0.09 K/UL (ref 0–0.58)
IMM GRANULOCYTES # BLD AUTO: 0.12 K/UL (ref 0–0.58)
IMM GRANULOCYTES NFR BLD: 1 % (ref 0–5)
IMM GRANULOCYTES NFR BLD: 1 % (ref 0–5)
KETONES UR STRIP-MCNC: NEGATIVE MG/DL
LACTATE BLDV-SCNC: 1.7 MMOL/L (ref 0.5–2.2)
LACTATE BLDV-SCNC: 2 MMOL/L (ref 0.5–2.2)
LACTATE BLDV-SCNC: 2.6 MMOL/L (ref 0.5–2.2)
LACTATE BLDV-SCNC: 2.6 MMOL/L (ref 0.5–2.2)
LACTATE BLDV-SCNC: 3.4 MMOL/L (ref 0.5–2.2)
LEUKOCYTE ESTERASE UR QL STRIP: ABNORMAL
LYMPHOCYTES NFR BLD: 0.54 K/UL (ref 1.5–4)
LYMPHOCYTES NFR BLD: 0.66 K/UL (ref 1.5–4)
LYMPHOCYTES RELATIVE PERCENT: 4 % (ref 20–42)
LYMPHOCYTES RELATIVE PERCENT: 4 % (ref 20–42)
MAGNESIUM SERPL-MCNC: 1.3 MG/DL (ref 1.6–2.6)
MAGNESIUM SERPL-MCNC: 1.7 MG/DL (ref 1.6–2.6)
MCH RBC QN AUTO: 31.4 PG (ref 26–35)
MCH RBC QN AUTO: 32.1 PG (ref 26–35)
MCHC RBC AUTO-ENTMCNC: 30.8 G/DL (ref 32–34.5)
MCHC RBC AUTO-ENTMCNC: 30.9 G/DL (ref 32–34.5)
MCV RBC AUTO: 102 FL (ref 80–99.9)
MCV RBC AUTO: 103.9 FL (ref 80–99.9)
MONOCYTES NFR BLD: 0.58 K/UL (ref 0.1–0.95)
MONOCYTES NFR BLD: 0.68 K/UL (ref 0.1–0.95)
MONOCYTES NFR BLD: 4 % (ref 2–12)
MONOCYTES NFR BLD: 4 % (ref 2–12)
NEUTROPHILS NFR BLD: 91 % (ref 43–80)
NEUTROPHILS NFR BLD: 91 % (ref 43–80)
NEUTS SEG NFR BLD: 14.12 K/UL (ref 1.8–7.3)
NEUTS SEG NFR BLD: 15.12 K/UL (ref 1.8–7.3)
NITRITE UR QL STRIP: POSITIVE
PH UR STRIP: 6 [PH] (ref 5–9)
PHOSPHATE SERPL-MCNC: 2.3 MG/DL (ref 2.5–4.5)
PHOSPHATE SERPL-MCNC: 3.1 MG/DL (ref 2.5–4.5)
PLATELET # BLD AUTO: 237 K/UL (ref 130–450)
PLATELET # BLD AUTO: 260 K/UL (ref 130–450)
PMV BLD AUTO: 9.4 FL (ref 7–12)
PMV BLD AUTO: 9.5 FL (ref 7–12)
POTASSIUM SERPL-SCNC: 3 MMOL/L (ref 3.5–5)
POTASSIUM SERPL-SCNC: 4 MMOL/L (ref 3.5–5)
PROCALCITONIN SERPL-MCNC: 32.95 NG/ML (ref 0–0.08)
PROT SERPL-MCNC: 4.2 G/DL (ref 6.4–8.3)
PROT UR STRIP-MCNC: 100 MG/DL
RBC # BLD AUTO: 2.93 M/UL (ref 3.5–5.5)
RBC # BLD AUTO: 3.33 M/UL (ref 3.5–5.5)
RBC # BLD: NORMAL 10*6/UL
RBC #/AREA URNS HPF: ABNORMAL /HPF
SODIUM SERPL-SCNC: 142 MMOL/L (ref 132–146)
SODIUM SERPL-SCNC: 142 MMOL/L (ref 132–146)
SP GR UR STRIP: 1.01 (ref 1–1.03)
TROPONIN I SERPL HS-MCNC: 48 NG/L (ref 0–9)
UROBILINOGEN UR STRIP-ACNC: 0.2 EU/DL (ref 0–1)
WBC #/AREA URNS HPF: ABNORMAL /HPF
WBC OTHER # BLD: 15.5 K/UL (ref 4.5–11.5)
WBC OTHER # BLD: 16.7 K/UL (ref 4.5–11.5)

## 2024-06-22 PROCEDURE — 2580000003 HC RX 258: Performed by: UROLOGY

## 2024-06-22 PROCEDURE — 2580000003 HC RX 258

## 2024-06-22 PROCEDURE — 2500000003 HC RX 250 WO HCPCS

## 2024-06-22 PROCEDURE — 85025 COMPLETE CBC W/AUTO DIFF WBC: CPT

## 2024-06-22 PROCEDURE — 96366 THER/PROPH/DIAG IV INF ADDON: CPT

## 2024-06-22 PROCEDURE — 2060000000 HC ICU INTERMEDIATE R&B

## 2024-06-22 PROCEDURE — 84100 ASSAY OF PHOSPHORUS: CPT

## 2024-06-22 PROCEDURE — 2580000003 HC RX 258: Performed by: INTERNAL MEDICINE

## 2024-06-22 PROCEDURE — 87040 BLOOD CULTURE FOR BACTERIA: CPT

## 2024-06-22 PROCEDURE — 6360000002 HC RX W HCPCS: Performed by: UROLOGY

## 2024-06-22 PROCEDURE — 6370000000 HC RX 637 (ALT 250 FOR IP): Performed by: INTERNAL MEDICINE

## 2024-06-22 PROCEDURE — 93970 EXTREMITY STUDY: CPT

## 2024-06-22 PROCEDURE — 6370000000 HC RX 637 (ALT 250 FOR IP): Performed by: STUDENT IN AN ORGANIZED HEALTH CARE EDUCATION/TRAINING PROGRAM

## 2024-06-22 PROCEDURE — 83735 ASSAY OF MAGNESIUM: CPT

## 2024-06-22 PROCEDURE — 83880 ASSAY OF NATRIURETIC PEPTIDE: CPT

## 2024-06-22 PROCEDURE — 94640 AIRWAY INHALATION TREATMENT: CPT

## 2024-06-22 PROCEDURE — 99233 SBSQ HOSP IP/OBS HIGH 50: CPT | Performed by: INTERNAL MEDICINE

## 2024-06-22 PROCEDURE — 80053 COMPREHEN METABOLIC PANEL: CPT

## 2024-06-22 PROCEDURE — 81001 URINALYSIS AUTO W/SCOPE: CPT

## 2024-06-22 PROCEDURE — 96367 TX/PROPH/DG ADDL SEQ IV INF: CPT

## 2024-06-22 PROCEDURE — 71045 X-RAY EXAM CHEST 1 VIEW: CPT

## 2024-06-22 PROCEDURE — 6360000002 HC RX W HCPCS

## 2024-06-22 PROCEDURE — 93005 ELECTROCARDIOGRAM TRACING: CPT | Performed by: INTERNAL MEDICINE

## 2024-06-22 PROCEDURE — 94664 DEMO&/EVAL PT USE INHALER: CPT

## 2024-06-22 PROCEDURE — 51702 INSERT TEMP BLADDER CATH: CPT

## 2024-06-22 PROCEDURE — 6370000000 HC RX 637 (ALT 250 FOR IP)

## 2024-06-22 PROCEDURE — 6360000002 HC RX W HCPCS: Performed by: INTERNAL MEDICINE

## 2024-06-22 PROCEDURE — 96365 THER/PROPH/DIAG IV INF INIT: CPT

## 2024-06-22 PROCEDURE — 83605 ASSAY OF LACTIC ACID: CPT

## 2024-06-22 PROCEDURE — 96375 TX/PRO/DX INJ NEW DRUG ADDON: CPT

## 2024-06-22 PROCEDURE — 87081 CULTURE SCREEN ONLY: CPT

## 2024-06-22 PROCEDURE — 96361 HYDRATE IV INFUSION ADD-ON: CPT

## 2024-06-22 PROCEDURE — 80048 BASIC METABOLIC PNL TOTAL CA: CPT

## 2024-06-22 PROCEDURE — 6360000002 HC RX W HCPCS: Performed by: STUDENT IN AN ORGANIZED HEALTH CARE EDUCATION/TRAINING PROGRAM

## 2024-06-22 PROCEDURE — 82330 ASSAY OF CALCIUM: CPT

## 2024-06-22 PROCEDURE — 2700000000 HC OXYGEN THERAPY PER DAY

## 2024-06-22 RX ORDER — CALCIUM GLUCONATE 20 MG/ML
1000 INJECTION, SOLUTION INTRAVENOUS ONCE
Status: COMPLETED | OUTPATIENT
Start: 2024-06-22 | End: 2024-06-22

## 2024-06-22 RX ORDER — 0.9 % SODIUM CHLORIDE 0.9 %
1000 INTRAVENOUS SOLUTION INTRAVENOUS ONCE
Status: COMPLETED | OUTPATIENT
Start: 2024-06-22 | End: 2024-06-22

## 2024-06-22 RX ORDER — NAPROXEN 250 MG/1
250 TABLET ORAL EVERY 6 HOURS PRN
Status: DISCONTINUED | OUTPATIENT
Start: 2024-06-22 | End: 2024-06-28 | Stop reason: HOSPADM

## 2024-06-22 RX ORDER — ACETAMINOPHEN 325 MG/1
650 TABLET ORAL EVERY 4 HOURS PRN
Status: DISCONTINUED | OUTPATIENT
Start: 2024-06-22 | End: 2024-06-23

## 2024-06-22 RX ORDER — SODIUM CHLORIDE 9 MG/ML
INJECTION, SOLUTION INTRAVENOUS CONTINUOUS
Status: DISCONTINUED | OUTPATIENT
Start: 2024-06-22 | End: 2024-06-22

## 2024-06-22 RX ORDER — HEPARIN SODIUM 5000 [USP'U]/ML
5000 INJECTION, SOLUTION INTRAVENOUS; SUBCUTANEOUS 2 TIMES DAILY
Status: DISCONTINUED | OUTPATIENT
Start: 2024-06-22 | End: 2024-06-28

## 2024-06-22 RX ORDER — DIPHENHYDRAMINE HCL 25 MG
25 TABLET ORAL ONCE
Status: COMPLETED | OUTPATIENT
Start: 2024-06-22 | End: 2024-06-22

## 2024-06-22 RX ORDER — SODIUM CHLORIDE, SODIUM LACTATE, POTASSIUM CHLORIDE, CALCIUM CHLORIDE 600; 310; 30; 20 MG/100ML; MG/100ML; MG/100ML; MG/100ML
INJECTION, SOLUTION INTRAVENOUS CONTINUOUS
Status: DISCONTINUED | OUTPATIENT
Start: 2024-06-22 | End: 2024-06-26

## 2024-06-22 RX ADMIN — PIPERACILLIN AND TAZOBACTAM 3375 MG: 3; .375 INJECTION, POWDER, LYOPHILIZED, FOR SOLUTION INTRAVENOUS at 15:24

## 2024-06-22 RX ADMIN — ACETAMINOPHEN 650 MG: 325 TABLET ORAL at 06:10

## 2024-06-22 RX ADMIN — BUDESONIDE 500 MCG: 0.5 SUSPENSION RESPIRATORY (INHALATION) at 08:24

## 2024-06-22 RX ADMIN — WATER 2000 MG: 1 INJECTION INTRAMUSCULAR; INTRAVENOUS; SUBCUTANEOUS at 01:55

## 2024-06-22 RX ADMIN — CALCIUM GLUCONATE 1000 MG: 20 INJECTION, SOLUTION INTRAVENOUS at 09:04

## 2024-06-22 RX ADMIN — MAGNESIUM OXIDE 400 MG (241.3 MG MAGNESIUM) TABLET 400 MG: TABLET at 08:27

## 2024-06-22 RX ADMIN — BUDESONIDE 500 MCG: 0.5 SUSPENSION RESPIRATORY (INHALATION) at 19:59

## 2024-06-22 RX ADMIN — SODIUM CHLORIDE: 9 INJECTION, SOLUTION INTRAVENOUS at 01:09

## 2024-06-22 RX ADMIN — ACETAMINOPHEN 650 MG: 325 TABLET ORAL at 14:12

## 2024-06-22 RX ADMIN — SODIUM CHLORIDE, POTASSIUM CHLORIDE, SODIUM LACTATE AND CALCIUM CHLORIDE: 600; 310; 30; 20 INJECTION, SOLUTION INTRAVENOUS at 09:05

## 2024-06-22 RX ADMIN — VANCOMYCIN HYDROCHLORIDE 2000 MG: 10 INJECTION, POWDER, LYOPHILIZED, FOR SOLUTION INTRAVENOUS at 01:49

## 2024-06-22 RX ADMIN — SODIUM CHLORIDE, POTASSIUM CHLORIDE, SODIUM LACTATE AND CALCIUM CHLORIDE: 600; 310; 30; 20 INJECTION, SOLUTION INTRAVENOUS at 18:59

## 2024-06-22 RX ADMIN — PIPERACILLIN AND TAZOBACTAM 3375 MG: 3; .375 INJECTION, POWDER, LYOPHILIZED, FOR SOLUTION INTRAVENOUS at 22:48

## 2024-06-22 RX ADMIN — ARFORMOTEROL TARTRATE 15 MCG: 15 SOLUTION RESPIRATORY (INHALATION) at 19:59

## 2024-06-22 RX ADMIN — HEPARIN SODIUM 5000 UNITS: 5000 INJECTION INTRAVENOUS; SUBCUTANEOUS at 20:39

## 2024-06-22 RX ADMIN — ACETAMINOPHEN 650 MG: 325 TABLET ORAL at 18:26

## 2024-06-22 RX ADMIN — HEPARIN SODIUM 5000 UNITS: 5000 INJECTION INTRAVENOUS; SUBCUTANEOUS at 13:10

## 2024-06-22 RX ADMIN — DIPHENHYDRAMINE HCL 25 MG: 25 TABLET ORAL at 11:33

## 2024-06-22 RX ADMIN — SODIUM CHLORIDE, PRESERVATIVE FREE 10 ML: 5 INJECTION INTRAVENOUS at 08:27

## 2024-06-22 RX ADMIN — ACETAMINOPHEN 650 MG: 325 TABLET ORAL at 01:45

## 2024-06-22 RX ADMIN — SODIUM CHLORIDE 1000 ML: 9 INJECTION, SOLUTION INTRAVENOUS at 04:05

## 2024-06-22 RX ADMIN — ACETAMINOPHEN 650 MG: 325 TABLET ORAL at 09:58

## 2024-06-22 RX ADMIN — ARFORMOTEROL TARTRATE 15 MCG: 15 SOLUTION RESPIRATORY (INHALATION) at 08:24

## 2024-06-22 RX ADMIN — ACETAMINOPHEN 650 MG: 325 TABLET ORAL at 22:50

## 2024-06-22 ASSESSMENT — PAIN SCALES - GENERAL
PAINLEVEL_OUTOF10: 0
PAINLEVEL_OUTOF10: 0

## 2024-06-22 NOTE — PROGRESS NOTES
Intensive Care Daily Quality Rounding Checklist      ICU Team Members: Dr. Brock, Resident Dr. Raphael, Bedside nurse, Charge nurse.    ICU Day #: NUMBER: 1    Intubation Date: N/A    Ventilator Day #: N/A    Central Line Insertion Date: N/A        Day #: N/A        Indication: N/A     Arterial Line Insertion Date: N/A      Day #: N/A    Temporary Hemodialysis Catheter Insertion Date: N/A      Day # N/A    DVT Prophylaxis: SCD's    GI Prophylaxis: Eliquis    Obrien Catheter Insertion Date: June 22       Day #: 1      Indications: Urinary retention      Continued need (if yes, reason documented and discussed with physician): yes, urology ordered.     Skin Issues/ Wounds and ordered treatment discussed on rounds: Yes, wound care consulted.     Goals/ Plans for the Day: Monitor labs and vitals, treat/replace as needed. Start eliquis. Transfer to [facility].     Reviewed plan and goals for day with patient and/or representative: Patient updated.

## 2024-06-22 NOTE — PROGRESS NOTES
Patient admitted from 4W to 211, with the following belongings; SHIRT, PANTS, SOCKS, 2 INHALERS, SPACER, CELL PHONE, placed on monitor, patient oriented to room and unit visiting hours.  Patient guide at bedside, reviewed patient rights and responsibilities. MRSA nasal swab obtained.  Bed alarm on.  Call light within reach.

## 2024-06-22 NOTE — PROGRESS NOTES
Call to Dr. Kc to update him on patient's vitals. Told to get EKG and he would be up to see patient.

## 2024-06-22 NOTE — SIGNIFICANT EVENT
RRT NOTE  Rrt was called on patient around 8 pm as she was hypotensive with her bp 80/60  Rest of the vitals p 128, rr 20, spo2 95% on 2 lts,  Examination  Gen- lying in bed, ill apearing  Cvs- tachycardiac, no m/r/g  Pulm- CTA b/l no added sounds  Abdomen-soft, bowel sounds present, tender to deep palpation s/p surgery.  Extremities-2-3+ pitting edema bilateral lower extremities no cyanosis or clubbing  Neuro-alert awake oriented x 3    Patient had only 1 IV access, we tried and got another IV access for [place] fluids.  Labs reviewed which showed lactic acid of 4.4.  Patient has been on antibiotics and fresh blood blood culture was sent, please see my consult note.  She got a total of 300 cc of D5 half-normal saline s/p surgery  Plan was made to give her fluids as per sepsis protocol, 30 cc/kg review her status again.  Case discussed with ICU attending.  Diagnosis-sepsis/septic shock  Will follow  More than 30 minutes of critical time was spent.

## 2024-06-22 NOTE — PROGRESS NOTES
PROGRESS NOTE      Chief Complaint:   High-grade bladder urothelial cell carcinoma/Gross hematuria/UTI/Left hydronephrosis/Left ureteral tumor/History of left distal ureteral stone/Bilateral renal cysts/Left renal atrophy/Left renal calculi/OAB with urge incontinence/Urinary retention    HPI:   She was hypotensive and admitted to the intensive care unit last evening after an RRT.  She actually feels much better today.  She ate without nausea.  She denies any catheter discomfort or pain.  She has a headache at this time.    Vitals:    06/22/24 1015   BP: 136/61   Pulse: (!) 109   Resp: 22   Temp: 100 °F (37.8 °C)   SpO2: 93%       Allergies: Lyrica [pregabalin] and Adhesive tape    PAST MEDICAL HISTORY:   Past Medical History:   Diagnosis Date    Arthritis     Asthma     follows with PCP    Cancer (HCC)     bladder    COPD (chronic obstructive pulmonary disease) (HCC)     Follows with PCP    Eczema     Fall 05/26/2011    Gout     Hypertension     Kidney stone     Multiple sclerosis (HCC)     Prolonged emergence from general anesthesia     Scoliosis     Spinal stenosis     UTI (urinary tract infection)        PAST SURGICAL HISTORY:   Past Surgical History:   Procedure Laterality Date    APPENDECTOMY      BLADDER SURGERY Left 7/6/2023    CYSTOSCOPY RETROGRADE PYELOGRAM  left STENT REMOVAL, PLACEMENT OF LEFT STENT performed by Ruy Deng MD at [facility] [place]    CHOLECYSTECTOMY      CYSTOSCOPY N/A 4/4/2024    BLADDER BIOPSY/ RESECTION OF BLADDER TUMOR performed by Ruy Deng MD at [facility] [place]    CYSTOSCOPY N/A 6/21/2024    CYSTOSCOPY RETROGRADE PYELOGRAM BLADDER BIOPSY FULGURATION GEMCITABINE INSTALLATION performed by Tony Howard DO at [facility] OR    CYSTOSCOPY INSERTION / REMOVAL STENT / STONE Right 07/26/2020    CYSTOSCOPY RETROGRADE PYELOGRAM STENT INSERTION LEFT performed by Tony Howard DO at [facility] OR    HYSTERECTOMY (CERVIX STATUS UNKNOWN)      LITHOTRIPSY Left 08/07/2020    CYSTOSCOPY RETROGRADE  Cysto, left ureteral stent removal, left ureteroscopy with ureteral tumor biopsy, bladder biopsy, gemcitabine instillation  -Urine cytology on 4/3/2024 was negative.  Pathology from the initial bladder tumor resection, however, showed high-grade, necrotic, urothelial carcinoma with invasion into the lamina propria.  No muscle tissue was present.  Repeat bladder and left ureteral biopsy pathology results from yesterday are pending.  -Serum creatinine is 1.0.  Continue to monitor.  Hold off on replacement of the ureteral stent at this time  -Continue to hold anticoagulants  -Continue the Obrien for now until she is ready for discharge  -DVT prophylaxis  -Appreciate internal medicine consultation and assistance  -Tylenol and Percocet as needed for headaches  -Urine culture on 6/13/2024 was negative.  Repeat cultures are pending.  Continue empiric antibiotics for now  -She plans to be discharged to her home where she lives by herself when stable    Ronan Deng MD  6/22/2024  11:37 AM

## 2024-06-22 NOTE — PROGRESS NOTES
4 Eyes Skin Assessment     NAME:  Nettie Lara  YOB: 1955  MEDICAL RECORD NUMBER:  [id number]    The patient is being assessed for  Transfer to New Unit    I agree that at least one RN has performed a thorough Head to Toe Skin Assessment on the patient. ALL assessment sites listed below have been assessed.      Areas assessed by both nurses:    Head, Face, Ears, Shoulders, Back, Chest, Arms, Elbows, Hands, Sacrum. Buttock, Coccyx, Ischium, Legs. Feet and Heels, and Under Medical Devices         Does the Patient have a Wound? Yes wound(s) were present on assessment. LDA wound assessment was Initiated and completed by RN       Avery Prevention initiated by RN: Yes  Wound Care Orders initiated by RN: Yes    Pressure Injury (Stage 3,4, Unstageable, DTI, NWPT, and Complex wounds) if present, place Wound referral order by RN under : No    New Ostomies, if present place, Ostomy referral order under : No     Nurse 1 eSignature: Electronically signed by Sabra Kee RN on 6/22/24 at 12:09 PM EDT    **SHARE this note so that the co-signing nurse can place an eSignature**    Nurse 2 eSignature: Electronically signed by Teri Garcia RN on 6/22/24 at 12:12 PM EDT

## 2024-06-22 NOTE — PROGRESS NOTES
4 Eyes Skin Assessment     NAME:  Nettie Lara  YOB: 1955  MEDICAL RECORD NUMBER:  [id number]    The patient is being assessed for  Admission    I agree that at least one RN has performed a thorough Head to Toe Skin Assessment on the patient. ALL assessment sites listed below have been assessed.      Areas assessed by both nurses:    Head, Face, Ears, Shoulders, Back, Chest, Arms, Elbows, Hands, Sacrum. Buttock, Coccyx, Ischium, Legs. Feet and Heels, and Under Medical Devices         Does the Patient have a Wound? Yes wound(s) were present on assessment. LDA wound assessment was Initiated and completed by RN       Avery Prevention initiated by RN: Yes  Wound Care Orders initiated by RN: Yes    Pressure Injury (Stage 3,4, Unstageable, DTI, NWPT, and Complex wounds) if present, place Wound referral order by RN under : No    New Ostomies, if present place, Ostomy referral order under : No     Nurse 1 eSignature: Electronically signed by Leandro Mckeon RN on 6/22/24 at 8:06 AM EDT    **SHARE this note so that the co-signing nurse can place an eSignature**    Nurse 2 eSignature: Electronically signed by Anuradha Kaye RN on 6/22/24 at 8:44 AM EDT

## 2024-06-22 NOTE — PROGRESS NOTES
went to see patient while rounding. Patient was sleeping.   prayed a silent prayer for the patient.  Chaplains will remain available to offer spiritual and emotional support as needed.

## 2024-06-22 NOTE — PLAN OF CARE
Problem: Discharge Planning  Goal: Discharge to home or other facility with appropriate resources  6/22/2024 1151 by Sabra Kee RN  Outcome: Progressing  6/22/2024 0232 by Leandro Mckeon RN  Outcome: Progressing     Problem: Skin/Tissue Integrity  Goal: Absence of new skin breakdown  Description: 1.  Monitor for areas of redness and/or skin breakdown  2.  Assess vascular access sites hourly  3.  Every 4-6 hours minimum:  Change oxygen saturation probe site  4.  Every 4-6 hours:  If on nasal continuous positive airway pressure, respiratory therapy assess nares and determine need for appliance change or resting period.  6/22/2024 1151 by Sabra Kee RN  Outcome: Progressing  6/22/2024 0232 by Leandro Mckeon, RN  Outcome: Progressing     Problem: Safety - Adult  Goal: Free from fall injury  6/22/2024 1151 by Sabra Kee RN  Outcome: Progressing  6/22/2024 0232 by Leandro Mckeon, RN  Outcome: Progressing     Problem: ABCDS Injury Assessment  Goal: Absence of physical injury  6/22/2024 1151 by Sabra Kee RN  Outcome: Progressing  6/22/2024 0232 by Leandro Mckeon, RN  Outcome: Progressing

## 2024-06-22 NOTE — PLAN OF CARE
Problem: Discharge Planning  Goal: Discharge to home or other facility with appropriate resources  6/22/2024 0232 by Leandro Mckeon RN  Outcome: Progressing  6/21/2024 1803 by Juan Miguel Torres RN  Outcome: Progressing     Problem: Skin/Tissue Integrity  Goal: Absence of new skin breakdown  Description: 1.  Monitor for areas of redness and/or skin breakdown  2.  Assess vascular access sites hourly  3.  Every 4-6 hours minimum:  Change oxygen saturation probe site  4.  Every 4-6 hours:  If on nasal continuous positive airway pressure, respiratory therapy assess nares and determine need for appliance change or resting period.  6/22/2024 0232 by Leandro Mckeon, RN  Outcome: Progressing  6/21/2024 1803 by Juan Miguel Torres RN  Outcome: Progressing     Problem: Safety - Adult  Goal: Free from fall injury  6/22/2024 0232 by Leandro Mckeon RN  Outcome: Progressing  6/21/2024 1803 by Juan Miguel Torres RN  Outcome: Progressing     Problem: ABCDS Injury Assessment  Goal: Absence of physical injury  Outcome: Progressing

## 2024-06-22 NOTE — CONSULTS
times documented are independent of procedures and multidisciplinary rounds with Residents. Additionally comprehensive, multidisciplinary rounds were conducted with the MICU team. The case was discussed in detail and plans for care were established. Review of CNP documentation was conducted and revisions were made as appropriate. I agree with the above documented exam, problem list and plan of care.I performed the substantive portion of the visit.    Impression:  Sepsis, Sepsis shock most likley secondary to urosepsis-resolved  Bladder cancer but also noted a left ureteral tumor consistent with an upper tract urothelial carcinoma s/p mLeft stent removal, Bilateral retrograde pyelogram was done under fluoroscopy,. Ureteroscopy, Ureteral biopsy, Bladder biopsy, Obrien catheter placement, Gemcitabine instillation POD #1  3. H/O PE in April 2024        Patient responded well to fluids , did not require pressors.  Hemodynamically anisha.  Good UOP.  Electrolytes replaced.  Lactic acid trending down.  Continue rocephin 2 gr q 24 hours.  Follow final cultures  Patient needs to be on full dose anticoagulation but due to history of hematuria and epistaxis she is not taking eliquis and does not want to be on it. Will obtain LE dopplers if positive for DVT will need an IVC filter.Resumed eliquis 5 mg bid, given patients underlying malignancy will recommend anticoagulation indefinitely      During multidisciplinary team rounds the patient was seen, examined and discussed. This is confirmation that I have personally seen and examined the patient and that the key elements of the encounter were performed by me (> 85 % time).  The medications & laboratory data and imagery was discussed and adjusted where necessary. Key issues of the case were discussed among consultants.     This patient has a high probability of sudden clinically significant deterioration. I managed/supervised life or organ supporting interventions that required

## 2024-06-23 LAB
ALBUMIN SERPL-MCNC: 2.6 G/DL (ref 3.5–5.2)
ALP SERPL-CCNC: 93 U/L (ref 35–104)
ALT SERPL-CCNC: 21 U/L (ref 0–32)
ANION GAP SERPL CALCULATED.3IONS-SCNC: 10 MMOL/L (ref 7–16)
AST SERPL-CCNC: 28 U/L (ref 0–31)
BASOPHILS # BLD: 0.09 K/UL (ref 0–0.2)
BASOPHILS NFR BLD: 1 % (ref 0–2)
BILIRUB SERPL-MCNC: 0.6 MG/DL (ref 0–1.2)
BUN SERPL-MCNC: 11 MG/DL (ref 6–23)
CALCIUM SERPL-MCNC: 8.1 MG/DL (ref 8.6–10.2)
CHLORIDE SERPL-SCNC: 105 MMOL/L (ref 98–107)
CO2 SERPL-SCNC: 21 MMOL/L (ref 22–29)
CREAT SERPL-MCNC: 0.9 MG/DL (ref 0.5–1)
EOSINOPHIL # BLD: 0.63 K/UL (ref 0.05–0.5)
EOSINOPHILS RELATIVE PERCENT: 6 % (ref 0–6)
ERYTHROCYTE [DISTWIDTH] IN BLOOD BY AUTOMATED COUNT: 15.2 % (ref 11.5–15)
GFR, ESTIMATED: 73 ML/MIN/1.73M2
GLUCOSE SERPL-MCNC: 114 MG/DL (ref 74–99)
HCT VFR BLD AUTO: 31 % (ref 34–48)
HGB BLD-MCNC: 10.1 G/DL (ref 11.5–15.5)
LACTATE BLDV-SCNC: 1.9 MMOL/L (ref 0.5–2.2)
LYMPHOCYTES NFR BLD: 1.09 K/UL (ref 1.5–4)
LYMPHOCYTES RELATIVE PERCENT: 10 % (ref 20–42)
MAGNESIUM SERPL-MCNC: 1.9 MG/DL (ref 1.6–2.6)
MCH RBC QN AUTO: 33.4 PG (ref 26–35)
MCHC RBC AUTO-ENTMCNC: 32.6 G/DL (ref 32–34.5)
MCV RBC AUTO: 102.6 FL (ref 80–99.9)
MICROORGANISM SPEC CULT: NORMAL
MONOCYTES NFR BLD: 0 % (ref 2–12)
MONOCYTES NFR BLD: 0 K/UL (ref 0.1–0.95)
NEUTROPHILS NFR BLD: 83 % (ref 43–80)
NEUTS SEG NFR BLD: 8.59 K/UL (ref 1.8–7.3)
PHOSPHATE SERPL-MCNC: 1.7 MG/DL (ref 2.5–4.5)
PLATELET # BLD AUTO: 196 K/UL (ref 130–450)
PMV BLD AUTO: 9.9 FL (ref 7–12)
POTASSIUM SERPL-SCNC: 3.6 MMOL/L (ref 3.5–5)
PROT SERPL-MCNC: 5.5 G/DL (ref 6.4–8.3)
RBC # BLD AUTO: 3.02 M/UL (ref 3.5–5.5)
RBC # BLD: ABNORMAL 10*6/UL
SERVICE CMNT-IMP: NORMAL
SODIUM SERPL-SCNC: 136 MMOL/L (ref 132–146)
SPECIMEN DESCRIPTION: NORMAL
WBC OTHER # BLD: 10.4 K/UL (ref 4.5–11.5)

## 2024-06-23 PROCEDURE — 2060000000 HC ICU INTERMEDIATE R&B

## 2024-06-23 PROCEDURE — 6360000002 HC RX W HCPCS: Performed by: STUDENT IN AN ORGANIZED HEALTH CARE EDUCATION/TRAINING PROGRAM

## 2024-06-23 PROCEDURE — 2580000003 HC RX 258: Performed by: UROLOGY

## 2024-06-23 PROCEDURE — 2580000003 HC RX 258: Performed by: INTERNAL MEDICINE

## 2024-06-23 PROCEDURE — 94640 AIRWAY INHALATION TREATMENT: CPT

## 2024-06-23 PROCEDURE — 99232 SBSQ HOSP IP/OBS MODERATE 35: CPT | Performed by: INTERNAL MEDICINE

## 2024-06-23 PROCEDURE — 85025 COMPLETE CBC W/AUTO DIFF WBC: CPT

## 2024-06-23 PROCEDURE — 6360000002 HC RX W HCPCS: Performed by: UROLOGY

## 2024-06-23 PROCEDURE — 84100 ASSAY OF PHOSPHORUS: CPT

## 2024-06-23 PROCEDURE — 6370000000 HC RX 637 (ALT 250 FOR IP): Performed by: INTERNAL MEDICINE

## 2024-06-23 PROCEDURE — 84145 PROCALCITONIN (PCT): CPT

## 2024-06-23 PROCEDURE — 6370000000 HC RX 637 (ALT 250 FOR IP)

## 2024-06-23 PROCEDURE — 6370000000 HC RX 637 (ALT 250 FOR IP): Performed by: STUDENT IN AN ORGANIZED HEALTH CARE EDUCATION/TRAINING PROGRAM

## 2024-06-23 PROCEDURE — 6360000002 HC RX W HCPCS: Performed by: INTERNAL MEDICINE

## 2024-06-23 PROCEDURE — 36415 COLL VENOUS BLD VENIPUNCTURE: CPT

## 2024-06-23 PROCEDURE — 83735 ASSAY OF MAGNESIUM: CPT

## 2024-06-23 PROCEDURE — 2700000000 HC OXYGEN THERAPY PER DAY

## 2024-06-23 PROCEDURE — 80053 COMPREHEN METABOLIC PANEL: CPT

## 2024-06-23 PROCEDURE — 83605 ASSAY OF LACTIC ACID: CPT

## 2024-06-23 RX ORDER — POLYETHYLENE GLYCOL 3350 17 G/17G
17 POWDER, FOR SOLUTION ORAL DAILY
Status: DISCONTINUED | OUTPATIENT
Start: 2024-06-23 | End: 2024-06-25

## 2024-06-23 RX ORDER — ACETAMINOPHEN 325 MG/1
650 TABLET ORAL EVERY 4 HOURS PRN
Status: DISCONTINUED | OUTPATIENT
Start: 2024-06-23 | End: 2024-06-28 | Stop reason: HOSPADM

## 2024-06-23 RX ORDER — DOCUSATE SODIUM 100 MG/1
100 CAPSULE, LIQUID FILLED ORAL DAILY
Status: DISCONTINUED | OUTPATIENT
Start: 2024-06-23 | End: 2024-06-28 | Stop reason: HOSPADM

## 2024-06-23 RX ADMIN — BUDESONIDE 500 MCG: 0.5 SUSPENSION RESPIRATORY (INHALATION) at 19:54

## 2024-06-23 RX ADMIN — ACETAMINOPHEN 650 MG: 325 TABLET ORAL at 17:34

## 2024-06-23 RX ADMIN — BUDESONIDE 500 MCG: 0.5 SUSPENSION RESPIRATORY (INHALATION) at 09:04

## 2024-06-23 RX ADMIN — MAGNESIUM OXIDE 400 MG (241.3 MG MAGNESIUM) TABLET 400 MG: TABLET at 09:34

## 2024-06-23 RX ADMIN — ARFORMOTEROL TARTRATE 15 MCG: 15 SOLUTION RESPIRATORY (INHALATION) at 09:04

## 2024-06-23 RX ADMIN — SODIUM CHLORIDE, PRESERVATIVE FREE 10 ML: 5 INJECTION INTRAVENOUS at 19:45

## 2024-06-23 RX ADMIN — PIPERACILLIN AND TAZOBACTAM 3375 MG: 3; .375 INJECTION, POWDER, LYOPHILIZED, FOR SOLUTION INTRAVENOUS at 05:48

## 2024-06-23 RX ADMIN — PIPERACILLIN AND TAZOBACTAM 3375 MG: 3; .375 INJECTION, POWDER, LYOPHILIZED, FOR SOLUTION INTRAVENOUS at 15:10

## 2024-06-23 RX ADMIN — POLYETHYLENE GLYCOL 3350 17 G: 17 POWDER, FOR SOLUTION ORAL at 10:49

## 2024-06-23 RX ADMIN — DOCUSATE SODIUM 100 MG: 100 CAPSULE, LIQUID FILLED ORAL at 10:49

## 2024-06-23 RX ADMIN — ACETAMINOPHEN 650 MG: 325 TABLET ORAL at 10:54

## 2024-06-23 RX ADMIN — HEPARIN SODIUM 5000 UNITS: 5000 INJECTION INTRAVENOUS; SUBCUTANEOUS at 09:34

## 2024-06-23 RX ADMIN — SODIUM CHLORIDE, POTASSIUM CHLORIDE, SODIUM LACTATE AND CALCIUM CHLORIDE: 600; 310; 30; 20 INJECTION, SOLUTION INTRAVENOUS at 10:21

## 2024-06-23 RX ADMIN — SODIUM CHLORIDE, POTASSIUM CHLORIDE, SODIUM LACTATE AND CALCIUM CHLORIDE: 600; 310; 30; 20 INJECTION, SOLUTION INTRAVENOUS at 18:26

## 2024-06-23 RX ADMIN — ARFORMOTEROL TARTRATE 15 MCG: 15 SOLUTION RESPIRATORY (INHALATION) at 19:54

## 2024-06-23 RX ADMIN — ACETAMINOPHEN 650 MG: 325 TABLET ORAL at 22:32

## 2024-06-23 RX ADMIN — HEPARIN SODIUM 5000 UNITS: 5000 INJECTION INTRAVENOUS; SUBCUTANEOUS at 19:45

## 2024-06-23 ASSESSMENT — PAIN SCALES - GENERAL
PAINLEVEL_OUTOF10: 7
PAINLEVEL_OUTOF10: 7

## 2024-06-23 ASSESSMENT — PAIN DESCRIPTION - LOCATION
LOCATION: HEAD
LOCATION: HEAD

## 2024-06-23 ASSESSMENT — PAIN DESCRIPTION - DESCRIPTORS
DESCRIPTORS: ACHING
DESCRIPTORS: PRESSURE

## 2024-06-23 NOTE — PROGRESS NOTES
[facility] Hospitalist Progress Note    Admitting Date and Time: 6/21/2024 10:04 AM  Admit Dx: Malignant neoplasm of urinary bladder, unspecified site (HCC) [C67.9]  At risk for sepsis due to urinary tract infection [N39.0, Z91.89]  Sepsis due to urinary tract infection (HCC) [A41.9, N39.0]    Subjective:  Patient is being followed for Malignant neoplasm of urinary bladder, unspecified site (HCC) [C67.9]  At risk for sepsis due to urinary tract infection [N39.0, Z91.89]  Sepsis due to urinary tract infection (HCC) [A41.9, N39.0]   Pt seen and examined this morning  No acute events overnight  States she feels better today.  Denies any acute complaints    ROS: denies fever, chills, cp, sob, n/v, HA unless stated above.      polyethylene glycol  17 g Oral Daily    docusate sodium  100 mg Oral Daily    heparin (porcine)  5,000 Units SubCUTAneous BID    piperacillin-tazobactam  3,375 mg IntraVENous q8h    sodium chloride flush  5-40 mL IntraVENous 2 times per day    [Held by provider] losartan  25 mg Oral Nightly    [Held by provider] metoprolol tartrate  25 mg Oral BID    magnesium oxide  400 mg Oral Daily    budesonide  0.5 mg Nebulization BID RT    And    arformoterol tartrate  15 mcg Nebulization BID RT     acetaminophen, 650 mg, Q4H PRN  naproxen, 250 mg, Q6H PRN  sodium chloride flush, 5-40 mL, PRN  sodium chloride, , PRN  phenazopyridine, 100 mg, TID PRN  oxyCODONE-acetaminophen, 1 tablet, Q4H PRN   Or  oxyCODONE-acetaminophen, 2 tablet, Q4H PRN  albuterol, 2.5 mg, Q4H PRN         Objective:    BP (!) 144/77   Pulse (!) 105   Temp 99.1 °F (37.3 °C) (Oral)   Resp 20   Ht 1.676 m (5' 6\")   Wt 96.6 kg (213 lb)   LMP  (LMP Unknown)   SpO2 99%   BMI 34.38 kg/m²     General Appearance: Sleepy  Skin: warm and dry  Head: normocephalic and atraumatic  Eyes: pupils equal, round, and reactive to light, extraocular eye movements intact, conjunctivae normal  Neck: neck supple and non tender without mass

## 2024-06-23 NOTE — PROGRESS NOTES
PROGRESS NOTE      Chief Complaint:   High-grade bladder urothelial cell carcinoma/Gross hematuria/UTI/Left hydronephrosis/Left ureteral tumor/History of left distal ureteral stone/Bilateral renal cysts/Left renal atrophy/Left renal calculi/OAB with urge incontinence/Urinary retention    HPI:   She is cold and feeling much better.  She denies any pain or catheter discomfort at this time    Vitals:    06/23/24 1712   BP: 109/76   Pulse:    Resp:    Temp: 99.5 °F (37.5 °C)   SpO2:        Allergies: Lyrica [pregabalin] and Adhesive tape    PAST MEDICAL HISTORY:   Past Medical History:   Diagnosis Date    Arthritis     Asthma     follows with PCP    Cancer (HCC)     bladder    COPD (chronic obstructive pulmonary disease) (HCC)     Follows with PCP    Eczema     Fall 05/26/2011    Gout     Hypertension     Kidney stone     Multiple sclerosis (HCC)     Prolonged emergence from general anesthesia     Scoliosis     Spinal stenosis     UTI (urinary tract infection)        PAST SURGICAL HISTORY:   Past Surgical History:   Procedure Laterality Date    APPENDECTOMY      BLADDER SURGERY Left 7/6/2023    CYSTOSCOPY RETROGRADE PYELOGRAM  left STENT REMOVAL, PLACEMENT OF LEFT STENT performed by Ruy Deng MD at [facility] OR    CHOLECYSTECTOMY      CYSTOSCOPY N/A 4/4/2024    BLADDER BIOPSY/ RESECTION OF BLADDER TUMOR performed by Ruy Deng MD at [facility] OR    CYSTOSCOPY N/A 6/21/2024    CYSTOSCOPY RETROGRADE PYELOGRAM BLADDER BIOPSY FULGURATION GEMCITABINE INSTALLATION performed by Tony Howard DO at [facility] OR    CYSTOSCOPY INSERTION / REMOVAL STENT / STONE Right 07/26/2020    CYSTOSCOPY RETROGRADE PYELOGRAM STENT INSERTION LEFT performed by Tony Howard DO at [facility] OR    HYSTERECTOMY (CERVIX STATUS UNKNOWN)      LITHOTRIPSY Left 08/07/2020    CYSTOSCOPY RETROGRADE PYELOGRAM URETEROSCOPY  LEFT J-STENT EXCHANGE, INSERTION JUNIOR CATHETER---FACILITY------ performed by Tony Howard DO at [facility] OR    LITHOTRIPSY Left

## 2024-06-23 NOTE — PLAN OF CARE
Problem: Discharge Planning  Goal: Discharge to home or other facility with appropriate resources  6/23/2024 0752 by Sabra Kee RN  Outcome: Progressing  6/22/2024 2122 by Michelle Vaughn RN  Outcome: Progressing     Problem: Skin/Tissue Integrity  Goal: Absence of new skin breakdown  Description: 1.  Monitor for areas of redness and/or skin breakdown  2.  Assess vascular access sites hourly  3.  Every 4-6 hours minimum:  Change oxygen saturation probe site  4.  Every 4-6 hours:  If on nasal continuous positive airway pressure, respiratory therapy assess nares and determine need for appliance change or resting period.  6/23/2024 0752 by Sabra Kee RN  Outcome: Progressing  6/22/2024 2122 by Michelle Vaughn RN  Outcome: Progressing     Problem: Safety - Adult  Goal: Free from fall injury  6/23/2024 0752 by Sabra Kee RN  Outcome: Progressing  6/22/2024 2122 by Michelle Vaughn RN  Outcome: Progressing     Problem: ABCDS Injury Assessment  Goal: Absence of physical injury  6/23/2024 0752 by Sabra Kee RN  Outcome: Progressing  6/22/2024 2122 by Michelle Vaughn RN  Outcome: Progressing

## 2024-06-24 LAB
ALBUMIN SERPL-MCNC: 2.4 G/DL (ref 3.5–5.2)
ALP SERPL-CCNC: 106 U/L (ref 35–104)
ALT SERPL-CCNC: 19 U/L (ref 0–32)
ANION GAP SERPL CALCULATED.3IONS-SCNC: 7 MMOL/L (ref 7–16)
AST SERPL-CCNC: 23 U/L (ref 0–31)
BASOPHILS # BLD: 0.02 K/UL (ref 0–0.2)
BASOPHILS NFR BLD: 0 % (ref 0–2)
BILIRUB SERPL-MCNC: 0.7 MG/DL (ref 0–1.2)
BUN SERPL-MCNC: 9 MG/DL (ref 6–23)
CALCIUM SERPL-MCNC: 8.3 MG/DL (ref 8.6–10.2)
CHLORIDE SERPL-SCNC: 109 MMOL/L (ref 98–107)
CO2 SERPL-SCNC: 22 MMOL/L (ref 22–29)
CREAT SERPL-MCNC: 0.7 MG/DL (ref 0.5–1)
EKG ATRIAL RATE: 128 BPM
EKG ATRIAL RATE: 132 BPM
EKG P AXIS: 10 DEGREES
EKG P AXIS: 39 DEGREES
EKG P-R INTERVAL: 130 MS
EKG P-R INTERVAL: 136 MS
EKG Q-T INTERVAL: 292 MS
EKG Q-T INTERVAL: 324 MS
EKG QRS DURATION: 80 MS
EKG QRS DURATION: 82 MS
EKG QTC CALCULATION (BAZETT): 432 MS
EKG QTC CALCULATION (BAZETT): 473 MS
EKG R AXIS: -3 DEGREES
EKG R AXIS: 3 DEGREES
EKG T AXIS: 49 DEGREES
EKG T AXIS: 81 DEGREES
EKG VENTRICULAR RATE: 128 BPM
EKG VENTRICULAR RATE: 132 BPM
EOSINOPHIL # BLD: 0.36 K/UL (ref 0.05–0.5)
EOSINOPHILS RELATIVE PERCENT: 4 % (ref 0–6)
ERYTHROCYTE [DISTWIDTH] IN BLOOD BY AUTOMATED COUNT: 14.9 % (ref 11.5–15)
GFR, ESTIMATED: 88 ML/MIN/1.73M2
GLUCOSE SERPL-MCNC: 111 MG/DL (ref 74–99)
HCT VFR BLD AUTO: 30.5 % (ref 34–48)
HGB BLD-MCNC: 9.7 G/DL (ref 11.5–15.5)
IMM GRANULOCYTES # BLD AUTO: 0.06 K/UL (ref 0–0.58)
IMM GRANULOCYTES NFR BLD: 1 % (ref 0–5)
LYMPHOCYTES NFR BLD: 0.97 K/UL (ref 1.5–4)
LYMPHOCYTES RELATIVE PERCENT: 11 % (ref 20–42)
MAGNESIUM SERPL-MCNC: 2.1 MG/DL (ref 1.6–2.6)
MCH RBC QN AUTO: 32 PG (ref 26–35)
MCHC RBC AUTO-ENTMCNC: 31.8 G/DL (ref 32–34.5)
MCV RBC AUTO: 100.7 FL (ref 80–99.9)
MICROORGANISM SPEC CULT: ABNORMAL
MONOCYTES NFR BLD: 0.6 K/UL (ref 0.1–0.95)
MONOCYTES NFR BLD: 7 % (ref 2–12)
NEUTROPHILS NFR BLD: 77 % (ref 43–80)
NEUTS SEG NFR BLD: 6.79 K/UL (ref 1.8–7.3)
PHOSPHATE SERPL-MCNC: 2 MG/DL (ref 2.5–4.5)
PLATELET # BLD AUTO: 180 K/UL (ref 130–450)
PMV BLD AUTO: 10.3 FL (ref 7–12)
POTASSIUM SERPL-SCNC: 3.9 MMOL/L (ref 3.5–5)
PROT SERPL-MCNC: 4.9 G/DL (ref 6.4–8.3)
RBC # BLD AUTO: 3.03 M/UL (ref 3.5–5.5)
SODIUM SERPL-SCNC: 138 MMOL/L (ref 132–146)
SPECIMEN DESCRIPTION: ABNORMAL
WBC OTHER # BLD: 8.8 K/UL (ref 4.5–11.5)

## 2024-06-24 PROCEDURE — 6360000002 HC RX W HCPCS: Performed by: STUDENT IN AN ORGANIZED HEALTH CARE EDUCATION/TRAINING PROGRAM

## 2024-06-24 PROCEDURE — 2580000003 HC RX 258: Performed by: UROLOGY

## 2024-06-24 PROCEDURE — 6370000000 HC RX 637 (ALT 250 FOR IP): Performed by: STUDENT IN AN ORGANIZED HEALTH CARE EDUCATION/TRAINING PROGRAM

## 2024-06-24 PROCEDURE — 99222 1ST HOSP IP/OBS MODERATE 55: CPT | Performed by: STUDENT IN AN ORGANIZED HEALTH CARE EDUCATION/TRAINING PROGRAM

## 2024-06-24 PROCEDURE — 83735 ASSAY OF MAGNESIUM: CPT

## 2024-06-24 PROCEDURE — 2580000003 HC RX 258: Performed by: INTERNAL MEDICINE

## 2024-06-24 PROCEDURE — 97161 PT EVAL LOW COMPLEX 20 MIN: CPT

## 2024-06-24 PROCEDURE — 93010 ELECTROCARDIOGRAM REPORT: CPT | Performed by: INTERNAL MEDICINE

## 2024-06-24 PROCEDURE — 80053 COMPREHEN METABOLIC PANEL: CPT

## 2024-06-24 PROCEDURE — 85025 COMPLETE CBC W/AUTO DIFF WBC: CPT

## 2024-06-24 PROCEDURE — 2060000000 HC ICU INTERMEDIATE R&B

## 2024-06-24 PROCEDURE — 97530 THERAPEUTIC ACTIVITIES: CPT

## 2024-06-24 PROCEDURE — 2700000000 HC OXYGEN THERAPY PER DAY

## 2024-06-24 PROCEDURE — 94640 AIRWAY INHALATION TREATMENT: CPT

## 2024-06-24 PROCEDURE — 84100 ASSAY OF PHOSPHORUS: CPT

## 2024-06-24 PROCEDURE — 6360000002 HC RX W HCPCS: Performed by: INTERNAL MEDICINE

## 2024-06-24 PROCEDURE — 36415 COLL VENOUS BLD VENIPUNCTURE: CPT

## 2024-06-24 PROCEDURE — 6370000000 HC RX 637 (ALT 250 FOR IP): Performed by: INTERNAL MEDICINE

## 2024-06-24 RX ORDER — FUROSEMIDE 20 MG/1
20 TABLET ORAL DAILY
Status: DISCONTINUED | OUTPATIENT
Start: 2024-06-24 | End: 2024-06-28 | Stop reason: HOSPADM

## 2024-06-24 RX ADMIN — ARFORMOTEROL TARTRATE 15 MCG: 15 SOLUTION RESPIRATORY (INHALATION) at 19:38

## 2024-06-24 RX ADMIN — ACETAMINOPHEN 650 MG: 325 TABLET ORAL at 22:29

## 2024-06-24 RX ADMIN — FUROSEMIDE 20 MG: 20 TABLET ORAL at 16:38

## 2024-06-24 RX ADMIN — PIPERACILLIN AND TAZOBACTAM 3375 MG: 3; .375 INJECTION, POWDER, LYOPHILIZED, FOR SOLUTION INTRAVENOUS at 00:24

## 2024-06-24 RX ADMIN — ACETAMINOPHEN 650 MG: 325 TABLET ORAL at 11:57

## 2024-06-24 RX ADMIN — SODIUM CHLORIDE, PRESERVATIVE FREE 10 ML: 5 INJECTION INTRAVENOUS at 20:27

## 2024-06-24 RX ADMIN — ACETAMINOPHEN 650 MG: 325 TABLET ORAL at 05:56

## 2024-06-24 RX ADMIN — BUDESONIDE 500 MCG: 0.5 SUSPENSION RESPIRATORY (INHALATION) at 07:52

## 2024-06-24 RX ADMIN — BUDESONIDE 500 MCG: 0.5 SUSPENSION RESPIRATORY (INHALATION) at 19:38

## 2024-06-24 RX ADMIN — ACETAMINOPHEN 650 MG: 325 TABLET ORAL at 17:22

## 2024-06-24 RX ADMIN — SODIUM CHLORIDE, PRESERVATIVE FREE 10 ML: 5 INJECTION INTRAVENOUS at 08:53

## 2024-06-24 RX ADMIN — PIPERACILLIN AND TAZOBACTAM 3375 MG: 3; .375 INJECTION, POWDER, LYOPHILIZED, FOR SOLUTION INTRAVENOUS at 08:50

## 2024-06-24 RX ADMIN — ANORECTAL OINTMENT: 15.7; .44; 24; 20.6 OINTMENT TOPICAL at 21:34

## 2024-06-24 RX ADMIN — POLYETHYLENE GLYCOL 3350 17 G: 17 POWDER, FOR SOLUTION ORAL at 08:51

## 2024-06-24 RX ADMIN — DOCUSATE SODIUM 100 MG: 100 CAPSULE, LIQUID FILLED ORAL at 08:52

## 2024-06-24 RX ADMIN — PIPERACILLIN AND TAZOBACTAM 3375 MG: 3; .375 INJECTION, POWDER, LYOPHILIZED, FOR SOLUTION INTRAVENOUS at 22:25

## 2024-06-24 RX ADMIN — MAGNESIUM OXIDE 400 MG (241.3 MG MAGNESIUM) TABLET 400 MG: TABLET at 08:52

## 2024-06-24 RX ADMIN — ARFORMOTEROL TARTRATE 15 MCG: 15 SOLUTION RESPIRATORY (INHALATION) at 07:52

## 2024-06-24 RX ADMIN — PIPERACILLIN AND TAZOBACTAM 3375 MG: 3; .375 INJECTION, POWDER, LYOPHILIZED, FOR SOLUTION INTRAVENOUS at 14:37

## 2024-06-24 ASSESSMENT — PAIN DESCRIPTION - DESCRIPTORS
DESCRIPTORS: ACHING;DULL;DISCOMFORT
DESCRIPTORS: ACHING;DISCOMFORT

## 2024-06-24 ASSESSMENT — PAIN DESCRIPTION - LOCATION
LOCATION: HEAD

## 2024-06-24 ASSESSMENT — PAIN SCALES - GENERAL
PAINLEVEL_OUTOF10: 6
PAINLEVEL_OUTOF10: 8
PAINLEVEL_OUTOF10: 7

## 2024-06-24 ASSESSMENT — PAIN - FUNCTIONAL ASSESSMENT: PAIN_FUNCTIONAL_ASSESSMENT: PREVENTS OR INTERFERES SOME ACTIVE ACTIVITIES AND ADLS

## 2024-06-24 NOTE — DISCHARGE INSTR - COC
Continuity of Care Form    Patient Name: Nettie Lara   :  1955  MRN:  [id number]    Admit date:  2024  Discharge date:  2024    Code Status Order: Full Code   Advance Directives:     Admitting Physician:  Tony Howard DO  PCP: Geraldo Myers MD    Discharging Nurse: Adwoa Flynn RN  Discharging Hospital Unit/Room#: 0603/0603-A  Discharging Unit Phone Number: [phone]    Emergency Contact:   Extended Emergency Contact Information  Primary Emergency Contact: Dena Stern (DPOA)  Home Phone: [phone]  Mobile Phone: [phone]  Relation: Other   needed? No  Secondary Emergency Contact: Rosalba Aaron  Home Phone: [phone]  Mobile Phone: [phone]  Relation: Brother/Sister    Past Surgical History:  Past Surgical History:   Procedure Laterality Date    APPENDECTOMY      BLADDER SURGERY Left 2023    CYSTOSCOPY RETROGRADE PYELOGRAM  left STENT REMOVAL, PLACEMENT OF LEFT STENT performed by Ruy Deng MD at [facility] [place]    CHOLECYSTECTOMY      CYSTOSCOPY N/A 2024    BLADDER BIOPSY/ RESECTION OF BLADDER TUMOR performed by Ruy Deng MD at [facility] OR    CYSTOSCOPY N/A 2024    CYSTOSCOPY RETROGRADE PYELOGRAM BLADDER BIOPSY FULGURATION GEMCITABINE INSTALLATION performed by Tony Howard DO at [facility] OR    CYSTOSCOPY INSERTION / REMOVAL STENT / STONE Right 2020    CYSTOSCOPY RETROGRADE PYELOGRAM STENT INSERTION LEFT performed by Tony Howard DO at [facility] OR    HYSTERECTOMY (CERVIX STATUS UNKNOWN)      LITHOTRIPSY Left 2020    CYSTOSCOPY RETROGRADE PYELOGRAM URETEROSCOPY  LEFT J-STENT EXCHANGE, INSERTION JUNIOR CATHETER---FACILITY------ performed by Tony Howard DO at [facility] OR    LITHOTRIPSY Left 2023    CYSTOSCOPY LEFT ESWL EXTRACORPOREAL SHOCK WAVE LITHOTRIPSY WITH LEFT STENT INSERTION (HOLD TIME 1130 FOR TRANSPORT) performed by Wilson Finney MD at [facility] OR    LITHOTRIPSY Left 2023    CYSTOSCOPY RETROGRADE PYELOGRAM LEFT URETEROSCOPY  WITH LASER  SECTION    Prognosis: Good    Condition at Discharge: Stable    Rehab Potential (if transferring to Rehab): Fair    Recommended Labs or Other Treatments After Discharge: ***    Physician Certification: I certify the above information and transfer of Nettie Lara  is necessary for the continuing treatment of the diagnosis listed and that she requires Skilled Nursing Facility for less 30 days.     Update Admission H&P: No change in H&P    PHYSICIAN SIGNATURE:  Dr fernandez Howard DO

## 2024-06-24 NOTE — PLAN OF CARE
Problem: Discharge Planning  Goal: Discharge to home or other facility with appropriate resources  Outcome: Progressing     Problem: Skin/Tissue Integrity  Goal: Absence of new skin breakdown  Description: 1.  Monitor for areas of redness and/or skin breakdown  2.  Assess vascular access sites hourly  3.  Every 4-6 hours minimum:  Change oxygen saturation probe site  4.  Every 4-6 hours:  If on nasal continuous positive airway pressure, respiratory therapy assess nares and determine need for appliance change or resting period.  Outcome: Progressing     Problem: Safety - Adult  Goal: Free from fall injury  Outcome: Progressing  Flowsheets (Taken 6/24/2024 0103 by Yumiko Dominguez, RN)  Free From Fall Injury: Instruct family/caregiver on patient safety     Problem: ABCDS Injury Assessment  Goal: Absence of physical injury  Outcome: Progressing  Flowsheets (Taken 6/24/2024 0103 by Yumiko Dominguez, RN)  Absence of Physical Injury: Implement safety measures based on patient assessment

## 2024-06-24 NOTE — PROGRESS NOTES
non tender without mass   Pulmonary/Chest: clear to auscultation bilaterally- no wheezes, rales or rhonchi, normal air movement, no respiratory distress  Cardiovascular: normal rate, normal S1 and S2 and no carotid bruits  Abdomen: soft, non-tender, non-distended, normal bowel sounds, no masses or organomegaly  Extremities: no cyanosis, no clubbing and no edema  Neurologic: no cranial nerve deficit and speech normal        Recent Labs     06/22/24  0515 06/23/24  1010 06/24/24  0531    136 138   K 4.0 3.6 3.9   * 105 109*   CO2 21* 21* 22   BUN 13 11 9   CREATININE 1.0 0.9 0.7   GLUCOSE 132* 114* 111*   CALCIUM 7.5* 8.1* 8.3*       Recent Labs     06/22/24  0515 06/23/24  1010 06/24/24  0531   WBC 16.7* 10.4 8.8   RBC 3.33* 3.02* 3.03*   HGB 10.7* 10.1* 9.7*   HCT 34.6 31.0* 30.5*   .9* 102.6* 100.7*   MCH 32.1 33.4 32.0   MCHC 30.9* 32.6 31.8*   RDW 15.0 15.2* 14.9    196 180   MPV 9.4 9.9 10.3       Radiology:   No results to display    Assessment:    Principal Problem:    At risk for sepsis due to urinary tract infection  Active Problems:    Septic shock (HCC)    Complicated UTI (urinary tract infection)    SIRS (systemic inflammatory response syndrome) (HCC)    Hypotension due to hypovolemia    Malignant neoplasm of urinary bladder (HCC)  Resolved Problems:    * No resolved hospital problems. *      Plan:    Sepsis  UTI  -Continue Zosyn   -Continue supportive IV fluids    History of PE in April 2024  -Patient has history of hematuria stasis and is not anticoagulated    Bladder tumor/malignancy  -History cystoscopy with ureteroscopy, ureteral and bladder biopsy  -Gemcitabine instillation on 6/21    Chronic COPD  -Not in exacerbation  -Continue bronchodilators    VTE prophylaxis: Heparin hold      NOTE: This report was transcribed using voice recognition software. Every effort was made to ensure accuracy; however, inadvertent computerized transcription errors may be  present.  Electronically signed by Catrachita Toussaint MD on 6/24/2024 at 9:08 AM

## 2024-06-24 NOTE — ACP (ADVANCE CARE PLANNING)
Advance Care Planning   Healthcare Decision Maker:    Primary Decision Maker: Dena Stern (DPOA) - Other - [phone]    Click here to complete Healthcare Decision Makers including selection of the Healthcare Decision Maker Relationship (ie \"Primary\").  Today we documented Decision Maker(s) consistent with Legal Next of Kin hierarchy.

## 2024-06-24 NOTE — PROGRESS NOTES
6/24/2024 3:24 PM  Nettie Lara  [id number]    Subjective:    She is feeling better  Dillon with bart urine     Review of Systems  Constitutional: No fever or chills   Respiratory: negative for cough and hemoptysis  Cardiovascular: negative for chest pain and dyspnea  Gastrointestinal: negative for abdominal pain, diarrhea, nausea and vomiting   : See above  Derm: negative for rash and skin lesion(s)  Neurological: negative for seizures and tremors  Musculoskeletal: Negative    Psychiatric: Negative   All other reviews are negative      Scheduled Meds:   menthol-zinc oxide   Topical BID    polyethylene glycol  17 g Oral Daily    docusate sodium  100 mg Oral Daily    [Held by provider] heparin (porcine)  5,000 Units SubCUTAneous BID    piperacillin-tazobactam  3,375 mg IntraVENous q8h    sodium chloride flush  5-40 mL IntraVENous 2 times per day    [Held by provider] losartan  25 mg Oral Nightly    [Held by provider] metoprolol tartrate  25 mg Oral BID    magnesium oxide  400 mg Oral Daily    budesonide  0.5 mg Nebulization BID RT    And    arformoterol tartrate  15 mcg Nebulization BID RT       Objective:  Vitals:    06/24/24 1102   BP: (!) 136/57   Pulse: 93   Resp: 18   Temp: 98.2 °F (36.8 °C)   SpO2: 97%         Allergies: Lyrica [pregabalin] and Adhesive tape    General Appearance: alert and oriented to person, place and time and in no acute distress  Skin: no rash or erythema  Head: normocephalic and atraumatic  Pulmonary/Chest: normal air movement, no respiratory distress  Abdomen: soft, non-tender, non-distended  Genitourinary:dillon with yellow urine   Extremities: no cyanosis, clubbing or edema         Labs:     Recent Labs     06/24/24  0531      K 3.9   *   CO2 22   BUN 9   CREATININE 0.7   GLUCOSE 111*   CALCIUM 8.3*       Lab Results   Component Value Date/Time    HGB 9.7 06/24/2024 05:31 AM    HCT 30.5 06/24/2024 05:31 AM       No results found for:

## 2024-06-24 NOTE — PROGRESS NOTES
Initial Inpatient Wound Care    Admit Date: 6/21/2024 10:04 AM    Reason for consult:    Open wound on buttocks.    Significant history:    Case Time: Procedures: Surgeons:   6/21/2024 11:02 AM CYSTOSCOPY RETROGRADE PYELOGRAM BLADDER BIOPSY FULGURATION GEMCITABINE INSTALLATION    Lee, Tony COVARRUBIAS, DO            Hx MS, bladder cancer    Wound history:  POA    Findings:  alert and verbally appropriate  Obrien in place-post op  States can stand, mostly in w/c  Heels rt lateral redness. Knees and feet fall outward  Buttocks below. ? Ointment in place.  No open areas noted. Discoloration and variation of skin, scarring        Plan:lo air loss mattress in hallway  Use Optiview for heels  SOS  Calmoseptine- as prefers      Dinora Springer RN 6/24/2024 2:50 PM

## 2024-06-24 NOTE — PROGRESS NOTES
Physical Therapy  Facility/Department: [address]  Physical Therapy Initial Assessment    Name: Nettie Lara  : 1955  MRN: [id number]  Date of Service: 2024          Patient Diagnosis(es): The primary encounter diagnosis was Malignant neoplasm of urinary bladder, unspecified site (HCC). A diagnosis of Hypotension due to hypovolemia was also pertinent to this visit.  Past Medical History:  has a past medical history of Arthritis, Asthma, Cancer (HCC), COPD (chronic obstructive pulmonary disease) (HCC), Eczema, Fall, Gout, Hypertension, Kidney stone, Multiple sclerosis (HCC), Prolonged emergence from general anesthesia, Scoliosis, Spinal stenosis, and UTI (urinary tract infection).  Past Surgical History:  has a past surgical history that includes Hysterectomy; Cholecystectomy; Appendectomy; CYSTOSCOPY INSERTION / REMOVAL STENT / STONE (Right, 2020); Lithotripsy (Left, 2020); Tonsillectomy; Lithotripsy (Left, 2023); Bladder surgery (Left, 2023); Lithotripsy (Left, 2023); shoulder surgery (Left, 2024); Cystoscopy (N/A, 2024); and Cystoscopy (N/A, 2024).       Requires PT Follow-Up: Yes     Evaluating Therapist: Sabrina Asif PT     Referring Provider:  Catrachita Toussaint MD    PT order : PT eval and treat     Room #: 603   DIAGNOSIS: The primary encounter diagnosis was Malignant neoplasm of urinary bladder, unspecified site (HCC). A diagnosis of Hypotension due to hypovolemia was also pertinent to this visit.  Additional Pertinent History:  : 1. Cystoscopy.  2. Left stent removal.  3. Bilateral retrograde pyelogram was done under fluoroscopy.  4. Ureteroscopy.  5. Ureteral biopsy.  6. Bladder biopsy.  7. Obrien catheter placement.  8. Gemcitabine instillation.  PRECAUTIONS: falls     Social:  Pt lives alone  in a  third   floor apartment with elevator  to enter.  Prior to admission : pt typically independent at w/c level. Non -ambulatory      Initial      PLAN  PT care will be provided in accordance with the objectives noted above.  Whenever appropriate, clear delegation orders will be provided for nursing staff.  Exercises and functional mobility practice will be used as well as appropriate assistive devices or modalities to obtain goals. Patient and family education will also be administered as needed.        PLAN OF CARE:    Current Treatment Recommendations     [x] Strengthening to improve independence with functional mobility   [x] ROM to improve independence with functional mobility   [x] Balance Training to improve static/dynamic balance and to reduce fall risk  [x] Endurance Training to improve activity tolerance during functional mobility   [x] Transfer Training to improve safety and independence with all functional transfers   [] Gait Training to improve gait mechanics, endurance and assess need for appropriate assistive device  [] Stair Training in preparation for safe discharge home and/or into the community   [x] Positioning to prevent skin breakdown and contractures  [x] Safety and Education Training   [x] Patient/Caregiver Education   [] HEP  [] Other     Frequency of treatments will be 2-5x/week x 2-10 days.    Time in:  1500   Time out:  1527       Evaluation Time includes thorough review of current medical information, gathering information on past medical history/social history and prior level of function, completion of standardized testing/informal observation of tasks, assessment of data and education on plan of care and goals.    CPT codes:  [x] Low Complexity PT evaluation 76437  [] Moderate Complexity PT evaluation 74123  [] High Complexity PT evaluation 09683  [] PT Re-evaluation 41637  [] Gait training 62495  minutes  [x] Therapeutic activities 07875 13 minutes  [] Therapeutic exercises 47989  minutes  [] Neuromuscular reeducation 33348  minutes       Sabrina Asif  License number:  [id number]

## 2024-06-24 NOTE — CARE COORDINATION
Introduced my self and provided explanation of CM role to patient. Admitted for at risk for sepsis due to urinary tract infection. Hx of MS, COPD, HTN, bladder CA. Wound care consult pending. Urology is following.POD #3 Cysto, left ureteral stent removal, left ureteroscopy with ureteral tumor biopsy, bladder biopsy, gemcitabine instillation. Currentlly on 2L O2 nc RA is baseline, nebs, IV zosyn. She voices she resides alone in a 3rd story apt. with elevator access. completes her adl's with independence and uses wc. Patient is established with a pcp and denies any issue with retail pharmaceutical coverage. Patient is established with Dr. Myers and uses [facility] in [place]. Pt. Would like to go to SNF upon discharge if required. Referral made to [facility] for [facility], await to hear back PT/OT evals pending.Will continue to follow.  Glendy YOUNG, RN  Case Management     Addendum: [facility] accpeted, 7000, BREANA initiated, envelope with facesheet and transport form in chart.  Glendy YOUNG, RN  Case Management

## 2024-06-25 ENCOUNTER — APPOINTMENT (OUTPATIENT)
Dept: GENERAL RADIOLOGY | Age: 69
DRG: 461 | End: 2024-06-25
Attending: UROLOGY
Payer: MEDICAID

## 2024-06-25 LAB
ANION GAP SERPL CALCULATED.3IONS-SCNC: 9 MMOL/L (ref 7–16)
BASOPHILS # BLD: 0.02 K/UL (ref 0–0.2)
BASOPHILS NFR BLD: 0 % (ref 0–2)
BUN SERPL-MCNC: 9 MG/DL (ref 6–23)
CALCIUM SERPL-MCNC: 8.3 MG/DL (ref 8.6–10.2)
CHLORIDE SERPL-SCNC: 103 MMOL/L (ref 98–107)
CO2 SERPL-SCNC: 26 MMOL/L (ref 22–29)
CREAT SERPL-MCNC: 0.9 MG/DL (ref 0.5–1)
EOSINOPHIL # BLD: 0.34 K/UL (ref 0.05–0.5)
EOSINOPHILS RELATIVE PERCENT: 5 % (ref 0–6)
ERYTHROCYTE [DISTWIDTH] IN BLOOD BY AUTOMATED COUNT: 14.6 % (ref 11.5–15)
GFR, ESTIMATED: 69 ML/MIN/1.73M2
GLUCOSE SERPL-MCNC: 113 MG/DL (ref 74–99)
HCT VFR BLD AUTO: 30.6 % (ref 34–48)
HGB BLD-MCNC: 10 G/DL (ref 11.5–15.5)
IMM GRANULOCYTES # BLD AUTO: 0.09 K/UL (ref 0–0.58)
IMM GRANULOCYTES NFR BLD: 1 % (ref 0–5)
LYMPHOCYTES NFR BLD: 1.44 K/UL (ref 1.5–4)
LYMPHOCYTES RELATIVE PERCENT: 19 % (ref 20–42)
MCH RBC QN AUTO: 33.2 PG (ref 26–35)
MCHC RBC AUTO-ENTMCNC: 32.7 G/DL (ref 32–34.5)
MCV RBC AUTO: 101.7 FL (ref 80–99.9)
MONOCYTES NFR BLD: 0.96 K/UL (ref 0.1–0.95)
MONOCYTES NFR BLD: 13 % (ref 2–12)
NEUTROPHILS NFR BLD: 63 % (ref 43–80)
NEUTS SEG NFR BLD: 4.76 K/UL (ref 1.8–7.3)
PLATELET # BLD AUTO: 236 K/UL (ref 130–450)
PMV BLD AUTO: 10.2 FL (ref 7–12)
POTASSIUM SERPL-SCNC: 3.3 MMOL/L (ref 3.5–5)
PROCALCITONIN SERPL-MCNC: 22.77 NG/ML (ref 0–0.08)
RBC # BLD AUTO: 3.01 M/UL (ref 3.5–5.5)
SODIUM SERPL-SCNC: 138 MMOL/L (ref 132–146)
WBC OTHER # BLD: 7.6 K/UL (ref 4.5–11.5)

## 2024-06-25 PROCEDURE — 2580000003 HC RX 258: Performed by: UROLOGY

## 2024-06-25 PROCEDURE — 2580000003 HC RX 258: Performed by: INTERNAL MEDICINE

## 2024-06-25 PROCEDURE — 6360000002 HC RX W HCPCS: Performed by: STUDENT IN AN ORGANIZED HEALTH CARE EDUCATION/TRAINING PROGRAM

## 2024-06-25 PROCEDURE — 6360000002 HC RX W HCPCS: Performed by: INTERNAL MEDICINE

## 2024-06-25 PROCEDURE — 6370000000 HC RX 637 (ALT 250 FOR IP): Performed by: STUDENT IN AN ORGANIZED HEALTH CARE EDUCATION/TRAINING PROGRAM

## 2024-06-25 PROCEDURE — 36415 COLL VENOUS BLD VENIPUNCTURE: CPT

## 2024-06-25 PROCEDURE — 2700000000 HC OXYGEN THERAPY PER DAY

## 2024-06-25 PROCEDURE — 6370000000 HC RX 637 (ALT 250 FOR IP): Performed by: INTERNAL MEDICINE

## 2024-06-25 PROCEDURE — 99222 1ST HOSP IP/OBS MODERATE 55: CPT | Performed by: STUDENT IN AN ORGANIZED HEALTH CARE EDUCATION/TRAINING PROGRAM

## 2024-06-25 PROCEDURE — 94640 AIRWAY INHALATION TREATMENT: CPT

## 2024-06-25 PROCEDURE — 6360000002 HC RX W HCPCS: Performed by: REGISTERED NURSE

## 2024-06-25 PROCEDURE — 74018 RADEX ABDOMEN 1 VIEW: CPT

## 2024-06-25 PROCEDURE — 2580000003 HC RX 258: Performed by: REGISTERED NURSE

## 2024-06-25 PROCEDURE — 2060000000 HC ICU INTERMEDIATE R&B

## 2024-06-25 PROCEDURE — 80048 BASIC METABOLIC PNL TOTAL CA: CPT

## 2024-06-25 PROCEDURE — 85025 COMPLETE CBC W/AUTO DIFF WBC: CPT

## 2024-06-25 PROCEDURE — 6360000002 HC RX W HCPCS: Performed by: UROLOGY

## 2024-06-25 RX ORDER — POLYETHYLENE GLYCOL 3350 17 G/17G
17 POWDER, FOR SOLUTION ORAL 2 TIMES DAILY
Status: DISCONTINUED | OUTPATIENT
Start: 2024-06-25 | End: 2024-06-28 | Stop reason: HOSPADM

## 2024-06-25 RX ADMIN — WATER 2000 MG: 1 INJECTION INTRAMUSCULAR; INTRAVENOUS; SUBCUTANEOUS at 12:44

## 2024-06-25 RX ADMIN — MAGNESIUM OXIDE 400 MG (241.3 MG MAGNESIUM) TABLET 400 MG: TABLET at 07:54

## 2024-06-25 RX ADMIN — FUROSEMIDE 20 MG: 20 TABLET ORAL at 07:54

## 2024-06-25 RX ADMIN — ANORECTAL OINTMENT: 15.7; .44; 24; 20.6 OINTMENT TOPICAL at 18:23

## 2024-06-25 RX ADMIN — DOCUSATE SODIUM 100 MG: 100 CAPSULE, LIQUID FILLED ORAL at 07:54

## 2024-06-25 RX ADMIN — HEPARIN SODIUM 5000 UNITS: 5000 INJECTION INTRAVENOUS; SUBCUTANEOUS at 20:56

## 2024-06-25 RX ADMIN — ARFORMOTEROL TARTRATE 15 MCG: 15 SOLUTION RESPIRATORY (INHALATION) at 20:18

## 2024-06-25 RX ADMIN — ACETAMINOPHEN 650 MG: 325 TABLET ORAL at 04:57

## 2024-06-25 RX ADMIN — ACETAMINOPHEN 650 MG: 325 TABLET ORAL at 19:40

## 2024-06-25 RX ADMIN — POLYETHYLENE GLYCOL 3350 17 G: 17 POWDER, FOR SOLUTION ORAL at 07:54

## 2024-06-25 RX ADMIN — PIPERACILLIN AND TAZOBACTAM 3375 MG: 3; .375 INJECTION, POWDER, LYOPHILIZED, FOR SOLUTION INTRAVENOUS at 06:05

## 2024-06-25 RX ADMIN — POLYETHYLENE GLYCOL 3350 17 G: 17 POWDER, FOR SOLUTION ORAL at 19:41

## 2024-06-25 RX ADMIN — BUDESONIDE 500 MCG: 0.5 SUSPENSION RESPIRATORY (INHALATION) at 20:18

## 2024-06-25 RX ADMIN — BUDESONIDE 500 MCG: 0.5 SUSPENSION RESPIRATORY (INHALATION) at 08:10

## 2024-06-25 RX ADMIN — SODIUM CHLORIDE, PRESERVATIVE FREE 10 ML: 5 INJECTION INTRAVENOUS at 19:39

## 2024-06-25 RX ADMIN — ARFORMOTEROL TARTRATE 15 MCG: 15 SOLUTION RESPIRATORY (INHALATION) at 08:10

## 2024-06-25 RX ADMIN — ACETAMINOPHEN 650 MG: 325 TABLET ORAL at 12:25

## 2024-06-25 ASSESSMENT — PAIN SCALES - GENERAL
PAINLEVEL_OUTOF10: 7
PAINLEVEL_OUTOF10: 7
PAINLEVEL_OUTOF10: 0
PAINLEVEL_OUTOF10: 7

## 2024-06-25 ASSESSMENT — PAIN DESCRIPTION - LOCATION
LOCATION: HEAD
LOCATION: ABDOMEN
LOCATION: HEAD;OTHER (COMMENT)

## 2024-06-25 ASSESSMENT — PAIN DESCRIPTION - DESCRIPTORS
DESCRIPTORS: CRUSHING;DISCOMFORT
DESCRIPTORS: DISCOMFORT;ACHING
DESCRIPTORS: PRESSURE

## 2024-06-25 ASSESSMENT — PAIN DESCRIPTION - ORIENTATION: ORIENTATION: MID

## 2024-06-25 NOTE — PROGRESS NOTES
Reported KUB results to Dr. Toussaint. New orders obtained.    Electronically signed by Yamilet South RN on 6/25/2024 at 6:15 PM

## 2024-06-25 NOTE — PLAN OF CARE
Problem: Discharge Planning  Goal: Discharge to home or other facility with appropriate resources  6/24/2024 2156 by Negrito Mancini RN  Outcome: Progressing  6/24/2024 1014 by Adwoa Flynn RN  Outcome: Progressing     Problem: Skin/Tissue Integrity  Goal: Absence of new skin breakdown  Description: 1.  Monitor for areas of redness and/or skin breakdown  2.  Assess vascular access sites hourly  3.  Every 4-6 hours minimum:  Change oxygen saturation probe site  4.  Every 4-6 hours:  If on nasal continuous positive airway pressure, respiratory therapy assess nares and determine need for appliance change or resting period.  6/24/2024 2156 by Negrito Mancini RN  Outcome: Progressing  6/24/2024 1014 by Adwoa Flynn RN  Outcome: Progressing     Problem: Safety - Adult  Goal: Free from fall injury  6/24/2024 2156 by Ngerito Mancini RN  Outcome: Progressing  6/24/2024 1014 by Adwoa Flynn RN  Outcome: Progressing  Flowsheets (Taken 6/24/2024 0103 by Yumiko Dominguez RN)  Free From Fall Injury: Instruct family/caregiver on patient safety     Problem: ABCDS Injury Assessment  Goal: Absence of physical injury  6/24/2024 2156 by Negrito Mancini RN  Outcome: Progressing  6/24/2024 1014 by Adwoa Flynn RN  Outcome: Progressing  Flowsheets (Taken 6/24/2024 0103 by Yumiko Dominguez, RN)  Absence of Physical Injury: Implement safety measures based on patient assessment     Problem: Pain  Goal: Verbalizes/displays adequate comfort level or baseline comfort level  Outcome: Progressing

## 2024-06-25 NOTE — PROGRESS NOTES
[facility] Hospitalist Progress Note    Admitting Date and Time: 6/21/2024 10:04 AM  Admit Dx: Malignant neoplasm of urinary bladder, unspecified site (HCC) [C67.9]  At risk for sepsis due to urinary tract infection [N39.0, Z91.89]  Sepsis due to urinary tract infection (HCC) [A41.9, N39.0]    Subjective:  Patient is being followed for Malignant neoplasm of urinary bladder, unspecified site (HCC) [C67.9]  At risk for sepsis due to urinary tract infection [N39.0, Z91.89]  Sepsis due to urinary tract infection (HCC) [A41.9, N39.0]     No acute events overnight.  Patient has constipation over the last 3 days and will get KUB.  She has Obrien catheter for urinary retention.    ROS: denies fever, chills, cp, sob, n/v, HA unless stated above.      menthol-zinc oxide   Topical BID    furosemide  20 mg Oral Daily    polyethylene glycol  17 g Oral Daily    docusate sodium  100 mg Oral Daily    [Held by provider] heparin (porcine)  5,000 Units SubCUTAneous BID    piperacillin-tazobactam  3,375 mg IntraVENous q8h    sodium chloride flush  5-40 mL IntraVENous 2 times per day    [Held by provider] losartan  25 mg Oral Nightly    [Held by provider] metoprolol tartrate  25 mg Oral BID    magnesium oxide  400 mg Oral Daily    budesonide  0.5 mg Nebulization BID RT    And    arformoterol tartrate  15 mcg Nebulization BID RT     acetaminophen, 650 mg, Q4H PRN  naproxen, 250 mg, Q6H PRN  sodium chloride flush, 5-40 mL, PRN  sodium chloride, , PRN  phenazopyridine, 100 mg, TID PRN  oxyCODONE-acetaminophen, 1 tablet, Q4H PRN   Or  oxyCODONE-acetaminophen, 2 tablet, Q4H PRN  albuterol, 2.5 mg, Q4H PRN         Objective:    /65   Pulse 97   Temp 98.6 °F (37 °C) (Oral)   Resp 16   Ht 1.676 m (5' 6\")   Wt 96.6 kg (213 lb)   LMP  (LMP Unknown)   SpO2 93%   BMI 34.38 kg/m²     General Appearance: alert and oriented to person, place and time and in no acute distress  Skin: warm and dry  Head: normocephalic and

## 2024-06-25 NOTE — CONSULTS
[facility]  [facility]  Consultation Note     Admit Date: 6/21/2024 10:04 AM    Reason for Consult:   positive blood cultures 1 out of 4    Attending Physician:  Tony Howard DO    HISTORY OF PRESENT ILLNESS:             The history is obtained from extensive review of available past medical records. The patient is a 69 y.o. female who is previously known to the ID service.    Patient was admitted post op for observation after a urological procedure on 6/21. She has a known history of bladder cancer and a cysto with left stent removal, and biopsies were done. Gemcitabine was instilled in the OR and drained about an hour later. She was having shaking chills post op. Later on the day of admission an RRT was called for hypotension 80/60, tachycardia, lactic acid of 4.4, tmax of 101.5 & leukocytosis of 25K. She was transferred to the ICU for septic shock. She was given a dose of Vancomycin, a dose of Rocephin, mainained on Cipro for a day, followed by Zosyn. She had urinary rentention with insertion of a dillon. She had a tmax of 102.6 later in the morning on 6/22. She has been afebrile since the evening of 6/22. Heart rate has improved and she is holding her BP. She was transferred back to an intermediate unit on 6/22. Her white count normalized on 6/23.  As part of her work up for sepsis blood cultures were done and on 6/25 turned positive in 1 out of 4 bottles for Proteus (with no CTX-M gene). Urine culture shows mixed katja. ID was asked to see today.    Past Medical History:      April 2024: Admitted to [facility] with a diagnosis of bladder mass and calculus of ureter. Seen by urology and underwent cystoscopy, laser lithotripsy, bladder biopsy with resection of bladder tumor on 4/4/2024. Urine cultures are growing quinolone and nitrofurantoin resistant E. coli and Enterococcus. She was treated with Cefepime. ID was asked to see and consolidated antibiotics to oral Amoxicillin

## 2024-06-25 NOTE — PROGRESS NOTES
Occupational Therapy  OT consult received to eval/treat and chart review complete. Attempted OT evaluation, patient off floor for testing. OT to re-attempt at a later date/time as able and appropriate.     Noemy Limon OTR/L  License Number: [id number]

## 2024-06-25 NOTE — PLAN OF CARE
Problem: Discharge Planning  Goal: Discharge to home or other facility with appropriate resources  6/24/2024 2156 by Negrito Mancini RN  Outcome: Progressing  6/24/2024 1014 by Adwoa Flynn RN  Outcome: Progressing     Problem: Skin/Tissue Integrity  Goal: Absence of new skin breakdown  Description: 1.  Monitor for areas of redness and/or skin breakdown  2.  Assess vascular access sites hourly  3.  Every 4-6 hours minimum:  Change oxygen saturation probe site  4.  Every 4-6 hours:  If on nasal continuous positive airway pressure, respiratory therapy assess nares and determine need for appliance change or resting period.  6/24/2024 2156 by Negrito Mancini RN  Outcome: Progressing  6/24/2024 1014 by Adwoa Flynn RN  Outcome: Progressing     Problem: Safety - Adult  Goal: Free from fall injury  6/24/2024 2202 by Negrito Mancini RN  Outcome: Progressing  6/24/2024 2156 by Negrito Mancini RN  Outcome: Progressing  6/24/2024 1014 by Adwoa Flynn RN  Outcome: Progressing  Flowsheets (Taken 6/24/2024 0103 by Yumiko Dominguez RN)  Free From Fall Injury: Instruct family/caregiver on patient safety     Problem: ABCDS Injury Assessment  Goal: Absence of physical injury  6/24/2024 2202 by Negrito Mancini RN  Outcome: Progressing  6/24/2024 2156 by Negrito Mancini RN  Outcome: Progressing  6/24/2024 1014 by Adwoa Flynn RN  Outcome: Progressing  Flowsheets (Taken 6/24/2024 0103 by Yumiko Dominguez RN)  Absence of Physical Injury: Implement safety measures based on patient assessment     Problem: Pain  Goal: Verbalizes/displays adequate comfort level or baseline comfort level  6/24/2024 2202 by Negrito Mancini RN  Outcome: Progressing  6/24/2024 2156 by Negrito Mancini RN  Outcome: Progressing

## 2024-06-26 LAB
ANION GAP SERPL CALCULATED.3IONS-SCNC: 9 MMOL/L (ref 7–16)
BASOPHILS # BLD: 0.04 K/UL (ref 0–0.2)
BASOPHILS NFR BLD: 1 % (ref 0–2)
BUN SERPL-MCNC: 10 MG/DL (ref 6–23)
CALCIUM SERPL-MCNC: 8.6 MG/DL (ref 8.6–10.2)
CHLORIDE SERPL-SCNC: 101 MMOL/L (ref 98–107)
CO2 SERPL-SCNC: 28 MMOL/L (ref 22–29)
CREAT SERPL-MCNC: 0.7 MG/DL (ref 0.5–1)
EOSINOPHIL # BLD: 0.25 K/UL (ref 0.05–0.5)
EOSINOPHILS RELATIVE PERCENT: 3 % (ref 0–6)
ERYTHROCYTE [DISTWIDTH] IN BLOOD BY AUTOMATED COUNT: 14.5 % (ref 11.5–15)
GFR, ESTIMATED: >90 ML/MIN/1.73M2
GLUCOSE SERPL-MCNC: 107 MG/DL (ref 74–99)
HCT VFR BLD AUTO: 31.7 % (ref 34–48)
HGB BLD-MCNC: 10.3 G/DL (ref 11.5–15.5)
IMM GRANULOCYTES # BLD AUTO: 0.19 K/UL (ref 0–0.58)
IMM GRANULOCYTES NFR BLD: 2 % (ref 0–5)
LYMPHOCYTES NFR BLD: 2.03 K/UL (ref 1.5–4)
LYMPHOCYTES RELATIVE PERCENT: 23 % (ref 20–42)
MCH RBC QN AUTO: 32.1 PG (ref 26–35)
MCHC RBC AUTO-ENTMCNC: 32.5 G/DL (ref 32–34.5)
MCV RBC AUTO: 98.8 FL (ref 80–99.9)
MONOCYTES NFR BLD: 1.26 K/UL (ref 0.1–0.95)
MONOCYTES NFR BLD: 14 % (ref 2–12)
NEUTROPHILS NFR BLD: 57 % (ref 43–80)
NEUTS SEG NFR BLD: 4.96 K/UL (ref 1.8–7.3)
PLATELET # BLD AUTO: 245 K/UL (ref 130–450)
PMV BLD AUTO: 9.9 FL (ref 7–12)
POTASSIUM SERPL-SCNC: 3.3 MMOL/L (ref 3.5–5)
RBC # BLD AUTO: 3.21 M/UL (ref 3.5–5.5)
SODIUM SERPL-SCNC: 138 MMOL/L (ref 132–146)
WBC OTHER # BLD: 8.7 K/UL (ref 4.5–11.5)

## 2024-06-26 PROCEDURE — 6360000002 HC RX W HCPCS: Performed by: STUDENT IN AN ORGANIZED HEALTH CARE EDUCATION/TRAINING PROGRAM

## 2024-06-26 PROCEDURE — 2060000000 HC ICU INTERMEDIATE R&B

## 2024-06-26 PROCEDURE — 85025 COMPLETE CBC W/AUTO DIFF WBC: CPT

## 2024-06-26 PROCEDURE — 36415 COLL VENOUS BLD VENIPUNCTURE: CPT

## 2024-06-26 PROCEDURE — 6360000002 HC RX W HCPCS: Performed by: UROLOGY

## 2024-06-26 PROCEDURE — 99222 1ST HOSP IP/OBS MODERATE 55: CPT | Performed by: STUDENT IN AN ORGANIZED HEALTH CARE EDUCATION/TRAINING PROGRAM

## 2024-06-26 PROCEDURE — 6370000000 HC RX 637 (ALT 250 FOR IP): Performed by: INTERNAL MEDICINE

## 2024-06-26 PROCEDURE — 2700000000 HC OXYGEN THERAPY PER DAY

## 2024-06-26 PROCEDURE — 80048 BASIC METABOLIC PNL TOTAL CA: CPT

## 2024-06-26 PROCEDURE — 6370000000 HC RX 637 (ALT 250 FOR IP): Performed by: STUDENT IN AN ORGANIZED HEALTH CARE EDUCATION/TRAINING PROGRAM

## 2024-06-26 PROCEDURE — 2580000003 HC RX 258: Performed by: UROLOGY

## 2024-06-26 PROCEDURE — 2580000003 HC RX 258: Performed by: REGISTERED NURSE

## 2024-06-26 PROCEDURE — 6360000002 HC RX W HCPCS: Performed by: REGISTERED NURSE

## 2024-06-26 PROCEDURE — 51702 INSERT TEMP BLADDER CATH: CPT

## 2024-06-26 PROCEDURE — 94640 AIRWAY INHALATION TREATMENT: CPT

## 2024-06-26 RX ORDER — OXYCODONE HYDROCHLORIDE AND ACETAMINOPHEN 5; 325 MG/1; MG/1
1 TABLET ORAL EVERY 6 HOURS PRN
Status: DISCONTINUED | OUTPATIENT
Start: 2024-06-26 | End: 2024-06-28 | Stop reason: HOSPADM

## 2024-06-26 RX ADMIN — BUDESONIDE 500 MCG: 0.5 SUSPENSION RESPIRATORY (INHALATION) at 19:04

## 2024-06-26 RX ADMIN — MAGNESIUM OXIDE 400 MG (241.3 MG MAGNESIUM) TABLET 400 MG: TABLET at 08:01

## 2024-06-26 RX ADMIN — METOPROLOL TARTRATE 25 MG: 25 TABLET, FILM COATED ORAL at 10:51

## 2024-06-26 RX ADMIN — ARFORMOTEROL TARTRATE 15 MCG: 15 SOLUTION RESPIRATORY (INHALATION) at 19:04

## 2024-06-26 RX ADMIN — SODIUM CHLORIDE, PRESERVATIVE FREE 10 ML: 5 INJECTION INTRAVENOUS at 20:29

## 2024-06-26 RX ADMIN — ANORECTAL OINTMENT: 15.7; .44; 24; 20.6 OINTMENT TOPICAL at 20:26

## 2024-06-26 RX ADMIN — HEPARIN SODIUM 5000 UNITS: 5000 INJECTION INTRAVENOUS; SUBCUTANEOUS at 08:06

## 2024-06-26 RX ADMIN — LOSARTAN POTASSIUM 25 MG: 25 TABLET, FILM COATED ORAL at 20:24

## 2024-06-26 RX ADMIN — HEPARIN SODIUM 5000 UNITS: 5000 INJECTION INTRAVENOUS; SUBCUTANEOUS at 20:24

## 2024-06-26 RX ADMIN — ARFORMOTEROL TARTRATE 15 MCG: 15 SOLUTION RESPIRATORY (INHALATION) at 09:05

## 2024-06-26 RX ADMIN — POLYETHYLENE GLYCOL 3350 17 G: 17 POWDER, FOR SOLUTION ORAL at 08:00

## 2024-06-26 RX ADMIN — ACETAMINOPHEN 650 MG: 325 TABLET ORAL at 15:25

## 2024-06-26 RX ADMIN — FUROSEMIDE 20 MG: 20 TABLET ORAL at 08:01

## 2024-06-26 RX ADMIN — METOPROLOL TARTRATE 25 MG: 25 TABLET, FILM COATED ORAL at 20:24

## 2024-06-26 RX ADMIN — ANORECTAL OINTMENT: 15.7; .44; 24; 20.6 OINTMENT TOPICAL at 08:01

## 2024-06-26 RX ADMIN — BUDESONIDE 500 MCG: 0.5 SUSPENSION RESPIRATORY (INHALATION) at 09:05

## 2024-06-26 RX ADMIN — SODIUM CHLORIDE, PRESERVATIVE FREE 10 ML: 5 INJECTION INTRAVENOUS at 08:02

## 2024-06-26 RX ADMIN — ACETAMINOPHEN 650 MG: 325 TABLET ORAL at 20:24

## 2024-06-26 RX ADMIN — POLYETHYLENE GLYCOL 3350 17 G: 17 POWDER, FOR SOLUTION ORAL at 20:29

## 2024-06-26 RX ADMIN — DOCUSATE SODIUM 100 MG: 100 CAPSULE, LIQUID FILLED ORAL at 08:01

## 2024-06-26 RX ADMIN — WATER 2000 MG: 1 INJECTION INTRAMUSCULAR; INTRAVENOUS; SUBCUTANEOUS at 14:08

## 2024-06-26 RX ADMIN — ACETAMINOPHEN 650 MG: 325 TABLET ORAL at 04:48

## 2024-06-26 ASSESSMENT — PAIN DESCRIPTION - LOCATION
LOCATION: HEAD

## 2024-06-26 ASSESSMENT — PAIN DESCRIPTION - DESCRIPTORS
DESCRIPTORS: ACHING;DISCOMFORT;DULL
DESCRIPTORS: ACHING
DESCRIPTORS: DISCOMFORT;CRUSHING

## 2024-06-26 ASSESSMENT — PAIN SCALES - GENERAL
PAINLEVEL_OUTOF10: 0
PAINLEVEL_OUTOF10: 6
PAINLEVEL_OUTOF10: 8

## 2024-06-26 NOTE — PROGRESS NOTES
[facility] Hospitalist Progress Note    Admitting Date and Time: 6/21/2024 10:04 AM  Admit Dx: Malignant neoplasm of urinary bladder, unspecified site (HCC) [C67.9]  At risk for sepsis due to urinary tract infection [N39.0, Z91.89]  Sepsis due to urinary tract infection (HCC) [A41.9, N39.0]    Subjective:  Patient is being followed for Malignant neoplasm of urinary bladder, unspecified site (HCC) [C67.9]  At risk for sepsis due to urinary tract infection [N39.0, Z91.89]  Sepsis due to urinary tract infection (HCC) [A41.9, N39.0]     No acute events overnight.  Patient has constipation over the last 4 days and KUB showed ileus.  She has Obrien catheter for urinary retention.    ROS: denies fever, chills, cp, sob, n/v, HA unless stated above.      cefTRIAXone (ROCEPHIN) IV  2,000 mg IntraVENous Q24H    polyethylene glycol  17 g Oral BID    menthol-zinc oxide   Topical BID    furosemide  20 mg Oral Daily    docusate sodium  100 mg Oral Daily    heparin (porcine)  5,000 Units SubCUTAneous BID    sodium chloride flush  5-40 mL IntraVENous 2 times per day    [Held by provider] losartan  25 mg Oral Nightly    [Held by provider] metoprolol tartrate  25 mg Oral BID    magnesium oxide  400 mg Oral Daily    budesonide  0.5 mg Nebulization BID RT    And    arformoterol tartrate  15 mcg Nebulization BID RT     oxyCODONE-acetaminophen, 1 tablet, Q6H PRN  diphenhydrAMINE-zinc acetate, , TID PRN  acetaminophen, 650 mg, Q4H PRN  naproxen, 250 mg, Q6H PRN  sodium chloride flush, 5-40 mL, PRN  sodium chloride, , PRN  phenazopyridine, 100 mg, TID PRN  albuterol, 2.5 mg, Q4H PRN         Objective:    /73   Pulse (!) 101   Temp 98.3 °F (36.8 °C) (Oral)   Resp 16   Ht 1.676 m (5' 6\")   Wt 96.6 kg (213 lb)   LMP  (LMP Unknown)   SpO2 94%   BMI 34.38 kg/m²     General Appearance: alert and oriented to person, place and time and in no acute distress  Skin: warm and dry  Head: normocephalic and atraumatic  Eyes: pupils  equal, round, and reactive to light, extraocular eye movements intact, conjunctivae normal  Neck: neck supple and non tender without mass   Pulmonary/Chest: clear to auscultation bilaterally- no wheezes, rales or rhonchi, normal air movement, no respiratory distress  Cardiovascular: normal rate, normal S1 and S2 and no carotid bruits  Abdomen: soft, non-tender, non-distended, normal bowel sounds, no masses or organomegaly  Extremities: no cyanosis, no clubbing and no edema  Neurologic: no cranial nerve deficit and speech normal        Recent Labs     06/23/24  1010 06/24/24  0531 06/25/24  1115    138 138   K 3.6 3.9 3.3*    109* 103   CO2 21* 22 26   BUN 11 9 9   CREATININE 0.9 0.7 0.9   GLUCOSE 114* 111* 113*   CALCIUM 8.1* 8.3* 8.3*       Recent Labs     06/23/24  1010 06/24/24  0531 06/25/24  1115   WBC 10.4 8.8 7.6   RBC 3.02* 3.03* 3.01*   HGB 10.1* 9.7* 10.0*   HCT 31.0* 30.5* 30.6*   .6* 100.7* 101.7*   MCH 33.4 32.0 33.2   MCHC 32.6 31.8* 32.7   RDW 15.2* 14.9 14.6    180 236   MPV 9.9 10.3 10.2       Radiology:   ONE SUPINE XRAY VIEW(S) OF THE ABDOMEN  6/25/2024 1:51 pm     IMPRESSION:  Ileus bowel gas pattern.    Assessment:    Principal Problem:    At risk for sepsis due to urinary tract infection  Active Problems:    Septic shock (HCC)    Complicated UTI (urinary tract infection)    SIRS (systemic inflammatory response syndrome) (HCC)    Hypotension due to hypovolemia    Malignant neoplasm of urinary bladder (HCC)  Resolved Problems:    * No resolved hospital problems. *      Plan:    Urosepsis secondary to urological procedure  Proteus bacteremia  Complicated UTI  -Blood culture showed gram-negative rods, Enterobacteriaceae and Proteus  -Urine culture showed gram-negative rods  -Zosyn was discontinued and ceftriaxone was started  -Awaiting final culture and sensitivity  -Continue supportive IV fluids    Ileus  -Continue bowel regimen    History of PE in April 2024  -Patient

## 2024-06-26 NOTE — CARE COORDINATION
Met with patient, Urology and ID are following. Currently on 0.5 L O2nc. nebs, IV rocephin, PO lasix. Awaiting final cultures. Patient was accepted to [facility], no pre-cert required. Will continue to follow.  Glendy KIMN, RN  Case Management

## 2024-06-26 NOTE — PROGRESS NOTES
[facility]  [facility]  Progress Note    SUBJECTIVE:    Patient is tolerating medications. No reported adverse drug reactions.  No nausea, vomiting, diarrhea.  No fevers   Says she is feeling pretty good today    Review of systems:  As stated above in the chief complaint, otherwise negative.    Medications:  Scheduled Meds:   cefTRIAXone (ROCEPHIN) IV  2,000 mg IntraVENous Q24H    polyethylene glycol  17 g Oral BID    menthol-zinc oxide   Topical BID    furosemide  20 mg Oral Daily    docusate sodium  100 mg Oral Daily    heparin (porcine)  5,000 Units SubCUTAneous BID    sodium chloride flush  5-40 mL IntraVENous 2 times per day    losartan  25 mg Oral Nightly    metoprolol tartrate  25 mg Oral BID    magnesium oxide  400 mg Oral Daily    budesonide  0.5 mg Nebulization BID RT    And    arformoterol tartrate  15 mcg Nebulization BID RT     Continuous Infusions:   sodium chloride       PRN Meds:oxyCODONE-acetaminophen **OR** [DISCONTINUED] oxyCODONE-acetaminophen, diphenhydrAMINE-zinc acetate, acetaminophen, naproxen, sodium chloride flush, sodium chloride, phenazopyridine, albuterol    OBJECTIVE:  /73   Pulse 91   Temp 98.3 °F (36.8 °C) (Oral)   Resp 16   Ht 1.676 m (5' 6\")   Wt 96.6 kg (213 lb)   LMP  (LMP Unknown)   SpO2 94%   BMI 34.38 kg/m²   Temp  Av.6 °F (37 °C)  Min: 98.3 °F (36.8 °C)  Max: 99.1 °F (37.3 °C)  Constitutional: The patient is awake, alert, and oriented. Sitting up in bed, in no distress  Skin: Warm and dry. No rashes were noted.   HEENT: Round and reactive pupils.  Moist mucous membranes.  No ulcerations or thrush.  Neck: Supple to movements.   Chest: No use of accessory muscles to breathe. Symmetrical expansion.  No wheezing, crackles or rhonchi.  Cardiovascular: S1 and S2 are rhythmic and regular. No murmurs appreciated.   Abdomen: Positive bowel sounds to auscultation. Benign to palpation. No masses felt.  Extremities: No edema.  Lines:  Peripheral.  Obrien catheter with clear yellow urine     Laboratory and Tests:  Lab Results   Component Value Date    CRP 30.0 (H) 06/21/2024    .0 (H) 04/07/2024    CRP 0.3 12/20/2022     Lab Results   Component Value Date    SEDRATE 111 (H) 04/07/2024       Radiology:  Reviewed     Microbiology:   Blood cultures 6/21: Proteus mirabilis in 1/4 bottles  Urine culture: mixed katja     ASSESSMENT:  Urosepsis secondary to recent urological procedure  Proteus bacteremia associated to the above  Complicated UTI   Fever, leukocytosis, hypotension, associated to the above, resolved  Bladder tumor/malignancy  Urinary retention      PLAN:  Continue Ceftriaxone for now  Await final sensitivities of Proteus in the blood   Check final cultures  Monitor labs    GRETEL Hess - CNP  10:22 AM  6/26/2024    Patient seen and examined. I had a face to face encounter with the patient. Agree with exam.  Assessment and plan as outlined above and directed by me. Addition and corrections were done as deemed appropriate. My exam and plan include: The patient is only complaining of constipation.  Altogether he feels better.  We are still waiting for sensitivities on Proteus to decide if we can switch the IV to oral antibiotic.    Juan Dodson MD  6/26/2024  12:34 PM

## 2024-06-26 NOTE — PROGRESS NOTES
6/25/2024 8:00 PM  Nettie Lara  [id number]    Subjective:      Awake and alert  Sitting up in bed  States she feels much better  States she feels constipated but is on a bowel regimen      Review of Systems  Constitutional: No fever or chills   Respiratory: negative for cough and hemoptysis  Cardiovascular: negative for chest pain and dyspnea  Gastrointestinal: negative for abdominal pain, diarrhea, nausea and vomiting   : See above  Derm: negative for rash and skin lesion(s)  Neurological: negative for seizures and tremors  Musculoskeletal: Negative    Psychiatric: Negative   All other reviews are negative      Scheduled Meds:   cefTRIAXone (ROCEPHIN) IV  2,000 mg IntraVENous Q24H    polyethylene glycol  17 g Oral BID    menthol-zinc oxide   Topical BID    furosemide  20 mg Oral Daily    docusate sodium  100 mg Oral Daily    heparin (porcine)  5,000 Units SubCUTAneous BID    sodium chloride flush  5-40 mL IntraVENous 2 times per day    [Held by provider] losartan  25 mg Oral Nightly    [Held by provider] metoprolol tartrate  25 mg Oral BID    magnesium oxide  400 mg Oral Daily    budesonide  0.5 mg Nebulization BID RT    And    arformoterol tartrate  15 mcg Nebulization BID RT       Objective:  Vitals:    06/25/24 1849   BP: 106/80   Pulse: (!) 106   Resp: 18   Temp: 99.1 °F (37.3 °C)   SpO2: 94%         Allergies: Lyrica [pregabalin], Adhesive tape, and Demerol hcl [meperidine]    General Appearance: alert and oriented to person, place and time and in no acute distress  Skin: no rash or erythema  Head: normocephalic and atraumatic  Pulmonary/Chest: normal air movement, no respiratory distress  Abdomen: soft, non-tender, non-distended  Genitourinary: Obrien catheter present draining clear yellow urine  Extremities: no cyanosis, clubbing or edema         Labs:     Recent Labs     06/25/24  1115      K 3.3*      CO2 26   BUN 9   CREATININE 0.9   GLUCOSE 113*   CALCIUM 8.3*       Lab Results    Component Value Date/Time    HGB 10.0 06/25/2024 11:15 AM    HCT 30.6 06/25/2024 11:15 AM       No results found for: \"PSA\"      Assessment/Plan:  POD#4--cystoscopy, left ureteral stent removal, left ureteroscopy with ureteral tumor biopsy, bladder biopsy, gemcitabine installation   UTI    Pathology pending  Creatinine stable  WBC 7.6  Blood culture growing Proteus, sensitivity pending  Currently on ceftriaxone  ID following, appreciate consult      Dylon Lynn PA-C   [facility]

## 2024-06-26 NOTE — H&P
6/26/2024 2:19 PM  Service: Urology  Group: [facility] (Lee/Sowmya/Reg)    Nettie Lara  [id number]     Chief Complaint: bladder cancer    History of Present Illness:  The patient is a 69 y.o. female patient who presents with the above  The risk of the surgery was discussed at length.  The risk of the anesthesia and loss of airway.  Cardiovascular risks, myocardial infraction, cerebral vascular accident, pulmonary embolism, deep vein thrombosis.  Injury risk, bowel injury, bladder injury, ureteral injury, blood vessel injury, delayed presentation of the injury (ie scar tissue or stricture formation).  These can lead to bleeding, requiring transfusion.  He risk of infection with can lead to sepsis.  The risk of death.  The may be other issues that present that are not listed above which can occur.  The patient understood these risks, they understood the benefits, they understood the alternatives and fully consent to proceed.  The needs for a second procedure may also be required.    Past Medical History:   Diagnosis Date    Arthritis     Asthma     follows with PCP    Cancer (HCC)     bladder    COPD (chronic obstructive pulmonary disease) (HCC)     Follows with PCP    Eczema     Fall 05/26/2011    Gout     Hypertension     Kidney stone     Multiple sclerosis (HCC)     Prolonged emergence from general anesthesia     Scoliosis     Spinal stenosis     UTI (urinary tract infection)        Past Surgical History:   Procedure Laterality Date    APPENDECTOMY      BLADDER SURGERY Left 7/6/2023    CYSTOSCOPY RETROGRADE PYELOGRAM  left STENT REMOVAL, PLACEMENT OF LEFT STENT performed by Ruy Deng MD at [facility] [place]    CHOLECYSTECTOMY      CYSTOSCOPY N/A 4/4/2024    BLADDER BIOPSY/ RESECTION OF BLADDER TUMOR performed by Ruy Deng MD at [facility] [place] [facility] [place]    CYSTOSCOPY N/A 6/21/2024    CYSTOSCOPY RETROGRADE PYELOGRAM BLADDER BIOPSY FULGURATION GEMCITABINE INSTALLATION performed by Tony Howard DO at [facility] OR

## 2024-06-26 NOTE — PLAN OF CARE
Problem: Discharge Planning  Goal: Discharge to home or other facility with appropriate resources  Outcome: Progressing     Problem: Skin/Tissue Integrity  Goal: Absence of new skin breakdown  Description: 1.  Monitor for areas of redness and/or skin breakdown  2.  Assess vascular access sites hourly  3.  Every 4-6 hours minimum:  Change oxygen saturation probe site  4.  Every 4-6 hours:  If on nasal continuous positive airway pressure, respiratory therapy assess nares and determine need for appliance change or resting period.  Outcome: Progressing     Problem: Safety - Adult  Goal: Free from fall injury  Outcome: Progressing     Problem: ABCDS Injury Assessment  Goal: Absence of physical injury  Outcome: Progressing     Problem: Pain  Goal: Verbalizes/displays adequate comfort level or baseline comfort level  Outcome: Progressing  Flowsheets (Taken 6/25/2024 1045 by Melissa Priest RN)  Verbalizes/displays adequate comfort level or baseline comfort level:   Assess pain using appropriate pain scale   Encourage patient to monitor pain and request assistance

## 2024-06-26 NOTE — PROGRESS NOTES
PROGRESS NOTE      Chief Complaint:   High-grade bladder urothelial cell carcinoma/Gross hematuria/UTI/Left hydronephrosis/Left ureteral tumor/History of left distal ureteral stone/Bilateral renal cysts/Left renal atrophy/Left renal calculi/OAB with urge incontinence/Urinary retention    HPI:   No headache today or catheter discomfort.  She took Tylenol this morning for abdominal discomfort.  She hopes to go home soon.    Vitals:    06/26/24 0734   BP: 120/73   Pulse: (!) 101   Resp: 16   Temp: 98.3 °F (36.8 °C)   SpO2: 94%       Allergies: Lyrica [pregabalin], Adhesive tape, and Demerol hcl [meperidine]    PAST MEDICAL HISTORY:   Past Medical History:   Diagnosis Date    Arthritis     Asthma     follows with PCP    Cancer (HCC)     bladder    COPD (chronic obstructive pulmonary disease) (HCC)     Follows with PCP    Eczema     Fall 05/26/2011    Gout     Hypertension     Kidney stone     Multiple sclerosis (HCC)     Prolonged emergence from general anesthesia     Scoliosis     Spinal stenosis     UTI (urinary tract infection)        PAST SURGICAL HISTORY:   Past Surgical History:   Procedure Laterality Date    APPENDECTOMY      BLADDER SURGERY Left 7/6/2023    CYSTOSCOPY RETROGRADE PYELOGRAM  left STENT REMOVAL, PLACEMENT OF LEFT STENT [place] [facility] [place]    CHOLECYSTECTOMY      CYSTOSCOPY N/A 4/4/2024    BLADDER BIOPSY/ RESECTION OF BLADDER TUMOR performed [place] [facility] [place]    CYSTOSCOPY N/A 6/21/2024    CYSTOSCOPY RETROGRADE PYELOGRAM BLADDER BIOPSY FULGURATION GEMCITABINE INSTALLATION performed by Tony Howard DO at [facility] OR    CYSTOSCOPY INSERTION / REMOVAL STENT / STONE Right 07/26/2020    CYSTOSCOPY RETROGRADE PYELOGRAM STENT INSERTION LEFT performed by Tony Howard DO at [facility] OR    HYSTERECTOMY (CERVIX STATUS UNKNOWN)      LITHOTRIPSY Left 08/07/2020    CYSTOSCOPY RETROGRADE PYELOGRAM URETEROSCOPY  LEFT J-STENT EXCHANGE, INSERTION JUNIOR  (Medical): No     Lack of Transportation (Non-Medical): No   Housing Stability: Low Risk  (6/21/2024)    Housing Stability Vital Sign     Unable to Pay for Housing in the Last Year: No     Number of Places Lived in the Last Year: 1     Unstable Housing in the Last Year: No       IV:    [Held by provider] lactated ringers IV soln Stopped (06/24/24 0858)    sodium chloride         PRN: oxyCODONE-acetaminophen **OR** [DISCONTINUED] oxyCODONE-acetaminophen, diphenhydrAMINE-zinc acetate, acetaminophen, naproxen, sodium chloride flush, sodium chloride, phenazopyridine, albuterol    Scheduled:    cefTRIAXone (ROCEPHIN) IV  2,000 mg IntraVENous Q24H    polyethylene glycol  17 g Oral BID    menthol-zinc oxide   Topical BID    furosemide  20 mg Oral Daily    docusate sodium  100 mg Oral Daily    heparin (porcine)  5,000 Units SubCUTAneous BID    sodium chloride flush  5-40 mL IntraVENous 2 times per day    [Held by provider] losartan  25 mg Oral Nightly    [Held by provider] metoprolol tartrate  25 mg Oral BID    magnesium oxide  400 mg Oral Daily    budesonide  0.5 mg Nebulization BID RT    And    arformoterol tartrate  15 mcg Nebulization BID RT       Lab Results   Component Value Date/Time     06/25/2024 11:15 AM    K 3.3 06/25/2024 11:15 AM    K 4.2 12/18/2022 07:37 AM    BUN 9 06/25/2024 11:15 AM    CREATININE 0.9 06/25/2024 11:15 AM        Lab Results   Component Value Date/Time    HGB 10.0 06/25/2024 11:15 AM    HCT 30.6 06/25/2024 11:15 AM         Review Of Systems:  Constitutional: Tired  Eyes: negative  Ears, nose, mouth, throat, and face: negative  Respiratory: Asthma  Cardiovascular: negative  Gastrointestinal: Constipation  Genitourinary: Bladder cancer  Musculoskeletal: Arthritis  Neurological: MS  Behavioral/Psych: negative  Endocrine: negative     Physical Exam:  Skin is dry, and without rashes  Respirations are non-labored, intact  Abdomen is soft, non-tender, non-distended.  Active bowel sounds are

## 2024-06-27 LAB
ACB COMPLEX DNA BLD POS QL NAA+NON-PROBE: NOT DETECTED
ANION GAP SERPL CALCULATED.3IONS-SCNC: 10 MMOL/L (ref 7–16)
B FRAGILIS DNA BLD POS QL NAA+NON-PROBE: NOT DETECTED
BASOPHILS # BLD: 0.05 K/UL (ref 0–0.2)
BASOPHILS NFR BLD: 1 % (ref 0–2)
BIOFIRE TEST COMMENT: ABNORMAL
BLACTX-M ISLT/SPM QL: NOT DETECTED
BLAIMP ISLT/SPM QL: NOT DETECTED
BLAKPC ISLT/SPM QL: NOT DETECTED
BLAOXA-48-LIKE ISLT/SPM QL: NOT DETECTED
BLAVIM ISLT/SPM QL: NOT DETECTED
BUN SERPL-MCNC: 9 MG/DL (ref 6–23)
C ALBICANS DNA BLD POS QL NAA+NON-PROBE: NOT DETECTED
C AURIS DNA BLD POS QL NAA+NON-PROBE: NOT DETECTED
C GATTII+NEOFOR DNA BLD POS QL NAA+N-PRB: NOT DETECTED
C GLABRATA DNA BLD POS QL NAA+NON-PROBE: NOT DETECTED
C KRUSEI DNA BLD POS QL NAA+NON-PROBE: NOT DETECTED
C PARAP DNA BLD POS QL NAA+NON-PROBE: NOT DETECTED
C TROPICLS DNA BLD POS QL NAA+NON-PROBE: NOT DETECTED
CALCIUM SERPL-MCNC: 8.8 MG/DL (ref 8.6–10.2)
CHLORIDE SERPL-SCNC: 102 MMOL/L (ref 98–107)
CO2 SERPL-SCNC: 26 MMOL/L (ref 22–29)
CREAT SERPL-MCNC: 0.7 MG/DL (ref 0.5–1)
E CLOAC COMP DNA BLD POS NAA+NON-PROBE: NOT DETECTED
E COLI DNA BLD POS QL NAA+NON-PROBE: NOT DETECTED
E FAECALIS DNA BLD POS QL NAA+NON-PROBE: NOT DETECTED
E FAECIUM DNA BLD POS QL NAA+NON-PROBE: NOT DETECTED
ENTEROBACTERALES DNA BLD POS NAA+N-PRB: DETECTED
EOSINOPHIL # BLD: 0.25 K/UL (ref 0.05–0.5)
EOSINOPHILS RELATIVE PERCENT: 2 % (ref 0–6)
ERYTHROCYTE [DISTWIDTH] IN BLOOD BY AUTOMATED COUNT: 14.6 % (ref 11.5–15)
GFR, ESTIMATED: >90 ML/MIN/1.73M2
GLUCOSE SERPL-MCNC: 111 MG/DL (ref 74–99)
GP B STREP DNA BLD POS QL NAA+NON-PROBE: NOT DETECTED
HAEM INFLU DNA BLD POS QL NAA+NON-PROBE: NOT DETECTED
HCT VFR BLD AUTO: 31.1 % (ref 34–48)
HGB BLD-MCNC: 10.3 G/DL (ref 11.5–15.5)
IMM GRANULOCYTES # BLD AUTO: 0.48 K/UL (ref 0–0.58)
IMM GRANULOCYTES NFR BLD: 4 % (ref 0–5)
K OXYTOCA DNA BLD POS QL NAA+NON-PROBE: NOT DETECTED
KLEBSIELLA SP DNA BLD POS QL NAA+NON-PRB: NOT DETECTED
KLEBSIELLA SP DNA BLD POS QL NAA+NON-PRB: NOT DETECTED
L MONOCYTOG DNA BLD POS QL NAA+NON-PROBE: NOT DETECTED
LYMPHOCYTES NFR BLD: 1.84 K/UL (ref 1.5–4)
LYMPHOCYTES RELATIVE PERCENT: 17 % (ref 20–42)
MCH RBC QN AUTO: 32.9 PG (ref 26–35)
MCHC RBC AUTO-ENTMCNC: 33.1 G/DL (ref 32–34.5)
MCV RBC AUTO: 99.4 FL (ref 80–99.9)
MICROORGANISM SPEC CULT: ABNORMAL
MICROORGANISM SPEC CULT: NORMAL
MICROORGANISM/AGENT SPEC: ABNORMAL
MONOCYTES NFR BLD: 1.25 K/UL (ref 0.1–0.95)
MONOCYTES NFR BLD: 11 % (ref 2–12)
N MEN DNA BLD POS QL NAA+NON-PROBE: NOT DETECTED
NEUTROPHILS NFR BLD: 65 % (ref 43–80)
NEUTS SEG NFR BLD: 7.1 K/UL (ref 1.8–7.3)
P AERUGINOSA DNA BLD POS NAA+NON-PROBE: NOT DETECTED
PLATELET # BLD AUTO: 260 K/UL (ref 130–450)
PMV BLD AUTO: 9.8 FL (ref 7–12)
POTASSIUM SERPL-SCNC: 3.3 MMOL/L (ref 3.5–5)
PROTEUS SP DNA BLD POS QL NAA+NON-PROBE: DETECTED
RBC # BLD AUTO: 3.13 M/UL (ref 3.5–5.5)
RESISTANT GENE NDM BY PCR: NOT DETECTED
S AUREUS DNA BLD POS QL NAA+NON-PROBE: NOT DETECTED
S AUREUS+CONS DNA BLD POS NAA+NON-PROBE: NOT DETECTED
S EPIDERMIDIS DNA BLD POS QL NAA+NON-PRB: NOT DETECTED
S LUGDUNENSIS DNA BLD POS QL NAA+NON-PRB: NOT DETECTED
S MALTOPHILIA DNA BLD POS QL NAA+NON-PRB: NOT DETECTED
S MARCESCENS DNA BLD POS NAA+NON-PROBE: NOT DETECTED
S PNEUM DNA BLD POS QL NAA+NON-PROBE: NOT DETECTED
S PYO DNA BLD POS QL NAA+NON-PROBE: NOT DETECTED
SALMONELLA DNA BLD POS QL NAA+NON-PROBE: NOT DETECTED
SERVICE CMNT-IMP: ABNORMAL
SERVICE CMNT-IMP: NORMAL
SODIUM SERPL-SCNC: 138 MMOL/L (ref 132–146)
SPECIMEN DESCRIPTION: ABNORMAL
SPECIMEN DESCRIPTION: NORMAL
STREPTOCOCCUS DNA BLD POS NAA+NON-PROBE: NOT DETECTED
WBC OTHER # BLD: 11 K/UL (ref 4.5–11.5)

## 2024-06-27 PROCEDURE — 80048 BASIC METABOLIC PNL TOTAL CA: CPT

## 2024-06-27 PROCEDURE — 36415 COLL VENOUS BLD VENIPUNCTURE: CPT

## 2024-06-27 PROCEDURE — 94640 AIRWAY INHALATION TREATMENT: CPT

## 2024-06-27 PROCEDURE — 6370000000 HC RX 637 (ALT 250 FOR IP): Performed by: STUDENT IN AN ORGANIZED HEALTH CARE EDUCATION/TRAINING PROGRAM

## 2024-06-27 PROCEDURE — 6360000002 HC RX W HCPCS: Performed by: UROLOGY

## 2024-06-27 PROCEDURE — 6370000000 HC RX 637 (ALT 250 FOR IP): Performed by: NURSE PRACTITIONER

## 2024-06-27 PROCEDURE — 85025 COMPLETE CBC W/AUTO DIFF WBC: CPT

## 2024-06-27 PROCEDURE — 6360000002 HC RX W HCPCS: Performed by: STUDENT IN AN ORGANIZED HEALTH CARE EDUCATION/TRAINING PROGRAM

## 2024-06-27 PROCEDURE — 2580000003 HC RX 258: Performed by: UROLOGY

## 2024-06-27 PROCEDURE — 99222 1ST HOSP IP/OBS MODERATE 55: CPT | Performed by: STUDENT IN AN ORGANIZED HEALTH CARE EDUCATION/TRAINING PROGRAM

## 2024-06-27 PROCEDURE — 6370000000 HC RX 637 (ALT 250 FOR IP): Performed by: INTERNAL MEDICINE

## 2024-06-27 PROCEDURE — 2700000000 HC OXYGEN THERAPY PER DAY

## 2024-06-27 PROCEDURE — 2060000000 HC ICU INTERMEDIATE R&B

## 2024-06-27 RX ORDER — BISACODYL 10 MG
10 SUPPOSITORY, RECTAL RECTAL PRN
Status: DISCONTINUED | OUTPATIENT
Start: 2024-06-27 | End: 2024-06-28 | Stop reason: HOSPADM

## 2024-06-27 RX ORDER — CEFDINIR 300 MG/1
300 CAPSULE ORAL EVERY 12 HOURS SCHEDULED
Status: DISCONTINUED | OUTPATIENT
Start: 2024-06-27 | End: 2024-06-28 | Stop reason: HOSPADM

## 2024-06-27 RX ADMIN — ANORECTAL OINTMENT: 15.7; .44; 24; 20.6 OINTMENT TOPICAL at 16:05

## 2024-06-27 RX ADMIN — FUROSEMIDE 20 MG: 20 TABLET ORAL at 07:50

## 2024-06-27 RX ADMIN — ARFORMOTEROL TARTRATE 15 MCG: 15 SOLUTION RESPIRATORY (INHALATION) at 19:38

## 2024-06-27 RX ADMIN — SODIUM CHLORIDE, PRESERVATIVE FREE 10 ML: 5 INJECTION INTRAVENOUS at 07:51

## 2024-06-27 RX ADMIN — METOPROLOL TARTRATE 25 MG: 25 TABLET, FILM COATED ORAL at 07:50

## 2024-06-27 RX ADMIN — CEFDINIR 300 MG: 300 CAPSULE ORAL at 20:18

## 2024-06-27 RX ADMIN — MAGNESIUM OXIDE 400 MG (241.3 MG MAGNESIUM) TABLET 400 MG: TABLET at 07:50

## 2024-06-27 RX ADMIN — ACETAMINOPHEN 650 MG: 325 TABLET ORAL at 21:09

## 2024-06-27 RX ADMIN — ACETAMINOPHEN 650 MG: 325 TABLET ORAL at 16:04

## 2024-06-27 RX ADMIN — HEPARIN SODIUM 5000 UNITS: 5000 INJECTION INTRAVENOUS; SUBCUTANEOUS at 20:18

## 2024-06-27 RX ADMIN — DOCUSATE SODIUM 100 MG: 100 CAPSULE, LIQUID FILLED ORAL at 07:50

## 2024-06-27 RX ADMIN — BUDESONIDE 500 MCG: 0.5 SUSPENSION RESPIRATORY (INHALATION) at 19:38

## 2024-06-27 RX ADMIN — ARFORMOTEROL TARTRATE 15 MCG: 15 SOLUTION RESPIRATORY (INHALATION) at 07:45

## 2024-06-27 RX ADMIN — BISACODYL 10 MG: 10 SUPPOSITORY RECTAL at 10:40

## 2024-06-27 RX ADMIN — CEFDINIR 300 MG: 300 CAPSULE ORAL at 11:22

## 2024-06-27 RX ADMIN — ACETAMINOPHEN 650 MG: 325 TABLET ORAL at 07:50

## 2024-06-27 RX ADMIN — HEPARIN SODIUM 5000 UNITS: 5000 INJECTION INTRAVENOUS; SUBCUTANEOUS at 07:51

## 2024-06-27 RX ADMIN — POLYETHYLENE GLYCOL 3350 17 G: 17 POWDER, FOR SOLUTION ORAL at 07:51

## 2024-06-27 RX ADMIN — ACETAMINOPHEN 650 MG: 325 TABLET ORAL at 03:11

## 2024-06-27 RX ADMIN — LOSARTAN POTASSIUM 25 MG: 25 TABLET, FILM COATED ORAL at 20:18

## 2024-06-27 RX ADMIN — POLYETHYLENE GLYCOL 3350 17 G: 17 POWDER, FOR SOLUTION ORAL at 20:18

## 2024-06-27 RX ADMIN — ANORECTAL OINTMENT: 15.7; .44; 24; 20.6 OINTMENT TOPICAL at 07:50

## 2024-06-27 RX ADMIN — METOPROLOL TARTRATE 25 MG: 25 TABLET, FILM COATED ORAL at 20:18

## 2024-06-27 RX ADMIN — BUDESONIDE 500 MCG: 0.5 SUSPENSION RESPIRATORY (INHALATION) at 07:45

## 2024-06-27 ASSESSMENT — PAIN SCALES - GENERAL
PAINLEVEL_OUTOF10: 2
PAINLEVEL_OUTOF10: 8
PAINLEVEL_OUTOF10: 8
PAINLEVEL_OUTOF10: 4

## 2024-06-27 ASSESSMENT — PAIN DESCRIPTION - LOCATION
LOCATION: NECK
LOCATION: HEAD;NECK
LOCATION: BACK
LOCATION: NECK;HEAD

## 2024-06-27 ASSESSMENT — PAIN DESCRIPTION - DESCRIPTORS
DESCRIPTORS: ACHING;SORE;DISCOMFORT
DESCRIPTORS: ACHING;DISCOMFORT;SORE
DESCRIPTORS: ACHING

## 2024-06-27 ASSESSMENT — PAIN DESCRIPTION - ORIENTATION: ORIENTATION: UPPER

## 2024-06-27 NOTE — PROGRESS NOTES
formerly [facility]  [facility]  Progress Note    SUBJECTIVE:    Patient is tolerating medications. No reported adverse drug reactions.  No nausea, vomiting, diarrhea.  No fevers   Reports headache  Obrien removed this am.    Review of systems:  As stated above in the chief complaint, otherwise negative.    Medications:  Scheduled Meds:   cefTRIAXone (ROCEPHIN) IV  2,000 mg IntraVENous Q24H    polyethylene glycol  17 g Oral BID    menthol-zinc oxide   Topical BID    furosemide  20 mg Oral Daily    docusate sodium  100 mg Oral Daily    heparin (porcine)  5,000 Units SubCUTAneous BID    sodium chloride flush  5-40 mL IntraVENous 2 times per day    losartan  25 mg Oral Nightly    metoprolol tartrate  25 mg Oral BID    magnesium oxide  400 mg Oral Daily    budesonide  0.5 mg Nebulization BID RT    And    arformoterol tartrate  15 mcg Nebulization BID RT     Continuous Infusions:   sodium chloride       PRN Meds:oxyCODONE-acetaminophen **OR** [DISCONTINUED] oxyCODONE-acetaminophen, diphenhydrAMINE-zinc acetate, acetaminophen, naproxen, sodium chloride flush, sodium chloride, phenazopyridine, albuterol    OBJECTIVE:  /86   Pulse 84   Temp 98.2 °F (36.8 °C) (Oral)   Resp 18   Ht 1.676 m (5' 6\")   Wt 111.4 kg (245 lb 9.5 oz)   LMP  (LMP Unknown)   SpO2 93%   BMI 39.64 kg/m²   Temp  Av.4 °F (36.9 °C)  Min: 98 °F (36.7 °C)  Max: 98.8 °F (37.1 °C)  Constitutional: The patient is awake, alert, and oriented. Sitting up in bed, in no distress  Skin: Warm and dry. No rashes were noted.   HEENT: Round and reactive pupils.  Moist mucous membranes.  No ulcerations or thrush.  Neck: Supple to movements.   Chest: No use of accessory muscles to breathe. Symmetrical expansion.  No wheezing, crackles or rhonchi.  Cardiovascular: S1 and S2 are rhythmic and regular. No murmurs appreciated.   Abdomen: Positive bowel sounds to auscultation. Benign to palpation. No masses felt.  Extremities: No

## 2024-06-27 NOTE — PROGRESS NOTES
[facility] Hospitalist Progress Note    Admitting Date and Time: 6/21/2024 10:04 AM  Admit Dx: Malignant neoplasm of urinary bladder, unspecified site (HCC) [C67.9]  At risk for sepsis due to urinary tract infection [N39.0, Z91.89]  Sepsis due to urinary tract infection (HCC) [A41.9, N39.0]    Subjective:  Patient is being followed for Malignant neoplasm of urinary bladder, unspecified site (HCC) [C67.9]  At risk for sepsis due to urinary tract infection [N39.0, Z91.89]  Sepsis due to urinary tract infection (HCC) [A41.9, N39.0]     No acute events overnight.  Patient has no bowel movement over the last week and KUB showed ileus.  She has Obrien catheter for urinary retention which was removed today and will give her trial for spontaneous voiding.    ROS: denies fever, chills, cp, sob, n/v, HA unless stated above.      cefdinir  300 mg Oral 2 times per day    polyethylene glycol  17 g Oral BID    menthol-zinc oxide   Topical BID    furosemide  20 mg Oral Daily    docusate sodium  100 mg Oral Daily    heparin (porcine)  5,000 Units SubCUTAneous BID    sodium chloride flush  5-40 mL IntraVENous 2 times per day    losartan  25 mg Oral Nightly    metoprolol tartrate  25 mg Oral BID    magnesium oxide  400 mg Oral Daily    budesonide  0.5 mg Nebulization BID RT    And    arformoterol tartrate  15 mcg Nebulization BID RT     oxyCODONE-acetaminophen, 1 tablet, Q6H PRN  diphenhydrAMINE-zinc acetate, , TID PRN  acetaminophen, 650 mg, Q4H PRN  naproxen, 250 mg, Q6H PRN  sodium chloride flush, 5-40 mL, PRN  sodium chloride, , PRN  phenazopyridine, 100 mg, TID PRN  albuterol, 2.5 mg, Q4H PRN         Objective:    /86   Pulse 84   Temp 98.2 °F (36.8 °C) (Oral)   Resp 18   Ht 1.676 m (5' 6\")   Wt 111.4 kg (245 lb 9.5 oz)   LMP  (LMP Unknown)   SpO2 93%   BMI 39.64 kg/m²     General Appearance: alert and oriented to person, place and time and in no acute distress  Skin: warm and dry  Head: normocephalic and

## 2024-06-27 NOTE — PLAN OF CARE
Problem: Discharge Planning  Goal: Discharge to home or other facility with appropriate resources  Outcome: Completed     Problem: Skin/Tissue Integrity  Goal: Absence of new skin breakdown  Description: 1.  Monitor for areas of redness and/or skin breakdown  2.  Assess vascular access sites hourly  3.  Every 4-6 hours minimum:  Change oxygen saturation probe site  4.  Every 4-6 hours:  If on nasal continuous positive airway pressure, respiratory therapy assess nares and determine need for appliance change or resting period.  Outcome: Completed     Problem: Safety - Adult  Goal: Free from fall injury  Outcome: Completed     Problem: ABCDS Injury Assessment  Goal: Absence of physical injury  Outcome: Completed     Problem: Pain  Goal: Verbalizes/displays adequate comfort level or baseline comfort level  Outcome: Completed

## 2024-06-27 NOTE — PROGRESS NOTES
6/27/2024 12:16 PM  Nettie Lara  [id number]    Subjective:    Awake and alert  Dillon removed today   Voiding on a bed pan now  No family present    Review of Systems  Constitutional: No fever or chills   Respiratory: negative for cough and hemoptysis  Cardiovascular: negative for chest pain and dyspnea  Gastrointestinal: negative for abdominal pain, diarrhea, nausea and vomiting   : See above  Derm: negative for rash and skin lesion(s)  Neurological: negative for seizures and tremors  Musculoskeletal: Negative    Psychiatric: Negative   All other reviews are negative      Scheduled Meds:   cefdinir  300 mg Oral 2 times per day    polyethylene glycol  17 g Oral BID    menthol-zinc oxide   Topical BID    furosemide  20 mg Oral Daily    docusate sodium  100 mg Oral Daily    heparin (porcine)  5,000 Units SubCUTAneous BID    sodium chloride flush  5-40 mL IntraVENous 2 times per day    losartan  25 mg Oral Nightly    metoprolol tartrate  25 mg Oral BID    magnesium oxide  400 mg Oral Daily    budesonide  0.5 mg Nebulization BID RT    And    arformoterol tartrate  15 mcg Nebulization BID RT       Objective:  Vitals:    06/27/24 1106   BP: 118/82   Pulse: 78   Resp: 18   Temp: 98.2 °F (36.8 °C)   SpO2: 95%         Allergies: Lyrica [pregabalin], Adhesive tape, and Demerol hcl [meperidine]    General Appearance: alert and oriented to person, place and time and in no acute distress  Skin: no rash or erythema  Head: normocephalic and atraumatic  Pulmonary/Chest: normal air movement, no respiratory distress  Abdomen: soft, non-tender, non-distended  Genitourinary: no dillon   Extremities: no cyanosis, clubbing or edema         Labs:     Recent Labs     06/27/24  0836      K 3.3*      CO2 26   BUN 9   CREATININE 0.7   GLUCOSE 111*   CALCIUM 8.8       Lab Results   Component Value Date/Time    HGB 10.3 06/27/2024 08:36 AM    HCT 31.1 06/27/2024 08:36 AM       No results found for:

## 2024-06-28 VITALS
DIASTOLIC BLOOD PRESSURE: 85 MMHG | HEART RATE: 78 BPM | WEIGHT: 245.59 LBS | TEMPERATURE: 99.2 F | BODY MASS INDEX: 39.47 KG/M2 | SYSTOLIC BLOOD PRESSURE: 142 MMHG | RESPIRATION RATE: 18 BRPM | HEIGHT: 66 IN | OXYGEN SATURATION: 92 %

## 2024-06-28 PROBLEM — N39.0 AT RISK FOR SEPSIS DUE TO URINARY TRACT INFECTION: Status: RESOLVED | Noted: 2024-06-21 | Resolved: 2024-06-28

## 2024-06-28 PROBLEM — Z91.89 AT RISK FOR SEPSIS DUE TO URINARY TRACT INFECTION: Status: RESOLVED | Noted: 2024-06-21 | Resolved: 2024-06-28

## 2024-06-28 PROBLEM — N39.0 COMPLICATED UTI (URINARY TRACT INFECTION): Status: RESOLVED | Noted: 2021-07-01 | Resolved: 2024-06-28

## 2024-06-28 PROBLEM — R65.21 SEPTIC SHOCK (HCC): Status: RESOLVED | Noted: 2020-12-23 | Resolved: 2024-06-28

## 2024-06-28 PROBLEM — R65.10 SIRS (SYSTEMIC INFLAMMATORY RESPONSE SYNDROME) (HCC): Status: RESOLVED | Noted: 2024-06-21 | Resolved: 2024-06-28

## 2024-06-28 PROBLEM — E86.1 HYPOTENSION DUE TO HYPOVOLEMIA: Status: RESOLVED | Noted: 2024-06-21 | Resolved: 2024-06-28

## 2024-06-28 PROBLEM — A41.9 SEPTIC SHOCK (HCC): Status: RESOLVED | Noted: 2020-12-23 | Resolved: 2024-06-28

## 2024-06-28 PROCEDURE — 6370000000 HC RX 637 (ALT 250 FOR IP): Performed by: UROLOGY

## 2024-06-28 PROCEDURE — 6370000000 HC RX 637 (ALT 250 FOR IP): Performed by: NURSE PRACTITIONER

## 2024-06-28 PROCEDURE — 2700000000 HC OXYGEN THERAPY PER DAY

## 2024-06-28 PROCEDURE — 2580000003 HC RX 258: Performed by: UROLOGY

## 2024-06-28 PROCEDURE — 6370000000 HC RX 637 (ALT 250 FOR IP): Performed by: STUDENT IN AN ORGANIZED HEALTH CARE EDUCATION/TRAINING PROGRAM

## 2024-06-28 PROCEDURE — 94640 AIRWAY INHALATION TREATMENT: CPT

## 2024-06-28 PROCEDURE — 6360000002 HC RX W HCPCS: Performed by: STUDENT IN AN ORGANIZED HEALTH CARE EDUCATION/TRAINING PROGRAM

## 2024-06-28 PROCEDURE — 6370000000 HC RX 637 (ALT 250 FOR IP): Performed by: INTERNAL MEDICINE

## 2024-06-28 PROCEDURE — 99222 1ST HOSP IP/OBS MODERATE 55: CPT | Performed by: STUDENT IN AN ORGANIZED HEALTH CARE EDUCATION/TRAINING PROGRAM

## 2024-06-28 RX ORDER — HEPARIN SODIUM 5000 [USP'U]/ML
5000 INJECTION, SOLUTION INTRAVENOUS; SUBCUTANEOUS EVERY 8 HOURS SCHEDULED
Status: DISCONTINUED | OUTPATIENT
Start: 2024-06-28 | End: 2024-06-28 | Stop reason: HOSPADM

## 2024-06-28 RX ORDER — CEFDINIR 300 MG/1
300 CAPSULE ORAL EVERY 12 HOURS SCHEDULED
Qty: 6 CAPSULE | Refills: 0 | DISCHARGE
Start: 2024-06-28 | End: 2024-07-01

## 2024-06-28 RX ADMIN — MAGNESIUM OXIDE 400 MG (241.3 MG MAGNESIUM) TABLET 400 MG: TABLET at 08:45

## 2024-06-28 RX ADMIN — BUDESONIDE 500 MCG: 0.5 SUSPENSION RESPIRATORY (INHALATION) at 07:48

## 2024-06-28 RX ADMIN — SODIUM CHLORIDE, PRESERVATIVE FREE 10 ML: 5 INJECTION INTRAVENOUS at 08:46

## 2024-06-28 RX ADMIN — ARFORMOTEROL TARTRATE 15 MCG: 15 SOLUTION RESPIRATORY (INHALATION) at 07:48

## 2024-06-28 RX ADMIN — OXYCODONE HYDROCHLORIDE AND ACETAMINOPHEN 1 TABLET: 5; 325 TABLET ORAL at 06:13

## 2024-06-28 RX ADMIN — METOPROLOL TARTRATE 25 MG: 25 TABLET, FILM COATED ORAL at 08:45

## 2024-06-28 RX ADMIN — FUROSEMIDE 20 MG: 20 TABLET ORAL at 08:45

## 2024-06-28 RX ADMIN — ANORECTAL OINTMENT: 15.7; .44; 24; 20.6 OINTMENT TOPICAL at 08:45

## 2024-06-28 RX ADMIN — ACETAMINOPHEN 650 MG: 325 TABLET ORAL at 11:21

## 2024-06-28 RX ADMIN — CEFDINIR 300 MG: 300 CAPSULE ORAL at 08:45

## 2024-06-28 ASSESSMENT — PAIN SCALES - GENERAL: PAINLEVEL_OUTOF10: 6

## 2024-06-28 ASSESSMENT — PAIN DESCRIPTION - LOCATION: LOCATION: GENERALIZED

## 2024-06-28 NOTE — PLAN OF CARE
Problem: Discharge Planning  Goal: Discharge to home or other facility with appropriate resources  6/27/2024 1334 by Ramonita Brooks, RN  Outcome: Completed     Problem: Skin/Tissue Integrity  Goal: Absence of new skin breakdown  Description: 1.  Monitor for areas of redness and/or skin breakdown  2.  Assess vascular access sites hourly  3.  Every 4-6 hours minimum:  Change oxygen saturation probe site  4.  Every 4-6 hours:  If on nasal continuous positive airway pressure, respiratory therapy assess nares and determine need for appliance change or resting period.  6/27/2024 1334 by Ramonita Brooks, RN  Outcome: Completed     Problem: Safety - Adult  Goal: Free from fall injury  6/27/2024 1334 by Ramonita Brooks RN  Outcome: Completed     Problem: Pain  Goal: Verbalizes/displays adequate comfort level or baseline comfort level  6/27/2024 1334 by Ramonita Brooks, RN  Outcome: Completed

## 2024-06-28 NOTE — PROGRESS NOTES
This patient is on medication that requires renal, weight, and/or indication dose adjustment.      Date Body Weight IBW  Adjusted BW SCr  CrCl Dialysis status   6/28/2024 111.4 kg (245 lb 9.5 oz) Ideal body weight: 59.3 kg (130 lb 11.7 oz)  Adjusted ideal body weight: 80.1 kg (176 lb 10.8 oz) Serum creatinine: 0.7 mg/dL 06/27/24 0836  Estimated creatinine clearance: 95 mL/min N/a       Pharmacy has dose-adjusted the following medication(s):    Date Previous Order Adjusted Order   6/28/2024 Heparin 5000 units q12h Heparin 5000 q8h       These changes were made per protocol according to the [facility]   Automatic Renal Dose Adjustment Policy.     *Please note this dose may need readjusted if patient's condition changes.    Please contact pharmacy with any questions regarding these changes.    Radha Curry RPH  6/28/2024  7:04 AM

## 2024-06-28 NOTE — PROGRESS NOTES
[facility] Hospitalist Progress Note    Admitting Date and Time: 6/21/2024 10:04 AM  Admit Dx: Malignant neoplasm of urinary bladder, unspecified site (HCC) [C67.9]  At risk for sepsis due to urinary tract infection [N39.0, Z91.89]  Sepsis due to urinary tract infection (HCC) [A41.9, N39.0]    Subjective:  Patient is being followed for Malignant neoplasm of urinary bladder, unspecified site (HCC) [C67.9]  At risk for sepsis due to urinary tract infection [N39.0, Z91.89]  Sepsis due to urinary tract infection (HCC) [A41.9, N39.0]     No acute events overnight.  Patient had bowel movement and Obrien catheter was removed and patient voiding spontaneously.  Patient will be switched to oral cefdinir and she will be discharged today.    ROS: denies fever, chills, cp, sob, n/v, HA unless stated above.      heparin (porcine)  5,000 Units SubCUTAneous 3 times per day    cefdinir  300 mg Oral 2 times per day    polyethylene glycol  17 g Oral BID    menthol-zinc oxide   Topical BID    furosemide  20 mg Oral Daily    docusate sodium  100 mg Oral Daily    sodium chloride flush  5-40 mL IntraVENous 2 times per day    losartan  25 mg Oral Nightly    metoprolol tartrate  25 mg Oral BID    magnesium oxide  400 mg Oral Daily    budesonide  0.5 mg Nebulization BID RT    And    arformoterol tartrate  15 mcg Nebulization BID RT     bisacodyl, 10 mg, PRN  oxyCODONE-acetaminophen, 1 tablet, Q6H PRN  diphenhydrAMINE-zinc acetate, , TID PRN  acetaminophen, 650 mg, Q4H PRN  naproxen, 250 mg, Q6H PRN  sodium chloride flush, 5-40 mL, PRN  sodium chloride, , PRN  phenazopyridine, 100 mg, TID PRN  albuterol, 2.5 mg, Q4H PRN         Objective:    /68   Pulse 89   Temp 99.3 °F (37.4 °C) (Oral)   Resp 18   Ht 1.676 m (5' 6\")   Wt 111.4 kg (245 lb 9.5 oz)   LMP  (LMP Unknown)   SpO2 92%   BMI 39.64 kg/m²     General Appearance: alert and oriented to person, place and time and in no acute distress  Skin: warm and dry  Head:  today    History of PE in April 2024  -Patient has history of hematuria stasis and is not anticoagulated    Bladder tumor/malignancy  -History cystoscopy with ureteroscopy, ureteral and bladder biopsy  -Gemcitabine instillation on 6/21    Chronic COPD  -Not in exacerbation  -Continue bronchodilators    Chronic hypertension  -Losartan and metoprolol on hold due to soft blood pressure    VTE prophylaxis: Heparin    NOTE: This report was transcribed using voice recognition software. Every effort was made to ensure accuracy; however, inadvertent computerized transcription errors may be present.  Electronically signed by Catrachita Toussaint MD on 6/28/2024 at 10:26 AM

## 2024-06-28 NOTE — DISCHARGE SUMMARY
Discharge Summary    Date: 6/28/2024  Patient Name: Nettie Lara    YOB: 1955     Age: 69 y.o.    Admit Date: 6/21/2024  Discharge Date: 6/28/2024  Discharge Condition: Stable    Admission Diagnosis  Malignant neoplasm of urinary bladder, unspecified site (HCC) [C67.9];At risk for sepsis due to urinary tract infection [N39.0, Z91.89];Sepsis due to urinary tract infection (HCC) [A41.9, N39.0]      Discharge Diagnosis  Principal Problem (Resolved):    At risk for sepsis due to urinary tract infection  Active Problems:    Malignant neoplasm of urinary bladder (HCC)  Resolved Problems:    Septic shock (HCC)    Complicated UTI (urinary tract infection)    SIRS (systemic inflammatory response syndrome) (HCC)    Hypotension due to hypovolemia      Hospital Stay  Narrative of Hospital Course:  Present in the hospital for Cysto, left ureteral stent removal, left ureteroscopy with ureteral tumor biopsy, bladder biopsy, gemcitabine instillation.  Hospital stay was complicated by UTI.  ID was consulted.  She underwent antibiotic therapy.  She is stable upon discharge and will follow-up with the office outpatient.    Consultants:  IP CONSULT HOUSE MEDICINE  IP CONSULT TO VASCULAR ACCESS TEAM  IP CONSULT TO INFECTIOUS DISEASES  IP CONSULT TO VASCULAR ACCESS TEAM    Surgeries/procedures Performed:      Treatments:            Discharge Plan/Disposition:  Home    Hospital/Incidental Findings Requiring Follow Up:    Patient Instructions:    Diet: Regular Diet    Activity:Activity as Tolerated  For number of days (if applicable):      Other Instructions:    Provider Follow-Up:   No follow-ups on file.     Significant Diagnostic Studies:    Recent Labs:  Admission on 06/21/2024  No results displayed because visit has over 200 results.    ------------    Radiology last 7 days:  XR ABDOMEN (KUB) (SINGLE AP VIEW)    Result Date: 6/25/2024  Ileus bowel gas pattern.     FL RETROGRADE PYELOGRAM W WO KUB    Result Date:

## 2024-06-28 NOTE — PROGRESS NOTES
[facility]  [facility]  Progress Note    SUBJECTIVE:    Patient is tolerating medications. No reported adverse drug reactions.  No nausea, vomiting, diarrhea.  Awaiting transport to [facility]  No new complaints     Review of systems:  As stated above in the chief complaint, otherwise negative.    Medications:  Scheduled Meds:   heparin (porcine)  5,000 Units SubCUTAneous 3 times per day    cefdinir  300 mg Oral 2 times per day    polyethylene glycol  17 g Oral BID    menthol-zinc oxide   Topical BID    furosemide  20 mg Oral Daily    docusate sodium  100 mg Oral Daily    sodium chloride flush  5-40 mL IntraVENous 2 times per day    losartan  25 mg Oral Nightly    metoprolol tartrate  25 mg Oral BID    magnesium oxide  400 mg Oral Daily    budesonide  0.5 mg Nebulization BID RT    And    arformoterol tartrate  15 mcg Nebulization BID RT     Continuous Infusions:   sodium chloride       PRN Meds:bisacodyl, oxyCODONE-acetaminophen **OR** [DISCONTINUED] oxyCODONE-acetaminophen, diphenhydrAMINE-zinc acetate, acetaminophen, naproxen, sodium chloride flush, sodium chloride, phenazopyridine, albuterol    OBJECTIVE:  BP (!) 142/85   Pulse 78   Temp 99.2 °F (37.3 °C) (Oral)   Resp 18   Ht 1.676 m (5' 6\")   Wt 111.4 kg (245 lb 9.5 oz)   LMP  (LMP Unknown)   SpO2 92%   BMI 39.64 kg/m²   Temp  Av.4 °F (36.9 °C)  Min: 97.8 °F (36.6 °C)  Max: 99.3 °F (37.4 °C)  Constitutional: The patient is awake, alert, and oriented. Sitting up in bed, in no distress  Skin: Warm and dry. No rashes were noted.   HEENT: Round and reactive pupils.  Moist mucous membranes.  No ulcerations or thrush.  Neck: Supple to movements.   Chest: No use of accessory muscles to breathe. Symmetrical expansion.  No wheezing, crackles or rhonchi.  Cardiovascular: S1 and S2 are rhythmic and regular. No murmurs appreciated.   Abdomen: Positive bowel sounds to auscultation. Benign to palpation. No masses felt.  Extremities: No

## 2024-06-28 NOTE — CARE COORDINATION
Discharge order noted. Plan is to discharge to [facility] today at 11:30am via wheelchair with [employer]. Facility, nursing and patient notified. Envelope and transport form in chart.  Glendy KIMN, RN  Case Management

## 2024-06-29 NOTE — PROGRESS NOTES
Physician Progress Note      PATIENT:               TRACY LEGER  CSN #:                  [id number]  :                       1955  ADMIT DATE:       2024 10:04 AM  DISCH DATE:        2024 2:24 PM  RESPONDING  PROVIDER #:        Catrachita Suresh MD          QUERY TEXT:    Pt admitted with UTI, SIRS. Documentation of risk for sepsis IM note , and   Sepsis with septic shock  by Critical care. If possible, please document   in progress notes and discharge summary the present on admission status of   sepsis:    The medical record reflects the following:  Risk Factors: UTI, pt states febrile at home, bladder CA,  Clinical Indicators: Wbc 25, Lactic 4.4, Procal 32, HR 98-120s, RR 18-24, temp   max 102.4, UC mixed GNR, BC GNR  Treatment: IVF 3L bolus, Rocephin, Vanc, Ancef, Gemzar bladder instillation,   Pyridium, Cipro, Cystoscopy w/ Left stent removal.    Thank you, Ana Cramer RN [facility]  [phone]  Options provided:  -- Yes, sepsis was present at the time of the order to admit to the hospital  -- No, sepsis was not present on admission and developed during the inpatient   stay  -- Other - I will add my own diagnosis  -- Disagree - Not applicable / Not valid  -- Disagree - Clinically unable to determine / Unknown  -- Refer to Clinical Documentation Reviewer    PROVIDER RESPONSE TEXT:    Yes, sepsis was present at the time of the order to admit to the hospital.    Query created by: Ana Cramer on 2024 10:23 AM      Electronically signed by:  Catrachita Suresh MD 2024 4:51 PM

## 2024-07-02 LAB — SURGICAL PATHOLOGY REPORT: NORMAL

## 2024-07-23 ENCOUNTER — TELEPHONE (OUTPATIENT)
Dept: PRIMARY CARE CLINIC | Age: 69
End: 2024-07-23

## 2024-07-23 RX ORDER — FLUCONAZOLE 150 MG/1
150 TABLET ORAL
Qty: 2 TABLET | Refills: 0 | Status: SHIPPED | OUTPATIENT
Start: 2024-07-23 | End: 2024-07-29

## 2024-07-23 RX ORDER — PETROLATUM 420 MG/G
2 OINTMENT TOPICAL 2 TIMES DAILY
Qty: 500 G | Refills: 5 | Status: SHIPPED | OUTPATIENT
Start: 2024-07-23

## 2024-07-23 NOTE — TELEPHONE ENCOUNTER
Pt calling stating she nursing home [facility] and she has vaginal itching and a rash and  bed sores wants to know if you can call in something for the itching and some petrolatum hydrating 42%oint.  [facility]    Please advise?

## 2024-07-23 NOTE — TELEPHONE ENCOUNTER
Pt states she is coming home on Thursday and she would much rather you call something in for her, she also mentions usage of a prescription of petroleum jelly that helped her sister in the past

## 2024-07-26 ENCOUNTER — TELEPHONE (OUTPATIENT)
Dept: PRIMARY CARE CLINIC | Age: 69
End: 2024-07-26

## 2024-07-26 DIAGNOSIS — N30.00 ACUTE CYSTITIS WITHOUT HEMATURIA: Primary | ICD-10-CM

## 2024-07-26 RX ORDER — AMOXICILLIN AND CLAVULANATE POTASSIUM 875; 125 MG/1; MG/1
1 TABLET, FILM COATED ORAL 2 TIMES DAILY
Qty: 14 TABLET | Refills: 0 | Status: SHIPPED | OUTPATIENT
Start: 2024-07-26 | End: 2024-08-02

## 2024-07-26 NOTE — TELEPHONE ENCOUNTER
Pt calling stating she has a uti she having frequency pressure and a bad odor  she states she is nauseated and low grade fever. She wants to know if you can call a antibiotic in for her.    She states its to hard for her to give a specimen.    Please advise?

## 2024-08-01 ENCOUNTER — TELEPHONE (OUTPATIENT)
Dept: PRIMARY CARE CLINIC | Age: 69
End: 2024-08-01

## 2024-08-01 RX ORDER — NYSTATIN 100000 [USP'U]/G
POWDER TOPICAL
Qty: 30 G | Refills: 2 | Status: SHIPPED | OUTPATIENT
Start: 2024-08-01

## 2024-08-01 NOTE — TELEPHONE ENCOUNTER
Pt calling she is still having vaginal itching, the Difucan x 2 did not help, is there anything else to use. She is asking about a anti-fungal powder?    advise

## 2024-08-15 ENCOUNTER — OFFICE VISIT (OUTPATIENT)
Dept: PRIMARY CARE CLINIC | Age: 69
End: 2024-08-15
Payer: MEDICAID

## 2024-08-15 VITALS
SYSTOLIC BLOOD PRESSURE: 154 MMHG | DIASTOLIC BLOOD PRESSURE: 79 MMHG | HEART RATE: 72 BPM | OXYGEN SATURATION: 97 % | RESPIRATION RATE: 16 BRPM | HEIGHT: 66 IN | TEMPERATURE: 97.2 F | BODY MASS INDEX: 31.66 KG/M2 | WEIGHT: 197 LBS

## 2024-08-15 DIAGNOSIS — G82.20 PARAPLEGIA (HCC): ICD-10-CM

## 2024-08-15 DIAGNOSIS — G35 MULTIPLE SCLEROSIS (HCC): Primary | ICD-10-CM

## 2024-08-15 DIAGNOSIS — I10 PRIMARY HYPERTENSION: ICD-10-CM

## 2024-08-15 DIAGNOSIS — C67.9 MALIGNANT NEOPLASM OF URINARY BLADDER, UNSPECIFIED SITE (HCC): ICD-10-CM

## 2024-08-15 LAB
ALBUMIN: 3.7 G/DL (ref 3.5–5.2)
ALP BLD-CCNC: 98 U/L (ref 35–104)
ALT SERPL-CCNC: 19 U/L (ref 0–32)
ANION GAP SERPL CALCULATED.3IONS-SCNC: 14 MMOL/L (ref 7–16)
AST SERPL-CCNC: 33 U/L (ref 0–31)
BASOPHILS ABSOLUTE: 0.1 K/UL (ref 0–0.2)
BASOPHILS RELATIVE PERCENT: 1 % (ref 0–2)
BILIRUB SERPL-MCNC: 0.5 MG/DL (ref 0–1.2)
BUN BLDV-MCNC: 17 MG/DL (ref 6–23)
CALCIUM SERPL-MCNC: 10.2 MG/DL (ref 8.6–10.2)
CHLORIDE BLD-SCNC: 103 MMOL/L (ref 98–107)
CO2: 23 MMOL/L (ref 22–29)
CREAT SERPL-MCNC: 0.9 MG/DL (ref 0.5–1)
EOSINOPHILS ABSOLUTE: 0.54 K/UL (ref 0.05–0.5)
EOSINOPHILS RELATIVE PERCENT: 5 % (ref 0–6)
GFR, ESTIMATED: 68 ML/MIN/1.73M2
GLUCOSE BLD-MCNC: 97 MG/DL (ref 74–99)
HCT VFR BLD CALC: 33.6 % (ref 34–48)
HEMOGLOBIN: 12.5 G/DL (ref 11.5–15.5)
IMMATURE GRANULOCYTES %: 2 % (ref 0–5)
IMMATURE GRANULOCYTES ABSOLUTE: 0.2 K/UL (ref 0–0.58)
LYMPHOCYTES ABSOLUTE: 2.55 K/UL (ref 1.5–4)
LYMPHOCYTES RELATIVE PERCENT: 21 % (ref 20–42)
MCH RBC QN AUTO: 38.3 PG (ref 26–35)
MCHC RBC AUTO-ENTMCNC: 37.2 G/DL (ref 32–34.5)
MCV RBC AUTO: 103.1 FL (ref 80–99.9)
MONOCYTES ABSOLUTE: 1.38 K/UL (ref 0.1–0.95)
MONOCYTES RELATIVE PERCENT: 12 % (ref 2–12)
NEUTROPHILS ABSOLUTE: 7.2 K/UL (ref 1.8–7.3)
NEUTROPHILS RELATIVE PERCENT: 60 % (ref 43–80)
PDW BLD-RTO: 15.7 % (ref 11.5–15)
PLATELET CONFIRMATION: NORMAL
PLATELET, FLUORESCENCE: 422 K/UL (ref 130–450)
PMV BLD AUTO: 11.1 FL (ref 7–12)
POTASSIUM SERPL-SCNC: 4.9 MMOL/L (ref 3.5–5)
RBC # BLD: 3.26 M/UL (ref 3.5–5.5)
SODIUM BLD-SCNC: 140 MMOL/L (ref 132–146)
TOTAL PROTEIN: 7.4 G/DL (ref 6.4–8.3)
TSH SERPL DL<=0.05 MIU/L-ACNC: 0.95 UIU/ML (ref 0.27–4.2)
WBC # BLD: 12 K/UL (ref 4.5–11.5)

## 2024-08-15 PROCEDURE — 3078F DIAST BP <80 MM HG: CPT | Performed by: FAMILY MEDICINE

## 2024-08-15 PROCEDURE — 99214 OFFICE O/P EST MOD 30 MIN: CPT | Performed by: FAMILY MEDICINE

## 2024-08-15 PROCEDURE — 36415 COLL VENOUS BLD VENIPUNCTURE: CPT | Performed by: FAMILY MEDICINE

## 2024-08-15 PROCEDURE — 3077F SYST BP >= 140 MM HG: CPT | Performed by: FAMILY MEDICINE

## 2024-08-15 PROCEDURE — 1123F ACP DISCUSS/DSCN MKR DOCD: CPT | Performed by: FAMILY MEDICINE

## 2024-08-15 RX ORDER — LOSARTAN POTASSIUM 50 MG/1
50 TABLET ORAL DAILY
Qty: 90 TABLET | Refills: 1 | Status: SHIPPED | OUTPATIENT
Start: 2024-08-15

## 2024-08-15 NOTE — PROGRESS NOTES
8/15/2024     Chief Complaint   Patient presents with    Hypertension    Orders     PT NEEDS POTTY CHAIR/SHOWER BENCH /PRESSURE SORE PAD CUSHION      HPI:  Patient comes in today after surgery for bladder cancer and chemo (gemcitabine) instillation. She says she is doing pretty good except for fatigue, some swelling of her lower extremities.  The wound on her left posterior calf has healed.  Otherwise she is not having any difficulty with breathing, chest pain, abdominal pain or constipation.  She had another UTI and yeast infection but feels these have resolved. She has incontinence since have surgery done. She does have a appointment set up with gastroenterology for evaluation and colonoscopy.    REVIEW OF SYSTEMS: General: Weight stable, generally healthy, no change in strength or exercise tolerance. Heart: No palpitations, no syncope, no orthopnea. EDEMA OF LEGS AT TIMES.  Abdomen: Chronic constipation. No change in appetite, no dysphagia, no abdominal pains, no bowel habit changes, no emesis, no melena. : No urinary urgency, no dysuria, no change in nature of urine. Neurologic:   Unable to walk without holding on to something. In w/c most of the time.  No tremor, no seizures, no changes in mentation.   All other systems negative.       PAST MEDICAL HISTORY: (Major events, hospitalizations, surgeries): Pneumonia (2010), 1998 Vag hyst, 1978 naina, append, freq epidural inclusion cysts tx'd with docycycline.   MS dx'd Mar 8, 1991.   IV corticosteroids 1991-92.   Has never been on MS meds. 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   Known allergies: NKDA.   Ongoing medical problems: Multiple sclerosis, Asthma, HTN, hypoglycemia, scoliosis, DDD with sciatic, arthritis, osteoporosis, chronic LBP.  Preventative: COVID vac - 3/22/21, 4/19134/21 (Refuses booster),  Pneumovax 23 - 11/2009. Prevnar - 10/2019. Flu vac - 10/2020 (refused 11/2021). Smoking cessation counselling 7/2014 Social history:  with

## 2024-08-16 DIAGNOSIS — D64.9 ANEMIA, UNSPECIFIED TYPE: Primary | ICD-10-CM

## 2024-08-20 DIAGNOSIS — J31.0 CHRONIC RHINITIS: Primary | ICD-10-CM

## 2024-08-20 RX ORDER — GUAIFENESIN/DEXTROMETHORPHAN 100-10MG/5
5 SYRUP ORAL 4 TIMES DAILY PRN
COMMUNITY
End: 2024-08-20 | Stop reason: SDUPTHER

## 2024-08-20 RX ORDER — GUAIFENESIN/DEXTROMETHORPHAN 100-10MG/5
5 SYRUP ORAL 4 TIMES DAILY PRN
Qty: 354 ML | Refills: 3 | Status: SHIPPED | OUTPATIENT
Start: 2024-08-20

## 2024-08-26 ENCOUNTER — TELEPHONE (OUTPATIENT)
Dept: PRIMARY CARE CLINIC | Age: 69
End: 2024-08-26

## 2024-08-26 RX ORDER — DOXYCYCLINE HYCLATE 100 MG
100 TABLET ORAL 2 TIMES DAILY
Qty: 28 TABLET | Refills: 0 | Status: SHIPPED | OUTPATIENT
Start: 2024-08-26 | End: 2024-09-09

## 2024-08-26 NOTE — TELEPHONE ENCOUNTER
Okay.  I sent eRx for doxycycline to [facility] which she has taken before for boils. Hopefully it will take care of UTI also, but if she is not improving, she needs to let us or the urologist know.

## 2024-08-26 NOTE — TELEPHONE ENCOUNTER
Pt calling she does not have any aid at the moment she has covid, but patient is having skin flare up she has recurrent boils that need treated. She also is c/o UTI so hopefully the antibiotic can treat both issues    [facility]

## 2024-09-04 DIAGNOSIS — J31.0 CHRONIC RHINITIS: Primary | ICD-10-CM

## 2024-09-04 RX ORDER — IPRATROPIUM BROMIDE 42 UG/1
2 SPRAY, METERED NASAL DAILY
Qty: 15 ML | Refills: 11 | Status: SHIPPED | OUTPATIENT
Start: 2024-09-04

## 2024-09-04 RX ORDER — IPRATROPIUM BROMIDE 42 UG/1
2 SPRAY, METERED NASAL DAILY
COMMUNITY
End: 2024-09-04 | Stop reason: SDUPTHER

## 2024-09-09 RX ORDER — FUROSEMIDE 40 MG
40 TABLET ORAL DAILY
Qty: 90 TABLET | Refills: 3 | Status: SHIPPED | OUTPATIENT
Start: 2024-09-09

## 2024-09-19 ENCOUNTER — OFFICE VISIT (OUTPATIENT)
Dept: PRIMARY CARE CLINIC | Age: 69
End: 2024-09-19
Payer: MEDICAID

## 2024-09-19 VITALS
RESPIRATION RATE: 16 BRPM | SYSTOLIC BLOOD PRESSURE: 110 MMHG | HEIGHT: 66 IN | OXYGEN SATURATION: 93 % | BODY MASS INDEX: 31.66 KG/M2 | TEMPERATURE: 97.3 F | WEIGHT: 197 LBS | HEART RATE: 63 BPM | DIASTOLIC BLOOD PRESSURE: 60 MMHG

## 2024-09-19 DIAGNOSIS — G82.20 PARAPLEGIA (HCC): ICD-10-CM

## 2024-09-19 DIAGNOSIS — R26.2 UNABLE TO WALK: ICD-10-CM

## 2024-09-19 DIAGNOSIS — G62.9 PERIPHERAL POLYNEUROPATHY: ICD-10-CM

## 2024-09-19 DIAGNOSIS — C67.9 MALIGNANT NEOPLASM OF URINARY BLADDER, UNSPECIFIED SITE (HCC): ICD-10-CM

## 2024-09-19 DIAGNOSIS — I10 PRIMARY HYPERTENSION: ICD-10-CM

## 2024-09-19 DIAGNOSIS — G35 MULTIPLE SCLEROSIS (HCC): Primary | ICD-10-CM

## 2024-09-19 PROCEDURE — 3074F SYST BP LT 130 MM HG: CPT | Performed by: FAMILY MEDICINE

## 2024-09-19 PROCEDURE — 1123F ACP DISCUSS/DSCN MKR DOCD: CPT | Performed by: FAMILY MEDICINE

## 2024-09-19 PROCEDURE — 99214 OFFICE O/P EST MOD 30 MIN: CPT | Performed by: FAMILY MEDICINE

## 2024-09-19 PROCEDURE — 3078F DIAST BP <80 MM HG: CPT | Performed by: FAMILY MEDICINE

## 2024-09-27 DIAGNOSIS — L02.229 BOIL OF TRUNK: ICD-10-CM

## 2024-09-27 DIAGNOSIS — L02.224 BOIL OF GROIN: ICD-10-CM

## 2024-09-27 DIAGNOSIS — L73.9 FOLLICULITIS: Primary | ICD-10-CM

## 2024-09-27 RX ORDER — DOXYCYCLINE HYCLATE 100 MG
100 TABLET ORAL 2 TIMES DAILY
COMMUNITY
End: 2024-09-27 | Stop reason: SDUPTHER

## 2024-09-27 RX ORDER — DOXYCYCLINE HYCLATE 100 MG
100 TABLET ORAL 2 TIMES DAILY
Qty: 28 TABLET | Refills: 0 | Status: SHIPPED | OUTPATIENT
Start: 2024-09-27

## 2024-11-07 LAB
MICROORGANISM SPEC CULT: ABNORMAL
MICROORGANISM SPEC CULT: ABNORMAL
SPECIMEN DESCRIPTION: ABNORMAL

## 2024-11-12 ENCOUNTER — TELEPHONE (OUTPATIENT)
Dept: PRIMARY CARE CLINIC | Age: 69
End: 2024-11-12

## 2024-11-12 DIAGNOSIS — R26.2 UNABLE TO WALK: Primary | ICD-10-CM

## 2024-11-18 ENCOUNTER — TELEPHONE (OUTPATIENT)
Dept: PRIMARY CARE CLINIC | Age: 69
End: 2024-11-18

## 2024-11-18 RX ORDER — DOXYCYCLINE HYCLATE 100 MG
100 TABLET ORAL 2 TIMES DAILY
Qty: 20 TABLET | Refills: 0 | Status: SHIPPED | OUTPATIENT
Start: 2024-11-18 | End: 2024-11-28

## 2024-11-18 NOTE — TELEPHONE ENCOUNTER
Pt states she has a fever, body aches and believes she may have the onset of the flu she has a runny nose and sneexing pt is asking for an antibiotic sent her pharmacy [facility] in [place], pt states she knows her body and will end up in the hospital is she does not receive an antibiotic considering she has MS.

## 2024-11-20 DIAGNOSIS — J31.0 CHRONIC RHINITIS: ICD-10-CM

## 2024-11-20 RX ORDER — IPRATROPIUM BROMIDE 42 UG/1
2 SPRAY, METERED NASAL DAILY
Qty: 15 ML | Refills: 11 | Status: SHIPPED | OUTPATIENT
Start: 2024-11-20

## 2024-11-20 NOTE — TELEPHONE ENCOUNTER
Name of Medication(s) Requested:  Requested Prescriptions     Pending Prescriptions Disp Refills    ipratropium (ATROVENT) 0.06 % nasal spray [Pharmacy Med Name: IPRATROPIUM 0.06% SPRAY 0.06 Solution] 15 mL 11     Si SPRAYS BY EACH NOSTRIL ROUTE DAILY       Medication is on current medication list Yes    Dosage and directions were verified? Yes    Quantity verified: 30 day supply     Pharmacy Verified?  Yes    Last Appointment:  2024    Future appts:  Future Appointments   Date Time Provider Department Center   2024 11:00 AM Geraldo Myers MD TRAIL PC [facility] DEP        (If no appt send self scheduling link. .REFILLAPPT)  Scheduling request sent?     [] Yes  [] No    Does patient need updated?  [] Yes  [] No

## 2024-12-10 DIAGNOSIS — J45.40 MODERATE PERSISTENT ASTHMA WITHOUT COMPLICATION: Primary | ICD-10-CM

## 2024-12-10 RX ORDER — BUDESONIDE AND FORMOTEROL FUMARATE DIHYDRATE 160; 4.5 UG/1; UG/1
2 AEROSOL RESPIRATORY (INHALATION) 2 TIMES DAILY
Qty: 30.6 G | Refills: 5 | Status: SHIPPED | OUTPATIENT
Start: 2024-12-10

## 2024-12-10 RX ORDER — ALBUTEROL SULFATE 90 UG/1
2 INHALANT RESPIRATORY (INHALATION) EVERY 4 HOURS PRN
Qty: 54 G | Refills: 11 | Status: SHIPPED | OUTPATIENT
Start: 2024-12-10

## 2024-12-10 NOTE — TELEPHONE ENCOUNTER
Name of Medication(s) Requested:  Requested Prescriptions     Pending Prescriptions Disp Refills    budesonide-formoterol (SYMBICORT) 160-4.5 MCG/ACT AERO 30.6 g 1     Sig: Inhale 2 puffs into the lungs 2 times daily    albuterol sulfate HFA (VENTOLIN HFA) 108 (90 Base) MCG/ACT inhaler 54 g 11     Sig: Inhale 2 puffs into the lungs every 4 hours as needed for Wheezing (or cough)       Medication is on current medication list Yes    Dosage and directions were verified? Yes    Quantity verified: 30 day supply     Pharmacy Verified?  Yes    Last Appointment:  9/19/2024    Future appts:  Future Appointments   Date Time Provider Department Center   12/17/2024 11:00 AM Geraldo Myers MD TRAIL PC [facility] DEP        (If no appt send self scheduling link. .REFILLAPPT)  Scheduling request sent?     [] Yes  [] No    Does patient need updated?  [] Yes  [] No

## 2024-12-12 ENCOUNTER — TELEPHONE (OUTPATIENT)
Dept: PRIMARY CARE CLINIC | Age: 69
End: 2024-12-12

## 2024-12-12 DIAGNOSIS — J20.9 ACUTE BRONCHITIS, UNSPECIFIED ORGANISM: Primary | ICD-10-CM

## 2024-12-12 RX ORDER — DOXYCYCLINE HYCLATE 100 MG
100 TABLET ORAL 2 TIMES DAILY
Qty: 20 TABLET | Refills: 0 | Status: SHIPPED | OUTPATIENT
Start: 2024-12-12 | End: 2024-12-22

## 2024-12-12 NOTE — TELEPHONE ENCOUNTER
Pt calling she is having cough, congestion, runny nose and temp 99.  She is requesting antibiotic and cough med to [facility]   Next appt is 12/17/24 with Dr Myers  advise

## 2024-12-18 NOTE — PROGRESS NOTES
None nasal spray 1 spray by Each Nostril route daily 1 Bottle 11    senna (SENOKOT) 8.6 MG tablet Take 1 tablet by mouth daily 30 tablet 11    docusate sodium (COLACE) 100 MG capsule Take 1 capsule by mouth 2 times daily as needed for Constipation 60 capsule 11    levocetirizine (XYZAL) 5 MG tablet Take 1 tablet by mouth nightly as needed (allergies) 30 tablet 11    vitamin E 1000 units capsule Take 1,000 Units by mouth daily      Magnesium Citrate 1.745 GM/30ML solution Take 296 mLs by mouth See Admin Instructions 1 bottle daily prn constipation 1 Bottle 5    BLACK ELDERBERRY,BERRY-FLOWER, PO Take 1 tablet by mouth daily      Methylsulfonylmethane (MSM) 1000 MG CAPS Take 1 tablet by mouth daily      Multiple Vitamins-Minerals (MULTIVITAMIN ADULT PO) Take 1 tablet by mouth daily      Incontinence Supply Disposable (POISE MAXIMUM ABSORBENCY) PADS   11    Ascorbic Acid (VITAMIN C) 1000 MG tablet Take 1,000 mg by mouth daily      betamethasone valerate (VALISONE) 0.1 % cream Apply topically 3 times daily Apply to rash TID until resolved      B Complex-C-Folic Acid (HM VITAMIN B COMPLEX/VITAMIN C PO) Take 1 tablet by mouth daily      mupirocin (BACTROBAN) 2 % ointment Apply topically See Admin Instructions Apply to affected area BID until better      polyethylene glycol (GLYCOLAX) powder Take by mouth See Admin Instructions 1 capful in large glass water prn constipation      Propylhexedrine (BENZEDREX) INHA by Nasal route Inhale each nostril for nasal congestion      Disposable Gloves (VINYL GLOVES) MISC by Does not apply route      vitamin D (CHOLECALCIFEROL) 5000 UNITS CAPS capsule Take 5,000 Units by mouth daily      ammonium lactate (LAC-HYDRIN) 12 % lotion Apply topically as needed for Dry Skin Apply topically as needed. No current facility-administered medications for this visit. Return in about 1 month (around 6/29/2020).     Serina Johnson is a 76 y.o. female being evaluated by a

## 2025-01-30 DIAGNOSIS — J31.0 CHRONIC RHINITIS: ICD-10-CM

## 2025-01-30 RX ORDER — GUAIFENESIN/DEXTROMETHORPHAN 100-10MG/5
5 SYRUP ORAL 4 TIMES DAILY PRN
Qty: 354 ML | Refills: 3 | Status: SHIPPED | OUTPATIENT
Start: 2025-01-30

## 2025-02-07 ENCOUNTER — TELEPHONE (OUTPATIENT)
Dept: PRIMARY CARE CLINIC | Age: 70
End: 2025-02-07

## 2025-02-07 DIAGNOSIS — J11.1 INFLUENZA: Primary | ICD-10-CM

## 2025-02-07 RX ORDER — OSELTAMIVIR PHOSPHATE 75 MG/1
75 CAPSULE ORAL 2 TIMES DAILY
Qty: 10 CAPSULE | Refills: 0 | Status: SHIPPED | OUTPATIENT
Start: 2025-02-07 | End: 2025-02-12

## 2025-02-07 NOTE — TELEPHONE ENCOUNTER
Patient phoned in stating she thinks she has the flu because her whole apartment complex has it.  Since 2/5/2025 she has a temp of 99.1, bodyaches, fatigue, headache, diarrhea, head congestion and clear PND.  She is using tylenol and a rx nasal spray.  She is requesting an antibiotic.    Please advise.

## 2025-02-07 NOTE — TELEPHONE ENCOUNTER
I sent eRx for Tamiflu to [facility].  She should also take Tylenol/Advil (she can take them at the same time) as needed, Zyrtec or Claritin as needed, Mucinex DM as needed.

## 2025-02-21 DIAGNOSIS — I10 PRIMARY HYPERTENSION: Primary | ICD-10-CM

## 2025-02-21 RX ORDER — METOPROLOL TARTRATE 25 MG/1
25 TABLET, FILM COATED ORAL 2 TIMES DAILY
Qty: 60 TABLET | Refills: 11 | Status: SHIPPED | OUTPATIENT
Start: 2025-02-21

## 2025-02-21 NOTE — TELEPHONE ENCOUNTER
Name of Medication(s) Requested:  Requested Prescriptions     Pending Prescriptions Disp Refills    metoprolol tartrate (LOPRESSOR) 25 MG tablet 60 tablet 11     Sig: Take 1 tablet by mouth 2 times daily       Medication is on current medication list Yes    Dosage and directions were verified? Yes    Quantity verified: 30     day supply     Pharmacy Verified?  Yes    Last Appointment:  9/19/2024    Future appts:  Future Appointments   Date Time Provider Department Center   3/4/2025  3:00 PM Geraldo Myers MD TRAIL PC [facility] DEP        (If no appt send self scheduling link. .REFILLAPPT)  Scheduling request sent?     [] Yes  [x] No    Does patient need updated?  [] Yes  [x] No

## 2025-02-24 DIAGNOSIS — I10 PRIMARY HYPERTENSION: ICD-10-CM

## 2025-02-24 RX ORDER — METOPROLOL TARTRATE 25 MG/1
25 TABLET, FILM COATED ORAL 2 TIMES DAILY
Qty: 60 TABLET | Refills: 11 | Status: SHIPPED | OUTPATIENT
Start: 2025-02-24

## 2025-03-04 ENCOUNTER — TELEPHONE (OUTPATIENT)
Dept: PRIMARY CARE CLINIC | Age: 70
End: 2025-03-04

## 2025-03-04 RX ORDER — DOXYCYCLINE HYCLATE 100 MG
100 TABLET ORAL 2 TIMES DAILY
Qty: 20 TABLET | Refills: 0 | Status: SHIPPED | OUTPATIENT
Start: 2025-03-04 | End: 2025-03-14

## 2025-03-04 NOTE — TELEPHONE ENCOUNTER
Pt calling c/o sinuses congestion runny nose sneezing left eye is swollen and has pressure behind it..she states she is a little bit dizzy and a slight fever.    Please advise ?      She is requesting a antibiotic .

## 2025-03-14 ENCOUNTER — TELEPHONE (OUTPATIENT)
Dept: PRIMARY CARE CLINIC | Age: 70
End: 2025-03-14

## 2025-03-14 DIAGNOSIS — M79.672 PAIN IN LEFT FOOT: ICD-10-CM

## 2025-03-14 DIAGNOSIS — M79.675 PAIN OF TOE OF LEFT FOOT: Primary | ICD-10-CM

## 2025-03-14 NOTE — TELEPHONE ENCOUNTER
Patient phoned stating she fell 1 month ago and injured her left pinky toe/left side of foot.  She thought it was getting better but now she is complaining of pain while standing on left side of left foot.  Patient requesting mobile xray left foot.    Please advise.

## 2025-03-24 ENCOUNTER — TELEPHONE (OUTPATIENT)
Dept: PRIMARY CARE CLINIC | Age: 70
End: 2025-03-24

## 2025-03-24 NOTE — TELEPHONE ENCOUNTER
Pt calling with sinus infx, she has sinus pressure, head congestion and ear is full can you send rx to [facility]

## 2025-03-28 DIAGNOSIS — M79.675 PAIN OF TOE OF LEFT FOOT: ICD-10-CM

## 2025-03-28 DIAGNOSIS — M79.672 PAIN IN LEFT FOOT: ICD-10-CM

## 2025-03-30 ENCOUNTER — RESULTS FOLLOW-UP (OUTPATIENT)
Dept: PRIMARY CARE CLINIC | Age: 70
End: 2025-03-30

## 2025-03-31 PROBLEM — I48.0 PAROXYSMAL ATRIAL FIBRILLATION (HCC): Status: ACTIVE | Noted: 2025-03-31

## 2025-03-31 PROBLEM — J96.21 ACUTE ON CHRONIC RESPIRATORY FAILURE WITH HYPOXIA (HCC): Status: ACTIVE | Noted: 2025-03-31

## 2025-03-31 PROBLEM — J44.9 CHRONIC OBSTRUCTIVE PULMONARY DISEASE, UNSPECIFIED COPD TYPE (HCC): Status: ACTIVE | Noted: 2025-03-31

## 2025-04-29 DIAGNOSIS — I10 PRIMARY HYPERTENSION: ICD-10-CM

## 2025-04-29 RX ORDER — LOSARTAN POTASSIUM 50 MG/1
50 TABLET ORAL DAILY
Qty: 90 TABLET | Refills: 0 | Status: SHIPPED | OUTPATIENT
Start: 2025-04-29

## 2025-04-29 NOTE — TELEPHONE ENCOUNTER
Name of Medication(s) Requested:  Requested Prescriptions     Pending Prescriptions Disp Refills    losartan (COZAAR) 50 MG tablet 90 tablet 0     Sig: Take 1 tablet by mouth daily       Medication is on current medication list Yes    Dosage and directions were verified? Yes    Quantity verified: 90 day supply     Pharmacy Verified?  Yes    Last Appointment:  9/19/2024    Future appts:  Future Appointments   Date Time Provider Department Center   5/27/2025 10:30 AM Geraldo Myers MD TRAIL PC [facility] DEP        (If no appt send self scheduling link. .REFILLAPPT)  Scheduling request sent?     [] Yes  [] No    Does patient need updated?  [] Yes  [] No

## 2025-05-07 ENCOUNTER — TELEPHONE (OUTPATIENT)
Dept: PRIMARY CARE CLINIC | Age: 70
End: 2025-05-07

## 2025-05-07 NOTE — TELEPHONE ENCOUNTER
Pt states she is having sinus issues along with a lump in her ear she wants to be seen for also she said she was eating a tater tot and it was super hot and she burnt her esophagus, pt advised we have no opening and she should utilize walk in but I she wanted me to still check with provider coving for dr scanlon

## 2025-05-23 ENCOUNTER — TELEPHONE (OUTPATIENT)
Dept: PRIMARY CARE CLINIC | Age: 70
End: 2025-05-23

## 2025-05-23 RX ORDER — NITROFURANTOIN 25; 75 MG/1; MG/1
100 CAPSULE ORAL 2 TIMES DAILY
Qty: 10 CAPSULE | Refills: 0 | Status: SHIPPED | OUTPATIENT
Start: 2025-05-23 | End: 2025-05-28

## 2025-05-23 NOTE — TELEPHONE ENCOUNTER
I sent eRx for Macrobid to [facility].  
Informed patient  
Patient called stating she has a UTI-- dark urine, slight fever, pressure, frequency,burning and urgency.  She is requesting antibiotic.    Please advise.  
Name band;

## 2025-05-26 PROBLEM — J96.21 ACUTE ON CHRONIC RESPIRATORY FAILURE WITH HYPOXIA (HCC): Status: RESOLVED | Noted: 2025-03-31 | Resolved: 2025-05-26

## 2025-06-04 ENCOUNTER — TELEPHONE (OUTPATIENT)
Dept: PRIMARY CARE CLINIC | Age: 70
End: 2025-06-04

## 2025-06-04 DIAGNOSIS — R26.2 UNABLE TO WALK: ICD-10-CM

## 2025-06-04 DIAGNOSIS — G82.20 PARAPLEGIA (HCC): ICD-10-CM

## 2025-06-04 DIAGNOSIS — G35 MULTIPLE SCLEROSIS (HCC): Primary | ICD-10-CM

## 2025-06-04 NOTE — TELEPHONE ENCOUNTER
Pt is asking for an order for a shower bench, a shower chair that is inside and outside of the shower pt is located in [place] and will need a medical supply store that way that will deliver

## 2025-06-15 ENCOUNTER — TRANSCRIBE ORDERS (OUTPATIENT)
Dept: ADMINISTRATIVE | Age: 70
End: 2025-06-15

## 2025-06-15 DIAGNOSIS — N20.0 CALCULUS OF KIDNEY: Primary | ICD-10-CM

## 2025-07-01 ENCOUNTER — TELEPHONE (OUTPATIENT)
Dept: PRIMARY CARE CLINIC | Age: 70
End: 2025-07-01

## 2025-07-01 RX ORDER — NITROFURANTOIN 25; 75 MG/1; MG/1
100 CAPSULE ORAL 2 TIMES DAILY
Qty: 14 CAPSULE | Refills: 0 | Status: SHIPPED | OUTPATIENT
Start: 2025-07-01 | End: 2025-07-08

## 2025-07-01 NOTE — TELEPHONE ENCOUNTER
Pt called back, she is concerned she may be getting septic because she has chills off and on, Dr Myers advised pt to go to ER if she feels she is getting septic this can not be treated over the phone.     Pt notified

## 2025-07-01 NOTE — TELEPHONE ENCOUNTER
Pt calling she has UTI, she has frequency, pressure and discomfort. She also states she has the chills off and on. She is requesting antibiotic to [facility] in [place]    advise

## 2025-07-07 DIAGNOSIS — J31.0 CHRONIC RHINITIS: ICD-10-CM

## 2025-07-07 RX ORDER — GUAIFENESIN/DEXTROMETHORPHAN 100-10MG/5
5 SYRUP ORAL 4 TIMES DAILY PRN
Qty: 354 ML | Refills: 5 | Status: SHIPPED | OUTPATIENT
Start: 2025-07-07

## 2025-07-07 RX ORDER — CLOTRIMAZOLE 1 %
CREAM (GRAM) TOPICAL
Qty: 45 G | Refills: 5 | Status: SHIPPED | OUTPATIENT
Start: 2025-07-07

## 2025-07-07 NOTE — TELEPHONE ENCOUNTER
Name of Medication(s) Requested:  Requested Prescriptions     Pending Prescriptions Disp Refills    clotrimazole (LOTRIMIN) 1 % cream 45 g 5     Sig: Apply to affected area twice daily until resolved.       Medication is on current medication list Yes    Dosage and directions were verified? Yes    Quantity verified: 30 day supply     Pharmacy Verified?  Yes    Last Appointment:  9/19/2024    Future appts:  Future Appointments   Date Time Provider Department Center   7/21/2025  1:30 PM Geraldo Myers MD TRAIL PC [facility] DEP        (If no appt send self scheduling link. .REFILLAPPT)  Scheduling request sent?     [] Yes  [] No    Does patient need updated?  [] Yes  [] No

## 2025-07-10 ENCOUNTER — TRANSCRIBE ORDERS (OUTPATIENT)
Dept: ADMINISTRATIVE | Age: 70
End: 2025-07-10

## 2025-07-10 DIAGNOSIS — C66.2 MALIGNANT NEOPLASM OF LEFT URETER (HCC): Primary | ICD-10-CM

## 2025-07-16 RX ORDER — FLUTICASONE PROPIONATE 50 MCG
1 SPRAY, SUSPENSION (ML) NASAL DAILY
Qty: 16 G | Refills: 1 | Status: SHIPPED | OUTPATIENT
Start: 2025-07-16

## 2025-07-16 NOTE — TELEPHONE ENCOUNTER
Name of Medication(s) Requested:  Requested Prescriptions     Pending Prescriptions Disp Refills    fluticasone (FLONASE) 50 MCG/ACT nasal spray [Pharmacy Med Name: FLUTICASONE PROP 50 MCG SPR 50 MALIK] 16 g 11     Sig: INSTILL 1 SPRAY INTO EACH NOSTRIL ONCE DAILY       Medication is on current medication list Yes    Dosage and directions were verified? Yes    Quantity verified: 30 day supply     Pharmacy Verified?  Yes    Last Appointment:  9/19/2024    Future appts:  Future Appointments   Date Time Provider Department Center   7/21/2025  1:30 PM Geraldo Myers MD TRAIL PC [facility]   7/28/2025 12:00 PM [facility] CT RM 3 [facility] CT [facility]        (If no appt send self scheduling link. .REFILLAPPT)  Scheduling request sent?     [] Yes  [] No    Does patient need updated?  [] Yes  [] No

## 2025-07-17 ENCOUNTER — TELEPHONE (OUTPATIENT)
Dept: PRIMARY CARE CLINIC | Age: 70
End: 2025-07-17

## 2025-07-17 RX ORDER — LACTULOSE 10 G/15ML
20 SOLUTION ORAL 3 TIMES DAILY PRN
Qty: 473 ML | Refills: 3 | Status: SHIPPED | OUTPATIENT
Start: 2025-07-17

## 2025-07-17 NOTE — TELEPHONE ENCOUNTER
Okay. I sent eRx to [facility]. She can take as much or as little as is effective.

## 2025-07-17 NOTE — TELEPHONE ENCOUNTER
Pt calling she is requesting a rx for lactulose sol 10gm/15ml sig 30ml q6h prn   [facility] [place]    advise

## 2025-07-21 ENCOUNTER — OFFICE VISIT (OUTPATIENT)
Dept: PRIMARY CARE CLINIC | Age: 70
End: 2025-07-21
Payer: MEDICAID

## 2025-07-21 VITALS
SYSTOLIC BLOOD PRESSURE: 150 MMHG | BODY MASS INDEX: 31.66 KG/M2 | RESPIRATION RATE: 16 BRPM | HEART RATE: 73 BPM | TEMPERATURE: 97.5 F | DIASTOLIC BLOOD PRESSURE: 100 MMHG | HEIGHT: 66 IN | WEIGHT: 197 LBS | OXYGEN SATURATION: 97 %

## 2025-07-21 DIAGNOSIS — N20.0 RENAL CALCULI: ICD-10-CM

## 2025-07-21 DIAGNOSIS — I10 PRIMARY HYPERTENSION: ICD-10-CM

## 2025-07-21 DIAGNOSIS — N39.0 RECURRENT UTI: ICD-10-CM

## 2025-07-21 DIAGNOSIS — G35 MULTIPLE SCLEROSIS (HCC): Primary | ICD-10-CM

## 2025-07-21 DIAGNOSIS — G82.20 PARAPLEGIA (HCC): ICD-10-CM

## 2025-07-21 DIAGNOSIS — K59.00 CONSTIPATION, UNSPECIFIED CONSTIPATION TYPE: ICD-10-CM

## 2025-07-21 PROBLEM — I48.0 PAROXYSMAL ATRIAL FIBRILLATION (HCC): Status: RESOLVED | Noted: 2025-03-31 | Resolved: 2025-07-21

## 2025-07-21 PROBLEM — M62.50 MUSCLE ATROPHY: Status: ACTIVE | Noted: 2020-12-28

## 2025-07-21 PROBLEM — C67.9 MALIGNANT NEOPLASM OF URINARY BLADDER (HCC): Status: RESOLVED | Noted: 2024-06-22 | Resolved: 2025-07-21

## 2025-07-21 PROBLEM — J44.9 CHRONIC OBSTRUCTIVE PULMONARY DISEASE, UNSPECIFIED COPD TYPE (HCC): Status: RESOLVED | Noted: 2025-03-31 | Resolved: 2025-07-21

## 2025-07-21 PROCEDURE — 3077F SYST BP >= 140 MM HG: CPT | Performed by: FAMILY MEDICINE

## 2025-07-21 PROCEDURE — 36415 COLL VENOUS BLD VENIPUNCTURE: CPT | Performed by: FAMILY MEDICINE

## 2025-07-21 PROCEDURE — 3080F DIAST BP >= 90 MM HG: CPT | Performed by: FAMILY MEDICINE

## 2025-07-21 PROCEDURE — 1123F ACP DISCUSS/DSCN MKR DOCD: CPT | Performed by: FAMILY MEDICINE

## 2025-07-21 PROCEDURE — 99214 OFFICE O/P EST MOD 30 MIN: CPT | Performed by: FAMILY MEDICINE

## 2025-07-21 RX ORDER — GUAIFENESIN 400 MG/1
400 TABLET ORAL 4 TIMES DAILY PRN
Qty: 60 TABLET | Refills: 5 | Status: SHIPPED | OUTPATIENT
Start: 2025-07-21

## 2025-07-21 RX ORDER — LOSARTAN POTASSIUM 50 MG/1
50 TABLET ORAL DAILY
Qty: 90 TABLET | Refills: 3 | Status: SHIPPED | OUTPATIENT
Start: 2025-07-21

## 2025-07-21 RX ORDER — NITROFURANTOIN 25; 75 MG/1; MG/1
100 CAPSULE ORAL 2 TIMES DAILY
Qty: 20 CAPSULE | Refills: 0 | Status: SHIPPED | OUTPATIENT
Start: 2025-07-21 | End: 2025-07-31

## 2025-07-21 SDOH — ECONOMIC STABILITY: FOOD INSECURITY: WITHIN THE PAST 12 MONTHS, THE FOOD YOU BOUGHT JUST DIDN'T LAST AND YOU DIDN'T HAVE MONEY TO GET MORE.: NEVER TRUE

## 2025-07-21 SDOH — ECONOMIC STABILITY: FOOD INSECURITY: WITHIN THE PAST 12 MONTHS, YOU WORRIED THAT YOUR FOOD WOULD RUN OUT BEFORE YOU GOT MONEY TO BUY MORE.: NEVER TRUE

## 2025-07-21 ASSESSMENT — PATIENT HEALTH QUESTIONNAIRE - PHQ9
1. LITTLE INTEREST OR PLEASURE IN DOING THINGS: NOT AT ALL
SUM OF ALL RESPONSES TO PHQ QUESTIONS 1-9: 0
2. FEELING DOWN, DEPRESSED OR HOPELESS: NOT AT ALL
SUM OF ALL RESPONSES TO PHQ QUESTIONS 1-9: 0

## 2025-07-21 NOTE — PROGRESS NOTES
7/21/2025     Chief Complaint   Patient presents with    Urinary Tract Infection    Kidney Problem     Pain     Hypertension     HPI:  Patient comes in today reporting that she was discovered with left kidney neoplasm. She is scheduled to see Dr. Howard in 2 weeks or so. She is having pain in the left flank. She is also having UTI symptoms with incontinence at times.  She reports that the bladder cancer she had last fall is cured.  She continues to have some skin boils but she is currently better. Otherwise she is not having any difficulty with breathing, chest pain, abdominal pain or constipation.     REVIEW OF SYSTEMS: as per HPI.    Past Medical History:   Diagnosis Date    Arthritis     Asthma     follows with PCP    Cancer (HCC)     bladder    COPD (chronic obstructive pulmonary disease) (HCC)     Follows with PCP    Eczema     Fall 05/26/2011    Gout     Hypertension     Kidney stone     Multiple sclerosis (HCC)     Prolonged emergence from general anesthesia     Scoliosis     Spinal stenosis     UTI (urinary tract infection)      Past Surgical History:   Procedure Laterality Date    APPENDECTOMY      BLADDER SURGERY Left 7/6/2023    CYSTOSCOPY RETROGRADE PYELOGRAM  left STENT REMOVAL, PLACEMENT OF LEFT STENT performed [place] Deng [place] [facility] [place]    CHOLECYSTECTOMY      CYSTOSCOPY N/A 4/4/2024    BLADDER BIOPSY/ RESECTION OF BLADDER TUMOR performed by Ruy Deng MD at [facility] [place]    CYSTOSCOPY N/A 6/21/2024    CYSTOSCOPY RETROGRADE PYELOGRAM BLADDER BIOPSY FULGURATION GEMCITABINE INSTALLATION performed by Tony Howard DO at [facility] OR    CYSTOSCOPY INSERTION / REMOVAL STENT / STONE Right 07/26/2020    CYSTOSCOPY RETROGRADE PYELOGRAM STENT INSERTION LEFT performed by Tony Howard DO at [facility] OR    HYSTERECTOMY (CERVIX STATUS UNKNOWN)      LITHOTRIPSY Left 08/07/2020    CYSTOSCOPY RETROGRADE PYELOGRAM URETEROSCOPY  LEFT J-STENT EXCHANGE, INSERTION JUNIOR CATHETER---FACILITY------ performed by Tony COVARRUBIAS

## 2025-07-22 LAB
ALBUMIN: 3.6 G/DL (ref 3.5–5.2)
ALP BLD-CCNC: 92 U/L (ref 35–104)
ALT SERPL-CCNC: 16 U/L (ref 0–35)
ANION GAP SERPL CALCULATED.3IONS-SCNC: 11 MMOL/L (ref 7–16)
AST SERPL-CCNC: 24 U/L (ref 0–35)
BASOPHILS ABSOLUTE: 0.13 K/UL (ref 0–0.2)
BASOPHILS RELATIVE PERCENT: 1 % (ref 0–2)
BILIRUB SERPL-MCNC: 0.3 MG/DL (ref 0–1.2)
BUN BLDV-MCNC: 12 MG/DL (ref 8–23)
CALCIUM SERPL-MCNC: 9.6 MG/DL (ref 8.8–10.2)
CHLORIDE BLD-SCNC: 105 MMOL/L (ref 98–107)
CHOLESTEROL, TOTAL: 186 MG/DL
CO2: 22 MMOL/L (ref 22–29)
CREAT SERPL-MCNC: 0.8 MG/DL (ref 0.5–1)
EOSINOPHILS ABSOLUTE: 0.7 K/UL (ref 0.05–0.5)
EOSINOPHILS RELATIVE PERCENT: 5 % (ref 0–6)
GFR, ESTIMATED: 79 ML/MIN/1.73M2
GLUCOSE BLD-MCNC: 87 MG/DL (ref 74–99)
HCT VFR BLD CALC: 39.1 % (ref 34–48)
HDLC SERPL-MCNC: 31 MG/DL
HEMOGLOBIN: 11.9 G/DL (ref 11.5–15.5)
IMMATURE GRANULOCYTES %: 1 % (ref 0–5)
IMMATURE GRANULOCYTES ABSOLUTE: 0.14 K/UL (ref 0–0.58)
LDL CHOLESTEROL: 87 MG/DL
LYMPHOCYTES ABSOLUTE: 3.36 K/UL (ref 1.5–4)
LYMPHOCYTES RELATIVE PERCENT: 22 % (ref 20–42)
MCH RBC QN AUTO: 31.2 PG (ref 26–35)
MCHC RBC AUTO-ENTMCNC: 30.4 G/DL (ref 32–34.5)
MCV RBC AUTO: 102.6 FL (ref 80–99.9)
MONOCYTES ABSOLUTE: 1.51 K/UL (ref 0.1–0.95)
MONOCYTES RELATIVE PERCENT: 10 % (ref 2–12)
NEUTROPHILS ABSOLUTE: 9.57 K/UL (ref 1.8–7.3)
NEUTROPHILS RELATIVE PERCENT: 62 % (ref 43–80)
PDW BLD-RTO: 14.7 % (ref 11.5–15)
PLATELET, FLUORESCENCE: 445 K/UL (ref 130–450)
PMV BLD AUTO: 10.5 FL (ref 7–12)
POTASSIUM SERPL-SCNC: 4.4 MMOL/L (ref 3.5–5.1)
RBC # BLD: 3.81 M/UL (ref 3.5–5.5)
SODIUM BLD-SCNC: 139 MMOL/L (ref 136–145)
TOTAL PROTEIN: 7 G/DL (ref 6.4–8.3)
TRIGL SERPL-MCNC: 341 MG/DL
TSH SERPL DL<=0.05 MIU/L-ACNC: 1.05 UIU/ML (ref 0.27–4.2)
VLDLC SERPL CALC-MCNC: 68 MG/DL
WBC # BLD: 15.4 K/UL (ref 4.5–11.5)

## 2025-07-23 DIAGNOSIS — B37.9 CANDIDIASIS: Primary | ICD-10-CM

## 2025-07-23 RX ORDER — NYSTATIN 100000 [USP'U]/G
POWDER TOPICAL
Qty: 30 G | Refills: 2 | Status: SHIPPED | OUTPATIENT
Start: 2025-07-23

## 2025-07-28 ENCOUNTER — TELEPHONE (OUTPATIENT)
Dept: PRIMARY CARE CLINIC | Age: 70
End: 2025-07-28

## 2025-07-28 NOTE — TELEPHONE ENCOUNTER
Patient will come in 8-1-2025 due to ride.  She is instructed to go to ER if she feels readings are to high.

## 2025-07-28 NOTE — TELEPHONE ENCOUNTER
Patient phoned in since 7/21/2025 visit she has been checking her BP's and they are running 190-200's/101-102.  She has a CT scan today which she is going to cancel because of the readings but wants to know should she increase a med or what to do.    She is taking Losartan 50mg qd and Metoprolol 25mg bid.    Please advise.

## 2025-08-01 ENCOUNTER — OFFICE VISIT (OUTPATIENT)
Dept: PRIMARY CARE CLINIC | Age: 70
End: 2025-08-01

## 2025-08-01 VITALS
OXYGEN SATURATION: 96 % | TEMPERATURE: 97 F | BODY MASS INDEX: 31.66 KG/M2 | WEIGHT: 197 LBS | DIASTOLIC BLOOD PRESSURE: 80 MMHG | SYSTOLIC BLOOD PRESSURE: 137 MMHG | HEART RATE: 63 BPM | HEIGHT: 66 IN | RESPIRATION RATE: 18 BRPM

## 2025-08-01 DIAGNOSIS — B37.9 CANDIDIASIS: ICD-10-CM

## 2025-08-01 DIAGNOSIS — I10 PRIMARY HYPERTENSION: Primary | ICD-10-CM

## 2025-08-01 DIAGNOSIS — N76.0 ACUTE VAGINITIS: ICD-10-CM

## 2025-08-01 RX ORDER — NYSTATIN 100000 U/G
CREAM TOPICAL
Qty: 30 G | Refills: 0 | Status: SHIPPED | OUTPATIENT
Start: 2025-08-01

## 2025-08-01 RX ORDER — FLUCONAZOLE 150 MG/1
150 TABLET ORAL ONCE
Qty: 1 TABLET | Refills: 0 | Status: SHIPPED | OUTPATIENT
Start: 2025-08-01 | End: 2025-08-01

## 2025-08-01 NOTE — PROGRESS NOTES
Hypertension:  Patient is here for follow up chronic hypertension.  This is  generally controlled on current medication regimen. Takes meds as directed and tolerates them well. She is taking the losartan at night. Her bp at home has been variable. She has systolic readings recorded from 130s-170s, diastolic 70s-90s.  Most recent labs reviewed with patient and are not remarkable.  No symptoms from htn standpoint per ROS.  Patient is not compliant with lifestyle modifications.  Patient does not smoke.  Comorbid conditions include obesity.    She complains of pruritic rash on left buttock. She is currently on macrobid, and has vaginitis.     Patient's past medical, surgical, social and/or family history reviewed, updated in chart, and are non-contributory (unless otherwise stated).  Medications and allergies also reviewed and updated in chart.        Review of Systems:  Constitutional:  No fever, no fatigue, no chills, no headaches, no weight change  Dermatology:  + rash, no mole, no dry or sensitive skin  ENT:  No cough, no sore throat, no sinus pain, no runny nose, no ear pain  Cardiology:  No chest pain, no palpitations, no leg edema, no shortness of breath, no PND  Gastroenterology:  No dysphagia, no abdominal pain, no nausea, no vomiting, no constipation, no diarrhea, no heartburn  Musculoskeletal:  No joint pain, no leg cramps, no back pain, no muscle aches  Respiratory:  No shortness of breath, no orthopnea, no wheezing, no GUTIERRES, no hemoptysis  Urology:  No blood in the urine, no urinary frequency, no urinary incontinence, no urinary urgency, no nocturia, no dysuria    Vitals:    08/01/25 0907 08/01/25 0909   BP: (!) 166/77 137/80   Pulse: 63    Resp: 18    Temp: 97 °F (36.1 °C)    TempSrc: Temporal    SpO2: 96%    Weight: 89.4 kg (197 lb)    Height: 1.676 m (5' 5.98\")        Physical Exam  Constitutional:       General: She is not in acute distress.     Appearance: Normal appearance. She is well-developed.

## 2025-08-04 ENCOUNTER — TELEPHONE (OUTPATIENT)
Dept: PRIMARY CARE CLINIC | Age: 70
End: 2025-08-04

## 2025-08-04 DIAGNOSIS — B37.9 CANDIDIASIS: Primary | ICD-10-CM

## 2025-08-04 RX ORDER — FLUCONAZOLE 150 MG/1
150 TABLET ORAL DAILY
Qty: 3 TABLET | Refills: 0 | Status: SHIPPED | OUTPATIENT
Start: 2025-08-04 | End: 2025-08-07

## 2025-08-07 ENCOUNTER — TELEPHONE (OUTPATIENT)
Dept: PRIMARY CARE CLINIC | Age: 70
End: 2025-08-07

## 2025-08-07 RX ORDER — HYDROXYZINE HYDROCHLORIDE 25 MG/1
25 TABLET, FILM COATED ORAL 4 TIMES DAILY PRN
Qty: 60 TABLET | Refills: 2 | Status: SHIPPED | OUTPATIENT
Start: 2025-08-07

## 2025-08-07 RX ORDER — MUPIROCIN 2 %
OINTMENT (GRAM) TOPICAL
Qty: 22 G | Refills: 1 | Status: SHIPPED | OUTPATIENT
Start: 2025-08-07 | End: 2025-08-14

## 2025-08-07 RX ORDER — KETOCONAZOLE 20 MG/G
CREAM TOPICAL
Qty: 30 G | Refills: 1 | Status: SHIPPED | OUTPATIENT
Start: 2025-08-07

## 2025-08-11 ENCOUNTER — HOSPITAL ENCOUNTER (EMERGENCY)
Age: 70
Discharge: HOME | End: 2025-08-11
Attending: EMERGENCY MEDICINE
Payer: MEDICAID

## 2025-08-11 ENCOUNTER — TELEPHONE (OUTPATIENT)
Dept: PRIMARY CARE CLINIC | Age: 70
End: 2025-08-11

## 2025-08-11 ENCOUNTER — APPOINTMENT (OUTPATIENT)
Dept: GENERAL RADIOLOGY | Age: 70
End: 2025-08-11
Payer: MEDICAID

## 2025-08-11 VITALS
SYSTOLIC BLOOD PRESSURE: 126 MMHG | DIASTOLIC BLOOD PRESSURE: 77 MMHG | TEMPERATURE: 98.6 F | OXYGEN SATURATION: 97 % | HEART RATE: 81 BPM | RESPIRATION RATE: 18 BRPM

## 2025-08-11 DIAGNOSIS — L03.116 CELLULITIS OF LEFT LOWER EXTREMITY: Primary | ICD-10-CM

## 2025-08-11 LAB
ALBUMIN SERPL-MCNC: 3.6 G/DL (ref 3.5–5.2)
ALP SERPL-CCNC: 99 U/L (ref 35–104)
ALT SERPL-CCNC: 23 U/L (ref 0–35)
ANION GAP SERPL CALCULATED.3IONS-SCNC: 11 MMOL/L (ref 7–16)
AST SERPL-CCNC: 26 U/L (ref 0–35)
BASOPHILS # BLD: 0.12 K/UL (ref 0–0.2)
BASOPHILS NFR BLD: 1 % (ref 0–2)
BILIRUB SERPL-MCNC: 0.4 MG/DL (ref 0–1.2)
BUN SERPL-MCNC: 17 MG/DL (ref 8–23)
CALCIUM SERPL-MCNC: 9.8 MG/DL (ref 8.8–10.2)
CHLORIDE SERPL-SCNC: 106 MMOL/L (ref 98–107)
CO2 SERPL-SCNC: 22 MMOL/L (ref 22–29)
CREAT SERPL-MCNC: 0.9 MG/DL (ref 0.5–1)
EOSINOPHIL # BLD: 0.59 K/UL (ref 0.05–0.5)
EOSINOPHILS RELATIVE PERCENT: 4 % (ref 0–6)
ERYTHROCYTE [DISTWIDTH] IN BLOOD BY AUTOMATED COUNT: 15.4 % (ref 11.5–15)
GFR, ESTIMATED: 69 ML/MIN/1.73M2
GLUCOSE SERPL-MCNC: 95 MG/DL (ref 74–99)
HCT VFR BLD AUTO: 32.2 % (ref 34–48)
HGB BLD-MCNC: 11.9 G/DL (ref 11.5–15.5)
IMM GRANULOCYTES # BLD AUTO: 0.16 K/UL (ref 0–0.58)
IMM GRANULOCYTES NFR BLD: 1 % (ref 0–5)
LACTATE BLDV-SCNC: 1.2 MMOL/L (ref 0.5–2.2)
LYMPHOCYTES NFR BLD: 2.26 K/UL (ref 1.5–4)
LYMPHOCYTES RELATIVE PERCENT: 15 % (ref 20–42)
MCH RBC QN AUTO: 38.1 PG (ref 26–35)
MCHC RBC AUTO-ENTMCNC: 37 G/DL (ref 32–34.5)
MCV RBC AUTO: 103.2 FL (ref 80–99.9)
MONOCYTES NFR BLD: 1.47 K/UL (ref 0.1–0.95)
MONOCYTES NFR BLD: 10 % (ref 2–12)
NEUTROPHILS NFR BLD: 70 % (ref 43–80)
NEUTS SEG NFR BLD: 10.59 K/UL (ref 1.8–7.3)
PLATELET # BLD AUTO: 448 K/UL (ref 130–450)
PMV BLD AUTO: 10 FL (ref 7–12)
POTASSIUM SERPL-SCNC: 4.2 MMOL/L (ref 3.5–5.1)
PROT SERPL-MCNC: 7.4 G/DL (ref 6.4–8.3)
RBC # BLD AUTO: 3.12 M/UL (ref 3.5–5.5)
SODIUM SERPL-SCNC: 138 MMOL/L (ref 136–145)
WBC OTHER # BLD: 15.2 K/UL (ref 4.5–11.5)

## 2025-08-11 PROCEDURE — 73552 X-RAY EXAM OF FEMUR 2/>: CPT

## 2025-08-11 PROCEDURE — 80053 COMPREHEN METABOLIC PANEL: CPT

## 2025-08-11 PROCEDURE — 6370000000 HC RX 637 (ALT 250 FOR IP)

## 2025-08-11 PROCEDURE — 83605 ASSAY OF LACTIC ACID: CPT

## 2025-08-11 PROCEDURE — 99284 EMERGENCY DEPT VISIT MOD MDM: CPT

## 2025-08-11 PROCEDURE — 85025 COMPLETE CBC W/AUTO DIFF WBC: CPT

## 2025-08-11 RX ORDER — DOXYCYCLINE HYCLATE 100 MG
100 TABLET ORAL 2 TIMES DAILY
Qty: 14 TABLET | Refills: 0 | Status: SHIPPED | OUTPATIENT
Start: 2025-08-11 | End: 2025-08-18

## 2025-08-11 RX ORDER — FLUCONAZOLE 150 MG/1
150 TABLET ORAL ONCE
Status: COMPLETED | OUTPATIENT
Start: 2025-08-11 | End: 2025-08-11

## 2025-08-11 RX ORDER — CEPHALEXIN 500 MG/1
500 CAPSULE ORAL 4 TIMES DAILY
Qty: 28 CAPSULE | Refills: 0 | Status: SHIPPED | OUTPATIENT
Start: 2025-08-11 | End: 2025-08-18

## 2025-08-11 RX ADMIN — FLUCONAZOLE 150 MG: 150 TABLET ORAL at 14:29

## 2025-08-11 ASSESSMENT — PAIN SCALES - GENERAL: PAINLEVEL_OUTOF10: 0

## 2025-08-14 ENCOUNTER — ANESTHESIA EVENT (OUTPATIENT)
Dept: OPERATING ROOM | Age: 70
End: 2025-08-14
Payer: MEDICAID

## 2025-08-14 ASSESSMENT — LIFESTYLE VARIABLES: SMOKING_STATUS: 0

## 2025-08-15 ENCOUNTER — ANESTHESIA (OUTPATIENT)
Dept: OPERATING ROOM | Age: 70
End: 2025-08-15
Payer: MEDICAID

## 2025-08-15 ENCOUNTER — HOSPITAL ENCOUNTER (OUTPATIENT)
Dept: GENERAL RADIOLOGY | Age: 70
Setting detail: OUTPATIENT SURGERY
Discharge: HOME | End: 2025-08-17
Attending: UROLOGY
Payer: MEDICAID

## 2025-08-15 ENCOUNTER — HOSPITAL ENCOUNTER (OUTPATIENT)
Age: 70
Setting detail: OUTPATIENT SURGERY
Discharge: HOME | End: 2025-08-15
Attending: UROLOGY | Admitting: UROLOGY
Payer: MEDICAID

## 2025-08-15 VITALS
SYSTOLIC BLOOD PRESSURE: 174 MMHG | RESPIRATION RATE: 15 BRPM | BODY MASS INDEX: 31.34 KG/M2 | TEMPERATURE: 97.8 F | OXYGEN SATURATION: 97 % | WEIGHT: 195 LBS | HEIGHT: 66 IN | HEART RATE: 74 BPM | DIASTOLIC BLOOD PRESSURE: 78 MMHG

## 2025-08-15 DIAGNOSIS — C67.9 MALIGNANT NEOPLASM OF URINARY BLADDER, UNSPECIFIED SITE (HCC): ICD-10-CM

## 2025-08-15 DIAGNOSIS — R52 PAIN: ICD-10-CM

## 2025-08-15 PROCEDURE — 7100000011 HC PHASE II RECOVERY - ADDTL 15 MIN: Performed by: UROLOGY

## 2025-08-15 PROCEDURE — 3700000000 HC ANESTHESIA ATTENDED CARE: Performed by: UROLOGY

## 2025-08-15 PROCEDURE — 2580000003 HC RX 258

## 2025-08-15 PROCEDURE — 3600000002 HC SURGERY LEVEL 2 BASE: Performed by: UROLOGY

## 2025-08-15 PROCEDURE — 7100000010 HC PHASE II RECOVERY - FIRST 15 MIN: Performed by: UROLOGY

## 2025-08-15 PROCEDURE — 6370000000 HC RX 637 (ALT 250 FOR IP): Performed by: ANESTHESIOLOGY

## 2025-08-15 PROCEDURE — C1758 CATHETER, URETERAL: HCPCS | Performed by: UROLOGY

## 2025-08-15 PROCEDURE — C1769 GUIDE WIRE: HCPCS | Performed by: UROLOGY

## 2025-08-15 PROCEDURE — 3600000012 HC SURGERY LEVEL 2 ADDTL 15MIN: Performed by: UROLOGY

## 2025-08-15 PROCEDURE — 6360000002 HC RX W HCPCS: Performed by: UROLOGY

## 2025-08-15 PROCEDURE — 3700000001 HC ADD 15 MINUTES (ANESTHESIA): Performed by: UROLOGY

## 2025-08-15 PROCEDURE — 88112 CYTOPATH CELL ENHANCE TECH: CPT

## 2025-08-15 PROCEDURE — 6360000002 HC RX W HCPCS

## 2025-08-15 PROCEDURE — 2709999900 HC NON-CHARGEABLE SUPPLY: Performed by: UROLOGY

## 2025-08-15 PROCEDURE — 74420 UROGRAPHY RTRGR +-KUB: CPT

## 2025-08-15 PROCEDURE — 2500000003 HC RX 250 WO HCPCS: Performed by: UROLOGY

## 2025-08-15 PROCEDURE — C2617 STENT, NON-COR, TEM W/O DEL: HCPCS | Performed by: UROLOGY

## 2025-08-15 DEVICE — STENT URET 6FR L24CM PERCFLX HYDR+ DBL PGTL THRD 2: Type: IMPLANTABLE DEVICE | Site: URETER | Status: FUNCTIONAL

## 2025-08-15 RX ORDER — SODIUM CHLORIDE 9 MG/ML
INJECTION, SOLUTION INTRAVENOUS PRN
Status: DISCONTINUED | OUTPATIENT
Start: 2025-08-15 | End: 2025-08-15 | Stop reason: HOSPADM

## 2025-08-15 RX ORDER — FENTANYL CITRATE 50 UG/ML
25 INJECTION, SOLUTION INTRAMUSCULAR; INTRAVENOUS EVERY 5 MIN PRN
Status: DISCONTINUED | OUTPATIENT
Start: 2025-08-15 | End: 2025-08-15 | Stop reason: HOSPADM

## 2025-08-15 RX ORDER — SODIUM CHLORIDE 9 MG/ML
INJECTION, SOLUTION INTRAVENOUS
Status: DISCONTINUED | OUTPATIENT
Start: 2025-08-15 | End: 2025-08-15 | Stop reason: SDUPTHER

## 2025-08-15 RX ORDER — PHENYLEPHRINE HCL IN 0.9% NACL 1 MG/10 ML
SYRINGE (ML) INTRAVENOUS
Status: DISCONTINUED | OUTPATIENT
Start: 2025-08-15 | End: 2025-08-15 | Stop reason: SDUPTHER

## 2025-08-15 RX ORDER — LABETALOL HYDROCHLORIDE 5 MG/ML
5 INJECTION, SOLUTION INTRAVENOUS
Status: DISCONTINUED | OUTPATIENT
Start: 2025-08-15 | End: 2025-08-15 | Stop reason: HOSPADM

## 2025-08-15 RX ORDER — PROPOFOL 10 MG/ML
INJECTION, EMULSION INTRAVENOUS
Status: DISCONTINUED | OUTPATIENT
Start: 2025-08-15 | End: 2025-08-15 | Stop reason: SDUPTHER

## 2025-08-15 RX ORDER — PROCHLORPERAZINE EDISYLATE 5 MG/ML
5 INJECTION INTRAMUSCULAR; INTRAVENOUS
Status: DISCONTINUED | OUTPATIENT
Start: 2025-08-15 | End: 2025-08-15 | Stop reason: HOSPADM

## 2025-08-15 RX ORDER — SODIUM CHLORIDE 0.9 % (FLUSH) 0.9 %
5-40 SYRINGE (ML) INJECTION PRN
Status: DISCONTINUED | OUTPATIENT
Start: 2025-08-15 | End: 2025-08-15 | Stop reason: HOSPADM

## 2025-08-15 RX ORDER — OXYCODONE AND ACETAMINOPHEN 5; 325 MG/1; MG/1
1 TABLET ORAL ONCE
Refills: 0 | Status: COMPLETED | OUTPATIENT
Start: 2025-08-15 | End: 2025-08-15

## 2025-08-15 RX ORDER — CIPROFLOXACIN 500 MG/1
500 TABLET, FILM COATED ORAL 2 TIMES DAILY
Qty: 10 TABLET | Refills: 0 | Status: SHIPPED | OUTPATIENT
Start: 2025-08-15 | End: 2025-08-20

## 2025-08-15 RX ORDER — SODIUM CHLORIDE 0.9 % (FLUSH) 0.9 %
5-40 SYRINGE (ML) INJECTION EVERY 12 HOURS SCHEDULED
Status: DISCONTINUED | OUTPATIENT
Start: 2025-08-15 | End: 2025-08-15 | Stop reason: HOSPADM

## 2025-08-15 RX ORDER — HYDRALAZINE HYDROCHLORIDE 20 MG/ML
5 INJECTION INTRAMUSCULAR; INTRAVENOUS
Status: DISCONTINUED | OUTPATIENT
Start: 2025-08-15 | End: 2025-08-15 | Stop reason: HOSPADM

## 2025-08-15 RX ORDER — DIPHENHYDRAMINE HYDROCHLORIDE 50 MG/ML
12.5 INJECTION, SOLUTION INTRAMUSCULAR; INTRAVENOUS
Status: DISCONTINUED | OUTPATIENT
Start: 2025-08-15 | End: 2025-08-15 | Stop reason: HOSPADM

## 2025-08-15 RX ADMIN — OXYCODONE HYDROCHLORIDE AND ACETAMINOPHEN 1 TABLET: 5; 325 TABLET ORAL at 14:24

## 2025-08-15 RX ADMIN — Medication 100 MCG: at 13:06

## 2025-08-15 RX ADMIN — PROPOFOL 30 MG: 10 INJECTION, EMULSION INTRAVENOUS at 12:44

## 2025-08-15 RX ADMIN — Medication 100 MCG: at 13:04

## 2025-08-15 RX ADMIN — PROPOFOL 100 MCG/KG/MIN: 10 INJECTION, EMULSION INTRAVENOUS at 12:45

## 2025-08-15 RX ADMIN — PROPOFOL 30 MG: 10 INJECTION, EMULSION INTRAVENOUS at 12:55

## 2025-08-15 RX ADMIN — Medication 100 MCG: at 13:11

## 2025-08-15 RX ADMIN — SODIUM CHLORIDE: 9 INJECTION, SOLUTION INTRAVENOUS at 12:36

## 2025-08-15 RX ADMIN — WATER 1000 MG: 1 INJECTION INTRAMUSCULAR; INTRAVENOUS; SUBCUTANEOUS at 12:45

## 2025-08-15 RX ADMIN — PROPOFOL 10 MG: 10 INJECTION, EMULSION INTRAVENOUS at 12:49

## 2025-08-15 ASSESSMENT — PAIN - FUNCTIONAL ASSESSMENT
PAIN_FUNCTIONAL_ASSESSMENT: 0-10
PAIN_FUNCTIONAL_ASSESSMENT: 0-10

## 2025-08-15 ASSESSMENT — PAIN SCALES - GENERAL: PAINLEVEL_OUTOF10: 4

## 2025-08-15 ASSESSMENT — PAIN DESCRIPTION - DESCRIPTORS
DESCRIPTORS: PRESSURE
DESCRIPTORS: PRESSURE

## 2025-08-15 ASSESSMENT — COPD QUESTIONNAIRES: CAT_SEVERITY: MILD

## 2025-08-19 LAB — NON-GYN CYTOLOGY REPORT: NORMAL

## 2025-08-21 RX ORDER — PSEUDOEPHEDRINE HCL 30 MG
200 TABLET ORAL DAILY
Qty: 180 CAPSULE | Refills: 3 | Status: SHIPPED | OUTPATIENT
Start: 2025-08-21

## 2025-08-25 ENCOUNTER — TELEPHONE (OUTPATIENT)
Dept: PRIMARY CARE CLINIC | Age: 70
End: 2025-08-25

## (undated) DEVICE — SOLUTION IRRIG 1000ML STRL H2O USP PLAS POUR BTL

## (undated) DEVICE — SOLUTION IRRIG 3000ML STRL H2O USP UROMATIC PLAS CONT

## (undated) DEVICE — Z INACTIVE USE 2635503 SOLUTION IRRIG 3000ML ST H2O USP UROMATIC PLAS CONT

## (undated) DEVICE — MEDIA CONTRAST ISOVUE 300 100ML

## (undated) DEVICE — GUIDEWIRE URO L150CM DIA0.025IN STD NIT HYDRPHLC STR TIP

## (undated) DEVICE — ELECTRODE ES 28FR WIRE 0.012IN BLU R ANG CUT LOOP STBL FOR

## (undated) DEVICE — GAUZE,SPONGE,4"X4",16PLY,XRAY,STRL,LF: Brand: MEDLINE

## (undated) DEVICE — SOLUTION SCRB 4OZ 4% CHG H2O AIDED FOR PREOPERATIVE SKIN

## (undated) DEVICE — CYSTO PACK: Brand: MEDLINE INDUSTRIES, INC.

## (undated) DEVICE — GLOVE ORANGE PI 7 1/2   MSG9075

## (undated) DEVICE — GOWN,SIRUS,FABRNF,2XL,18/CS: Brand: MEDLINE

## (undated) DEVICE — GAUZE,SPONGE,4"X4",8PLY,STRL,LF,10/TRAY: Brand: MEDLINE

## (undated) DEVICE — GOWN,SIRUS,NONRNF,SETINSLV,XL,20/CS: Brand: MEDLINE

## (undated) DEVICE — 4-PORT MANIFOLD: Brand: NEPTUNE 2

## (undated) DEVICE — GARMENT,MEDLINE,DVT,INT,CALF,MED, GEN2: Brand: MEDLINE

## (undated) DEVICE — Device

## (undated) DEVICE — DRAINBAG,ANTI-REFLUX TOWER,L/F,2000ML,LL: Brand: MEDLINE

## (undated) DEVICE — TOWEL,OR,DSP,ST,BLUE,STD,6/PK,12PK/CS: Brand: MEDLINE

## (undated) DEVICE — CYSTO: Brand: MEDLINE INDUSTRIES, INC.

## (undated) DEVICE — DILATOR/SHEATH SET: Brand: 8/10 DILATOR/SHEATH SET

## (undated) DEVICE — Z INACTIVATING USE 2488003 GUIDEWIRE ENDO L150CM DIA0.035IN STR TIP (QTY = EACH)

## (undated) DEVICE — GUIDEWIRE ENDOSCP L150CM DIA0.035IN TIP L15CM BENT PTFE

## (undated) DEVICE — SOLUTION IRRIG 3000ML 0.9% SOD CHL USP UROMATIC PLAS CONT

## (undated) DEVICE — SYRINGE,TOOMEY,IRRIGATION,70CC,STERILE: Brand: MEDLINE

## (undated) DEVICE — GOWN,SIRUS,FABRNF,XL,20/CS: Brand: MEDLINE

## (undated) DEVICE — BAG DRNGE COMB PK

## (undated) DEVICE — Z INACTIVE USE 2660664 SOLUTION IRRIG 3000ML 0.9% SOD CHL USP UROMATIC PLAS CONT

## (undated) DEVICE — CATHETER URET 5FR L70CM OPN END SGL LUMN INJ HUB FLEXIMA

## (undated) DEVICE — BASIC SINGLE BASIN 1-LF: Brand: MEDLINE INDUSTRIES, INC.

## (undated) DEVICE — CABLE ENDOSCP L10FT ACT DISP

## (undated) DEVICE — READY WET SKIN SCRUB TRAY-LF: Brand: MEDLINE INDUSTRIES, INC.

## (undated) DEVICE — COVER,LIGHT HANDLE,FLX,1/PK: Brand: MEDLINE INDUSTRIES, INC.

## (undated) DEVICE — GLOVE ORANGE PI 8   MSG9080

## (undated) DEVICE — CONE TIP URETERAL CATHETER: Brand: URETERAL CATHETER

## (undated) DEVICE — CAMERA STRYKER 1488 HD GEN

## (undated) DEVICE — MEDIA CONTRAST ISOVUE  300 10X50ML

## (undated) DEVICE — TUBING, SUCTION, 3/16" X 12', STRAIGHT: Brand: MEDLINE

## (undated) DEVICE — MEDICINE CUP, GRADUATED, STER: Brand: MEDLINE

## (undated) DEVICE — URETERAL ACCESS SHEATH SET: Brand: NAVIGATOR HD

## (undated) DEVICE — BAG DRAINAGE CONTAINER 15 LT FLUID COLLCTN

## (undated) DEVICE — HOSE CONN FOR WST MGMT SYS NEPTUNE 2

## (undated) DEVICE — ALLIGATOR FORCEPS WITH FLEXIBLE SHAFT: Brand: COOK

## (undated) DEVICE — Z DUP USE 2139333 GUIDEWIRE UROLOGICAL STR STD 0.035 IN X150 CM REG ZIPWIRE LF

## (undated) DEVICE — SOL IRR SOD CHL 0.9% TITAN XL CNTNR 3000ML

## (undated) DEVICE — CATHETER F BLLN 5CC 16FR 2 W HYDRGEL COAT LESS TRAUM LUB

## (undated) DEVICE — SYRINGE MED 30ML STD CLR PLAS LUERLOCK TIP N CTRL DISP

## (undated) DEVICE — SOLUTION IV IRRIG WATER 1000ML POUR BRL 2F7114

## (undated) DEVICE — TUR/ENDOSCOPIC CABLE, 10' (3.05 M): Brand: CONMED

## (undated) DEVICE — TUBING, SUCTION, 1/4" X 10', STRAIGHT: Brand: MEDLINE

## (undated) DEVICE — CATHETER URETH 24FR BLLN 5CC LTX 3 W F SPEC SHT RND TIP TWO

## (undated) DEVICE — SPECIMEN CUP W/LID: Brand: DEROYAL

## (undated) DEVICE — SPONGE GZ W4XL4IN 8 PLY 100% COTTON

## (undated) DEVICE — MANIFOLD SUCT 4 PRT 2 CANSTR FLTR DISP NEPTUNE 2

## (undated) DEVICE — SOLUTION IRRIG 1000ML H2O PIC PLAS SHATTERPROOF CONTAINER

## (undated) DEVICE — URETERAL STENT
Type: IMPLANTABLE DEVICE | Status: NON-FUNCTIONAL
Brand: POLARIS™ ULTRA

## (undated) DEVICE — GUIDEWIRE UROLOGICAL STR STD 0.035 IN X150 CM (QTY = BOX OF 5)

## (undated) DEVICE — TUBING SUCT L12FT DIA0.1875IN CONN UNIV W/ STR RIB CONN

## (undated) DEVICE — ELECTRODE PT RET AD L9FT HI MOIST COND ADH HYDRGEL CORDED

## (undated) DEVICE — GUIDEWIRE ENDO L150CM DIA0.035IN STR TIP (QTY = EACH)

## (undated) DEVICE — ELECTRODE ES VPR FOR USA ELITE SYS

## (undated) DEVICE — PLUG,CATHETER,DRAINAGE PROTECTOR,TUBE: Brand: MEDLINE

## (undated) DEVICE — SOLUTION SURG PREP ANTIMICROBIAL 4 OZ SKIN WND EXIDINE

## (undated) DEVICE — FLEXIVA  PULSE  AND  FLEXIVA  PULSE  TRACTIP  LASER  FIBERS  ARE  HIGH  POWER  SINGLE-USE FIBER: Brand: FLEXIVA PULSE ID

## (undated) DEVICE — GLOVE SURG SZ 75 STD WHT LTX SYN POLYMER BEAD REINF ANTI RL